# Patient Record
Sex: FEMALE | Race: WHITE | Employment: OTHER | URBAN - METROPOLITAN AREA
[De-identification: names, ages, dates, MRNs, and addresses within clinical notes are randomized per-mention and may not be internally consistent; named-entity substitution may affect disease eponyms.]

---

## 2021-05-03 ENCOUNTER — APPOINTMENT (OUTPATIENT)
Dept: GENERAL RADIOLOGY | Age: 71
DRG: 853 | End: 2021-05-03
Payer: MEDICARE

## 2021-05-03 ENCOUNTER — HOSPITAL ENCOUNTER (INPATIENT)
Age: 71
LOS: 25 days | Discharge: NURSING, REHAB OR LONG-TERM CARE | DRG: 853 | End: 2021-05-28
Attending: EMERGENCY MEDICINE | Admitting: FAMILY MEDICINE
Payer: MEDICARE

## 2021-05-03 ENCOUNTER — APPOINTMENT (OUTPATIENT)
Dept: MRI IMAGING | Age: 71
DRG: 853 | End: 2021-05-03
Payer: MEDICARE

## 2021-05-03 DIAGNOSIS — K44.0: ICD-10-CM

## 2021-05-03 DIAGNOSIS — I10 ESSENTIAL HYPERTENSION: ICD-10-CM

## 2021-05-03 DIAGNOSIS — K44.9 HIATAL HERNIA: ICD-10-CM

## 2021-05-03 DIAGNOSIS — K85.90 ACUTE PANCREATITIS, UNSPECIFIED COMPLICATION STATUS, UNSPECIFIED PANCREATITIS TYPE: Primary | ICD-10-CM

## 2021-05-03 DIAGNOSIS — J98.51 MEDIASTINITIS: ICD-10-CM

## 2021-05-03 DIAGNOSIS — K22.89 PARAESOPHAGEAL FLUID COLLECTION: ICD-10-CM

## 2021-05-03 DIAGNOSIS — N17.9 AKI (ACUTE KIDNEY INJURY) (HCC): ICD-10-CM

## 2021-05-03 DIAGNOSIS — E11.10 DIABETIC KETOACIDOSIS WITHOUT COMA ASSOCIATED WITH TYPE 2 DIABETES MELLITUS (HCC): ICD-10-CM

## 2021-05-03 DIAGNOSIS — K31.89: ICD-10-CM

## 2021-05-03 DIAGNOSIS — K46.0 HERNIA WITH OBSTRUCTION: ICD-10-CM

## 2021-05-03 LAB
ALBUMIN SERPL-MCNC: 3.3 G/DL (ref 3.5–5.2)
ALBUMIN SERPL-MCNC: 3.6 G/DL (ref 3.5–5.2)
ALBUMIN/GLOBULIN RATIO: 1.2 (ref 1–2.5)
ALLEN TEST: ABNORMAL
ALP BLD-CCNC: 77 U/L (ref 35–104)
ALT SERPL-CCNC: 14 U/L (ref 5–33)
AMMONIA: 19 UMOL/L (ref 11–51)
ANION GAP SERPL CALCULATED.3IONS-SCNC: 20 MMOL/L (ref 9–17)
ANION GAP SERPL CALCULATED.3IONS-SCNC: 24 MMOL/L (ref 9–17)
ANION GAP: 14 MMOL/L (ref 7–16)
AST SERPL-CCNC: 30 U/L
BETA-HYDROXYBUTYRATE: 2.85 MMOL/L (ref 0.02–0.27)
BILIRUB SERPL-MCNC: 3.74 MG/DL (ref 0.3–1.2)
BUN BLDV-MCNC: 92 MG/DL (ref 8–23)
BUN BLDV-MCNC: 95 MG/DL (ref 8–23)
BUN/CREAT BLD: ABNORMAL (ref 9–20)
BUN/CREAT BLD: ABNORMAL (ref 9–20)
CALCIUM SERPL-MCNC: 7.7 MG/DL (ref 8.6–10.4)
CALCIUM SERPL-MCNC: 7.8 MG/DL (ref 8.6–10.4)
CHLORIDE BLD-SCNC: 92 MMOL/L (ref 98–107)
CHLORIDE BLD-SCNC: 95 MMOL/L (ref 98–107)
CO2: 28 MMOL/L (ref 20–31)
CO2: 32 MMOL/L (ref 20–31)
CREAT SERPL-MCNC: 2.38 MG/DL (ref 0.5–0.9)
CREAT SERPL-MCNC: 2.49 MG/DL (ref 0.5–0.9)
FIO2: ABNORMAL
GFR AFRICAN AMERICAN: 23 ML/MIN
GFR AFRICAN AMERICAN: 25 ML/MIN
GFR NON-AFRICAN AMERICAN: 11 ML/MIN
GFR NON-AFRICAN AMERICAN: 19 ML/MIN
GFR NON-AFRICAN AMERICAN: 20 ML/MIN
GFR SERPL CREATININE-BSD FRML MDRD: 14 ML/MIN
GFR SERPL CREATININE-BSD FRML MDRD: ABNORMAL ML/MIN/{1.73_M2}
GLUCOSE BLD-MCNC: 444 MG/DL (ref 70–99)
GLUCOSE BLD-MCNC: 449 MG/DL (ref 74–100)
GLUCOSE BLD-MCNC: 467 MG/DL (ref 70–99)
HCO3 VENOUS: 35.8 MMOL/L (ref 22–29)
INR BLD: 1.2
LACTIC ACID, WHOLE BLOOD: 2.7 MMOL/L (ref 0.7–2.1)
MAGNESIUM: 2.4 MG/DL (ref 1.6–2.6)
MODE: ABNORMAL
NEGATIVE BASE EXCESS, VEN: ABNORMAL (ref 0–2)
O2 DEVICE/FLOW/%: ABNORMAL
O2 SAT, VEN: 92 % (ref 60–85)
PATIENT TEMP: ABNORMAL
PCO2, VEN: 35.3 MM HG (ref 41–51)
PH VENOUS: 7.61 (ref 7.32–7.43)
PHOSPHORUS: 3.6 MG/DL (ref 2.6–4.5)
PO2, VEN: 52.2 MM HG (ref 30–50)
POC BUN: 98 MG/DL (ref 8–26)
POC CHLORIDE: 97 MMOL/L (ref 98–107)
POC CREATININE: 3.95 MG/DL (ref 0.51–1.19)
POC HEMATOCRIT: 53 % (ref 36–46)
POC HEMOGLOBIN: 17.9 G/DL (ref 12–16)
POC IONIZED CALCIUM: 0.72 MMOL/L (ref 1.15–1.33)
POC LACTIC ACID: 2.84 MMOL/L (ref 0.56–1.39)
POC PCO2 TEMP: ABNORMAL MM HG
POC PH TEMP: ABNORMAL
POC PO2 TEMP: ABNORMAL MM HG
POC POTASSIUM: 2.3 MMOL/L (ref 3.5–4.5)
POC SODIUM: 145 MMOL/L (ref 138–146)
POC TCO2: 35 MMOL/L (ref 22–30)
POSITIVE BASE EXCESS, VEN: 14 (ref 0–3)
POTASSIUM SERPL-SCNC: 2.1 MMOL/L (ref 3.7–5.3)
POTASSIUM SERPL-SCNC: 3.2 MMOL/L (ref 3.7–5.3)
PROTHROMBIN TIME: 12.6 SEC (ref 9.1–12.3)
SAMPLE SITE: ABNORMAL
SERUM OSMOLALITY: 350 MOSM/KG (ref 275–295)
SODIUM BLD-SCNC: 143 MMOL/L (ref 135–144)
SODIUM BLD-SCNC: 148 MMOL/L (ref 135–144)
THYROXINE, FREE: 1.11 NG/DL (ref 0.93–1.7)
TOTAL CO2, VENOUS: ABNORMAL MMOL/L (ref 23–30)
TOTAL PROTEIN: 6.7 G/DL (ref 6.4–8.3)
TROPONIN INTERP: ABNORMAL
TROPONIN INTERP: ABNORMAL
TROPONIN T: ABNORMAL NG/ML
TROPONIN T: ABNORMAL NG/ML
TROPONIN, HIGH SENSITIVITY: 85 NG/L (ref 0–14)
TROPONIN, HIGH SENSITIVITY: 94 NG/L (ref 0–14)
TSH SERPL DL<=0.05 MIU/L-ACNC: 13.36 MIU/L (ref 0.3–5)

## 2021-05-03 PROCEDURE — 84484 ASSAY OF TROPONIN QUANT: CPT

## 2021-05-03 PROCEDURE — 82010 KETONE BODYS QUAN: CPT

## 2021-05-03 PROCEDURE — 84443 ASSAY THYROID STIM HORMONE: CPT

## 2021-05-03 PROCEDURE — 83735 ASSAY OF MAGNESIUM: CPT

## 2021-05-03 PROCEDURE — 99223 1ST HOSP IP/OBS HIGH 75: CPT | Performed by: NURSE PRACTITIONER

## 2021-05-03 PROCEDURE — 82803 BLOOD GASES ANY COMBINATION: CPT

## 2021-05-03 PROCEDURE — 84520 ASSAY OF UREA NITROGEN: CPT

## 2021-05-03 PROCEDURE — 82947 ASSAY GLUCOSE BLOOD QUANT: CPT

## 2021-05-03 PROCEDURE — 82565 ASSAY OF CREATININE: CPT

## 2021-05-03 PROCEDURE — 6360000002 HC RX W HCPCS: Performed by: STUDENT IN AN ORGANIZED HEALTH CARE EDUCATION/TRAINING PROGRAM

## 2021-05-03 PROCEDURE — 84100 ASSAY OF PHOSPHORUS: CPT

## 2021-05-03 PROCEDURE — 2580000003 HC RX 258: Performed by: NURSE PRACTITIONER

## 2021-05-03 PROCEDURE — 96375 TX/PRO/DX INJ NEW DRUG ADDON: CPT

## 2021-05-03 PROCEDURE — 6360000002 HC RX W HCPCS: Performed by: NURSE PRACTITIONER

## 2021-05-03 PROCEDURE — 83605 ASSAY OF LACTIC ACID: CPT

## 2021-05-03 PROCEDURE — 74181 MRI ABDOMEN W/O CONTRAST: CPT

## 2021-05-03 PROCEDURE — 82040 ASSAY OF SERUM ALBUMIN: CPT

## 2021-05-03 PROCEDURE — 85014 HEMATOCRIT: CPT

## 2021-05-03 PROCEDURE — 71045 X-RAY EXAM CHEST 1 VIEW: CPT

## 2021-05-03 PROCEDURE — 96374 THER/PROPH/DIAG INJ IV PUSH: CPT

## 2021-05-03 PROCEDURE — 99285 EMERGENCY DEPT VISIT HI MDM: CPT

## 2021-05-03 PROCEDURE — 82140 ASSAY OF AMMONIA: CPT

## 2021-05-03 PROCEDURE — 83930 ASSAY OF BLOOD OSMOLALITY: CPT

## 2021-05-03 PROCEDURE — 82330 ASSAY OF CALCIUM: CPT

## 2021-05-03 PROCEDURE — 2580000003 HC RX 258: Performed by: STUDENT IN AN ORGANIZED HEALTH CARE EDUCATION/TRAINING PROGRAM

## 2021-05-03 PROCEDURE — 84439 ASSAY OF FREE THYROXINE: CPT

## 2021-05-03 PROCEDURE — 85610 PROTHROMBIN TIME: CPT

## 2021-05-03 PROCEDURE — 80048 BASIC METABOLIC PNL TOTAL CA: CPT

## 2021-05-03 PROCEDURE — 6370000000 HC RX 637 (ALT 250 FOR IP): Performed by: NURSE PRACTITIONER

## 2021-05-03 PROCEDURE — 80051 ELECTROLYTE PANEL: CPT

## 2021-05-03 PROCEDURE — 2000000000 HC ICU R&B

## 2021-05-03 PROCEDURE — 80053 COMPREHEN METABOLIC PANEL: CPT

## 2021-05-03 PROCEDURE — 93005 ELECTROCARDIOGRAM TRACING: CPT | Performed by: STUDENT IN AN ORGANIZED HEALTH CARE EDUCATION/TRAINING PROGRAM

## 2021-05-03 RX ORDER — MAGNESIUM SULFATE IN WATER 40 MG/ML
2000 INJECTION, SOLUTION INTRAVENOUS ONCE
Status: COMPLETED | OUTPATIENT
Start: 2021-05-03 | End: 2021-05-03

## 2021-05-03 RX ORDER — SODIUM CHLORIDE, SODIUM LACTATE, POTASSIUM CHLORIDE, CALCIUM CHLORIDE 600; 310; 30; 20 MG/100ML; MG/100ML; MG/100ML; MG/100ML
INJECTION, SOLUTION INTRAVENOUS CONTINUOUS
Status: DISCONTINUED | OUTPATIENT
Start: 2021-05-04 | End: 2021-05-04

## 2021-05-03 RX ORDER — POTASSIUM CHLORIDE 7.45 MG/ML
20 INJECTION INTRAVENOUS ONCE
Status: COMPLETED | OUTPATIENT
Start: 2021-05-03 | End: 2021-05-04

## 2021-05-03 RX ORDER — DEXTROSE MONOHYDRATE 25 G/50ML
12.5 INJECTION, SOLUTION INTRAVENOUS PRN
Status: DISCONTINUED | OUTPATIENT
Start: 2021-05-03 | End: 2021-05-12

## 2021-05-03 RX ORDER — MAGNESIUM SULFATE 1 G/100ML
1000 INJECTION INTRAVENOUS PRN
Status: DISCONTINUED | OUTPATIENT
Start: 2021-05-03 | End: 2021-05-05

## 2021-05-03 RX ORDER — SODIUM CHLORIDE 0.9 % (FLUSH) 0.9 %
5-40 SYRINGE (ML) INJECTION PRN
Status: DISCONTINUED | OUTPATIENT
Start: 2021-05-03 | End: 2021-05-28 | Stop reason: HOSPADM

## 2021-05-03 RX ORDER — ACETAMINOPHEN 325 MG/1
650 TABLET ORAL EVERY 4 HOURS PRN
Status: DISCONTINUED | OUTPATIENT
Start: 2021-05-03 | End: 2021-05-05

## 2021-05-03 RX ORDER — ONDANSETRON 2 MG/ML
4 INJECTION INTRAMUSCULAR; INTRAVENOUS EVERY 6 HOURS PRN
Status: DISCONTINUED | OUTPATIENT
Start: 2021-05-03 | End: 2021-05-28 | Stop reason: HOSPADM

## 2021-05-03 RX ORDER — DEXTROSE, SODIUM CHLORIDE, AND POTASSIUM CHLORIDE 5; .45; .15 G/100ML; G/100ML; G/100ML
INJECTION INTRAVENOUS CONTINUOUS PRN
Status: CANCELLED | OUTPATIENT
Start: 2021-05-03

## 2021-05-03 RX ORDER — INSULIN GLARGINE 100 [IU]/ML
0.15 INJECTION, SOLUTION SUBCUTANEOUS NIGHTLY
Status: DISCONTINUED | OUTPATIENT
Start: 2021-05-03 | End: 2021-05-04

## 2021-05-03 RX ORDER — 0.9 % SODIUM CHLORIDE 0.9 %
1000 INTRAVENOUS SOLUTION INTRAVENOUS ONCE
Status: COMPLETED | OUTPATIENT
Start: 2021-05-03 | End: 2021-05-03

## 2021-05-03 RX ORDER — SODIUM CHLORIDE 9 MG/ML
25 INJECTION, SOLUTION INTRAVENOUS PRN
Status: DISCONTINUED | OUTPATIENT
Start: 2021-05-03 | End: 2021-05-12

## 2021-05-03 RX ORDER — SODIUM CHLORIDE, SODIUM LACTATE, POTASSIUM CHLORIDE, CALCIUM CHLORIDE 600; 310; 30; 20 MG/100ML; MG/100ML; MG/100ML; MG/100ML
INJECTION, SOLUTION INTRAVENOUS CONTINUOUS
Status: DISCONTINUED | OUTPATIENT
Start: 2021-05-03 | End: 2021-05-04

## 2021-05-03 RX ORDER — ALOGLIPTIN 6.25 MG/1
6.25 TABLET, FILM COATED ORAL DAILY
Status: DISCONTINUED | OUTPATIENT
Start: 2021-05-03 | End: 2021-05-05

## 2021-05-03 RX ORDER — HYDROCODONE BITARTRATE AND ACETAMINOPHEN 5; 325 MG/1; MG/1
1 TABLET ORAL EVERY 4 HOURS PRN
Status: DISCONTINUED | OUTPATIENT
Start: 2021-05-03 | End: 2021-05-04

## 2021-05-03 RX ORDER — GLIPIZIDE 5 MG/1
5 TABLET ORAL
Status: DISCONTINUED | OUTPATIENT
Start: 2021-05-04 | End: 2021-05-05

## 2021-05-03 RX ORDER — POTASSIUM CHLORIDE 7.45 MG/ML
10 INJECTION INTRAVENOUS PRN
Status: DISCONTINUED | OUTPATIENT
Start: 2021-05-03 | End: 2021-05-06

## 2021-05-03 RX ORDER — ATORVASTATIN CALCIUM 10 MG/1
10 TABLET, FILM COATED ORAL DAILY
Status: DISCONTINUED | OUTPATIENT
Start: 2021-05-03 | End: 2021-05-03

## 2021-05-03 RX ORDER — ASPIRIN 81 MG/1
81 TABLET ORAL DAILY
Status: DISCONTINUED | OUTPATIENT
Start: 2021-05-03 | End: 2021-05-05

## 2021-05-03 RX ORDER — SODIUM CHLORIDE 9 MG/ML
INJECTION, SOLUTION INTRAVENOUS CONTINUOUS
Status: DISCONTINUED | OUTPATIENT
Start: 2021-05-03 | End: 2021-05-04

## 2021-05-03 RX ORDER — PROMETHAZINE HYDROCHLORIDE 12.5 MG/1
12.5 TABLET ORAL EVERY 6 HOURS PRN
Status: DISCONTINUED | OUTPATIENT
Start: 2021-05-03 | End: 2021-05-05

## 2021-05-03 RX ORDER — LEVOTHYROXINE SODIUM 112 UG/1
112 TABLET ORAL DAILY
Status: DISCONTINUED | OUTPATIENT
Start: 2021-05-04 | End: 2021-05-05

## 2021-05-03 RX ORDER — HYDROCHLOROTHIAZIDE 25 MG/1
25 TABLET ORAL DAILY
Status: DISCONTINUED | OUTPATIENT
Start: 2021-05-03 | End: 2021-05-05

## 2021-05-03 RX ORDER — SODIUM CHLORIDE 0.9 % (FLUSH) 0.9 %
5-40 SYRINGE (ML) INJECTION EVERY 12 HOURS SCHEDULED
Status: DISCONTINUED | OUTPATIENT
Start: 2021-05-03 | End: 2021-05-28 | Stop reason: HOSPADM

## 2021-05-03 RX ORDER — HEPARIN SODIUM 5000 [USP'U]/ML
5000 INJECTION, SOLUTION INTRAVENOUS; SUBCUTANEOUS EVERY 8 HOURS SCHEDULED
Status: DISCONTINUED | OUTPATIENT
Start: 2021-05-03 | End: 2021-05-05

## 2021-05-03 RX ORDER — HYDROCODONE BITARTRATE AND ACETAMINOPHEN 5; 325 MG/1; MG/1
2 TABLET ORAL EVERY 4 HOURS PRN
Status: DISCONTINUED | OUTPATIENT
Start: 2021-05-03 | End: 2021-05-04

## 2021-05-03 RX ORDER — LISINOPRIL 10 MG/1
10 TABLET ORAL DAILY
Status: DISCONTINUED | OUTPATIENT
Start: 2021-05-03 | End: 2021-05-05

## 2021-05-03 RX ORDER — PANTOPRAZOLE SODIUM 40 MG/1
40 TABLET, DELAYED RELEASE ORAL
Status: DISCONTINUED | OUTPATIENT
Start: 2021-05-04 | End: 2021-05-04

## 2021-05-03 RX ADMIN — INSULIN GLARGINE 11 UNITS: 100 INJECTION, SOLUTION SUBCUTANEOUS at 21:31

## 2021-05-03 RX ADMIN — POTASSIUM CHLORIDE 20 MEQ: 7.46 INJECTION, SOLUTION INTRAVENOUS at 13:57

## 2021-05-03 RX ADMIN — HEPARIN SODIUM 5000 UNITS: 5000 INJECTION INTRAVENOUS; SUBCUTANEOUS at 22:22

## 2021-05-03 RX ADMIN — SODIUM CHLORIDE, POTASSIUM CHLORIDE, SODIUM LACTATE AND CALCIUM CHLORIDE: 600; 310; 30; 20 INJECTION, SOLUTION INTRAVENOUS at 22:37

## 2021-05-03 RX ADMIN — POTASSIUM CHLORIDE 10 MEQ: 10 INJECTION, SOLUTION INTRAVENOUS at 21:31

## 2021-05-03 RX ADMIN — MAGNESIUM SULFATE 2000 MG: 2 INJECTION INTRAVENOUS at 13:57

## 2021-05-03 RX ADMIN — SODIUM CHLORIDE 1000 ML: 9 INJECTION, SOLUTION INTRAVENOUS at 13:57

## 2021-05-03 RX ADMIN — POTASSIUM CHLORIDE 10 MEQ: 10 INJECTION, SOLUTION INTRAVENOUS at 22:35

## 2021-05-03 RX ADMIN — ONDANSETRON 4 MG: 2 INJECTION INTRAMUSCULAR; INTRAVENOUS at 17:04

## 2021-05-03 RX ADMIN — POTASSIUM CHLORIDE 10 MEQ: 10 INJECTION, SOLUTION INTRAVENOUS at 23:45

## 2021-05-03 RX ADMIN — INSULIN LISPRO 9 UNITS: 100 INJECTION, SOLUTION INTRAVENOUS; SUBCUTANEOUS at 21:31

## 2021-05-03 ASSESSMENT — ENCOUNTER SYMPTOMS
NAUSEA: 1
ABDOMINAL PAIN: 0
VOMITING: 1
DIARRHEA: 1
SHORTNESS OF BREATH: 0

## 2021-05-03 NOTE — ED TRIAGE NOTES
Pt presents to the ED via EMS after waking up on the floor of her home. Pt states she was getting a drink of water around 230 am and woke up on the floor. Pt lives alone. Pt states she has been having diarrhea and vomiting for 6 days. Pt has not taken her medications d/t frequent episodes of vomiting. Pt is jaundace upon appearance.    AxO x4, RR even and unlabored on 2L NC.

## 2021-05-03 NOTE — ED PROVIDER NOTES
[facility]  Emergency Department  Faculty Attestation     I performed a history and physical examination of the patient and discussed management with the resident. I reviewed the residents note and agree with the documented findings and plan of care. Any areas of disagreement are noted on the chart. I was personally present for the key portions of any procedures. I have documented in the chart those procedures where I was not present during the key portions. I have reviewed the emergency nurses triage note. I agree with the chief complaint, past medical history, past surgical history, allergies, medications, social and family history as documented unless otherwise noted below. For Physician Assistant/ Nurse Practitioner cases/documentation I have personally evaluated this patient and have completed at least one if not all key elements of the E/M (history, physical exam, and MDM). Additional findings are as noted. Primary Care Physician:  Treasure Ventura DO    Screenings:  [unfilled]    CHIEF COMPLAINT       Chief Complaint   Patient presents with    Blood Sugar Problem     >450    Hernia     hyatial        RECENT VITALS:    ,  Pulse: 86, Resp: 18, BP: (!) 127/91    LABS:  Labs Reviewed   COMPREHENSIVE METABOLIC PANEL   PROTIME-INR   LACTIC ACID, WHOLE BLOOD   AMMONIA   TROPONIN   POC BLOOD GAS AND CHEMISTRY       Radiology  XR CHEST PORTABLE    (Results Pending)       CRITICAL CARE: There was a high probability of clinically significant/life threatening deterioration in this patient's condition which required my urgent intervention. Total critical care time was 10 minutes. This excludes any time for separately reportable procedures.      EKG:   EKG Interpretation    Interpreted by me    Rhythm: normal sinus   Rate: normal  Axis: normal  Ectopy: none  Conduction: normal  ST Segments: Elevation in aVR and V1  T Waves: Flattening, inversion inferolaterally  Q Waves: none  U waves noted    Clinical Impression: Nonspecific ST and T wave changes, consider ischemia    Attending Physician Additional  Notes    Patient is transferred from outside hospital.  She is diabetic, has been ill with diarrhea for the past 6 days, vomiting for the past 5 days. She woke up on her kitchen floor this morning with a head contusion. She feels weak. No chest pain or abdominal pain. She does have urine issues. At outside hospital she had normal CT brain and cervical spine, CT abdomen showed hiatal hernia. Blood work shows leukocytosis, elevated glucose, metabolic acidosis with lactate, hypochloremia and severe hypokalemia to 2.2 initially and then one-point 3:08 rounds of 10 mill equivalents of potassium. She has severe prerenal acidemia. She received 3 L of IV fluids. She also received IV Zosyn. She has markedly elevated lipase. She has persistent nausea being treated with Phenergan. On arrival patient looks uncomfortable, stable vitals. She is afebrile and anicteric. She is in no acute distress. There is mild icterus noted. Lungs are clear. Abdomen is soft and nontender. No distention rebound or guarding. No peripheral edema. Impression is severe dehydration, pancreatitis, hypokalemia, presumed magnesium deficiency, DKA, hiatal hernia, hypochloremia. Plan is repeat labs, cardiac monitor, EKG, IV potassium and magnesium, continue fluids, insulin once potassium is over 3.3, admission to a monitored bed. Point-of-care chemistries here reveals respiratory alkalosis to pH 7.6. My suspicion for DKA is low. CT abdomen is reviewed and does appear that there is a large amount of gastric distention along with hiatal hernia concerning for essentially gastric volvulus. On reexam she appears in no distress, denies chest pain or abdominal pain. Abdomen is soft and nontender.   Will give trial of NG tube but general surgery is consulted, anticipate need for thoracic surgery consultation. Soraya Walker.  Grant Chavez MD, [address] [facility]  Attending Emergency  Physician               Sadi Mcguire MD  05/03/21 1317       Sadi Mcguire MD  05/03/21 1404

## 2021-05-03 NOTE — ED NOTES
Bed: 15  Expected date:   Expected time:   Means of arrival:   Comments:  Milagros Escamilla RN  05/03/21 9488

## 2021-05-03 NOTE — Clinical Note
Patient Class: Inpatient [101]   REQUIRED: Diagnosis: Acute pancreatitis, unspecified complication status, unspecified pancreatitis type [[id number]]   Estimated Length of Stay: Estimated stay of more than 2 midnights   Admitting Provider: Pearline Lundborg [[id number]]   Telemetry Bed Required?: Yes

## 2021-05-03 NOTE — ED NOTES
The following labs labeled with pt sticker and tubed:     -Green Pedi Tube         Ericka Guardado RN  05/03/21 4567

## 2021-05-03 NOTE — ED NOTES
Pt resting on cot. Family at bedside. Pt and family updated on plan of care. NAD noted.   Will continue to monitor     Nini Heredia RN  05/03/21 4157

## 2021-05-03 NOTE — CONSULTS
General Surgery   Consultation        PATIENT NAME: Corinne Houston OF BIRTH: 1950  MRN: [id number]    ADMISSION DATE: 5/3/2021 12:49 PM     Consulting Physician: Dr. João Shin Physician: Dr. Inman Raman: 5/3/2021    Consult regarding: Hiatal hernia       Cc: Vomiting    HPI:  The patient is a 78 y.o. female  who presents as a transfer from [place]. Patient states that over the past year, she has experienced intermittent nausea and vomiting. She states that typically, these episodes only last for about a day and resolve spontaneously. She states that over the past 6 days, she has been persistently nauseated and has vomiting several times. Patient also reports diarrhea with this. She denies any blood in there emesis or stool. Patient reports feeling weak overall. She states that she has been experiencing reflux over the past year as well, and has modified her diet. She states she sticks to smaller meals and bland foods. Patient states that she is passing flatus, but has not as much over the past 6 days. Reports increased belching. She states that she was seen and evaluated at the emergency department in her home town 4[place], and was told she had a hiatal hernia at that time. She denies any known history piror to this. Patient denies any abdominal or chest pain. CT imaging performed prior to transfer revealed an obstruction of the stomach, due to portions of the stomach being contained in a very large hiatal hernia. Past Medical History:        Diagnosis Date    Diabetes mellitus ([address]     Gout     Hiatal hernia     Hyperlipidemia     Hypertension     Thyroid disease        Past Surgical History:        Procedure Laterality Date    BREAST SURGERY      Bx 1993    CHOLECYSTECTOMY      HYSTERECTOMY         Medications Prior to Admission:   Not in a hospital admission.     Allergies:  No known allergies    Social History:   Social History     Socioeconomic History    Marital status:      Spouse name: Not on file    Number of children: Not on file    Years of education: Not on file    Highest education level: Not on file   Occupational History    Not on file   Social Needs    Financial resource strain: Not on file    Food insecurity     Worry: Not on file     Inability: Not on file    Transportation needs     Medical: Not on file     Non-medical: Not on file   Tobacco Use    Smoking status: Never Smoker    Smokeless tobacco: Never Used   Substance and Sexual Activity    Alcohol use: Never     Frequency: Never    Drug use: Never    Sexual activity: Not on file   Lifestyle    Physical activity     Days per week: Not on file     Minutes per session: Not on file    Stress: Not on file   Relationships    Social connections     Talks on phone: Not on file     Gets together: Not on file     Attends Taoist service: Not on file     Active member of club or organization: Not on file     Attends meetings of clubs or organizations: Not on file     Relationship status: Not on file    Intimate partner violence     Fear of current or ex partner: Not on file     Emotionally abused: Not on file     Physically abused: Not on file     Forced sexual activity: Not on file   Other Topics Concern    Not on file   Social History Narrative    Not on file       Family History:   History reviewed. No pertinent family history. Review of Systems:    Gen: Positive for general weakness. No fever or chills  Eyes: No blurry vision or conjunctivitis   ENT: No sinus congestion or sore throat  CV: No chest pain or dyspnea on exertion  Pulm: No cough or shortness of breath  GI: Positive for nausea, vomiting, and diarrhea. No abdominal pain.   Renal: No dysuria or hematuria  Endo: No excessive sweating or cold intolerance  Heme/Onc: No excessive bruising or swollen lymph nodes  Neuro: No difficulty with speech or acute extremity weakness  Skin: No rashes or ulcers  Musculoskeletal: Denies muscle, joint, back pain      PHYSICAL EXAM:    VITALS:  BP (!) 127/91   Pulse 86   Temp 98.6 °F (37 °C) (Oral)   Resp 18   Ht 5' 2\" (1.575 m)   Wt 166 lb (75.3 kg)   SpO2 97%   BMI 30.36 kg/m²   INTAKE/OUTPUT:   No intake or output data in the 24 hours ending 05/03/21 1408    CONSTITUTIONAL: awake, alert, cooperative, no apparent distress  HEAD: atraumatic, normocephalic  EYES: sclera icteric, pupils equal and reactive to light  ENT: ears are symmetric, nares patent, moist mucous membranes  NECK: Supple, symmetrical, trachea midline  LUNGS: no respiratory distress, no audible wheezing  CARDIOVASCULAR: regular rate   ABDOMEN: soft, nontender, nondistended, non-peritoneal on exam  MUSCULOSKELETAL: full range of motion noted  NEUROLOGIC: Awake, alert, oriented to name, place and time  EXTREMITIES: no edema, cyanosis or clubbing  SKIN: normal coloration and turgor      CBC with Differential:    Lab Results   Component Value Date    WBC 23.5 05/03/2021    RBC 6.23 05/03/2021    HGB 18.3 05/03/2021    HCT 54.2 05/03/2021     05/03/2021    MCV 87 05/03/2021    MCH 29.4 05/03/2021    MCHC 33.8 05/03/2021    LYMPHOPCT 6 05/03/2021    MONOSABS 1.8 05/03/2021    LYMPHSABS 1.4 05/03/2021    EOSABS 0.0 05/03/2021    BASOSABS 0.0 05/03/2021     CMP:    Lab Results   Component Value Date     05/03/2021    K 3.2 05/03/2021    CL 92 05/03/2021    CO2 32 05/03/2021    BUN 95 05/03/2021    CREATININE 2.49 05/03/2021    CREATININE 3.09 05/03/2021    GFRAA 23 05/03/2021    LABGLOM 19 05/03/2021    GLUCOSE 444 05/03/2021    GLUCOSE 453 05/03/2021    PROT 6.7 05/03/2021    PROT 7.3 05/03/2021    LABALBU 3.6 05/03/2021    CALCIUM 7.7 05/03/2021    BILITOT 3.74 05/03/2021    ALKPHOS 77 05/03/2021    AST 30 05/03/2021    ALT 14 05/03/2021     Magnesium:    Lab Results   Component Value Date    MG 1.7 05/03/2021     Phosphorus:  No results found for: PHOS  Troponin:    Lab Results   Component Value Date    TROPONINI 0.404 consciousness. COMPARISON: None. TECHNIQUE: CT examination of the head without IV contrast. Dose reduction techniques were achieved by using automated exposure control and/or adjustment of mA and/or kV according to patient size and/or use of iterative reconstruction technique. FINDINGS: There is no evidence of acute hemorrhage or infarct. There is no evidence of mass effect or midline shift. The CSF spaces are normal. The contents of the orbits are normal. The visualized sinuses and mastoid air cells are clear. Osseous structures are normal. Extracranial soft tissues are normal. Report electronically signed by: Dr. Darryl Oswald    No acute intracranial process. If clinical concern remains, consider further imaging with MRI. Ct Cervical Spine Wo Contrast    Result Date: 5/3/2021  EXAMINATION: CT CERVICAL SPINE WO CONTRAST HISTORY: Fall with loss of consciousness. COMPARISON: None. TECHNIQUE: CT Cervical spine without IV contrast. Coronal and sagittal reformations were performed. Dose reduction techniques were achieved by using automated exposure control and/or adjustment of mA and/or kV according to patient size and/or use of iterative reconstruction technique. FINDINGS: There is no evidence of acute fracture or subluxation. No suspicious bone lesions are seen. There are mild to moderate degenerative disc changes. No level shows significant canal or neuroforaminal narrowing. Paraspinal muscles are normal. Visualized lung apices are clear. Report electronically signed by: Dr. Darryl Oswald    No acute findings in the cervical spine. Xr Chest Portable    Result Date: 5/3/2021  EXAMINATION: ONE XRAY VIEW OF THE CHEST 5/3/2021 10:43 am COMPARISON: None. HISTORY: ORDERING SYSTEM PROVIDED HISTORY: cp TECHNOLOGIST PROVIDED HISTORY: cp FINDINGS: There is suboptimal inspiration. The cardiac size is upper limits of normal. No acute infiltrates or pleural effusions are seen.  Pulmonary vascularity appears normal. There is mild ectasia of the thoracic aorta. There are degenerative changes in the spine and shoulders. No acute bony abnormalities. The hilar structures are normal.  Large hiatal hernia. No acute cardiopulmonary disease Large hiatal hernia         ASSESSMENT   78year old female with a large hiatal hernia, likely chronic. Leukocytosis, hyperbilirubinemia, pancreatitis       PLAN    1. Recommend admission to medicine  2. NPO  3. Recommend NGT placement should patient develop nausea/vomiting   4. Maintenance IVF   5. MRCP for further evaluation of jaundice/elevated bilirubin   6. Recommend GI consult for further evaluation of hyperbilirubinemia, pancreatitis   7. No acute surgical intervention at this time. Hiatal hernia likely chronic and unlikely to be sole source of current symptoms and lab abnormalities. Hyperbilirubinemia will need worked up and patient would need to be medically optimized prior to consideration for elective repair. General surgery will continue to follow. Patient and plan discussed with senior resident and attending surgeon Dr. Sami Dorman. Chanel Maloney DO PGY-1   5/3/2021 at 2:08 PM     Attending Physician Statement  I have discussed the case with Dr Evelyn Esquivel, including pertinent history and exam findings with the resident. I have seen and examined the patient and the key elements of the encounter have been performed by me. I agree with the assessment, plan and orders as documented by the resident.       Electronically signed by Hany Mora IV, DO  on 5/5/2021 at 8:57 AM

## 2021-05-03 NOTE — ED NOTES
The following labs labeled with pt sticker and tubed:     [x] Lavender   [x] on ice   [x] Blue   [x] Green/yellow x2 (first and redraw)  [x] Green/black [x] on ice  [] Pink  [] Red  [] Yellow     [] COVID-19 swab    [] Rapid     [] Urine Sample  [] Pelvic Cultures    [] Blood Cultures            Michelle Nguyễn RN  05/03/21 2833

## 2021-05-03 NOTE — ED PROVIDER NOTES
[facility] ED  Emergency Department Encounter  Emergency Medicine Resident     Pt Name: Anamaria Bedoya  MRN: [id number]  [place] 1950  Date of evaluation: 5/3/21  PCP:  Mickey Greco, [address] [place] [address]       Chief Complaint   Patient presents with    Blood Sugar Problem     >450    Hernia     hyatial        HISTORY OFPRESENT ILLNESS  (Location/Symptom, Timing/Onset, Context/Setting, Quality, Duration, Modifying Thomascathryn Cooper.)      Anamaria Bedoya is a 78year old female who presents from outlying facility for concern of an STEMI and DKA. The patient was evaluated at outline facility for syncopal event this morning. Patient reports that she was getting up to get a drink of water and felt so fatigued she lost consciousness. The patient has about 1 week of nausea/vomiting, unable to take her home medications for hypertension or DM. Patient was evaluated and found to have lipase 1451, glucose 585 and leukocytosis to 23.5. Patient had elevated beta hydroxy butyrate and troponin as well. The CT scan of her head, cervical spine and abdomen which showed no acute abnormalities. UA negative. The patient was given aspirin and Zosyn and transferred here for further evaluation. PAST MEDICAL / SURGICAL / SOCIAL / FAMILY HISTORY      has a past medical history of Diabetes mellitus ([address] Gout, Hiatal hernia, Hyperlipidemia, Hypertension, and Thyroid disease. has a past surgical history that includes Cholecystectomy; Hysterectomy; and Breast surgery.      Social History     Socioeconomic History    Marital status:      Spouse name: Not on file    Number of children: Not on file    Years of education: Not on file    Highest education level: Not on file   Occupational History    Not on file   Social Needs    Financial resource strain: Not on file    Food insecurity     Worry: Not on file     Inability: Not on file    Transportation needs     Medical: Not on file     Non-medical: Not on file Tobacco Use    Smoking status: Never Smoker    Smokeless tobacco: Never Used   Substance and Sexual Activity    Alcohol use: Never     Frequency: Never    Drug use: Never    Sexual activity: Not on file   Lifestyle    Physical activity     Days per week: Not on file     Minutes per session: Not on file    Stress: Not on file   Relationships    Social connections     Talks on phone: Not on file     Gets together: Not on file     Attends Mor[employer] service: Not on file     Active member of club or organization: Not on file     Attends meetings of clubs or organizations: Not on file     Relationship status: Not on file    Intimate partner violence     Fear of current or ex partner: Not on file     Emotionally abused: Not on file     Physically abused: Not on file     Forced sexual activity: Not on file   Other Topics Concern    Not on file   Social History Narrative    Not on file       History reviewed. No pertinent family history. Allergies:  No known allergies    Home Medications:  Prior to Admission medications    Medication Sig Start Date End Date Taking?  Authorizing Provider   metFORMIN (GLUCOPHAGE) 1000 MG tablet Take 1,000 mg by mouth daily (with breakfast)    Historical Provider, MD   SITagliptin (JANUVIA) 100 MG tablet Take 100 mg by mouth daily    Historical Provider, MD   omeprazole (PRILOSEC) 40 MG delayed release capsule Take 1 capsule by mouth every morning (before breakfast) 4/28/21   Moon[place], DO   lisinopril (PRINIVIL;ZESTRIL) 40 MG tablet Take 10 mg by mouth daily     Historical Provider, MD   glimepiride (AMARYL) 2 MG tablet Take 2 mg by mouth every morning (before breakfast)    Historical Provider, MD   simvastatin (ZOCOR) 10 MG tablet Take 10 mg by mouth nightly    Historical Provider, MD   levothyroxine (SYNTHROID) 175 MCG tablet Take 112 mcg by mouth Daily Dose reduced in December 2020    Historical Provider, MD   aspirin 81 MG EC tablet Take 81 mg by mouth daily    Historical [employer] 23 (*)     All other components within normal limits   PROTIME-INR - Abnormal; Notable for the following components:    Protime 12.6 (*)     All other components within normal limits   LACTIC ACID, WHOLE BLOOD - Abnormal; Notable for the following components:    Lactic Acid, Whole Blood 2.7 (*)     All other components within normal limits   TROPONIN - Abnormal; Notable for the following components:    Troponin, High Sensitivity 85 (*)     All other components within normal limits   ELECTROLYTES PLUS - Abnormal; Notable for the following components:    POC Potassium 2.3 (*)     POC Chloride 97 (*)     POC TCO2 35 (*)     All other components within normal limits   HGB/HCT - Abnormal; Notable for the following components:    POC Hemoglobin 17.9 (*)     POC Hematocrit 53 (*)     All other components within normal limits   CALCIUM, IONIC (POC) - Abnormal; Notable for the following components:    POC Ionized Calcium 0.72 (*)     All other components within normal limits   VENOUS BLOOD GAS, POINT OF CARE - Abnormal; Notable for the following components:    pH, Medardo 7.613 (*)     pCO2, Medardo 35.3 (*)     pO2, Medardo 52.2 (*)     HCO3, Venous 35.8 (*)     Positive Base Excess, Medardo 14 (*)     O2 Sat, Medardo 92 (*)     All other components within normal limits   CREATININE W/GFR POINT OF CARE - Abnormal; Notable for the following components:    POC Creatinine 3.95 (*)     GFR Comment 14 (*)     GFR Non- 11 (*)     All other components within normal limits   UREA N (POC) - Abnormal; Notable for the following components:    POC BUN 98 (*)     All other components within normal limits   LACTIC ACID,POINT OF CARE - Abnormal; Notable for the following components:    POC Lactic Acid 2.84 (*)     All other components within normal limits   POCT GLUCOSE - Abnormal; Notable for the following components:    POC Glucose 449 (*)     All other components within normal limits   AMMONIA   TSH WITH REFLEX   TROPONIN   POC BLOOD GAS AND CHEMISTRY         RADIOLOGY:  Ct Abdomen Pelvis Wo Contrast Additional Contrast? None    Result Date: 5/3/2021  EXAMINATION: CT ABDOMEN PELVIS WO CONTRAST HISTORY: Abdominal bloating with abnormal labs. COMPARISON: CT dated 4/28/2021 TECHNIQUE: CT examination of the abdomen and pelvis without IV contrast. Coronal and sagittal reformations were performed. Dose reduction techniques were achieved by using automated exposure control and/or adjustment of mA and/or kV according to patient size and/or use of iterative reconstruction technique. FINDINGS: The lung bases are clear. There is a stable extremely large fluid-filled hiatal hernia. The stomach below the diaphragm is also distended and fluid-filled. The visualized heart is stable. The liver, spleen, adrenal glands, pancreas show normal unenhanced characteristics. Surgical changes of cholecystectomy. The kidneys are symmetric in size and show no calcifications or hydronephrosis. The aorta has a normal course and caliber. The large and small bowel are normal. A normal appendix is seen in the right lower quadrant. Surgical changes of hysterectomy. The urinary bladder is decompressed by Walker catheter. There is no free air or free fluid and no inflammatory stranding is seen. Osseous structures are stable in appearance. Report electronically signed by: Dr. Darryl Oswald    There is obstruction of the stomach because of the portions of the stomach contained in the very large hiatal hernia. The rest of the GI tract is normal. No evidence of an acute infectious or inflammatory process in the abdomen and pelvis. Ct Head Wo Contrast    Result Date: 5/3/2021  EXAMINATION: CT HEAD WO CONTRAST HISTORY: Fall with loss of consciousness. COMPARISON: None.  TECHNIQUE: CT examination of the head without IV contrast. Dose reduction techniques were achieved by using automated exposure control and/or adjustment of mA and/or kV according to patient size and/or use of iterative reconstruction technique. FINDINGS: There is no evidence of acute hemorrhage or infarct. There is no evidence of mass effect or midline shift. The CSF spaces are normal. The contents of the orbits are normal. The visualized sinuses and mastoid air cells are clear. Osseous structures are normal. Extracranial soft tissues are normal. Report electronically signed by: Dr. Elvis Diop    No acute intracranial process. If clinical concern remains, consider further imaging with MRI. Ct Cervical Spine Wo Contrast    Result Date: 5/3/2021  EXAMINATION: CT CERVICAL SPINE WO CONTRAST HISTORY: Fall with loss of consciousness. COMPARISON: None. TECHNIQUE: CT Cervical spine without IV contrast. Coronal and sagittal reformations were performed. Dose reduction techniques were achieved by using automated exposure control and/or adjustment of mA and/or kV according to patient size and/or use of iterative reconstruction technique. FINDINGS: There is no evidence of acute fracture or subluxation. No suspicious bone lesions are seen. There are mild to moderate degenerative disc changes. No level shows significant canal or neuroforaminal narrowing. Paraspinal muscles are normal. Visualized lung apices are clear. Report electronically signed by: Dr. Elvis Diop    No acute findings in the cervical spine. Xr Chest Portable    Result Date: 5/3/2021  EXAMINATION: ONE XRAY VIEW OF THE CHEST 5/3/2021 10:43 am COMPARISON: None. HISTORY: ORDERING SYSTEM PROVIDED HISTORY: cp TECHNOLOGIST PROVIDED HISTORY: cp FINDINGS: There is suboptimal inspiration. The cardiac size is upper limits of normal. No acute infiltrates or pleural effusions are seen. Pulmonary vascularity appears normal. There is mild ectasia of the thoracic aorta. There are degenerative changes in the spine and shoulders. No acute bony abnormalities. The hilar structures are normal.  Large hiatal hernia.      No acute cardiopulmonary disease Large hiatal hernia EKG  EKG Interpretation    Interpreted by emergency department physician    Rhythm: normal sinus   Rate: normal  Axis: left  Ectopy: none  Conduction: normal  ST Segments: no acute change  T Waves: no acute change  Q Waves: none    Clinical Impression: no acute changes    [place]      All EKG's are interpreted by the Emergency Department Physicianwho either signs or Co-signs this chart in the absence of a cardiologist.    EMERGENCY DEPARTMENT COURSE:      The patient's lab work significant for persistent hypokalemia, replacing with 20 mEq IV and 2 g of magnesium. Patient also has BONNIE and lactic acidosis. UA negative for infectious signs and CXR negative. General surgery consulted. Planning to admit to [facility]. General surgery consulted and recommending MRCP. The patient does not need OR urgently. Cardiology recommending trending troponins at this time. PROCEDURES:  None    CONSULTS:  IP CONSULT TO GENERAL SURGERY  IP CONSULT TO HOSPITALIST    CRITICAL CARE:  Please see attending note    FINAL IMPRESSION      1. Acute pancreatitis, unspecified complication status, unspecified pancreatitis type    2. BONNIE (acute kidney injury) ([facility]    3. Hiatal hernia          DISPOSITION / PLAN     DISPOSITION Decision To Admit 05/03/2021 01:57:27 PM      PATIENT REFERRED TO:  No follow-up provider specified.     DISCHARGE MEDICATIONS:  New Prescriptions    No medications on file       El Ch[place] MD  Emergency Medicine Resident    (Please note that portions of this note were completed with a voice recognition program.Efforts were made to edit the dictations but occasionally words are mis-transcribed.)        El Chino MD  Resident  05/03/21 [id number]

## 2021-05-04 ENCOUNTER — SOCIAL WORK (OUTPATIENT)
Dept: EMERGENCY DEPT | Age: 71
End: 2021-05-04

## 2021-05-04 ENCOUNTER — APPOINTMENT (OUTPATIENT)
Dept: CT IMAGING | Age: 71
DRG: 853 | End: 2021-05-04
Payer: MEDICARE

## 2021-05-04 ENCOUNTER — APPOINTMENT (OUTPATIENT)
Dept: GENERAL RADIOLOGY | Age: 71
DRG: 853 | End: 2021-05-04
Payer: MEDICARE

## 2021-05-04 ENCOUNTER — ANESTHESIA (OUTPATIENT)
Dept: OPERATING ROOM | Age: 71
DRG: 853 | End: 2021-05-04
Payer: MEDICARE

## 2021-05-04 ENCOUNTER — ANESTHESIA EVENT (OUTPATIENT)
Dept: OPERATING ROOM | Age: 71
DRG: 853 | End: 2021-05-04
Payer: MEDICARE

## 2021-05-04 PROBLEM — N17.9 AKI (ACUTE KIDNEY INJURY) (HCC): Status: ACTIVE | Noted: 2021-05-04

## 2021-05-04 PROBLEM — E11.10 DIABETIC KETOACIDOSIS WITHOUT COMA ASSOCIATED WITH TYPE 2 DIABETES MELLITUS (HCC): Status: ACTIVE | Noted: 2021-05-04

## 2021-05-04 PROBLEM — R65.21 SEPTIC SHOCK (HCC): Status: ACTIVE | Noted: 2021-05-04

## 2021-05-04 PROBLEM — N17.0 ACUTE TUBULAR NECROSIS (HCC): Status: ACTIVE | Noted: 2021-05-04

## 2021-05-04 PROBLEM — A41.9 SEPSIS WITH ACUTE RENAL FAILURE WITHOUT SEPTIC SHOCK (HCC): Status: ACTIVE | Noted: 2021-05-04

## 2021-05-04 PROBLEM — E87.20 LACTIC ACIDOSIS: Status: ACTIVE | Noted: 2021-05-04

## 2021-05-04 PROBLEM — K46.0 HERNIA WITH OBSTRUCTION: Status: ACTIVE | Noted: 2021-05-04

## 2021-05-04 PROBLEM — N17.9 SEPSIS WITH ACUTE RENAL FAILURE WITHOUT SEPTIC SHOCK (HCC): Status: ACTIVE | Noted: 2021-05-04

## 2021-05-04 PROBLEM — I21.4 NON-STEMI (NON-ST ELEVATED MYOCARDIAL INFARCTION) (HCC): Status: ACTIVE | Noted: 2021-05-04

## 2021-05-04 PROBLEM — R65.20 SEPSIS WITH ACUTE RENAL FAILURE WITHOUT SEPTIC SHOCK (HCC): Status: ACTIVE | Noted: 2021-05-04

## 2021-05-04 PROBLEM — J96.01 ACUTE HYPOXEMIC RESPIRATORY FAILURE (HCC): Status: ACTIVE | Noted: 2021-05-04

## 2021-05-04 LAB
-: ABNORMAL
-: NORMAL
-: NORMAL
ALBUMIN SERPL-MCNC: 2.7 G/DL (ref 3.5–5.2)
ALBUMIN/GLOBULIN RATIO: 1.1 (ref 1–2.5)
ALLEN TEST: ABNORMAL
ALP BLD-CCNC: 57 U/L (ref 35–104)
ALT SERPL-CCNC: 14 U/L (ref 5–33)
AMORPHOUS: ABNORMAL
ANION GAP SERPL CALCULATED.3IONS-SCNC: 14 MMOL/L (ref 9–17)
ANION GAP SERPL CALCULATED.3IONS-SCNC: 17 MMOL/L (ref 9–17)
ANION GAP SERPL CALCULATED.3IONS-SCNC: 18 MMOL/L (ref 9–17)
AST SERPL-CCNC: 32 U/L
BACTERIA: ABNORMAL
BILIRUB SERPL-MCNC: 2.46 MG/DL (ref 0.3–1.2)
BILIRUBIN URINE: NEGATIVE
BLOOD BANK SPECIMEN: NORMAL
BUN BLDV-MCNC: 84 MG/DL (ref 8–23)
BUN BLDV-MCNC: 84 MG/DL (ref 8–23)
BUN BLDV-MCNC: 88 MG/DL (ref 8–23)
BUN/CREAT BLD: ABNORMAL (ref 9–20)
CALCIUM IONIZED: 0.98 MMOL/L (ref 1.13–1.33)
CALCIUM IONIZED: 1.02 MMOL/L (ref 1.13–1.33)
CALCIUM IONIZED: 1.21 MMOL/L (ref 1.13–1.33)
CALCIUM IONIZED: 1.24 MMOL/L (ref 1.13–1.33)
CALCIUM SERPL-MCNC: 7.4 MG/DL (ref 8.6–10.4)
CALCIUM SERPL-MCNC: 8.2 MG/DL (ref 8.6–10.4)
CALCIUM SERPL-MCNC: 8.2 MG/DL (ref 8.6–10.4)
CARBOXYHEMOGLOBIN: 0.2 % (ref 0–5)
CARBOXYHEMOGLOBIN: 0.5 % (ref 0–5)
CARBOXYHEMOGLOBIN: 1.5 % (ref 0–5)
CASTS UA: ABNORMAL /LPF (ref 0–2)
CHLORIDE BLD-SCNC: 101 MMOL/L (ref 98–107)
CHLORIDE BLD-SCNC: 103 MMOL/L (ref 98–107)
CHLORIDE BLD-SCNC: 109 MMOL/L (ref 98–107)
CHLORIDE, WHOLE BLOOD: 111 MMOL/L (ref 98–110)
CHLORIDE, WHOLE BLOOD: 117 MMOL/L (ref 98–110)
CO2: 22 MMOL/L (ref 20–31)
CO2: 24 MMOL/L (ref 20–31)
CO2: 25 MMOL/L (ref 20–31)
COLOR: ABNORMAL
COMMENT UA: ABNORMAL
CREAT SERPL-MCNC: 2.24 MG/DL (ref 0.5–0.9)
CREAT SERPL-MCNC: 2.27 MG/DL (ref 0.5–0.9)
CREAT SERPL-MCNC: 2.42 MG/DL (ref 0.5–0.9)
CRYSTALS, UA: ABNORMAL /HPF
CRYSTALS, UA: ABNORMAL /HPF
EPITHELIAL CELLS UA: ABNORMAL /HPF (ref 0–5)
ESTIMATED AVERAGE GLUCOSE: 146 MG/DL
FIBRINOGEN: 560 MG/DL (ref 140–420)
FIO2: ABNORMAL
GFR AFRICAN AMERICAN: 24 ML/MIN
GFR AFRICAN AMERICAN: 26 ML/MIN
GFR AFRICAN AMERICAN: 26 ML/MIN
GFR NON-AFRICAN AMERICAN: 20 ML/MIN
GFR NON-AFRICAN AMERICAN: 21 ML/MIN
GFR NON-AFRICAN AMERICAN: 22 ML/MIN
GFR SERPL CREATININE-BSD FRML MDRD: ABNORMAL ML/MIN/{1.73_M2}
GLUCOSE BLD-MCNC: 170 MG/DL (ref 65–105)
GLUCOSE BLD-MCNC: 181 MG/DL (ref 65–105)
GLUCOSE BLD-MCNC: 184 MG/DL (ref 70–99)
GLUCOSE BLD-MCNC: 203 MG/DL (ref 65–105)
GLUCOSE BLD-MCNC: 228 MG/DL (ref 65–105)
GLUCOSE BLD-MCNC: 273 MG/DL (ref 70–99)
GLUCOSE BLD-MCNC: 277 MG/DL (ref 70–99)
GLUCOSE BLD-MCNC: 314 MG/DL (ref 65–105)
GLUCOSE URINE: ABNORMAL
HBA1C MFR BLD: 6.7 % (ref 4–6)
HCO3 ARTERIAL: 19.2 MMOL/L (ref 22–27)
HCO3 ARTERIAL: 22.9 MMOL/L (ref 22–27)
HCO3 VENOUS: 26.8 MMOL/L (ref 24–30)
HCT VFR BLD CALC: 36.7 %
HCT VFR BLD CALC: 51.4 % (ref 36.3–47.1)
HCT VFR BLD CALC: 60.6 % (ref 36.3–47.1)
HCT VFR BLD CALC: ABNORMAL %
HEMOGLOBIN: 11.9 GM/DL
HEMOGLOBIN: 17 G/DL (ref 11.9–15.1)
HEMOGLOBIN: 19.5 G/DL (ref 11.9–15.1)
HEMOGLOBIN: ABNORMAL GM/DL
INR BLD: 1.4
INR BLD: 2.9
KETONES, URINE: ABNORMAL
LACTIC ACID, SEPSIS WHOLE BLOOD: 6.7 MMOL/L (ref 0.5–1.9)
LACTIC ACID, SEPSIS: ABNORMAL MMOL/L (ref 0.5–1.9)
LACTIC ACID, WHOLE BLOOD: 3.8 MMOL/L (ref 0.7–2.1)
LACTIC ACID, WHOLE BLOOD: 4.3 MMOL/L (ref 0.7–2.1)
LACTIC ACID, WHOLE BLOOD: 5.7 MMOL/L (ref 0.7–2.1)
LEUKOCYTE ESTERASE, URINE: ABNORMAL
MAGNESIUM: 1.9 MG/DL (ref 1.6–2.6)
MAGNESIUM: 2.1 MG/DL (ref 1.6–2.6)
MAGNESIUM: 2.2 MG/DL (ref 1.6–2.6)
MCH RBC QN AUTO: 28.2 PG (ref 25.2–33.5)
MCHC RBC AUTO-ENTMCNC: 32.2 G/DL (ref 28.4–34.8)
MCV RBC AUTO: 87.7 FL (ref 82.6–102.9)
METHEMOGLOBIN: ABNORMAL % (ref 0–1.5)
MODE: ABNORMAL
MUCUS: ABNORMAL
NEGATIVE BASE EXCESS, ART: 0.8 MMOL/L (ref 0–2)
NEGATIVE BASE EXCESS, ART: 8.6 MMOL/L (ref 0–2)
NEGATIVE BASE EXCESS, VEN: ABNORMAL MMOL/L (ref 0–2)
NITRITE, URINE: NEGATIVE
NOTIFICATION TIME: ABNORMAL
NOTIFICATION: ABNORMAL
NRBC AUTOMATED: 0 PER 100 WBC
O2 DEVICE/FLOW/%: ABNORMAL
O2 SAT, ARTERIAL: 98 % (ref 94–100)
O2 SAT, ARTERIAL: 98.5 % (ref 94–100)
O2 SAT, VEN: 94.7 % (ref 60–85)
OTHER OBSERVATIONS UA: ABNORMAL
OXYHEMOGLOBIN: ABNORMAL % (ref 95–98)
PARTIAL THROMBOPLASTIN TIME: 27.4 SEC (ref 20.5–30.5)
PATIENT TEMP: 37
PCO2 ARTERIAL: 36.9 MMHG (ref 32–45)
PCO2 ARTERIAL: 52 MMHG (ref 32–45)
PCO2, ART, TEMP ADJ: ABNORMAL (ref 32–45)
PCO2, ART, TEMP ADJ: ABNORMAL (ref 32–45)
PCO2, VEN, TEMP ADJ: ABNORMAL MMHG (ref 39–55)
PCO2, VEN: 42.9 (ref 39–55)
PDW BLD-RTO: 14.3 % (ref 11.8–14.4)
PEEP/CPAP: ABNORMAL
PH ARTERIAL: 7.19 (ref 7.35–7.45)
PH ARTERIAL: 7.41 (ref 7.35–7.45)
PH UA: 5.5 (ref 5–8)
PH VENOUS: 7.41 (ref 7.32–7.42)
PH, ART, TEMP ADJ: ABNORMAL (ref 7.35–7.45)
PH, ART, TEMP ADJ: ABNORMAL (ref 7.35–7.45)
PH, VEN, TEMP ADJ: ABNORMAL (ref 7.32–7.42)
PHOSPHORUS: 3.5 MG/DL (ref 2.6–4.5)
PHOSPHORUS: 3.6 MG/DL (ref 2.6–4.5)
PHOSPHORUS: 3.7 MG/DL (ref 2.6–4.5)
PLATELET # BLD: ABNORMAL K/UL (ref 138–453)
PLATELET # BLD: ABNORMAL K/UL (ref 138–453)
PLATELET, FLUORESCENCE: 224 K/UL (ref 138–453)
PLATELET, FLUORESCENCE: 227 K/UL (ref 138–453)
PLATELET, IMMATURE FRACTION: 13.7 % (ref 1.1–10.3)
PLATELET, IMMATURE FRACTION: 14.7 % (ref 1.1–10.3)
PMV BLD AUTO: ABNORMAL FL (ref 8.1–13.5)
PO2 ARTERIAL: 130 MMHG (ref 75–95)
PO2 ARTERIAL: 145 MMHG (ref 75–95)
PO2, ART, TEMP ADJ: ABNORMAL MMHG (ref 75–95)
PO2, ART, TEMP ADJ: ABNORMAL MMHG (ref 75–95)
PO2, VEN, TEMP ADJ: ABNORMAL MMHG (ref 30–50)
PO2, VEN: 81 (ref 30–50)
POSITIVE BASE EXCESS, ART: ABNORMAL MMOL/L (ref 0–2)
POSITIVE BASE EXCESS, ART: ABNORMAL MMOL/L (ref 0–2)
POSITIVE BASE EXCESS, VEN: 2.2 MMOL/L (ref 0–2)
POTASSIUM SERPL-SCNC: 2.8 MMOL/L (ref 3.7–5.3)
POTASSIUM SERPL-SCNC: 3 MMOL/L (ref 3.7–5.3)
POTASSIUM SERPL-SCNC: 3.3 MMOL/L (ref 3.7–5.3)
POTASSIUM, WHOLE BLOOD: 2.5 MMOL/L (ref 3.6–5)
POTASSIUM, WHOLE BLOOD: 2.9 MMOL/L (ref 3.6–5)
PROCALCITONIN: 24.66 NG/ML
PROTEIN UA: ABNORMAL
PROTHROMBIN TIME: 14.5 SEC (ref 9.1–12.3)
PROTHROMBIN TIME: 28.2 SEC (ref 9.1–12.3)
PSV: ABNORMAL
PT. POSITION: ABNORMAL
RBC # BLD: 6.91 M/UL (ref 3.95–5.11)
RBC UA: ABNORMAL /HPF (ref 0–2)
REASON FOR REJECTION: NORMAL
REASON FOR REJECTION: NORMAL
RENAL EPITHELIAL, UA: ABNORMAL /HPF
RESPIRATORY RATE: ABNORMAL
SAMPLE SITE: ABNORMAL
SET RATE: ABNORMAL
SODIUM BLD-SCNC: 142 MMOL/L (ref 135–144)
SODIUM BLD-SCNC: 143 MMOL/L (ref 135–144)
SODIUM BLD-SCNC: 148 MMOL/L (ref 135–144)
SODIUM, WHOLE BLOOD: 145 MMOL/L (ref 136–145)
SODIUM, WHOLE BLOOD: 146 MMOL/L (ref 136–145)
SPECIFIC GRAVITY UA: 1.03 (ref 1–1.03)
TEXT FOR RESPIRATORY: ABNORMAL
TOTAL HB: ABNORMAL G/DL (ref 12–16)
TOTAL PROTEIN: 5.2 G/DL (ref 6.4–8.3)
TOTAL RATE: ABNORMAL
TRICHOMONAS: ABNORMAL
TRIGL SERPL-MCNC: 163 MG/DL
TURBIDITY: ABNORMAL
URINE HGB: ABNORMAL
UROBILINOGEN, URINE: NORMAL
VT: ABNORMAL
WBC # BLD: 4.1 K/UL (ref 3.5–11.3)
WBC UA: ABNORMAL /HPF (ref 0–5)
YEAST: ABNORMAL
ZZ NTE CLEAN UP: ORDERED TEST: NORMAL
ZZ NTE CLEAN UP: ORDERED TEST: NORMAL
ZZ NTE WITH NAME CLEAN UP: SPECIMEN SOURCE: NORMAL
ZZ NTE WITH NAME CLEAN UP: SPECIMEN SOURCE: NORMAL

## 2021-05-04 PROCEDURE — 83036 HEMOGLOBIN GLYCOSYLATED A1C: CPT

## 2021-05-04 PROCEDURE — 84100 ASSAY OF PHOSPHORUS: CPT

## 2021-05-04 PROCEDURE — 87086 URINE CULTURE/COLONY COUNT: CPT

## 2021-05-04 PROCEDURE — 2000000000 HC ICU R&B

## 2021-05-04 PROCEDURE — 99222 1ST HOSP IP/OBS MODERATE 55: CPT | Performed by: INTERNAL MEDICINE

## 2021-05-04 PROCEDURE — 99291 CRITICAL CARE FIRST HOUR: CPT | Performed by: PHYSICIAN ASSISTANT

## 2021-05-04 PROCEDURE — 86901 BLOOD TYPING SEROLOGIC RH(D): CPT

## 2021-05-04 PROCEDURE — P9041 ALBUMIN (HUMAN),5%, 50ML: HCPCS | Performed by: NURSE ANESTHETIST, CERTIFIED REGISTERED

## 2021-05-04 PROCEDURE — 6360000004 HC RX CONTRAST MEDICATION: Performed by: STUDENT IN AN ORGANIZED HEALTH CARE EDUCATION/TRAINING PROGRAM

## 2021-05-04 PROCEDURE — 85014 HEMATOCRIT: CPT

## 2021-05-04 PROCEDURE — 85730 THROMBOPLASTIN TIME PARTIAL: CPT

## 2021-05-04 PROCEDURE — 3700000000 HC ANESTHESIA ATTENDED CARE: Performed by: SURGERY

## 2021-05-04 PROCEDURE — 2500000003 HC RX 250 WO HCPCS: Performed by: PHYSICIAN ASSISTANT

## 2021-05-04 PROCEDURE — 43281 LAP PARAESOPHAG HERN REPAIR: CPT | Performed by: SURGERY

## 2021-05-04 PROCEDURE — 82962 GLUCOSE BLOOD TEST: CPT

## 2021-05-04 PROCEDURE — 82805 BLOOD GASES W/O2 SATURATION: CPT

## 2021-05-04 PROCEDURE — 43659 UNLISTED LAPS PX STOMACH: CPT | Performed by: SURGERY

## 2021-05-04 PROCEDURE — 85049 AUTOMATED PLATELET COUNT: CPT

## 2021-05-04 PROCEDURE — 6360000002 HC RX W HCPCS: Performed by: SURGERY

## 2021-05-04 PROCEDURE — 0BQT4ZZ REPAIR DIAPHRAGM, PERCUTANEOUS ENDOSCOPIC APPROACH: ICD-10-PCS | Performed by: SURGERY

## 2021-05-04 PROCEDURE — 2500000003 HC RX 250 WO HCPCS: Performed by: SURGERY

## 2021-05-04 PROCEDURE — 6360000002 HC RX W HCPCS: Performed by: NURSE PRACTITIONER

## 2021-05-04 PROCEDURE — 2500000003 HC RX 250 WO HCPCS: Performed by: STUDENT IN AN ORGANIZED HEALTH CARE EDUCATION/TRAINING PROGRAM

## 2021-05-04 PROCEDURE — 83605 ASSAY OF LACTIC ACID: CPT

## 2021-05-04 PROCEDURE — 3600000019 HC SURGERY ROBOT ADDTL 15MIN: Performed by: SURGERY

## 2021-05-04 PROCEDURE — 84132 ASSAY OF SERUM POTASSIUM: CPT

## 2021-05-04 PROCEDURE — G0108 DIAB MANAGE TRN  PER INDIV: HCPCS

## 2021-05-04 PROCEDURE — 80048 BASIC METABOLIC PNL TOTAL CA: CPT

## 2021-05-04 PROCEDURE — 74018 RADEX ABDOMEN 1 VIEW: CPT

## 2021-05-04 PROCEDURE — 2500000003 HC RX 250 WO HCPCS: Performed by: NURSE ANESTHETIST, CERTIFIED REGISTERED

## 2021-05-04 PROCEDURE — 2580000003 HC RX 258: Performed by: FAMILY MEDICINE

## 2021-05-04 PROCEDURE — 2580000003 HC RX 258: Performed by: PHYSICIAN ASSISTANT

## 2021-05-04 PROCEDURE — 36415 COLL VENOUS BLD VENIPUNCTURE: CPT

## 2021-05-04 PROCEDURE — 82435 ASSAY OF BLOOD CHLORIDE: CPT

## 2021-05-04 PROCEDURE — 85055 RETICULATED PLATELET ASSAY: CPT

## 2021-05-04 PROCEDURE — 86850 RBC ANTIBODY SCREEN: CPT

## 2021-05-04 PROCEDURE — 2580000003 HC RX 258: Performed by: NURSE PRACTITIONER

## 2021-05-04 PROCEDURE — 0DB64ZZ EXCISION OF STOMACH, PERCUTANEOUS ENDOSCOPIC APPROACH: ICD-10-PCS | Performed by: SURGERY

## 2021-05-04 PROCEDURE — 6360000002 HC RX W HCPCS: Performed by: NURSE ANESTHETIST, CERTIFIED REGISTERED

## 2021-05-04 PROCEDURE — 85027 COMPLETE CBC AUTOMATED: CPT

## 2021-05-04 PROCEDURE — 84145 PROCALCITONIN (PCT): CPT

## 2021-05-04 PROCEDURE — 5A1945Z RESPIRATORY VENTILATION, 24-96 CONSECUTIVE HOURS: ICD-10-PCS | Performed by: INTERNAL MEDICINE

## 2021-05-04 PROCEDURE — 0D9630Z DRAINAGE OF STOMACH WITH DRAINAGE DEVICE, PERCUTANEOUS APPROACH: ICD-10-PCS | Performed by: SURGERY

## 2021-05-04 PROCEDURE — 2580000003 HC RX 258: Performed by: NURSE ANESTHETIST, CERTIFIED REGISTERED

## 2021-05-04 PROCEDURE — 99223 1ST HOSP IP/OBS HIGH 75: CPT | Performed by: SURGERY

## 2021-05-04 PROCEDURE — 81001 URINALYSIS AUTO W/SCOPE: CPT

## 2021-05-04 PROCEDURE — 93005 ELECTROCARDIOGRAM TRACING: CPT | Performed by: FAMILY MEDICINE

## 2021-05-04 PROCEDURE — 8E0W4CZ ROBOTIC ASSISTED PROCEDURE OF TRUNK REGION, PERCUTANEOUS ENDOSCOPIC APPROACH: ICD-10-PCS | Performed by: SURGERY

## 2021-05-04 PROCEDURE — 3700000001 HC ADD 15 MINUTES (ANESTHESIA): Performed by: SURGERY

## 2021-05-04 PROCEDURE — 99291 CRITICAL CARE FIRST HOUR: CPT | Performed by: INTERNAL MEDICINE

## 2021-05-04 PROCEDURE — 85018 HEMOGLOBIN: CPT

## 2021-05-04 PROCEDURE — 71045 X-RAY EXAM CHEST 1 VIEW: CPT

## 2021-05-04 PROCEDURE — 85384 FIBRINOGEN ACTIVITY: CPT

## 2021-05-04 PROCEDURE — 2720000010 HC SURG SUPPLY STERILE: Performed by: SURGERY

## 2021-05-04 PROCEDURE — 85610 PROTHROMBIN TIME: CPT

## 2021-05-04 PROCEDURE — 84478 ASSAY OF TRIGLYCERIDES: CPT

## 2021-05-04 PROCEDURE — 0DJ08ZZ INSPECTION OF UPPER INTESTINAL TRACT, VIA NATURAL OR ARTIFICIAL OPENING ENDOSCOPIC: ICD-10-PCS | Performed by: SURGERY

## 2021-05-04 PROCEDURE — 74240 X-RAY XM UPR GI TRC 1CNTRST: CPT

## 2021-05-04 PROCEDURE — 3600000009 HC SURGERY ROBOT BASE: Performed by: SURGERY

## 2021-05-04 PROCEDURE — 2709999900 HC NON-CHARGEABLE SUPPLY: Performed by: SURGERY

## 2021-05-04 PROCEDURE — 6360000002 HC RX W HCPCS

## 2021-05-04 PROCEDURE — 6370000000 HC RX 637 (ALT 250 FOR IP): Performed by: NURSE PRACTITIONER

## 2021-05-04 PROCEDURE — 71250 CT THORAX DX C-: CPT

## 2021-05-04 PROCEDURE — 87641 MR-STAPH DNA AMP PROBE: CPT

## 2021-05-04 PROCEDURE — 86900 BLOOD TYPING SEROLOGIC ABO: CPT

## 2021-05-04 PROCEDURE — S2900 ROBOTIC SURGICAL SYSTEM: HCPCS | Performed by: SURGERY

## 2021-05-04 PROCEDURE — 84295 ASSAY OF SERUM SODIUM: CPT

## 2021-05-04 PROCEDURE — 6360000002 HC RX W HCPCS: Performed by: FAMILY MEDICINE

## 2021-05-04 PROCEDURE — 82330 ASSAY OF CALCIUM: CPT

## 2021-05-04 PROCEDURE — 86920 COMPATIBILITY TEST SPIN: CPT

## 2021-05-04 PROCEDURE — 82947 ASSAY GLUCOSE BLOOD QUANT: CPT

## 2021-05-04 PROCEDURE — 2500000003 HC RX 250 WO HCPCS: Performed by: NURSE PRACTITIONER

## 2021-05-04 PROCEDURE — 80053 COMPREHEN METABOLIC PANEL: CPT

## 2021-05-04 PROCEDURE — 83735 ASSAY OF MAGNESIUM: CPT

## 2021-05-04 PROCEDURE — 3E1M38Z IRRIGATION OF PERITONEAL CAVITY USING IRRIGATING SUBSTANCE, PERCUTANEOUS APPROACH: ICD-10-PCS | Performed by: SURGERY

## 2021-05-04 RX ORDER — BUPIVACAINE HYDROCHLORIDE 5 MG/ML
INJECTION, SOLUTION PERINEURAL PRN
Status: DISCONTINUED | OUTPATIENT
Start: 2021-05-04 | End: 2021-05-05 | Stop reason: ALTCHOICE

## 2021-05-04 RX ORDER — SODIUM CHLORIDE, SODIUM LACTATE, POTASSIUM CHLORIDE, CALCIUM CHLORIDE 600; 310; 30; 20 MG/100ML; MG/100ML; MG/100ML; MG/100ML
INJECTION, SOLUTION INTRAVENOUS CONTINUOUS PRN
Status: DISCONTINUED | OUTPATIENT
Start: 2021-05-04 | End: 2021-05-05 | Stop reason: SDUPTHER

## 2021-05-04 RX ORDER — NOREPINEPHRINE BIT/0.9 % NACL 16MG/250ML
.01-3.3 INFUSION BOTTLE (ML) INTRAVENOUS CONTINUOUS
Status: DISCONTINUED | OUTPATIENT
Start: 2021-05-04 | End: 2021-05-04

## 2021-05-04 RX ORDER — ACETAMINOPHEN 650 MG/1
650 SUPPOSITORY RECTAL EVERY 6 HOURS PRN
Status: CANCELLED | OUTPATIENT
Start: 2021-05-04

## 2021-05-04 RX ORDER — HEPARIN SODIUM 5000 [USP'U]/ML
5000 INJECTION, SOLUTION INTRAVENOUS; SUBCUTANEOUS EVERY 8 HOURS SCHEDULED
Status: CANCELLED | OUTPATIENT
Start: 2021-05-04

## 2021-05-04 RX ORDER — ETOMIDATE 2 MG/ML
INJECTION INTRAVENOUS PRN
Status: DISCONTINUED | OUTPATIENT
Start: 2021-05-04 | End: 2021-05-05 | Stop reason: SDUPTHER

## 2021-05-04 RX ORDER — POTASSIUM CHLORIDE 7.45 MG/ML
10 INJECTION INTRAVENOUS PRN
Status: DISCONTINUED | OUTPATIENT
Start: 2021-05-04 | End: 2021-05-06

## 2021-05-04 RX ORDER — SODIUM CHLORIDE, SODIUM LACTATE, POTASSIUM CHLORIDE, CALCIUM CHLORIDE 600; 310; 30; 20 MG/100ML; MG/100ML; MG/100ML; MG/100ML
INJECTION, SOLUTION INTRAVENOUS CONTINUOUS
Status: DISCONTINUED | OUTPATIENT
Start: 2021-05-04 | End: 2021-05-05

## 2021-05-04 RX ORDER — INDOCYANINE GREEN AND WATER 25 MG
5 KIT INJECTION ONCE
Status: DISCONTINUED | OUTPATIENT
Start: 2021-05-05 | End: 2021-05-05

## 2021-05-04 RX ORDER — SODIUM CHLORIDE 9 MG/ML
10 INJECTION INTRAVENOUS DAILY
Status: DISCONTINUED | OUTPATIENT
Start: 2021-05-04 | End: 2021-05-12

## 2021-05-04 RX ORDER — MAGNESIUM SULFATE IN WATER 40 MG/ML
2000 INJECTION, SOLUTION INTRAVENOUS PRN
Status: CANCELLED | OUTPATIENT
Start: 2021-05-04

## 2021-05-04 RX ORDER — 0.9 % SODIUM CHLORIDE 0.9 %
500 INTRAVENOUS SOLUTION INTRAVENOUS ONCE
Status: COMPLETED | OUTPATIENT
Start: 2021-05-04 | End: 2021-05-04

## 2021-05-04 RX ORDER — POTASSIUM CHLORIDE 7.45 MG/ML
INJECTION INTRAVENOUS PRN
Status: DISCONTINUED | OUTPATIENT
Start: 2021-05-04 | End: 2021-05-05 | Stop reason: SDUPTHER

## 2021-05-04 RX ORDER — ONDANSETRON 2 MG/ML
4 INJECTION INTRAMUSCULAR; INTRAVENOUS EVERY 6 HOURS PRN
Status: CANCELLED | OUTPATIENT
Start: 2021-05-04

## 2021-05-04 RX ORDER — INDOCYANINE GREEN AND WATER 25 MG
KIT INJECTION PRN
Status: DISCONTINUED | OUTPATIENT
Start: 2021-05-04 | End: 2021-05-05 | Stop reason: SDUPTHER

## 2021-05-04 RX ORDER — SODIUM CHLORIDE 9 MG/ML
INJECTION, SOLUTION INTRAVENOUS PRN
Status: DISCONTINUED | OUTPATIENT
Start: 2021-05-04 | End: 2021-05-06

## 2021-05-04 RX ORDER — 0.9 % SODIUM CHLORIDE 0.9 %
1000 INTRAVENOUS SOLUTION INTRAVENOUS ONCE
Status: DISCONTINUED | OUTPATIENT
Start: 2021-05-04 | End: 2021-05-05

## 2021-05-04 RX ORDER — SODIUM CHLORIDE 0.9 % (FLUSH) 0.9 %
5-40 SYRINGE (ML) INJECTION EVERY 12 HOURS SCHEDULED
Status: CANCELLED | OUTPATIENT
Start: 2021-05-04

## 2021-05-04 RX ORDER — 0.9 % SODIUM CHLORIDE 0.9 %
1000 INTRAVENOUS SOLUTION INTRAVENOUS ONCE
Status: COMPLETED | OUTPATIENT
Start: 2021-05-04 | End: 2021-05-04

## 2021-05-04 RX ORDER — ALBUMIN, HUMAN INJ 5% 5 %
SOLUTION INTRAVENOUS PRN
Status: DISCONTINUED | OUTPATIENT
Start: 2021-05-04 | End: 2021-05-05 | Stop reason: SDUPTHER

## 2021-05-04 RX ORDER — SODIUM CHLORIDE 9 MG/ML
INJECTION, SOLUTION INTRAVENOUS CONTINUOUS
Status: DISCONTINUED | OUTPATIENT
Start: 2021-05-04 | End: 2021-05-04

## 2021-05-04 RX ORDER — POLYETHYLENE GLYCOL 3350 17 G/17G
17 POWDER, FOR SOLUTION ORAL DAILY
Status: CANCELLED | OUTPATIENT
Start: 2021-05-04

## 2021-05-04 RX ORDER — FENTANYL CITRATE 50 UG/ML
INJECTION, SOLUTION INTRAMUSCULAR; INTRAVENOUS PRN
Status: DISCONTINUED | OUTPATIENT
Start: 2021-05-04 | End: 2021-05-05 | Stop reason: SDUPTHER

## 2021-05-04 RX ORDER — SODIUM CHLORIDE 9 MG/ML
25 INJECTION, SOLUTION INTRAVENOUS PRN
Status: CANCELLED | OUTPATIENT
Start: 2021-05-04

## 2021-05-04 RX ORDER — FENTANYL CITRATE 50 UG/ML
50 INJECTION, SOLUTION INTRAMUSCULAR; INTRAVENOUS ONCE
Status: COMPLETED | OUTPATIENT
Start: 2021-05-04 | End: 2021-05-04

## 2021-05-04 RX ORDER — SODIUM CHLORIDE 9 MG/ML
INJECTION, SOLUTION INTRAVENOUS CONTINUOUS PRN
Status: DISCONTINUED | OUTPATIENT
Start: 2021-05-04 | End: 2021-05-05 | Stop reason: SDUPTHER

## 2021-05-04 RX ORDER — FENTANYL CITRATE 50 UG/ML
INJECTION, SOLUTION INTRAMUSCULAR; INTRAVENOUS
Status: COMPLETED
Start: 2021-05-04 | End: 2021-05-04

## 2021-05-04 RX ORDER — NOREPINEPHRINE BIT/0.9 % NACL 16MG/250ML
2-100 INFUSION BOTTLE (ML) INTRAVENOUS CONTINUOUS
Status: DISCONTINUED | OUTPATIENT
Start: 2021-05-04 | End: 2021-05-07

## 2021-05-04 RX ORDER — ROCURONIUM BROMIDE 10 MG/ML
INJECTION, SOLUTION INTRAVENOUS PRN
Status: DISCONTINUED | OUTPATIENT
Start: 2021-05-04 | End: 2021-05-05 | Stop reason: SDUPTHER

## 2021-05-04 RX ORDER — ACETAMINOPHEN 325 MG/1
650 TABLET ORAL EVERY 6 HOURS PRN
Status: CANCELLED | OUTPATIENT
Start: 2021-05-04

## 2021-05-04 RX ORDER — PANTOPRAZOLE SODIUM 40 MG/10ML
40 INJECTION, POWDER, LYOPHILIZED, FOR SOLUTION INTRAVENOUS DAILY
Status: DISCONTINUED | OUTPATIENT
Start: 2021-05-04 | End: 2021-05-12

## 2021-05-04 RX ORDER — PHENYLEPHRINE HCL IN 0.9% NACL 1 MG/10 ML
SYRINGE (ML) INTRAVENOUS PRN
Status: DISCONTINUED | OUTPATIENT
Start: 2021-05-04 | End: 2021-05-05 | Stop reason: SDUPTHER

## 2021-05-04 RX ORDER — PROMETHAZINE HYDROCHLORIDE 12.5 MG/1
12.5 TABLET ORAL EVERY 6 HOURS PRN
Status: CANCELLED | OUTPATIENT
Start: 2021-05-04

## 2021-05-04 RX ORDER — POTASSIUM CHLORIDE 7.45 MG/ML
10 INJECTION INTRAVENOUS PRN
Status: CANCELLED | OUTPATIENT
Start: 2021-05-04

## 2021-05-04 RX ORDER — CALCIUM CHLORIDE 100 MG/ML
INJECTION INTRAVENOUS; INTRAVENTRICULAR PRN
Status: DISCONTINUED | OUTPATIENT
Start: 2021-05-04 | End: 2021-05-05 | Stop reason: SDUPTHER

## 2021-05-04 RX ORDER — SODIUM CHLORIDE 0.9 % (FLUSH) 0.9 %
5-40 SYRINGE (ML) INJECTION PRN
Status: CANCELLED | OUTPATIENT
Start: 2021-05-04

## 2021-05-04 RX ORDER — LIDOCAINE HYDROCHLORIDE 10 MG/ML
INJECTION, SOLUTION EPIDURAL; INFILTRATION; INTRACAUDAL; PERINEURAL PRN
Status: DISCONTINUED | OUTPATIENT
Start: 2021-05-04 | End: 2021-05-05 | Stop reason: SDUPTHER

## 2021-05-04 RX ADMIN — PHENYLEPHRINE HYDROCHLORIDE 300 MCG/MIN: 10 INJECTION INTRAVENOUS at 22:00

## 2021-05-04 RX ADMIN — ALBUMIN (HUMAN) 500 ML: 12.5 INJECTION, SOLUTION INTRAVENOUS at 22:04

## 2021-05-04 RX ADMIN — SODIUM CHLORIDE 1000 ML: 9 INJECTION, SOLUTION INTRAVENOUS at 05:32

## 2021-05-04 RX ADMIN — Medication 100 MCG: at 21:23

## 2021-05-04 RX ADMIN — PIPERACILLIN AND TAZOBACTAM 3375 MG: 3; .375 INJECTION, POWDER, LYOPHILIZED, FOR SOLUTION INTRAVENOUS at 10:49

## 2021-05-04 RX ADMIN — SODIUM CHLORIDE, POTASSIUM CHLORIDE, SODIUM LACTATE AND CALCIUM CHLORIDE: 600; 310; 30; 20 INJECTION, SOLUTION INTRAVENOUS at 19:34

## 2021-05-04 RX ADMIN — PHENYLEPHRINE HYDROCHLORIDE 50 MCG/MIN: 10 INJECTION INTRAVENOUS at 21:33

## 2021-05-04 RX ADMIN — SODIUM BICARBONATE 50 MEQ: 84 INJECTION, SOLUTION INTRAVENOUS at 22:42

## 2021-05-04 RX ADMIN — FENTANYL CITRATE 50 MCG: 50 INJECTION, SOLUTION INTRAMUSCULAR; INTRAVENOUS at 20:36

## 2021-05-04 RX ADMIN — FAMOTIDINE 20 MG: 10 INJECTION, SOLUTION INTRAVENOUS at 09:30

## 2021-05-04 RX ADMIN — SODIUM CHLORIDE: 9 INJECTION, SOLUTION INTRAVENOUS at 19:34

## 2021-05-04 RX ADMIN — SODIUM CHLORIDE, POTASSIUM CHLORIDE, SODIUM LACTATE AND CALCIUM CHLORIDE: 600; 310; 30; 20 INJECTION, SOLUTION INTRAVENOUS at 21:59

## 2021-05-04 RX ADMIN — SODIUM CHLORIDE, PRESERVATIVE FREE 10 ML: 5 INJECTION INTRAVENOUS at 09:30

## 2021-05-04 RX ADMIN — CALCIUM CHLORIDE 0.5 G: 100 INJECTION, SOLUTION INTRAVENOUS; INTRAVENTRICULAR at 22:00

## 2021-05-04 RX ADMIN — INDOCYANINE GREEN AND WATER 12.5 MG: KIT at 22:44

## 2021-05-04 RX ADMIN — ROCURONIUM BROMIDE 50 MG: 10 INJECTION INTRAVENOUS at 19:39

## 2021-05-04 RX ADMIN — ALBUMIN (HUMAN) 500 ML: 12.5 INJECTION, SOLUTION INTRAVENOUS at 22:35

## 2021-05-04 RX ADMIN — FLUCONAZOLE 400 MG: 2 INJECTION, SOLUTION INTRAVENOUS at 23:31

## 2021-05-04 RX ADMIN — SODIUM CHLORIDE 500 ML: 0.9 INJECTION, SOLUTION INTRAVENOUS at 03:40

## 2021-05-04 RX ADMIN — INSULIN LISPRO 2 UNITS: 100 INJECTION, SOLUTION INTRAVENOUS; SUBCUTANEOUS at 05:14

## 2021-05-04 RX ADMIN — POTASSIUM CHLORIDE 10 MEQ: 10 INJECTION, SOLUTION INTRAVENOUS at 01:34

## 2021-05-04 RX ADMIN — POTASSIUM CHLORIDE 10 MEQ: 7.46 INJECTION, SOLUTION INTRAVENOUS at 23:05

## 2021-05-04 RX ADMIN — SODIUM CHLORIDE: 9 INJECTION, SOLUTION INTRAVENOUS at 22:29

## 2021-05-04 RX ADMIN — FENTANYL CITRATE 50 MCG: 50 INJECTION, SOLUTION INTRAMUSCULAR; INTRAVENOUS at 18:48

## 2021-05-04 RX ADMIN — INDOCYANINE GREEN AND WATER 5 MG: KIT at 23:02

## 2021-05-04 RX ADMIN — SODIUM CHLORIDE, PRESERVATIVE FREE 10 ML: 5 INJECTION INTRAVENOUS at 04:17

## 2021-05-04 RX ADMIN — Medication 2 MCG/MIN: at 14:37

## 2021-05-04 RX ADMIN — POTASSIUM CHLORIDE 10 MEQ: 10 INJECTION, SOLUTION INTRAVENOUS at 03:12

## 2021-05-04 RX ADMIN — POTASSIUM CHLORIDE 10 MEQ: 7.46 INJECTION, SOLUTION INTRAVENOUS at 21:44

## 2021-05-04 RX ADMIN — Medication 100 MCG: at 21:16

## 2021-05-04 RX ADMIN — SODIUM CHLORIDE 1000 ML: 9 INJECTION, SOLUTION INTRAVENOUS at 10:49

## 2021-05-04 RX ADMIN — INDOCYANINE GREEN AND WATER 7.5 MG: KIT at 22:51

## 2021-05-04 RX ADMIN — SODIUM CHLORIDE: 9 INJECTION, SOLUTION INTRAVENOUS at 22:58

## 2021-05-04 RX ADMIN — SODIUM CHLORIDE: 9 INJECTION, SOLUTION INTRAVENOUS at 20:47

## 2021-05-04 RX ADMIN — IOHEXOL 80 ML: 240 INJECTION, SOLUTION INTRATHECAL; INTRAVASCULAR; INTRAVENOUS; ORAL at 09:14

## 2021-05-04 RX ADMIN — ROCURONIUM BROMIDE 50 MG: 10 INJECTION INTRAVENOUS at 20:34

## 2021-05-04 RX ADMIN — POTASSIUM CHLORIDE 10 MEQ: 10 INJECTION, SOLUTION INTRAVENOUS at 04:14

## 2021-05-04 RX ADMIN — CALCIUM CHLORIDE 0.5 G: 100 INJECTION, SOLUTION INTRAVENOUS; INTRAVENTRICULAR at 21:28

## 2021-05-04 RX ADMIN — VASOPRESSIN 4 UNITS/HR: 20 INJECTION INTRAVENOUS at 20:18

## 2021-05-04 RX ADMIN — Medication 300 MCG: at 22:00

## 2021-05-04 RX ADMIN — Medication 14 MCG/MIN: at 19:34

## 2021-05-04 RX ADMIN — ETOMIDATE 14 MG: 2 INJECTION, SOLUTION INTRAVENOUS at 19:39

## 2021-05-04 RX ADMIN — LIDOCAINE HYDROCHLORIDE 50 MG: 10 INJECTION, SOLUTION EPIDURAL; INFILTRATION; INTRACAUDAL; PERINEURAL at 19:39

## 2021-05-04 RX ADMIN — FENTANYL CITRATE 50 MCG: 50 INJECTION, SOLUTION INTRAMUSCULAR; INTRAVENOUS at 20:45

## 2021-05-04 RX ADMIN — POTASSIUM CHLORIDE 10 MEQ: 7.46 INJECTION, SOLUTION INTRAVENOUS at 21:01

## 2021-05-04 ASSESSMENT — PULMONARY FUNCTION TESTS
PIF_VALUE: 25
PIF_VALUE: 22
PIF_VALUE: 23
PIF_VALUE: 25
PIF_VALUE: 35
PIF_VALUE: 21
PIF_VALUE: 33
PIF_VALUE: 37
PIF_VALUE: 35
PIF_VALUE: 30
PIF_VALUE: 31
PIF_VALUE: 20
PIF_VALUE: 34
PIF_VALUE: 32
PIF_VALUE: 29
PIF_VALUE: 24
PIF_VALUE: 25
PIF_VALUE: 24
PIF_VALUE: 23
PIF_VALUE: 25
PIF_VALUE: 29
PIF_VALUE: 35
PIF_VALUE: 29
PIF_VALUE: 32
PIF_VALUE: 23
PIF_VALUE: 35
PIF_VALUE: 34
PIF_VALUE: 19
PIF_VALUE: 31
PIF_VALUE: 25
PIF_VALUE: 33
PIF_VALUE: 34
PIF_VALUE: 33
PIF_VALUE: 22
PIF_VALUE: 29
PIF_VALUE: 25
PIF_VALUE: 33
PIF_VALUE: 25
PIF_VALUE: 24
PIF_VALUE: 33
PIF_VALUE: 35
PIF_VALUE: 21
PIF_VALUE: 26
PIF_VALUE: 20
PIF_VALUE: 18
PIF_VALUE: 25
PIF_VALUE: 27
PIF_VALUE: 31
PIF_VALUE: 25
PIF_VALUE: 25
PIF_VALUE: 20
PIF_VALUE: 19
PIF_VALUE: 21
PIF_VALUE: 21
PIF_VALUE: 25
PIF_VALUE: 25
PIF_VALUE: 32
PIF_VALUE: 18
PIF_VALUE: 33
PIF_VALUE: 30
PIF_VALUE: 24
PIF_VALUE: 21
PIF_VALUE: 23
PIF_VALUE: 22
PIF_VALUE: 23
PIF_VALUE: 26
PIF_VALUE: 25
PIF_VALUE: 25
PIF_VALUE: 31
PIF_VALUE: 19
PIF_VALUE: 34
PIF_VALUE: 33
PIF_VALUE: 27
PIF_VALUE: 33
PIF_VALUE: 25
PIF_VALUE: 25
PIF_VALUE: 34
PIF_VALUE: 20
PIF_VALUE: 21
PIF_VALUE: 35
PIF_VALUE: 20
PIF_VALUE: 31
PIF_VALUE: 32
PIF_VALUE: 19
PIF_VALUE: 23
PIF_VALUE: 19
PIF_VALUE: 32
PIF_VALUE: 21
PIF_VALUE: 26
PIF_VALUE: 23
PIF_VALUE: 32
PIF_VALUE: 33
PIF_VALUE: 19
PIF_VALUE: 25
PIF_VALUE: 19
PIF_VALUE: 20
PIF_VALUE: 26
PIF_VALUE: 35
PIF_VALUE: 17
PIF_VALUE: 22
PIF_VALUE: 21
PIF_VALUE: 25
PIF_VALUE: 25
PIF_VALUE: 20
PIF_VALUE: 31
PIF_VALUE: 23
PIF_VALUE: 30
PIF_VALUE: 16
PIF_VALUE: 25
PIF_VALUE: 21
PIF_VALUE: 25
PIF_VALUE: 35
PIF_VALUE: 32
PIF_VALUE: 24
PIF_VALUE: 25
PIF_VALUE: 35
PIF_VALUE: 33
PIF_VALUE: 18
PIF_VALUE: 25
PIF_VALUE: 33
PIF_VALUE: 35
PIF_VALUE: 26
PIF_VALUE: 22
PIF_VALUE: 32
PIF_VALUE: 32
PIF_VALUE: 24
PIF_VALUE: 25
PIF_VALUE: 25
PIF_VALUE: 31
PIF_VALUE: 22
PIF_VALUE: 24
PIF_VALUE: 25
PIF_VALUE: 23
PIF_VALUE: 24
PIF_VALUE: 18
PIF_VALUE: 21
PIF_VALUE: 17
PIF_VALUE: 35
PIF_VALUE: 26
PIF_VALUE: 35
PIF_VALUE: 4
PIF_VALUE: 21

## 2021-05-04 ASSESSMENT — ENCOUNTER SYMPTOMS
DIARRHEA: 0
VOMITING: 1
WHEEZING: 0
STRIDOR: 0
BLOOD IN STOOL: 0
COUGH: 0
CONSTIPATION: 0
ABDOMINAL PAIN: 1
SHORTNESS OF BREATH: 0
NAUSEA: 1

## 2021-05-04 NOTE — CONSULTS
History    Not on file   Social Needs    Financial resource strain: Not on file    Food insecurity     Worry: Not on file     Inability: Not on file    Transportation needs     Medical: Not on file     Non-medical: Not on file   Tobacco Use    Smoking status: Never Smoker    Smokeless tobacco: Never Used   Substance and Sexual Activity    Alcohol use: Never     Frequency: Never    Drug use: Never    Sexual activity: Not on file   Lifestyle    Physical activity     Days per week: Not on file     Minutes per session: Not on file    Stress: Not on file   Relationships    Social connections     Talks on phone: Not on file     Gets together: Not on file     Attends Roman [employer] service: Not on file     Active member of club or organization: Not on file     Attends meetings of clubs or organizations: Not on file     Relationship status: Not on file    Intimate partner violence     Fear of current or ex partner: Not on file     Emotionally abused: Not on file     Physically abused: Not on file     Forced sexual activity: Not on file   Other Topics Concern    Not on file   Social History Narrative    Not on file       Family History:       Problem Relation Age of Onset    Breast Cancer Mother     High Blood Pressure Mother     High Cholesterol Father     Breast Cancer Sister        REVIEW OF SYSTEMS:    CONSTITUTIONAL: Denies recent weight loss, fatigue, fevers, chills. HEENT: Denies rhinorrhea, dysphagia, odynphagia. CARDIOVASCULAR: Denies history of MI, recent chest pain. RESPIRATORY: Denies recent history of shortness of breath or history of PE. GASTROINTESTINAL: per hpi  GENITOURINARY: Denies increased frequency or dysuria. HEMATOLOGIC/LYMPHATIC: Denies history of anemia or DVTs. ENDOCRINE: Denies history of thyroid problems or diabetes. NEURO: Denies history of CVA, TIA. Review of systems negative unless listed above.     PHYSICAL EXAM:    VITALS:  BP 81/61   Pulse 95   Temp 98.6 °F (37 °C) (Oral)   Resp (!) 33   Ht 5' 2\" (1.575 m)   Wt 166 lb (75.3 kg)   SpO2 90%   BMI 30.36 kg/m²   INTAKE/OUTPUT:   No intake or output data in the 24 hours ending 05/04/21 0717    CONSTITUTIONAL:  awake, alert, mild distress   HEENT: Normocephalic/atraumatic, without obvious abnormality. NECK:  Supple, symmetrical, trachea midline   CARDIOVASCULAR: Regular rate and rhythm without murmurs. LUNGS: Equal chest rise and fall  ABDOMEN: Epigastric tenderness noted, no guarding, obese, nondistended, no rebound, nonperitoneal, surgical scars consistent with history  MUSCULOSKELETAL: Muscle strength intact in all extremities bilaterally. NEUROLOGIC:  Gross motor intact without focal weakness. SKIN: No cyanosis, rashes, or edema noted.    Orientation:   oriented to person, place, and time    CBC with Differential:    Lab Results   Component Value Date    WBC 4.1 05/04/2021    RBC 6.91 05/04/2021    RBC 6.23 05/03/2021    HGB 19.5 05/04/2021    HCT 60.6 05/04/2021    PLT See Reflexed IPF Result 05/04/2021    MCV 87.7 05/04/2021    MCH 28.2 05/04/2021    MCHC 32.2 05/04/2021    RDW 14.3 05/04/2021    LYMPHOPCT 6 05/03/2021    MONOSABS 1.8 05/03/2021    LYMPHSABS 1.4 05/03/2021    EOSABS 0.0 05/03/2021    BASOSABS 0.0 05/03/2021     CMP:    Lab Results   Component Value Date     05/04/2021    K 2.8 05/04/2021     05/04/2021    CO2 25 05/04/2021    BUN 84 05/04/2021    CREATININE 2.24 05/04/2021    CREATININE 3.09 05/03/2021    GFRAA 26 05/04/2021    LABGLOM 22 05/04/2021    GLUCOSE 184 05/04/2021    GLUCOSE 453 05/03/2021    PROT 5.2 05/04/2021    PROT 7.3 05/03/2021    LABALBU 2.7 05/04/2021    CALCIUM 8.2 05/04/2021    BILITOT 2.46 05/04/2021    ALKPHOS 57 05/04/2021    AST 32 05/04/2021    ALT 14 05/04/2021     Hepatic Function Panel:    Lab Results   Component Value Date    ALKPHOS 57 05/04/2021    ALT 14 05/04/2021    AST 32 05/04/2021    PROT 5.2 05/04/2021    PROT 7.3 05/03/2021    BILITOT 2.46 05/04/2021    BILIDIR 0.0 05/03/2021    LABALBU 2.7 05/04/2021     PT/INR:    Lab Results   Component Value Date    PROTIME 28.2 05/04/2021    INR 2.9 05/04/2021       Pertinent Radiology:   Ct Abdomen Pelvis Wo Contrast Additional Contrast? None    Result Date: 5/3/2021  EXAMINATION: CT ABDOMEN PELVIS WO CONTRAST HISTORY: Abdominal bloating with abnormal labs. COMPARISON: CT dated 4/28/2021 TECHNIQUE: CT examination of the abdomen and pelvis without IV contrast. Coronal and sagittal reformations were performed. Dose reduction techniques were achieved by using automated exposure control and/or adjustment of mA and/or kV according to patient size and/or use of iterative reconstruction technique. FINDINGS: The lung bases are clear. There is a stable extremely large fluid-filled hiatal hernia. The stomach below the diaphragm is also distended and fluid-filled. The visualized heart is stable. The liver, spleen, adrenal glands, pancreas show normal unenhanced characteristics. Surgical changes of cholecystectomy. The kidneys are symmetric in size and show no calcifications or hydronephrosis. The aorta has a normal course and caliber. The large and small bowel are normal. A normal appendix is seen in the right lower quadrant. Surgical changes of hysterectomy. The urinary bladder is decompressed by Walker catheter. There is no free air or free fluid and no inflammatory stranding is seen. Osseous structures are stable in appearance. Report electronically signed by: Dr. Janette Mejia    There is obstruction of the stomach because of the portions of the stomach contained in the very large hiatal hernia. The rest of the GI tract is normal. No evidence of an acute infectious or inflammatory process in the abdomen and pelvis. Ct Head Wo Contrast    Result Date: 5/3/2021  EXAMINATION: CT HEAD WO CONTRAST HISTORY: Fall with loss of consciousness. COMPARISON: None.  TECHNIQUE: CT examination of the head without IV contrast. Dose shoulders. No acute bony abnormalities. The hilar structures are normal.  Large hiatal hernia. No acute cardiopulmonary disease Large hiatal hernia     Mri Abdomen Wo Contrast Mrcp    Result Date: 5/3/2021  EXAMINATION: MRI OF THE ABDOMEN WITHOUT CONTRAST AND MRCP 5/3/2021 6:32 pm TECHNIQUE: Multiplanar multisequence MRI of the abdomen was performed without the administration of intravenous contrast.  After initial T2 axial and coronal images, thick slab, thin slab and 3D coronal MRCP sequences were obtained without the administration of intravenous contrast.  MIP images are provided for review. COMPARISON: None HISTORY: ORDERING SYSTEM PROVIDED HISTORY: Elevated lipase, bili. Painless jaundice. TECHNOLOGIST PROVIDED HISTORY: Elevated lipase, bili. Painless jaundice. Reason for Exam: elevated lipase, bilil. FINDINGS: Gallbladder: Status post cholecystectomy. Bile Ducts: There is no intrahepatic or extrahepatic ductal dilatation. The common bile duct has a maximal dimension of 6 mm without evidence of a filling defect Pancreatic Duct: Pancreatic duct is normal in course and caliber. Other: There is an incompletely imaged large hiatal hernia with gastric organoaxial malrotation. A small to moderate amount of ascites is present throughout the abdomen and pelvis. There is diffuse bowel wall thickening of the visualized jejunum likely due to 3rd spacing. Trace bilateral pleural effusions are noted     1. No evidence of intrahepatic or extrahepatic ductal dilatation. 2. Incompletely imaged large hiatal hernia with organoaxial malrotation of the stomach. 3. Small to moderate amount of ascites. 4. Diffuse small bowel wall thickening likely due to 3rd spacing as the post enteritis. 5. Trace bilateral pleural effusions.          ASSESSMENT:  Active Hospital Problems    Diagnosis Date Noted    Sepsis with acute renal failure without septic shock (HCC) [A41.9, R65.20, N17.9] 05/04/2021    Non-STEMI (non-ST elevated myocardial infarction) ([facility] [I21.4] 05/04/2021    Diabetic ketoacidosis without coma associated with type 2 diabetes mellitus ([facility] [E11.10] 05/04/2021    BONNIE (acute kidney injury) ([facility] [N17.9] 05/04/2021    Hernia with obstruction [K46.0] 05/04/2021    Essential hypertension [I10]     Thyroid disease [E07.9]     Syncope [R55]     Acute pancreatitis [K85.90] 05/03/2021     70 F with large hiatal hernia    Plan:  1. Obtain UGI and KUB post procedure to monitor for contrast progression   2. Continue medical mgmt and supportive care per primary  3. Will follow along   4.  No plans for any acute surgical interventions at this time, further recs to follow status post imaging work-up    Plan discussed and formulated with Dr. Elvia Savage     Thank you,    Electronically signed by Oscar Barker DO  on 5/4/2021 at 7:17 AM

## 2021-05-04 NOTE — ED NOTES
Phleb unable to draw blue top blood specimen tube after poking pt 6 times. Pt's IVs do not draw blood. Juan Linton, ED charge nurse notified. Phleb to notify another phleb on the floor to come down to draw blue tube when she is available.      Femi Vo RN  05/04/21 [id number]

## 2021-05-04 NOTE — CONSULTS
Attestation signed by      Attending Physician Statement:    I have discussed the care of  Yolis Hughes , including pertinent history and exam findings, with the Cardiology fellow/resident. I have seen and examined the patient and the key elements of all parts of the encounter have been performed by me. I agree with the assessment, plan and orders as documented by the fellow/resident, after I modified exam findings and plan of treatments, and the final version is my approved version of the assessment. Additional Comments: The patient was seen and examined, agree with below. Presented with DKA and sepsis. No chest pain or SOB. Abnormal ECG and mildly elevated HS troponin, possible type 2 from renal failure. Will obtain echo. Ischemia work up after echo is done and acute issues  resolve. [facility]    Consult / H&P               Today's Date: 5/4/2021  Patient Name: Yolis Hughes  Date of admission: 5/3/2021 12:49 PM  Patient's age: 78 y. o., 1950  Admission Dx: Acute pancreatitis, unspecified complication status, unspecified pancreatitis type [K85.90]    Reason for Consult:  Cardiac evaluation    Requesting Physician: Ricardo Posadas MD    CHIEF COMPLAINT:  AMS     History Obtained From:  patient    HISTORY OF PRESENT ILLNESS:    57-year-old female with past medical history of hypertension, type 1 diabetes hypothyroidism presented with acute mental status. Was in diabetic ketoacidosis and had pancreatitis. Was found down at home. Denies any chest pain, shortness of breath. Cardiology was consulted because of elevated troponin. 83 and 94. EKG showed inferior infarct. No acute ST or T wave abnormality. Never had any ischemia work-up in the past.    Past Medical History:   has a past medical history of Diabetes mellitus ([address] Gout, Hiatal hernia, Hyperlipidemia, Hypertension, and Thyroid disease.     Past Surgical History:   has a past surgical history that includes Cholecystectomy; Hysterectomy; and Breast surgery. Home Medications:    Prior to Admission medications    Medication Sig Start Date End Date Taking?  Authorizing Provider   metFORMIN (GLUCOPHAGE) 1000 MG tablet Take 1,000 mg by mouth daily (with breakfast)    Historical Provider, MD   SITagliptin (JANUVIA) 100 MG tablet Take 100 mg by mouth daily    Historical Provider, MD   omeprazole (PRILOSEC) 40 MG delayed release capsule Take 1 capsule by mouth every morning (before breakfast) 4/28/21   Michelet Llamas DO   lisinopril (PRINIVIL;ZESTRIL) 40 MG tablet Take 10 mg by mouth daily     Historical Provider, MD   glimepiride (AMARYL) 2 MG tablet Take 2 mg by mouth every morning (before breakfast)    Historical Provider, MD   simvastatin (ZOCOR) 10 MG tablet Take 10 mg by mouth nightly    Historical Provider, MD   levothyroxine (SYNTHROID) 175 MCG tablet Take 112 mcg by mouth Daily Dose reduced in December 2020    Historical Provider, MD   aspirin 81 MG EC tablet Take 81 mg by mouth daily    Historical Provider, MD   metFORMIN (GLUCOPHAGE) 500 MG tablet Take 500 mg by mouth every evening     Historical Provider, MD   hydroCHLOROthiazide (HYDRODIURIL) 25 MG tablet Take 25 mg by mouth daily    Historical Provider, MD   predniSONE (DELTASONE) 20 MG tablet 60 mg x 2 days, 40 mg x 2 days, 20 mg x 2 days 12/19/20   Che Rome PA-C      Current Facility-Administered Medications: 0.9 % sodium chloride infusion, , Intravenous, Continuous  piperacillin-tazobactam (ZOSYN) 3,375 mg in dextrose 5 % 50 mL IVPB extended infusion (mini-bag), 3,375 mg, Intravenous, Q8H  aspirin EC tablet 81 mg, 81 mg, Oral, Daily  [Held by provider] glipiZIDE (GLUCOTROL) tablet 5 mg, 5 mg, Oral, QAM AC  [Held by provider] hydroCHLOROthiazide (HYDRODIURIL) tablet 25 mg, 25 mg, Oral, Daily  levothyroxine (SYNTHROID) tablet 112 mcg, 112 mcg, Oral, Daily  [Held by provider] lisinopril (PRINIVIL;ZESTRIL) tablet 10 mg, 10 mg, Oral, Daily  [Held by Nightly    Allergies:  No known allergies    Social History:   reports that she has never smoked. She has never used smokeless tobacco. She reports that she does not drink alcohol or use drugs. Family History: family history includes Breast Cancer in her mother and sister; High Blood Pressure in her mother; High Cholesterol in her father. No h/o sudden cardiac death. No for premature CAD    REVIEW OF SYSTEMS:    Constitutional: there has been no unanticipated weight loss. There's been No change in energy level, No change in activity level. Eyes: No visual changes or diplopia. No scleral icterus. ENT: No Headaches  Cardiovascular: As mentioned in H&P   Respiratory: No previous pulmonary problems, No cough  Gastrointestinal: No abdominal pain. No change in bowel or bladder habits. Genitourinary: No dysuria, trouble voiding, or hematuria. Musculoskeletal:  No gait disturbance, No weakness or joint complaints. Integumentary: No rash or pruritis. Neurological: No headache, diplopia, change in muscle strength, numbness or tingling. No change in gait, balance, coordination, mood, affect, memory, mentation, behavior. PHYSICAL EXAM:      BP 89/76   Pulse 95   Temp 98.6 °F (37 °C) (Oral)   Resp 29   Ht 5' 2\" (1.575 m)   Wt 166 lb (75.3 kg)   SpO2 90%   BMI 30.36 kg/m²    Constitutional and General Appearance: alert, cooperative, no distress and appears stated age  HEENT: PERRL, no cervical lymphadenopathy. No masses palpable. Normal oral mucosa  Respiratory:  Normal excursion and expansion without use of accessory muscles  Resp Auscultation: Good respiratory effort. No for increased work of breathing. On auscultation: clear to auscultation bilaterally  Cardiovascular:   The apical impulse is not displaced  Heart tones are crisp and normal. regular S1 and S2.  Jugular venous pulsation Normal  The carotid upstroke is normal in amplitude and contour without delay or bruit  Peripheral pulses are symmetrical and full   Abdomen:   No masses or tenderness  Bowel sounds present  Extremities:   No Cyanosis or Clubbing   Lower extremity edema: No   Skin: Warm and dry  Neurological:  Alert and oriented. Moves all extremities well  No abnormalities of mood, affect, memory, mentation, or behavior are noted    DATA:      Labs:     CBC:   Recent Labs     05/03/21  0713 05/04/21  0509   WBC 23.5* 4.1   HGB 18.3* 19.5*   HCT 54.2* 60.6*    See Reflexed IPF Result     BMP:   Recent Labs     05/04/21  0409 05/04/21  0509    142   K 3.3* 3.0*   CO2 22 24   BUN 88* 84*   CREATININE 2.27* 2.42*   LABGLOM 21* 20*   GLUCOSE 277* 273*     BNP: No results for input(s): BNP in the last 72 hours. PT/INR:   Recent Labs     05/03/21  1334 05/04/21  0509   PROTIME 12.6* 28.2*   INR 1.2 2.9     APTT:No results for input(s): APTT in the last 72 hours. CARDIAC ENZYMES:  Recent Labs     05/03/21  0713   TROPONINI 0.404*     FASTING LIPID PANEL:  Lab Results   Component Value Date    TRIG 163 05/04/2021     LIVER PROFILE:  Recent Labs     05/03/21  1334 05/03/21  1940 05/04/21  0509   AST 30  --  32*   ALT 14  --  14   LABALBU 3.6 3.3* 2.7*       IMPRESSION:    Patient Active Problem List   Diagnosis    Acute pancreatitis    Sepsis with acute [facility] failure without [facility] shock [facility]    Non-STEMI (non-ST elevated myocardial infarction) [facility]    Diabetic ketoacidosis without coma associated with type 2 diabetes mellitus ([facility]    BONNIE (acute kidney injury) ([facility]    Hernia with obstruction    Essential hypertension    Thyroid disease    Syncope     Assessment    1. AMS   2. Acute pancreatitis   2. Elevated troponin NSTEMI type 2 85->94  3. DKA   4. CKD   5. Essential HTN   6. Large Hiatal hernia   7. Hypothyroidism       RECOMMENDATIONS:  EKG reviewed. Showed inferior infarct. Elevated troponin likely due to chronic kidney disease and acute pancreatitis.   2D echo   2    patient will need ischemia work-up likely stress test after acute issues resolve. Discussed with patient and Nurse.     Electronically signed by Sara Mcintyre MD on 5/4/2021 at 8:24 [address] [place] [address] [facility]      [phone]

## 2021-05-04 NOTE — CONSULTS
GI CONSULTATION      REASON FOR CONSULT: Gastric volvulus    REQUESTING PHYSICIAN:  Romana Begin, MD    HISTORY OF PRESENT ILLNESS:    The patient is a 78 y.o. female who was transferred from the outside facility yesterday because of nausea vomiting diarrhea which has been going on for past few days associated with lightheadedness. Her diarrhea has improved. Last time she ate or drink anything was on Sunday. She has been having nausea vomiting, anytime she eats anything. She had an upper GI series which showed gastric volvulus with majority of the stomach in the chest area. Currently there is no NG tube. She was slightly hypotensive with tachycardia. Medical History:   Past Medical History:   Diagnosis Date    Diabetes mellitus ([facility]     Gout     Hiatal hernia     Hyperlipidemia     Hypertension     Thyroid disease        SURGICAL HISTORY:  Past Surgical History:   Procedure Laterality Date    BREAST SURGERY      Bx 1993    CHOLECYSTECTOMY      HYSTERECTOMY         MEDS:  Prior to Admission medications    Medication Sig Start Date End Date Taking?  Authorizing Provider   metFORMIN (GLUCOPHAGE) 1000 MG tablet Take 1,000 mg by mouth daily (with breakfast)    Historical Provider, MD   SITagliptin (JANUVIA) 100 MG tablet Take 100 mg by mouth daily    Historical Provider, MD   omeprazole (PRILOSEC) 40 MG delayed release capsule Take 1 capsule by mouth every morning (before breakfast) 4/28/21   Valerie Kelley DO   lisinopril (PRINIVIL;ZESTRIL) 40 MG tablet Take 10 mg by mouth daily     Historical Provider, MD   glimepiride (AMARYL) 2 MG tablet Take 2 mg by mouth every morning (before breakfast)    Historical Provider, MD   simvastatin (ZOCOR) 10 MG tablet Take 10 mg by mouth nightly    Historical Provider, MD   levothyroxine (SYNTHROID) 175 MCG tablet Take 112 mcg by mouth Daily Dose reduced in December 2020 Historical Provider, MD   aspirin 81 MG EC tablet Take 81 mg by mouth daily    Historical Provider, MD   metFORMIN (GLUCOPHAGE) 500 MG tablet Take 500 mg by mouth every evening     Historical Provider, MD   hydroCHLOROthiazide (HYDRODIURIL) 25 MG tablet Take 25 mg by mouth daily    Historical Provider, MD   predniSONE (DELTASONE) 20 MG tablet 60 mg x 2 days, 40 mg x 2 days, 20 mg x 2 days 12/19/20   Che Vaughan PA-C     Scheduled Meds:   piperacillin-tazobactam  3,375 mg Intravenous Q8H    sodium chloride  1,000 mL Intravenous Once    aspirin  81 mg Oral Daily    [Held by provider] glipiZIDE  5 mg Oral QAM AC    [Held by provider] hydroCHLOROthiazide  25 mg Oral Daily    levothyroxine  112 mcg Oral Daily    [Held by provider] lisinopril  10 mg Oral Daily    [Held by provider] metFORMIN  1,000 mg Oral Daily with breakfast    [Held by provider] metFORMIN  500 mg Oral QPM    pantoprazole  40 mg Oral QAM AC    [Held by provider] alogliptin  6.25 mg Oral Daily    sodium chloride flush  5-40 mL Intravenous 2 times per day    famotidine (PEPCID) injection  20 mg Intravenous Daily    [Held by provider] heparin (porcine)  5,000 Units Subcutaneous 3 times per day    insulin glargine  0.15 Units/kg Subcutaneous Nightly    insulin lispro  0-18 Units Subcutaneous TID WC    insulin lispro  0-9 Units Subcutaneous Nightly     Continuous Infusions:   sodium chloride      norepinephrine 10 mcg/min (05/04/21 1521)    sodium chloride       PRN Meds:sodium chloride flush, sodium chloride, potassium chloride, acetaminophen, HYDROcodone 5 mg - acetaminophen **OR** HYDROcodone 5 mg - acetaminophen, HYDROmorphone **OR** HYDROmorphone, promethazine **OR** ondansetron, dextrose, magnesium sulfate, sodium phosphate IVPB **OR** sodium phosphate IVPB **OR** sodium phosphate IVPB    Allergies   Allergen Reactions    No Known Allergies        SOCIAL HISTORY:   Social History     Socioeconomic History    Marital status: chest wall. There are no tattoos. There is no jaundice       HEENT:  Sclera are anicteric and conjunctiva are normal.  Hearing is adequate. Oropharynx moist without thrush. Oral ulcers are not seen. Neck is supple without masses. There is no supraclavicular wasting  Abdomen: Bowel sounds are normal.  The abdomen is soft and non distended. There is no tenderness, guarding, rebound, or rigidity. There are no masses, hepatosplenomegaly, or hernias. Peritoneal signs are not appreciated. Extemities: There is no clubbing or edema. Pulses are palpable.      Lab and Imaging Review   Recent Labs     05/03/21  0713 05/03/21  0713 05/03/21  0713 05/03/21  0713 05/03/21  1334 05/03/21  1940 05/04/21  0409 05/04/21  0509 05/04/21  1253   WBC 23.5*  --   --   --   --   --   --  4.1  --    HGB 18.3*  --   --   --   --   --   --  19.5*  --    MCV 87  --   --   --   --   --   --  87.7  --      --   --   --   --   --   --  See Reflexed IPF Result  --    INR  --   --   --   --  1.2  --   --  2.9  --    NA  --   --  144   < > 148* 143 143 142 148*   K  --   --  2.2*   < > 3.2* 2.1* 3.3* 3.0* 2.8*   CL  --   --  78*   < > 92* 95* 103 101 109*   CO2  --   --  >40*   < > 32* 28 22 24 25   BUN  --   --  116*   < > 95* 92* 88* 84* 84*   CREATININE  --   --  3.80*   < > 2.49* 2.38* 2.27* 2.42* 2.24*   GLUCOSE  --   --  585*   < > 444* 467* 277* 273* 184*   CALCIUM  --   --  9.6   < > 7.7* 7.8* 7.4* 8.2* 8.2*   PROT  --   --  7.3  --  6.7  --   --  5.2*  --    LABALBU  --    < > 4.7  --  3.6 3.3*  --  2.7*  --    AST  --   --  37*  --  30  --   --  32*  --    ALT  --   --  18  --  14  --   --  14  --    ALKPHOS  --   --  106  --  77  --   --  57  --    BILITOT  --   --  5.0*  --  3.74*  --   --  2.46*  --    BILIDIR  --   --  0.0  --   --   --   --   --   --    AMMONIA  --   --   --   --  19  --   --   --   --    LIPASE  --   --  1,451*  --   --   --   --   --   --    MG  --   --  2.1   < >  -- 2.4 2.2 2.1 1.9    < > = values in this interval not displayed. Recent Labs     05/03/21  1334 05/04/21  0509   INR 1.2 2.9   PROTIME 12.6* 28.2*   Upper GI series, CT scan, MRI of the abdomen were reviewed. All of the studies pointing towards the gastric volvulus without any CBD obstruction or pancreatitis. IMPRESSION:  1. Gastric volvulus  2. Dehydration with acute kidney injury  3. Indirect hyperbilirubinemia likely from Gilbert's syndrome. MRI was negative yesterday for any CBD stone. 4.  Elevated troponin secondary to type II MI  5. Hyperlipasemia without clinical acute pancreatitis based on her symptoms and imaging. RECOMMENDATIONS:   1. Patient had an upper GI series which was reviewed which showed gastric volvulus. Due to large amount of the contrast we will cannot do upper endoscopy today. Recommend NG tube suction today and potential upper endoscopy tomorrow with possible decompression and placement of PEG tube. If there is any deterioration she may require a surgical intervention. Thank you for this consult. Please do not hesitate to call with any questions.     Electronically signed by Wesley Kunz MD  on 5/4/2021 at 3:38 PM

## 2021-05-04 NOTE — PROGRESS NOTES
 Non-STEMI (non-ST elevated myocardial infarction) ([facility] [I21.4] 05/04/2021    Diabetic ketoacidosis without coma associated with type 2 diabetes mellitus ([facility] [E11.10] 05/04/2021    BONNIE (acute kidney injury) ([facility] [N17.9] 05/04/2021    Hernia with obstruction [K46.0] 05/04/2021    Essential hypertension [I10]     Thyroid disease [E07.9]     Syncope [R55]     Acute pancreatitis [K85.90] 05/03/2021       1. 78 y.o. female with acute pancreatitis, elevated INR, hyperbilirubinemia, NSTEMI, Hiatal hernia, and DKA     Plan:  1. Pt seen and examined at bedside this morning. 2. MRCP and labs reviewed. Elevated INR today- 2.9. bilirubin decreased overnight. Pt continues to have elevated blood glucose in the setting of DKA. MRCP with no evidence of intrahepatic or extrahepatic ductal dilation, there is a large hiatal hernia with organoaxial malrotation. 3. Pt with previous cholecystectomy with elevated bilirubin, recommend GI consultation. 4. Will consult MIS surgery team due to large hiatal hernia. 5. UGI study ordered this morning. Electronically signed by Jean Paul Barnard DO  on 5/4/2021 at 7:22 AM     Attending Physician Statement  I have discussed the case with Dr Mohamud Hairston, including pertinent history and exam findings with the resident. I have seen and examined the patient and the key elements of the encounter have been performed by me. I agree with the assessment, plan and orders as documented by the resident.       Electronically signed by Sabrina Mora IV, DO  on 5/5/2021 at 8:57 AM

## 2021-05-04 NOTE — PROGRESS NOTES
year-old female who was transferred from [place] where she presented with nausea, vomiting and diarrhea for 6 days. Naturally, her oral intake had been poor and she did experience a syncopal episode. Additionally, creatinine noted to be elevated to 3.8, lactic acid 6, glucose 585. Troponin I 0.404, bilirubin 5, lipase 1451, beta hydroxybutyrate 3.66. WBC count elevated to 23.5. [facility]. Admitted to medicine with DKA, BONNIE, pancreatitis, NSTEMI and large obstructing hiatal hernia noted on CT scan. General surgery consulted. Patient blood pressure remained low despite approximately 4 L of resuscitative fluids. Levophed was initiated. Critical care consulted. GI consulted for gastric volvulus. Review of Systems:     Constitutional:  negative for chills, fevers, sweats  Respiratory:  negative for cough, dyspnea on exertion, shortness of breath, wheezing  Cardiovascular:  negative for chest pain, chest pressure/discomfort, lower extremity edema, palpitations  Gastrointestinal: Positive for abdominal pain, diarrhea, nausea, vomiting  Neurological: Positive for dizziness. Negative for headache    Medications: Allergies:     Allergies   Allergen Reactions    No Known Allergies        Current Meds:   Scheduled Meds:    piperacillin-tazobactam  3,375 mg Intravenous Q8H    aspirin  81 mg Oral Daily    [Held by provider] glipiZIDE  5 mg Oral QAM AC    [Held by provider] hydroCHLOROthiazide  25 mg Oral Daily    levothyroxine  112 mcg Oral Daily    [Held by provider] lisinopril  10 mg Oral Daily    [Held by provider] metFORMIN  1,000 mg Oral Daily with breakfast    [Held by provider] metFORMIN  500 mg Oral QPM    pantoprazole  40 mg Oral QAM AC    [Held by provider] alogliptin  6.25 mg Oral Daily    sodium chloride flush  5-40 mL Intravenous 2 times per day    famotidine (PEPCID) injection  20 mg Intravenous Daily    [Held by provider] heparin (porcine) 5,000 Units Subcutaneous 3 times per day    insulin glargine  0.15 Units/kg Subcutaneous Nightly    insulin lispro  0-18 Units Subcutaneous TID WC    insulin lispro  0-9 Units Subcutaneous Nightly     Continuous Infusions:    sodium chloride      sodium chloride       PRN Meds: sodium chloride flush, sodium chloride, potassium chloride, acetaminophen, HYDROcodone 5 mg - acetaminophen **OR** HYDROcodone 5 mg - acetaminophen, HYDROmorphone **OR** HYDROmorphone, promethazine **OR** ondansetron, dextrose, magnesium sulfate, sodium phosphate IVPB **OR** sodium phosphate IVPB **OR** sodium phosphate IVPB    Data:     Past Medical History:   has a past medical history of Diabetes mellitus ([address] Gout, Hiatal hernia, Hyperlipidemia, Hypertension, and Thyroid disease. Social History:   reports that she has never smoked. She has never used smokeless tobacco. She reports that she does not drink alcohol or use drugs. Family History:   Family History   Problem Relation Age of Onset    Breast Cancer Mother     High Blood Pressure Mother     High Cholesterol Father     Breast Cancer Sister        Vitals:  BP (!) 78/60   Pulse 96   Temp 98.6 °F (37 °C) (Oral)   Resp 30   Ht 5' 2\" (1.575 m)   Wt 166 lb (75.3 kg)   SpO2 94%   BMI 30.36 kg/m²   Temp (24hrs), Av.1 °F (36.7 °C), Min:97.5 °F (36.4 °C), Max:98.6 °F (37 °C)    Recent Labs     21  1322 21  0131 21  0435   POCGLU 449* 314* 228*       I/O (24Hr):   No intake or output data in the 24 hours ending 21 0901    Labs:  Hematology:  Recent Labs     21  0713 21  1334 21  0509   WBC 23.5*  --  4.1   RBC 6.23*  --  6.91*   HGB 18.3*  --  19.5*   HCT 54.2*  --  60.6*   MCV 87  --  87.7   MCH 29.4  --  28.2   MCHC 33.8  --  32.2   RDW  --   --  14.3     --  See Reflexed IPF Result   MPV 13.7*  --  NOT REPORTED   INR  --  1.2 2.9     Chemistry:  Recent Labs     21  1334 21  1434 21  1940 05/04/21  0409 05/04/21  0509 05/04/21  0513   *  --  143 143 142  --    K 3.2*  --  2.1* 3.3* 3.0*  --    CL 92*  --  95* 103 101  --    CO2 32*  --  28 22 24  --    GLUCOSE 444*  --  467* 277* 273*  --    BUN 95*  --  92* 88* 84*  --    CREATININE 2.49*  --  2.38* 2.27* 2.42*  --    MG  --   --  2.4 2.2 2.1  --    ANIONGAP 24*  --  20* 18* 17  --    LABGLOM 19*  --  20* 21* 20*  --    GFRAA 23*  --  25* 26* 24*  --    CALCIUM 7.7*  --  7.8* 7.4* 8.2*  --    CAION  --   --   --  0.98*  --  1.02*   PHOS  --   --  3.6 3.6 3.7  --    TROPHS 85* 94*  --   --   --   --    LACTACIDWB 2.7*  --   --   --   --  5.7*     Recent Labs     05/03/21  0713 05/03/21  1322 05/03/21  1334 05/03/21  1940 05/04/21  0131 05/04/21  0435 05/04/21  0509   PROT 7.3  --  6.7  --   --   --  5.2*   LABALBU 4.7  --  3.6 3.3*  --   --  2.7*   TSH  --   --  13.36*  --   --   --   --    AST 37*  --  30  --   --   --  32*   ALT 18  --  14  --   --   --  14   ALKPHOS 106  --  77  --   --   --  57   BILITOT 5.0*  --  3.74*  --   --   --  2.46*   BILIDIR 0.0  --   --   --   --   --   --    AMMONIA  --   --  19  --   --   --   --    LIPASE 1,451*  --   --   --   --   --   --    TRIG  --   --   --   --   --   --  163*   POCGLU  --  449*  --   --  314* 228*  --      ABG:  Lab Results   Component Value Date    FIO2 INFORMATION NOT PROVIDED 05/04/2021     No results found for: SPECIAL  Lab Results   Component Value Date/Time    CULTURE <10,000 COL/CC, WILL NOT BE FURTHER ID'D. 04/28/2021 03:30 PM       Radiology:  Ct Abdomen Pelvis Wo Contrast Additional Contrast? None    Result Date: 5/3/2021  There is obstruction of the stomach because of the portions of the stomach contained in the very large hiatal hernia. The rest of the GI tract is normal. No evidence of an acute infectious or inflammatory process in the abdomen and pelvis.      Ct Abdomen Pelvis Wo Contrast Additional Contrast? None    Result Date: 4/28/2021  No acute infectious or inflammatory process is identified. Very large hiatal hernia. This could be the source of the patient's symptoms. Ct Head Wo Contrast    Result Date: 5/3/2021  No acute intracranial process. If clinical concern remains, consider further imaging with MRI. Ct Cervical Spine Wo Contrast    Result Date: 5/3/2021  No acute findings in the cervical spine. Xr Chest Portable    Result Date: 5/3/2021  No acute cardiopulmonary disease Large hiatal hernia     Mri Abdomen Wo Contrast Mrcp    Result Date: 5/3/2021  1. No evidence of intrahepatic or extrahepatic ductal dilatation. 2. Incompletely imaged large hiatal hernia with organoaxial malrotation of the stomach. 3. Small to moderate amount of ascites. 4. Diffuse small bowel wall thickening likely due to 3rd spacing as the post enteritis. 5. Trace bilateral pleural effusions. Physical Examination:        General appearance:  alert, acutely ill appearing. Not currently in distress. Mental Status:  oriented to person, but not place or time. Lungs:  clear to auscultation bilaterally. Tachypnea  Heart:  regular rate and rhythm, no murmur  Abdomen:  soft, nondistended, there is epigastric tenderness. Bowel sounds are hypoactive. No peritoneal signs.   Extremities:  no edema, redness, tenderness in the calves  Skin:  no gross lesions, rashes, induration    Assessment:        Hospital Problems           Last Modified POA    * (Principal) Sepsis with acute renal failure without septic shock ([address] 5/4/2021 Yes    Acute pancreatitis 5/4/2021 Yes    Non-STEMI (non-ST elevated myocardial infarction) ([address] 5/4/2021 Yes    Diabetic ketoacidosis without coma associated with type 2 diabetes mellitus ([address] 5/4/2021 Yes    BONNIE (acute kidney injury) ([address] 5/4/2021 Yes    Hernia with obstruction 5/4/2021 Yes    Essential hypertension 5/4/2021 Yes    Thyroid disease 5/4/2021 Yes    Syncope 5/4/2021 Yes          Plan:        #Septic shock/lactic acidosis, unclear source, suspect GI  - s/p administration of 3.5L of resuscitative fluids. Pressures remain low. Lactic remains elevated (3.8). I have initiated levophed and consulted critical care. Arterial line and central line ordered, spoke with critical care resident. Continue normal saline @125 ml/h. - continue zosyn as ordered  - trend lactic, concern is for gastric ischemia  - f/u urine, blood cultures    #Large obstructing hiatal hernia/gastric volvulus  - reviewed CT imaging/upper GI series and discussed with both the general surgery and GI teams. NG tube to be placed. Will plan for EGD tomorrow. Serial abdominal exams and monitoring of lactic. If lactic continues to uptrend and abdominal pain worsens, contact surgery ASAP  - less likely pancreatitis, lipase likely elevated secondary to recurrent vomiting, hemoconcentration. No evidence of pancreatitis on CT scan    #Type 2 DM, non-insulin dependent, complicated by hyperglycemia and ketosis  - ketosis is likely from starvation in conjunction with profound hyperglycemia. There is no suggestion of acidemia based ABG. - continue to check blood glucose q6h while NPO. Moderate intensity sliding scale    #Acute kidney injury secondary to ATN from hypotension/hypoperfusion  - continue to monitor creatinine. Continue resuscitative fluids. Consult to nephrology. #Acute hypoxemic respiratory failure  - likely secondary to impaired lung expansion from large hiatal hernia/septic shock  - maintain oxygen saturation >92%. Monitor for pulmonary edema given large amount of resuscitative fluids    #NSTEMI  - likely demand ischemia in setting of septic shock. Cardiology following. Echo pending. I have reviewed EKG which shows inferior infarct. Patient will need ischemic work-up following resolution of acute issues    #Hypothyroidism  - TSH elevated to 13.36. in the setting of acute illness.  Free T4 normal. Will resume thyroid supplementation when tolerating P.O.    #Elevated PT  - Could be

## 2021-05-04 NOTE — ED NOTES
Writer spoke with Marycruz Hoffmann, ANGELES and pt to get a one time dose of insulin based on recent BS of 228.      Iola Sandhoff, RN  05/04/21 [id number]

## 2021-05-04 NOTE — ANESTHESIA PRE PROCEDURE
Department of Anesthesiology  Preprocedure Note       Name:  Mell Pepe   Age:  78 y.o.  :  1950                                          MRN:  [id number]         Date:  2021      Surgeon: Gibran Barker):  Julio Cesar Ríos DO    Procedure: Procedure(s):  [address] ROBOTIC    Department of Anesthesiology  Pre-Anesthesia Evaluation/Consultation         Name:  Mell Pepe                                         Age:  78 y.o.   MRN:  [id number]             Medications  Current Facility-Administered Medications   Medication Dose Route Frequency Provider Last Rate Last Admin    0.9 % sodium chloride bolus  1,000 mL Intravenous Once Diana Horton DO        piperacillin-tazobactam (ZOSYN) 3,375 mg in dextrose 5 % 50 mL IVPB extended infusion (mini-bag)  3,375 mg Intravenous Q12H Nessa Cedeno MD        insulin lispro (HUMALOG) injection vial 0-12 Units  0-12 Units Subcutaneous Q6H Darwin Mancilla PA-C        insulin lispro (HUMALOG) injection vial 0-6 Units  0-6 Units Subcutaneous Nightly Arianne Mancilla PA-C        pantoprazole (PROTONIX) injection 40 mg  40 mg Intravenous Daily Darwin Mancilla PA-C        And    sodium chloride (PF) 0.9 % injection 10 mL  10 mL Intravenous Daily Arianne Mancilla PA-C        lactated ringers infusion   Intravenous Continuous Curly Conrad PA-C 100 mL/hr at 21 Rate Verify at 21    phytonadione (ADULT) (VITAMIN K) 10 mg in dextrose 5 % 100 mL IVPB  10 mg Intravenous Once Leo Cumins, DO        potassium chloride 10 mEq/100 mL IVPB (Peripheral Line)  10 mEq Intravenous PRN Leo Cumins, DO        norepinephrine (LEVOPHED) 16 mg in sodium chloride 0.9 % 250 mL infusion  2-100 mcg/min Intravenous Continuous Flordia Ang, DO 13.1 mL/hr at 21 14 mcg/min at 21    0.9 % sodium chloride infusion   Intravenous PRN Armen Bashir, DO        0.9 % sodium chloride infusion   Intravenous PRN Diana A Clifford, DO        sodium phosphate 10 mmol in dextrose 5 % 250 mL IVPB  10 mmol Intravenous PRN Diana A Clifford, DO        Or    sodium phosphate 15 mmol in dextrose 5 % 250 mL IVPB  15 mmol Intravenous PRN Diana A Clifford, DO        Or    sodium phosphate 20 mmol in dextrose 5 % 500 mL IVPB  20 mmol Intravenous PRN Diana A Clifford, DO        [Held by provider] heparin (porcine) injection 5,000 Units  5,000 Units Subcutaneous 3 times per day ALFONZO Greco CNP   5,000 Units at 21 2222       Allergies   Allergen Reactions    No Known Allergies      Patient Active Problem List   Diagnosis    Acute pancreatitis    Septic shock (HCC)    [facility]non-ST elevated [facility]    Diabetic ketoacidosis without coma associated with type 2 diabetes mellitus ([facility]    BONNIE (acute kidney injury) ([facility]    Hernia with obstruction    Essential hypertension    Thyroid disease    Syncope    Acute tubular necrosis (HCC)    Lactic acidosis    Acute hypoxemic respiratory failure (HCC)     Past Medical History:   Diagnosis Date    Diabetes mellitus ([facility]     Gout     Hiatal hernia     Hyperlipidemia     Hypertension     Thyroid disease      Past Surgical History:   Procedure Laterality Date    BREAST SURGERY      Bx     CHOLECYSTECTOMY      HYSTERECTOMY       Social History     Tobacco Use    Smoking status: Never Smoker    Smokeless tobacco: Never Used   Substance Use Topics    Alcohol use: Never     Frequency: Never    Drug use: Never         Vital Signs (Current)   Vitals:    21 1830   BP: (!) 82/42   Pulse: 109   Resp: (!) 39   Temp:    SpO2: 92%     Vital Signs Statistics (for past 48 hrs)     Temp  Av.9 °F (36.6 °C)  Min: 97.5 °F (36.4 °C)   Min taken time: 21 1003  Max: 98.6 °F (37 °C)   Max taken time: 21 1400  Pulse  Av  Min: 70   Min taken time: 21 0900  Max: 110   Max taken time: 21 1641  Resp  Av.4  Min: 10   Min taken time: 21 1401  Max: 44   Max taken time: 21 1830  BP  Min: 68/58   Min taken time: 21 1449  Max: 149/89   Max taken time: 21 1030  MAP (mmHg)  Av.5  Min: 55   Min taken time: 21 1815  Max: 114   Max taken time: 21 1100  SpO2  Av.2 %  Min: 85 %   Min taken time: 21 1641  Max: 100 %   Max taken time: 21 1045  BP Readings from Last 3 Encounters:   21 (!) 82/42   21 139/79   21 124/76       BMI  Body mass index is 30.36 kg/m². CBC   Lab Results   Component Value Date    WBC 4.1 2021    RBC 6.91 2021    RBC 6.23 2021    HGB 19.5 2021    HCT 60.6 2021    MCV 87.7 2021    RDW 14.3 2021    PLT See Reflexed IPF Result 2021       CMP    Lab Results   Component Value Date     2021    K 2.8 2021     2021    CO2 25 2021    BUN 84 2021    CREATININE 2.24 2021    CREATININE 3.09 2021    GFRAA 26 2021    LABGLOM 22 2021    GLUCOSE 184 2021    GLUCOSE 453 2021    PROT 5.2 2021    PROT 7.3 2021    CALCIUM 8.2 2021    BILITOT 2.46 2021    ALKPHOS 57 2021    AST 32 2021    ALT 14 2021       BMP    Lab Results   Component Value Date     2021    K 2.8 2021     2021    CO2 25 2021    BUN 84 2021    CREATININE 2.24 2021    CREATININE 3.09 2021    CALCIUM 8.2 2021    GFRAA 26 2021    LABGLOM 22 2021    GLUCOSE 184 2021    GLUCOSE 453 2021       POC Testing  Recent Labs     21  1322 21  1322 21  1718   POCGLU 449*   < > 170*   POCNA 145  --   --    POCK 2.3*  --   --    POCCL 97*  --   --    POCBUN 98*  --   --    POCHEMO 17.9*  --   --    POCHCT 53*  --   --     < > = values in this interval not displayed.        [facility]    Lab Results   Component Value Date    PROTIME 28.2 2021    INR 2.9 05/04/2021       HCG (If Applicable) No results found for: PREGTESTUR, PREGSERUM, HCG, HCGQUANT     ABGs No results found for: PHART, PO2ART, NEF4QSY, WGR4AWW, BEART, V6YVABIM     Type & Screen (If Applicable)  No results found for: [facility]    Radiology (If Applicable)    Cardiac Testing (If Applicable)     EKG (If Applicable)   Inf,anterolat MI ,inc QT        Medications prior to admission:   Prior to Admission medications    Medication Sig Start Date End Date Taking?  Authorizing Provider   metFORMIN (GLUCOPHAGE) 1000 MG tablet Take 1,000 mg by mouth daily (with breakfast)    Historical Provider, MD   SITagliptin (JANUVIA) 100 MG tablet Take 100 mg by mouth daily    Historical Provider, MD   omeprazole (PRILOSEC) 40 MG delayed release capsule Take 1 capsule by mouth every morning (before breakfast) 4/28/21   Sebastian Glez DO   lisinopril (PRINIVIL;ZESTRIL) 40 MG tablet Take 10 mg by mouth daily     Historical Provider, MD   glimepiride (AMARYL) 2 MG tablet Take 2 mg by mouth every morning (before breakfast)    Historical Provider, MD   simvastatin (ZOCOR) 10 MG tablet Take 10 mg by mouth nightly    Historical Provider, MD   levothyroxine (SYNTHROID) 175 MCG tablet Take 112 mcg by mouth Daily Dose reduced in December 2020    Historical Provider, MD   aspirin 81 MG EC tablet Take 81 mg by mouth daily    Historical Provider, MD   metFORMIN (GLUCOPHAGE) 500 MG tablet Take 500 mg by mouth every evening     Historical Provider, MD   hydroCHLOROthiazide (HYDRODIURIL) 25 MG tablet Take 25 mg by mouth daily    Historical Provider, MD   predniSONE (DELTASONE) 20 MG tablet 60 mg x 2 days, 40 mg x 2 days, 20 mg x 2 days 12/19/20   JUN RamosC       Current medications:    Current Facility-Administered Medications   Medication Dose Route Frequency Provider Last Rate Last Admin    0.9 % sodium chloride bolus  1,000 mL Intravenous Once Diana Horton,         piperacillin-tazobactam (ZOSYN) 3,375 mg in dextrose 5 % 50 mL IVPB extended infusion (mini-bag)  3,375 mg Intravenous Q12H Nessa Cedeno MD        insulin lispro (HUMALOG) injection vial 0-12 Units  0-12 Units Subcutaneous Q6H Esteban Mancilla PA-C        insulin lispro (HUMALOG) injection vial 0-6 Units  0-6 Units Subcutaneous Nightly Esteban Mancilla PA-C        pantoprazole (PROTONIX) injection 40 mg  40 mg Intravenous Daily Darwin Mancilla PA-C        And    sodium chloride (PF) 0.9 % injection 10 mL  10 mL Intravenous Daily Nirmala Reina PA-C        lactated ringers infusion   Intravenous Continuous Nirmala ROMINA Reina 100 mL/hr at 05/04/21 1816 Rate Verify at 05/04/21 1816    phytonadione (ADULT) (VITAMIN K) 10 mg in dextrose 5 % 100 mL IVPB  10 mg Intravenous Once Kvng Powell DO        potassium chloride 10 mEq/100 mL IVPB (Peripheral Line)  10 mEq Intravenous PRN Kvng Powell DO        norepinephrine (LEVOPHED) 16 mg in sodium chloride 0.9 % 250 mL infusion  2-100 mcg/min Intravenous Continuous Catarino Medrano DO 13.1 mL/hr at 05/04/21 1831 14 mcg/min at 05/04/21 1831    0.9 % sodium chloride infusion   Intravenous PRN Francis Lopez DO        0.9 % sodium chloride infusion   Intravenous PRN Francis Lopez DO        aspirin EC tablet 81 mg  81 mg Oral Daily Diana Horton DO        [Held by provider] glipiZIDE (GLUCOTROL) tablet 5 mg  5 mg Oral QAM AC ALFONZO Pavon CNP        [Held by provider] hydroCHLOROthiazide (HYDRODIURIL) tablet 25 mg  25 mg Oral Daily ALFONZO Lepe - CNP        levothyroxine (SYNTHROID) tablet 112 mcg  112 mcg Oral Daily Diana Horton DO   Stopped at 05/04/21 0505    [Held by provider] lisinopril (PRINIVIL;ZESTRIL) tablet 10 mg  10 mg Oral Daily ALFONZO Pavon CNP        [Held by provider] metFORMIN (GLUCOPHAGE) tablet 1,000 mg  1,000 mg Oral Daily with breakfast Lawernce Sidle, APRN - CNP        [Held by provider] metFORMIN (GLUCOPHAGE) tablet 500 mg  500 mg Oral QPM Mattie Kiley Steele CNP        [Held by provider] alogliptin (NESINA) tablet 6.25 mg  6.25 mg Oral Daily ALFONZO Rodas CNP        sodium chloride flush 0.9 % injection 5-40 mL  5-40 mL Intravenous 2 times per day Diana A Clifford, DO   10 mL at 05/04/21 0930    sodium chloride flush 0.9 % injection 5-40 mL  5-40 mL Intravenous PRN Diana A Clifford, DO        0.9 % sodium chloride infusion  25 mL Intravenous PRN Diana A Clifford, DO        potassium chloride 10 mEq/100 mL IVPB (Peripheral Line)  10 mEq Intravenous PRN Diana A Clifford, DO   Stopped at 05/04/21 0503    acetaminophen (TYLENOL) tablet 650 mg  650 mg Oral Q4H PRN Diana A Clifford, DO        HYDROmorphone (DILAUDID) injection 0.25 mg  0.25 mg Intravenous Q3H PRN Diana A Clifford, DO        Or    HYDROmorphone (DILAUDID) injection 0.5 mg  0.5 mg Intravenous Q3H PRN Diana A Clifford, DO        promethazine (PHENERGAN) tablet 12.5 mg  12.5 mg Oral Q6H PRN Diana A Clifford, DO        Or    ondansetron (ZOFRAN) injection 4 mg  4 mg Intravenous Q6H PRN Diana A Clifford, DO   4 mg at 05/03/21 1704    dextrose 50 % IV solution  12.5 g Intravenous PRN Diana A Clifford, DO        magnesium sulfate 1000 mg in dextrose 5% 100 mL IVPB  1,000 mg Intravenous PRN Diana A Clifford, DO        sodium phosphate 10 mmol in dextrose 5 % 250 mL IVPB  10 mmol Intravenous PRN Diana A Clifford, DO        Or    sodium phosphate 15 mmol in dextrose 5 % 250 mL IVPB  15 mmol Intravenous PRN Diana A Clifford, DO        Or    sodium phosphate 20 mmol in dextrose 5 % 500 mL IVPB  20 mmol Intravenous PRN Diana A Clifford, DO        [Held by provider] heparin (porcine) injection 5,000 Units  5,000 Units Subcutaneous 3 times per day ALFONZO Rodas CNP   5,000 Units at 05/03/21 2222       Allergies:     Allergies   Allergen Reactions    No Known Allergies        Problem List:    Patient Active Problem List   Diagnosis Code    Acute pancreatitis K85.90    Septic shock (HCC) A41.9, R65.21    Non-STEMI (non-ST elevated myocardial infarction) ([facility] I21.4    Diabetic ketoacidosis without coma associated with type 2 diabetes mellitus ([facility] E11.10    BONNIE (acute kidney injury) ([facility] N17.9    Hernia with obstruction K46.0    Essential hypertension I10    Thyroid disease E07.9    Syncope R55    Acute tubular necrosis (HCC) N17.0    Lactic acidosis E87.2    Acute hypoxemic respiratory failure (HCC) J96.01       Past Medical History:        Diagnosis Date    Diabetes mellitus ([facility]     Gout     Hiatal hernia     Hyperlipidemia     Hypertension     Thyroid disease        Past Surgical History:        Procedure Laterality Date    BREAST SURGERY      Bx 1993    CHOLECYSTECTOMY      HYSTERECTOMY         Social History:    Social History     Tobacco Use    Smoking status: Never Smoker    Smokeless tobacco: Never Used   Substance Use Topics    Alcohol use: Never     Frequency: Never                                Counseling given: Not Answered      Vital Signs (Current):   Vitals:    05/04/21 1641 05/04/21 1645 05/04/21 1815 05/04/21 1830   BP: 92/61 92/61 (!) 84/38 (!) 82/42   Pulse: 110 107 103 109   Resp: 20 (!) 35 (!) 38 (!) 39   Temp: 98 °F (36.7 °C)      TempSrc: Oral      SpO2: (!) 85% 91% 93% 92%   Weight:       Height:                                                  BP Readings from Last 3 Encounters:   05/04/21 (!) 82/42   05/03/21 139/79   04/28/21 124/76       NPO Status:  mn                                                                               BMI:   Wt Readings from Last 3 Encounters:   05/03/21 166 lb (75.3 kg)   05/03/21 166 lb (75.3 kg)   04/28/21 166 lb (75.3 kg)     Body mass index is 30.36 kg/m².     CBC:   Lab Results   Component Value Date    WBC 4.1 05/04/2021    RBC 6.91 05/04/2021    RBC 6.23 05/03/2021    HGB 19.5 05/04/2021    HCT 60.6 05/04/2021    MCV 87.7 05/04/2021    RDW 14.3 05/04/2021    PLT See Reflexed IPF Result 05/04/2021       CMP:   Lab Results   Component Value Date     05/04/2021    K 2.8 05/04/2021     05/04/2021    CO2 25 05/04/2021    BUN 84 05/04/2021    CREATININE 2.24 05/04/2021    CREATININE 3.09 05/03/2021    GFRAA 26 05/04/2021    LABGLOM 22 05/04/2021    GLUCOSE 184 05/04/2021    GLUCOSE 453 05/03/2021    PROT 5.2 05/04/2021    PROT 7.3 05/03/2021    CALCIUM 8.2 05/04/2021    BILITOT 2.46 05/04/2021    ALKPHOS 57 05/04/2021    AST 32 05/04/2021    ALT 14 05/04/2021       POC Tests:   Recent Labs     05/03/21  1322 05/03/21  1322 05/04/21  1718   POCGLU 449*   < > 170*   POCNA 145  --   --    POCK 2.3*  --   --    POCCL 97*  --   --    POCBUN 98*  --   --    POCHEMO 17.9*  --   --    POCHCT 53*  --   --     < > = values in this interval not displayed. Coags:   Lab Results   Component Value Date    PROTIME 28.2 05/04/2021    INR 2.9 05/04/2021       HCG (If Applicable): No results found for: PREGTESTUR, PREGSERUM, HCG, HCGQUANT     ABGs: No results found for: PHART, PO2ART, RIB7MWL, QRT1SZQ, BEART, S3XQTJUV     Type & Screen (If Applicable):  No results found for: LABABO, LABRH    Drug/Infectious Status (If Applicable):  No results found for: HIV, HEPCAB    COVID-19 Screening (If Applicable):   Lab Results   Component Value Date    COVID19 NOT DETECTED 05/03/2021           Anesthesia Evaluation  Nursing notes reviewed  Airway:         Dental:          Pulmonary:                             ROS comment: Rep failurw   Cardiovascular:    (+) hypertension:, past MI: < 1 month,                ROS comment: nstemi     Neuro/Psych:               GI/Hepatic/Renal:   (+) hiatal hernia, renal disease: ARF,          ROS comment: Septic shock  Ketoacidosis  pancreatitis. Endo/Other:    (+) DiabetesType II DM, poorly controlled, , hypothyroidism::., .                  ROS comment: gout Abdominal:           Vascular:                                        Anesthesia Plan      general     ASA 4 - emergent       Induction: intravenous.       Anesthetic plan and risks discussed with Unable to obtain due to emergent nature.                     Marlon Haile MD   5/4/2021

## 2021-05-04 NOTE — CONSULTS
Critical Care - History and Physical Examination    Patient's name:  Florecita Banuelos Record Number: [id number]  Patient's account/billing number: [de-identified]  Patient's YOB: 1950  Age: 78 y.o. Date of Admission: 5/3/2021 12:49 PM  Reason of ICU admission: hypotension  Date of History and Physical Examination: 5/4/2021    Primary Care Physician: Adilson Mansfield DO  Attending Physician: Dr. Vernell Campos    Code Status: Full Code    Chief complaint:   Chief Complaint   Patient presents with    Blood Sugar Problem     >450    Hernia     hyatial        Reason for ICU admission: hypotension    History Of Present Illness:   History was obtained from chart review, the patient and family member. Negro Ng is a 78 y.o.  female who was transferred from OSH after presenting for 6d of n/v and diarrhea. States that she fell, +LOC and woke up around 0200 yesterday morning on the floor. Lives alone, she was able to alert her neighbor that she needed help and her neighbor called EMS. Medical history includes HTN, DM, gout, and hiatal hernia. Her w/u at OSH demonstrated pancreatitis, NSTEMI, DKA, BONNIE and large hiatal hernia. She was transferred for higher level care and general surgery consult. General surgery not recommending surgical intervention at this time. She was originally admitted to medicine service. Became hypotensive while boarding in the emergency department, refractory to 3 fluid boluses. She was transferred to ICU due to requiring pressor support. Past Medical History:        Diagnosis Date    Diabetes mellitus ([address]     Gout     Hiatal hernia     Hyperlipidemia     Hypertension     Thyroid disease        Past Surgical History:        Procedure Laterality Date    BREAST SURGERY      Bx 1993    CHOLECYSTECTOMY      HYSTERECTOMY         Allergies:     Allergies   Allergen Reactions    No Known Allergies          Home Medications:   Prior to Admission medications    Medication Sig Start Date End Date Taking? Authorizing Provider   metFORMIN (GLUCOPHAGE) 1000 MG tablet Take 1,000 mg by mouth daily (with breakfast)    Historical Provider, MD   SITagliptin (JANUVIA) 100 MG tablet Take 100 mg by mouth daily    Historical Provider, MD   omeprazole (PRILOSEC) 40 MG delayed release capsule Take 1 capsule by mouth every morning (before breakfast) 4/28/21   Elizabeth Sheehan DO   lisinopril (PRINIVIL;ZESTRIL) 40 MG tablet Take 10 mg by mouth daily     Historical Provider, MD   glimepiride (AMARYL) 2 MG tablet Take 2 mg by mouth every morning (before breakfast)    Historical Provider, MD   simvastatin (ZOCOR) 10 MG tablet Take 10 mg by mouth nightly    Historical Provider, MD   levothyroxine (SYNTHROID) 175 MCG tablet Take 112 mcg by mouth Daily Dose reduced in December 2020    Historical Provider, MD   aspirin 81 MG EC tablet Take 81 mg by mouth daily    Historical Provider, MD   metFORMIN (GLUCOPHAGE) 500 MG tablet Take 500 mg by mouth every evening     Historical Provider, MD   hydroCHLOROthiazide (HYDRODIURIL) 25 MG tablet Take 25 mg by mouth daily    Historical Provider, MD   predniSONE (DELTASONE) 20 MG tablet 60 mg x 2 days, 40 mg x 2 days, 20 mg x 2 days 12/19/20   Che Lopez PA-C       Social History:   TOBACCO:   reports that she has never smoked. She has never used smokeless tobacco.  ETOH:   reports no history of alcohol use. DRUGS:  reports no history of drug use.   OCCUPATION:      Family History:       Problem Relation Age of Onset    Breast Cancer Mother     High Blood Pressure Mother     High Cholesterol Father     Breast Cancer Sister        REVIEW OF SYSTEMS (ROS):  Review of Systems    Physical Exam:    Vitals: BP 84/62   Pulse 97   Temp 98.6 °F (37 °C) (Oral)   Resp 30   Ht 5' 2\" (1.575 m)   Wt 166 lb (75.3 kg)   SpO2 92%   BMI 30.36 kg/m²     Body weight:   Wt Readings from Last 3 Encounters:   05/03/21 166 lb (75.3 kg)   05/03/21 166 lb (75.3 kg)   04/28/21 166 lb (75.3 kg) enzymes:  Recent Labs     05/03/21  0713   TROPONINI 0.404*     BNP:No results for input(s): BNP in the last 72 hours. Lipid profile:   Recent Labs     05/04/21  0509   TRIG 163*     Blood Gases: No results found for: PH, PCO2, PO2, HCO3, O2SAT  Thyroid functions:   Lab Results   Component Value Date    TSH 13.36 05/03/2021        Urinalysis: Urine dipstick shows many uric acid crystals. Micro exam: negative for WBC's or RBC's and uric acid crystals seen. Microbiology:  Cultures during this admission:   Blood cultures:                 [x] None drawn      [] Negative             []  Positive (Details:  )  Urine Culture:                   [x] None drawn      [] Negative             []  Positive (Details:  )  Sputum Culture:               [x] None drawn       [] Negative             []  Positive (Details:  )   Endotracheal aspirate:     [x] None drawn       [] Negative             []  Positive (Details:  )   -----------------------------------------------------------------  Radiological reports:    Ct Abdomen Pelvis Wo Contrast Additional Contrast? None    Result Date: 5/3/2021  There is obstruction of the stomach because of the portions of the stomach contained in the very large hiatal hernia. The rest of the GI tract is normal. No evidence of an acute infectious or inflammatory process in the abdomen and pelvis. Ct Abdomen Pelvis Wo Contrast Additional Contrast? None    Result Date: 4/28/2021  No acute infectious or inflammatory process is identified. Very large hiatal hernia. This could be the source of the patient's symptoms. Xr Abdomen (kub) (single Ap View)    Result Date: 5/4/2021  No significant subdiaphragmatic contrast progression, as above. RECOMMENDATION: Consider chest x-ray to further assess a organic-axial gastric volvulus     Ct Head Wo Contrast    Result Date: 5/3/2021  No acute intracranial process. If clinical concern remains, consider further imaging with MRI.      Ct Cervical Spine Wo Contrast    Result Date: 5/3/2021  No acute findings in the cervical spine. Xr Chest Portable    Result Date: 5/3/2021  No acute cardiopulmonary disease Large hiatal hernia     Mri Abdomen Wo Contrast Mrcp    Result Date: 5/3/2021  1. No evidence of intrahepatic or extrahepatic ductal dilatation. 2. Incompletely imaged large hiatal hernia with organoaxial malrotation of the stomach. 3. Small to moderate amount of ascites. 4. Diffuse small bowel wall thickening likely due to 3rd spacing as the post enteritis. 5. Trace bilateral pleural effusions. Fl Ugi    Result Date: 5/4/2021  Organo-axial volvulus the stomach. Large paraesophageal hernia containing the majority of the rotated gastric body. The greater curvature is positioned superiorly within the paraesophageal hernia defect. There is narrowing of the gastroesophageal junction as well as of the gastroduodenal junction through the hernia defect. Assessment and Plan     Patient Active Problem List   Diagnosis    Acute pancreatitis    Sepsis with acute renal failure without septic shock ([address]    Non-STEMI (non-ST elevated myocardial infarction) ([address]    Diabetic ketoacidosis without coma associated with type 2 diabetes mellitus ([address]    BONNIE (acute kidney injury) ([address]    Hernia with obstruction    Essential hypertension    Thyroid disease    Syncope    Acute tubular necrosis ([address]       Additional assessment:  Dino Burkett is a 78 y.o. female who intially presented on 5/3/2021 for   Chief Complaint   Patient presents with    Blood Sugar Problem     >450    Hernia     hyatial        PLAN/MEDICAL DECISION MAKING:  Neurologic:   · Neuro checks per protocol  · Sedation: no sedation.   · Pain control: analgesia PRN  Cardiovascular:  · HR:   · MAP: 63-95  · MAP goal >65  · Echo ordered and pending  Pulmonary:  · Maintain oxygen sats >92%  · Pulmonary toilet  · Felisha@barcoo  · No oxygen requirement at home  · Most recent CXR as mentioned above  GI/Nutrition  · bowel reg PRN  · Ulcer Prophylaxis: PPI  · Diet:Diet NPO Effective Now  Renal/Fluid/Electrolyte  · IV Fluids: 0.9NS @ 100 mL/Hr   · I/O: No intake/output data recorded. · F/u UOP  · BUN/Cr: 84/2.24  · Monitor electrolytes, replace PRN   ID  WBC:   Lab Results   Component Value Date    WBC 4.1 05/04/2021   ·   · Tmax: No data recorded.   · zosyn/vancomycin given in ED  Hematology:  Recent Labs     05/03/21  0713 05/04/21  0509   HGB 18.3* 19.5*   ·  stable  Endocrine:   glucose controlled - most recent BGL is   Recent Labs     05/04/21  0409 05/04/21  0509 05/04/21  1253   GLUCOSE 277* 273* 184*   ·   DVT Prophylaxis  · SCD sleeves -thigh high and Heparin  Discharge Needs:  PT, OT, ST, SW and Case Management      Plan to admit patient to TICU w/pulmonology consulting    CODE STATUS: Full Code    DISPOSITION:  [x] To remain ICU: pending dispo  [] OK for out of ICU from Critical Care standpoint    ---  Alfonzo Rivera DO, [address]  Emergency Medicine Resident  [facility]  05/04/21 3:11 PM

## 2021-05-04 NOTE — PROGRESS NOTES
Inpatient Diabetes  Education     Type and Reason for Visit: Patient Education  Patient in ED room 19. Patient was not feeling well, a bit confused at this time and full education session not appropriate. Brief assessment of needs was done : per support person, daughter in law, patient had been not well at home last few days and not able to eat or take medication. Unsure if patient had been checking her BG at home. Patient did state she had a home BG meter. Per chart review:Patient home medication list with oral medications for diabetes X3, no insulin. Discussed with daughter in law and patient her currently insulin is part of the plan now due to her acute needs. A1C was checked and is in target:   Lab Results   Component Value Date    LABA1C 6.7 (H) 05/04/2021     An education folder was left at bedside as support and writer will follow patient for ongoing needs. Out patient diabetes education  contact number [email or web address] - [phone] to patient and placed on the discharge summary.     Callie Osorio RN CDE

## 2021-05-04 NOTE — ED NOTES
[facility]leb notified of needing boarding labs. Notified stickers will be left in room for them.      Sary Troncoso RN  05/04/21 4521

## 2021-05-04 NOTE — ED NOTES
Audie Tse  77 yo F  Known hiatal hernia with incarceration and obstruction  Dr. Contreras Area aware  23 leukocytosis  Lactic 6.0  Elevated trop  Lipases 1051  Got 30 cc/kg fluid  Zosyn given    Accepted by Dr. Peace Cruz, RN  05/04/21 0108

## 2021-05-04 NOTE — H&P
the floor. She stated the last thing she recalls was getting up to get water. Patient states that she was lightheaded dizzy and weak and had a syncopal episode and fell. LOC of unknown time. The work-up in the outlying emergency room showed: A metabolic panel with a sodium of 144 potassium 2.2 chloride 78 CO2 greater than 40  creatinine 3.8 with lactic acid of 6 and glucose of 585. Nikolai Kid Troponin I 0.404, liver function showed AST of 37 bilirubin of 5 direct bilirubin 0, and a lipase of 1451. Beta hydroxybutyrate 3.66. Hemogram with acute leukocytosis with a WBC of 23.5, hemoglobin 18.3, hematocrit 54.2 a high concern of acute pancreatitis with acute kidney injury and DKA, with non-STEMI. There was endorgan injury with a high lactic 6.6 and a creatinine of 3.8 and troponin of 0.4 with EKG showing acute ischemia but with Q waves in the inferior and anterior leads. Originally the patient was recommended for ICU and the case was discussed with Dr. Jocelyn Mei and deferred the case to general surgery. And at that time Dr. Racquel Chadwick recommended ED to ED transfer. At that time the patient was given Zosyn and transferred ED to ED. Once at [facility] emergency room (@ 4318 5/3/21) repeat laboratories showed sodium of 148, potassium 3.2, chloride 92, CO2 32, BUN 95, creatinine 2.49 anion gap of 24 and a lactic acid of 2.7 with a glucose of 444. Repeat troponin was a high sensitive troponin that resulted at 85 and a recheck at 94. LFT GI panel normalized outside of the bilirubin decreased to 3.74 with no recheck of lipase. TSH showed 13.36 but a free T4 of 1.11. No repeat on the hemogram INR normal at 1.2 PT 12.6.   Venous blood gas showed pH of 7.61, CO2 35 PO2 52.2 HCO3 35.8    Past Medical History:     Past Medical History:   Diagnosis Date    Diabetes mellitus ([address]     Gout     Hiatal hernia     Hyperlipidemia     Hypertension     Thyroid disease         Past Surgical History:     Past Surgical Constitutional: Positive for fatigue. Negative for chills, diaphoresis and fever. HENT: Negative for congestion and hearing loss. Respiratory: Negative for cough, shortness of breath, wheezing and stridor. Cardiovascular: Negative for chest pain, palpitations and leg swelling. Gastrointestinal: Positive for abdominal pain, nausea and vomiting. Negative for blood in stool, constipation and diarrhea. Blood taste in mouth   Genitourinary: Negative for difficulty urinating, dysuria, frequency and urgency. Musculoskeletal: Positive for arthralgias and myalgias. Skin: Negative for rash. Neurological: Positive for syncope and weakness. Negative for dizziness, seizures and headaches. Psychiatric/Behavioral: Positive for confusion. The patient is nervous/anxious. Physical Exam:   /86   Pulse 97   Temp 98.6 °F (37 °C) (Oral)   Resp 22   Ht 5' 2\" (1.575 m)   Wt 166 lb (75.3 kg)   SpO2 99%   BMI 30.36 kg/m²   Temp (24hrs), Av.9 °F (36.6 °C), Min:97.5 °F (36.4 °C), Max:98.6 °F (37 °C)    Recent Labs     21  1322   POCGLU 449*     No intake or output data in the 24 hours ending 216    Physical Exam  Vitals signs and nursing note reviewed. Constitutional:       General: She is not in acute distress. Appearance: She is well-developed. She is ill-appearing. She is not diaphoretic. HENT:      Head: Normocephalic and atraumatic. Right Ear: Hearing normal.      Left Ear: Hearing normal.      Nose: Nose normal. No rhinorrhea. Eyes:      General: Lids are normal.      Extraocular Movements:      Right eye: Normal extraocular motion. Left eye: Normal extraocular motion. Conjunctiva/sclera: Conjunctivae normal.      Right eye: Right conjunctiva is not injected. Left eye: Left conjunctiva is not injected. Pupils: Pupils are equal, round, and reactive to light. Pupils are equal.      Right eye: Pupil is reactive.       Left eye: Pupil is reactive. Neck:      Musculoskeletal: Neck supple. Thyroid: No thyromegaly. Vascular: No carotid bruit. Trachea: Trachea and phonation normal. No tracheal deviation. Cardiovascular:      Rate and Rhythm: Regular rhythm. Tachycardia present. Pulses: Normal pulses. Heart sounds: Normal heart sounds. No murmur. Pulmonary:      Effort: Pulmonary effort is normal. No tachypnea or respiratory distress. Breath sounds: Normal breath sounds. No stridor. No decreased breath sounds. Abdominal:      General: Abdomen is protuberant. Bowel sounds are normal. There is distension. Palpations: Abdomen is soft. There is hepatomegaly. There is no mass. Tenderness: There is generalized abdominal tenderness. There is no guarding. Genitourinary:     Comments: Walker catheter  Musculoskeletal:         General: No tenderness. Right lower leg: No edema. Left lower leg: No edema. Comments: Moves all extremities   Skin:     General: Skin is warm and dry. Findings: No erythema, lesion or rash. Neurological:      General: No focal deficit present. Mental Status: She is easily aroused. She is lethargic. She is not disoriented. GCS: GCS eye subscore is 4. GCS verbal subscore is 5. GCS motor subscore is 6. Cranial Nerves: No cranial nerve deficit. Sensory: Sensation is intact. Motor: Motor function is intact. Comments: Alert to person situation but incorrect date, location. Answers questions and follows simple commands slowly     Psychiatric:         Speech: Speech normal.         Behavior: Behavior normal. Behavior is cooperative.          Investigations:      Laboratory Testing:  Recent Results (from the past 24 hour(s))   CBC Auto Differential    Collection Time: 05/03/21  7:13 AM   Result Value Ref Range    WBC 23.5 (H) 3.7 - 11.0 10 X 3/mm^3    RBC 6.23 (H) 4.20 - 5.40 10 X 6/mm^3    Hemoglobin 18.3 (HH) 12.0 - 16.0 g/dL    Hematocrit 54.2 (H) Collection Time: 05/03/21  8:35 AM    Specimen: Urine   Result Value Ref Range    Color, Urine Yellow Yellow    Clarity, UA Clear Clear    Glucose, Ur 100 mg/dL (A) Negative mg/dL    Ketones, Urine 15 mg/dL (A) Negative mg/dL    Bilirubin, Urine Moderate (A) Negative    Specific Gravity, Urine 1.025 1.005 - 1.030    pH, Urine 5.5 5.0 - 7.0    Blood, Urine Negative Negative    Protein, Ur (gm/dL) 100 mg/dL (A) Negative mg/dL    Urobilinogen, Urine 0.2 E.U./dL 0.2 - 1.0 E.U./dL    Nitrate, UA Negative Negative    Leukocyte Esterase, Urine Negative Negative    WBC, UA 0-3 #/HPF    RBC, UA 0-2 #/HPF    Squam Epithel, UA 3+     MUCUS, URINE 1+     BACTERIA, URINE 2+     Hyaline Casts, UA 2-4 #/LPF    Culture Indicated?  Yes    FSBS Finger Stick Blood Sugar    Collection Time: 05/03/21  9:19 AM   Result Value Ref Range    Glucose 473 mg/dL   Lactic Acid, Plasma    Collection Time: 05/03/21 10:14 AM   Result Value Ref Range    Lactic Acid 3.2 (H) 1.0 - 2.2 mmol/L   Basic Metabolic Panel w/ Reflex to MG    Collection Time: 05/03/21 10:15 AM   Result Value Ref Range    Glucose 453 (H) 65 - 100 mg/dL     (H) 7 - 17 mg/dL    Creatinine 3.09 (H) 0.5 - 1.0 mg/dL    Sodium 144 135 - 145 mEq/L    Potassium 1.9 (LL) 3.6 - 5.0 mEq/L    Chloride 91 (L) 98 - 107 mEq/L    CO2 36 (H) 22 - 32 mEq/L    Calcium 7.6 (L) 8.4 - 10.2 mg/dL    GFR, Estimated 15 (L) >60 mL/min/1.73m2   Magnesium    Collection Time: 05/03/21 10:15 AM   Result Value Ref Range    Magnesium 1.7 1.6 - 2.3 mg/dL   COVID-19 Rapid (ID NOW)    Collection Time: 05/03/21 10:32 AM    Specimen: Nasopharyngeal Swab   Result Value Ref Range    SARS-CoV-2 NOT DETECTED NOT DETECTED   Venous Blood Gas, POC    Collection Time: 05/03/21  1:22 PM   Result Value Ref Range    pH, Medardo 7.613 (HH) 7.320 - 7.430    pCO2, Medardo 35.3 (L) 41.0 - 51.0 mm Hg    pO2, Medardo 52.2 (H) 30 - 50 mm Hg    HCO3, Venous 35.8 (H) 22.0 - 29.0 mmol/L    Total CO2, Venous NOT REPORTED 23.0 - 30.0 mmol/L Negative Base Excess, Medardo NOT REPORTED 0.0 - 2.0    Positive Base Excess, Medardo 14 (H) 0.0 - 3.0    O2 Sat, Medardo 92 (H) 60.0 - 85.0 %    O2 Device/Flow/% NOT REPORTED     Kofi Test NOT REPORTED     Sample Site NOT REPORTED     Mode NOT REPORTED     FIO2 NOT REPORTED     Pt Temp NOT REPORTED     POC pH Temp NOT REPORTED     POC pCO2 Temp NOT REPORTED mm Hg    POC pO2 Temp NOT REPORTED mm Hg   ELECTROLYTES PLUS    Collection Time: 05/03/21  1:22 PM   Result Value Ref Range    POC Sodium 145 138 - 146 mmol/L    POC Potassium 2.3 (LL) 3.5 - 4.5 mmol/L    POC Chloride 97 (L) 98 - 107 mmol/L    POC TCO2 35 (H) 22 - 30 mmol/L    Anion Gap 14 7 - 16 mmol/L   Hemoglobin and hematocrit, blood    Collection Time: 05/03/21  1:22 PM   Result Value Ref Range    POC Hemoglobin 17.9 (H) 12.0 - 16.0 g/dL    POC Hematocrit 53 (H) 36 - 46 %   Creatinine W/GFR Point of Care    Collection Time: 05/03/21  1:22 PM   Result Value Ref Range    POC Creatinine 3.95 (H) 0.51 - 1.19 mg/dL    GFR Comment 14 (L) >60 mL/min    GFR Non- 11 (L) >60 mL/min    GFR Comment         CALCIUM, IONIC (POC)    Collection Time: 05/03/21  1:22 PM   Result Value Ref Range    POC Ionized Calcium 0.72 (LL) 1.15 - 1.33 mmol/L   POCT urea (BUN)    Collection Time: 05/03/21  1:22 PM   Result Value Ref Range    POC BUN 98 (HH) 8 - 26 mg/dL   Lactic Acid, POC    Collection Time: 05/03/21  1:22 PM   Result Value Ref Range    POC Lactic Acid 2.84 (H) 0.56 - 1.39 mmol/L   POCT Glucose    Collection Time: 05/03/21  1:22 PM   Result Value Ref Range    POC Glucose 449 (HH) 74 - 100 mg/dL   COMPREHENSIVE METABOLIC PANEL    Collection Time: 05/03/21  1:34 PM   Result Value Ref Range    Glucose 444 (HH) 70 - 99 mg/dL    BUN 95 (HH) 8 - 23 mg/dL    CREATININE 2.49 (H) 0.50 - 0.90 mg/dL    Bun/Cre Ratio NOT REPORTED 9 - 20    Calcium 7.7 (L) 8.6 - 10.4 mg/dL    Sodium 148 (H) 135 - 144 mmol/L    Potassium 3.2 (L) 3.7 - 5.3 mmol/L    Chloride 92 (L) 98 - 107 mmol/L    CO2 32 (H) 20 - 31 mmol/L    Anion Gap 24 (H) 9 - 17 mmol/L    Alkaline Phosphatase 77 35 - 104 U/L    ALT 14 5 - 33 U/L    AST 30 <32 U/L    Total Bilirubin 3.74 (H) 0.3 - 1.2 mg/dL    Total Protein 6.7 6.4 - 8.3 g/dL    Albumin 3.6 3.5 - 5.2 g/dL    Albumin/Globulin Ratio 1.2 1.0 - 2.5    GFR Non- 19 (L) >60 mL/min    GFR  23 (L) >60 mL/min    GFR Comment          GFR Staging NOT REPORTED    PROTIME-INR    Collection Time: 05/03/21  1:34 PM   Result Value Ref Range    Protime 12.6 (H) 9.1 - 12.3 sec    INR 1.2    LACTIC ACID, WHOLE BLOOD    Collection Time: 05/03/21  1:34 PM   Result Value Ref Range    Lactic Acid, Whole Blood 2.7 (H) 0.7 - 2.1 mmol/L   AMMONIA    Collection Time: 05/03/21  1:34 PM   Result Value Ref Range    Ammonia 19 11 - 51 umol/L   Troponin    Collection Time: 05/03/21  1:34 PM   Result Value Ref Range    Troponin, High Sensitivity 85 (HH) 0 - 14 ng/L    Troponin T NOT REPORTED <0.03 ng/mL    Troponin Interp NOT REPORTED    TSH with Reflex    Collection Time: 05/03/21  1:34 PM   Result Value Ref Range    TSH 13.36 (H) 0.30 - 5.00 mIU/L   T4, Free    Collection Time: 05/03/21  1:34 PM   Result Value Ref Range    Thyroxine, Free 1.11 0.93 - 1.70 ng/dL   Troponin    Collection Time: 05/03/21  2:34 PM   Result Value Ref Range    Troponin, High Sensitivity 94 (HH) 0 - 14 ng/L    Troponin T NOT REPORTED <0.03 ng/mL    Troponin Interp NOT REPORTED      Imaging/Diagnostics:  Ct Abdomen Pelvis Wo Contrast Additional Contrast? None    Result Date: 5/3/2021  There is obstruction of the stomach because of the portions of the stomach contained in the very large hiatal hernia. The rest of the GI tract is normal. No evidence of an acute infectious or inflammatory process in the abdomen and pelvis. Ct Abdomen Pelvis Wo Contrast Additional Contrast? None    Result Date: 4/28/2021  No acute infectious or inflammatory process is identified. Very large hiatal hernia. This could be the source of the patient's symptoms. Ct Head Wo Contrast    Result Date: 5/3/2021  No acute intracranial process. If clinical concern remains, consider further imaging with MRI. Ct Cervical Spine Wo Contrast    Result Date: 5/3/2021  No acute findings in the cervical spine. Xr Chest Portable    Result Date: 5/3/2021  No acute cardiopulmonary disease Large hiatal hernia           Assessment :      Hospital Problems           Last Modified POA    * (Principal) Sepsis with acute renal failure without septic shock ([address] 5/4/2021 Yes    Acute pancreatitis 5/4/2021 Yes    Diabetic ketoacidosis without coma associated with type 2 diabetes mellitus ([address] 5/4/2021 Yes    BONNIE (acute kidney injury) ([address] 5/4/2021 Yes    Hernia with obstruction 5/4/2021 Yes    Non-STEMI (non-ST elevated myocardial infarction) ([address] 5/4/2021 Yes    Essential hypertension 5/4/2021 Yes    Thyroid disease 5/4/2021 Yes    Syncope 5/4/2021 Yes          Plan:     Patient status inpatient in the Progressive Unit/Step down    Order for stat recheck on electrolytes to assess the hypokalemia and metabolic disturbances  Recheck venous blood gas,  Recheck beta hydroxybutyrate as we cannot start IV insulin with her potassium being so low  Close monitoring of above lab results and concern of need for ICU, primary service to be notified of all lab results  General surgery evaluation completed in ER prior to admission with further recommendations to obtain abdominal MRI MRCP and GI consultation if indicated. Continue  to trend out cardiac enzymes and correlate with EKG, consultation to cardiology  Continue to monitor kidney function, avoid nephrotoxins   Syncope multifactorial risks, will continue to monitor, CT imaging above continue to work-up from cardiac standpoint.    GI prophylaxis  DVT prophylaxis with heparin as the kidney function is poor  Very close monitoring of laboratories and overall clinical condition as there is high concern for the need of ICU. Please contact for any concerns or change. Consultations:   IP CONSULT TO GENERAL SURGERY  IP CONSULT TO HOSPITALIST  IP CONSULT TO CARDIOLOGY  IP CONSULT TO DIABETES EDUCATOR  IP CONSULT TO DIETITIAN     Patient is admitted as inpatient status because of co-morbidities listed above, severity of signs and symptoms as outlined, requirement for current medical therapies and most importantly because of direct risk to patient if care not provided in a hospital setting. Expected length of stay > 48 hours.     ALFONZO Bourgeois - CNP  5/3/2021  9:26 PM    Copy sent to Dr. Ochoa Perez DO

## 2021-05-04 NOTE — ED NOTES
VBG on ice, redrawn potassium, and urine sample obtained and sent to lab via tube system. Pt boosted in bed for comfort.      Makenzie Dupree RN  05/04/21 [id number]

## 2021-05-04 NOTE — ED NOTES
Pt placed on sticker pulse ox, as the other one is not reading an O2 sat.      Lex Sharma RN  05/04/21 [id number]

## 2021-05-04 NOTE — ED NOTES
ROMINA AQUINO perfectserved about pt's hypotension. Pt receiving 500mL bolus of NS currently on her transport to Endoscopy for upper GI scope. Writer having a hard time obtaining blood pressure readings on pt, requested an ART line. Pt still A&O x4, answering questions and conversing with writer as she has since writer assumed care.      Jose Conrad RN  05/04/21 [id number]

## 2021-05-04 NOTE — PROGRESS NOTES
No acute surgical intervention from a general surgery standpoint. Internal medicine, bariatric surgery and cardiology following. General surgery will be signing off. Please contact with any questions.        Brendan Britton, DO PGY-1

## 2021-05-04 NOTE — CONSULTS
INTENSIVE CARE UNIT   Physician Consult Note     Patient - Yolis Hughes  Date of Admission -  5/3/2021 12:49 PM  Date of Evaluation -  5/4/2021  Room and Bed Number -  8383/0370-70   Hospital Day - 1      SUBJECTIVE:     CHIEF COMPLAINT: Refractory shock    HPI:       History is obtained from the patient and her family at the bedside. Patient was in good health up until about a week ago when she developed severe diarrhea. She was evaluated at urgent care center and diagnosed with a large hiatal hernia and gastroesophageal reflux. Discharged home but according to her family was unable to eat or drink much. Became progressively weaker. Had a fall at home. Circumstances not clear but did not lose consciousness. Was able to summon help and was taken to [facility].  There an MRCP showed a large gastric volvulus and paraesophageal hernia with much of the gastric contents in the lower chest.  There was no specific mention of strangulation. In addition, the patient specifically denied chest or epigastric pain. Upon her arrival her serum bicarbonate was actually elevated at 32. Lactic acid was elevated at 2.5. She was in acute renal failure creatinine of 2.49. Potassium was low at 3.2. She was given a total of 3 L crystalloid overnight. Other notable findings included an elevated lipase of 1700 (no evidence for pancreatic necrosis on MRI) as well as an elevated procalcitonin of 24.66. Glucose was elevated at 444 upon arrival.  She has a history of type 2 diabetes mellitus. Beta hydroxybutyrate was elevated at 3.66. Recent electrolytes this afternoon showed a sodium of 148 potassium of 2.8 chloride of 109 bicarbonate 25 BUN of 84 creatinine 2.24. Patient continues to report thirst.  Lactic acid was elevated at 6.7 but most recently down to 3.8. Urine output borderline. TSH mildly elevated at 11. Patient states that her diarrhea has since resolved. She specifically denies abdominal pain.   No shortness of breath. Her chest x-ray is reviewed and found to be clear except for the large paraesophageal hernia. Non-smoker. No history of C. difficile or recent antibiotics. No recent travel or sick contacts. Patient received both of her Covid vaccines in early March. Past medical history includes hypertension and diabetes. General surgery evaluation noted. Gastroenterology evaluation noted. No clinical evidence for acute pancreatitis. Etiology consulted for elevated troponin although no clear ischemia on electrocardiogram.  Poor R wave progression anteriorly. Myocardial stress oxygen demand.     AWAKE & FOLLOWING COMMANDS:  [] No   [x] Yes    SECRETIONS Amount:  [x] Small [] Moderate  [] Large  [] None  Color:     [] White [] Colored  [] Bloody    SEDATION:  RAAS Score:  [] Propofol gtt  [] Versed gtt  [] Ativan gtt   [] No Sedation    PARALYZED:  [x] No    [] Yes    VASOPRESSORS:  [] No    [x] Yes  [x] Levophed [] Dopamine [] Vasopressin  [] Dobutamine [] Phenylephrine [] Epinephrine      OBJECTIVE:     VITAL SIGNS:  BP 92/61   Pulse 110   Temp 98 °F (36.7 °C) (Oral)   Resp 20   Ht 5' 2\" (1.575 m)   Wt 166 lb (75.3 kg)   SpO2 (!) 85%   BMI 30.36 kg/m²   Tmax over 24 hours:  Temp (24hrs), Av °F (36.7 °C), Min:98 °F (36.7 °C), Max:98 °F (36.7 °C)      Patient Vitals for the past 8 hrs:   BP Temp Temp src Pulse Resp SpO2   21 1641 92/61 98 °F (36.7 °C) Oral 110 20 (!) 85 %   21 1600 88/75   106     21 1531 (!) 93/57   104 (!) 36 90 %   21 1518 108/65   102     21 1449 (!) 68/58   102 (!) 35 91 %   21 1401 96/77   100 (!) 37 90 %   21 1333 84/60   99 (!) 34 90 %   21 1235      92 %   21 1231 84/62   97     21 1216 (!) 84/43   94 30 93 %   21 1201 97/70   94 (!) 33 94 %   21 1146 99/72   89 (!) 33 93 %   21 1131 98/71   90 (!) 33 96 %   21 1117 93/70   91 (!) 35 93 %   21 1102 (!) 87/57   95 (!) 36 94 %   05/04/21 1048 (!) 83/56   100 27 92 %   05/04/21 1031 (!) 81/57   99 (!) 37    05/04/21 1016 90/70   95 (!) 36 93 %   05/04/21 1006 (!) 84/54   97 (!) 38 91 %   05/04/21 0946 (!) 81/52   99 (!) 36 93 %         Intake/Output Summary (Last 24 hours) at 5/4/2021 1707  Last data filed at 5/4/2021 1642  Gross per 24 hour   Intake    Output 36 ml   Net -36 ml     Date 05/04/21 0000 - 05/04/21 2359   Shift 2471-7245 7439-9897 3793-9068 24 Hour Total   INTAKE   Shift Total(mL/kg)       OUTPUT   Urine(mL/kg/hr)   36 36   Shift Total(mL/kg)   36(0.5) 36(0.5)   Weight (kg) 75.3 75.3 75.3 75.3     Wt Readings from Last 3 Encounters:   05/03/21 166 lb (75.3 kg)   05/03/21 166 lb (75.3 kg)   04/28/21 166 lb (75.3 kg)     Body mass index is 30.36 kg/m². PHYSICAL EXAM:  CONSTITUTIONAL:  awake, alert, cooperative, no apparent distress, and appears stated age and denies pain. EYES:  Lids and lashes normal, pupils equal, round and reactive to light, extra ocular muscles intact, sclera clear, conjunctiva normal  HEENT:  Normocephalic, without obvious abnormality, atramatic, sinuses nontender on palpation, external ears without lesions, oral pharynx with moist mucus membranes, tonsils without erythema or exudates, gums normal and good dentition. John membranes and tongue appear dry. NECK:  Supple, symmetrical, trachea midline, no adenopathy, thyroid symmetric, not enlarged and no tenderness and no thyromegaly. HEMATOLOGIC/LYMPHATICS:  Cervical lymph nodes normal, supraclavicular lymph nodes normal  BACK:  no kyphosis  LUNGS:  no increased work of breathing, good air exchange, clear to auscultation and no crackles or wheezing  CARDIOVASCULAR:  Normal apical impulse, regular rate and rhythm, normal S1 and S2, no S3 or S4, and no murmur noted  ABDOMEN:  No scars, normal bowel sounds, soft, non-distended, non-tender, no masses palpated, no hepatosplenomegaly.   No flank PHOS 3.7 05/04/2021    PHOS 3.6 05/04/2021     Ionized Calcium:   Lab Results   Component Value Date    CAION 1.02 05/04/2021    CAION 0.98 05/04/2021        Urinalysis:   Lab Results   Component Value Date    NITRU NEGATIVE 05/04/2021    COLORU DARK YELLOW 05/04/2021    PHUR 5.5 05/04/2021    WBCUA 20 TO 50 05/04/2021    WBCUA 0-3 05/03/2021    RBCUA 5 TO 10 05/04/2021    RBCUA 0-2 05/03/2021    MUCUS NOT REPORTED 05/04/2021    TRICHOMONAS NOT REPORTED 05/04/2021    YEAST NOT REPORTED 05/04/2021    BACTERIA NOT REPORTED 05/04/2021    CLARITYU Clear 05/03/2021    SPECGRAV 1.029 05/04/2021    LEUKOCYTESUR TRACE 05/04/2021    UROBILINOGEN Normal 05/04/2021    BILIRUBINUR NEGATIVE 05/04/2021    BILIRUBINUR Moderate 05/03/2021    BLOODU Negative 05/03/2021    GLUCOSEU 3+ 05/04/2021    KETUA SMALL 05/04/2021    AMORPHOUS 1+ 05/04/2021       HgBA1c:    Lab Results   Component Value Date    LABA1C 6.7 05/04/2021     TSH:    Lab Results   Component Value Date    TSH 13.36 05/03/2021       Lactic Acid:   Lab Results   Component Value Date    LACTA 3.2 05/03/2021    LACTA 6.0 05/03/2021      Troponin:   Recent Labs     05/03/21  0713   TROPONINI 0.404*       Microbiology:  Urine Culture:  No components found for: CURINE  Blood Culture:  No components found for: CBLOOD, CFUNGUSBL  Sputum Culture:  No components found for: CSPUTUM      Other Labs:  CBC:   Lab Results   Component Value Date    WBC 4.1 05/04/2021    RBC 6.91 05/04/2021    RBC 6.23 05/03/2021    HGB 19.5 05/04/2021    HCT 60.6 05/04/2021    MCV 87.7 05/04/2021    MCH 28.2 05/04/2021    MCHC 32.2 05/04/2021    RDW 14.3 05/04/2021    PLT See Reflexed IPF Result 05/04/2021    MPV NOT REPORTED 05/04/2021     CBC with Differential:    Lab Results   Component Value Date    WBC 4.1 05/04/2021    RBC 6.91 05/04/2021    RBC 6.23 05/03/2021    HGB 19.5 05/04/2021    HCT 60.6 05/04/2021    PLT See Reflexed IPF Result 05/04/2021    MCV 87.7 05/04/2021    MCH 28.2 05/04/2021 MCHC 32.2 05/04/2021    RDW 14.3 05/04/2021    LYMPHOPCT 6 05/03/2021    MONOSABS 1.8 05/03/2021    LYMPHSABS 1.4 05/03/2021    EOSABS 0.0 05/03/2021    BASOSABS 0.0 05/03/2021     WBC:    Lab Results   Component Value Date    WBC 4.1 05/04/2021     Platelets:    Lab Results   Component Value Date    PLT See Reflexed IPF Result 05/04/2021     Hemoglobin/Hematocrit:    Lab Results   Component Value Date    HGB 19.5 05/04/2021    HCT 60.6 05/04/2021     CMP:    Lab Results   Component Value Date     05/04/2021    K 2.8 05/04/2021     05/04/2021    CO2 25 05/04/2021    BUN 84 05/04/2021    CREATININE 2.24 05/04/2021    CREATININE 3.09 05/03/2021    GFRAA 26 05/04/2021    LABGLOM 22 05/04/2021    GLUCOSE 184 05/04/2021    GLUCOSE 453 05/03/2021    PROT 5.2 05/04/2021    PROT 7.3 05/03/2021    LABALBU 2.7 05/04/2021    CALCIUM 8.2 05/04/2021    BILITOT 2.46 05/04/2021    ALKPHOS 57 05/04/2021    AST 32 05/04/2021    ALT 14 05/04/2021     BMP:    Lab Results   Component Value Date     05/04/2021    K 2.8 05/04/2021     05/04/2021    CO2 25 05/04/2021    BUN 84 05/04/2021    LABALBU 2.7 05/04/2021    CREATININE 2.24 05/04/2021    CREATININE 3.09 05/03/2021    CALCIUM 8.2 05/04/2021    GFRAA 26 05/04/2021    LABGLOM 22 05/04/2021    GLUCOSE 184 05/04/2021    GLUCOSE 453 05/03/2021     Sodium:    Lab Results   Component Value Date     05/04/2021     Potassium:    Lab Results   Component Value Date    K 2.8 05/04/2021     BUN/Creatinine:    Lab Results   Component Value Date    BUN 84 05/04/2021    CREATININE 2.24 05/04/2021    CREATININE 3.09 05/03/2021     Hepatic Function Panel:    Lab Results   Component Value Date    ALKPHOS 57 05/04/2021    ALT 14 05/04/2021    AST 32 05/04/2021    PROT 5.2 05/04/2021    PROT 7.3 05/03/2021    BILITOT 2.46 05/04/2021    BILIDIR 0.0 05/03/2021    LABALBU 2.7 05/04/2021     Albumin:    Lab Results   Component Value Date    LABALBU 2.7 05/04/2021 Calcium:    Lab Results   Component Value Date    CALCIUM 8.2 05/04/2021     Ionized Calcium:  No results found for: IONCA  Magnesium:    Lab Results   Component Value Date    MG 1.9 05/04/2021     Phosphorus:    Lab Results   Component Value Date    PHOS 3.5 05/04/2021     LDH:  No results found for: LDH  Uric Acid:  No results found for: LABURIC, URICACID  PT/INR:    Lab Results   Component Value Date    PROTIME 28.2 05/04/2021    INR 2.9 05/04/2021     PTT:  No results found for: APTT, PTT[APTT}  Troponin:    Lab Results   Component Value Date    TROPONINI 0.404 05/03/2021     Last 3 Troponin:    Lab Results   Component Value Date    TROPONINI 0.404 05/03/2021     U/A:    Lab Results   Component Value Date    COLORU DARK YELLOW 05/04/2021    PROTEINU 2+ 05/04/2021    PHUR 5.5 05/04/2021    WBCUA 20 TO 50 05/04/2021    WBCUA 0-3 05/03/2021    RBCUA 5 TO 10 05/04/2021    RBCUA 0-2 05/03/2021    MUCUS NOT REPORTED 05/04/2021    TRICHOMONAS NOT REPORTED 05/04/2021    YEAST NOT REPORTED 05/04/2021    BACTERIA NOT REPORTED 05/04/2021    CLARITYU Clear 05/03/2021    SPECGRAV 1.029 05/04/2021    LEUKOCYTESUR TRACE 05/04/2021    UROBILINOGEN Normal 05/04/2021    BILIRUBINUR NEGATIVE 05/04/2021    BILIRUBINUR Moderate 05/03/2021    BLOODU Negative 05/03/2021    GLUCOSEU 3+ 05/04/2021    AMORPHOUS 1+ 05/04/2021     ABG:  No results found for: PH, PCO2, PO2, HCO3, BE, THGB, TCO2, O2SAT  HgBA1c:    Lab Results   Component Value Date    LABA1C 6.7 05/04/2021     Microalbumen/Creatinine ratio:  No components found for: RUCREAT  FLP:    Lab Results   Component Value Date    TRIG 163 05/04/2021     TSH:    Lab Results   Component Value Date    TSH 13.36 05/03/2021     VITAMIN B12: No components found for: B12  FOLATE:  No results found for: FOLATE  Iron Saturation:  No components found for: PERCENTFE  FERRITIN:  No results found for: FERRITIN  AMYLASE:  No results found for: AMYLASE  LIPASE:    Lab Results   Component Value Date    LIPASE 1,451 05/03/2021         Radiology/Imaging:  Mediastinum is normal, No infiltrate, No effusion, No cardiomegaly and No pneumothorax    ASSESSMENT:     Patient Active Problem List    Diagnosis Date Noted    Septic shock ([facility] 05/04/2021    Non-STEMI (non-ST elevated myocardial infarction) [facility] 05/04/2021    Diabetic ketoacidosis without coma associated with type 2 diabetes mellitus ([facility] 05/04/2021    BONNIE (acute kidney injury) ([facility] 05/04/2021    Hernia with obstruction 05/04/2021    Acute tubular necrosis ([facility] 05/04/2021    Lactic acidosis 05/04/2021    Acute hypoxemic respiratory failure ([facility] 05/04/2021    Essential hypertension     Thyroid disease     Syncope     Acute pancreatitis 05/03/2021         PLAN:     WEAN PER PROTOCOL:  [] No   [] Yes  [x] N/A    DISCONTINUE ANY LABS:   [x] No   [] Yes    ICU PROPHYLAXIS:  Stress ulcer:  [x] PPI Agent  [] [id number] [] Sucralfate  [] Other:  VTE:   [] Enoxaparin  [] Unfract. Heparin Subcut  [] EPC Cuffs    NUTRITION:  [x] NPO [] Tube Feeding (Specify: ) [] TPN  [] PO    HOME MEDICATIONS RECONCILED: [x] No  [] Yes    INSULIN DRIP:   [x] No   [] Yes    CONSULTATION NEEDED:  [x] No   [] Yes    FAMILY UPDATED:    [] No   [x] Yes    TRANSFER OUT OF ICU:   [x] No   [] Yes    ADDITIONAL PLAN:  1. Would volume expand. 2. Replace K and Mg  3. Reasonable to cont Ab until sepsis excluded but no clear source apparent  4. Art line  5. Support BP with NE  6. Monitor labs   7. Vit K to reverse elevated PT INR. Nutritional  8. Discussed with IM, Gen surg and GI. Large gastric volvulus an issue and not clear if acute or subacute. EGD planned tonight after barium evacuated. 9. Discussed with family. Prognosis guarded. Multisystem disease. Dorthula Sacks, DO  5/4/2021 5:07 PM  Dept.  Of Pulmonary and Critical Care Medicine  Critical Care Time 65min

## 2021-05-04 NOTE — ED NOTES
Mya Armstrong PA-C perfectserved about pt's hypotension again, he states he is speaking with ICU attending and resident to admit to ICU. Awaiting pressor order.      Lupe Leyva, ROSALINDA  05/04/21 9084

## 2021-05-05 ENCOUNTER — SOCIAL WORK (OUTPATIENT)
Dept: EMERGENCY DEPT | Age: 71
End: 2021-05-05

## 2021-05-05 ENCOUNTER — APPOINTMENT (OUTPATIENT)
Dept: GENERAL RADIOLOGY | Age: 71
DRG: 853 | End: 2021-05-05
Payer: MEDICARE

## 2021-05-05 ENCOUNTER — APPOINTMENT (OUTPATIENT)
Dept: ULTRASOUND IMAGING | Age: 71
DRG: 853 | End: 2021-05-05
Payer: MEDICARE

## 2021-05-05 VITALS
DIASTOLIC BLOOD PRESSURE: 35 MMHG | TEMPERATURE: 96.8 F | SYSTOLIC BLOOD PRESSURE: 62 MMHG | OXYGEN SATURATION: 75 % | RESPIRATION RATE: 18 BRPM

## 2021-05-05 LAB
ABO/RH: NORMAL
ABSOLUTE EOS #: 0 K/UL (ref 0–0.4)
ABSOLUTE EOS #: 0 K/UL (ref 0–0.4)
ABSOLUTE IMMATURE GRANULOCYTE: 0 K/UL (ref 0–0.3)
ABSOLUTE IMMATURE GRANULOCYTE: 0.64 K/UL (ref 0–0.3)
ABSOLUTE LYMPH #: 1.06 K/UL (ref 1–4.8)
ABSOLUTE LYMPH #: 1.45 K/UL (ref 1–4.8)
ABSOLUTE MONO #: 0.21 K/UL (ref 0.1–0.8)
ABSOLUTE MONO #: 0.66 K/UL (ref 0.1–0.8)
ACTION: NORMAL
ALBUMIN SERPL-MCNC: 2.7 G/DL (ref 3.5–5.2)
ALBUMIN SERPL-MCNC: 2.7 G/DL (ref 3.5–5.2)
ALBUMIN SERPL-MCNC: 2.9 G/DL (ref 3.5–5.2)
ALBUMIN/GLOBULIN RATIO: 1.2 (ref 1–2.5)
ALBUMIN/GLOBULIN RATIO: 1.6 (ref 1–2.5)
ALBUMIN/GLOBULIN RATIO: 1.8 (ref 1–2.5)
ALLEN TEST: ABNORMAL
ALP BLD-CCNC: 34 U/L (ref 35–104)
ALP BLD-CCNC: 35 U/L (ref 35–104)
ALP BLD-CCNC: 42 U/L (ref 35–104)
ALT SERPL-CCNC: 133 U/L (ref 5–33)
ALT SERPL-CCNC: 141 U/L (ref 5–33)
ALT SERPL-CCNC: 149 U/L (ref 5–33)
ANION GAP SERPL CALCULATED.3IONS-SCNC: 14 MMOL/L (ref 9–17)
ANION GAP SERPL CALCULATED.3IONS-SCNC: 15 MMOL/L (ref 9–17)
ANION GAP SERPL CALCULATED.3IONS-SCNC: 15 MMOL/L (ref 9–17)
ANION GAP SERPL CALCULATED.3IONS-SCNC: 17 MMOL/L (ref 9–17)
ANTIBODY SCREEN: NEGATIVE
ARM BAND NUMBER: NORMAL
AST SERPL-CCNC: 205 U/L
AST SERPL-CCNC: 243 U/L
AST SERPL-CCNC: 265 U/L
BASOPHILS # BLD: 0 % (ref 0–2)
BASOPHILS # BLD: 0 % (ref 0–2)
BASOPHILS ABSOLUTE: 0 K/UL (ref 0–0.2)
BASOPHILS ABSOLUTE: 0 K/UL (ref 0–0.2)
BILIRUB SERPL-MCNC: 2.08 MG/DL (ref 0.3–1.2)
BILIRUB SERPL-MCNC: 2.3 MG/DL (ref 0.3–1.2)
BILIRUB SERPL-MCNC: 2.32 MG/DL (ref 0.3–1.2)
BILIRUBIN DIRECT: 0.62 MG/DL
BILIRUBIN DIRECT: 0.76 MG/DL
BILIRUBIN, INDIRECT: 1.46 MG/DL (ref 0–1)
BILIRUBIN, INDIRECT: 1.54 MG/DL (ref 0–1)
BLD PROD TYP BPU: NORMAL
BUN BLDV-MCNC: 60 MG/DL (ref 8–23)
BUN BLDV-MCNC: 60 MG/DL (ref 8–23)
BUN BLDV-MCNC: 63 MG/DL (ref 8–23)
BUN BLDV-MCNC: 63 MG/DL (ref 8–23)
BUN/CREAT BLD: ABNORMAL (ref 9–20)
CALCIUM IONIZED: 1.08 MMOL/L (ref 1.13–1.33)
CALCIUM SERPL-MCNC: 7.6 MG/DL (ref 8.6–10.4)
CALCIUM SERPL-MCNC: 7.9 MG/DL (ref 8.6–10.4)
CALCIUM SERPL-MCNC: 8.3 MG/DL (ref 8.6–10.4)
CALCIUM SERPL-MCNC: 8.6 MG/DL (ref 8.6–10.4)
CHLORIDE BLD-SCNC: 108 MMOL/L (ref 98–107)
CHLORIDE BLD-SCNC: 111 MMOL/L (ref 98–107)
CHLORIDE BLD-SCNC: 111 MMOL/L (ref 98–107)
CHLORIDE BLD-SCNC: 112 MMOL/L (ref 98–107)
CO2: 18 MMOL/L (ref 20–31)
CO2: 20 MMOL/L (ref 20–31)
CO2: 22 MMOL/L (ref 20–31)
CO2: 22 MMOL/L (ref 20–31)
COMPLEMENT C3: 50 MG/DL (ref 90–180)
COMPLEMENT C4: 14 MG/DL (ref 10–40)
CREAT SERPL-MCNC: 1.98 MG/DL (ref 0.5–0.9)
CREAT SERPL-MCNC: 2.03 MG/DL (ref 0.5–0.9)
CREAT SERPL-MCNC: 2.06 MG/DL (ref 0.5–0.9)
CREAT SERPL-MCNC: 2.1 MG/DL (ref 0.5–0.9)
CREATININE URINE: 131 MG/DL (ref 28–217)
CROSSMATCH RESULT: NORMAL
CROSSMATCH RESULT: NORMAL
CULTURE: NO GROWTH
DATE AND TIME: NORMAL
DIFFERENTIAL TYPE: ABNORMAL
DIFFERENTIAL TYPE: ABNORMAL
DISPENSE STATUS BLOOD BANK: NORMAL
EKG ATRIAL RATE: 98 BPM
EKG P AXIS: 43 DEGREES
EKG P-R INTERVAL: 148 MS
EKG Q-T INTERVAL: 410 MS
EKG QRS DURATION: 68 MS
EKG QTC CALCULATION (BAZETT): 523 MS
EKG R AXIS: -88 DEGREES
EKG T AXIS: 20 DEGREES
EKG VENTRICULAR RATE: 98 BPM
EOSINOPHIL,URINE: NORMAL
EOSINOPHILS RELATIVE PERCENT: 0 % (ref 1–4)
EOSINOPHILS RELATIVE PERCENT: 0 % (ref 1–4)
EXPIRATION DATE: NORMAL
FIO2: 100
FIO2: 40
FIO2: 55
FIO2: 60
FIO2: 75
FREE KAPPA/LAMBDA RATIO: 1.72 (ref 0.26–1.65)
GFR AFRICAN AMERICAN: 28 ML/MIN
GFR AFRICAN AMERICAN: 29 ML/MIN
GFR AFRICAN AMERICAN: 30 ML/MIN
GFR AFRICAN AMERICAN: 30 ML/MIN
GFR NON-AFRICAN AMERICAN: 23 ML/MIN
GFR NON-AFRICAN AMERICAN: 24 ML/MIN
GFR NON-AFRICAN AMERICAN: 24 ML/MIN
GFR NON-AFRICAN AMERICAN: 25 ML/MIN
GFR SERPL CREATININE-BSD FRML MDRD: ABNORMAL ML/MIN/{1.73_M2}
GLOBULIN: ABNORMAL G/DL (ref 1.5–3.8)
GLOBULIN: ABNORMAL G/DL (ref 1.5–3.8)
GLUCOSE BLD-MCNC: 129 MG/DL (ref 65–105)
GLUCOSE BLD-MCNC: 132 MG/DL (ref 65–105)
GLUCOSE BLD-MCNC: 140 MG/DL (ref 65–105)
GLUCOSE BLD-MCNC: 151 MG/DL (ref 70–99)
GLUCOSE BLD-MCNC: 176 MG/DL (ref 74–100)
GLUCOSE BLD-MCNC: 178 MG/DL (ref 70–99)
GLUCOSE BLD-MCNC: 186 MG/DL (ref 70–99)
GLUCOSE BLD-MCNC: 190 MG/DL (ref 74–100)
GLUCOSE BLD-MCNC: 208 MG/DL (ref 70–99)
GLUCOSE BLD-MCNC: 233 MG/DL (ref 74–100)
HAV IGM SER IA-ACNC: NONREACTIVE
HCT VFR BLD CALC: 35.2 % (ref 36.3–47.1)
HCT VFR BLD CALC: 36.5 % (ref 36.3–47.1)
HEMOGLOBIN: 11.1 G/DL (ref 11.9–15.1)
HEMOGLOBIN: 11.5 G/DL (ref 11.9–15.1)
HEPATITIS B CORE IGM ANTIBODY: NONREACTIVE
HEPATITIS B SURFACE ANTIGEN: NONREACTIVE
HEPATITIS C ANTIBODY: NONREACTIVE
IMMATURE GRANULOCYTES: 0 %
IMMATURE GRANULOCYTES: 6 %
INR BLD: 1.7
KAPPA FREE LIGHT CHAINS QNT: 3.24 MG/DL (ref 0.37–1.94)
LACTIC ACID, WHOLE BLOOD: 2 MMOL/L (ref 0.7–2.1)
LAMBDA FREE LIGHT CHAINS QNT: 1.88 MG/DL (ref 0.57–2.63)
LV EF: 55 %
LVEF MODALITY: NORMAL
LYMPHOCYTES # BLD: 10 % (ref 24–44)
LYMPHOCYTES # BLD: 11 % (ref 24–44)
Lab: NORMAL
MAGNESIUM: 1.5 MG/DL (ref 1.6–2.6)
MCH RBC QN AUTO: 28.4 PG (ref 25.2–33.5)
MCH RBC QN AUTO: 29 PG (ref 25.2–33.5)
MCHC RBC AUTO-ENTMCNC: 31.5 G/DL (ref 28.4–34.8)
MCHC RBC AUTO-ENTMCNC: 31.5 G/DL (ref 28.4–34.8)
MCV RBC AUTO: 90 FL (ref 82.6–102.9)
MCV RBC AUTO: 92.2 FL (ref 82.6–102.9)
MODE: ABNORMAL
MONOCYTES # BLD: 2 % (ref 1–7)
MONOCYTES # BLD: 5 % (ref 1–7)
MORPHOLOGY: ABNORMAL
MRSA, DNA, NASAL: NORMAL
NEGATIVE BASE EXCESS, ART: 3 (ref 0–2)
NEGATIVE BASE EXCESS, ART: 4 (ref 0–2)
NEGATIVE BASE EXCESS, ART: 4 (ref 0–2)
NEGATIVE BASE EXCESS, ART: 6 (ref 0–2)
NOTIFY: NORMAL
NRBC AUTOMATED: 0 PER 100 WBC
NRBC AUTOMATED: 0.2 PER 100 WBC
NUCLEATED RED BLOOD CELLS: 1 PER 100 WBC
O2 DEVICE/FLOW/%: ABNORMAL
PATIENT TEMP: ABNORMAL
PDW BLD-RTO: 14.3 % (ref 11.8–14.4)
PDW BLD-RTO: 14.3 % (ref 11.8–14.4)
PHOSPHORUS: 4.3 MG/DL (ref 2.6–4.5)
PLATELET # BLD: ABNORMAL K/UL (ref 138–453)
PLATELET # BLD: ABNORMAL K/UL (ref 138–453)
PLATELET ESTIMATE: ABNORMAL
PLATELET ESTIMATE: ABNORMAL
PLATELET, FLUORESCENCE: 144 K/UL (ref 138–453)
PLATELET, FLUORESCENCE: 163 K/UL (ref 138–453)
PLATELET, IMMATURE FRACTION: 13.2 % (ref 1.1–10.3)
PLATELET, IMMATURE FRACTION: 16.3 % (ref 1.1–10.3)
PMV BLD AUTO: ABNORMAL FL (ref 8.1–13.5)
PMV BLD AUTO: ABNORMAL FL (ref 8.1–13.5)
POC HCO3: 20.5 MMOL/L (ref 21–28)
POC HCO3: 20.8 MMOL/L (ref 21–28)
POC HCO3: 20.9 MMOL/L (ref 21–28)
POC HCO3: 21.4 MMOL/L (ref 21–28)
POC HCO3: 21.7 MMOL/L (ref 21–28)
POC HCO3: 21.8 MMOL/L (ref 21–28)
POC HCO3: 23.8 MMOL/L (ref 21–28)
POC HCO3: 25.6 MMOL/L (ref 21–28)
POC LACTIC ACID: 1.37 MMOL/L (ref 0.56–1.39)
POC LACTIC ACID: 1.65 MMOL/L (ref 0.56–1.39)
POC LACTIC ACID: 1.74 MMOL/L (ref 0.56–1.39)
POC LACTIC ACID: 1.76 MMOL/L (ref 0.56–1.39)
POC LACTIC ACID: 1.81 MMOL/L (ref 0.56–1.39)
POC LACTIC ACID: 1.85 MMOL/L (ref 0.56–1.39)
POC LACTIC ACID: 1.88 MMOL/L (ref 0.56–1.39)
POC O2 SATURATION: 100 % (ref 94–98)
POC O2 SATURATION: 85 % (ref 94–98)
POC O2 SATURATION: 92 % (ref 94–98)
POC O2 SATURATION: 94 % (ref 94–98)
POC O2 SATURATION: 98 % (ref 94–98)
POC O2 SATURATION: 99 % (ref 94–98)
POC PCO2 TEMP: ABNORMAL MM HG
POC PCO2: 33.2 MM HG (ref 35–48)
POC PCO2: 34.9 MM HG (ref 35–48)
POC PCO2: 34.9 MM HG (ref 35–48)
POC PCO2: 35.7 MM HG (ref 35–48)
POC PCO2: 36.6 MM HG (ref 35–48)
POC PCO2: 36.7 MM HG (ref 35–48)
POC PCO2: 50.3 MM HG (ref 35–48)
POC PCO2: 87.5 MM HG (ref 35–48)
POC PH TEMP: ABNORMAL
POC PH: 7.08 (ref 7.35–7.45)
POC PH: 7.28 (ref 7.35–7.45)
POC PH: 7.38 (ref 7.35–7.45)
POC PH: 7.39 (ref 7.35–7.45)
POC PH: 7.41 (ref 7.35–7.45)
POC PO2 TEMP: ABNORMAL MM HG
POC PO2: 142.1 MM HG (ref 83–108)
POC PO2: 145.7 MM HG (ref 83–108)
POC PO2: 158.9 MM HG (ref 83–108)
POC PO2: 159.2 MM HG (ref 83–108)
POC PO2: 278.3 MM HG (ref 83–108)
POC PO2: 56.8 MM HG (ref 83–108)
POC PO2: 66.2 MM HG (ref 83–108)
POC PO2: 72.7 MM HG (ref 83–108)
POSITIVE BASE EXCESS, ART: ABNORMAL (ref 0–3)
POTASSIUM SERPL-SCNC: 3.4 MMOL/L (ref 3.7–5.3)
POTASSIUM SERPL-SCNC: 3.4 MMOL/L (ref 3.7–5.3)
POTASSIUM SERPL-SCNC: 3.5 MMOL/L (ref 3.7–5.3)
POTASSIUM SERPL-SCNC: 3.8 MMOL/L (ref 3.7–5.3)
PROTHROMBIN TIME: 17 SEC (ref 9.1–12.3)
RBC # BLD: 3.91 M/UL (ref 3.95–5.11)
RBC # BLD: 3.96 M/UL (ref 3.95–5.11)
RBC # BLD: ABNORMAL 10*6/UL
RBC # BLD: ABNORMAL 10*6/UL
READ BACK: YES
SAMPLE SITE: ABNORMAL
SEG NEUTROPHILS: 82 % (ref 36–66)
SEG NEUTROPHILS: 84 % (ref 36–66)
SEGMENTED NEUTROPHILS ABSOLUTE COUNT: 11.09 K/UL (ref 1.8–7.7)
SEGMENTED NEUTROPHILS ABSOLUTE COUNT: 8.69 K/UL (ref 1.8–7.7)
SODIUM BLD-SCNC: 143 MMOL/L (ref 135–144)
SODIUM BLD-SCNC: 146 MMOL/L (ref 135–144)
SODIUM BLD-SCNC: 148 MMOL/L (ref 135–144)
SODIUM BLD-SCNC: 148 MMOL/L (ref 135–144)
SODIUM,UR: 43 MMOL/L
SPECIMEN DESCRIPTION: NORMAL
SPECIMEN DESCRIPTION: NORMAL
TCO2 (CALC), ART: ABNORMAL MMOL/L (ref 22–29)
TOTAL PROTEIN, URINE: 65 MG/DL
TOTAL PROTEIN: 4.4 G/DL (ref 6.4–8.3)
TOTAL PROTEIN: 4.5 G/DL (ref 6.4–8.3)
TOTAL PROTEIN: 4.9 G/DL (ref 6.4–8.3)
TRANSFUSION STATUS: NORMAL
UNIT DIVISION: 0
UNIT NUMBER: NORMAL
WBC # BLD: 10.6 K/UL (ref 3.5–11.3)
WBC # BLD: 13.2 K/UL (ref 3.5–11.3)
WBC # BLD: ABNORMAL 10*3/UL
WBC # BLD: ABNORMAL 10*3/UL

## 2021-05-05 PROCEDURE — 2580000003 HC RX 258: Performed by: NURSE PRACTITIONER

## 2021-05-05 PROCEDURE — 88342 IMHCHEM/IMCYTCHM 1ST ANTB: CPT

## 2021-05-05 PROCEDURE — 76770 US EXAM ABDO BACK WALL COMP: CPT

## 2021-05-05 PROCEDURE — 94002 VENT MGMT INPAT INIT DAY: CPT

## 2021-05-05 PROCEDURE — 80076 HEPATIC FUNCTION PANEL: CPT

## 2021-05-05 PROCEDURE — 84100 ASSAY OF PHOSPHORUS: CPT

## 2021-05-05 PROCEDURE — 2580000003 HC RX 258: Performed by: STUDENT IN AN ORGANIZED HEALTH CARE EDUCATION/TRAINING PROGRAM

## 2021-05-05 PROCEDURE — 87205 SMEAR GRAM STAIN: CPT

## 2021-05-05 PROCEDURE — 2500000003 HC RX 250 WO HCPCS: Performed by: STUDENT IN AN ORGANIZED HEALTH CARE EDUCATION/TRAINING PROGRAM

## 2021-05-05 PROCEDURE — 82803 BLOOD GASES ANY COMBINATION: CPT

## 2021-05-05 PROCEDURE — 2500000003 HC RX 250 WO HCPCS: Performed by: NURSE PRACTITIONER

## 2021-05-05 PROCEDURE — 80048 BASIC METABOLIC PNL TOTAL CA: CPT

## 2021-05-05 PROCEDURE — 86334 IMMUNOFIX E-PHORESIS SERUM: CPT

## 2021-05-05 PROCEDURE — 83605 ASSAY OF LACTIC ACID: CPT

## 2021-05-05 PROCEDURE — 6360000002 HC RX W HCPCS: Performed by: STUDENT IN AN ORGANIZED HEALTH CARE EDUCATION/TRAINING PROGRAM

## 2021-05-05 PROCEDURE — 85610 PROTHROMBIN TIME: CPT

## 2021-05-05 PROCEDURE — 99221 1ST HOSP IP/OBS SF/LOW 40: CPT | Performed by: INTERNAL MEDICINE

## 2021-05-05 PROCEDURE — 71045 X-RAY EXAM CHEST 1 VIEW: CPT

## 2021-05-05 PROCEDURE — 88307 TISSUE EXAM BY PATHOLOGIST: CPT

## 2021-05-05 PROCEDURE — 85055 RETICULATED PLATELET ASSAY: CPT

## 2021-05-05 PROCEDURE — 85025 COMPLETE CBC W/AUTO DIFF WBC: CPT

## 2021-05-05 PROCEDURE — 88341 IMHCHEM/IMCYTCHM EA ADD ANTB: CPT

## 2021-05-05 PROCEDURE — 06HY33Z INSERTION OF INFUSION DEVICE INTO LOWER VEIN, PERCUTANEOUS APPROACH: ICD-10-PCS | Performed by: SURGERY

## 2021-05-05 PROCEDURE — 99291 CRITICAL CARE FIRST HOUR: CPT | Performed by: INTERNAL MEDICINE

## 2021-05-05 PROCEDURE — 93356 MYOCRD STRAIN IMG SPCKL TRCK: CPT

## 2021-05-05 PROCEDURE — 36556 INSERT NON-TUNNEL CV CATH: CPT

## 2021-05-05 PROCEDURE — 93306 TTE W/DOPPLER COMPLETE: CPT

## 2021-05-05 PROCEDURE — 86160 COMPLEMENT ANTIGEN: CPT

## 2021-05-05 PROCEDURE — 6370000000 HC RX 637 (ALT 250 FOR IP): Performed by: STUDENT IN AN ORGANIZED HEALTH CARE EDUCATION/TRAINING PROGRAM

## 2021-05-05 PROCEDURE — 6360000002 HC RX W HCPCS: Performed by: NURSE PRACTITIONER

## 2021-05-05 PROCEDURE — 99233 SBSQ HOSP IP/OBS HIGH 50: CPT | Performed by: PHYSICIAN ASSISTANT

## 2021-05-05 PROCEDURE — 82570 ASSAY OF URINE CREATININE: CPT

## 2021-05-05 PROCEDURE — 82947 ASSAY GLUCOSE BLOOD QUANT: CPT

## 2021-05-05 PROCEDURE — 84300 ASSAY OF URINE SODIUM: CPT

## 2021-05-05 PROCEDURE — 80074 ACUTE HEPATITIS PANEL: CPT

## 2021-05-05 PROCEDURE — 37799 UNLISTED PX VASCULAR SURGERY: CPT

## 2021-05-05 PROCEDURE — 99232 SBSQ HOSP IP/OBS MODERATE 35: CPT | Performed by: INTERNAL MEDICINE

## 2021-05-05 PROCEDURE — 6360000002 HC RX W HCPCS: Performed by: NURSE ANESTHETIST, CERTIFIED REGISTERED

## 2021-05-05 PROCEDURE — 2580000003 HC RX 258: Performed by: NURSE ANESTHETIST, CERTIFIED REGISTERED

## 2021-05-05 PROCEDURE — C9113 INJ PANTOPRAZOLE SODIUM, VIA: HCPCS | Performed by: STUDENT IN AN ORGANIZED HEALTH CARE EDUCATION/TRAINING PROGRAM

## 2021-05-05 PROCEDURE — 83883 ASSAY NEPHELOMETRY NOT SPEC: CPT

## 2021-05-05 PROCEDURE — 84156 ASSAY OF PROTEIN URINE: CPT

## 2021-05-05 PROCEDURE — 82330 ASSAY OF CALCIUM: CPT

## 2021-05-05 PROCEDURE — 93010 ELECTROCARDIOGRAM REPORT: CPT | Performed by: INTERNAL MEDICINE

## 2021-05-05 PROCEDURE — 80053 COMPREHEN METABOLIC PANEL: CPT

## 2021-05-05 PROCEDURE — APPSS45 APP SPLIT SHARED TIME 31-45 MINUTES: Performed by: INTERNAL MEDICINE

## 2021-05-05 PROCEDURE — 2000000000 HC ICU R&B

## 2021-05-05 PROCEDURE — 83735 ASSAY OF MAGNESIUM: CPT

## 2021-05-05 PROCEDURE — 2500000003 HC RX 250 WO HCPCS: Performed by: NURSE ANESTHETIST, CERTIFIED REGISTERED

## 2021-05-05 PROCEDURE — 36620 INSERTION CATHETER ARTERY: CPT

## 2021-05-05 RX ORDER — CHLORHEXIDINE GLUCONATE 0.12 MG/ML
15 RINSE ORAL 2 TIMES DAILY
Status: DISCONTINUED | OUTPATIENT
Start: 2021-05-05 | End: 2021-05-06

## 2021-05-05 RX ORDER — HEPARIN SODIUM 5000 [USP'U]/ML
5000 INJECTION, SOLUTION INTRAVENOUS; SUBCUTANEOUS EVERY 8 HOURS SCHEDULED
Status: DISCONTINUED | OUTPATIENT
Start: 2021-05-05 | End: 2021-05-06

## 2021-05-05 RX ORDER — PROPOFOL 10 MG/ML
INJECTION, EMULSION INTRAVENOUS CONTINUOUS PRN
Status: DISCONTINUED | OUTPATIENT
Start: 2021-05-05 | End: 2021-05-05 | Stop reason: SDUPTHER

## 2021-05-05 RX ORDER — DEXMEDETOMIDINE HYDROCHLORIDE 4 UG/ML
.2-1.4 INJECTION, SOLUTION INTRAVENOUS CONTINUOUS
Status: DISCONTINUED | OUTPATIENT
Start: 2021-05-05 | End: 2021-05-06

## 2021-05-05 RX ORDER — SODIUM CHLORIDE, SODIUM LACTATE, POTASSIUM CHLORIDE, CALCIUM CHLORIDE 600; 310; 30; 20 MG/100ML; MG/100ML; MG/100ML; MG/100ML
INJECTION, SOLUTION INTRAVENOUS CONTINUOUS
Status: DISCONTINUED | OUTPATIENT
Start: 2021-05-05 | End: 2021-05-06

## 2021-05-05 RX ORDER — DEXTROSE AND SODIUM CHLORIDE 5; .45 G/100ML; G/100ML
INJECTION, SOLUTION INTRAVENOUS CONTINUOUS
Status: DISCONTINUED | OUTPATIENT
Start: 2021-05-05 | End: 2021-05-05

## 2021-05-05 RX ORDER — LEVOTHYROXINE SODIUM ANHYDROUS 100 UG/5ML
56 INJECTION, POWDER, LYOPHILIZED, FOR SOLUTION INTRAVENOUS DAILY
Status: DISCONTINUED | OUTPATIENT
Start: 2021-05-05 | End: 2021-05-12

## 2021-05-05 RX ORDER — SODIUM CHLORIDE, SODIUM LACTATE, POTASSIUM CHLORIDE, AND CALCIUM CHLORIDE .6; .31; .03; .02 G/100ML; G/100ML; G/100ML; G/100ML
500 INJECTION, SOLUTION INTRAVENOUS ONCE
Status: COMPLETED | OUTPATIENT
Start: 2021-05-05 | End: 2021-05-05

## 2021-05-05 RX ORDER — MINERAL OIL AND WHITE PETROLATUM 150; 830 MG/G; MG/G
OINTMENT OPHTHALMIC PRN
Status: DISCONTINUED | OUTPATIENT
Start: 2021-05-05 | End: 2021-05-28 | Stop reason: HOSPADM

## 2021-05-05 RX ORDER — POTASSIUM CHLORIDE 7.45 MG/ML
20 INJECTION INTRAVENOUS ONCE
Status: COMPLETED | OUTPATIENT
Start: 2021-05-05 | End: 2021-05-05

## 2021-05-05 RX ORDER — MAGNESIUM SULFATE HEPTAHYDRATE 40 MG/ML
4000 INJECTION, SOLUTION INTRAVENOUS ONCE
Status: COMPLETED | OUTPATIENT
Start: 2021-05-05 | End: 2021-05-05

## 2021-05-05 RX ORDER — PROPOFOL 10 MG/ML
5-50 INJECTION, EMULSION INTRAVENOUS
Status: DISCONTINUED | OUTPATIENT
Start: 2021-05-05 | End: 2021-05-05

## 2021-05-05 RX ORDER — SODIUM CHLORIDE 9 MG/ML
5 INJECTION INTRAVENOUS DAILY
Status: DISCONTINUED | OUTPATIENT
Start: 2021-05-05 | End: 2021-05-12

## 2021-05-05 RX ORDER — POTASSIUM CHLORIDE 29.8 MG/ML
20 INJECTION INTRAVENOUS PRN
Status: DISCONTINUED | OUTPATIENT
Start: 2021-05-05 | End: 2021-05-06

## 2021-05-05 RX ADMIN — PANTOPRAZOLE SODIUM 40 MG: 40 INJECTION, POWDER, FOR SOLUTION INTRAVENOUS at 08:25

## 2021-05-05 RX ADMIN — POTASSIUM CHLORIDE 20 MEQ: 400 INJECTION, SOLUTION INTRAVENOUS at 09:49

## 2021-05-05 RX ADMIN — HEPARIN SODIUM 5000 UNITS: 5000 INJECTION INTRAVENOUS; SUBCUTANEOUS at 15:05

## 2021-05-05 RX ADMIN — SODIUM CHLORIDE: 9 INJECTION, SOLUTION INTRAVENOUS at 00:57

## 2021-05-05 RX ADMIN — HEPARIN SODIUM 5000 UNITS: 5000 INJECTION INTRAVENOUS; SUBCUTANEOUS at 21:55

## 2021-05-05 RX ADMIN — POTASSIUM CHLORIDE 20 MEQ: 400 INJECTION, SOLUTION INTRAVENOUS at 08:25

## 2021-05-05 RX ADMIN — LEVOTHYROXINE SODIUM ANHYDROUS 56 MCG: 100 INJECTION, POWDER, LYOPHILIZED, FOR SOLUTION INTRAVENOUS at 13:01

## 2021-05-05 RX ADMIN — DEXMEDETOMIDINE HYDROCHLORIDE 0.2 MCG/KG/HR: 400 INJECTION INTRAVENOUS at 05:38

## 2021-05-05 RX ADMIN — FENTANYL CITRATE 50 MCG: 50 INJECTION, SOLUTION INTRAMUSCULAR; INTRAVENOUS at 01:03

## 2021-05-05 RX ADMIN — VASOPRESSIN 0.04 UNITS/MIN: 20 INJECTION INTRAVENOUS at 03:32

## 2021-05-05 RX ADMIN — CALCIUM GLUCONATE 1000 MG: 20 INJECTION, SOLUTION INTRAVENOUS at 11:12

## 2021-05-05 RX ADMIN — FENTANYL CITRATE 50 MCG: 50 INJECTION, SOLUTION INTRAMUSCULAR; INTRAVENOUS at 01:11

## 2021-05-05 RX ADMIN — ROCURONIUM BROMIDE 50 MG: 10 INJECTION INTRAVENOUS at 00:54

## 2021-05-05 RX ADMIN — PIPERACILLIN AND TAZOBACTAM 3375 MG: 3; .375 INJECTION, POWDER, FOR SOLUTION INTRAVENOUS at 11:58

## 2021-05-05 RX ADMIN — SODIUM CHLORIDE, PRESERVATIVE FREE 10 ML: 5 INJECTION INTRAVENOUS at 08:25

## 2021-05-05 RX ADMIN — SODIUM CHLORIDE, POTASSIUM CHLORIDE, SODIUM LACTATE AND CALCIUM CHLORIDE 500 ML: 600; 310; 30; 20 INJECTION, SOLUTION INTRAVENOUS at 14:33

## 2021-05-05 RX ADMIN — INSULIN LISPRO 6 UNITS: 100 INJECTION, SOLUTION INTRAVENOUS; SUBCUTANEOUS at 20:24

## 2021-05-05 RX ADMIN — CALCIUM GLUCONATE: 98 INJECTION, SOLUTION INTRAVENOUS at 18:05

## 2021-05-05 RX ADMIN — INSULIN LISPRO 3 UNITS: 100 INJECTION, SOLUTION INTRAVENOUS; SUBCUTANEOUS at 04:17

## 2021-05-05 RX ADMIN — KETAMINE HYDROCHLORIDE 0.2 MG/KG/HR: 50 INJECTION, SOLUTION INTRAMUSCULAR; INTRAVENOUS at 03:48

## 2021-05-05 RX ADMIN — PROPOFOL 25 MCG/KG/MIN: 10 INJECTION, EMULSION INTRAVENOUS at 00:51

## 2021-05-05 RX ADMIN — POTASSIUM CHLORIDE 20 MEQ: 400 INJECTION, SOLUTION INTRAVENOUS at 20:21

## 2021-05-05 RX ADMIN — MINERAL OIL, PETROLATUM: 425; 573 OINTMENT OPHTHALMIC at 18:22

## 2021-05-05 RX ADMIN — HEPARIN SODIUM 5000 UNITS: 5000 INJECTION INTRAVENOUS; SUBCUTANEOUS at 08:14

## 2021-05-05 RX ADMIN — VASOPRESSIN 0.04 UNITS/MIN: 20 INJECTION INTRAVENOUS at 19:48

## 2021-05-05 RX ADMIN — POTASSIUM CHLORIDE 20 MEQ: 400 INJECTION, SOLUTION INTRAVENOUS at 22:19

## 2021-05-05 RX ADMIN — SODIUM CHLORIDE, POTASSIUM CHLORIDE, SODIUM LACTATE AND CALCIUM CHLORIDE 100 ML/HR: 600; 310; 30; 20 INJECTION, SOLUTION INTRAVENOUS at 02:40

## 2021-05-05 RX ADMIN — PROPOFOL 25 MCG/KG/MIN: 10 INJECTION, EMULSION INTRAVENOUS at 02:28

## 2021-05-05 RX ADMIN — INSULIN LISPRO 3 UNITS: 100 INJECTION, SOLUTION INTRAVENOUS; SUBCUTANEOUS at 08:25

## 2021-05-05 RX ADMIN — VASOPRESSIN 0.04 UNITS/MIN: 20 INJECTION INTRAVENOUS at 10:16

## 2021-05-05 RX ADMIN — SODIUM CHLORIDE, POTASSIUM CHLORIDE, SODIUM LACTATE AND CALCIUM CHLORIDE: 600; 310; 30; 20 INJECTION, SOLUTION INTRAVENOUS at 10:53

## 2021-05-05 RX ADMIN — FLUCONAZOLE 200 MG: 2 INJECTION, SOLUTION INTRAVENOUS at 23:34

## 2021-05-05 RX ADMIN — I.V. FAT EMULSION 100 ML: 20 EMULSION INTRAVENOUS at 18:04

## 2021-05-05 RX ADMIN — SODIUM CHLORIDE, PRESERVATIVE FREE 5 ML: 5 INJECTION INTRAVENOUS at 13:00

## 2021-05-05 RX ADMIN — POTASSIUM CHLORIDE 20 MEQ: 7.46 INJECTION, SOLUTION INTRAVENOUS at 04:22

## 2021-05-05 RX ADMIN — SODIUM CHLORIDE, PRESERVATIVE FREE 10 ML: 5 INJECTION INTRAVENOUS at 09:13

## 2021-05-05 RX ADMIN — DEXTROSE AND SODIUM CHLORIDE: 5; 450 INJECTION, SOLUTION INTRAVENOUS at 10:27

## 2021-05-05 RX ADMIN — MAGNESIUM SULFATE IN WATER 4000 MG: 40 INJECTION, SOLUTION INTRAVENOUS at 12:16

## 2021-05-05 RX ADMIN — FENTANYL CITRATE 50 MCG: 50 INJECTION, SOLUTION INTRAMUSCULAR; INTRAVENOUS at 00:55

## 2021-05-05 RX ADMIN — CHLORHEXIDINE GLUCONATE 15 ML: 1.2 RINSE ORAL at 08:19

## 2021-05-05 RX ADMIN — Medication 20 MCG/MIN: at 13:55

## 2021-05-05 ASSESSMENT — PULMONARY FUNCTION TESTS
PIF_VALUE: 33
PIF_VALUE: 11
PIF_VALUE: 33
PIF_VALUE: 33
PIF_VALUE: 25
PIF_VALUE: 31
PIF_VALUE: 24
PIF_VALUE: 23
PIF_VALUE: 29
PIF_VALUE: 26
PIF_VALUE: 27
PIF_VALUE: 31
PIF_VALUE: 24
PIF_VALUE: 33
PIF_VALUE: 25
PIF_VALUE: 34
PIF_VALUE: 30
PIF_VALUE: 28
PIF_VALUE: 25
PIF_VALUE: 29
PIF_VALUE: 24
PIF_VALUE: 31
PIF_VALUE: 35
PIF_VALUE: 9
PIF_VALUE: 27
PIF_VALUE: 35
PIF_VALUE: 32
PIF_VALUE: 34
PIF_VALUE: 25
PIF_VALUE: 24
PIF_VALUE: 28
PIF_VALUE: 33
PIF_VALUE: 33
PIF_VALUE: 27
PIF_VALUE: 29
PIF_VALUE: 23
PIF_VALUE: 22
PIF_VALUE: 27
PIF_VALUE: 24
PIF_VALUE: 33
PIF_VALUE: 27
PIF_VALUE: 33
PIF_VALUE: 28
PIF_VALUE: 27
PIF_VALUE: 25
PIF_VALUE: 34
PIF_VALUE: 33
PIF_VALUE: 23
PIF_VALUE: 28
PIF_VALUE: 28
PIF_VALUE: 33
PIF_VALUE: 27
PIF_VALUE: 34
PIF_VALUE: 28
PIF_VALUE: 30
PIF_VALUE: 33

## 2021-05-05 NOTE — CONSULTS
Hypertension     Thyroid disease        PAST SURGICAL HISTORY          Procedure Laterality Date    BREAST SURGERY      Bx 1993    CHOLECYSTECTOMY  1999    GASTRIC FUNDOPLICATION N/A 6/9/4691    PARAESPHAGEAL HERNIA REPAIR LAPAROSCOPIC ROBOTIC performed by Kiley Renteria DO at [address] [place] [address]    Remove spur L ankle    OTHER SURGICAL HISTORY  1978    Pin and wire repair R ankle    TUBAL LIGATION  1988       MEDICATIONS     Home Meds:                Medications Prior to Admission: metFORMIN (GLUCOPHAGE) 1000 MG tablet, Take 1,000 mg by mouth daily (with breakfast)  SITagliptin (JANUVIA) 100 MG tablet, Take 100 mg by mouth daily  omeprazole (PRILOSEC) 40 MG delayed release capsule, Take 1 capsule by mouth every morning (before breakfast)  lisinopril (PRINIVIL;ZESTRIL) 40 MG tablet, Take 10 mg by mouth daily   glimepiride (AMARYL) 2 MG tablet, Take 2 mg by mouth every morning (before breakfast)  simvastatin (ZOCOR) 10 MG tablet, Take 10 mg by mouth nightly  levothyroxine (SYNTHROID) 175 MCG tablet, Take 112 mcg by mouth Daily Dose reduced in December 2020  aspirin 81 MG EC tablet, Take 81 mg by mouth daily  metFORMIN (GLUCOPHAGE) 500 MG tablet, Take 500 mg by mouth every evening   hydroCHLOROthiazide (HYDRODIURIL) 25 MG tablet, Take 25 mg by mouth daily  [DISCONTINUED] predniSONE (DELTASONE) 20 MG tablet, 60 mg x 2 days, 40 mg x 2 days, 20 mg x 2 days  Scheduled Meds:    chlorhexidine  15 mL Mouth/Throat BID    heparin (porcine)  5,000 Units Subcutaneous 3 times per day    insulin lispro  0-18 Units Subcutaneous Q4H    fluconazole  200 mg Intravenous Q24H    piperacillin-tazobactam  3,375 mg Intravenous Q12H    magnesium sulfate  4,000 mg Intravenous Once    levothyroxine  56 mcg Intravenous Daily    And    sodium chloride (PF)  5 mL Intravenous Daily    pantoprazole  40 mg Intravenous Daily    And    sodium chloride (PF)  10 mL Intravenous Daily    sodium chloride flush  5-40 mL Intravenous 2 times per day     Continuous Infusions:    ketamine (KETALAR) infusion for analgosedation 0.2 mg/kg/hr (05/05/21 0348)    vasopressin (Septic Shock) infusion 0.04 Units/min (05/05/21 1016)    dexmedetomidine 0.2 mcg/kg/hr (05/05/21 1314)    lactated ringers 75 mL/hr at 05/05/21 1053    norepinephrine 20 mcg/min (05/05/21 1307)    sodium chloride      sodium chloride      sodium chloride       PRN Meds:  potassium chloride, potassium chloride, sodium chloride, sodium chloride, sodium chloride flush, sodium chloride, potassium chloride, [DISCONTINUED] promethazine **OR** ondansetron, dextrose, sodium phosphate IVPB **OR** sodium phosphate IVPB **OR** sodium phosphate IVPB    ALLERGY     No known allergies       SOCIAL HISTORY     Social History     Socioeconomic History    Marital status:      Spouse name: Not on file    Number of children: Not on file    Years of education: Not on file    Highest education level: Not on file   Occupational History    Not on file   Social Needs    Financial resource strain: Not on file    Food insecurity     Worry: Not on file     Inability: Not on file    Transportation needs     Medical: Not on file     Non-medical: Not on file   Tobacco Use    Smoking status: Never Smoker    Smokeless tobacco: Never Used   Substance and Sexual Activity    Alcohol use: Never     Frequency: Never    Drug use: Never    Sexual activity: Not on file   Lifestyle    Physical activity     Days per week: Not on file     Minutes per session: Not on file    Stress: Not on file   Relationships    Social connections     Talks on phone: Not on file     Gets together: Not on file     Attends [employer] service: Not on file     Active member of club or organization: Not on file     Attends meetings of clubs or organizations: Not on file     Relationship status: Not on file    Intimate partner violence     Fear of current or ex partner: Not on file     Emotionally abused: Not on file     Physically abused: Not on file     Forced sexual activity: Not on file   Other Topics Concern    Not on file   Social History Narrative    Not on file       FAMILY HISTORY     Family History   Problem Relation Age of Onset    Breast Cancer Mother     High Blood Pressure Mother     High Cholesterol Father     Breast Cancer Sister           REVIEW OF SYSTEM     Unable to obtain as intubated mechanically ventilated      PHYSICAL EXAM     Vitals:    05/05/21 1215 05/05/21 1217 05/05/21 1230 05/05/21 1233   BP:  113/72  (!) 115/58   Pulse: 68  68    Resp: 26  26    Temp:       TempSrc:       SpO2:       Weight:       Height:         24HR INTAKE/OUTPUT:      Intake/Output Summary (Last 24 hours) at 5/5/2021 1316  Last data filed at 5/5/2021 1200  Gross per 24 hour   Intake 9590.08 ml   Output 1986 ml   Net 7604.08 ml       General appearance: Mechanical ventilation  Respiratory: vesicular breath sounds,no wheeze/crackles  Cardiovascular: S1 S2 normal,no gallop or organic murmur. No carotid bruit  Abdomen: TAISHA drain  Extremities: No Cyanosis or Clubbing,Lower extremity edema  Neurological: Mechanical ventilation    INVESTIGATIONS      CBC:   Recent Labs     05/04/21  0509 05/04/21 2010 05/04/21 2229 05/05/21  0255 05/05/21  0646   WBC 4.1  --   --  13.2* 10.6   RBC 6.91*  --   --  3.96 3.91*   HGB 72.2* DUPLICATE ORDER  15.9* 75.8 11.5* 11.1*   HCT 47.2* DUPLICATE ORDER  53.2* 76.8 36.5 35.2*   MCV 87.7  --   --  92.2 90.0   MCH 28.2  --   --  29.0 28.4   MCHC 32.2  --   --  31.5 31.5   RDW 14.3  --   --  14.3 14.3   PLT See Reflexed IPF Result See Reflexed IPF Result  --  See Reflexed IPF Result See Reflexed IPF Result   MPV NOT REPORTED  --   --  NOT REPORTED NOT REPORTED      BMP:   Recent Labs     05/04/21  1253 05/04/21  1253 05/04/21 2229 05/05/21  0255 05/05/21  0646   *   < > 145 148* 148*   K 2.8*   < > 2.9* 3.4* 3.4*   *  --   --  112* 111* CO2 25  --   --  22 22   BUN 84*  --   --  63* 63*   CREATININE 2.24*  --   --  2.03* 2.10*   GLUCOSE 184*  --   --  178* 186*   CALCIUM 8.2*  --   --  7.6* 7.9*    < > = values in this interval not displayed. Phosphorus:    Recent Labs     05/04/21  0509 05/04/21  1253 05/05/21  0646   PHOS 3.7 3.5 4.3     Magnesium:   Recent Labs     05/04/21  0509 05/04/21  1253 05/05/21  0646   MG 2.1 1.9 1.5*     Albumin:   Recent Labs     05/04/21  0509 05/05/21  0255 05/05/21  0500   LABALBU 2.7* 2.9* 2.7*       Radiology:  Reviewed as available. ASSESSMENT     #1 acute kidney injury nonoliguric secondary to ischemic ATN from GI loss (N/V/D and gastirc volvulus) and hypotension - plateau  Suspected baseline creatinine 1.3  #2 circulatory shock on pressors likely hypovolemic  #3 hiatal hernia with gastric volvulus and perforation status post wedge gastrectomy with gastropexy and placement of TAISHA drains  #4 ventilatory dependent respiratory failure  #5 non-ST relation MI type II likely demand ischemia  #6 type 2 diabetes     PLAN:     1.T,P,RR,BP & I/O monitoring  2. Avoid Nephrotoxic agents  3. Keep MAP >60 /SBP >110 mm Hg  4. Antibiotics dosed as per eGFR  5. Urine routine analysis/Random Urine protein/sodium/creatinine/albumin  6. LILLY,Complement levels,Acute hepatitis panel to exclude occult connective tissue dis  7. SPEP/UPEP/sFLC to rule out occult  Paraproteinemia  8. USS Kidneys for size,echogenicity and exclude obstruction  9. Fluids   10. Recheck BMP in the evening    Thank you for the consultation. Please do not hesitate to call with questions. This note is created with the assistance of a speech-recognition program. While intending to generate a document that actually reflects the content of the visit, no guarantees can be provided that every mistake has been identified and corrected by editing.     Salbador Hein MD, MRCP Arturo Ureña, FACP   5/5/2021 1:16 PM    [facility]

## 2021-05-05 NOTE — PLAN OF CARE
Problem: Non-Violent Restraints  Goal: No harm/injury to patient while restraints in use  Outcome: Met This Shift  Goal: Patient's dignity will be maintained  Outcome: Met This Shift     Problem: OXYGENATION/RESPIRATORY FUNCTION  Goal: Patient will maintain patent airway  5/5/2021 0407 by Harpal [place] RN  Outcome: Ongoing     Problem: OXYGENATION/RESPIRATORY FUNCTION  Goal: Patient will achieve/maintain normal respiratory rate/effort  Description: Respiratory rate and effort will be within normal limits for the patient  5/5/2021 0407 by Harpal [place] RN  Outcome: Ongoing     Problem: MECHANICAL VENTILATION  Goal: Patient will maintain patent airway  5/5/2021 0407 by Harpal [place] RN  Outcome: Ongoing     Problem: MECHANICAL VENTILATION  Goal: Oral health is maintained or improved  5/5/2021 0407 by Harpal [place] RN  Outcome: Ongoing     Problem: MECHANICAL VENTILATION  Goal: ET tube will be managed safely  5/5/2021 0407 by Harpal [place] RN  Outcome: Ongoing     Problem: MECHANICAL VENTILATION  Goal: Ability to express needs and understand communication  5/5/2021 0407 by Harpal [place] RN  Outcome: Ongoing     Problem: MECHANICAL VENTILATION  Goal: Mobility/activity is maintained at optimum level for patient  5/5/2021 0407 by Harpal [place] RN  Outcome: Ongoing     Problem: SKIN INTEGRITY  Goal: Skin integrity is maintained or improved  5/5/2021 0407 by Harpal [place] RN  Outcome: Ongoing     Problem: Non-Violent Restraints  Goal: Removal from restraints as soon as assessed to be safe  Outcome: Ongoing

## 2021-05-05 NOTE — BRIEF OP NOTE
Brief Postoperative Note      Patient: Walker Simon  YOB: 1950  MRN: [id number]     Date of Procedure: 5/4/2021    Pre-Op Diagnosis: PARAESOPHAGEAL HERNIA, PERFORRATED STOMACH    Post-Op Diagnosis: Same       Procedure(s):  PARAESPHAGEAL HERNIA REPAIR LAPAROSCOPIC ROBOTIC    Surgeon(s):  Clement Carroll DO    Assistant:  Resident: Sandoval Snow DO; Lottie Arnold MD    Anesthesia: General    Estimated Blood Loss (mL): Minimal    Complications: None    Specimens:   ID Type Source Tests Collected by Time Destination   A : PARTIAL GASTRECTOMY Tissue Stomach SURGICAL PATHOLOGY Clement Carroll DO 5/5/2021 0027        Implants:  * No implants in log *      Drains:   Closed/Suction Drain RLQ Bulb 19 [place] (Active)   Site Description Unable to view 05/05/21 0400   Dressing Status Clean;Dry; Intact 05/05/21 0400   Drainage Appearance Bloody;Dark Red 05/05/21 0400   Status Compressed 05/05/21 0400   Output (ml) 120 ml 05/05/21 0622       Closed/Suction Drain Lateral LLQ Bulb 19 [place] (Active)   Site Description Other (Comment) 05/05/21 0400   Dressing Status Clean;Dry; Intact 05/05/21 0400   Drainage Appearance Bloody;Dark Red 05/05/21 0400   Status Compressed 05/05/21 0400   Output (ml) 100 ml 05/05/21 0259       Gastrostomy/Enterostomy/Jejunostomy Gastrostomy LUQ 1 22 fr (Active)   Drainage Appearance Bile;Green 05/05/21 0400   Site Description Bleeding 05/05/21 0400   Drain Status Open to gravity drainage 05/05/21 0400   Surrounding Skin Dry; Intact 05/05/21 0400   Dressing Status Changed;New drainage 05/05/21 0400   Dressing Type Split gauze 05/05/21 0400   Dressing Change Due 05/05/21 05/05/21 0400       Gastrostomy/Enterostomy/Jejunostomy Gastrostomy  1 20 fr (Active)   Drainage Appearance Bile;Green 05/05/21 0400   Site Description Reddened 05/05/21 0400   Drain Status Open to gravity drainage 05/05/21 0400   Surrounding Skin Dry; Intact 05/05/21 0400   Dressing Status Changed;New drainage 05/05/21 0400   Dressing Type Split gauze 05/05/21 0400   Dressing Change Due 05/05/21 05/05/21 0400       Urethral Catheter Temperature probe (Active)   Catheter Indications Need for fluid volume management of the critically ill patient in a critical care setting 05/05/21 0400   Securement Device Date Changed 05/04/21 05/05/21 0400   Site Assessment No urethral drainage 05/05/21 0400   Urine Color Yellow 05/05/21 0400   Urine Appearance Clear 05/05/21 0400   Output (mL) 260 mL 05/05/21 0623       [REMOVED] NG/OG/NJ/NE Tube Nasogastric (Removed)   Surrounding Skin Dry; Intact 05/04/21 1645   Securement device Yes 05/04/21 1645   Status Suction-low intermittent 05/04/21 1645   Placement Verified by X-Ray (Initial) 05/04/21 1645   NG/OG/NJ/NE External Measurement (cm) 57 cm 05/04/21 1645   Drainage Appearance Bile;Brown 05/04/21 1645       [REMOVED] Urethral Catheter Non-latex 16 fr (Removed)   Catheter Indications Need for fluid volume management of the critically ill patient in a critical care setting 05/03/21 0815   Urine Color Yellow 05/03/21 0815   Output (mL) 36 mL 05/04/21 1642       Findings: SEE NOTE    Electronically signed by Ryan Moon DO on 5/5/2021 at 7:00 AM

## 2021-05-05 NOTE — ANESTHESIA PRE PROCEDURE
Department of Anesthesiology  Preprocedure Note       Name:  Sydnee Beauchamp   Age:  78 y.o.  :  1950                                          MRN:  [id number]         Date:  2021      Surgeon: Nanci Vargas):  Elaine See DO    Procedure: Procedure(s):  PARAESPHAGEAL HERNIA REPAIR LAPAROSCOPIC ROBOTIC    Medications prior to admission:   Prior to Admission medications    Medication Sig Start Date End Date Taking?  Authorizing Provider   metFORMIN (GLUCOPHAGE) 1000 MG tablet Take 1,000 mg by mouth daily (with breakfast)    Historical Provider, MD   SITagliptin (JANUVIA) 100 MG tablet Take 100 mg by mouth daily    Historical Provider, MD   omeprazole (PRILOSEC) 40 MG delayed release capsule Take 1 capsule by mouth every morning (before breakfast) 21   Rafi Bowman DO   lisinopril (PRINIVIL;ZESTRIL) 40 MG tablet Take 10 mg by mouth daily     Historical Provider, MD   glimepiride (AMARYL) 2 MG tablet Take 2 mg by mouth every morning (before breakfast)    Historical Provider, MD   simvastatin (ZOCOR) 10 MG tablet Take 10 mg by mouth nightly    Historical Provider, MD   levothyroxine (SYNTHROID) 175 MCG tablet Take 112 mcg by mouth Daily Dose reduced in 2020    Historical Provider, MD   aspirin 81 MG EC tablet Take 81 mg by mouth daily    Historical Provider, MD   metFORMIN (GLUCOPHAGE) 500 MG tablet Take 500 mg by mouth every evening     Historical Provider, MD   hydroCHLOROthiazide (HYDRODIURIL) 25 MG tablet Take 25 mg by mouth daily    Historical Provider, MD   predniSONE (DELTASONE) 20 MG tablet 60 mg x 2 days, 40 mg x 2 days, 20 mg x 2 days 20   Che Flores PA-C       Current medications:    Current Facility-Administered Medications   Medication Dose Route Frequency Provider Last Rate Last Admin    chlorhexidine (PERIDEX) 0.12 % solution 15 mL  15 mL Mouth/Throat BID Martha Valentine MD        propofol injection  5-50 mcg/kg/min Intravenous Titrated Martha Valentine MD 11.3 mL/hr at 05/05/21 0228 25 mcg/kg/min at 05/05/21 0228    fluconazole (DIFLUCAN) 200 mg IVPB  200 mg Intravenous Q24H Pearl Lee DO        heparin (porcine) injection 5,000 Units  5,000 Units Subcutaneous 3 times per day Pearl Lee DO        ketamine (KETALAR) 500 mg in sodium chloride 0.9 % 250 mL infusion  0.2 mg/kg/hr (Ideal) Intravenous Continuous Pearl Lee DO 5 mL/hr at 05/05/21 0348 0.2 mg/kg/hr at 05/05/21 0348    vasopressin 20 Units in dextrose 5 % 100 mL infusion  0.04 Units/min Intravenous Continuous Pearl Lee DO 12 mL/hr at 05/05/21 0332 0.04 Units/min at 05/05/21 0332    0.9 % sodium chloride bolus  1,000 mL Intravenous Once Abhinav Obrien MD        piperacillin-tazobactam (ZOSYN) 3,375 mg in dextrose 5 % 50 mL IVPB extended infusion (mini-bag)  3,375 mg Intravenous Q12H Abhinav Obrien MD        insulin lispro (HUMALOG) injection vial 0-12 Units  0-12 Units Subcutaneous Q6H Abhinav Obrien MD        insulin lispro (HUMALOG) injection vial 0-6 Units  0-6 Units Subcutaneous Nightly Abhinav Obrien MD        pantoprazole (PROTONIX) injection 40 mg  40 mg Intravenous Daily Abhinav Obrien MD        And    sodium chloride (PF) 0.9 % injection 10 mL  10 mL Intravenous Daily Abhinav Obrien MD        lactated ringers infusion   Intravenous Continuous Abhinav Obrien  mL/hr at 05/05/21 0240 100 mL/hr at 05/05/21 0240    phytonadione (ADULT) (VITAMIN K) 10 mg in dextrose 5 % 100 mL IVPB  10 mg Intravenous Once Abhinav Obrien MD        potassium chloride 10 mEq/100 mL IVPB (Peripheral Line)  10 mEq Intravenous PRN Abhinav Obrien MD        norepinephrine (LEVOPHED) 16 mg in sodium chloride 0.9 % 250 mL infusion  2-100 mcg/min Intravenous Continuous Abhinav Obrien MD 28.1 mL/hr at 05/05/21 0254 30 mcg/min at 05/05/21 0254    0.9 % sodium chloride infusion   Intravenous PRN Abhinav Obrien MD        0.9 % sodium chloride infusion   Intravenous PRN Abhinav Obrien MD        sodium chloride flush 0.9 % injection 5-40 mL  5-40 mL Intravenous 2 times per day Talat Henao MD   10 mL at 05/04/21 0930    sodium chloride flush 0.9 % injection 5-40 mL  5-40 mL Intravenous PRN Talat Henao MD        0.9 % sodium chloride infusion  25 mL Intravenous PRN Talat Henao MD        potassium chloride 10 mEq/100 mL IVPB (Peripheral Line)  10 mEq Intravenous PRN Talat Henao MD   Stopped at 05/04/21 0503    ondansetron (ZOFRAN) injection 4 mg  4 mg Intravenous Q6H PRN Talat Henao MD   4 mg at 05/03/21 1704    dextrose 50 % IV solution  12.5 g Intravenous PRN Talat Henao MD        magnesium sulfate 1000 mg in dextrose 5% 100 mL IVPB  1,000 mg Intravenous PRN Talat Henao MD        sodium phosphate 10 mmol in dextrose 5 % 250 mL IVPB  10 mmol Intravenous PRN Talat Henao MD        Or    sodium phosphate 15 mmol in dextrose 5 % 250 mL IVPB  15 mmol Intravenous PRN Talat Henao MD        Or    sodium phosphate 20 mmol in dextrose 5 % 500 mL IVPB  20 mmol Intravenous PRN Talat Henao MD           Allergies:     Allergies   Allergen Reactions    No Known Allergies        Problem List:    Patient Active Problem List   Diagnosis Code    Acute pancreatitis K85.90    Septic shock (HCC) A41.9, R65.21    Non-STEMI (non-ST elevated myocardial infarction) ([facility] [address] I21.4    Diabetic ketoacidosis without coma associated with type 2 diabetes mellitus ([facility] [address] E11.10    BONNIE (acute kidney injury) ([facility] [address] N17.9    Hernia with obstruction K46.0    Essential hypertension I10    Thyroid disease E07.9    Syncope R55    Acute tubular necrosis (HCC) N17.0    Lactic acidosis E87.2    Acute hypoxemic respiratory failure (HCC) J96.01       Past Medical History:        Diagnosis Date    Diabetes mellitus ([facility] [address]     Gout     Hiatal hernia     Hyperlipidemia     Hypertension     Thyroid disease        Past Surgical History:        Procedure Laterality Date    BREAST SURGERY      Bx 1993    CHOLECYSTECTOMY  HYSTERECTOMY         Social History:    Social History     Tobacco Use    Smoking status: Never Smoker    Smokeless tobacco: Never Used   Substance Use Topics    Alcohol use: Never     Frequency: Never                                Counseling given: Not Answered      Vital Signs (Current):   Vitals:    05/04/21 1915 05/05/21 0130 05/05/21 0350 05/05/21 0353   BP: 100/66      Pulse: 112  91 89   Resp: (!) 37 14 18 23   Temp:       TempSrc:       SpO2: 90%  100% 100%   Weight:       Height:                                                  BP Readings from Last 3 Encounters:   05/04/21 100/66   05/04/21 (!) 62/35   05/03/21 139/79       NPO Status:                                                                                 BMI:   Wt Readings from Last 3 Encounters:   05/03/21 166 lb (75.3 kg)   05/03/21 166 lb (75.3 kg)   04/28/21 166 lb (75.3 kg)     Body mass index is 30.36 kg/m². CBC:   Lab Results   Component Value Date    WBC 13.2 05/05/2021    RBC 3.96 05/05/2021    RBC 6.23 05/03/2021    HGB 11.5 05/05/2021    HCT 36.5 05/05/2021    MCV 92.2 05/05/2021    RDW 14.3 05/05/2021    PLT See Reflexed IPF Result 05/05/2021       CMP:   Lab Results   Component Value Date     05/05/2021    K 3.4 05/05/2021     05/05/2021    CO2 22 05/05/2021    BUN 63 05/05/2021    CREATININE 2.03 05/05/2021    CREATININE 3.09 05/03/2021    GFRAA 30 05/05/2021    LABGLOM 24 05/05/2021    GLUCOSE 178 05/05/2021    GLUCOSE 453 05/03/2021    PROT 4.5 05/05/2021    PROT 7.3 05/03/2021    CALCIUM 7.6 05/05/2021    BILITOT 2.32 05/05/2021    ALKPHOS 35 05/05/2021     05/05/2021     05/05/2021       POC Tests:   Recent Labs     05/03/21  1322 05/03/21  1322 05/05/21  0231   POCGLU 449*   < > 176*   POCNA 145  --   --    POCK 2.3*  --   --    POCCL 97*  --   --    POCBUN 98*  --   --    POCHEMO 17.9*  --   --    POCHCT 53*  --   --     < > = values in this interval not displayed.        Coags:   Lab Results   Component Value Date    PROTIME 14.5 05/04/2021    INR 1.4 05/04/2021    APTT 27.4 05/04/2021       HCG (If Applicable): No results found for: PREGTESTUR, PREGSERUM, HCG, HCGQUANT     ABGs:   Lab Results   Component Value Date    PHART 7.193 05/04/2021    PO2ART 145.0 05/04/2021    OIK6TTE 52.0 05/04/2021    CHF9JBO 19.2 05/04/2021    N5VUXLDT 98.0 05/04/2021        Type & Screen (If Applicable):  No results found for: LABABO, LABRH    Drug/Infectious Status (If Applicable):  No results found for: HIV, HEPCAB    COVID-19 Screening (If Applicable):   Lab Results   Component Value Date    COVID19 NOT DETECTED 05/03/2021           Anesthesia Evaluation    Airway: Mallampati: Unable to assess / NA        Dental:          Pulmonary:   (+) COPD:                             Cardiovascular:                      Neuro/Psych:               GI/Hepatic/Renal:             Endo/Other:                     Abdominal:           Vascular:                                        Anesthesia Plan        Sonam Reyes MD   5/5/2021

## 2021-05-05 NOTE — PROGRESS NOTES
Patient admitted on Mechanical Ventilator Protocol. Patients height measured at 62\" for an IBW 50.1 Kg. Patient placed on the ventilator on settings as charted on flowsheeet. Ventilator Bronchodilator assessment    Breath sounds: clear  Inspiratory Pressure: 23  Plateau Pressure: 21    Patient assessed at level 1           [x]    Bronchodilator Assessment    BRONCHODILATOR ASSESSMENT SCORE  Score 0 (Home) 1 2 3 4   Breath Sounds   []  Chronic Ventilator: Patient at baseline [x]  Mild Wheezes/ Clear []  Intermittent wheezes with good air entry []  Bilateral/unilateral wheezing with diminished air entry []  Insp/Exp wheeze and/or poor aeration   Ventilator Pressures   []  Chronic Ventilator [x]  Insp. Pressure less than 25 cm H20 []  Insp. Pressure less than 25 cm H20 []  Insp. Pressure exceeds 25 cm H20 []  Insp.  Pressure exceeds 30 cm H20   Plateau Pressure []  NA   [x]  Plateau Pressure less than 4  []  Plateau Pressure less than or equal to 5 []  Plateau Pressure greater than or equal to 6 []  Plateau Pressure greater than or equal to 8       Karolina JOEL Foster  1:40 AM

## 2021-05-05 NOTE — PROGRESS NOTES
ICU PROGRESS NOTE          PATIENT NAME: Nguyen Rome  MEDICAL RECORD NO. [id number]  DATE: 5/5/2021    PRIMARY CARE PHYSICIAN: Robson Chavez DO     HD: # 2    ASSESSMENT    Hiatal Hernia Reduction  Wedge gastroectomy  Gastropexy x2  Placement of TAISHA drain x2  Septic Shock  Acute Pancreatitis   Non-STEMI   Diabetic Ketoacidosis without coma associated with type 2 DM  BONNIE (acute kidney injury)  Essential Hypertension  Thyroid disease  Syncope  Acute Tubular necrosis  Lactic Acidosis  Acute hypoxemic respiratory failure        MEDICAL DECISION MAKING AND PLAN    1. Neuro       1. Sedation/Pain          Precedex          Ketamine           Propofol           Tylenol 650 mg q 6 prn                   2. CV-        DX: NSTEMI        Map 66       Art /61       Saul /86       Saul MAP 86        Troponin I on 5/3/21 0.404,        Troponin, High Sensitivity  5/3/21 at 1334- 85 and repeat at 1434- 94        Heparin  5000 u sc TID       LR @ 100 ml/hr        Levophed-currently 20 mcg/min        Vasopressin       3. Heme      Hgb 11.1 (previous 11.5)       HCT 35.2 (previous 36.5)       INR 1.7 (previous 1.4)      PTT 17.0 ( previous 14.5)       Type and Screened      Received no blood products           4. Res      PRVC, Rate 26, , PEEP 14, PIP 28       5/5/21 ph 7.36, pCO2 36.6, pO2 66.2, HCO3 21.4 O2 Sat 92%     Will start spontaneous breathing trials if there is no plan for further surgery and extubate when appropriate. Xr Chest Portable      Result Date: 5/5/2021      1. Recommend repositioning of left internal jugular approach central venous catheter. 2. Low lung              volumes with left basilar atelectasis plus or minus mild pleural effusion. 3. Left chest wall scattered soft     tissue emphysema. Will assess and consider changing IV access.              5. GI       Dx: Hiatal Hernia reduction, Wedge Gastrectomy secondary to perforation, Gastropexy x2, TAISHA drain x 2       NPO Protonix     6.       Morel to gravity- yellow urine no sediment      24 hr intake/output:      Intake  8902.9/ Output 1616      Total -7286.9      Urine output per hr: 17.6 ml/kg             7. ID      Dx: Septic Shock,  Hiatal Hernia reduction, Wedge Gastrectomy secondary to perforation, Gastropexy       x2, TAISHA drain x 2       Zosyn      Diflucan     8. MSK      Tylenol 650 mg q 6 prn     9. Endo -      Dx: DKA and thyroid disease       Accucheck q 4 hrs       PO anti-glycemics on hold       Will adjust insulin according to insulin needs once no longer NPO       Will restart synthroid once no longer NPO    10: Skin       TAISHA drain x 2 (right and left sided) with serosanguineous drainage         Routinely turning and repositioning     CHECKLIST    RASS:  -2  RESTRAINTS: bilateral wrist   IVF: LR @100  NUTRITION:  NPO  ANTIBIOTICS: Zosyn   GI: Bilateral TAISHA, NPO  DVT: Heparin   GLYCEMIC CONTROL: accucheck q 4 hrs  HOB >45: yes    SUBJECTIVE    [place] acutely ill appearing. No acute distress. No acute events over night and patient rested quietly.        OBJECTIVE  VITALS: Temp:  GENERAL: sedated, no acute distress  NEURO: sedated, GCS 5 ( E 2, V- not testable, M 3)   HEENT:NCAT   : morel, noted yellow urine drainage by gravity to bedside bag   LUNGS: clear to auscultation  MECHANICAL VENTILATION: ETT in place, PRVC, rate 26, , Peep 14, PIP, 27   HEART: S1, S2 audible, no additional heart sounds   ABDOMEN: mild distension,patient is sedated but no guarding or movement on palpation, BS noted, TAISHA drain on bilateral sides with serous sanguineous drainage   EXTERMITY: trace edema to bilateral lower extremities        Drain/tube output:  Urethral catheter 1260, Rt TAISHA 220, Lt TAISHA 100    LAB:  CBC:   Recent Labs     05/03/21  0713 05/04/21  0509 05/04/21 2010 05/04/21  2229 05/05/21  0255   WBC 23.5* 4.1  --   --  13.2*   HGB 99.3* 90.5* DUPLICATE ORDER  69.4* 70.0 11.5*   HCT 28.3* 84.8* DUPLICATE ORDER  72.3* 36.7 36.5   MCV 87 87.7  --   --  92.2    See Reflexed IPF Result See Reflexed IPF Result  --  See Reflexed IPF Result     BMP:   Recent Labs     05/04/21  0509 05/04/21  1253 05/04/21 2010 05/04/21  2229 05/05/21  0255    148* 146* 145 148*   K 3.0* 2.8* 2.5* 2.9* 3.4*    109*  --   --  112*   CO2 24 25  --   --  22   BUN 84* 84*  --   --  63*   CREATININE 2.42* 2.24*  --   --  2.03*   GLUCOSE 273* 184*  --   --  178*         RADIOLOGY:  Ct Abdomen Pelvis Wo Contrast Additional Contrast? None    Result Date: 5/3/2021  There is obstruction of the stomach because of the portions of the stomach contained in the very large hiatal hernia. The rest of the GI tract is normal. No evidence of an acute infectious or inflammatory process in the abdomen and pelvis. Ct Abdomen Pelvis Wo Contrast Additional Contrast? None    Result Date: 4/28/2021  No acute infectious or inflammatory process is identified. Very large hiatal hernia. This could be the source of the patient's symptoms. Xr Abdomen (kub) (single Ap View)    Result Date: 5/4/2021  No significant subdiaphragmatic contrast progression, as above. RECOMMENDATION: Consider chest x-ray to further assess a organic-axial gastric volvulus     Ct Head Wo Contrast    Result Date: 5/3/2021  No acute intracranial process. If clinical concern remains, consider further imaging with MRI. Ct Chest Wo Contrast    Result Date: 5/4/2021  1. Ascites and pneumoperitoneum with apparent free-flowing oral contrast in the left upper quadrant is concerning for viscus perforation, possibly within the subdiaphragmatic portion of the stomach. 2. Large hiatal hernia with organoaxial volvulus. Majority of contrast is retained within the esophagus and supradiaphragmatic stomach. 3. Enteric tube extends below the diaphragm with tip outside the field of view. 4. Moderate bilateral pleural effusions with adjacent consolidation, atelectasis versus pneumonia.  Critical results were called by Dr. Sharee Christian MD to [facility] on 5/4/2021 at 18:30. Ct Cervical Spine Wo Contrast    Result Date: 5/3/2021  No acute findings in the cervical spine. Xr Chest Portable    Result Date: 5/5/2021  1. Recommend repositioning of left internal jugular approach central venous catheter. 2. Low lung volumes with left basilar atelectasis plus or minus mild pleural effusion. 3. Left chest wall scattered soft tissue emphysema. Xr Chest Portable    Result Date: 5/4/2021  Intestinal tube deviates to the left side of a sizable hiatal hernia, traversing below the hemidiaphragm and terminating just underneath the left hemidiaphragm. Side port of the intestinal tube is below the diaphragmatic hiatus. Xr Chest Portable    Result Date: 5/3/2021  No acute cardiopulmonary disease Large hiatal hernia     Mri Abdomen Wo Contrast Mrcp    Result Date: 5/3/2021  1. No evidence of intrahepatic or extrahepatic ductal dilatation. 2. Incompletely imaged large hiatal hernia with organoaxial malrotation of the stomach. 3. Small to moderate amount of ascites. 4. Diffuse small bowel wall thickening likely due to 3rd spacing as the post enteritis. 5. Trace bilateral pleural effusions. Fl Ugi    Result Date: 5/4/2021  Organo-axial volvulus the stomach. Large paraesophageal hernia containing the majority of the rotated gastric body. The greater curvature is positioned superiorly within the paraesophageal hernia defect. There is narrowing of the gastroesophageal junction as well as of the gastroduodenal junction through the hernia defect. Ashley Sylvester MD  5/5/21, 6:30 AM           Trauma Attending Rick Thakur      I have reviewed the above GCS note(s) and confirmed the key elements of the medical history and physical exam. I have seen and examined the pt. I have discussed the findings, established the care plan and recommendations with Resident, GCS RN, bedside nurse.   Neuro: ketamine, precedex, propofol   CV: maintain MAP >65 levo and vaso titrate to MAP   Pulm: O2 sats  paO2 66-on 75% fiO2- p/f ratio 88  GI: NPO currently, bilirubin imrpoving   Creatinine 2.10 will trend UOP  On zosyn and diflucan   Increased PEEP to 16 and dec fiO2 to 60%  Critical Care min: 2050 [facility],   5/5/2021  10:18 AM

## 2021-05-05 NOTE — PROGRESS NOTES
Patient's son and DIL are now ready to go to a room at [place] [address]. SW took them to room #8 and checked them in.  Amna Evans.  Sukhjinder Landeros

## 2021-05-05 NOTE — PROGRESS NOTES
Occupational Therapy    Occupational Therapy Not Seen Note    DATE: 2021  Name: Sydnee Beauchamp  : 1950  MRN: [id number]    Patient not available for Occupational Therapy due to: Other: Pt intubated and sedated at this time. Not appropriate for OOB/EOB activity with OT at this time.     Next Scheduled Treatment: Check back     Electronically signed by Laney Godinez OT on 2021 at 8:55 AM

## 2021-05-05 NOTE — CONSULTS
every evening     Historical Provider, MD   hydroCHLOROthiazide (HYDRODIURIL) 25 MG tablet Take 25 mg by mouth daily    Historical Provider, MD     IMMUNIZATIONS:    There is no immunization history on file for this patient.     REVIEW OF SYSTEMS:  Review of systems not possible given patient is intubated    PHYSICAL EXAMINATION     VITAL SIGNS:   LAST-  BP (!) 115/58   Pulse 68   Temp 99.9 °F (37.7 °C) (Core)   Resp 26   Ht 5' 2\" (1.575 m)   Wt 202 lb 9.6 oz (91.9 kg)   SpO2 99%   BMI 37.06 kg/m²   8-24 HR RANGE-  TEMP Temp  Av.2 °F (36.8 °C)  Min: 96.8 °F (36 °C)  Max: 99.9 °F (26.2 °C)   BP Systolic (80VWZ), ESTEE:89 , Min:62 , UIN:140      Diastolic (87FOB), DLW:75, Min:24, Max:82     PULSE Pulse  Av.5  Min: 66  Max: 121   RR Resp  Av.7  Min: 23  Max: 26   O2 SAT SpO2  Av.4 %  Min: 95 %  Max: 100 %   OXYGEN DELIVERY No data recorded     SYSTEMIC EXAMINATION:   General appearance -intubated, sedated   mental status -follows commands off sedation  Eyes - pupils equal and reactive, extraocular eye movements intact, sclera anicteric  Ears - not examined  Nose - normal and patent, no erythema, discharge  Mouth - mucous membranes moist, pharynx normal without lesions  Neck - supple, no significant adenopathy, carotids upstroke normal bilaterally, no bruits  Lymphatics - no palpable lymphadenopathy, no hepatosplenomegaly  Chest -bilateral breath sounds equal and vesicular  Heart - normal rate, regular rhythm, normal S1, S2, no murmurs, rubs, clicks or gallops  Abdomen -distended, rigid with 2 TAISHA drains and 2 PEG tubes  Neurological - motor and sensory grossly normal bilaterally off sedation  Musculoskeletal - no joint tenderness, deformity or swelling  Extremities - peripheral pulses normal, no pedal edema, no clubbing or cyanosis  Skin - normal coloration and turgor, no rashes, no suspicious skin lesions noted    DATA REVIEW     Medications: Current Inpatient  Scheduled Meds:   chlorhexidine 15 mL Mouth/Throat BID    heparin (porcine)  5,000 Units Subcutaneous 3 times per day    insulin lispro  0-18 Units Subcutaneous Q4H    fluconazole  200 mg Intravenous Q24H    piperacillin-tazobactam  3,375 mg Intravenous Q12H    magnesium sulfate  4,000 mg Intravenous Once    levothyroxine  56 mcg Intravenous Daily    And    sodium chloride (PF)  5 mL Intravenous Daily    lactated ringers bolus  500 mL Intravenous Once    pantoprazole  40 mg Intravenous Daily    And    sodium chloride (PF)  10 mL Intravenous Daily    sodium chloride flush  5-40 mL Intravenous 2 times per day     Continuous Infusions:   ketamine (KETALAR) infusion for analgosedation 0.2 mg/kg/hr (05/05/21 0348)    vasopressin (Septic Shock) infusion 0.04 Units/min (05/05/21 1016)    dexmedetomidine 0.2 mcg/kg/hr (05/05/21 1314)    lactated ringers 75 mL/hr at 05/05/21 1053    norepinephrine 20 mcg/min (05/05/21 1355)    sodium chloride      sodium chloride      sodium chloride       INPUT/OUTPUT:  In: 9590.1 [I.V.:9490.1]  Out: 1986 [WDVZB:7058; Drains:545]    LABS:-  ABGs:   No results found for: PH, PCO2, PO2, HCO3, O2SAT  Recent Labs     05/04/21 2010 05/04/21 2229   PHART 7.409 7.193*   PO2ART 130.0* 145.0*   PXB9BFY 36.9 52.0*   FOW9XMA 22.9 19.2*   K8WMRMLV 98.5 98.0     Lab Results   Component Value Date    POCPH 7.381 05/05/2021    POCPCO2 36.7 05/05/2021    POCPO2 278.3 (H) 05/05/2021    POCHCO3 21.8 05/05/2021    HMNC1QOT 100 (H) 05/05/2021     CBC:   Recent Labs     05/03/21  0713 05/03/21  0713 05/04/21  0509 05/04/21 2010 05/04/21 2229 05/05/21  0255 05/05/21  0646   WBC 23.5*  --  4.1  --   --  13.2* 10.6   HGB 36.5*  --  74.2* DUPLICATE ORDER  03.5* 67.4 11.5* 11.1*   HCT 46.5*  --  10.2* DUPLICATE ORDER  59.7* 49.2 36.5 35.2*   MCV 87  --  87.7  --   --  92.2 90.0      < > See Reflexed IPF Result See Reflexed IPF Result  --  See Reflexed IPF Result See Reflexed IPF Result   LYMPHOPCT 6*  --   --   -- XR CHEST PORTABLE   Final Result   1. Recommend repositioning of left internal jugular approach central venous   catheter. 2. Low lung volumes with left basilar atelectasis plus or minus mild pleural   effusion. 3. Left chest wall scattered soft tissue emphysema. XR CHEST PORTABLE   Final Result   Intestinal tube deviates to the left side of a sizable hiatal hernia,   traversing below the hemidiaphragm and terminating just underneath the left   hemidiaphragm. Side port of the intestinal tube is below the diaphragmatic   hiatus. CT CHEST WO CONTRAST   Final Result   1. Ascites and pneumoperitoneum with apparent free-flowing oral contrast in   the left upper quadrant is concerning for viscus perforation, possibly within   the subdiaphragmatic portion of the stomach. 2. Large hiatal hernia with organoaxial volvulus. Majority of contrast is   retained within the esophagus and supradiaphragmatic stomach. 3. Enteric tube extends below the diaphragm with tip outside the field of   view. 4. Moderate bilateral pleural effusions with adjacent consolidation,   atelectasis versus pneumonia. Critical results were called by Dr. Lucy Douglas MD to [facility] on   5/4/2021 at 18:30. XR ABDOMEN (KUB) (SINGLE AP VIEW)   Final Result   No significant subdiaphragmatic contrast progression, as above. RECOMMENDATION:   Consider chest x-ray to further assess a organic-axial gastric volvulus         FL UGI   Final Result   Organo-axial volvulus the stomach. Large paraesophageal hernia containing the majority of the rotated gastric   body. The greater curvature is positioned superiorly within the   paraesophageal hernia defect. There is narrowing of the gastroesophageal   junction as well as of the gastroduodenal junction through the hernia defect. MRI ABDOMEN WO CONTRAST MRCP   Final Result   1. No evidence of intrahepatic or extrahepatic ductal dilatation.    2. Weight: 166 pounds BSA: 1.77 m^2 BMI: 30.36 kg/m^2    CONCLUSIONS    Summary  Normal LV size and wall thickness. No obvious wall motion abnormality seen. Normal LV systolic function with LVEF 55%. RV appears dilated with reduced function. RV systolic pressure 37 mmHg  LA appears normal in size. No obvious significant structural valvular abnormality noted. No significant valvular stenosis or regurgitation noted. Normal aortic root dimension. No significant pericardial effusion noted. Signature  ----------------------------------------------------------------------------   Electronically signed by Connor Montoya(Interpreting physician) on   05/05/2021 12:15 PM  ----------------------------------------------------------------------------    ----------------------------------------------------------------------------   Electronically signed by Olimpia Borjas(Sonographer) on 05/05/2021 11:37 AM  ----------------------------------------------------------------------------  FINDINGS  Left Atrium  Left atrium is normal in size. Inter-atrial septum is intact with no evidence for an atrial septal defect,  by color doppler. Left Ventricle  Left ventricle is small in size, normal wall thickness, global left  ventricular systolic function is normal, calculated ejection fraction is  53%. All wall segments are not well visualized (poor acoustic windows.)  Right Atrium  Right atrial dilatation. Right Ventricle  Right ventricular dilatation with reduced systolic function. Abnormal RV strain. Mitral Valve  Normal mitral valve structure. Trivial mitral regurgitation. Aortic Valve  Aortic valve is trileaflet. No evidence of aortic insufficiency or stenosis. Tricuspid Valve  Normal tricuspid valve leaflets. Moderate tricuspid regurgitation. Estimated right ventricular systolic pressure is 37 mmHg, suggesting  pulmonary HTN.   Pulmonic Valve  Pulmonic valve not well visualized but Doppler velocities are normal.  Pericardial Effusion  No significant pericardial effusion is seen. Miscellaneous  Normal aortic root dimension. E/E' average = 15.05. IVC dilated but unable to assess respiratory collapse due to patient on  ventilator. M-mode / 2D Measurements & Calculations:      LVIDd:4 cm(3.7 - 5.6 cm)          Diastolic NNVUFF:21 ml   EMCZ:4.7 cm(0.6 - 1.1 cm)         Systolic VENFIU:93.76 ml   LVPWd:0.9 cm(0.6 - 1.1 cm)        Aortic Root:2.9 cm(2.0 - 3.7 cm)                                     LA Dimension: 3.1 cm(1.9 - 4.0 cm)   Calculated LVEF (%): 52.95 %      LA volume/Index: 31.04 ml /18m^2                                     LVOT:1.9 cm                                     RVDd:3 cm      Mitral:                                 Aortic      Valve Area (P1/2-Time): 5.12 cm^2       Peak Velocity: 1.55 m/s   Peak E-Wave: 0.78 m/s                   Mean Velocity: 0.99 m/s   Peak A-Wave: 1.00 m/s                   Peak Gradient: 9.61 mmHg   E/A Ratio: 0.78                         Mean Gradient: 5 mmHg   Peak Gradient: 2.45 mmHg   Mean Gradient: 2 mmHg   Deceleration Time: 145 msec             Area (continuity): 2.16 cm^2   P1/2t: 43 msec                          AV VTI: 25.9 cm      Area (continuity): 2.1 cm^2   Mean Velocity: 0.60 m/s      Tricuspid:                              Pulmonic:      Estimated RVSP: 37 mmHg                 Peak Velocity: 0.80 m/s   Peak TR Velocity: 2.85 m/s              Peak Gradient: 2.57 mmHg   Peak TR Gradient: 32.49 mmHg     Diastology / Tissue Doppler  Septal Wall E' velocity:0.06 m/s  Septal Wall E/E':13.8  Lateral Wall E' velocity:0.05 m/s  Lateral Wall E/E':16.3       Cardiac Catheterization:   No results found for this or any previous visit.     ASSESSMENT AND PLAN     Assessment:    Gastric volvulus with Viscus perforation  Sepsis  Moderate bilateral pleural effusions  Diabetes mellitusDKA now resolved  Lactic acidosis  BONNIE  Elevated troponins  Subclinical hypothyroidism  Elevated bilirubin    Plan:    I personally interviewed/examined the patient; reviewed interval history, interpreted all available radiographic and laboratory data at the time of service. Patient continues to be on vent support PRVC/26/400/14/75  Noted to be on pressor support with Levophed and vasopressin  Continue Precedex, propofol weaned off  Started on ketamine for pain  We will continue vent support and wean as tolerated. Patient's chest x-ray shows subcutaneous emphysema likely postoperative, will continue to follow    Gastric volvulus with viscus perforation s/p 2 PEG, 2 TAISHA drain in placeon Zosyn  Acute kidney injurynonoliguric, good urine output, nephrology following. HypernatremiaRinger lactate changed to D5 half-normal saline for hypernatremia of 148  Elevated troponins cardiology evaluation to follow  Cover hyperglycemia with insulin sliding scale. Restart DVT prophylaxis when okay with surgery      Rob Strickland M.D. Pulmonary and Critical Care Medicine           5/5/2021, 2:19 PM    Please note that this chart was generated using voice recognition Dragon dictation software. Although every effort was made to ensure the accuracy of this automated transcription, some errors in transcription may have occurred.

## 2021-05-05 NOTE — CARE COORDINATION
Transitional planning     Call from [place] with [facility]; patient is OON and will need to be transferred to [facility] facility once stable. Case Management Initial Discharge Plan  Dunia Bishop,             Met with:patient's son to discuss discharge plans. Information verified: address, contacts, phone number, , insurance Yes    Emergency Contact/Next of Kin name & number:   Dajuan Barron (Son) [phone]    PCP: Etienne Currie DO  Date of last visit:     Insurance Provider: [facility] Medicare     Discharge Planning    Living Arrangements:      Support Systems:       Patient able to perform ADL's:Independent    Current Services (outpatient & in home)   DME equipment:   DME provider:     Receiving oral anticoagulation therapy? No    If indicated:   Physician managing anticoagulation treatment:   Where does patient obtain lab work for ATC treatment? Potential Assistance Needed:       Patient agreeable to home care: Yes  Freedom of choice provided:  yes    Prior SNF/Rehab Placement and Facility:   Agreeable to SNF/Rehab: Yes  [place] of choice provided: yes     Evaluation: n/a    Expected Discharge date:       Patient expects to be discharged to: Follow Up Appointment: Best Day/ Time:      Transportation provider: son/ambulance. Transportation arrangements needed for discharge: Yes    Readmission Risk              Risk of Unplanned Readmission:        24             Does patient have a readmission risk score greater than 14?: Yes  If yes, follow-up appointment must be made within 7 days of discharge. Goals of Care:       Discharge Plan: Home goal; has transportation. Agreeable to SNF/HC if needed.         Electronically signed by Aroldo Cabrera RN on 21 at 2:48 PM EDT

## 2021-05-05 NOTE — ANESTHESIA PRE PROCEDURE
Department of Anesthesiology  Preprocedure Note       Name:  Anamaria Bedoya   Age:  78 y.o.  :  1950                                          MRN:  [id number]         Date:  2021      Surgeon: Alok Leblanc):  Lucio Lucio DO    Procedure: Procedure(s):  PARAESPHAGEAL HERNIA REPAIR LAPAROSCOPIC ROBOTIC    Medications prior to admission:   Prior to Admission medications    Medication Sig Start Date End Date Taking?  Authorizing Provider   metFORMIN (GLUCOPHAGE) 1000 MG tablet Take 1,000 mg by mouth daily (with breakfast)    Historical Provider, MD   SITagliptin (JANUVIA) 100 MG tablet Take 100 mg by mouth daily    Historical Provider, MD   omeprazole (PRILOSEC) 40 MG delayed release capsule Take 1 capsule by mouth every morning (before breakfast) 21   Jory Barnard DO   lisinopril (PRINIVIL;ZESTRIL) 40 MG tablet Take 10 mg by mouth daily     Historical Provider, MD   glimepiride (AMARYL) 2 MG tablet Take 2 mg by mouth every morning (before breakfast)    Historical Provider, MD   simvastatin (ZOCOR) 10 MG tablet Take 10 mg by mouth nightly    Historical Provider, MD   levothyroxine (SYNTHROID) 175 MCG tablet Take 112 mcg by mouth Daily Dose reduced in 2020    Historical Provider, MD   aspirin 81 MG EC tablet Take 81 mg by mouth daily    Historical Provider, MD   metFORMIN (GLUCOPHAGE) 500 MG tablet Take 500 mg by mouth every evening     Historical Provider, MD   hydroCHLOROthiazide (HYDRODIURIL) 25 MG tablet Take 25 mg by mouth daily    Historical Provider, MD   predniSONE (DELTASONE) 20 MG tablet 60 mg x 2 days, 40 mg x 2 days, 20 mg x 2 days 20   Che Gallagher PA-C       Current medications:    Current Facility-Administered Medications   Medication Dose Route Frequency Provider Last Rate Last Admin    chlorhexidine (PERIDEX) 0.12 % solution 15 mL  15 mL Mouth/Throat BID Afia Cunningham MD        propofol injection  5-50 mcg/kg/min Intravenous Titrated Afia Cunningham MD 11.3 mL/hr at 05/05/21 0228 25 mcg/kg/min at 05/05/21 0228    fluconazole (DIFLUCAN) 200 mg IVPB  200 mg Intravenous Q24H Sveta Laughter, DO        heparin (porcine) injection 5,000 Units  5,000 Units Subcutaneous 3 times per day Sveta Laughter, DO        ketamine (KETALAR) 500 mg in sodium chloride 0.9 % 250 mL infusion  0.2 mg/kg/hr (Ideal) Intravenous Continuous Sveta Laughter, DO 5 mL/hr at 05/05/21 0348 0.2 mg/kg/hr at 05/05/21 0348    vasopressin 20 Units in dextrose 5 % 100 mL infusion  0.04 Units/min Intravenous Continuous Sveta Laughter, DO 12 mL/hr at 05/05/21 0332 0.04 Units/min at 05/05/21 0332    dexmedetomidine (PRECEDEX) 400 mcg in sodium chloride 0.9 % 100 mL infusion  0.2-1.4 mcg/kg/hr Intravenous Continuous Rock Doss MD        potassium chloride 10 mEq/100 mL IVPB (Peripheral Line)  20 mEq Intravenous Once Rock Doss MD        0.9 % sodium chloride bolus  1,000 mL Intravenous Once Rock Doss MD        piperacillin-tazobactam (ZOSYN) 3,375 mg in dextrose 5 % 50 mL IVPB extended infusion (mini-bag)  3,375 mg Intravenous Q12H Rock Doss MD        insulin lispro (HUMALOG) injection vial 0-12 Units  0-12 Units Subcutaneous Q6H Rock Doss MD        insulin lispro (HUMALOG) injection vial 0-6 Units  0-6 Units Subcutaneous Nightly Rock Doss MD        pantoprazole (PROTONIX) injection 40 mg  40 mg Intravenous Daily Rock Doss MD        And    sodium chloride (PF) 0.9 % injection 10 mL  10 mL Intravenous Daily Rock Doss MD        lactated ringers infusion   Intravenous Continuous Rock Doss  mL/hr at 05/05/21 0240 100 mL/hr at 05/05/21 0240    phytonadione (ADULT) (VITAMIN K) 10 mg in dextrose 5 % 100 mL IVPB  10 mg Intravenous Once Rock Doss MD        potassium chloride 10 mEq/100 mL IVPB (Peripheral Line)  10 mEq Intravenous PRN Rock Doss MD        norepinephrine (LEVOPHED) 16 mg in sodium chloride 0.9 % 250 mL infusion  2-100 mcg/min Intravenous Continuous Jason Camp MD 28.1 mL/hr at 05/05/21 0254 30 mcg/min at 05/05/21 0254    0.9 % sodium chloride infusion   Intravenous PRN Jason Camp MD        0.9 % sodium chloride infusion   Intravenous PRN Jason Camp MD        sodium chloride flush 0.9 % injection 5-40 mL  5-40 mL Intravenous 2 times per day Jason Camp MD   10 mL at 05/04/21 0930    sodium chloride flush 0.9 % injection 5-40 mL  5-40 mL Intravenous PRN Jason Camp MD        0.9 % sodium chloride infusion  25 mL Intravenous PRN Jason Camp MD        potassium chloride 10 mEq/100 mL IVPB (Peripheral Line)  10 mEq Intravenous PRN Jason Camp MD   Stopped at 05/04/21 0503    ondansetron (ZOFRAN) injection 4 mg  4 mg Intravenous Q6H PRN Jason Camp MD   4 mg at 05/03/21 1704    dextrose 50 % IV solution  12.5 g Intravenous PRN Jason Camp MD        magnesium sulfate 1000 mg in dextrose 5% 100 mL IVPB  1,000 mg Intravenous PRN Jason Camp MD        sodium phosphate 10 mmol in dextrose 5 % 250 mL IVPB  10 mmol Intravenous PRN Jason Camp MD        Or    sodium phosphate 15 mmol in dextrose 5 % 250 mL IVPB  15 mmol Intravenous PRN Jason Camp MD        Or    sodium phosphate 20 mmol in dextrose 5 % 500 mL IVPB  20 mmol Intravenous PRN Jason Camp MD           Allergies:     Allergies   Allergen Reactions    No Known Allergies        Problem List:    Patient Active Problem List   Diagnosis Code    Acute pancreatitis K85.90    Septic shock (HCC) A41.9, R65.21    Non-STEMI (non-ST elevated myocardial infarction) ([facility] [address] I21.4    Diabetic ketoacidosis without coma associated with type 2 diabetes mellitus ([facility] [address] E11.10    BONNIE (acute kidney injury) ([facility] [address] N17.9    Hernia with obstruction K46.0    Essential hypertension I10    Thyroid disease E07.9    Syncope R55    Acute tubular necrosis (HCC) N17.0    Lactic acidosis E87.2    Acute hypoxemic respiratory failure (HCC) J96.01       Past Medical History: Diagnosis Date    Diabetes mellitus ([facility] [address]     Gout     Hiatal hernia     Hyperlipidemia     Hypertension     Thyroid disease        Past Surgical History:        Procedure Laterality Date    BREAST SURGERY      Bx 1993    CHOLECYSTECTOMY      HYSTERECTOMY         Social History:    Social History     Tobacco Use    Smoking status: Never Smoker    Smokeless tobacco: Never Used   Substance Use Topics    Alcohol use: Never     Frequency: Never                                Counseling given: Not Answered      Vital Signs (Current):   Vitals:    05/04/21 1915 05/05/21 0130 05/05/21 0350 05/05/21 0353   BP: 100/66      Pulse: 112  91 89   Resp: (!) 37 14 18 23   Temp:       TempSrc:       SpO2: 90%  100% 100%   Weight:       Height:                                                  BP Readings from Last 3 Encounters:   05/04/21 100/66   05/04/21 (!) 62/35   05/03/21 139/79       NPO Status:                                                                                 BMI:   Wt Readings from Last 3 Encounters:   05/03/21 166 lb (75.3 kg)   05/03/21 166 lb (75.3 kg)   04/28/21 166 lb (75.3 kg)     Body mass index is 30.36 kg/m².     CBC:   Lab Results   Component Value Date    WBC 13.2 05/05/2021    RBC 3.96 05/05/2021    RBC 6.23 05/03/2021    HGB 11.5 05/05/2021    HCT 36.5 05/05/2021    MCV 92.2 05/05/2021    RDW 14.3 05/05/2021    PLT See Reflexed IPF Result 05/05/2021       CMP:   Lab Results   Component Value Date     05/05/2021    K 3.4 05/05/2021     05/05/2021    CO2 22 05/05/2021    BUN 63 05/05/2021    CREATININE 2.03 05/05/2021    CREATININE 3.09 05/03/2021    GFRAA 30 05/05/2021    LABGLOM 24 05/05/2021    GLUCOSE 178 05/05/2021    GLUCOSE 453 05/03/2021    PROT 4.5 05/05/2021    PROT 7.3 05/03/2021    CALCIUM 7.6 05/05/2021    BILITOT 2.32 05/05/2021    ALKPHOS 35 05/05/2021     05/05/2021     05/05/2021       POC Tests:   Recent Labs     05/03/21  1323 05/03/21  1322 05/05/21  0231   POCGLU 449*   < > 176*   POCNA 145  --   --    POCK 2.3*  --   --    POCCL 97*  --   --    POCBUN 98*  --   --    POCHEMO 17.9*  --   --    POCHCT 53*  --   --     < > = values in this interval not displayed.        Coags:   Lab Results   Component Value Date    PROTIME 14.5 05/04/2021    INR 1.4 05/04/2021    APTT 27.4 05/04/2021       HCG (If Applicable): No results found for: PREGTESTUR, PREGSERUM, HCG, HCGQUANT     ABGs:   Lab Results   Component Value Date    PHART 7.193 05/04/2021    PO2ART 145.0 05/04/2021    OYW1RPO 52.0 05/04/2021    KYQ2OUE 19.2 05/04/2021    T8HDFPTX 98.0 05/04/2021        Type & Screen (If Applicable):  No results found for: LABABO, LABRH    Drug/Infectious Status (If Applicable):  No results found for: HIV, HEPCAB    COVID-19 Screening (If Applicable):   Lab Results   Component Value Date    COVID19 NOT DETECTED 05/03/2021           Anesthesia Evaluation    Airway:         Dental:          Pulmonary:   (+) asthma:                            Cardiovascular:                      Neuro/Psych:               GI/Hepatic/Renal:             Endo/Other:                     Abdominal:           Vascular:                                        Anesthesia Plan        Kasie Green MD   5/5/2021

## 2021-05-05 NOTE — PROGRESS NOTES
Patient off unit to OR, accompanied by Anesthesia, trauma resident, surgery team. Patient family in waiting room, consent obtained per Trauma resident.

## 2021-05-05 NOTE — PROGRESS NOTES
Bariatric Surgery Progress Note    PATIENT NAME: Sveta Duong   TODAY'S DATE: 5/5/2021, 6:34 AM  Chief Complaint   Patient presents with    Blood Sugar Problem     >450    Hernia     hyatial      SUBJECTIVE:    Pt seen and examined. No acute issues overnight. Remains intubated and sedated. Pressor requirement decreasing.      LLQ TAISHA 100 ml SS  RLQ TAISHA 220 ml SS  PEG tubes x2 minimal output   Tmax 37.3    OBJECTIVE:   Vitals:  /66   Pulse 71   Temp 97.9 °F (36.6 °C) (Axillary)   Resp 26   Ht 5' 2\" (1.575 m)   Wt 166 lb (75.3 kg)   SpO2 96%   BMI 30.36 kg/m²      INTAKE/OUTPUT:      Intake/Output Summary (Last 24 hours) at 5/5/2021 0634  Last data filed at 5/5/2021 5585  Gross per 24 hour   Intake 8902.9 ml   Output 1616 ml   Net 7286.9 ml                 General: intubated and sedated   Lungs: Symmetrical chest rise bilaterally, mechanically vented   Heart: S1S2  Abdomen: Soft, ND, appropriately tender, non peritoneal, no rebound, JPs intact with SS in bulb, incisions c/d/i, PEG to gravity x2  Extremity: moves all extremities x4, trace edema    Data Review:  CBC:   Recent Labs     05/03/21  0713 05/04/21  0509 05/04/21 2010 05/04/21 2229 05/05/21  0255   WBC 23.5* 4.1  --   --  13.2*   HGB 81.6* 47.1* DUPLICATE ORDER  59.4* 56.1 11.5*    See Reflexed IPF Result See Reflexed IPF Result  --  See Reflexed IPF Result     BMP:    Recent Labs     05/04/21  0509 05/04/21  1253 05/04/21 2010 05/04/21 2229 05/05/21  0255    148* 146* 145 148*   K 3.0* 2.8* 2.5* 2.9* 3.4*    109*  --   --  112*   CO2 24 25  --   --  22   BUN 84* 84*  --   --  63*   CREATININE 2.42* 2.24*  --   --  2.03*   GLUCOSE 273* 184*  --   --  178*     Hepatic:   Recent Labs     05/03/21  0713 05/03/21  0713 05/04/21  0509 05/05/21  0255 05/05/21  0500   AST 37*   < > 32* 243* 265*   ALT 18   < > 14 133* 141*   ALKPHOS 106   < > 57 35 34*   BILITOT 5.0*   < > 2.46* 2.32* 2.30*   BILIDIR 0.0  --   --   --  0.76*    < > = values in this interval not displayed. Coagulation:   Recent Labs     05/04/21  0509 05/04/21 2010 05/05/21  0255 05/05/21  0500   APTT  --  27.4  --   --    PROT 5.2*  --  4.5* 4.4*   INR 2.9 1.4  --  1.7       ASSESSMENT   Patient Active Problem List   Diagnosis    Acute pancreatitis    [facility] [address]    Non-STEMI (non-ST elevated myocardial infarction) ([facility] [address] [facility]    Diabetic ketoacidosis without coma associated with type 2 diabetes mellitus ([facility] [address]    BONNIE (acute kidney injury) ([facility] [address]    Hernia with obstruction    Essential hypertension    Thyroid disease    Syncope    Acute tubular necrosis (HCC)    Lactic acidosis    Acute hypoxemic respiratory failure ([facility] [address]     77 yo F s/p 5/4 emergent robotic assisted laparoscopic hiatal hernia reduction with gastropexy via g-tube x2     PLAN  1. Cont recs and management per CC/TIUC  2. Recommend cont NPO, andree TAISHA and PEG tube output   3.   Cont IV abx and monitor WBC/temperature     Thank you,    Electronically signed by Enoc Sainz DO  on 5/5/2021 at 6:34 AM

## 2021-05-05 NOTE — PROGRESS NOTES
[facility]. [facility] Gastroenterology Progress Note    Alvarez Jauregui is a 78 y.o. female patient. Hospitalization Day:2      Chief consult reason: Gastric volvulus      Subjective: Patient seen and examined. Patient in ICU, intubated, sedated, and on pressors. Patient underwent emergent robotic assisted laparoscopic hiatal hernia reduction with gastroplexi last evening per general surgery       Objective:   VITALS:  /66   Pulse 69   Temp 97.9 °F (36.6 °C) (Axillary)   Resp 26   Ht 5' 2\" (1.575 m)   Wt 202 lb 9.6 oz (91.9 kg)   SpO2 97%   BMI 37.06 kg/m²   TEMPERATURE:  Current - Temp: 97.9 °F (36.6 °C);  Max - Temp  Av.2 °F (36.8 °C)  Min: 96.8 °F (36 °C)  Max: 99.1 °F (37.3 °C)    Physical Assessment:  General appearance: Intubated, sedated, and on pressors  Mental Status: ALYSE  Lungs:  clear to auscultation bilaterally, normal effort  Heart:  regular rate and rhythm, no murmur  Abdomen:  Soft,  nondistended, G-tube x2 and TAISHA drain  X 2  Extremities:  + edema, no redness, No clubbing  Skin:  warm, dry, no gross lesions or rashes    CURRENT MEDICATIONS:  Scheduled Meds:   chlorhexidine  15 mL Mouth/Throat BID    heparin (porcine)  5,000 Units Subcutaneous 3 times per day    insulin lispro  0-18 Units Subcutaneous Q4H    fluconazole  200 mg Intravenous Q24H    piperacillin-tazobactam  3,375 mg Intravenous Q12H    sodium chloride  1,000 mL Intravenous Once    pantoprazole  40 mg Intravenous Daily    And    sodium chloride (PF)  10 mL Intravenous Daily    phytonadione (VITAMIN K)  IVPB  10 mg Intravenous Once    sodium chloride flush  5-40 mL Intravenous 2 times per day     Continuous Infusions:   propofol 20 mcg/kg/min (21)    ketamine (KETALAR) infusion for analgosedation 0.2 mg/kg/hr (21 885)    vasopressin (Septic Shock) infusion 0.04 Units/min (21 144)    dexmedetomidine 0.3 mcg/kg/hr (21)    lactated ringers 100 mL/hr (21 8881)   Juanito Davis norepinephrine 25 mcg/min (05/05/21 3713)    sodium chloride      sodium chloride      sodium chloride       PRN Meds:potassium chloride, sodium chloride, sodium chloride, sodium chloride flush, sodium chloride, potassium chloride, [DISCONTINUED] promethazine **OR** ondansetron, dextrose, magnesium sulfate, sodium phosphate IVPB **OR** sodium phosphate IVPB **OR** sodium phosphate IVPB      Data Review:  LABS and IMAGING:     CBC  Recent Labs     05/03/21  0713 05/04/21  0509 05/04/21 2010 05/04/21 2229 05/05/21  0255 05/05/21  0646   WBC 23.5* 4.1  --   --  13.2* PENDING   HGB 37.3* 60.8* DUPLICATE ORDER  32.8* 92.5 11.5* 11.1*   HCT 09.1* 62.9* DUPLICATE ORDER  27.6* 29.3 36.5 35.2*   MCV 87 87.7  --   --  92.2 90.0   MCHC 33.8 32.2  --   --  31.5 31.5   RDW  --  14.3  --   --  14.3 14.3    See Reflexed IPF Result See Reflexed IPF Result  --  See Reflexed IPF Result See Reflexed IPF Result       Immature PLTs   Lab Results   Component Value Date    PLTFLUORE 163 05/05/2021    PLTFLUORE 224 05/04/2021    PLTFLUORE 227 05/04/2021       ANEMIA STUDIES  No results for input(s): LABIRON, TIBC, IRON, FERRITIN, YSAQEEYL50, FOLATE, OCCULTBLD in the last 72 hours.     BMP  Recent Labs     05/04/21  0509 05/04/21  1253 05/04/21 2010 05/04/21 2229 05/05/21  0255    148* 146* 145 148*   K 3.0* 2.8* 2.5* 2.9* 3.4*    109*  --   --  112*   CO2 24 25  --   --  22   BUN 84* 84*  --   --  63*   CREATININE 2.42* 2.24*  --   --  2.03*   GLUCOSE 273* 184*  --   --  178*   CALCIUM 8.2* 8.2*  --   --  7.6*       LFTS  Recent Labs     05/03/21  0713 05/03/21  1334 05/03/21  1940 05/04/21  0509 05/05/21  0255 05/05/21  0500   ALKPHOS 106 77  --  57 35 34*   ALT 18 14  --  14 133* 141*   AST 37* 30  --  32* 243* 265*   BILITOT 5.0* 3.74*  --  2.46* 2.32* 2.30*   BILIDIR 0.0  --   --   --   --  0.76*   LABALBU 4.7 3.6 3.3* 2.7* 2.9* 2.7*       AMYLASE/LIPASE/AMMONIA  Recent Labs     05/03/21  0713 05/03/21  1334 LIPASE 1,451*  --    AMMONIA  --  19       Acute Hepatitis Panel   No results found for: HEPBSAG, HEPCAB, HEPBIGM, HEPAIGM    HCV Genotype   No results found for: HEPATITISCGENOTYPE    HCV Quantitative   No results found for: HCVQNT    LIVER WORK UP:    AFP  No results found for: AFP    Alpha 1 antitrypsin   No results found for: A1A    Anti - Liver/Kidney Ab  No results found for: LIVER-KIDNEYMICROSOMALAB    LILLY  No results found for: LILLY    AMA  No results found for: MITOAB    ASMA  No results found for: SMOOTHMUSCAB    Ceruloplasmin  No results found for: CERULOPLSM    Celiac panel  No results found for: TISSTRNTIIGG, TTGIGA, IGA    IgG  No results found for: IGG    IgM  No results found for: IGM    GGT   No results found for: LABGGT    PT/INR  Recent Labs     05/04/21  0509 05/04/21 2010 05/05/21  0500   PROTIME 28.2* 14.5* 17.0*   INR 2.9 1.4 1.7       Cancer Markers:  CEA:  No results for input(s): CEA in the last 72 hours. Ca 125:  No results for input(s):  in the last 72 hours. Ca 19-9:   No results for input(s):  in the last 72 hours. AFP: No results for input(s): AFP in the last 72 hours. Lactic acid:  Recent Labs     05/04/21  1253 05/04/21 2010 05/05/21  0255   LACTACIDWB 3.8* 4.3* 2.0         Radiology Review:    Ct Abdomen Pelvis Wo Contrast Additional Contrast? None    Result Date: 5/3/2021  EXAMINATION: CT ABDOMEN PELVIS WO CONTRAST HISTORY: Abdominal bloating with abnormal labs. COMPARISON: CT dated 4/28/2021 TECHNIQUE: CT examination of the abdomen and pelvis without IV contrast. Coronal and sagittal reformations were performed. Dose reduction techniques were achieved by using automated exposure control and/or adjustment of mA and/or kV according to patient size and/or use of iterative reconstruction technique. FINDINGS: The lung bases are clear. There is a stable extremely large fluid-filled hiatal hernia. The stomach below the diaphragm is also distended and fluid-filled.  The visualized heart is stable. The liver, spleen, adrenal glands, pancreas show normal unenhanced characteristics. Surgical changes of cholecystectomy. The kidneys are symmetric in size and show no calcifications or hydronephrosis. The aorta has a normal course and caliber. The large and small bowel are normal. A normal appendix is seen in the right lower quadrant. Surgical changes of hysterectomy. The urinary bladder is decompressed by Walker catheter. There is no free air or free fluid and no inflammatory stranding is seen. Osseous structures are stable in appearance. Report electronically signed by: Dr. Swapnil Rosario    There is obstruction of the stomach because of the portions of the stomach contained in the very large hiatal hernia. The rest of the GI tract is normal. No evidence of an acute infectious or inflammatory process in the abdomen and pelvis. Ct Abdomen Pelvis Wo Contrast Additional Contrast? None    Result Date: 4/28/2021  EXAMINATION: CT ABDOMEN PELVIS WO CONTRAST HISTORY: Epigastric pain and vomiting. COMPARISON: None. TECHNIQUE: CT examination of the abdomen and pelvis without IV contrast. Coronal and sagittal reformations were performed. Dose reduction techniques were achieved by using automated exposure control and/or adjustment of mA and/or kV according to patient size and/or use of iterative reconstruction technique. FINDINGS: The lung bases are clear. There is an extremely large hiatal hernia. Visualized heart is normal in size. The liver, spleen, adrenal glands, pancreas show normal unenhanced characteristics. Surgical changes of cholecystectomy. The kidneys are symmetric in size and show no calcifications or hydronephrosis. There is minimal calcified plaque in the aorta and branch vessels. The large and small bowel are normal caliber. A normal appendix is seen in the right lower quadrant. Surgical changes of hysterectomy.  The urinary bladder is normal. There is no free air or free fluid and no inflammatory stranding is seen. No suspicious or destructive bone lesions are seen. Report electronically signed by: Dr. Brodie Lam    No acute infectious or inflammatory process is identified. Very large hiatal hernia. This could be the source of the patient's symptoms. Xr Abdomen (kub) (single Ap View)    Result Date: 5/4/2021  EXAMINATION: ONE SUPINE XRAY VIEW(S) OF THE ABDOMEN 5/4/2021 2:22 pm COMPARISON: Earlier upper GI study HISTORY: ORDERING SYSTEM PROVIDED HISTORY: s/p UGI, inquiry of contrast progression TECHNOLOGIST PROVIDED HISTORY: s/p UGI, inquiry of contrast progression FINDINGS: No significant wall contrast identified below the diaphragm; a small amount detected in the gastric fundus as seen on the upper GI study. Clips status post cholecystectomy. Walker catheter and post herniorrhaphy changes lower pelvis. Paucity of bowel gas. No obvious mass or organomegaly. Bones intact. No significant subdiaphragmatic contrast progression, as above. RECOMMENDATION: Consider chest x-ray to further assess a organic-axial gastric volvulus     Ct Head Wo Contrast    Result Date: 5/3/2021  EXAMINATION: CT HEAD WO CONTRAST HISTORY: Fall with loss of consciousness. COMPARISON: None. TECHNIQUE: CT examination of the head without IV contrast. Dose reduction techniques were achieved by using automated exposure control and/or adjustment of mA and/or kV according to patient size and/or use of iterative reconstruction technique. FINDINGS: There is no evidence of acute hemorrhage or infarct. There is no evidence of mass effect or midline shift. The CSF spaces are normal. The contents of the orbits are normal. The visualized sinuses and mastoid air cells are clear. Osseous structures are normal. Extracranial soft tissues are normal. Report electronically signed by: Dr. Brodie Lam    No acute intracranial process. If clinical concern remains, consider further imaging with MRI.      Ct Chest Wo Contrast    Result Date: 5/4/2021  EXAMINATION: CT OF THE CHEST WITHOUT CONTRAST 5/4/2021 5:55 pm TECHNIQUE: CT of the chest was performed without the administration of intravenous contrast. Multiplanar reformatted images are provided for review. Dose modulation, iterative reconstruction, and/or weight based adjustment of the mA/kV was utilized to reduce the radiation dose to as low as reasonably achievable. COMPARISON: None. HISTORY: ORDERING SYSTEM PROVIDED HISTORY: large hiatal hernia, contrast progression? TECHNOLOGIST PROVIDED HISTORY: large hiatal hernia, contrast progression? Reason for Exam: large hiatal hernia, contrast progression? FINDINGS: Mediastinum: Heart size is normal without pericardial effusion. There are shotty mediastinal lymph nodes. The thoracic aorta and main pulmonary artery are normal in caliber. Lungs/pleura: Moderate bilateral pleural effusions with adjacent consolidation. No pneumothorax. Upper Abdomen: There is a large hiatal hernia containing the majority of the stomach. There is organoaxial volvulus. Enteric tube is noted extending below the diaphragm with tip outside the field of view. The herniated stomach is distended with contrast.  There is also contrast within the distal esophagus. There is some contrast visualized within the portion of stomach below the diaphragm. Free air and fluid are noted within the visualized upper abdomen with apparent free spill of contrast in the left upper quadrant. Soft Tissues/Bones: No acute osseous abnormality. 1. Ascites and pneumoperitoneum with apparent free-flowing oral contrast in the left upper quadrant is concerning for viscus perforation, possibly within the subdiaphragmatic portion of the stomach. 2. Large hiatal hernia with organoaxial volvulus. Majority of contrast is retained within the esophagus and supradiaphragmatic stomach. 3. Enteric tube extends below the diaphragm with tip outside the field of view.  4. Moderate bilateral pleural effusions with adjacent consolidation, atelectasis versus pneumonia. Critical results were called by Dr. Tiffanie Flores MD to [facility] on 5/4/2021 at 18:30. Xr Chest Portable    Result Date: 5/5/2021  EXAMINATION: ONE XRAY VIEW OF THE CHEST 5/5/2021 1:55 am COMPARISON: Chest portable May 4, 2021 HISTORY: ORDERING SYSTEM PROVIDED HISTORY: post op TECHNOLOGIST PROVIDED HISTORY: post op Reason for Exam: port Supine FINDINGS: Left internal jugular approach central venous catheter is noted which crosses midline and extends superiorly within the right internal jugular vein. Endotracheal tube is noted in place with the distal tip located 4.2 cm superior to the lucila. Partially visualized superior abdominal suspected drainage catheter is noted in place. Low lung volumes are seen with scattered atelectasis plus or minus mild left-sided pleural effusion. Left-sided chest wall scattered soft tissue emphysema is seen. 1. Recommend repositioning of left internal jugular approach central venous catheter. 2. Low lung volumes with left basilar atelectasis plus or minus mild pleural effusion. 3. Left chest wall scattered soft tissue emphysema. Xr Chest Portable    Result Date: 5/4/2021  EXAMINATION: ONE XRAY VIEW OF THE CHEST 5/4/2021 5:56 pm COMPARISON: 3 May 2021 HISTORY: ORDERING SYSTEM PROVIDED HISTORY: Confirmation of course of NG/OG/NE tube and location of tip of tube TECHNOLOGIST PROVIDED HISTORY: Please include upper chest Confirmation of course of NG/OG/NE tube and location of tip of tube Reason for Exam: upr Acuity: Unknown Type of Exam: Unknown FINDINGS: AP portable view of the chest time stamped at 1802 hours demonstrates an intestinal tube deviated within a large hiatal hernia, tip terminating just below the left hemidiaphragm. Moderate cardiomegaly and a large hiatal hernia are noted. Contrast is noted within the hiatal hernia.   Lung volumes are low with appearance of basilar atelectasis. No free air. Intestinal tube deviates to the left side of a sizable hiatal hernia, traversing below the hemidiaphragm and terminating just underneath the left hemidiaphragm. Side port of the intestinal tube is below the diaphragmatic hiatus. Xr Chest Portable    Result Date: 5/3/2021  EXAMINATION: ONE XRAY VIEW OF THE CHEST 5/3/2021 10:43 am COMPARISON: None. HISTORY: ORDERING SYSTEM PROVIDED HISTORY: cp TECHNOLOGIST PROVIDED HISTORY: cp FINDINGS: There is suboptimal inspiration. The cardiac size is upper limits of normal. No acute infiltrates or pleural effusions are seen. Pulmonary vascularity appears normal. There is mild ectasia of the thoracic aorta. There are degenerative changes in the spine and shoulders. No acute bony abnormalities. The hilar structures are normal.  Large hiatal hernia. No acute cardiopulmonary disease Large hiatal hernia     Mri Abdomen Wo Contrast Mrcp    Result Date: 5/3/2021  EXAMINATION: MRI OF THE ABDOMEN WITHOUT CONTRAST AND MRCP 5/3/2021 6:32 pm TECHNIQUE: Multiplanar multisequence MRI of the abdomen was performed without the administration of intravenous contrast.  After initial T2 axial and coronal images, thick slab, thin slab and 3D coronal MRCP sequences were obtained without the administration of intravenous contrast.  MIP images are provided for review. COMPARISON: None HISTORY: ORDERING SYSTEM PROVIDED HISTORY: Elevated lipase, bili. Painless jaundice. TECHNOLOGIST PROVIDED HISTORY: Elevated lipase, bili. Painless jaundice. Reason for Exam: elevated lipase, bilil. FINDINGS: Gallbladder: Status post cholecystectomy. Bile Ducts: There is no intrahepatic or extrahepatic ductal dilatation. The common bile duct has a maximal dimension of 6 mm without evidence of a filling defect Pancreatic Duct: Pancreatic duct is normal in course and caliber. Other: There is an incompletely imaged large hiatal hernia with gastric organoaxial malrotation.   A small to ENDOSCOPY     Principal Problem:    Septic shock ([facility] [address]  Active Problems:    Non-STEMI (non-ST elevated myocardial infarction) ([facility] [address]    Diabetic ketoacidosis without coma associated with type 2 diabetes mellitus ([facility] [address]    BONNIE (acute kidney injury) ([facility] [address]    Hernia with obstruction    Essential hypertension    Thyroid disease    Syncope    Acute tubular necrosis (HCC)    Lactic acidosis    Acute hypoxemic respiratory failure (HCC)  Resolved Problems:    * No resolved hospital problems. *       GI Assessment:    1. Hiatal hernia with volvulus - S/P emergent robotic assisted laparoscopic hiatal hernia reduction with gastroplexi via G-tube x2 per general surgery      Plan of care:  1. Management per general surgery team  2. GI will sign off. Please recall if we can further help. Discussed with Dr Troy Garcia. Please feel free to contact me with any questions or concerns. Thank you for allowing me to participate in the care of your patient. Garfield Posey, ALFONZO - CNP on 5/5/2021 at [address] [place] [address] Gastroenterology    Please note that this note was generated using a voice recognition dictation software. Although every effort was made to ensure the accuracy of this automated transcription, some errors in transcription may have occurred.

## 2021-05-05 NOTE — PLAN OF CARE
Nutrition Problem #1: Inadequate oral intake  Intervention: Food and/or Nutrient Delivery: Continue NPO, Start Parenteral Nutrition-suggest start with 150 g Dex, 50 g AA at 41.7 mL/hr with 100 mL 20% lipids.   Nutritional Goals: meet % of estimated nutrient needs

## 2021-05-05 NOTE — ANESTHESIA PROCEDURE NOTES
Central Venous Line:    A central venous line was placed using ultrasound guidance, in the OR for the following indication(s): central venous access. Sterility preparation included the following: maximum sterile barriers used and sterile technique used to drape from head to toe. The patient was placed in Trendelenburg position. The left internal jugular vein was prepped. The site was prepped with Chloraprep.20 (length), triple lumen was placed. During the procedure, the following specific steps were taken: target vein identified, needle advanced into vein and blood aspirated and guidewire advanced into vein. Intravenous verification was obtained by ultrasound and venous blood return. Post insertion care included: all ports aspirated, all ports flushed easily, guidewire removed intact, Biopatch applied, line sutured in place and dressing applied. During the procedure the patient experienced: patient tolerated procedure well with no complications.       Anesthesia type: general..No  Staffing  Anesthesiologist: Zari Pearson MD  Preanesthetic Checklist  Completed: patient identified

## 2021-05-05 NOTE — PROGRESS NOTES
Comprehensive Nutrition Assessment    Type and Reason for Visit:  Initial, Consult(Uncontrolled Diabetes, Vent, PN ordering/management)    Nutrition Recommendations/Plan: Continue NPO, Start Parenteral Nutrition start with 150 g Dex, 50 g AA at 41.7 mL/hr with 100 mL 20% lipids. Will monitor PN adequacy/labs - modify PN as needed. Nutrition Assessment:  Pt admitted with N/V/D x 6 days, hyperglycemia, elevated lipase, BONNIE. Noted large hiatal hernia present - s/p hernia repair, gastrectomy, gastropexy, G-tubes on 5/4/21. Pt is currently intubated. NPO/no enteral nutrition at present. Plan to start TPN - RD/Pharmacy consulted for PN ordering/management. Labs reviewed/include: Na 148 mmol/L, K 3.4 mmol/L, iCa 1.08 mmol/L, Mg 1.5 mg/dL, Glu 186, 140 mg/dL, BUN 63 mg/dL, Cr 2.10 mg/dL. Meds include: KCl, Mag Sulf, CaGluc, Humalog SS. Malnutrition Assessment:  Malnutrition Status:  Insufficient data    Context:  Acute Illness     Findings of the 6 clinical characteristics of malnutrition:  Energy Intake:  1 - 75% or less of estimated energy requirements for 7 or more days  Weight Loss:  Unable to assess     Body Fat Loss:  Unable to assess     Muscle Mass Loss:  Unable to assess    Fluid Accumulation:  Moderate, Generalized (per RN documentation)   Strength:  Not Performed    Estimated Daily Nutrient Needs:  Energy (kcal):  8928-0783 kcal/day; Weight Used for Energy Requirements:  Current     Protein (g):  75 g pro/day; Weight Used for Protein Requirements:  Ideal(1.5)        Fluid (ml/day):  9016-3461 mL/day (or per MD); Method Used for Fluid Requirements:  ml/Kg(25-30)      Nutrition Related Findings:  S/p hiatal hernia repair, gastrectomy, gastropexy, G-tubes 5/4/21.       Wounds:  Surgical Incision(abdomen)       Current Nutrition Therapies:    Diet NPO Effective Now  Additional Calorie Sources:   none    Anthropometric Measures:  · Height: 5' 2\" (157.5 cm)  · Current Body Weight: 202 lb 9.6 oz (91.9 kg) · Admission Body Weight: 202 lb (91.6 kg)    · Usual Body Weight: (166 lb - stated)     · Ideal Body Weight: 110 lbs; % Ideal Body Weight  184%   · BMI: 37  · BMI Categories: Obese Class 2 (BMI 35.0 -39.9)       Nutrition Diagnosis:   · Inadequate oral intake related to impaired respiratory function, altered GI function as evidenced by NPO or clear liquid status due to medical condition,need for TPN    Nutrition Interventions:   Food and/or Nutrient Delivery:  Continue NPO, Start Parenteral Nutrition start with 150 g Dex, 50 g AA at 41.7 mL/hr with 100 mL 20% lipids. Nutrition Education/Counseling:  No recommendation at this time- Diet education not appropriate. Coordination of Nutrition Care:  Continue to monitor while inpatient    Goals:  meet % of estimated nutrient needs-goal set        Nutrition Monitoring and Evaluation:   Behavioral-Environmental Outcomes:  None Identified   Food/Nutrient Intake Outcomes:   Parenteral Nutrition Intake/Tolerance  Physical Signs/Symptoms Outcomes:  Biochemical Data, Nutrition Focused Physical Findings, Skin, Weight     Discharge Planning:     Too soon to determine     Electronically signed by Keily Mccarthy RD, LD on 5/5/21 at 12:10 PM EDT    Contact: [phone]

## 2021-05-05 NOTE — CONSULTS
TRAUMA HISTORY AND PHYSICAL EXAMINATION    PATIENT NAME: Sydnee Beauchamp  YOB: 1950  MEDICAL RECORD NO. [id number]   DATE: 5/5/2021  PRIMARY CARE PHYSICIAN: DOUG DISLA DO  PATIENT EVALUATED AT THE REQUEST OF DR.: Carola Arteaga   . IMPRESSION:     Patient Active Problem List   Diagnosis    Acute pancreatitis    Septic shock (HCC)    Non-STEMI (non-ST elevated [facility] infarction) ([facility]    Diabetic ketoacidosis without coma associated with type 2 diabetes mellitus ([facility]    BONNIE (acute kidney injury) ([facility]    Hernia with obstruction    Essential hypertension    Thyroid disease    Syncope    Acute tubular necrosis (HCC)    Lactic acidosis    Acute hypoxemic respiratory failure [facility])       MEDICAL DECISION MAKING AND PLAN:       Post op trauma/Surgical ICU for management   Cbc/bmp/lft/pt/inr  cxr  Additional orders post op      CONSULT SERVICES    [] Neurosurgery     [] Orthopedic Surgery    [] Cardiothoracic     [] Facial Trauma    [] Plastic Surgery (Burn)    [] Pediatric Surgery     [x] Internal Medicine (p)    [] Pulmonary Medicine    [] Other:        HISTORY:     Chief Complaint:  \"Hiatal Henria with gastric volvulus \"    GENERAL DATA  Age 78 y.o.  female   Patient information was obtained from past medical records. History/Exam limitations: mental status. HISTORY:     Sydnee Beauchamp is a 78 y.o. female who presented to the hostpital on 5[facility]21 concern of hiatal hernia with gastric volvulus. Pt was taken to OR 5/4/21 for robotic hiatal hernia repair. Trauma surgical ICU consulted for management post op. She was taken to the or found to have large hiatal hernia with gastric volvulus and perforation. She had a wedge gastrectomy, gastropexy X2, placement of TAISHA drains X2, abdominal lavage. Taken to SICU for resuscitation. HPI (MIS Consult) 5/4/21  HISTORY OF PRESENT ILLNESS:  The patient is a 78 y.o. female  Who presented on 5/3 as a transfer.   Patient reports a 6 to 7-day history of acute nausea and emesis associated with diarrhea. Patient also states that over the past year has had issues with tolerating large meals and has noted some increased belching and GERD type symptoms. Patient denies any previous EGD or colonoscopy. Patient denies any history of gastric or colon cancer. Patient does have significant history of breast cancer in family. Otherwise patient denies any known cancers for family. Patient admits to history of cholecystectomy otherwise denies any other surgeries. Patient does admit to cardiac event in the 80s but denies any stents or CABG. Patient does take aspirin daily 81 mg but denies any use of other antiplatelets or anticoagulation. Patient currently denies any fever, chills, patient does have some nausea no recent emesis. Patient is passing flatus. Patient states she has some epigastric discomfort. MEDICATIONS:   []  None     []  Information not available due to exam limitations documented above  Prior to Admission medications    Medication Sig Start Date End Date Taking?  Authorizing Provider   metFORMIN (GLUCOPHAGE) 1000 MG tablet Take 1,000 mg by mouth daily (with breakfast)    Historical Provider, MD   SITagliptin (JANUVIA) 100 MG tablet Take 100 mg by mouth daily    Historical Provider, MD   omeprazole (PRILOSEC) 40 MG delayed release capsule Take 1 capsule by mouth every morning (before breakfast) 4/28/21   Emma Fix, DO   lisinopril (PRINIVIL;ZESTRIL) 40 MG tablet Take 10 mg by mouth daily     Historical Provider, MD   glimepiride (AMARYL) 2 MG tablet Take 2 mg by mouth every morning (before breakfast)    Historical Provider, MD   simvastatin (ZOCOR) 10 MG tablet Take 10 mg by mouth nightly    Historical Provider, MD   levothyroxine (SYNTHROID) 175 MCG tablet Take 112 mcg by mouth Daily Dose reduced in December 2020    Historical Provider, MD   aspirin 81 MG EC tablet Take 81 mg by mouth daily    Historical Provider, MD   metFORMIN (GLUCOPHAGE) 500 MG tablet Take 500 mg by mouth every evening     Historical Provider, MD   hydroCHLOROthiazide (HYDRODIURIL) 25 MG tablet Take 25 mg by mouth daily    Historical Provider, MD   predniSONE (DELTASONE) 20 MG tablet 60 mg x 2 days, 40 mg x 2 days, 20 mg x 2 days 12/19/20   Che Flores PA-C       ALLERGIES:   []  None    []   Information not available due to exam limitations documented above   No known allergies    PAST MEDICAL HISTORY: []  None   []   Information not available due to exam limitations documented above    has a past medical history of Diabetes mellitus ([facility] Gout, Hiatal hernia, Hyperlipidemia, Hypertension, and Thyroid disease. has a past surgical history that includes Cholecystectomy; Hysterectomy; and Breast surgery. FAMILY HISTORY   []   Information not available due to exam limitations documented above    family history includes Breast Cancer in her mother and sister; High Blood Pressure in her mother; High Cholesterol in her father. SOCIAL HISTORY  []   Information not available due to exam limitations documented above     reports that she has never smoked. She has never used smokeless tobacco.   reports no history of alcohol use. reports no history of drug use. PERTINENT SYSTEMIC REVIEW:    [x]   Information not available due to exam limitations documented above        PHYSICAL EXAMINATION:       PHYSICAL EXAMINATION    VITAL SIGNS:   Vitals:    05/05/21 0430   BP:    Pulse: 76   Resp: 22   Temp:    SpO2: 96%     Const: confused, awake, mod distress  HEENT: Atraumatic, normocephalic, external auditory canals patent, EOMI, no icterus, nares patent, mucous membranes moist  Neck: trachea midline, no jvd  Cv: tachycardia, s1+s2  Resp: tachypnic, equal and symmetric chest rise/fall  Abd: soft, nd, diffusely ttp, w/ guarding in epigastrium. Scars consistent with prior surgery  Ext: edema all extremities, no cyanosis          RADIOLOGY  XR CHEST PORTABLE   Final Result   1.  Recommend malrotation of   the stomach. 3. Small to moderate amount of ascites. 4. Diffuse small bowel wall thickening likely due to 3rd spacing as the post   enteritis. 5. Trace bilateral pleural effusions.          XR CHEST PORTABLE   Final Result   No acute cardiopulmonary disease      Large hiatal hernia         XR CHEST PORTABLE    (Results Pending)         LABS    Labs Reviewed   COMPREHENSIVE METABOLIC PANEL - Abnormal; Notable for the following components:       Result Value    Glucose 444 (*)     BUN 95 (*)     CREATININE 2.49 (*)     Calcium 7.7 (*)     Sodium 148 (*)     Potassium 3.2 (*)     Chloride 92 (*)     CO2 32 (*)     Anion Gap 24 (*)     Total Bilirubin 3.74 (*)     GFR Non- 19 (*)     GFR  23 (*)     All other components within normal limits   PROTIME-INR - Abnormal; Notable for the following components:    Protime 12.6 (*)     All other components within normal limits   LACTIC ACID, WHOLE BLOOD - Abnormal; Notable for the following components:    Lactic Acid, Whole Blood 2.7 (*)     All other components within normal limits   TROPONIN - Abnormal; Notable for the following components:    Troponin, High Sensitivity 85 (*)     All other components within normal limits   ELECTROLYTES PLUS - Abnormal; Notable for the following components:    POC Potassium 2.3 (*)     POC Chloride 97 (*)     POC TCO2 35 (*)     All other components within normal limits   HGB/HCT - Abnormal; Notable for the following components:    POC Hemoglobin 17.9 (*)     POC Hematocrit 53 (*)     All other components within normal limits   CALCIUM, IONIC (POC) - Abnormal; Notable for the following components:    POC Ionized Calcium 0.72 (*)     All other components within normal limits   TSH WITH REFLEX - Abnormal; Notable for the following components:    TSH 13.36 (*)     All other components within normal limits   TROPONIN - Abnormal; Notable for the following components:    Troponin, High Sensitivity 94 (*)     All other components within normal limits   TRIGLYCERIDES - Abnormal; Notable for the following components:    Triglycerides 163 (*)     All other components within normal limits   COMPREHENSIVE METABOLIC PANEL W/ REFLEX TO MG FOR LOW K - Abnormal; Notable for the following components:    Glucose 273 (*)     BUN 84 (*)     CREATININE 2.42 (*)     Calcium 8.2 (*)     Potassium 3.0 (*)     AST 32 (*)     Total Bilirubin 2.46 (*)     Total Protein 5.2 (*)     Albumin 2.7 (*)     GFR Non- 20 (*)     GFR  24 (*)     All other components within normal limits   ALBUMIN - Abnormal; Notable for the following components:    Albumin 3.3 (*)     All other components within normal limits   BETA-HYDROXYBUTYRATE - Abnormal; Notable for the following components:    Beta-Hydroxybutyrate 2.85 (*)     All other components within normal limits   BASIC METABOLIC PANEL - Abnormal; Notable for the following components:    Glucose 467 (*)     BUN 92 (*)     CREATININE 2.38 (*)     Calcium 7.8 (*)     Potassium 2.1 (*)     Chloride 95 (*)     Anion Gap 20 (*)     GFR Non- 20 (*)     GFR  25 (*)     All other components within normal limits   HEMOGLOBIN A1C - Abnormal; Notable for the following components:    Hemoglobin A1C 6.7 (*)     All other components within normal limits   OSMOLALITY - Abnormal; Notable for the following components:    Serum Osmolality 350 (*)     All other components within normal limits   CBC - Abnormal; Notable for the following components:    RBC 6.91 (*)     Hemoglobin 19.5 (*)     Hematocrit 60.6 (*)     All other components within normal limits   PROTIME-INR - Abnormal; Notable for the following components:    Protime 28.2 (*)     All other components within normal limits   BASIC METABOLIC PANEL - Abnormal; Notable for the following components:    Glucose 184 (*)     BUN 84 (*)     CREATININE 2.24 (*)     Calcium 8.2 (*) Sodium 148 (*)     Potassium 2.8 (*)     Chloride 109 (*)     GFR Non- 22 (*)     GFR  26 (*)     All other components within normal limits   URINE RT REFLEX TO CULTURE - Abnormal; Notable for the following components:    Color, UA DARK YELLOW (*)     Turbidity UA TURBID (*)     Glucose, Ur 3+ (*)     Ketones, Urine SMALL (*)     Urine Hgb MODERATE (*)     Protein, UA 2+ (*)     Leukocyte Esterase, Urine TRACE (*)     All other components within normal limits   BLOOD GAS, VENOUS - Abnormal; Notable for the following components:    pO2, Medardo 81.0 (*)     Positive Base Excess, Medardo 2.2 (*)     O2 Sat, Medardo 94.7 (*)     All other components within normal limits   BASIC METABOLIC PANEL - Abnormal; Notable for the following components:    Glucose 277 (*)     BUN 88 (*)     CREATININE 2.27 (*)     Calcium 7.4 (*)     Potassium 3.3 (*)     Anion Gap 18 (*)     GFR Non- 21 (*)     GFR  26 (*)     All other components within normal limits   CALCIUM, IONIZED - Abnormal; Notable for the following components:    Calcium, Ion 0.98 (*)     All other components within normal limits   LACTATE, SEPSIS - Abnormal; Notable for the following components:    Lactic Acid, Sepsis, Whole Blood 6.7 (*)     All other components within normal limits   MICROSCOPIC URINALYSIS - Abnormal; Notable for the following components:    Crystals, UA MANY (*)     Crystals, UA URIC ACID (*)     Amorphous, UA 1+ (*)     All other components within normal limits   CALCIUM, IONIZED - Abnormal; Notable for the following components:    Calcium, Ion 1.02 (*)     All other components within normal limits   LACTIC ACID, WHOLE BLOOD - Abnormal; Notable for the following components:    Lactic Acid, Whole Blood 5.7 (*)     All other components within normal limits   IMMATURE PLATELET FRACTION - Abnormal; Notable for the following components:    Platelet, Immature Fraction 13.7 (*)     All other components within normal limits   LACTIC ACID, WHOLE BLOOD - Abnormal; Notable for the following components:    Lactic Acid, Whole Blood 3.8 (*)     All other components within normal limits   PROCALCITONIN - Abnormal; Notable for the following components:    Procalcitonin 24.66 (*)     All other components within normal limits   OPEN HEART COAG - Abnormal; Notable for the following components:    Fibrinogen 560 (*)     Protime 14.5 (*)     All other components within normal limits   OPEN HEART PANEL - Abnormal; Notable for the following components:    pO2, Arterial 130.0 (*)     POC Glucose 203 (*)     Chloride, Whole Blood 111 (*)     Potassium, Whole Blood 2.5 (*)     Sodium, Whole Blood 146 (*)     All other components within normal limits   HEMOGLOBIN AND HEMATOCRIT, BLOOD - Abnormal; Notable for the following components:    Hemoglobin 17.0 (*)     Hematocrit 51.4 (*)     All other components within normal limits   LACTIC ACID, WHOLE BLOOD - Abnormal; Notable for the following components:    Lactic Acid, Whole Blood 4.3 (*)     All other components within normal limits   IMMATURE PLATELET FRACTION - Abnormal; Notable for the following components:    Platelet, Immature Fraction 14.7 (*)     All other components within normal limits   BLOOD GAS, ARTERIAL - Abnormal; Notable for the following components:    pH, Arterial 7.193 (*)     pCO2, Arterial 52.0 (*)     pO2, Arterial 145.0 (*)     HCO3, Arterial 19.2 (*)     Negative Base Excess, Art 8.6 (*)     All other components within normal limits   GLUCOSE, WHOLE BLOOD - Abnormal; Notable for the following components:    POC Glucose 181 (*)     All other components within normal limits   CHLORIDE, WHOLE BLOOD - Abnormal; Notable for the following components:    Chloride, Whole Blood 117 (*)     All other components within normal limits   POTASSIUM, WHOLE BLOOD - Abnormal; Notable for the following components:    Potassium, Whole Blood 2.9 (*)     All other components within normal limits   COMPREHENSIVE METABOLIC PANEL - Abnormal; Notable for the following components:    Glucose 178 (*)     BUN 63 (*)     CREATININE 2.03 (*)     Calcium 7.6 (*)     Sodium 148 (*)     Potassium 3.4 (*)     Chloride 112 (*)      (*)      (*)     Total Bilirubin 2.32 (*)     Total Protein 4.5 (*)     Albumin 2.9 (*)     GFR Non- 24 (*)     GFR  30 (*)     All other components within normal limits   CBC WITH AUTO DIFFERENTIAL - Abnormal; Notable for the following components:    WBC 13.2 (*)     Hemoglobin 11.5 (*)     NRBC Automated 0.2 (*)     Seg Neutrophils 84 (*)     Lymphocytes 11 (*)     Eosinophils % 0 (*)     Segs Absolute 11.09 (*)     All other components within normal limits   IMMATURE PLATELET FRACTION - Abnormal; Notable for the following components:    Platelet, Immature Fraction 16.3 (*)     All other components within normal limits   VENOUS BLOOD GAS, POINT OF CARE - Abnormal; Notable for the following components:    pH, Medardo 7.613 (*)     pCO2, Medardo 35.3 (*)     pO2, Medardo 52.2 (*)     HCO3, Venous 35.8 (*)     Positive Base Excess, Medardo 14 (*)     O2 Sat, Medardo 92 (*)     All other components within normal limits   CREATININE W/GFR POINT OF CARE - Abnormal; Notable for the following components:    POC Creatinine 3.95 (*)     GFR Comment 14 (*)     GFR Non- 11 (*)     All other components within normal limits   UREA N (POC) - Abnormal; Notable for the following components:    POC BUN 98 (*)     All other components within normal limits   LACTIC ACID,POINT OF CARE - Abnormal; Notable for the following components:    POC Lactic Acid 2.84 (*)     All other components within normal limits   POCT GLUCOSE - Abnormal; Notable for the following components:    POC Glucose 449 (*)     All other components within normal limits   POC GLUCOSE FINGERSTICK - Abnormal; Notable for the following components:    POC Glucose 314 (*)     All other components within normal limits   POC GLUCOSE FINGERSTICK - Abnormal; Notable for the following components:    POC Glucose 228 (*)     All other components within normal limits   POC GLUCOSE FINGERSTICK - Abnormal; Notable for the following components:    POC Glucose 170 (*)     All other components within normal limits   ARTERIAL BLOOD GAS, POC - Abnormal; Notable for the following components:    POC pH 7.075 (*)     POC pCO2 87.5 (*)     POC PO2 158.9 (*)     Negative Base Excess, Art 6 (*)     All other components within normal limits   LACTIC ACID,POINT OF CARE - Abnormal; Notable for the following components:    POC Lactic Acid 1.74 (*)     All other components within normal limits   POCT GLUCOSE - Abnormal; Notable for the following components:    POC Glucose 176 (*)     All other components within normal limits   CULTURE, URINE   MRSA DNA PROBE, NASAL   AMMONIA   T4, FREE   MAGNESIUM   PHOSPHORUS   MAGNESIUM   PHOSPHORUS   MAGNESIUM   PHOSPHORUS   SPECIMEN REJECTION   MAGNESIUM   PHOSPHORUS   SPECIMEN REJECTION   CALCIUM, IONIZED   CV HGB/HCT   CALCIUM, IONIZED   SODIUM, WHOLE BLOOD   BLOOD GAS, ARTERIAL   LACTIC ACID, WHOLE BLOOD   BASIC METABOLIC PANEL   MAGNESIUM   PHOSPHORUS   PREVIOUS SPECIMEN   CBC WITH AUTO DIFFERENTIAL   SURGICAL PATHOLOGY   HEPATIC FUNCTION PANEL   PROTIME-INR   NOTIFICATION PANEL, POC   TYPE AND SCREEN   PREPARE FRESH FROZEN PLASMA   BLOOD BANK SPECIMEN   PREPARE RBC (CROSSMATCH)         Donna Quiles DO  5/5/21, 4:40 AM

## 2021-05-05 NOTE — PROGRESS NOTES
05/05/21 0528   Vent Information   Rate Set 26 bmp   FiO2  75 %   PEEP/CPAP 14     Changes made per trauma. Repeat ABG at 0615.

## 2021-05-05 NOTE — ANESTHESIA POSTPROCEDURE EVALUATION
Department of Anesthesiology  Postprocedure Note    Patient: Nikkie Flowers  MRN: [id number]  [place]: 1950  Date of evaluation: 5/5/2021  Time:  4:08 AM     Procedure Summary     Date: 05/04/21 Room / Location: [address]    Anesthesia Start: 1934 Anesthesia Stop: 05/05/21 0135    Procedure: [address] ROBOTIC (N/A ) Diagnosis: (PARAESOPHAGEAL HERNIA, PERFORRATED ESOPHAGUS)    Surgeons: Carlos Marie DO Responsible Provider: Amie Farias MD    Anesthesia Type: general ASA Status: 4          Anesthesia Type: general    [place] Phase I:      [place] Phase II:      Last vitals: Reviewed and per EMR flowsheets.        Anesthesia Post Evaluation    Patient location during evaluation: ICU  Patient participation: complete - patient cannot participate  Level of consciousness: sedated and ventilated  Pain scale: NA.  Nausea & Vomiting: no vomiting  Cardiovascular status: hemodynamically stable  Respiratory status: ventilator

## 2021-05-05 NOTE — OP NOTE
Operative Note      Patient: Sydnee Beauchamp  YOB: 1950  MRN: [id number]    Date of Procedure: 5/4/2021    Pre-Op Diagnosis: PARAESOPHAGEAL HERNIA, PNEUMOPERITONEUM    Post-Op Diagnosis: Same, Gastric perforation due to ischemia from paraesophageal hernia. Feculent peritonitis       Procedure(s):  PARAESPHAGEAL HERNIA REPAIR LAPAROSCOPIC ROBOTIC   PARTIAL GASTRECTOMY  GASTROPEXY  IRRIGATION OF ABDOMEN    Surgeon(s):  Elaine See DO    Assistant:   Resident: Violette Lanes, DO; Martha Valentine MD    Anesthesia: General    Estimated Blood Loss (mL): Minimal    Complications: None    Specimens:   ID Type Source Tests Collected by Time Destination   A : PARTIAL GASTRECTOMY Tissue Stomach SURGICAL PATHOLOGY Elaine See DO 5/5/2021 0027        Implants:  * No implants in log *      Drains:   Closed/Suction Drain RLQ Bulb 19 [place] (Active)   Site Description Other (Comment) 05/05/21 1600   Dressing Status Dry;Clean; Intact 05/05/21 1600   Drainage Appearance Bloody;Dark Red 05/05/21 1600   Status To bulb suction 05/05/21 1600   Output (ml) 45 ml 05/05/21 1430       Closed/Suction Drain Lateral LLQ Bulb 19 [facility] (Active)   Site Description Other (Comment) 05/05/21 1600   Dressing Status Dry;Clean; Intact 05/05/21 1600   Drainage Appearance Bloody;Dark Red 05/05/21 1600   Status To bulb suction 05/05/21 1600   Output (ml) 40 ml 05/05/21 1430       Gastrostomy/Enterostomy/Jejunostomy Gastrostomy LUQ 1 22 fr (Active)   Drainage Appearance Bile;Green 05/05/21 1600   Site Description Bleeding 05/05/21 1200   Drain Status Open to gravity drainage 05/05/21 1600   Surrounding Skin Dry; Intact 05/05/21 1600   Dressing Status Changed;New drainage 05/05/21 1600   Dressing Type Split gauze 05/05/21 1600   Dressing Change Due 05/05/21 05/05/21 1600   Output (mL) 10 ml 05/05/21 1600       Gastrostomy/Enterostomy/Jejunostomy Gastrostomy  1 20 fr (Active)   Drainage Appearance Bile;Green 05/05/21 1600   Site Description Reddened 05/05/21 1600   Drain Status Open to gravity drainage 05/05/21 0800   Surrounding Skin Dry; Intact 05/05/21 1600   Dressing Status Clean;Dry; Intact 05/05/21 1600   Dressing Type Split gauze 05/05/21 1600   Dressing Change Due 05/05/21 05/05/21 1600   Output (mL) 15 ml 05/05/21 1600       Urethral Catheter Temperature probe (Active)   Catheter Indications Need for fluid volume management of the critically ill patient in a critical care setting 05/05/21 1600   Securement Device Date Changed 05/04/21 05/05/21 1600   Site Assessment No urethral drainage 05/05/21 1600   Urine Color Yellow 05/05/21 1600   Urine Appearance Clear 05/05/21 1600   Output (mL) 34 mL 05/05/21 1800       [REMOVED] NG/OG/NJ/NE Tube Nasogastric (Removed)   Surrounding Skin Dry; Intact 05/04/21 1645   Securement device Yes 05/04/21 1645   Status Suction-low intermittent 05/04/21 1645   Placement Verified by X-Ray (Initial) 05/04/21 1645   NG/OG/NJ/NE External Measurement (cm) 57 cm 05/04/21 1645   Drainage Appearance Bile;Brown 05/04/21 1645       [REMOVED] Urethral Catheter Non-latex 16 fr (Removed)   Catheter Indications Need for fluid volume management of the critically ill patient in a critical care setting 05/03/21 0815   Urine Color Yellow 05/03/21 0815   Output (mL) 36 mL 05/04/21 1642       Findings:     Gastric perforation, greater curve on the fundus from strangulated paraesophageal hernia  Feculent peritonitis with pus in thorax and abdomen  Esophagitis on endoscopy  Negative esophageal leak test and perfusion noted ICG perfusion test on stomach and esophagus    Detailed Description of Procedure: The patient taken to the operative suite and prepared and draped in normal sterile fashion. Time out called and patient and procedure confirmed. Arterial line and central line placed by anesthesia. SCD's in place, morel placed prior to procedure. Antibiotics administered.     Upper GI endoscopy performed and the endoscope advanced into the oropharynx down into the esophagus under visualization. The esophagus appeared somewhat dusky distally, there was also severe erythema and bleeding mucosa of the esophagus. We were able to advance into the stomach, but a large hernia could not be reduced. No apparent gastric ischemia. The endoscope removed for now. Incision made at [place]. Using a 12 mm trocar pneumoperitoneum established without complications. No underlying bleeding or evidence of succus or injury. I then placed three 8 mm ports and one 12 mm robotic ports for paraesophageal hernia repair. Segundo Carngrazyna placed and the liver retracted to evaluate the hernia and stomach. Robot docked. There was pus in the abdomen and in the thorax. I could not initially see the site of perforation. I pulled the distal stomach down from the thorax. The hernia defect somewhat small but there was about 85% of the stomach intra-thoracic. The duodenum well into the chest with about the junction of D1/D2 in the chest.      This allowed further visualization. At this time due to a paraesophageal hernia decision was made to attempt to perform hernia repair. We mobilized the pars flaccida until the right deborah was noted and we could begin our dissection. Using accommodation of blunt and sharp dissection as well as electrocautery a window was created along the right deborah along the esophagus. The avascular plane was entered and this facilitated our dissection. Care dissection circumferentially around the esophagus to expose the left deborah. The right and left vagus nerves were identified but visualization limited due to the purulence and fluid in the abdomen and the inflammation of the hernia sac. The hernia sac was massive, but the overall diaphragmatic defect not that large. The sac also opened in the chest to help reduce the hernia. I brought down as much of the sac as I could see.       I then mobilized the greater curve with the vessel sealer to the left deborah. The perforation was spilling gastric contents as we approached the left deborah. I turned dissection back to the right side and was able to further isolate the hernia sac from the right chest.  I kept this mobilization around anteriorly to the left side. The pleura was violated on the left side as we retracted further and dissected the hernia sac and stomach out of the chest.  I turned back to the right side and was able to dissect around the esophagus posteriorly in meticulous fashion. Extreme care used as there was purulence and inflammation. I eventually was able to get behind the esophagus and see the left deborah. Penrose used for retraction. The hernia now reduced. I used ICG to confirm the viable areas of the stomach as well as visualize the esophagus. The greater curve had some ischemia along the posterior superior fundus. I used several robotic green loads to perform a wedge gastrectomy of the perforation site and ischemic region. EGD again performed the region appeared to have good blood flow. The staple lines hemostatic. The esophagus looked less dusky, however, there was severe distal esophagitis and ulceration likely from repeated emesis. The esophagus however, appeared improved from the initial EGD. I performed a leak test of the esophagus with ICG, negative leak test noted. The endoscope used to evacuate the stomach and esophagus. She was on 3 pressors at one point, hence I felt it was better to pexy the stomach, drain and used damage control instead of closing the deborah with mesh placement and fundoplication. I selected two sites for purse string sutures on the greater curve at the body. Two 0 silk sutures placed at each site. Gastrotomy made at each site. Two 20 [place] G tubes passed through the abdomen at separate incisions. Each balloon tested and then passed into the stomach and filled with sterile saline.   There was gastric contents returning in each. The purse string sutures tied down. I had the penrose removed and confirmed removed. The needles all removed and the small portion of the stomach removed from the abdomen. The abdomen then drained with two 19 Lao TAISHA drains. One placed through the right most port, another through the left most port. The right drained passed over the staple line and into the thorax. The left along the left paracolic gutter. I also irrigated with one liter normal saline, including the pelvis as there was gastric contents pretty much everywhere secondary to the perforation. The drains secured with 3-0 nylon sutures to the skin. The liver retractor removed. The G tubes pulled into position as we released pneumoperitoneum under visualization. Sponge, needle and penrose counts correct. The skin closed with 4-0 monocryl sutures at the port sites, followed by skin glue. The patient's son and daughter in law updated.           Electronically signed by Mitch Gonzalez DO on 5/5/2021 at 6:46 PM

## 2021-05-05 NOTE — PROCEDURES
PROCEDURE NOTE - CENTRAL VENOUS LINE PLACEMENT    PATIENT NAME: Cami Paul  MEDICAL RECORD NO. [id number]  DATE: 5/5/2021  SURGEON: Dr. Jackeline Celeste: DOUG DISLA DO    PREOPERATIVE DIAGNOSIS:  Need for IV access  POSTOPERATIVE DIAGNOSIS:  Same  PROCEDURE PERFORMED:  Left Femoral Vein Central Line Insertion  SURGEON: Erica Pacheco MD  ANESTHESIA:  Intubated patient   ESTIMATED BLOOD LOSS:  Less than 33GY  COMPLICATIONS:  None immediately appreciated. OPERATIVE NOTE PREPARED BY: Erica Pacheco MD  TIME OF PROCEDURE: 8:07 AM     DISCUSSION:  Cami Paul is a 78y.o.-year-old female who requires additional IV access and central pressure monitoring. The history and physical examination were reviewed and confirmed. The diagnoses, proposed procedure, risks, possible complications, benefits and alternatives were discussed with the patient or family  The patient was then prepared for the procedure. PROCEDURE:  A timeout was initiated by the bedside nurse and was confirmed by those present. The patient was placed in a supine position. The skin overlying the Left Femoral Vein was prepped with chlorhexadine and draped in sterile fashion. The introducer needle was inserted into the femoral vein returning dark red non pulsatile blood. A guidewire was placed through the center of the needle with no resistance. A small incision made in the skin with a #11 scalpel blade. The dilator was inserted into the skin and vein over guidewire using Seldinger technique. The dilator was then removed and the catheter was placed in the vein over the guidewire using Seldinger technique. The guidewire was then removed and all ports aspirated and flushed appropriately. The catheter then secured using silk suture and a temporary sterile dressing was applied. No immediate complication was evident. All sponge, instrument and needle counts were correct at the completion of the procedure.      The patient tolerated the procedure well with no immediate complication evident.      Yohannes Katz MD  5/5/21, 8:07 AM

## 2021-05-05 NOTE — PROGRESS NOTES
Physical Therapy    DATE: 2021    NAME: Yara Smith  MRN: [id number]   : 1950      Patient not seen this date for Physical Therapy due to:     Other: Intubated      Electronically signed by Emili Chan PT on 2021 at 8:48 AM

## 2021-05-05 NOTE — PLAN OF CARE
Problem: OXYGENATION/RESPIRATORY FUNCTION  Goal: Patient will maintain patent airway  5/5/2021 1708 by Randy Martin RN  Outcome: Met This Shift  5/5/2021 0407 by Jake Adan RN  Outcome: Ongoing  Goal: Patient will achieve/maintain normal respiratory rate/effort  Description: Respiratory rate and effort will be within normal limits for the patient  5/5/2021 1708 by Randy Martin RN  Outcome: Met This Shift  5/5/2021 0407 by Jake Adan RN  Outcome: Ongoing     Problem: MECHANICAL VENTILATION  Goal: Patient will maintain patent airway  5/5/2021 1708 by Randy Martin RN  Outcome: Met This Shift  5/5/2021 0407 by Jake Adan RN  Outcome: Ongoing  Goal: Oral health is maintained or improved  5/5/2021 1708 by Randy Martin RN  Outcome: Met This Shift  5/5/2021 0407 by Jake Adan RN  Outcome: Ongoing  Goal: ET tube will be managed safely  5/5/2021 1708 by Randy Martin RN  Outcome: Met This Shift  5/5/2021 0407 by Jake Adan RN  Outcome: Ongoing     Problem: SKIN INTEGRITY  Goal: Skin integrity is maintained or improved  5/5/2021 1708 by Randy Martin RN  Outcome: Met This Shift  5/5/2021 0407 by Jake Adna RN  Outcome: Ongoing     Problem: Non-Violent Restraints  Goal: No harm/injury to patient while restraints in use  5/5/2021 1708 by Randy Martin RN  Outcome: Met This Shift  5/5/2021 0407 by Jake Adan RN  Outcome: Met This Shift  Goal: Patient's dignity will be maintained  5/5/2021 1708 by Randy Martin RN  Outcome: Met This Shift  5/5/2021 0407 by Jake Adan RN  Outcome: Met This Shift     Problem: Injury - Risk of, Physical Injury:  Goal: Will remain free from falls  Description: Will remain free from falls  Outcome: Met This Shift     Problem: Skin Integrity:  Goal: Absence of new skin breakdown  Description: Absence of new skin breakdown  Outcome: Met This Shift     Problem: Falls - Risk of:  Goal: Will remain free from falls  Description: Will remain free from falls  Outcome: Met This Shift     Problem: MECHANICAL VENTILATION  Goal: Ability to express needs and understand communication  5/5/2021 1708 by Yony Rodrigues RN  Outcome: Ongoing  5/5/2021 0407 by Jones Townsend RN  Outcome: Ongoing  Goal: Mobility/activity is maintained at optimum level for patient  5/5/2021 1708 by Yony Rodrigues RN  Outcome: Ongoing  5/5/2021 0407 by Jones Townsend RN  Outcome: Ongoing     Problem: Non-Violent Restraints  Goal: Removal from restraints as soon as assessed to be safe  5/5/2021 1708 by Yony Rodrigues RN  Outcome: Ongoing  5/5/2021 0407 by Jones Townsend RN  Outcome: Ongoing     Problem: Confusion - Acute:  Goal: Absence of continued neurological deterioration signs and symptoms  Description: Absence of continued neurological deterioration signs and symptoms  Outcome: Ongoing  Goal: Mental status will be restored to baseline  Description: Mental status will be restored to baseline  Outcome: Ongoing     Problem: Discharge Planning:  Goal: Ability to perform activities of daily living will improve  Description: Ability to perform activities of daily living will improve  Outcome: Ongoing  Goal: Participates in care planning  Description: Participates in care planning  Outcome: Ongoing     Problem: Injury - Risk of, Physical Injury:  Goal: Absence of physical injury  Description: Absence of physical injury  Outcome: Ongoing     Problem: Mood - Altered:  Goal: Mood stable  Description: Mood stable  Outcome: Ongoing  Goal: Absence of abusive behavior  Description: Absence of abusive behavior  Outcome: Ongoing  Goal: Verbalizations of feeling emotionally comfortable while being cared for will increase  Description: Verbalizations of feeling emotionally comfortable while being cared for will increase  Outcome: Ongoing     Problem: Psychomotor Activity - Altered:  Goal: Absence of psychomotor disturbance signs and symptoms  Description: Absence of psychomotor disturbance signs and symptoms  Outcome: Ongoing     Problem: Sensory Perception - Impaired:  Goal: Demonstrations of improved sensory functioning will increase  Description: Demonstrations of improved sensory functioning will increase  Outcome: Ongoing  Goal: Decrease in sensory misperception frequency  Description: Decrease in sensory misperception frequency  Outcome: Ongoing  Goal: Able to refrain from responding to false sensory perceptions  Description: Able to refrain from responding to false sensory perceptions  Outcome: Ongoing  Goal: Demonstrates accurate environmental perceptions  Description: Demonstrates accurate environmental perceptions  Outcome: Ongoing  Goal: Able to distinguish between reality-based and nonreality-based thinking  Description: Able to distinguish between reality-based and nonreality-based thinking  Outcome: Ongoing  Goal: Able to interrupt nonreality-based thinking  Description: Able to interrupt nonreality-based thinking  Outcome: Ongoing     Problem: Sleep Pattern Disturbance:  Goal: Appears well-rested  Description: Appears well-rested  Outcome: Ongoing     Problem: Skin Integrity:  Goal: Will show no infection signs and symptoms  Description: Will show no infection signs and symptoms  Outcome: Ongoing     Problem: Falls - Risk of:  Goal: Absence of physical injury  Description: Absence of physical injury  Outcome: Ongoing     Problem: Nutrition  Goal: Optimal nutrition therapy  Description: Nutrition Problem #1: Inadequate oral intake  Intervention: Food and/or Nutrient Delivery: Continue NPO, Start Parenteral Nutrition-suggest start with 150 g Dex, 50 g AA at 41.7 mL/hr with 100 mL 20% lipids.   Nutritional Goals: meet % of estimated nutrient needs     Outcome: Ongoing

## 2021-05-05 NOTE — PROGRESS NOTES
[facility]  Office: [address], DO, Jose Alfredo Ng, DO, Yane Gordon, DO, Olivier Smoker Blood, DO, Kolby North MD, Cheo Guerrero MD, Be Benitez MD, Makayla Stock MD, Naida Subramanian MD, Zoran Khalil MD, Heather Pina MD, Erika Moya MD, Katelin Chua, DO, Keith Barron MD, Nir Gomez DO, Adia Whitehead MD,  Fior Cool DO, Helen Metzger MD, Jen Traore MD, Srinivasa Hamlin MD, Acosta Dillard MD, Nadine Olivarez, Brenda Novak, CNP, Jaleesa Gandara, CNP, Joseph Knott, CNS, Samara Solomon, [facility], Beryle Fix, CNP, Liv Gan, CNP, Laith Carr, CNP, Joseph Puri, CNP, Flaca Rose PA-C, Silvio Flores, [facility], Derik Chaidez, CNP, Mike oK, CNP, Oralia Selby, CNP, Cony August, CNP, Avery Hernandez, [facility], Peter Dhaliwal, [address] [place] [address]    Progress Note    5/5/2021    8:12 AM    Name:   Dino Burkett  MRN:     [id number]     Acct:      [de-identified]   Room:   [facility] Day:  2  Admit Date:  5/3/2021 12:49 PM    PCP:   Jesse Santos DO  Code Status:  Full Code    Subjective:     C/C:   Chief Complaint   Patient presents with    Blood Sugar Problem     >450    Hernia     hyatial      Interval History Status: significantly worsened. Pt seen and evaluated this morning. She developed pneumoperitoneum and underwent emergency surgery yesterday. She remains intubated and sedated. Labs reviewed. Currently she is hemodynamically stable. She remains on two pressors, levophed and vasopressin. Discussed case with trauma attending, Dr. Ana Schulte. Brief History:     78year-old female who was transferred from [place] where she presented with nausea, vomiting and diarrhea for 6 days. Naturally, her oral intake had been poor and she did experience a syncopal episode. Additionally, creatinine noted to be elevated to 3.8, lactic acid 6, glucose 585.  Troponin I 0.404, bilirubin 5, lipase chloride, sodium chloride, sodium chloride flush, sodium chloride, potassium chloride, [DISCONTINUED] promethazine **OR** ondansetron, dextrose, magnesium sulfate, sodium phosphate IVPB **OR** sodium phosphate IVPB **OR** sodium phosphate IVPB    Data:     Past Medical History:   has a past medical history of Diabetes mellitus ([address] Gout, Hiatal hernia, Hyperlipidemia, Hypertension, and Thyroid disease. Social History:   reports that she has never smoked. She has never used smokeless tobacco. She reports that she does not drink alcohol or use drugs. Family History:   Family History   Problem Relation Age of Onset    Breast Cancer Mother     High Blood Pressure Mother     High Cholesterol Father     Breast Cancer Sister        Vitals:  /66   Pulse 68   Temp 97.9 °F (36.6 °C) (Axillary)   Resp 26   Ht 5' 2\" (1.575 m)   Wt 202 lb 9.6 oz (91.9 kg)   SpO2 98%   BMI 37.06 kg/m²   Temp (24hrs), Av.2 °F (36.8 °C), Min:96.8 °F (36 °C), Max:99.1 °F (37.3 °C)    Recent Labs     21  0231 21  0445   POCGLU 203* 181* 176* 190*       I/O (24Hr):     Intake/Output Summary (Last 24 hours) at 2021 0812  Last data filed at 2021 5969  Gross per 24 hour   Intake 8902.9 ml   Output 1616 ml   Net 7286.9 ml       Labs:  Hematology:  Recent Labs     21  05021  0255 21  0500 21  0646   WBC 4.1  --   --  13.2*  --  10.6   RBC 6.91*  --   --  3.96  --  3.91*   HGB 13.8* DUPLICATE ORDER  55.4* 62.2 11.5*  --  11.1*   HCT 35.6* DUPLICATE ORDER  46.3* 66.7 36.5  --  35.2*   MCV 87.7  --   --  92.2  --  90.0   MCH 28.2  --   --  29.0  --  28.4   MCHC 32.2  --   --  31.5  --  31.5   RDW 14.3  --   --  14.3  --  14.3   PLT See Reflexed IPF Result See Reflexed IPF Result  --  See Reflexed IPF Result  --  See Reflexed IPF Result   MPV NOT REPORTED  --   --  NOT REPORTED  --  NOT REPORTED   INR 2.9 1.4  --   --  1.7 --      Chemistry:  Recent Labs     05/03/21  1334 05/03/21  1434 05/03/21  1434 05/04/21  0509 05/04/21  0509 05/04/21  1253 05/04/21  1253 05/04/21 2010 05/04/21  2229 05/05/21  0255 05/05/21  0646   *  --    < > 142  --  148*   < > 146* 145 148* 148*   K 3.2*  --    < > 3.0*  --  2.8*   < > 2.5* 2.9* 3.4* 3.4*   CL 92*  --    < > 101  --  109*  --   --   --  112* 111*   CO2 32*  --    < > 24  --  25  --   --   --  22 22   GLUCOSE 444*  --    < > 273*  --  184*  --   --   --  178* 186*   BUN 95*  --    < > 84*  --  84*  --   --   --  63* 63*   CREATININE 2.49*  --    < > 2.42*  --  2.24*  --   --   --  2.03* 2.10*   MG  --   --    < > 2.1  --  1.9  --   --   --   --  1.5*   ANIONGAP 24*  --    < > 17  --  14  --   --   --  14 15   LABGLOM 19*  --    < > 20*  --  22*  --   --   --  24* 23*   GFRAA 23*  --    < > 24*  --  26*  --   --   --  30* 28*   CALCIUM 7.7*  --    < > 8.2*  --  8.2*  --   --   --  7.6* 7.9*   CAION  --   --    < >  --    < >  --   --  1.21 1.24  --  1.08*   PHOS  --   --    < > 3.7  --  3.5  --   --   --   --  4.3   TROPHS 85* 94*  --   --   --   --   --   --   --   --   --    LACTACIDWB 2.7*  --   --   --    < > 3.8*  --  4.3*  --  2.0  --     < > = values in this interval not displayed.      Recent Labs     05/03/21  0713 05/03/21  0713 05/03/21  1334 05/03/21  1334 05/04/21  0435 05/04/21  0509 05/04/21  1718 05/04/21 2010 05/04/21 2229 05/05/21  0231 05/05/21  0255 05/05/21  0445 05/05/21  0500   PROT 7.3   < > 6.7  --   --  5.2*  --   --   --   --  4.5*  --  4.4*   LABALBU 4.7  --  3.6   < >  --  2.7*  --   --   --   --  2.9*  --  2.7*   LABA1C  --   --   --   --   --  6.7*  --   --   --   --   --   --   --    TSH  --   --  13.36*  --   --   --   --   --   --   --   --   --   --    AST 37*  --  30  --   --  32*  --   --   --   --  243*  --  265*   ALT 18  --  14  --   --  14  --   --   --   --  133*  --  141*   ALKPHOS 106  --  77  --   --  57  --   --   --   --  35  --  34* BILITOT 5.0*  --  3.74*  --   --  2.46*  --   --   --   --  2.32*  --  2.30*   BILIDIR 0.0  --   --   --   --   --   --   --   --   --   --   --  0.76*   AMMONIA  --   --  19  --   --   --   --   --   --   --   --   --   --    LIPASE 1,451*  --   --   --   --   --   --   --   --   --   --   --   --    TRIG  --   --   --   --   --  163*  --   --   --   --   --   --   --    POCGLU  --    < >  --    < > 228*  --  170* 203* 181* 176*  --  190*  --     < > = values in this interval not displayed. ABG:  Lab Results   Component Value Date    POCPH 7.376 05/05/2021    PHART 7.193 05/04/2021    POCPCO2 36.6 05/05/2021    ZOE2YOR 52.0 05/04/2021    POCPO2 66.2 05/05/2021    PO2ART 145.0 05/04/2021    POCHCO3 21.4 05/05/2021    OIN7AMM 19.2 05/04/2021    NBEA 3 05/05/2021    PBEA NOT REPORTED 05/05/2021    WIY5HUV NOT REPORTED 05/05/2021    SMFE9AAR 92 05/05/2021    T1GXBRGF 98.0 05/04/2021    FIO2 75.0 05/05/2021     Lab Results   Component Value Date/Time    SPECIAL NOT REPORTED 05/04/2021 04:13 AM     Lab Results   Component Value Date/Time    CULTURE NO GROWTH 05/04/2021 04:13 AM       Radiology:  Ct Abdomen Pelvis Wo Contrast Additional Contrast? None    Result Date: 5/3/2021  There is obstruction of the stomach because of the portions of the stomach contained in the very large hiatal hernia. The rest of the GI tract is normal. No evidence of an acute infectious or inflammatory process in the abdomen and pelvis. Ct Abdomen Pelvis Wo Contrast Additional Contrast? None    Result Date: 4/28/2021  No acute infectious or inflammatory process is identified. Very large hiatal hernia. This could be the source of the patient's symptoms. Ct Head Wo Contrast    Result Date: 5/3/2021  No acute intracranial process. If clinical concern remains, consider further imaging with MRI. Ct Cervical Spine Wo Contrast    Result Date: 5/3/2021  No acute findings in the cervical spine.      Xr Chest Portable    Result Date: injury secondary to ATN from hypotension/hypoperfusion  - continue to monitor creatinine. Continue resuscitative fluids. Nephrology following. Creatinine is steadily improving. Urine output appropriate (0.6 ml/kg/h). #Acute hypoxemic respiratory failure  - likely secondary to impaired lung expansion from large hiatal hernia/septic shock  - Intubated. Monitor for pulmonary edema given large amount of resuscitative fluids    #NSTEMI  - likely demand ischemia in setting of septic shock. Cardiology following. Echo pending. I have reviewed EKG which shows inferior infarct. Patient will need ischemic work-up following resolution of acute issues    #Hypernatremia  - TBW deficit 2.6L. Fluids per nephro    #Hypothyroidism  - TSH elevated to 13.36. in the setting of acute illness. Free T4 normal. Will resume thyroid supplementation when tolerating P.O.    #Elevated PT/INR  - likely from vitamin K deficiency from poor oral intake. S/P 10 units vitamin K. INR 1.7 today.      #Protonix GI prophylaxis      Nirmala Reina PA-C  5/5/2021  8:12 AM

## 2021-05-06 ENCOUNTER — APPOINTMENT (OUTPATIENT)
Dept: GENERAL RADIOLOGY | Age: 71
DRG: 853 | End: 2021-05-06
Payer: MEDICARE

## 2021-05-06 LAB
-: ABNORMAL
ABSOLUTE EOS #: 0.16 K/UL (ref 0–0.4)
ABSOLUTE IMMATURE GRANULOCYTE: 0.08 K/UL (ref 0–0.3)
ABSOLUTE LYMPH #: 0.23 K/UL (ref 1–4.8)
ABSOLUTE MONO #: 0.16 K/UL (ref 0.1–0.8)
ALBUMIN SERPL-MCNC: 2.1 G/DL (ref 3.5–5.2)
ALBUMIN/GLOBULIN RATIO: 0.8 (ref 1–2.5)
ALLEN TEST: ABNORMAL
ALLEN TEST: ABNORMAL
ALP BLD-CCNC: 48 U/L (ref 35–104)
ALT SERPL-CCNC: 133 U/L (ref 5–33)
AMORPHOUS: ABNORMAL
ANION GAP SERPL CALCULATED.3IONS-SCNC: 13 MMOL/L (ref 9–17)
ANION GAP SERPL CALCULATED.3IONS-SCNC: 8 MMOL/L (ref 9–17)
AST SERPL-CCNC: 154 U/L
BACTERIA: ABNORMAL
BASOPHILS # BLD: 0 % (ref 0–2)
BASOPHILS ABSOLUTE: 0 K/UL (ref 0–0.2)
BILIRUB SERPL-MCNC: 1.3 MG/DL (ref 0.3–1.2)
BILIRUBIN URINE: NEGATIVE
BUN BLDV-MCNC: 48 MG/DL (ref 8–23)
BUN BLDV-MCNC: 54 MG/DL (ref 8–23)
BUN/CREAT BLD: ABNORMAL (ref 9–20)
BUN/CREAT BLD: ABNORMAL (ref 9–20)
CALCIUM SERPL-MCNC: 8 MG/DL (ref 8.6–10.4)
CALCIUM SERPL-MCNC: 8.5 MG/DL (ref 8.6–10.4)
CASTS UA: ABNORMAL /LPF (ref 0–2)
CHLORIDE BLD-SCNC: 113 MMOL/L (ref 98–107)
CHLORIDE BLD-SCNC: 113 MMOL/L (ref 98–107)
CO2: 21 MMOL/L (ref 20–31)
CO2: 26 MMOL/L (ref 20–31)
COLOR: YELLOW
COMMENT UA: ABNORMAL
CREAT SERPL-MCNC: 1.71 MG/DL (ref 0.5–0.9)
CREAT SERPL-MCNC: 1.8 MG/DL (ref 0.5–0.9)
CRYSTALS, UA: ABNORMAL /HPF
CRYSTALS, UA: ABNORMAL /HPF
DIFFERENTIAL TYPE: ABNORMAL
EOSINOPHILS RELATIVE PERCENT: 2 % (ref 1–4)
EPITHELIAL CELLS UA: ABNORMAL /HPF (ref 0–5)
FIO2: 40
FIO2: 40
GFR AFRICAN AMERICAN: 34 ML/MIN
GFR AFRICAN AMERICAN: 36 ML/MIN
GFR NON-AFRICAN AMERICAN: 28 ML/MIN
GFR NON-AFRICAN AMERICAN: 30 ML/MIN
GFR SERPL CREATININE-BSD FRML MDRD: ABNORMAL ML/MIN/{1.73_M2}
GLUCOSE BLD-MCNC: 141 MG/DL (ref 65–105)
GLUCOSE BLD-MCNC: 196 MG/DL (ref 65–105)
GLUCOSE BLD-MCNC: 198 MG/DL (ref 74–100)
GLUCOSE BLD-MCNC: 214 MG/DL (ref 70–99)
GLUCOSE BLD-MCNC: 219 MG/DL (ref 65–105)
GLUCOSE BLD-MCNC: 222 MG/DL (ref 65–105)
GLUCOSE BLD-MCNC: 272 MG/DL (ref 70–99)
GLUCOSE BLD-MCNC: 291 MG/DL (ref 74–100)
GLUCOSE URINE: ABNORMAL
HCT VFR BLD CALC: 29.9 % (ref 36.3–47.1)
HEMOGLOBIN: 9.5 G/DL (ref 11.9–15.1)
IMMATURE GRANULOCYTES: 1 %
INR BLD: 1.1
KETONES, URINE: NEGATIVE
LEUKOCYTE ESTERASE, URINE: NEGATIVE
LYMPHOCYTES # BLD: 3 % (ref 24–44)
MAGNESIUM: 1.9 MG/DL (ref 1.6–2.6)
MAGNESIUM: 2.2 MG/DL (ref 1.6–2.6)
MCH RBC QN AUTO: 28.4 PG (ref 25.2–33.5)
MCHC RBC AUTO-ENTMCNC: 31.8 G/DL (ref 28.4–34.8)
MCV RBC AUTO: 89.3 FL (ref 82.6–102.9)
MODE: ABNORMAL
MODE: ABNORMAL
MONOCYTES # BLD: 2 % (ref 1–7)
MORPHOLOGY: ABNORMAL
MUCUS: ABNORMAL
NEGATIVE BASE EXCESS, ART: ABNORMAL (ref 0–2)
NEGATIVE BASE EXCESS, ART: ABNORMAL (ref 0–2)
NITRITE, URINE: NEGATIVE
NRBC AUTOMATED: 0 PER 100 WBC
O2 DEVICE/FLOW/%: ABNORMAL
O2 DEVICE/FLOW/%: ABNORMAL
OTHER OBSERVATIONS UA: ABNORMAL
PARTIAL THROMBOPLASTIN TIME: 40.2 SEC (ref 20.5–30.5)
PATHOLOGIST: NORMAL
PATIENT TEMP: ABNORMAL
PATIENT TEMP: ABNORMAL
PDW BLD-RTO: 14.4 % (ref 11.8–14.4)
PH UA: 5.5 (ref 5–8)
PHOSPHORUS: 1.8 MG/DL (ref 2.6–4.5)
PHOSPHORUS: 2.4 MG/DL (ref 2.6–4.5)
PLATELET # BLD: ABNORMAL K/UL (ref 138–453)
PLATELET ESTIMATE: ABNORMAL
PLATELET, FLUORESCENCE: 75 K/UL (ref 138–453)
PLATELET, IMMATURE FRACTION: 8.2 % (ref 1.1–10.3)
PMV BLD AUTO: ABNORMAL FL (ref 8.1–13.5)
POC HCO3: 24.5 MMOL/L (ref 21–28)
POC HCO3: 24.6 MMOL/L (ref 21–28)
POC LACTIC ACID: 1.69 MMOL/L (ref 0.56–1.39)
POC LACTIC ACID: 2.02 MMOL/L (ref 0.56–1.39)
POC O2 SATURATION: 99 % (ref 94–98)
POC O2 SATURATION: 99 % (ref 94–98)
POC PCO2 TEMP: ABNORMAL MM HG
POC PCO2 TEMP: ABNORMAL MM HG
POC PCO2: 36.8 MM HG (ref 35–48)
POC PCO2: 38.4 MM HG (ref 35–48)
POC PH TEMP: ABNORMAL
POC PH TEMP: ABNORMAL
POC PH: 7.41 (ref 7.35–7.45)
POC PH: 7.43 (ref 7.35–7.45)
POC PO2 TEMP: ABNORMAL MM HG
POC PO2 TEMP: ABNORMAL MM HG
POC PO2: 147.2 MM HG (ref 83–108)
POC PO2: 150.9 MM HG (ref 83–108)
POSITIVE BASE EXCESS, ART: 0 (ref 0–3)
POSITIVE BASE EXCESS, ART: 0 (ref 0–3)
POTASSIUM SERPL-SCNC: 3.1 MMOL/L (ref 3.7–5.3)
POTASSIUM SERPL-SCNC: 3.5 MMOL/L (ref 3.7–5.3)
POTASSIUM SERPL-SCNC: 3.6 MMOL/L (ref 3.7–5.3)
PROTEIN UA: ABNORMAL
PROTHROMBIN TIME: 11.2 SEC (ref 9.1–12.3)
RBC # BLD: 3.35 M/UL (ref 3.95–5.11)
RBC # BLD: ABNORMAL 10*6/UL
RBC UA: ABNORMAL /HPF (ref 0–2)
RENAL EPITHELIAL, UA: ABNORMAL /HPF
SAMPLE SITE: ABNORMAL
SAMPLE SITE: ABNORMAL
SEG NEUTROPHILS: 92 % (ref 36–66)
SEGMENTED NEUTROPHILS ABSOLUTE COUNT: 7.17 K/UL (ref 1.8–7.7)
SERUM IFX INTERP: NORMAL
SODIUM BLD-SCNC: 147 MMOL/L (ref 135–144)
SODIUM BLD-SCNC: 147 MMOL/L (ref 135–144)
SPECIFIC GRAVITY UA: 1.02 (ref 1–1.03)
TCO2 (CALC), ART: ABNORMAL MMOL/L (ref 22–29)
TCO2 (CALC), ART: ABNORMAL MMOL/L (ref 22–29)
TOTAL PROTEIN: 4.6 G/DL (ref 6.4–8.3)
TRICHOMONAS: ABNORMAL
TRIGL SERPL-MCNC: 366 MG/DL
TURBIDITY: ABNORMAL
URINE HGB: ABNORMAL
UROBILINOGEN, URINE: NORMAL
WBC # BLD: 7.8 K/UL (ref 3.5–11.3)
WBC # BLD: ABNORMAL 10*3/UL
WBC UA: ABNORMAL /HPF (ref 0–5)
YEAST: ABNORMAL

## 2021-05-06 PROCEDURE — 81001 URINALYSIS AUTO W/SCOPE: CPT

## 2021-05-06 PROCEDURE — 85055 RETICULATED PLATELET ASSAY: CPT

## 2021-05-06 PROCEDURE — 97530 THERAPEUTIC ACTIVITIES: CPT

## 2021-05-06 PROCEDURE — 84132 ASSAY OF SERUM POTASSIUM: CPT

## 2021-05-06 PROCEDURE — 99291 CRITICAL CARE FIRST HOUR: CPT | Performed by: INTERNAL MEDICINE

## 2021-05-06 PROCEDURE — 94003 VENT MGMT INPAT SUBQ DAY: CPT

## 2021-05-06 PROCEDURE — 94761 N-INVAS EAR/PLS OXIMETRY MLT: CPT

## 2021-05-06 PROCEDURE — 2580000003 HC RX 258: Performed by: STUDENT IN AN ORGANIZED HEALTH CARE EDUCATION/TRAINING PROGRAM

## 2021-05-06 PROCEDURE — 36415 COLL VENOUS BLD VENIPUNCTURE: CPT

## 2021-05-06 PROCEDURE — 84478 ASSAY OF TRIGLYCERIDES: CPT

## 2021-05-06 PROCEDURE — 85610 PROTHROMBIN TIME: CPT

## 2021-05-06 PROCEDURE — 51702 INSERT TEMP BLADDER CATH: CPT | Performed by: NURSE PRACTITIONER

## 2021-05-06 PROCEDURE — 85730 THROMBOPLASTIN TIME PARTIAL: CPT

## 2021-05-06 PROCEDURE — 2500000003 HC RX 250 WO HCPCS: Performed by: STUDENT IN AN ORGANIZED HEALTH CARE EDUCATION/TRAINING PROGRAM

## 2021-05-06 PROCEDURE — 2500000003 HC RX 250 WO HCPCS: Performed by: NURSE PRACTITIONER

## 2021-05-06 PROCEDURE — 80048 BASIC METABOLIC PNL TOTAL CA: CPT

## 2021-05-06 PROCEDURE — 99232 SBSQ HOSP IP/OBS MODERATE 35: CPT | Performed by: PHYSICIAN ASSISTANT

## 2021-05-06 PROCEDURE — 97162 PT EVAL MOD COMPLEX 30 MIN: CPT

## 2021-05-06 PROCEDURE — 83605 ASSAY OF LACTIC ACID: CPT

## 2021-05-06 PROCEDURE — 2700000000 HC OXYGEN THERAPY PER DAY

## 2021-05-06 PROCEDURE — 6370000000 HC RX 637 (ALT 250 FOR IP): Performed by: STUDENT IN AN ORGANIZED HEALTH CARE EDUCATION/TRAINING PROGRAM

## 2021-05-06 PROCEDURE — 99232 SBSQ HOSP IP/OBS MODERATE 35: CPT | Performed by: INTERNAL MEDICINE

## 2021-05-06 PROCEDURE — C9113 INJ PANTOPRAZOLE SODIUM, VIA: HCPCS | Performed by: STUDENT IN AN ORGANIZED HEALTH CARE EDUCATION/TRAINING PROGRAM

## 2021-05-06 PROCEDURE — 6360000002 HC RX W HCPCS: Performed by: STUDENT IN AN ORGANIZED HEALTH CARE EDUCATION/TRAINING PROGRAM

## 2021-05-06 PROCEDURE — 84100 ASSAY OF PHOSPHORUS: CPT

## 2021-05-06 PROCEDURE — 82803 BLOOD GASES ANY COMBINATION: CPT

## 2021-05-06 PROCEDURE — 83735 ASSAY OF MAGNESIUM: CPT

## 2021-05-06 PROCEDURE — 2580000003 HC RX 258: Performed by: NURSE PRACTITIONER

## 2021-05-06 PROCEDURE — 6360000002 HC RX W HCPCS: Performed by: NURSE PRACTITIONER

## 2021-05-06 PROCEDURE — 85025 COMPLETE CBC W/AUTO DIFF WBC: CPT

## 2021-05-06 PROCEDURE — 82947 ASSAY GLUCOSE BLOOD QUANT: CPT

## 2021-05-06 PROCEDURE — 6370000000 HC RX 637 (ALT 250 FOR IP): Performed by: PHYSICIAN ASSISTANT

## 2021-05-06 PROCEDURE — 80053 COMPREHEN METABOLIC PANEL: CPT

## 2021-05-06 PROCEDURE — 2000000000 HC ICU R&B

## 2021-05-06 PROCEDURE — 71045 X-RAY EXAM CHEST 1 VIEW: CPT

## 2021-05-06 PROCEDURE — 86022 PLATELET ANTIBODIES: CPT

## 2021-05-06 PROCEDURE — 37799 UNLISTED PX VASCULAR SURGERY: CPT

## 2021-05-06 RX ORDER — POTASSIUM CHLORIDE 29.8 MG/ML
20 INJECTION INTRAVENOUS
Status: COMPLETED | OUTPATIENT
Start: 2021-05-06 | End: 2021-05-06

## 2021-05-06 RX ORDER — INSULIN GLARGINE 100 [IU]/ML
5 INJECTION, SOLUTION SUBCUTANEOUS NIGHTLY
Status: DISCONTINUED | OUTPATIENT
Start: 2021-05-06 | End: 2021-05-10

## 2021-05-06 RX ORDER — FUROSEMIDE 10 MG/ML
20 INJECTION INTRAMUSCULAR; INTRAVENOUS ONCE
Status: COMPLETED | OUTPATIENT
Start: 2021-05-06 | End: 2021-05-06

## 2021-05-06 RX ORDER — SODIUM CHLORIDE, SODIUM LACTATE, POTASSIUM CHLORIDE, AND CALCIUM CHLORIDE .6; .31; .03; .02 G/100ML; G/100ML; G/100ML; G/100ML
500 INJECTION, SOLUTION INTRAVENOUS ONCE
Status: COMPLETED | OUTPATIENT
Start: 2021-05-06 | End: 2021-05-06

## 2021-05-06 RX ADMIN — PIPERACILLIN AND TAZOBACTAM 3375 MG: 3; .375 INJECTION, POWDER, LYOPHILIZED, FOR SOLUTION INTRAVENOUS at 17:31

## 2021-05-06 RX ADMIN — LEVOTHYROXINE SODIUM ANHYDROUS 56 MCG: 100 INJECTION, POWDER, LYOPHILIZED, FOR SOLUTION INTRAVENOUS at 08:01

## 2021-05-06 RX ADMIN — POTASSIUM CHLORIDE 20 MEQ: 29.8 INJECTION, SOLUTION INTRAVENOUS at 15:27

## 2021-05-06 RX ADMIN — PIPERACILLIN AND TAZOBACTAM 3375 MG: 3; .375 INJECTION, POWDER, LYOPHILIZED, FOR SOLUTION INTRAVENOUS at 10:10

## 2021-05-06 RX ADMIN — INSULIN LISPRO 3 UNITS: 100 INJECTION, SOLUTION INTRAVENOUS; SUBCUTANEOUS at 19:40

## 2021-05-06 RX ADMIN — SODIUM CHLORIDE, PRESERVATIVE FREE 5 ML: 5 INJECTION INTRAVENOUS at 08:01

## 2021-05-06 RX ADMIN — PIPERACILLIN AND TAZOBACTAM 3375 MG: 3; .375 INJECTION, POWDER, FOR SOLUTION INTRAVENOUS at 01:13

## 2021-05-06 RX ADMIN — KETAMINE HYDROCHLORIDE 0.1 MG/KG/HR: 50 INJECTION, SOLUTION INTRAMUSCULAR; INTRAVENOUS at 23:23

## 2021-05-06 RX ADMIN — INSULIN LISPRO 9 UNITS: 100 INJECTION, SOLUTION INTRAVENOUS; SUBCUTANEOUS at 05:59

## 2021-05-06 RX ADMIN — POTASSIUM CHLORIDE 20 MEQ: 29.8 INJECTION, SOLUTION INTRAVENOUS at 16:24

## 2021-05-06 RX ADMIN — VASOPRESSIN 0.04 UNITS/MIN: 20 INJECTION INTRAVENOUS at 04:31

## 2021-05-06 RX ADMIN — INSULIN LISPRO 6 UNITS: 100 INJECTION, SOLUTION INTRAVENOUS; SUBCUTANEOUS at 01:37

## 2021-05-06 RX ADMIN — INSULIN LISPRO 3 UNITS: 100 INJECTION, SOLUTION INTRAVENOUS; SUBCUTANEOUS at 12:11

## 2021-05-06 RX ADMIN — SODIUM PHOSPHATE, MONOBASIC, MONOHYDRATE 15 MMOL: 276; 142 INJECTION, SOLUTION INTRAVENOUS at 07:27

## 2021-05-06 RX ADMIN — SODIUM CHLORIDE, PRESERVATIVE FREE 10 ML: 5 INJECTION INTRAVENOUS at 08:01

## 2021-05-06 RX ADMIN — HEPARIN SODIUM 5000 UNITS: 5000 INJECTION INTRAVENOUS; SUBCUTANEOUS at 05:41

## 2021-05-06 RX ADMIN — INSULIN GLARGINE 5 UNITS: 100 INJECTION, SOLUTION SUBCUTANEOUS at 22:00

## 2021-05-06 RX ADMIN — SODIUM CHLORIDE, PRESERVATIVE FREE 10 ML: 5 INJECTION INTRAVENOUS at 08:02

## 2021-05-06 RX ADMIN — FLUCONAZOLE 200 MG: 2 INJECTION, SOLUTION INTRAVENOUS at 22:15

## 2021-05-06 RX ADMIN — FUROSEMIDE 20 MG: 10 INJECTION, SOLUTION INTRAMUSCULAR; INTRAVENOUS at 10:31

## 2021-05-06 RX ADMIN — PANTOPRAZOLE SODIUM 40 MG: 40 INJECTION, POWDER, FOR SOLUTION INTRAVENOUS at 08:01

## 2021-05-06 RX ADMIN — INSULIN LISPRO 3 UNITS: 100 INJECTION, SOLUTION INTRAVENOUS; SUBCUTANEOUS at 16:22

## 2021-05-06 RX ADMIN — SODIUM CHLORIDE, POTASSIUM CHLORIDE, SODIUM LACTATE AND CALCIUM CHLORIDE 500 ML: 600; 310; 30; 20 INJECTION, SOLUTION INTRAVENOUS at 05:35

## 2021-05-06 RX ADMIN — INSULIN LISPRO 6 UNITS: 100 INJECTION, SOLUTION INTRAVENOUS; SUBCUTANEOUS at 08:36

## 2021-05-06 RX ADMIN — POTASSIUM PHOSPHATE, MONOBASIC AND POTASSIUM PHOSPHATE, DIBASIC 10 MMOL: 224; 236 INJECTION, SOLUTION, CONCENTRATE INTRAVENOUS at 16:57

## 2021-05-06 RX ADMIN — CALCIUM GLUCONATE: 98 INJECTION, SOLUTION INTRAVENOUS at 17:48

## 2021-05-06 ASSESSMENT — PULMONARY FUNCTION TESTS
PIF_VALUE: 22
PIF_VALUE: 23
PIF_VALUE: 16

## 2021-05-06 ASSESSMENT — PAIN SCALES - GENERAL: PAINLEVEL_OUTOF10: 0

## 2021-05-06 NOTE — PROGRESS NOTES
[facility]  Office: [address], DO, Gleneden Beachlesly Perry, DO, Juanita Pride, DO, Purnima Covert Blood, DO, Suzan Soto MD, Mikel Patricio MD, Robi Sepulveda MD, Gianna Vincent MD, Collette Denmark, MD, Salvador Driver MD, Radha Dumont MD, Parker Mack MD, Dakota Grullon, DO, Sharri Felder MD, Flori De Anda, DO, Lisa Dennis MD,  Debra Hanson DO, Kelly Kaufman MD, Eliott Spatz, MD, Trina Hu MD, Luiza Lyons MD, Amarjit Box, Marlon Deras, CNP, Randy Sanchez, CNP, Jeanie Lubin, CNS, Evan Lopez, CNP, Narda Babb, CNP, Fern Yeung, CNP, Nelda Rg, CNP, Tom James, CNP, JUN CaalC, Clotilde Dillon, [facility], Duane Abed, CNP, Lily Villa, CNP, Dorcas Jackson, CNP, Ana Poe, CNP, Steven Pickens, CNP, Angela Flow, [address] [place] [address]    Progress Note    5/6/2021    9:58 AM    Name:   Sveta Duong  MRN:     [id number]     Acct:      [de-identified]   Room:   [address] [place], [place] [address]-[facility] Day:  3  Admit Date:  5/3/2021 12:49 PM    PCP:   Francesca Esquivel DO  Code Status:  Full Code    Subjective:     C/C:   Chief Complaint   Patient presents with    Blood Sugar Problem     >450    Hernia     hyatial      Interval History Status: improved. Pt seen and evaluated this morning. She appears to be doing better. She is currently on CPAP undergoing wean trial. She awakens to her name and follows commands. She is currently denying pain. She continues to be febrile. She has been weaned from vasopressin and remains on levophed. Creatinine steadily improving. I have reviewed her labs and imaging. Brief History:     78year-old female who was transferred from [place] where she presented with nausea, vomiting and diarrhea for 6 days. Naturally, her oral intake had been poor and she did experience a syncopal episode. Additionally, creatinine noted to be elevated to 3.8, lactic acid 6, glucose 585.  Troponin I chloride      sodium chloride      sodium chloride       PRN Meds: acetaminophen, artificial tears, sodium chloride, sodium chloride, sodium chloride flush, sodium chloride, [DISCONTINUED] promethazine **OR** ondansetron, dextrose    Data:     Past Medical History:   has a past medical history of Diabetes mellitus ([address] Gout, Hiatal hernia, Hyperlipidemia, Hypertension, and Thyroid disease. Social History:   reports that she has never smoked. She has never used smokeless tobacco. She reports that she does not drink alcohol or use drugs. Family History:   Family History   Problem Relation Age of Onset    Breast Cancer Mother     High Blood Pressure Mother     High Cholesterol Father     Breast Cancer Sister        Vitals:  BP (!) 95/58   Pulse 87   Temp 100.8 °F (38.2 °C) (Bladder)   Resp 22   Ht 5' 2\" (1.575 m)   Wt 205 lb 4 oz (93.1 kg)   SpO2 100%   BMI 37.54 kg/m²   Temp (24hrs), Av °F (38.3 °C), Min:99.9 °F (37.7 °C), Max:101.7 °F (38.7 °C)    Recent Labs     21  2219 21  0134 21  0602 21  0834   POCGLU 233* 222* 291* 219*       I/O (24Hr):     Intake/Output Summary (Last 24 hours) at 2021 0958  Last data filed at 2021 0900  Gross per 24 hour   Intake 3390.98 ml   Output 1449 ml   Net 1941.98 ml       Labs:  Hematology:  Recent Labs     21  0255 21  0500 21  0646 21  0503 21  0645   WBC  --   --  13.2*  --  10.6 7.8  --    RBC  --   --  3.96  --  3.91* 5.98*  --    HGB DUPLICATE ORDER  80.5*   < > 11.5*  --  11.1* 9.5*  --    HCT DUPLICATE ORDER  45.9*   < > 36.5  --  35.2* 29.9*  --    MCV  --   --  92.2  --  90.0 89.3  --    MCH  --   --  29.0  --  28.4 28.4  --    MCHC  --   --  31.5  --  31.5 31.8  --    RDW  --   --  14.3  --  14.3 14.4  --    PLT See Reflexed IPF Result  --  See Reflexed IPF Result  --  See Reflexed IPF Result See Reflexed IPF Result  --    MPV  --   --  NOT REPORTED  -- NOT REPORTED NOT REPORTED  --    INR 1.4  --   --  1.7  --   --  1.1    < > = values in this interval not displayed. Chemistry:  Recent Labs     05/03/21  1334 05/03/21  1434 05/03/21  1434 05/04/21  1253 05/04/21  1253 05/04/21 2010 05/04/21  2229 05/05/21  0255 05/05/21  0646 05/05/21  1410 05/05/21  1944 05/06/21  0503   *  --    < > 148*   < > 146* 145 148* 148* 146* 143 147*   K 3.2*  --    < > 2.8*   < > 2.5* 2.9* 3.4* 3.4* 3.8 3.5* 3.6*   CL 92*  --    < > 109*  --   --   --  112* 111* 111* 108* 113*   CO2 32*  --    < > 25  --   --   --  22 22 18* 20 21   GLUCOSE 444*  --    < > 184*  --   --   --  178* 186* 151* 208* 272*   BUN 95*  --    < > 84*  --   --   --  63* 63* 60* 60* 54*   CREATININE 2.49*  --    < > 2.24*  --   --   --  2.03* 2.10* 2.06* 1.98* 1.80*   MG  --   --    < > 1.9  --   --   --   --  1.5*  --   --  2.2   ANIONGAP 24*  --    < > 14  --   --   --  14 15 17 15 13   LABGLOM 19*  --    < > 22*  --   --   --  24* 23* 24* 25* 28*   GFRAA 23*  --    < > 26*  --   --   --  30* 28* 29* 30* 34*   CALCIUM 7.7*  --    < > 8.2*  --   --   --  7.6* 7.9* 8.6 8.3* 8.0*   CAION  --   --    < >  --   --  1.21 1.24  --  1.08*  --   --   --    PHOS  --   --    < > 3.5  --   --   --   --  4.3  --   --  1.8*   TROPHS 85* 94*  --   --   --   --   --   --   --   --   --   --    LACTACIDWB 2.7*  --    < > 3.8*  --  4.3*  --  2.0  --   --   --   --     < > = values in this interval not displayed.      Recent Labs     05/03/21  1334 05/03/21  1334 05/04/21  0509 05/04/21  0509 05/05/21  0500 05/05/21  0500 05/05/21  1144 05/05/21  1410 05/05/21  1541 05/05/21  2219 05/06/21  0134 05/06/21  0503 05/06/21  0602 05/06/21  0834   PROT 6.7  --  5.2*   < > 4.4*  --   --  4.9*  --   --   --  4.6*  --   --    LABALBU 3.6   < > 2.7*   < > 2.7*  --   --  2.7*  --   --   --  2.1*  --   --    LABA1C  --   --  6.7*  --   --   --   --   --   --   --   --   --   --   --    TSH 13.36*  --   --   --   --   --   --   -- --   --   --   --   --   --    AST 30  --  32*   < > 265*  --   --  205*  --   --   --  154*  --   --    ALT 14  --  14   < > 141*  --   --  149*  --   --   --  133*  --   --    ALKPHOS 77  --  57   < > 34*  --   --  42  --   --   --  48  --   --    BILITOT 3.74*  --  2.46*   < > 2.30*  --   --  2.08*  --   --   --  1.30*  --   --    BILIDIR  --   --   --   --  0.76*  --   --  0.62*  --   --   --   --   --   --    AMMONIA 19  --   --   --   --   --   --   --   --   --   --   --   --   --    TRIG  --    < > 163*  --   --   --   --   --   --   --   --  366*  --   --    POCGLU  --    < >  --    < >  --    < > 132*  --  129* 233* 222*  --  291* 219*    < > = values in this interval not displayed. ABG:  Lab Results   Component Value Date    POCPH 7.414 05/06/2021    PHART 7.193 05/04/2021    POCPCO2 38.4 05/06/2021    BTA1XLC 52.0 05/04/2021    POCPO2 147.2 05/06/2021    PO2ART 145.0 05/04/2021    POCHCO3 24.5 05/06/2021    DOP0KLD 19.2 05/04/2021    NBEA NOT REPORTED 05/06/2021    PBEA 0 05/06/2021    KBQ5ZTE NOT REPORTED 05/06/2021    VFVA4ZQO 99 05/06/2021    E9MEQXRL 98.0 05/04/2021    FIO2 40.0 05/06/2021     Lab Results   Component Value Date/Time    SPECIAL NOT REPORTED 05/04/2021 04:13 AM     Lab Results   Component Value Date/Time    CULTURE NO GROWTH 05/04/2021 04:13 AM       Radiology:  Ct Abdomen Pelvis Wo Contrast Additional Contrast? None    Result Date: 5/3/2021  There is obstruction of the stomach because of the portions of the stomach contained in the very large hiatal hernia. The rest of the GI tract is normal. No evidence of an acute infectious or inflammatory process in the abdomen and pelvis. Ct Abdomen Pelvis Wo Contrast Additional Contrast? None    Result Date: 4/28/2021  No acute infectious or inflammatory process is identified. Very large hiatal hernia. This could be the source of the patient's symptoms. Ct Head Wo Contrast    Result Date: 5/3/2021  No acute intracranial process.  If clinical concern remains, consider further imaging with MRI. Ct Cervical Spine Wo Contrast    Result Date: 5/3/2021  No acute findings in the cervical spine. Xr Chest Portable    Result Date: 5/3/2021  No acute cardiopulmonary disease Large hiatal hernia     Mri Abdomen Wo Contrast Mrcp    Result Date: 5/3/2021  1. No evidence of intrahepatic or extrahepatic ductal dilatation. 2. Incompletely imaged large hiatal hernia with organoaxial malrotation of the stomach. 3. Small to moderate amount of ascites. 4. Diffuse small bowel wall thickening likely due to 3rd spacing as the post enteritis. 5. Trace bilateral pleural effusions. Physical Examination:        General appearance:  Intubated, yet alert. Follows commands. Does not appear to be in distress   Mental Status:  Alert. Follows commands appropriately. Remains intubated. Lungs:  Ventilated breath sounds, coarse respirations  Heart:  regular rate and rhythm, no murmur  Abdomen:  soft, nondistended.  TAISHA drains present draining serosanguinous fluid  Extremities:  no edema, redness  Skin:  no gross lesions, rashes, induration    Assessment:        Hospital Problems           Last Modified POA    * (Principal) Septic shock ([address] 5/4/2021 Yes    Non-STEMI (non-ST elevated myocardial infarction) ([address] 5/4/2021 Yes    Diabetic ketoacidosis without coma associated with type 2 diabetes mellitus ([address] 5/4/2021 Yes    BONNIE (acute kidney injury) ([address] 5/4/2021 Yes    Hernia with obstruction 5/4/2021 Yes    Essential hypertension 5/4/2021 Yes    Thyroid disease 5/4/2021 Yes    Syncope 5/4/2021 Yes    Acute tubular necrosis ([address] 5/4/2021 Yes    Lactic acidosis 5/4/2021 Yes    Acute hypoxemic respiratory failure ([address] 5/4/2021 Yes    Hiatal hernia 5/5/2021 Yes    Gastric volvulus 5/5/2021 Yes          Plan:        #Septic shock/lactic acidosis secondary to gastric ischemia and subsequent pneumoperitoneum  - Underwent emergent wedge gastrectomy, gastropexy x2, placement of TAISHA drains x2, abdominal lavage  - Pt remains febrile. Will obtain urine culture as morel was clogged and changed today. Reviewed CXR, I do not see any evidence of pneumonia. WBC count not elevated. Will continue with zosyn. If condition worsens will consider broadening coverage with teflaro.  - fluconazole as ordered  - f/u urine, blood cultures  - wean pressors as tolerated    #Type 2 DM, non-insulin dependent, complicated by hyperglycemia and ketosis  - continue to check blood glucose q4h while NPO. Moderate intensity sliding scale. - start lantus 5U qhs    #Acute kidney injury secondary to ATN from hypotension/hypoperfusion  - continue to monitor creatinine. Continue resuscitative fluids. Nephrology following. Creatinine is steadily improving. Urine output did decline yesterday (0.2 ml/kg/h). Morel has been exchanged this a.m. and urine output improved. #Acute hypoxemic respiratory failure  - likely secondary to impaired lung expansion from large hiatal hernia/septic shock  - Intubated, currently tolerating CPAP/wean trial. Monitor for pulmonary edema given large amount of resuscitative fluids    #NSTEMI  - likely demand ischemia in setting of septic shock. Cardiology following. Echo pending. I have reviewed EKG which shows inferior infarct. Patient will need ischemic work-up following resolution of acute issues    #Hypernatremia  - TBW deficit 2.6L. Fluids per nephro    #Thrombocytopenia  - likely from sepsis. Continue to monitor. If continuing to decline, will stop heparin and obtain HIT panel    #Hypothyroidism  - TSH elevated to 13.36. in the setting of acute illness. Free T4 normal. Will resume thyroid supplementation when tolerating P.O.    #Elevated PT/INR  - resolved.     #Protonix GI prophylaxis      Lety Tyler PA-C  5/6/2021  9:58 AM

## 2021-05-06 NOTE — PROGRESS NOTES
PULMONARY & CRITICAL CARE MEDICINE PROGRESS  NOTE     Patient:  Eli Saunders  MRN: [id number]  Admit date: 5/3/2021    SUBJECTIVE     I personally interviewed/examined the patient, reviewed interval history and interpreted all available radiographic, laboratory data at the time of service. Chief Compliant/Reason for Initial Consult: Acute respiratory failure on vent support, gastric perforation    Brief Hospital Course and Interval History:  The patient is a 78 y.o. female with known medical history of diabetes mellitus, hypertension, hypothyroidism, abdominal hernia presented to the hospital with nausea, vomiting, diarrhea. Patient had similar symptoms on 4/28 but was apparently sent home. Patient had dizzy spells and lightheadedness, had a syncopal episode in the ER on this admission. Patient was transferred from the [facility] to [facility].  In [facility] patient lab revealed lactic acidosis of 6, glucose 585, patient was seen to be in DKA. Elevated lipase of 1451. Leukocytotic with WBC 23.5, hemoglobin 18.3. Patient also had elevated troponins, elevated creatinine of 3.8. In [facility] repeat labs showed creatinine of 2.49 with lactic acid of 2.4. Troponin was 85, recheck of 94. LFTs showed elevated bilirubin.     General surgery was consulted. CT chest showed ascites and pneumoperitoneum with apparent free-flowing oral contrast in the left upper quadrant concerning for viscus perforation possibly within the subdiaphragmatic portion of the stomach. Patient then went for robotic hernia repair 5/4 with wedge gastrectomy, gastropexy and placement of 2 TAISHA drains and 2 PEG tubes. Patient is n.p.o.     Pulmonology consulted for vent management. Interval history  5/6/2021  Patient weaning this morning, is off sedation and is able to follow commands. Patient did spike fever of 101.7.   Normal heart rate and respiratory rate. Slightly hypotensive on pressor support  Had trouble with urine output overnight, multiple boluses were given without any urine output then Walker catheter was changed which showed instant return of 500 mL of urine. Urine output has been picking up. Creatinine is now down to 1.8 mg/dL. She has baseline creatinine of 1.3 mg/dL. Patient is still on pressor support, currently weaning down. On Zosyn for abdominal infection    Patient also had severe thrombocytopenia with platelet count dropping to 75. Review of Systems:  Patient is intubated review of systems not possible  OBJECTIVE     VITAL SIGNS:   LAST-  /65   Pulse 94   Temp 100.9 °F (38.3 °C) (Bladder)   Resp 23   Ht 5' 2\" (1.575 m)   Wt 205 lb 4 oz (93.1 kg)   SpO2 100%   BMI 37.54 kg/m²   8-24 HR RANGE-  TEMP Temp  Av.1 °F (38.4 °C)  Min: 100.8 °F (38.2 °C)  Max: 101.7 °F (67.9 °C)   BP Systolic (19VLH), DZK:261 , Min:86 , TPL:433      Diastolic (99DQH), ANI:13, Min:49, Max:82     PULSE Pulse  Av.7  Min: 58  Max: 95   RR Resp  Av  Min: 18  Max: 25   O2 SAT SpO2  Av.9 %  Min: 98 %  Max: 100 %   OXYGEN DELIVERY No data recorded     Systemic Examination:   General appearance -intubated, off sedation, following commands   mental status - alert  Eyes - pupils equal and reactive, extraocular eye movements intact  Mouth - mucous membranes moist, pharynx normal without lesions  Neck - supple, no significant adenopathy  Chest - Chest was symmetrical without dullness to percussion. Breath sounds bilaterally were clear to auscultation. There were no wheezes, rhonchi or rales.   There is no intercostal recession or use of accessory muscles, ET tube in place  Heart - normal rate, regular rhythm, normal S1, S2, no murmurs, rubs, clicks or gallops  Abdomen -soft with 2 TAISHA drains in to PEG tube placement  Neurological - alert, oriented, normal speech, no focal findings or movement disorder noted  Extremities - peripheral pulses normal, no pedal edema, no clubbing or cyanosis  Skin - normal coloration and turgor, no rashes, no suspicious skin lesions noted     DATA REVIEW     Medications:  Scheduled Meds:   piperacillin-tazobactam  3,375 mg Intravenous Q8H    insulin glargine  5 Units Subcutaneous Nightly    chlorhexidine  15 mL Mouth/Throat BID    insulin lispro  0-18 Units Subcutaneous Q4H    fluconazole  200 mg Intravenous Q24H    levothyroxine  56 mcg Intravenous Daily    And    sodium chloride (PF)  5 mL Intravenous Daily    pantoprazole  40 mg Intravenous Daily    And    sodium chloride (PF)  10 mL Intravenous Daily    sodium chloride flush  5-40 mL Intravenous 2 times per day     Continuous Infusions:   PN-Adult 2-in-1 Central Line (Standard)      ketamine (KETALAR) infusion for analgosedation 0.1 mg/kg/hr (05/06/21 1300)    vasopressin (Septic Shock) infusion Stopped (05/06/21 0820)    PN-Adult 2-in-1 Central Line (Standard) 41.7 mL/hr at 05/05/21 1805    norepinephrine 2 mcg/min (05/06/21 1243)    sodium chloride       LABS:-  ABGs:   No results found for: PH, PCO2, PO2, HCO3, O2SAT  Recent Labs     05/04/21 2010 05/04/21  2229   PHART 7.409 7.193*   PO2ART 130.0* 145.0*   FCP5NCO 36.9 52.0*   JND7ZFS 22.9 19.2*   X1YGLAHF 98.5 98.0     Lab Results   Component Value Date    POCPH 7.433 05/06/2021    POCPCO2 36.8 05/06/2021    POCPO2 150.9 (H) 05/06/2021    POCHCO3 24.6 05/06/2021    XMKA6MNK 99 (H) 05/06/2021     CBC:   Recent Labs     05/05/21  0255 05/05/21  0646 05/06/21  0503   WBC 13.2* 10.6 7.8   HGB 11.5* 11.1* 9.5*   HCT 36.5 35.2* 29.9*   MCV 92.2 90.0 89.3   PLT See Reflexed IPF Result See Reflexed IPF Result See Reflexed IPF Result   LYMPHOPCT 11* 10* 3*   RBC 3.96 3.91* 3.35*   MCH 29.0 28.4 28.4   MCHC 31.5 31.5 31.8   RDW 14.3 14.3 14.4     BMP:   Recent Labs     05/04/21  1253 05/04/21  1253 05/05/21  0646 05/05/21  1410 05/05/21  1944 05/06/21  0503   *   < > 148* 146* 143 with haziness at the left base either   atelectasis and or fluid. Subcutaneous emphysema along the left lateral   chest wall has decreased. No appreciable extrapleural air. Endotracheal   tube in appropriate position. XR CHEST PORTABLE   Final Result   1. Recommend repositioning of left internal jugular approach central venous   catheter. 2. Low lung volumes with left basilar atelectasis plus or minus mild pleural   effusion. 3. Left chest wall scattered soft tissue emphysema. XR CHEST PORTABLE   Final Result   Intestinal tube deviates to the left side of a sizable hiatal hernia,   traversing below the hemidiaphragm and terminating just underneath the left   hemidiaphragm. Side port of the intestinal tube is below the diaphragmatic   hiatus. CT CHEST WO CONTRAST   Final Result   1. Ascites and pneumoperitoneum with apparent free-flowing oral contrast in   the left upper quadrant is concerning for viscus perforation, possibly within   the subdiaphragmatic portion of the stomach. 2. Large hiatal hernia with organoaxial volvulus. Majority of contrast is   retained within the esophagus and supradiaphragmatic stomach. 3. Enteric tube extends below the diaphragm with tip outside the field of   view. 4. Moderate bilateral pleural effusions with adjacent consolidation,   atelectasis versus pneumonia. Critical results were called by Dr. Ana Maria Bryant MD to [facility] on   5/4/2021 at 18:30. XR ABDOMEN (KUB) (SINGLE AP VIEW)   Final Result   No significant subdiaphragmatic contrast progression, as above. RECOMMENDATION:   Consider chest x-ray to further assess a organic-axial gastric volvulus         FL UGI   Final Result   Organo-axial volvulus the stomach. Large paraesophageal hernia containing the majority of the rotated gastric   body. The greater curvature is positioned superiorly within the   paraesophageal hernia defect.   There is narrowing of the troponin. History / Tech. Comments:  Procedure explained to patient. No known medical Hx. Patient Status: Inpatient    Height: 62 inches Weight: 166 pounds BSA: 1.77 m^2 BMI: 30.36 kg/m^2    CONCLUSIONS    Summary  Normal LV size and wall thickness. No obvious wall motion abnormality seen. Normal LV systolic function with LVEF 55%. RV appears dilated with reduced function. RV systolic pressure 37 mmHg  LA appears normal in size. No obvious significant structural valvular abnormality noted. No significant valvular stenosis or regurgitation noted. Normal aortic root dimension. No significant pericardial effusion noted. Signature  ----------------------------------------------------------------------------   Electronically signed by Connor Montoya(Interpreting physician) on   05/05/2021 12:15 PM  ----------------------------------------------------------------------------    ----------------------------------------------------------------------------   Electronically signed by Olimpia Clark(Sonographer) on 05/05/2021 11:37 AM  ----------------------------------------------------------------------------  FINDINGS  Left Atrium  Left atrium is normal in size. Inter-atrial septum is intact with no evidence for an atrial septal defect,  by color doppler. Left Ventricle  Left ventricle is small in size, normal wall thickness, global left  ventricular systolic function is normal, calculated ejection fraction is  53%. All wall segments are not well visualized (poor acoustic windows.)  Right Atrium  Right atrial dilatation. Right Ventricle  Right ventricular dilatation with reduced systolic function. Abnormal RV strain. Mitral Valve  Normal mitral valve structure. Trivial mitral regurgitation. Aortic Valve  Aortic valve is trileaflet. No evidence of aortic insufficiency or stenosis. Tricuspid Valve  Normal tricuspid valve leaflets. Moderate tricuspid regurgitation.   Estimated right ventricular systolic pressure is 37 mmHg, suggesting  pulmonary HTN. Pulmonic Valve  Pulmonic valve not well visualized but Doppler velocities are normal.  Pericardial Effusion  No significant pericardial effusion is seen. Miscellaneous  Normal aortic root dimension. E/E' average = 15.05. IVC dilated but unable to assess respiratory collapse due to patient on  ventilator. M-mode / 2D Measurements & Calculations:      LVIDd:4 cm(3.7 - 5.6 cm)          Diastolic PEXVNW:41 ml   ZHIZ:5.5 cm(0.6 - 1.1 cm)         Systolic IXAKMH:82.64 ml   LVPWd:0.9 cm(0.6 - 1.1 cm)        Aortic Root:2.9 cm(2.0 - 3.7 cm)                                     LA Dimension: 3.1 cm(1.9 - 4.0 cm)   Calculated LVEF (%): 52.95 %      LA volume/Index: 31.04 ml /18m^2                                     LVOT:1.9 cm                                     RVDd:3 cm      Mitral:                                 Aortic      Valve Area (P1/2-Time): 5.12 cm^2       Peak Velocity: 1.55 m/s   Peak E-Wave: 0.78 m/s                   Mean Velocity: 0.99 m/s   Peak A-Wave: 1.00 m/s                   Peak Gradient: 9.61 mmHg   E/A Ratio: 0.78                         Mean Gradient: 5 mmHg   Peak Gradient: 2.45 mmHg   Mean Gradient: 2 mmHg   Deceleration Time: 145 msec             Area (continuity): 2.16 cm^2   P1/2t: 43 msec                          AV VTI: 25.9 cm      Area (continuity): 2.1 cm^2   Mean Velocity: 0.60 m/s      Tricuspid:                              Pulmonic:      Estimated RVSP: 37 mmHg                 Peak Velocity: 0.80 m/s   Peak TR Velocity: 2.85 m/s              Peak Gradient: 2.57 mmHg   Peak TR Gradient: 32.49 mmHg     Diastology / Tissue Doppler  Septal Wall E' velocity:0.06 m/s  Septal Wall E/E':13.8  Lateral Wall E' velocity:0.05 m/s  Lateral Wall E/E':16.3       Cardiac Catheterization:   No results found for this or any previous visit.     ASSESSMENT AND PLAN     Assessment:    Gastric volvulus with Viscus perforation  Sepsis   Thrombocytopenia moderate bilateral pleural effusions  Diabetes mellitusDKA now resolved  Lactic acidosis resolving  BONNIE  Elevated troponins  Subclinical hypothyroidism  Elevated bilirubin  Hyponatremia    Plan:    Patient remains hemodynamically stable and was weaning well this morning  Blood gases show pH of 7.41, PCO2 38.4, PO2 of 147, bicarb 24.5  Continue supplemental oxygen to keep oxygen saturation greater than 92%  Continue nocturnal and as needed noninvasive mechanical ventilation (BiPAP)  No objections for extubation if general surgery is okay. Sepsis likely secondary to gastric perforation on Zosyn, continues on fluid    Thrombocytopenia-platelet count dropped from 220s to 75, likely related to sepsis. Since patient has received heparin, will stop heparin and send for HIT. Low suspicion for HIT    Gastric volvulus with viscus perforation s/p robotic assisted laparoscopic hiatal hernia reduction with gastropexy. Has 2 PEG, 2 TAISHA drain in placeon Zosyn  Acute kidney injurynonoliguric, good urine output after Walker change, nephrology following. Hypernatremia-on Ringer lactate  Elevated troponins cardiology evaluation to follow, echo shows EF of 55%. Diabetes mellituscover with insulin     Continue to monitor I/O with a goal of even/negative fluid balance      Maryjane Arriola M.D. Pulmonary and Critical Care Medicine           5/6/2021, 1:33 PM    Please note that this chart was generated using voice recognition Dragon dictation software. Although every effort was made to ensure the accuracy of this automated transcription, some errors in transcription may have occurred.

## 2021-05-06 NOTE — PROGRESS NOTES
Plan for esophagram tomorrow am per Dr. Nargis Adan. Order placed.      Thanks,    Electronically signed by Yashira Valero DO on 5/6/2021 at 4:36 PM

## 2021-05-06 NOTE — PROGRESS NOTES
Physical Therapy    Facility/Department: [address]  Initial Assessment    NAME: Eli Saunders  : 1950  MRN: [id number]    Date of Service: 2021  Date of Procedure: 2021   Pre-Op Diagnosis: PARAESOPHAGEAL HERNIA, PERFORRATED STOMACH   Post-Op Diagnosis: Same    Procedure(s):  PARAESPHAGEAL HERNIA REPAIR LAPAROSCOPIC ROBOTIC     Discharge Recommendations:  Continue to assess pending progress   PT Equipment Recommendations  Equipment Needed: No    Assessment   Body structures, Functions, Activity limitations: Decreased functional mobility ; Decreased strength;Decreased endurance  Assessment: Pt intubated. Pt followed some commands for active exercise. Will continue to assess. Prognosis: Good  Decision Making: Medium Complexity  PT Education: Plan of Care;General Safety  Barriers to Learning: Sedation, fatigue. REQUIRES PT FOLLOW UP: Yes  Activity Tolerance  Activity Tolerance: Patient limited by endurance; Patient limited by fatigue       Patient Diagnosis(es): The primary encounter diagnosis was Acute pancreatitis, unspecified complication status, unspecified pancreatitis type. Diagnoses of BONNIE (acute kidney injury) ([address] and Hiatal hernia were also pertinent to this visit. has a past medical history of Diabetes mellitus ([address] Gout, Hiatal hernia, Hyperlipidemia, Hypertension, and Thyroid disease. has a past surgical history that includes Cholecystectomy (); Hysterectomy (); Breast surgery; other surgical history (); other surgical history (); Tubal ligation (); and Gastric fundoplication (N/A, 4/3/2181).     Restrictions  Restrictions/Precautions  Restrictions/Precautions: General Precautions  Required Braces or Orthoses?: No  Vision/Hearing  Vision: Within Functional Limits  Hearing: Within functional limits     Subjective  General  Patient assessed for rehabilitation services?: Yes  Family / Caregiver Present: No  Follows Commands: Impaired  Other (Comment): Pt on some goal 1: Prevent contractures through ROM and stretching. Short term goal 2: Pt to follow most commands for active participation in treatment  Short term goal 3: Progress with mobility as appropriate. Patient Goals   Patient goals : Get tube out.        Therapy Time   Individual Concurrent Group Co-treatment   Time In 0920         Time Out 0940         Minutes 20         Timed Code Treatment Minutes: One Maki sky, PT

## 2021-05-06 NOTE — PROGRESS NOTES
SUBJECTIVE    Patient was seen and examined. Consult from [facility] reviewed. Patient is still intubated plan for extubation noted. In last 24 hours there has been improvement in her urine output and her creatinine remained stable around 1.8 mg/dL. Patient remained febrile and fever of 38.4 centigrade. Her urine output was close to 1.2 L in last 24 hours. Patient is still 9 L positive. Ultrasound shows right kidney 9.6 cm and left kidney 11.7 cm without hydronephrosis  Patient has low C3 of 50 but C4 was within normal limit. Free light chain ratios were normal and hepatitis serology was not reactive. Proteinuria was 300 mg/g of creatinine   OBJECTIVE      CURRENT TEMPERATURE:  Temp: 100.9 °F (38.3 °C)  MAXIMUM TEMPERATURE OVER 24HRS:  Temp (24hrs), Av.1 °F (38.4 °C), Min:100.8 °F (38.2 °C), Max:101.7 °F (38.7 °C)    CURRENT RESPIRATORY RATE:  Resp: 19  CURRENT PULSE:  Pulse: 93  CURRENT BLOOD PRESSURE:  BP: 116/72  24HR BLOOD PRESSURE RANGE:  Systolic (22UPB), MNI:552 , Min:86 , RWV:391   ; Diastolic (76DTE), RJX:18, Min:49, Max:82    24HR INTAKE/OUTPUT:      Intake/Output Summary (Last 24 hours) at 2021 1215  Last data filed at 2021 1200  Gross per 24 hour   Intake 3231 ml   Output 1479 ml   Net 1752 ml     WEIGHT :  Patient Vitals for the past 96 hrs (Last 3 readings):   Weight   21 0550 205 lb 4 oz (93.1 kg)   21 0600 202 lb 9.6 oz (91.9 kg)   21 1258 166 lb (75.3 kg)     PHYSICAL EXAM      GENERAL APPEARANCE: Patient was awake and alert and able to follow verbal commands.   She is intubated and on 35% FiO2  SKIN: Warm to touch and no erythema  EYES: Conjunctiva was pink  ENT: No thrush  NECK: No JVD or carotid bruit  PULMONARY: Bilateral air entry and clear to auscultation no crackles or wheezes  CADRDIOVASCULAR: S1 and S2 audible no S3 or murmur  ABDOMEN: Soft and nontender bowel sounds are positive and no ascites   EXTREMITIES: 1+ edema    CURRENT MEDICATIONS piperacillin-tazobactam (ZOSYN) 3,375 mg in dextrose 5 % 50 mL IVPB extended infusion (mini-bag), Q8H  insulin glargine (LANTUS) injection vial 5 Units, Nightly  acetaminophen (TYLENOL) suppository 325 mg, Q4H PRN  chlorhexidine (PERIDEX) 0.12 % solution 15 mL, BID  ketamine (KETALAR) 500 mg in sodium chloride 0.9 % 250 mL infusion, Continuous  vasopressin 20 Units in dextrose 5 % 100 mL infusion, Continuous  dexmedetomidine (PRECEDEX) 400 mcg in sodium chloride 0.9 % 100 mL infusion, Continuous  insulin lispro (HUMALOG) injection vial 0-18 Units, Q4H  fluconazole (DIFLUCAN) 200 mg IVPB, Q24H  levothyroxine (SYNTHROID) injection 56 mcg, Daily    And  sodium chloride (PF) 0.9 % injection 5 mL, Daily  PN-Adult 2-in-1 Central Line (Standard), Continuous TPN  fat emulsion 20 % infusion 100 mL, Daily  lubrifresh P.M. (artificial tears) ophthalmic ointment, PRN  pantoprazole (PROTONIX) injection 40 mg, Daily    And  sodium chloride (PF) 0.9 % injection 10 mL, Daily  norepinephrine (LEVOPHED) 16 mg in sodium chloride 0.9 % 250 mL infusion, Continuous  sodium chloride flush 0.9 % injection 5-40 mL, 2 times per day  sodium chloride flush 0.9 % injection 5-40 mL, PRN  0.9 % sodium chloride infusion, PRN  ondansetron (ZOFRAN) injection 4 mg, Q6H PRN  dextrose 50 % IV solution, PRN          LABS      CBC:   Recent Labs     05/05/21  0255 05/05/21  0646 05/06/21  0503   WBC 13.2* 10.6 7.8   RBC 3.96 3.91* 3.35*   HGB 11.5* 11.1* 9.5*   HCT 36.5 35.2* 29.9*   MCV 92.2 90.0 89.3   MCH 29.0 28.4 28.4   MCHC 31.5 31.5 31.8   RDW 14.3 14.3 14.4   PLT See Reflexed IPF Result See Reflexed IPF Result See Reflexed IPF Result   MPV NOT REPORTED NOT REPORTED NOT REPORTED      BMP:   Recent Labs     05/05/21  1410 05/05/21  1944 05/06/21  0503   * 143 147*   K 3.8 3.5* 3.6*   * 108* 113*   CO2 18* 20 21   BUN 60* 60* 54*   CREATININE 2.06* 1.98* 1.80*   GLUCOSE 151* 208* 272*   CALCIUM 8.6 8.3* 8.0*      BNP:No results found for: BNP  PHOSPHORUS:    Recent Labs     05/04/21  1253 05/05/21  0646 05/06/21  0503   PHOS 3.5 4.3 1.8*     MAGNESIUM:   Recent Labs     05/04/21  1253 05/05/21  0646 05/06/21  0503   MG 1.9 1.5* 2.2     ALBUMIN:   Recent Labs     05/05/21  0500 05/05/21  1410 05/06/21  0503   LABALBU 2.7* 2.7* 2.1*     SPEP:   Lab Results   Component Value Date    PROT 4.6 05/06/2021    PROT 7.3 05/03/2021     UPEP: No results found for: TPU   HEPBSAG:  Lab Results   Component Value Date    HEPBSAG NONREACTIVE 05/05/2021     HEPCAB:  Lab Results   Component Value Date    HEPCAB NONREACTIVE 05/05/2021     C3:   Lab Results   Component Value Date    C3 50 (L) 05/05/2021     C4:   Lab Results   Component Value Date    C4 14 05/05/2021   URINE SODIUM:    Lab Results   Component Value Date    KILEY 43 05/05/2021    URINE CREATININE:    Lab Results   Component Value Date    LABCREA 131.0 05/05/2021   URINALYSIS:  U/A:   Lab Results   Component Value Date    NITRU NEGATIVE 05/06/2021    COLORU YELLOW 05/06/2021    PHUR 5.5 05/06/2021    WBCUA None 05/06/2021    WBCUA 0-3 05/03/2021    RBCUA 0 TO 2 05/06/2021    RBCUA 0-2 05/03/2021    MUCUS NOT REPORTED 05/06/2021    TRICHOMONAS NOT REPORTED 05/06/2021    YEAST NOT REPORTED 05/06/2021    BACTERIA FEW 05/06/2021    CLARITYU Clear 05/03/2021    SPECGRAV 1.023 05/06/2021    LEUKOCYTESUR NEGATIVE 05/06/2021    UROBILINOGEN Normal 05/06/2021    BILIRUBINUR NEGATIVE 05/06/2021    BILIRUBINUR Moderate 05/03/2021    BLOODU Negative 05/03/2021    GLUCOSEU 1+ 05/06/2021    KETUA NEGATIVE 05/06/2021    AMORPHOUS NOT REPORTED 05/06/2021     ANTIGBM:No results found for: GBMABIGG      RADIOLOGY      Reviewed as available. ASSESSMENT      1. Cute kidney injury due to ischemic ATN further complicated by hypotension, third spacing due to gastric volvulus and perforation leading to acute renal failure  Her creatinine is improving and now down to 1.8 mg/dL  2.   Possible underlying chronic kidney disease with a creatinine of 1.3 mg/dL  3. Status post laparotomy for gastric volvulus and perforation. Patient underwent wedge gastrectomy and gastropexy with drain placement. [facility] Respiratory failure patient is on ventilator. Pulmonary status has improved significantly and plan for extubation noted  5. Non-ST BENJAMIN  6. Longstanding type 2 diabetes    PLAN      1. Follow serum electrolyte  2. May need diuresis  3. We will follow with you    Please do not hesitate to call with questions.     Electronically signed by Frederick Betancur MD on 5/6/2021 at 12:15 PM

## 2021-05-06 NOTE — PLAN OF CARE
Problem: MECHANICAL VENTILATION  Goal: Patient will maintain patent airway  5/6/2021 1412 by Phyllis Brooks RCP  Outcome: Completed     Problem: MECHANICAL VENTILATION  Goal: Oral health is maintained or improved  5/6/2021 1412 by Phyllis Brooks RCP  Outcome: Completed     Problem: MECHANICAL VENTILATION  Goal: ET tube will be managed safely  5/6/2021 1412 by Phyllis Brooks RCP  Outcome: Completed     Problem: MECHANICAL VENTILATION  Goal: Ability to express needs and understand communication  5/6/2021 1412 by Phyllis Brooks RCP  Outcome: Completed     Problem: MECHANICAL VENTILATION  Goal: Mobility/activity is maintained at optimum level for patient  5/6/2021 1412 by Phyllis Brooks RCP  Outcome: Completed

## 2021-05-06 NOTE — FLOWSHEET NOTE
Order obtained for extubation. SpO2 of 100 on 40% FiO2. Patient extubated and placed on 4L O2 via nasal cannula. Post extubation SpO2 is 100% with HR  97 bpm and RR 20 breaths/min. Patient had moderate cough that was productive of clear  and white sputum. Extubation Well tolerated by patient. Samantha Clark Flick   2:10 PM
meaning/purpose;Sustaining presence/ Ministry of presence   Outcome Expressed gratitude;Expressed feelings/needs/concerns;Engaged in conversation; Less anxious, less agitated; Shared life review; Hopeful;Encouraged     Electronically signed by Joycelyn Cormier on 5/5/2021 at 1:48 AM

## 2021-05-06 NOTE — PLAN OF CARE
Problem: Non-Violent Restraints  Goal: No harm/injury to patient while restraints in use  5/6/2021 0215 by Milka Waters RN  Outcome: Met This Shift     Problem: Non-Violent Restraints  Goal: Patient's dignity will be maintained  5/6/2021 0215 by Milka Waters RN  Outcome: Met This Shift     Problem: OXYGENATION/RESPIRATORY FUNCTION  Goal: Patient will maintain patent airway  5/6/2021 0215 by Milka Waters RN  Outcome: Ongoing     Problem: OXYGENATION/RESPIRATORY FUNCTION  Goal: Patient will achieve/maintain normal respiratory rate/effort  Description: Respiratory rate and effort will be within normal limits for the patient  5/6/2021 0215 by Milka Waters RN  Outcome: Ongoing     Problem: MECHANICAL VENTILATION  Goal: Patient will maintain patent airway  5/6/2021 0215 by Milka Waters RN  Outcome: Ongoing     Problem: MECHANICAL VENTILATION  Goal: Oral health is maintained or improved  5/6/2021 0215 by Milka Waters RN  Outcome: Ongoing     Problem: MECHANICAL VENTILATION  Goal: ET tube will be managed safely  5/6/2021 0215 by Milka Waters RN  Outcome: Ongoing     Problem: MECHANICAL VENTILATION  Goal: Ability to express needs and understand communication  5/6/2021 0215 by Milka Waters RN  Outcome: Ongoing     Problem: MECHANICAL VENTILATION  Goal: Mobility/activity is maintained at optimum level for patient  5/6/2021 0215 by Milka Waters RN  Outcome: Ongoing     Problem: SKIN INTEGRITY  Goal: Skin integrity is maintained or improved  5/6/2021 0215 by Milka Waters RN  Outcome: Ongoing     Problem: Non-Violent Restraints  Goal: Removal from restraints as soon as assessed to be safe  5/6/2021 0215 by Milka Waters RN  Outcome: Ongoing     Problem: Confusion - Acute:  Goal: Absence of continued neurological deterioration signs and symptoms  Description: Absence of continued neurological deterioration signs and symptoms  5/6/2021 0215 by Milka Waters RN  Outcome: Ongoing     Problem: Confusion - Acute:  Goal: Mental status will be restored to baseline  Description: Mental status will be restored to baseline  5/6/2021 0215 by Nancy Trujillo RN  Outcome: Ongoing     Problem: Discharge Planning:  Goal: Ability to perform activities of daily living will improve  Description: Ability to perform activities of daily living will improve  5/6/2021 0215 by Nancy Trujillo RN  Outcome: Ongoing     Problem: Discharge Planning:  Goal: Participates in care planning  Description: Participates in care planning  5/6/2021 0215 by Nancy Trujillo RN  Outcome: Ongoing     Problem: Injury - Risk of, Physical Injury:  Goal: Absence of physical injury  Description: Absence of physical injury  5/6/2021 0215 by Nancy Trujillo RN  Outcome: Ongoing     Problem: Injury - Risk of, Physical Injury:  Goal: Will remain free from falls  Description: Will remain free from falls  5/6/2021 0215 by Nancy Trujillo RN  Outcome: Ongoing     Problem: Mood - Altered:  Goal: Mood stable  Description: Mood stable  5/6/2021 0215 by Nancy Trujillo RN  Outcome: Ongoing     Problem: Mood - Altered:  Goal: Absence of abusive behavior  Description: Absence of abusive behavior  5/6/2021 0215 by Nancy Trujillo RN  Outcome: Ongoing     Problem: Mood - Altered:  Goal: Verbalizations of feeling emotionally comfortable while being cared for will increase  Description: Verbalizations of feeling emotionally comfortable while being cared for will increase  5/6/2021 0215 by Nancy Trujillo RN  Outcome: Ongoing     Problem: Psychomotor Activity - Altered:  Goal: Absence of psychomotor disturbance signs and symptoms  Description: Absence of psychomotor disturbance signs and symptoms  5/6/2021 0215 by Nancy Trujillo RN  Outcome: Ongoing     Problem: Sensory Perception - Impaired:  Goal: Demonstrations of improved sensory functioning will increase  Description: Demonstrations of improved sensory functioning will increase  5/6/2021 0215 by Nancy Trujillo RN  Outcome: Ongoing     Problem: Sensory Perception - Impaired:  Goal: Decrease in sensory misperception frequency  Description: Decrease in sensory misperception frequency  5/6/2021 0215 by Stephany Carrizales RN  Outcome: Ongoing     Problem: Sensory Perception - Impaired:  Goal: Able to refrain from responding to false sensory perceptions  Description: Able to refrain from responding to false sensory perceptions  5/6/2021 0215 by Stephany Carrizales RN  Outcome: Ongoing     Problem: Sensory Perception - Impaired:  Goal: Demonstrates accurate environmental perceptions  Description: Demonstrates accurate environmental perceptions  5/6/2021 0215 by Stephany Carrizales RN  Outcome: Ongoing     Problem: Sensory Perception - Impaired:  Goal: Able to distinguish between reality-based and nonreality-based thinking  Description: Able to distinguish between reality-based and nonreality-based thinking  5/6/2021 0215 by Stephany Carrizales RN  Outcome: Ongoing     Problem: Sensory Perception - Impaired:  Goal: Able to interrupt nonreality-based thinking  Description: Able to interrupt nonreality-based thinking  5/6/2021 0215 by Stephany Carrizales RN  Outcome: Ongoing     Problem: Sleep Pattern Disturbance:  Goal: Appears well-rested  Description: Appears well-rested  5/6/2021 0215 by Stephany Carrizales RN  Outcome: Ongoing     Problem: Skin Integrity:  Goal: Will show no infection signs and symptoms  Description: Will show no infection signs and symptoms  5/6/2021 0215 by Stephany Carrizales RN  Outcome: Ongoing     Problem: Skin Integrity:  Goal: Absence of new skin breakdown  Description: Absence of new skin breakdown  5/6/2021 0215 by Stephany Carrizales RN  Outcome: Ongoing     Problem: Falls - Risk of:  Goal: Absence of physical injury  Description: Absence of physical injury  5/6/2021 0215 by Stephany Carrizales RN  Outcome: Ongoing     Problem: Falls - Risk of:  Goal: Will remain free from falls  Description: Will remain free from falls  5/6/2021 0215 by Stephany Carrizales RN

## 2021-05-06 NOTE — PROGRESS NOTES
Pharmacy Note     Renal Dose Adjustment    Yolis Hughes is a 78 y.o. female. Pharmacist assessment of renally cleared medications. Recent Labs     05/05/21 1944 05/06/21  0503   BUN 60* 54*       Recent Labs     05/05/21 1944 05/06/21  0503   CREATININE 1.98* 1.80*       Estimated Creatinine Clearance: 32 mL/min (A) (based on SCr of 1.8 mg/dL (H)).   Estimated CrCl using Ideal Body Weight: 24 mL/min (based on IBW 50.1 kg)    Height:   Ht Readings from Last 1 Encounters:   05/03/21 5' 2\" (1.575 m)     Weight:  Wt Readings from Last 1 Encounters:   05/06/21 205 lb 4 oz (93.1 kg)       The following medication dose has been adjusted based upon renal function per P&T Guidelines:             Zosyn Q12H to [facility], PharmD BCPS BCCCP  5/6/2021 8:50 AM

## 2021-05-06 NOTE — PLAN OF CARE
Problem: Non-Violent Restraints  Goal: No harm/injury to patient while restraints in use  5/6/2021 1152 by Paula Varela  Outcome: Met This Shift  5/6/2021 0215 by Lillie Dempsey RN  Outcome: Met This Shift  Goal: Patient's dignity will be maintained  5/6/2021 1152 by Paula Varela  Outcome: Met This Shift  5/6/2021 0215 by Lillie Dempsey RN  Outcome: Met This Shift     Problem: OXYGENATION/RESPIRATORY FUNCTION  Goal: Patient will maintain patent airway  5/6/2021 1152 by Paula Varela  Outcome: Ongoing  5/6/2021 0215 by Lillie Dempsey RN  Outcome: Ongoing  Goal: Patient will achieve/maintain normal respiratory rate/effort  Description: Respiratory rate and effort will be within normal limits for the patient  5/6/2021 1152 by Paula Varela  Outcome: Ongoing  5/6/2021 0215 by Lillie Dempsey RN  Outcome: Ongoing     Problem: MECHANICAL VENTILATION  Goal: Patient will maintain patent airway  5/6/2021 1152 by Paula Varela  Outcome: Ongoing  5/6/2021 0215 by Lillie Dempsey RN  Outcome: Ongoing  Goal: Oral health is maintained or improved  5/6/2021 1152 by Paula Varela  Outcome: Ongoing  5/6/2021 0215 by Lillie Dempsey RN  Outcome: Ongoing  Goal: ET tube will be managed safely  5/6/2021 1152 by Paula Varela  Outcome: Ongoing  5/6/2021 0215 by Lillie Dempsey RN  Outcome: Ongoing  Goal: Ability to express needs and understand communication  5/6/2021 1152 by Paula Varela  Outcome: Ongoing  5/6/2021 0215 by Lillie Dempsey RN  Outcome: Ongoing  Goal: Mobility/activity is maintained at optimum level for patient  5/6/2021 1152 by Paula Varela  Outcome: Ongoing  5/6/2021 0215 by Lillie Dempsey RN  Outcome: Ongoing     Problem: SKIN INTEGRITY  Goal: Skin integrity is maintained or improved  5/6/2021 1152 by Paula Varela  Outcome: Ongoing  5/6/2021 0215 by Lillie Dempsey RN  Outcome: Ongoing     Problem: Confusion - Acute:  Goal: Absence of continued neurological deterioration signs and symptoms  Description: Absence of continued neurological deterioration signs and symptoms  5/6/2021 1152 by Camilla   Outcome: Ongoing  5/6/2021 0215 by Trevon Leung RN  Outcome: Ongoing  Goal: Mental status will be restored to baseline  Description: Mental status will be restored to baseline  5/6/2021 1152 by Camilla Zhou  Outcome: Ongoing  5/6/2021 0215 by Trevon Leung RN  Outcome: Ongoing     Problem: Discharge Planning:  Goal: Ability to perform activities of daily living will improve  Description: Ability to perform activities of daily living will improve  5/6/2021 1152 by Camilla Zhou  Outcome: Ongoing  5/6/2021 0215 by Trevon Leung RN  Outcome: Ongoing  Goal: Participates in care planning  Description: Participates in care planning  5/6/2021 1152 by Camilla Zhou  Outcome: Ongoing  5/6/2021 0215 by Trevon Leung RN  Outcome: Ongoing     Problem: Injury - Risk of, Physical Injury:  Goal: Absence of physical injury  Description: Absence of physical injury  5/6/2021 1152 by Camilla Zhou  Outcome: Ongoing  5/6/2021 0215 by Trevon Leung RN  Outcome: Ongoing  Goal: Will remain free from falls  Description: Will remain free from falls  5/6/2021 1152 by Camilla   Outcome: Ongoing  5/6/2021 0215 by Trevon Leung RN  Outcome: Ongoing     Problem: Mood - Altered:  Goal: Mood stable  Description: Mood stable  5/6/2021 1152 by Camilla Zhou  Outcome: Ongoing  5/6/2021 0215 by Trevon Leung RN  Outcome: Ongoing  Goal: Absence of abusive behavior  Description: Absence of abusive behavior  5/6/2021 1152 by Camilla   Outcome: Ongoing  5/6/2021 0215 by Trevon Leung RN  Outcome: Ongoing  Goal: Verbalizations of feeling emotionally comfortable while being cared for will increase  Description: Verbalizations of feeling emotionally comfortable while being cared for will increase  5/6/2021 1152 by Camilla Zhou  Outcome: Ongoing 5/6/2021 0215 by Dion Osman RN  Outcome: Ongoing     Problem: Psychomotor Activity - Altered:  Goal: Absence of psychomotor disturbance signs and symptoms  Description: Absence of psychomotor disturbance signs and symptoms  5/6/2021 1152 by Blessing Varghese  Outcome: Ongoing  5/6/2021 0215 by Dion Osman RN  Outcome: Ongoing     Problem: Sensory Perception - Impaired:  Goal: Demonstrations of improved sensory functioning will increase  Description: Demonstrations of improved sensory functioning will increase  5/6/2021 1152 by Blessing Varghese  Outcome: Ongoing  5/6/2021 0215 by Dion Osman RN  Outcome: Ongoing  Goal: Decrease in sensory misperception frequency  Description: Decrease in sensory misperception frequency  5/6/2021 1152 by Blessing Varghese  Outcome: Ongoing  5/6/2021 0215 by Dion Osman RN  Outcome: Ongoing  Goal: Able to refrain from responding to false sensory perceptions  Description: Able to refrain from responding to false sensory perceptions  5/6/2021 1152 by Blessing Varghese  Outcome: Ongoing  5/6/2021 0215 by Dion Osman RN  Outcome: Ongoing  Goal: Demonstrates accurate environmental perceptions  Description: Demonstrates accurate environmental perceptions  5/6/2021 1152 by Blessing Varghese  Outcome: Ongoing  5/6/2021 0215 by Dion Osman RN  Outcome: Ongoing  Goal: Able to distinguish between reality-based and nonreality-based thinking  Description: Able to distinguish between reality-based and nonreality-based thinking  5/6/2021 1152 by Blessing Varghese  Outcome: Ongoing  5/6/2021 0215 by Dion Osman RN  Outcome: Ongoing  Goal: Able to interrupt nonreality-based thinking  Description: Able to interrupt nonreality-based thinking  5/6/2021 1152 by Blessing Varghese  Outcome: Ongoing  5/6/2021 0215 by Dion Osman RN  Outcome: Ongoing     Problem: Sleep Pattern Disturbance:  Goal: Appears well-rested  Description: Appears well-rested  5/6/2021 1152 by Norris Tan Adam  Outcome: Ongoing  5/6/2021 0215 by Shashank Mensah RN  Outcome: Ongoing     Problem: Skin Integrity:  Goal: Will show no infection signs and symptoms  Description: Will show no infection signs and symptoms  5/6/2021 1152 by Bere Cifuentes  Outcome: Ongoing  5/6/2021 0215 by Shashank Mensah RN  Outcome: Ongoing  Goal: Absence of new skin breakdown  Description: Absence of new skin breakdown  5/6/2021 1152 by Bere Cifuentes  Outcome: Ongoing  5/6/2021 0215 by Shashank Mensah RN  Outcome: Ongoing     Problem: Falls - Risk of:  Goal: Absence of physical injury  Description: Absence of physical injury  5/6/2021 1152 by Bere Cifuentes  Outcome: Ongoing  5/6/2021 0215 by Shashank Mensah RN  Outcome: Ongoing  Goal: Will remain free from falls  Description: Will remain free from falls  5/6/2021 1152 by Bere Cifuentes  Outcome: Ongoing  5/6/2021 0215 by Shashank Mensah RN  Outcome: Ongoing     Problem: Nutrition  Goal: Optimal nutrition therapy  Description: Nutrition Problem #1: Inadequate oral intake  Intervention: Food and/or Nutrient Delivery: Continue NPO, Start Parenteral Nutrition-suggest start with 150 g Dex, 50 g AA at 41.7 mL/hr with 100 mL 20% lipids.   Nutritional Goals: meet % of estimated nutrient needs     5/6/2021 1152 by Bere Cifuentes  Outcome: Ongoing  5/6/2021 0215 by Shashank Mensah RN  Outcome: Ongoing     Problem: Non-Violent Restraints  Goal: Removal from restraints as soon as assessed to be safe  5/6/2021 1152 by Bere Cifuentes  Outcome: Not Met This Shift  5/6/2021 0215 by Shashank Mensah RN  Outcome: Ongoing

## 2021-05-06 NOTE — PROGRESS NOTES
Comprehensive Nutrition Assessment    Type and Reason for Visit:  Reassess    Nutrition Recommendations/Plan: Continue Parenteral Nutrition; increase AA, K, Phos, and Ca, decrease Na, and hold lipids. Will continue to monitor TPN adequacy and labs - modify PN as needed/able. Nutrition Assessment:  Pt remains on vent at this time. TPN started last night. Labs reviewed:Na 147 mmol/L, K 3.6 mmol/L, Cl 113 mmol/L, BUN 54 mg/dL, CR 1.80 mg/dL, Phos 1.8 mg/dL, Ca 8 mg/dL, Glu 222-291 mg/dL, Trig 366 mg/dL. Meds include: NaPhos, Lantus, Humalog SS, Protonix, Synthroid, and KCl prn. Malnutrition Assessment:  Malnutrition Status:  Insufficient data    Context:  Acute Illness     Findings of the 6 clinical characteristics of malnutrition:  Energy Intake:  1 - 75% or less of estimated energy requirements for 7 or more days  Weight Loss:  Unable to assess     Body Fat Loss:  Unable to assess     Muscle Mass Loss:  Unable to assess    Fluid Accumulation:  (Moderate) Generalized   Strength:  Not Performed    Estimated Daily Nutrient Needs:  Energy (kcal):  0449-4037 kcal/day; Weight Used for Energy Requirements:  Current     Protein (g):  75 g pro/day; Weight Used for Protein Requirements:  Ideal(1.5)        Fluid (ml/day):  7023-8231 mL/day (or per MD); Method Used for Fluid Requirements:  ml/Kg(25-30)      Nutrition Related Findings:  S/p hiatal hernia repair, gastrectomy, gastropexy, G-tubes 5/4/21.       Wounds:  Surgical Incision(abdomen)       Current Nutrition Therapies:    Diet NPO Effective Now  PN-Adult 2-in-1 Central Line (Standard)  Current Parenteral Nutrition Orders:  · Type and Formula: 2-in-1 Custom(150 g Dex, 50 g AA)   · Lipids: 100ml, Daily  · Duration: Continuous  · Rate/Volume: 41.7 mL/hr  · Current PN Order Provides: 910 kcal and 50 g pro/day    Additional Calorie Sources:   none    Anthropometric Measures:  · Height: 5' 2\" (157.5 cm)  · Current Body Weight: 205 lb 4 oz (93.1 kg)   · Admission Body Weight: 202 lb (91.6 kg)    · Usual Body Weight: (166 lb - stated)     · Ideal Body Weight: 110 lbs; % Ideal Body Weight  186%   · BMI: 37.5  · BMI Categories: Obese Class 2 (BMI 35.0 -39.9)       Nutrition Diagnosis:   · Inadequate oral intake related to altered GI function, impaired respiratory function as evidenced by NPO or clear liquid status due to medical condition, nutrition support - parenteral nutrition    Nutrition Interventions:   Food and/or Nutrient Delivery:  Continue Parenteral Nutrition; increase AA, K, Phos, and Ca, decrease Na, and hold lipids. Nutrition Education/Counseling:  No recommendation at this time   Coordination of Nutrition Care:  Continue to monitor while inpatient    Goals:  meet % of estimated nutrient needs-progressing towards goal        Nutrition Monitoring and Evaluation:   Behavioral-Environmental Outcomes:  None Identified   Food/Nutrient Intake Outcomes:  Parenteral Nutrition Intake/Tolerance  Physical Signs/Symptoms Outcomes:  Biochemical Data, Fluid Status or Edema, Nutrition Focused Physical Findings, Skin, Weight, GI Status     Discharge Planning:     Too soon to determine     Electronically signed by Murali Alejo RD, LD on 5/6/21 at 12:11 PM EDT    Contact: [phone]

## 2021-05-06 NOTE — PROGRESS NOTES
Bariatric Surgery Progress Note    PATIENT NAME: Alvarez Jauregui   TODAY'S DATE: 5/6/2021, 7:14 AM  Chief Complaint   Patient presents with    Blood Sugar Problem     >450    Hernia     Hiatal      SUBJECTIVE:    Pt seen and examined. Low UOP last 8hrs. Did have temp 38.7 in last 24hrs. Remains intubated and sedated. Pressor requirement decreasing.      LLQ TAISHA 170 ml SS  RLQ TAISHA 290 ml SS  PEG tubes x2 minimal bilious output   Tmax 38.7    OBJECTIVE:   Vitals:  /74   Pulse 81   Temp 101.1 °F (38.4 °C) (Core)   Resp 21   Ht 5' 2\" (1.575 m)   Wt 205 lb 4 oz (93.1 kg)   SpO2 100%   BMI 37.54 kg/m²      INTAKE/OUTPUT:      Intake/Output Summary (Last 24 hours) at 5/6/2021 0714  Last data filed at 5/6/2021 9320  Gross per 24 hour   Intake 2971.18 ml   Output 999 ml   Net 1972.18 ml                 General: intubated and sedated   Lungs: Symmetrical chest rise bilaterally, mechanically vented   Heart: S1S2  Abdomen: Soft, ND, appropriately tender, non peritoneal, no rebound, JPs x2 intact with SS in bulb, incisions c/d/i, PEG x2 to gravity  Extremity: moves all extremities x4, trace edema    Data Review:  CBC:   Recent Labs     05/05/21  0255 05/05/21  0646 05/06/21  0503   WBC 13.2* 10.6 7.8   HGB 11.5* 11.1* 9.5*   PLT See Reflexed IPF Result See Reflexed IPF Result See Reflexed IPF Result     BMP:    Recent Labs     05/05/21  1410 05/05/21  1944 05/06/21  0503   * 143 147*   K 3.8 3.5* 3.6*   * 108* 113*   CO2 18* 20 21   BUN 60* 60* 54*   CREATININE 2.06* 1.98* 1.80*   GLUCOSE 151* 208* 272*     Hepatic:   Recent Labs     05/05/21  0500 05/05/21  1410 05/06/21  0503   * 205* 154*   * 149* 133*   ALKPHOS 34* 42 48   BILITOT 2.30* 2.08* 1.30*   BILIDIR 0.76* 0.62*  --      Coagulation:   Recent Labs     05/04/21  0509 05/04/21 2010 05/04/21 2010 05/05/21 0500 05/05/21  1410 05/06/21  0503   APTT  --  27.4  --   --   --   --    PROT 5.2*  --    < > 4.4* 4.9* 4.6*   INR 2.9 1.4  --  1.7 --   --     < > = values in this interval not displayed. ASSESSMENT   Patient Active Problem List   Diagnosis    Acute pancreatitis    Septic shock (HCC)    Non-STEMI (non-ST elevated myocardial infarction) ([facility] [address] [facility]    Diabetic ketoacidosis without coma associated with type 2 diabetes mellitus ([facility] [address] [facility]    BONNIE (acute kidney injury) ([facility] [address] [facility]    Hernia with obstruction    Essential hypertension    Thyroid disease    Syncope    Acute tubular necrosis (HCC)    Lactic acidosis    Acute hypoxemic respiratory failure (HCC)    Hiatal hernia    Gastric volvulus     77 yo F s/p 5/4 emergent robotic assisted laparoscopic hiatal hernia reduction with gastropexy via g-tube x2     PLAN  1. Cont recs and management per CC/TICU  2. Recommend cont NPO, andree TAISHA and PEG tube output   3.   Cont IV abx and monitor WBC/temperature     Thank you,    Electronically signed by Makayla Hicks DO  on 5/6/2021 at 7:14 AM

## 2021-05-06 NOTE — PROGRESS NOTES
Pt having diminishing UOP overnight despite stable labs and decreased need for pressor support. No UOP noted since 0200 and no urine in morel bag this AM despite having just received at 500mL bolus. Pt expressing a lot of discomfort upon gentle pelvic palptation. Received order to exchange morel. Tip of old morel clearly occluded. 500mL of UOP quickly emptied. Dr Call Dears notified.

## 2021-05-06 NOTE — PLAN OF CARE
Problem: OXYGENATION/RESPIRATORY FUNCTION  Goal: Patient will maintain patent airway  5/6/2021 1152 by Perla Whitman  Outcome: Ongoing  5/6/2021 0215 by Milagros Garcia RN  Outcome: Ongoing  Goal: Patient will achieve/maintain normal respiratory rate/effort  Description: Respiratory rate and effort will be within normal limits for the patient  5/6/2021 1152 by Perla Whitman  Outcome: Ongoing  5/6/2021 0215 by Milagros Garcia RN  Outcome: Ongoing     Problem: SKIN INTEGRITY  Goal: Skin integrity is maintained or improved  5/6/2021 1152 by Perla Whitman  Outcome: Ongoing  5/6/2021 0215 by Milagros Garcia RN  Outcome: Ongoing     Problem: Confusion - Acute:  Goal: Absence of continued neurological deterioration signs and symptoms  Description: Absence of continued neurological deterioration signs and symptoms  5/6/2021 1152 by Perla Whitman  Outcome: Ongoing  5/6/2021 0215 by Milagros Garcia RN  Outcome: Ongoing  Goal: Mental status will be restored to baseline  Description: Mental status will be restored to baseline  5/6/2021 1152 by Perla Whitman  Outcome: Ongoing  5/6/2021 0215 by Milagros Garcia RN  Outcome: Ongoing     Problem: Discharge Planning:  Goal: Ability to perform activities of daily living will improve  Description: Ability to perform activities of daily living will improve  5/6/2021 1152 by Perla Whitman  Outcome: Ongoing  5/6/2021 0215 by Milagros Garcia RN  Outcome: Ongoing  Goal: Participates in care planning  Description: Participates in care planning  5/6/2021 1152 by Perla Whitman  Outcome: Ongoing  5/6/2021 0215 by Milagros Garcia RN  Outcome: Ongoing     Problem: Injury - Risk of, Physical Injury:  Goal: Absence of physical injury  Description: Absence of physical injury  5/6/2021 1152 by Perla Whitman  Outcome: Ongoing  5/6/2021 0215 by Milagros Garcia RN  Outcome: Ongoing  Goal: Will remain free from falls  Description: Will remain free from falls  5/6/2021 1152 by Brock Cordova  Outcome: Ongoing  5/6/2021 0215 by Kevin Baird RN  Outcome: Ongoing     Problem: Mood - Altered:  Goal: Mood stable  Description: Mood stable  5/6/2021 1152 by Brock Cordova  Outcome: Ongoing  5/6/2021 0215 by Kevin Baird RN  Outcome: Ongoing  Goal: Absence of abusive behavior  Description: Absence of abusive behavior  5/6/2021 1152 by Brock Cordova  Outcome: Ongoing  5/6/2021 0215 by Kevin Baird RN  Outcome: Ongoing  Goal: Verbalizations of feeling emotionally comfortable while being cared for will increase  Description: Verbalizations of feeling emotionally comfortable while being cared for will increase  5/6/2021 1152 by Brock Cordova  Outcome: Ongoing  5/6/2021 0215 by Kevin Baird RN  Outcome: Ongoing     Problem: Psychomotor Activity - Altered:  Goal: Absence of psychomotor disturbance signs and symptoms  Description: Absence of psychomotor disturbance signs and symptoms  5/6/2021 1152 by Brock Cordova  Outcome: Ongoing  5/6/2021 0215 by Kevin Baird RN  Outcome: Ongoing     Problem: Sensory Perception - Impaired:  Goal: Demonstrations of improved sensory functioning will increase  Description: Demonstrations of improved sensory functioning will increase  5/6/2021 1152 by Brock Cordova  Outcome: Ongoing  5/6/2021 0215 by Kevin Baird RN  Outcome: Ongoing  Goal: Decrease in sensory misperception frequency  Description: Decrease in sensory misperception frequency  5/6/2021 1152 by Brock Cordova  Outcome: Ongoing  5/6/2021 0215 by Kevin Baird RN  Outcome: Ongoing  Goal: Able to refrain from responding to false sensory perceptions  Description: Able to refrain from responding to false sensory perceptions  5/6/2021 1152 by Brock Cordova  Outcome: Ongoing  5/6/2021 0215 by Kevin Baird RN  Outcome: Ongoing  Goal: Demonstrates accurate environmental perceptions  Description: Demonstrates accurate environmental perceptions  5/6/2021 1152 by Kirsten Spann  Outcome: Ongoing  5/6/2021 0215 by Jake Adan RN  Outcome: Ongoing  Goal: Able to distinguish between reality-based and nonreality-based thinking  Description: Able to distinguish between reality-based and nonreality-based thinking  5/6/2021 1152 by Kirsten Spann  Outcome: Ongoing  5/6/2021 0215 by Jake Adan RN  Outcome: Ongoing  Goal: Able to interrupt nonreality-based thinking  Description: Able to interrupt nonreality-based thinking  5/6/2021 1152 by Kirsten Spann  Outcome: Ongoing  5/6/2021 0215 by Jake Adan RN  Outcome: Ongoing     Problem: Sleep Pattern Disturbance:  Goal: Appears well-rested  Description: Appears well-rested  5/6/2021 1152 by Kirsten Spann  Outcome: Ongoing  5/6/2021 0215 by Jake Adan RN  Outcome: Ongoing     Problem: Skin Integrity:  Goal: Will show no infection signs and symptoms  Description: Will show no infection signs and symptoms  5/6/2021 1152 by Kirsten Spann  Outcome: Ongoing  5/6/2021 0215 by Jake Adan RN  Outcome: Ongoing  Goal: Absence of new skin breakdown  Description: Absence of new skin breakdown  5/6/2021 1152 by Kirsten Spann  Outcome: Ongoing  5/6/2021 0215 by Jake Adan RN  Outcome: Ongoing     Problem: Falls - Risk of:  Goal: Absence of physical injury  Description: Absence of physical injury  5/6/2021 1152 by Kirsten Spann  Outcome: Ongoing  5/6/2021 0215 by Jake Adan RN  Outcome: Ongoing  Goal: Will remain free from falls  Description: Will remain free from falls  5/6/2021 1152 by Kirsten Spann  Outcome: Ongoing  5/6/2021 0215 by Jake Adan RN  Outcome: Ongoing     Problem: Nutrition  Goal: Optimal nutrition therapy  Description: Nutrition Problem #1: Inadequate oral intake  Intervention: Food and/or Nutrient Delivery: Continue NPO, Start Parenteral Nutrition-suggest start with 150 g Dex, 50 g AA at 41.7 mL/hr with 100 mL 20% lipids.   Nutritional Goals: meet % of estimated nutrient needs     5/6/2021 1152 by Karmen Wick  Outcome: Ongoing  5/6/2021 0215 by Milka Waters RN  Outcome: Ongoing

## 2021-05-06 NOTE — PROGRESS NOTES
ICU PROGRESS NOTE          PATIENT NAME: Nikkie Flowers  MEDICAL RECORD NO. [id number]  DATE: 5/6/2021    PRIMARY CARE PHYSICIAN: Irwin Johnson DO     HD: # 3    ASSESSMENT    Hiatal Hernia Reduction  Wedge gastroectomy  Gastropexy x2  Placement of TAISHA drain x2  Septic Shock  Acute Pancreatitis   Non-STEMI   Diabetic Ketoacidosis without coma associated with type 2 DM  BONNIE (acute kidney injury)  Essential Hypertension  Thyroid disease  Syncope  Acute Tubular necrosis  Lactic Acidosis  Acute hypoxemic respiratory failure        MEDICAL DECISION MAKING AND PLAN    1. Neuro       1. Sedation/Pain          Ketamine           Precedex (not requiring medication currently)                 2. CV-        DX: NSTEMI        Map 79       Art /52              Troponin I on 5/3/21 0.404,        Troponin, High Sensitivity  5/3/21 at 1334- 85 and repeat at 1434- 94        Heparin  5000 u sc TID       LR @ 75 ml/hr        Levophed-currently  4mcg/min        Vasopressin               5/5/21 Cardiac ECHO Normal LV systolic function with LVEF 55%. RV appears dilated with reduced function. RV systolic pressure 37 mmHg               Cardiology note states elevated troponins likely secondary to CKD and acute pancreatitis. Recommends patient will need ischemia work-up likely stress test after acute issue resolved. 3. Heme      Hgb 9.5 (previous 11.1)       HCT 29.9 (previous 35.2)       INR today's pending (previous 1.7)      PTT 17.0 ( previous 14.5)       Type and Screened      Received no blood products                4. Res      PRVC, Rate 22, , PEEP 14, PIP 28  FI02 100%     5/5/21 ph 7.41, pCO2 38.4, pO2 147.2, HCO3 24.5 O2 Sat 99%     Will start spontaneous breathing trials if there is no plan for further surgery and extubate when appropriate. Nestor Aguilar- today's pending        Xr Chest Portable      Result Date: 5/5/2021      1.  Recommend repositioning of left internal jugular approach central venous catheter. 2. Low lung              volumes with left basilar atelectasis plus or minus mild pleural effusion. 3. Left chest wall scattered soft     tissue emphysema. Will assess and consider changing IV access. 5. GI       Dx: Hiatal Hernia reduction, Wedge Gastrectomy secondary to perforation, Gastropexy x2, TAISHA drain x 2       NPO      Fat emulsion and       Protonix       Albumin 2.1(2.7) , Alk Phos 48 (42),  (149),  (205), Bili  1.30 (2.08) improved, Lipase pending     6. Renal/Lytes               1. BUN/Cr: 54/1.80 ( 60/1.98)         2. Electrolytes: Na+ 147, K+ 3.6, CL- 113, CO2 21   1. Hypernatremia -  FWF 5/1 @ 400q4h (resolved)   3. UOP: 0.2 cc/kg/hr over the past 24 hours   4. IVF: LR @ 50 cc/hr   5.  ml at 0730 completed  6. Nephrology following           24 hr intake/output:      Intake 2971.2/ Output 999     Total since admit +9259.1         7. ID      Dx: Septic Shock,  Hiatal Hernia reduction, Wedge Gastrectomy secondary to perforation, Gastropexy       x2, TAISHA drain x 2       Zosyn      Diflucan     8. MSK      Repositioning q 2 hrs     9. Endo -      Dx: DKA and thyroid disease       Accucheck q 4 hrs       PO anti-glycemics on hold       Insulin high dose sliding scale      On synthroid IV       10: Skin       TAISHA drain x 2 (right and left sided) with serosanguineous drainage (24hr output R 290/L 170)        Routinely turning and repositioning     CHECKLIST    RASS:  -2  RESTRAINTS: bilateral wrist   IVF: LR @100  NUTRITION:  NPO  ANTIBIOTICS: Zosyn   GI: Bilateral TAISHA, NPO  DVT: Heparin   GLYCEMIC CONTROL: accucheck q 4 hrs  HOB >45: yes    SUBJECTIVE    Alvarez Fritter acutely ill appearing. No acute distress. No acute events over night and patient rested quietly.        OBJECTIVE  VITALS: Temp: 101.1, RR 20, Pule 84, /56, Pulse Ox 100%  GENERAL: sedated, no acute distress  NEURO: sedated, GCS 5 ( E 2, V- not testable, M 3)   HEENT:NCAT   : morel, noted yellow urine drainage by gravity to bedside bag   LUNGS: clear to auscultation  MECHANICAL VENTILATION: ETT in place, PRVC, rate 22, , Peep 14, PIP, 27 Fio2 100%  HEART: S1, S2 audible, no additional heart sounds   ABDOMEN: mild distension,patient is sedated but no guarding or movement on palpation, BS noted, TAISHA drain on bilateral sides with serous sanguineous drainage   EXTERMITY: trace edema to bilateral lower extremities        Drain/tube output:  Urethral catheter 494, Rt TAISHA 290, Lt TAISHA 170    LAB:  CBC:   Recent Labs     05/05/21  0255 05/05/21  0646 05/06/21  0503   WBC 13.2* 10.6 PENDING   HGB 11.5* 11.1* 9.5*   HCT 36.5 35.2* 29.9*   MCV 92.2 90.0 89.3   PLT See Reflexed IPF Result See Reflexed IPF Result See Reflexed IPF Result     BMP:   Recent Labs     05/05/21  1410 05/05/21  1944 05/06/21  0503   * 143 147*   K 3.8 3.5* 3.6*   * 108* 113*   CO2 18* 20 21   BUN 60* 60* 54*   CREATININE 2.06* 1.98* 1.80*   GLUCOSE 151* 208* 272*         RADIOLOGY:  Ct Abdomen Pelvis Wo Contrast Additional Contrast? None    Result Date: 5/3/2021  There is obstruction of the stomach because of the portions of the stomach contained in the very large hiatal hernia. The rest of the GI tract is normal. No evidence of an acute infectious or inflammatory process in the abdomen and pelvis. Ct Abdomen Pelvis Wo Contrast Additional Contrast? None    Result Date: 4/28/2021  No acute infectious or inflammatory process is identified. Very large hiatal hernia. This could be the source of the patient's symptoms. Xr Abdomen (kub) (single Ap View)    Result Date: 5/4/2021  No significant subdiaphragmatic contrast progression, as above. RECOMMENDATION: Consider chest x-ray to further assess a organic-axial gastric volvulus     Ct Head Wo Contrast    Result Date: 5/3/2021  No acute intracranial process. If clinical concern remains, consider further imaging with MRI. Ct Chest Wo Contrast    Result Date: 5/4/2021  1. Ascites and pneumoperitoneum with apparent free-flowing oral contrast in the left upper quadrant is concerning for viscus perforation, possibly within the subdiaphragmatic portion of the stomach. 2. Large hiatal hernia with organoaxial volvulus. Majority of contrast is retained within the esophagus and supradiaphragmatic stomach. 3. Enteric tube extends below the diaphragm with tip outside the field of view. 4. Moderate bilateral pleural effusions with adjacent consolidation, atelectasis versus pneumonia. Critical results were called by Dr. Estelita Patel MD to [facility] on 5/4/2021 at 18:30. Ct Cervical Spine Wo Contrast    Result Date: 5/3/2021  No acute findings in the cervical spine. Xr Chest Portable    Result Date: 5/5/2021  1. Recommend repositioning of left internal jugular approach central venous catheter. 2. Low lung volumes with left basilar atelectasis plus or minus mild pleural effusion. 3. Left chest wall scattered soft tissue emphysema. Xr Chest Portable    Result Date: 5/4/2021  Intestinal tube deviates to the left side of a sizable hiatal hernia, traversing below the hemidiaphragm and terminating just underneath the left hemidiaphragm. Side port of the intestinal tube is below the diaphragmatic hiatus. Xr Chest Portable    Result Date: 5/3/2021  No acute cardiopulmonary disease Large hiatal hernia     Mri Abdomen Wo Contrast Mrcp    Result Date: 5/3/2021  1. No evidence of intrahepatic or extrahepatic ductal dilatation. 2. Incompletely imaged large hiatal hernia with organoaxial malrotation of the stomach. 3. Small to moderate amount of ascites. 4. Diffuse small bowel wall thickening likely due to 3rd spacing as the post enteritis. 5. Trace bilateral pleural effusions. Fl Ugi    Result Date: 5/4/2021  Organo-axial volvulus the stomach. Large paraesophageal hernia containing the majority of the rotated gastric body.   The greater curvature is positioned superiorly within the paraesophageal hernia defect. There is narrowing of the gastroesophageal junction as well as of the gastroduodenal junction through the hernia defect. Nivia Mckeon MD  5/5/21, 6:26 AM         Trauma Attending Leonel Norman      I have reviewed the above GCS note(s) and confirmed the key elements of the medical history and physical exam. I have seen and examined the pt. I have discussed the findings, established the care plan and recommendations with Resident, GCS RN, bedside nurse.   Pt follows commands   Cr trending down   Hyperglycemia   On SBT tolerating   P/f ratio 367  If tolerates SBT will consider extubation   Critical care min: Begoniasingel 13, DO  5/6/2021  10:18 AM

## 2021-05-07 ENCOUNTER — APPOINTMENT (OUTPATIENT)
Dept: GENERAL RADIOLOGY | Age: 71
DRG: 853 | End: 2021-05-07
Payer: MEDICARE

## 2021-05-07 ENCOUNTER — APPOINTMENT (OUTPATIENT)
Dept: CT IMAGING | Age: 71
DRG: 853 | End: 2021-05-07
Payer: MEDICARE

## 2021-05-07 LAB
ABSOLUTE EOS #: 0.06 K/UL (ref 0–0.4)
ABSOLUTE IMMATURE GRANULOCYTE: 0 K/UL (ref 0–0.3)
ABSOLUTE LYMPH #: 0.31 K/UL (ref 1–4.8)
ABSOLUTE MONO #: 0.37 K/UL (ref 0.1–0.8)
ALBUMIN SERPL-MCNC: 2 G/DL (ref 3.5–5.2)
ALBUMIN/GLOBULIN RATIO: 0.8 (ref 1–2.5)
ALP BLD-CCNC: 66 U/L (ref 35–104)
ALT SERPL-CCNC: 77 U/L (ref 5–33)
ANION GAP SERPL CALCULATED.3IONS-SCNC: 10 MMOL/L (ref 9–17)
AST SERPL-CCNC: 32 U/L
BASOPHILS # BLD: 0 % (ref 0–2)
BASOPHILS ABSOLUTE: 0 K/UL (ref 0–0.2)
BILIRUB SERPL-MCNC: 0.76 MG/DL (ref 0.3–1.2)
BILIRUBIN DIRECT: 0.29 MG/DL
BILIRUBIN, INDIRECT: 0.47 MG/DL (ref 0–1)
BUN BLDV-MCNC: 44 MG/DL (ref 8–23)
BUN/CREAT BLD: ABNORMAL (ref 9–20)
CALCIUM SERPL-MCNC: 8 MG/DL (ref 8.6–10.4)
CHLORIDE BLD-SCNC: 112 MMOL/L (ref 98–107)
CO2: 26 MMOL/L (ref 20–31)
CREAT SERPL-MCNC: 1.51 MG/DL (ref 0.5–0.9)
DIFFERENTIAL TYPE: ABNORMAL
EKG ATRIAL RATE: 84 BPM
EKG P AXIS: 27 DEGREES
EKG P-R INTERVAL: 152 MS
EKG Q-T INTERVAL: 450 MS
EKG QRS DURATION: 76 MS
EKG QTC CALCULATION (BAZETT): 531 MS
EKG R AXIS: -74 DEGREES
EKG T AXIS: 24 DEGREES
EKG VENTRICULAR RATE: 84 BPM
EOSINOPHILS RELATIVE PERCENT: 1 % (ref 1–4)
GFR AFRICAN AMERICAN: 42 ML/MIN
GFR NON-AFRICAN AMERICAN: 34 ML/MIN
GFR SERPL CREATININE-BSD FRML MDRD: ABNORMAL ML/MIN/{1.73_M2}
GFR SERPL CREATININE-BSD FRML MDRD: ABNORMAL ML/MIN/{1.73_M2}
GLOBULIN: ABNORMAL G/DL (ref 1.5–3.8)
GLUCOSE BLD-MCNC: 170 MG/DL (ref 65–105)
GLUCOSE BLD-MCNC: 176 MG/DL (ref 65–105)
GLUCOSE BLD-MCNC: 179 MG/DL (ref 65–105)
GLUCOSE BLD-MCNC: 184 MG/DL (ref 65–105)
GLUCOSE BLD-MCNC: 186 MG/DL (ref 65–105)
GLUCOSE BLD-MCNC: 190 MG/DL (ref 65–105)
GLUCOSE BLD-MCNC: 210 MG/DL (ref 65–105)
GLUCOSE BLD-MCNC: 235 MG/DL (ref 70–99)
HCT VFR BLD CALC: 27.2 % (ref 36.3–47.1)
HEMOGLOBIN: 8.8 G/DL (ref 11.9–15.1)
HEPARIN INDUCED PLATELET ANTIBODY: 0.2 O.D. (ref 0–0.4)
IMMATURE GRANULOCYTES: 0 %
INR BLD: 0.9
LIPASE: 12 U/L (ref 13–60)
LYMPHOCYTES # BLD: 5 % (ref 24–44)
MAGNESIUM: 1.6 MG/DL (ref 1.6–2.6)
MAGNESIUM: 1.8 MG/DL (ref 1.6–2.6)
MCH RBC QN AUTO: 28.6 PG (ref 25.2–33.5)
MCHC RBC AUTO-ENTMCNC: 32.4 G/DL (ref 28.4–34.8)
MCV RBC AUTO: 88.3 FL (ref 82.6–102.9)
MONOCYTES # BLD: 6 % (ref 1–7)
MORPHOLOGY: ABNORMAL
NRBC AUTOMATED: 0 PER 100 WBC
PARTIAL THROMBOPLASTIN TIME: 30.4 SEC (ref 20.5–30.5)
PDW BLD-RTO: 14.6 % (ref 11.8–14.4)
PHOSPHORUS: 2.6 MG/DL (ref 2.6–4.5)
PLATELET # BLD: 66 K/UL (ref 138–453)
PLATELET ESTIMATE: ABNORMAL
PMV BLD AUTO: 12 FL (ref 8.1–13.5)
POTASSIUM SERPL-SCNC: 3 MMOL/L (ref 3.7–5.3)
PROTHROMBIN TIME: 10.2 SEC (ref 9.1–12.3)
RBC # BLD: 3.08 M/UL (ref 3.95–5.11)
RBC # BLD: ABNORMAL 10*6/UL
SEG NEUTROPHILS: 88 % (ref 36–66)
SEGMENTED NEUTROPHILS ABSOLUTE COUNT: 5.46 K/UL (ref 1.8–7.7)
SODIUM BLD-SCNC: 148 MMOL/L (ref 135–144)
SURGICAL PATHOLOGY REPORT: NORMAL
TOTAL PROTEIN: 4.5 G/DL (ref 6.4–8.3)
WBC # BLD: 6.2 K/UL (ref 3.5–11.3)
WBC # BLD: ABNORMAL 10*3/UL

## 2021-05-07 PROCEDURE — 82947 ASSAY GLUCOSE BLOOD QUANT: CPT

## 2021-05-07 PROCEDURE — 86403 PARTICLE AGGLUT ANTBDY SCRN: CPT

## 2021-05-07 PROCEDURE — 80048 BASIC METABOLIC PNL TOTAL CA: CPT

## 2021-05-07 PROCEDURE — 2500000003 HC RX 250 WO HCPCS: Performed by: NURSE PRACTITIONER

## 2021-05-07 PROCEDURE — 6370000000 HC RX 637 (ALT 250 FOR IP): Performed by: STUDENT IN AN ORGANIZED HEALTH CARE EDUCATION/TRAINING PROGRAM

## 2021-05-07 PROCEDURE — 99233 SBSQ HOSP IP/OBS HIGH 50: CPT | Performed by: FAMILY MEDICINE

## 2021-05-07 PROCEDURE — 99232 SBSQ HOSP IP/OBS MODERATE 35: CPT | Performed by: INTERNAL MEDICINE

## 2021-05-07 PROCEDURE — 6360000002 HC RX W HCPCS: Performed by: NURSE PRACTITIONER

## 2021-05-07 PROCEDURE — 99291 CRITICAL CARE FIRST HOUR: CPT | Performed by: INTERNAL MEDICINE

## 2021-05-07 PROCEDURE — 6360000002 HC RX W HCPCS: Performed by: STUDENT IN AN ORGANIZED HEALTH CARE EDUCATION/TRAINING PROGRAM

## 2021-05-07 PROCEDURE — 93010 ELECTROCARDIOGRAM REPORT: CPT | Performed by: INTERNAL MEDICINE

## 2021-05-07 PROCEDURE — 94667 MNPJ CHEST WALL 1ST: CPT

## 2021-05-07 PROCEDURE — 6360000004 HC RX CONTRAST MEDICATION: Performed by: STUDENT IN AN ORGANIZED HEALTH CARE EDUCATION/TRAINING PROGRAM

## 2021-05-07 PROCEDURE — 36415 COLL VENOUS BLD VENIPUNCTURE: CPT

## 2021-05-07 PROCEDURE — 37799 UNLISTED PX VASCULAR SURGERY: CPT

## 2021-05-07 PROCEDURE — 85025 COMPLETE CBC W/AUTO DIFF WBC: CPT

## 2021-05-07 PROCEDURE — 87186 SC STD MICRODIL/AGAR DIL: CPT

## 2021-05-07 PROCEDURE — 2580000003 HC RX 258: Performed by: NURSE PRACTITIONER

## 2021-05-07 PROCEDURE — 83690 ASSAY OF LIPASE: CPT

## 2021-05-07 PROCEDURE — 85610 PROTHROMBIN TIME: CPT

## 2021-05-07 PROCEDURE — 71250 CT THORAX DX C-: CPT

## 2021-05-07 PROCEDURE — 83735 ASSAY OF MAGNESIUM: CPT

## 2021-05-07 PROCEDURE — 6370000000 HC RX 637 (ALT 250 FOR IP): Performed by: PHYSICIAN ASSISTANT

## 2021-05-07 PROCEDURE — 2580000003 HC RX 258: Performed by: STUDENT IN AN ORGANIZED HEALTH CARE EDUCATION/TRAINING PROGRAM

## 2021-05-07 PROCEDURE — 94640 AIRWAY INHALATION TREATMENT: CPT

## 2021-05-07 PROCEDURE — 84100 ASSAY OF PHOSPHORUS: CPT

## 2021-05-07 PROCEDURE — 71045 X-RAY EXAM CHEST 1 VIEW: CPT

## 2021-05-07 PROCEDURE — 94669 MECHANICAL CHEST WALL OSCILL: CPT

## 2021-05-07 PROCEDURE — 87040 BLOOD CULTURE FOR BACTERIA: CPT

## 2021-05-07 PROCEDURE — 2000000000 HC ICU R&B

## 2021-05-07 PROCEDURE — 2500000003 HC RX 250 WO HCPCS: Performed by: SURGERY

## 2021-05-07 PROCEDURE — 87205 SMEAR GRAM STAIN: CPT

## 2021-05-07 PROCEDURE — 74220 X-RAY XM ESOPHAGUS 1CNTRST: CPT

## 2021-05-07 PROCEDURE — 85730 THROMBOPLASTIN TIME PARTIAL: CPT

## 2021-05-07 PROCEDURE — 80076 HEPATIC FUNCTION PANEL: CPT

## 2021-05-07 PROCEDURE — 6370000000 HC RX 637 (ALT 250 FOR IP): Performed by: NURSE PRACTITIONER

## 2021-05-07 PROCEDURE — C9113 INJ PANTOPRAZOLE SODIUM, VIA: HCPCS | Performed by: STUDENT IN AN ORGANIZED HEALTH CARE EDUCATION/TRAINING PROGRAM

## 2021-05-07 PROCEDURE — 87077 CULTURE AEROBIC IDENTIFY: CPT

## 2021-05-07 PROCEDURE — 94660 CPAP INITIATION&MGMT: CPT

## 2021-05-07 PROCEDURE — 87150 DNA/RNA AMPLIFIED PROBE: CPT

## 2021-05-07 RX ORDER — GABAPENTIN 250 MG/5ML
300 SOLUTION ORAL EVERY 8 HOURS SCHEDULED
Status: DISCONTINUED | OUTPATIENT
Start: 2021-05-07 | End: 2021-05-12

## 2021-05-07 RX ORDER — IPRATROPIUM BROMIDE AND ALBUTEROL SULFATE 2.5; .5 MG/3ML; MG/3ML
1 SOLUTION RESPIRATORY (INHALATION)
Status: DISCONTINUED | OUTPATIENT
Start: 2021-05-07 | End: 2021-05-23

## 2021-05-07 RX ORDER — OXYCODONE HCL 5 MG/5 ML
2.5 SOLUTION, ORAL ORAL EVERY 4 HOURS PRN
Status: DISCONTINUED | OUTPATIENT
Start: 2021-05-07 | End: 2021-05-12

## 2021-05-07 RX ORDER — POTASSIUM CHLORIDE 29.8 MG/ML
20 INJECTION INTRAVENOUS PRN
Status: DISCONTINUED | OUTPATIENT
Start: 2021-05-07 | End: 2021-05-20

## 2021-05-07 RX ORDER — MAGNESIUM SULFATE 1 G/100ML
1000 INJECTION INTRAVENOUS PRN
Status: DISCONTINUED | OUTPATIENT
Start: 2021-05-07 | End: 2021-05-28 | Stop reason: HOSPADM

## 2021-05-07 RX ORDER — ACETAMINOPHEN 160 MG/5ML
1000 SOLUTION ORAL EVERY 8 HOURS
Status: DISCONTINUED | OUTPATIENT
Start: 2021-05-07 | End: 2021-05-11

## 2021-05-07 RX ORDER — IPRATROPIUM BROMIDE AND ALBUTEROL SULFATE 2.5; .5 MG/3ML; MG/3ML
1 SOLUTION RESPIRATORY (INHALATION)
Status: DISCONTINUED | OUTPATIENT
Start: 2021-05-07 | End: 2021-05-07

## 2021-05-07 RX ORDER — ACETYLCYSTEINE 200 MG/ML
600 SOLUTION ORAL; RESPIRATORY (INHALATION) 2 TIMES DAILY
Status: DISCONTINUED | OUTPATIENT
Start: 2021-05-07 | End: 2021-05-11

## 2021-05-07 RX ORDER — MAGNESIUM SULFATE IN WATER 40 MG/ML
2000 INJECTION, SOLUTION INTRAVENOUS ONCE
Status: COMPLETED | OUTPATIENT
Start: 2021-05-07 | End: 2021-05-07

## 2021-05-07 RX ORDER — POTASSIUM CHLORIDE 29.8 MG/ML
20 INJECTION INTRAVENOUS
Status: COMPLETED | OUTPATIENT
Start: 2021-05-07 | End: 2021-05-07

## 2021-05-07 RX ORDER — GABAPENTIN 100 MG/1
100 CAPSULE ORAL 3 TIMES DAILY
Status: DISCONTINUED | OUTPATIENT
Start: 2021-05-07 | End: 2021-05-08

## 2021-05-07 RX ADMIN — INSULIN LISPRO 3 UNITS: 100 INJECTION, SOLUTION INTRAVENOUS; SUBCUTANEOUS at 20:58

## 2021-05-07 RX ADMIN — LEVOTHYROXINE SODIUM ANHYDROUS 56 MCG: 100 INJECTION, POWDER, LYOPHILIZED, FOR SOLUTION INTRAVENOUS at 08:26

## 2021-05-07 RX ADMIN — INSULIN GLARGINE 5 UNITS: 100 INJECTION, SOLUTION SUBCUTANEOUS at 20:58

## 2021-05-07 RX ADMIN — SODIUM CHLORIDE, PRESERVATIVE FREE 10 ML: 5 INJECTION INTRAVENOUS at 20:59

## 2021-05-07 RX ADMIN — MAGNESIUM SULFATE 2000 MG: 2 INJECTION INTRAVENOUS at 13:49

## 2021-05-07 RX ADMIN — POTASSIUM CHLORIDE 20 MEQ: 400 INJECTION, SOLUTION INTRAVENOUS at 13:49

## 2021-05-07 RX ADMIN — PIPERACILLIN AND TAZOBACTAM 3375 MG: 3; .375 INJECTION, POWDER, LYOPHILIZED, FOR SOLUTION INTRAVENOUS at 09:41

## 2021-05-07 RX ADMIN — INSULIN LISPRO 3 UNITS: 100 INJECTION, SOLUTION INTRAVENOUS; SUBCUTANEOUS at 16:02

## 2021-05-07 RX ADMIN — INSULIN LISPRO 3 UNITS: 100 INJECTION, SOLUTION INTRAVENOUS; SUBCUTANEOUS at 05:43

## 2021-05-07 RX ADMIN — PANTOPRAZOLE SODIUM 40 MG: 40 INJECTION, POWDER, FOR SOLUTION INTRAVENOUS at 08:26

## 2021-05-07 RX ADMIN — CALCIUM GLUCONATE: 98 INJECTION, SOLUTION INTRAVENOUS at 17:38

## 2021-05-07 RX ADMIN — SODIUM CHLORIDE, PRESERVATIVE FREE 10 ML: 5 INJECTION INTRAVENOUS at 08:27

## 2021-05-07 RX ADMIN — PIPERACILLIN AND TAZOBACTAM 3375 MG: 3; .375 INJECTION, POWDER, LYOPHILIZED, FOR SOLUTION INTRAVENOUS at 17:36

## 2021-05-07 RX ADMIN — FLUCONAZOLE 200 MG: 2 INJECTION, SOLUTION INTRAVENOUS at 23:16

## 2021-05-07 RX ADMIN — Medication 600 MG: at 20:58

## 2021-05-07 RX ADMIN — ACETAMINOPHEN 325 MG: 325 SUPPOSITORY RECTAL at 15:09

## 2021-05-07 RX ADMIN — INSULIN LISPRO 3 UNITS: 100 INJECTION, SOLUTION INTRAVENOUS; SUBCUTANEOUS at 00:00

## 2021-05-07 RX ADMIN — INSULIN LISPRO 3 UNITS: 100 INJECTION, SOLUTION INTRAVENOUS; SUBCUTANEOUS at 08:25

## 2021-05-07 RX ADMIN — POTASSIUM CHLORIDE 20 MEQ: 400 INJECTION, SOLUTION INTRAVENOUS at 14:37

## 2021-05-07 RX ADMIN — SODIUM CHLORIDE, PRESERVATIVE FREE 5 ML: 5 INJECTION INTRAVENOUS at 08:28

## 2021-05-07 RX ADMIN — INSULIN LISPRO 6 UNITS: 100 INJECTION, SOLUTION INTRAVENOUS; SUBCUTANEOUS at 12:17

## 2021-05-07 RX ADMIN — IOHEXOL 100 ML: 240 INJECTION, SOLUTION INTRATHECAL; INTRAVASCULAR; INTRAVENOUS; ORAL at 11:20

## 2021-05-07 RX ADMIN — IPRATROPIUM BROMIDE AND ALBUTEROL SULFATE 1 AMPULE: .5; 3 SOLUTION RESPIRATORY (INHALATION) at 19:51

## 2021-05-07 RX ADMIN — SODIUM CHLORIDE, PRESERVATIVE FREE 10 ML: 5 INJECTION INTRAVENOUS at 08:25

## 2021-05-07 RX ADMIN — PIPERACILLIN AND TAZOBACTAM 3375 MG: 3; .375 INJECTION, POWDER, LYOPHILIZED, FOR SOLUTION INTRAVENOUS at 02:35

## 2021-05-07 RX ADMIN — POTASSIUM CHLORIDE 20 MEQ: 400 INJECTION, SOLUTION INTRAVENOUS at 15:59

## 2021-05-07 RX ADMIN — INSULIN LISPRO 3 UNITS: 100 INJECTION, SOLUTION INTRAVENOUS; SUBCUTANEOUS at 23:16

## 2021-05-07 RX ADMIN — ACETAMINOPHEN 325 MG: 325 SUPPOSITORY RECTAL at 22:55

## 2021-05-07 ASSESSMENT — ENCOUNTER SYMPTOMS
ABDOMINAL DISTENTION: 1
SHORTNESS OF BREATH: 0
COUGH: 0
ABDOMINAL PAIN: 1
WHEEZING: 0

## 2021-05-07 ASSESSMENT — PAIN SCALES - GENERAL
PAINLEVEL_OUTOF10: 0
PAINLEVEL_OUTOF10: 0

## 2021-05-07 ASSESSMENT — PULMONARY FUNCTION TESTS
PIF_VALUE: 11
PIF_VALUE: 14

## 2021-05-07 NOTE — PROGRESS NOTES
ICU PROGRESS NOTE          PATIENT NAME: Anamaria Bedoya  MEDICAL RECORD NO. [id number]  DATE: 5/7/2021    PRIMARY CARE PHYSICIAN: Mickey Greco DO     HD: # 4    ASSESSMENT    Hiatal Hernia Reduction  Wedge gastroectomy  Gastropexy x2  Placement of TAISHA drain x2  Septic Shock  Acute Pancreatitis   Non-STEMI   Diabetic Ketoacidosis without coma associated with type 2 DM  BONNIE (acute kidney injury)  Essential Hypertension  Thyroid disease  Syncope  Acute Tubular necrosis  Lactic Acidosis  Acute hypoxemic respiratory failure        MEDICAL DECISION MAKING AND PLAN    1. Neuro       1. Sedation/Pain          Ketamine           Has not needed medication for pain          Can add MMPM after patient is no longer NPO                       2. CV-        DX: NSTEMI        Map 81       Art /81              Troponin I on 5/3/21 0.404       Troponin, High Sensitivity  5/3/21 at 1334- 85 and repeat at 1434- 94        5/5/21 Cardiac ECHO Normal LV systolic function with LVEF 55%. RV appears dilated with reduced function. RV systolic pressure 37 mmHg               Cardiology note states elevated troponins likely secondary to CKD and acute pancreatitis. Recommends patient will need ischemia work-up likely stress test after acute issue resolved. Heparin D/C by IM- doing HIT work up        Platelets 66 (5/9/07 75 and 5/5/21 163)            3. Heme      Hgb 8.8 (previous 9.5)       HCT 27.2 (previous 29.9)       INR today's pending (previous 1.1)      PTT 40.2 ( previous 27.4)       Type and Screened      Received no blood products        4. Res      NC 2L sat 99%      5/6/21 ph 7.43, pCO2 36.8, pO2 150.9, HCO3 24.6 O2 Sat 99%     Start incentive spirometry        Xr Chest Portable      Result 5/7/21      Bibasilar consolidation new from prior study. Blunting of costophrenic angles bilaterally.       5. GI       Dx: Hiatal Hernia reduction, Wedge Gastrectomy secondary to perforation, Gastropexy x2, TAISHA drain x 2 14, PIP, 27 Fio2 100%  HEART: S1, S2 audible, no additional heart sounds   ABDOMEN: mild distension,patient is sedated but no guarding or movement on palpation, BS noted, TAISHA drain on bilateral sides with serous sanguineous drainage, Peg tube draining green fluid   EXTERMITY: trace edema to bilateral lower extremities        Drain/tube output:  Urethral catheter 494, Rt TAISHA 290, Lt TAISHA 170    LAB:  CBC:   Recent Labs     05/05/21  0646 05/06/21  0503 05/07/21  0542   WBC 10.6 7.8 6.2   HGB 11.1* 9.5* 8.8*   HCT 35.2* 29.9* 27.2*   MCV 90.0 89.3 88.3   PLT See Reflexed IPF Result See Reflexed IPF Result 66*     BMP:   Recent Labs     05/05/21  1944 05/06/21  0503 05/06/21  1404 05/06/21  2214    147*  --  147*   K 3.5* 3.6* 3.1* 3.5*   * 113*  --  113*   CO2 20 21  --  26   BUN 60* 54*  --  48*   CREATININE 1.98* 1.80*  --  1.71*   GLUCOSE 208* 272*  --  214*         RADIOLOGY:    Ct Abdomen Pelvis Wo Contrast Additional Contrast? None    Result Date: 5/3/2021  There is obstruction of the stomach because of the portions of the stomach contained in the very large hiatal hernia. The rest of the GI tract is normal. No evidence of an acute infectious or inflammatory process in the abdomen and pelvis. Ct Abdomen Pelvis Wo Contrast Additional Contrast? None    Result Date: 4/28/2021  No acute infectious or inflammatory process is identified. Very large hiatal hernia. This could be the source of the patient's symptoms. Xr Abdomen (kub) (single Ap View)    Result Date: 5/4/2021  No significant subdiaphragmatic contrast progression, as above. RECOMMENDATION: Consider chest x-ray to further assess a organic-axial gastric volvulus     Ct Head Wo Contrast    Result Date: 5/3/2021  No acute intracranial process. If clinical concern remains, consider further imaging with MRI. Ct Chest Wo Contrast    Result Date: 5/4/2021  1.  Ascites and pneumoperitoneum with apparent free-flowing oral contrast in the left upper quadrant is concerning for viscus perforation, possibly within the subdiaphragmatic portion of the stomach. 2. Large hiatal hernia with organoaxial volvulus. Majority of contrast is retained within the esophagus and supradiaphragmatic stomach. 3. Enteric tube extends below the diaphragm with tip outside the field of view. 4. Moderate bilateral pleural effusions with adjacent consolidation, atelectasis versus pneumonia. Critical results were called by Dr. Echo Sands MD to [facility] on 5/4/2021 at 18:30. Ct Cervical Spine Wo Contrast    Result Date: 5/3/2021  No acute findings in the cervical spine. Us Renal Complete    Result Date: 5/5/2021  Unremarkable ultrasound of the kidneys and urinary bladder. Trace right pleural effusion     Xr Chest Portable    Result Date: 5/6/2021  ETT tip position stable. Decreased left subcutaneous emphysema. Slightly improved suspected left basilar volume loss with persistent findings as above. No overt vascular congestion. Xr Chest Portable    Result Date: 5/5/2021  Volume loss continues left hemithorax with haziness at the left base either atelectasis and or fluid. Subcutaneous emphysema along the left lateral chest wall has decreased. No appreciable extrapleural air. Endotracheal tube in appropriate position. Xr Chest Portable    Result Date: 5/5/2021  1. Recommend repositioning of left internal jugular approach central venous catheter. 2. Low lung volumes with left basilar atelectasis plus or minus mild pleural effusion. 3. Left chest wall scattered soft tissue emphysema. Xr Chest Portable    Result Date: 5/4/2021  Intestinal tube deviates to the left side of a sizable hiatal hernia, traversing below the hemidiaphragm and terminating just underneath the left hemidiaphragm. Side port of the intestinal tube is below the diaphragmatic hiatus.      Xr Chest Portable    Result Date: 5/3/2021  No acute cardiopulmonary disease Large hiatal hernia Mri Abdomen Wo Contrast Mrcp    Result Date: 5/3/2021  1. No evidence of intrahepatic or extrahepatic ductal dilatation. 2. Incompletely imaged large hiatal hernia with organoaxial malrotation of the stomach. 3. Small to moderate amount of ascites. 4. Diffuse small bowel wall thickening likely due to 3rd spacing as the post enteritis. 5. Trace bilateral pleural effusions. Fl Ugi    Result Date: 5/4/2021  Organo-axial volvulus the stomach. Large paraesophageal hernia containing the majority of the rotated gastric body. The greater curvature is positioned superiorly within the paraesophageal hernia defect. There is narrowing of the gastroesophageal junction as well as of the gastroduodenal junction through the hernia defect. Ct Abdomen Pelvis Wo Contrast Additional Contrast? None    Result Date: 5/3/2021  There is obstruction of the stomach because of the portions of the stomach contained in the very large hiatal hernia. The rest of the GI tract is normal. No evidence of an acute infectious or inflammatory process in the abdomen and pelvis. Yaw Saravia MD  5/5/21, 6:38 AM             Trauma Attending Rafael Dean      I have reviewed the above GCS note(s) and confirmed the key elements of the medical history and physical exam. I have seen and examined the pt. I have discussed the findings, established the care plan and recommendations with Resident, GCS RN, bedside nurse.   Wean ketamine, MMPT, roxicodone elixir  NC 2L, IS   Pulmonary toilet- ipV, Vibrio vest   Will get CT chest/a/p   GI- started clears  On zosyn and diflucan       Izabel Mix DO  5/7/2021  4:22 PM

## 2021-05-07 NOTE — PROGRESS NOTES
SUBJECTIVE    Patient was seen and examined. Patient was lying flat. She was alert and awake and quite comfortable. She denies any shortness of breath. Patient denies any chest pain  There is no BMP available from this morning  Significant improvement in urine output noted she put out 5.3 L in last 24 hours. Patient is still positive close to 5.7 L of fluid. Chest x-ray from yesterday does not show any sign of pulmonary congestion    Ultrasound shows right kidney 9.6 cm and left kidney 11.7 cm without hydronephrosis  Patient has low C3 of 50 but C4 was within normal limit. Free light chain ratios were normal and hepatitis serology was not reactive. Proteinuria was 300 mg/g of creatinine   OBJECTIVE      CURRENT TEMPERATURE:  Temp: 101.1 °F (38.4 °C)  MAXIMUM TEMPERATURE OVER 24HRS:  Temp (24hrs), Av.8 °F (38.2 °C), Min:100.6 °F (38.1 °C), Max:101.1 °F (38.4 °C)    CURRENT RESPIRATORY RATE:  Resp: 16  CURRENT PULSE:  Pulse: 80  CURRENT BLOOD PRESSURE:  BP: 123/70  24HR BLOOD PRESSURE RANGE:  Systolic (88OOA), YGV:600 , Min:103 , JCD:857   ; Diastolic (62OTP), VÍCTOR:65, Min:62, Max:74    24HR INTAKE/OUTPUT:      Intake/Output Summary (Last 24 hours) at 2021 1027  Last data filed at 2021 0900  Gross per 24 hour   Intake 1747 ml   Output 5335 ml   Net -3588 ml     WEIGHT :  Patient Vitals for the past 96 hrs (Last 3 readings):   Weight   21 0542 195 lb 5.2 oz (88.6 kg)   21 0550 205 lb 4 oz (93.1 kg)   21 0600 202 lb 9.6 oz (91.9 kg)     PHYSICAL EXAM      GENERAL APPEARANCE: Lying flat alert and oriented x3.     SKIN: Warm to touch and no erythema  EYES: Conjunctiva was pink  ENT: No thrush  NECK: No JVD or carotid bruit   PULMONARY: Bilateral air entry and clear to auscultation  CADRDIOVASCULAR: S1 and S2 audible no S3   ABDOMEN: Soft and nontender bowel sounds are positive and no ascites   EXTREMITIES: 1+ edema    CURRENT MEDICATIONS      piperacillin-tazobactam (ZOSYN) 3,375 mg in dextrose 5 % 50 mL IVPB extended infusion (mini-bag), Q8H  insulin glargine (LANTUS) injection vial 5 Units, Nightly  acetaminophen (TYLENOL) suppository 325 mg, Q4H PRN  PN-Adult 2-in-1 Central Line (Standard), Continuous TPN  ketamine (KETALAR) 500 mg in sodium chloride 0.9 % 250 mL infusion, Continuous  vasopressin 20 Units in dextrose 5 % 100 mL infusion, Continuous  insulin lispro (HUMALOG) injection vial 0-18 Units, Q4H  fluconazole (DIFLUCAN) 200 mg IVPB, Q24H  levothyroxine (SYNTHROID) injection 56 mcg, Daily    And  sodium chloride (PF) 0.9 % injection 5 mL, Daily  lubrifresh P.M. (artificial tears) ophthalmic ointment, PRN  pantoprazole (PROTONIX) injection 40 mg, Daily    And  sodium chloride (PF) 0.9 % injection 10 mL, Daily  norepinephrine (LEVOPHED) 16 mg in sodium chloride 0.9 % 250 mL infusion, Continuous  sodium chloride flush 0.9 % injection 5-40 mL, 2 times per day  sodium chloride flush 0.9 % injection 5-40 mL, PRN  0.9 % sodium chloride infusion, PRN  ondansetron (ZOFRAN) injection 4 mg, Q6H PRN  dextrose 50 % IV solution, PRN          LABS      CBC:   Recent Labs     05/05/21  0646 05/06/21  0503 05/07/21  0542   WBC 10.6 7.8 6.2   RBC 3.91* 3.35* 3.08*   HGB 11.1* 9.5* 8.8*   HCT 35.2* 29.9* 27.2*   MCV 90.0 89.3 88.3   MCH 28.4 28.4 28.6   MCHC 31.5 31.8 32.4   RDW 14.3 14.4 14.6*   PLT See Reflexed IPF Result See Reflexed IPF Result 66*   MPV NOT REPORTED NOT REPORTED 12.0      BMP:   Recent Labs     05/05/21  1944 05/06/21  0503 05/06/21  1404 05/06/21  2214    147*  --  147*   K 3.5* 3.6* 3.1* 3.5*   * 113*  --  113*   CO2 20 21  --  26   BUN 60* 54*  --  48*   CREATININE 1.98* 1.80*  --  1.71*   GLUCOSE 208* 272*  --  214*   CALCIUM 8.3* 8.0*  --  8.5*   PHOSPHORUS:    Recent Labs     05/06/21  0503 05/06/21  1404 05/07/21  0542   PHOS 1.8* 2.4* 2.6     MAGNESIUM:   Recent Labs     05/06/21  0503 05/06/21  2214 05/07/21  0542   MG 2.2 1.9 1.8     ALBUMIN:   Recent Labs 05/05/21  0500 05/05/21  1410 05/06/21  0503   LABALBU 2.7* 2.7* 2.1*     SPEP:   Lab Results   Component Value Date    PROT 4.6 05/06/2021    PROT 7.3 05/03/2021    PATH ELECTRONICALLY SIGNED. Maria Del Rosario Puentes M.D. 05/05/2021     UPEP: No results found for: TPU   HEPBSAG:  Lab Results   Component Value Date    HEPBSAG NONREACTIVE 05/05/2021     HEPCAB:  Lab Results   Component Value Date    HEPCAB NONREACTIVE 05/05/2021     C3:   Lab Results   Component Value Date    C3 50 (L) 05/05/2021     C4:   Lab Results   Component Value Date    C4 14 05/05/2021   URINE SODIUM:    Lab Results   Component Value Date    KILEY 43 05/05/2021    URINE CREATININE:    Lab Results   Component Value Date    LABCREA 131.0 05/05/2021   URINALYSIS:  U/A:   Lab Results   Component Value Date    NITRU NEGATIVE 05/06/2021    COLORU YELLOW 05/06/2021    PHUR 5.5 05/06/2021    WBCUA None 05/06/2021    WBCUA 0-3 05/03/2021    RBCUA 0 TO 2 05/06/2021    RBCUA 0-2 05/03/2021    MUCUS NOT REPORTED 05/06/2021    TRICHOMONAS NOT REPORTED 05/06/2021    YEAST NOT REPORTED 05/06/2021    BACTERIA FEW 05/06/2021    CLARITYU Clear 05/03/2021    SPECGRAV 1.023 05/06/2021    LEUKOCYTESUR NEGATIVE 05/06/2021    UROBILINOGEN Normal 05/06/2021    BILIRUBINUR NEGATIVE 05/06/2021    BILIRUBINUR Moderate 05/03/2021    BLOODU Negative 05/03/2021    GLUCOSEU 1+ 05/06/2021    KETUA NEGATIVE 05/06/2021    AMORPHOUS NOT REPORTED 05/06/2021     ANTIGBM:No results found for: [address] as available. ASSESSMENT      1. Cute kidney injury due to ischemic ATN further complicated by hypotension, third spacing due to gastric volvulus and perforation leading to acute renal failure. Creatinine was 1.7 yesterday. No BMP is available from today    2. Possible underlying chronic kidney disease with a creatinine of 1.3 mg/dL  3. Status post laparotomy for gastric volvulus and perforation.   Patient underwent wedge gastrectomy and gastropexy with drain placement. Patient tolerated procedure fairly well  4. Respiratory failure: Successfully extubated yesterday  5. Non-ST BENJAMIN  6. Longstanding type 2 diabetes  7. Hypernatremia due to free water deficit  PLAN      1. Check BMP today  2. Encourage free water intake  3. CBC and BMP with a.m.  4.  We will follow with you  Please do not hesitate to call with questions.     Electronically signed by Vanessa Elizondo MD on 5/7/2021 at 10:27 AM

## 2021-05-07 NOTE — PROGRESS NOTES
Physical Therapy    DATE: 2021    NAME: Yara Smith  MRN: [id number]   : 1950      Patient not seen this date for Physical Therapy due to: Other: Pt currently off the unit for esophagram. Will check back as time allows.       Electronically signed by Sheldon Drake PTA on 2021 at 11:17 AM

## 2021-05-07 NOTE — PLAN OF CARE
Problem: OXYGENATION/RESPIRATORY FUNCTION  Goal: Patient will maintain patent airway  5/6/2021 2348 by Harpal Johnson RN  Outcome: Ongoing     Problem: OXYGENATION/RESPIRATORY FUNCTION  Goal: Patient will achieve/maintain normal respiratory rate/effort  Description: Respiratory rate and effort will be within normal limits for the patient  5/6/2021 2348 by Harpal Johnson RN  Outcome: Ongoing     Problem: SKIN INTEGRITY  Goal: Skin integrity is maintained or improved  5/6/2021 2348 by Harpal Johnson RN  Outcome: Ongoing     Problem: Confusion - Acute:  Goal: Absence of continued neurological deterioration signs and symptoms  Description: Absence of continued neurological deterioration signs and symptoms  5/6/2021 2348 by Harpal Johnson RN  Outcome: Ongoing     Problem: Confusion - Acute:  Goal: Mental status will be restored to baseline  Description: Mental status will be restored to baseline  5/6/2021 2348 by Harpal Johnson RN  Outcome: Ongoing     Problem: Discharge Planning:  Goal: Ability to perform activities of daily living will improve  Description: Ability to perform activities of daily living will improve  5/6/2021 2348 by Harpal Johnson RN  Outcome: Ongoing     Problem: Discharge Planning:  Goal: Participates in care planning  Description: Participates in care planning  5/6/2021 2348 by Harpal Johnson RN  Outcome: Ongoing     Problem: Injury - Risk of, Physical Injury:  Goal: Absence of physical injury  Description: Absence of physical injury  5/6/2021 2348 by Harpal Johnson RN  Outcome: Ongoing     Problem: Injury - Risk of, Physical Injury:  Goal: Will remain free from falls  Description: Will remain free from falls  5/6/2021 2348 by Harpal Johnson RN  Outcome: Ongoing     Problem: Mood - Altered:  Goal: Mood stable  Description: Mood stable  5/6/2021 2348 by Harpal Johnson RN  Outcome: Ongoing     Problem: Mood - Altered:  Goal: Absence of abusive behavior  Description: Absence of abusive behavior 5/6/2021 2348 by Saray Jack RN  Outcome: Ongoing     Problem: Mood - Altered:  Goal: Verbalizations of feeling emotionally comfortable while being cared for will increase  Description: Verbalizations of feeling emotionally comfortable while being cared for will increase  5/6/2021 2348 by Saray Jack RN  Outcome: Ongoing     Problem: Psychomotor Activity - Altered:  Goal: Absence of psychomotor disturbance signs and symptoms  Description: Absence of psychomotor disturbance signs and symptoms  5/6/2021 2348 by Saray Jack RN  Outcome: Ongoing     Problem: Sensory Perception - Impaired:  Goal: Demonstrations of improved sensory functioning will increase  Description: Demonstrations of improved sensory functioning will increase  5/6/2021 2348 by Saray Jack RN  Outcome: Ongoing     Problem: Sensory Perception - Impaired:  Goal: Decrease in sensory misperception frequency  Description: Decrease in sensory misperception frequency  5/6/2021 2348 by Saray Jack RN  Outcome: Ongoing     Problem: Sensory Perception - Impaired:  Goal: Able to refrain from responding to false sensory perceptions  Description: Able to refrain from responding to false sensory perceptions  5/6/2021 2348 by Saray Jack RN  Outcome: Ongoing     Problem: Sensory Perception - Impaired:  Goal: Demonstrates accurate environmental perceptions  Description: Demonstrates accurate environmental perceptions  5/6/2021 2348 by Saray Jack RN  Outcome: Ongoing     Problem: Sensory Perception - Impaired:  Goal: Able to distinguish between reality-based and nonreality-based thinking  Description: Able to distinguish between reality-based and nonreality-based thinking  5/6/2021 2348 by Saray Jack RN  Outcome: Ongoing     Problem: Sensory Perception - Impaired:  Goal: Able to interrupt nonreality-based thinking  Description: Able to interrupt nonreality-based thinking  5/6/2021 2348 by Saray Jack RN  Outcome: Ongoing     Problem: Sleep

## 2021-05-07 NOTE — PROGRESS NOTES
values in this interval not displayed. ASSESSMENT   Patient Active Problem List   Diagnosis    Acute pancreatitis    Septic shock (HCC)    Non-STEMI (non-ST elevated myocardial infarction) ([facility] [address]    Diabetic ketoacidosis without coma associated with type 2 diabetes mellitus ([facility] [address] [facility]    BONNIE (acute kidney injury) ([facility] [address] [facility]    Hernia with obstruction    Essential hypertension    Thyroid disease    Syncope    Acute tubular necrosis (HCC)    Lactic acidosis    Acute hypoxemic respiratory failure (HCC)    Hiatal hernia    Gastric volvulus     77 yo F s/p 5/4 emergent robotic assisted laparoscopic hiatal hernia reduction with gastropexy via g-tube x2     PLAN  1. Plan for esophagram this am, cont strict NPO until exam reviewed, cont TPN at this time  2. Cont recs and management per CC/TICU  3. Cont andree TAISHA and PEG tube output   4.  Cont IV abx and monitor WBC/temperature     Thank you,    Electronically signed by Lauren Tam DO  on 5/7/2021 at 6:32 AM

## 2021-05-07 NOTE — PROGRESS NOTES
Comprehensive Nutrition Assessment    Type and Reason for Visit:  Reassess    Nutrition Recommendations/Plan: Continue TPN until able to tolerate/advance oral diet. Suggest decrease Na, and increase K, Ca, Mg in next bag. Will continue to follow. Nutrition Assessment:  Chart reviewed. Pt was extubated yesterday. Pt off unit at time of visit. Noted pt having esophagram today. Pt remains on TPN at this time. Labs reviewed/include: Na 148 mmol/L, K 3 mmol/L, Cl 112 mmol/L, Ca 8 mg/dL, Mg 1.6 mg/dL, Glu 176-235 mg/dL. Meds include: Lantus, Humalog SS, Protonix, Synthroid, KCl. Malnutrition Assessment:  Malnutrition Status:  Insufficient data    Context:  Acute Illness       Estimated Daily Nutrient Needs:  Energy (kcal):  1600 kcal/day; Weight Used for Energy Requirements:  Current     Protein (g):  75 g pro/day; Weight Used for Protein Requirements:  Ideal(1.5)        Fluid (ml/day):  2297-2140 mL/day (or per MD); Method Used for Fluid Requirements:  ml/Kg(25-30)      Nutrition Related Findings:  S/p hiatal hernia repair, gastrectomy, gastropexy, G-tubes 5/4/21. Wounds:  Surgical Incision(abdomen)       Current Nutrition Therapies:    PN-Adult 2-in-1 Central Line (Standard)  DIET BARIATRIC CLEAR LIQUID;-just initiated this afternoon.   Current Parenteral Nutrition Orders:  · Type and Formula: 2-in-1 Custom(150 g Dex, 75 g AA)   · Lipids: None  · Duration: Continuous  · Rate/Volume: 50 mL/hr  · Current PN Order Provides: 810 kcal and 75 g pro/day    Additional Calorie Sources:   none    Anthropometric Measures:  · Height: 5' 2\" (157.5 cm)  · Current Body Weight: 195 lb 5.2 oz (88.6 kg)   · Admission Body Weight: 202 lb (91.6 kg)    · Usual Body Weight: (166 lb - stated)     · Ideal Body Weight: 110 lbs; % Ideal Body Weight  177%   · BMI: 35.7  · BMI Categories: Obese Class 2 (BMI 35.0 -39.9)       Nutrition Diagnosis:   · Inadequate oral intake related to altered GI function as evidenced by NPO or clear liquid status due to medical condition, nutrition support - parenteral nutrition    Nutrition Interventions:   Food and/or Nutrient Delivery: Continue TPN until able to tolerate/advance oral diet. Decrease Na, and increase K, Ca, Mg in next PN. Nutrition Education/Counseling:  No recommendation at this time   Coordination of Nutrition Care:  Continue to monitor while inpatient    Goals:  meet % of estimated nutrient needs-progressing towards goal       Nutrition Monitoring and Evaluation:   Behavioral-Environmental Outcomes:  None Identified   Food/Nutrient Intake Outcomes:  Parenteral Nutrition Intake/Tolerance, Diet Advancement/Tolerance, Food and Nutrient Intake  Physical Signs/Symptoms Outcomes:  Biochemical Data, Nutrition Focused Physical Findings, Weight, Skin, GI Status, Fluid Status or Edema     Discharge Planning:     Too soon to determine     Electronically signed by Elizabeth Roy RD, LD on 5/7/21 at 3:05 PM EDT    Contact: [phone]

## 2021-05-07 NOTE — CONSULTS
[facility] - Initial Consult Note  Today's Date and Time: 5/7/2021, 11:19 AM    Impression :     Paraesophageal Hiatal hernia  Perforation of the esophagus  S/p laparoscopic, robotic para esophageal hernia repair 5-4-21  Acute pancreatitis  Shock   Hyperglycemia  NSTEMI  BONNIE  Not a MRSA carrier    Recommendations:   Continue Zosyn  Diflucan 200 mg IV q day. Monitor QTc  Await results of Repeat blood cultures x 2 on 5-7-21    Medical Decision Making/Summary/Discussion:5/7/2021     Patient with large paraesophageal hiatal hernia with perforation of esophagus  Hyperglycemia   Acute pancreatitis  Shock   S/p laparoscopic, robotic para esophageal hernia repair 5-4-21  Improvement in overall picture but is developing basilar infiltrates  Not a MRSA carrier  Unclear whether this is fluid retention vs residual leakage vs secondary pneumonia  Esophagogram 5-7-21 does not show a leak  Will plan to continue zosyn and add Diflucan. Monitor temps and CXR. If further fluid retention becomes apparent then it may be wise to switch to meropenem to decrease the fluid and NA loads associated with Zosyn  Infection Control Recommendations   [place] Precautions      Antimicrobial Stewardship Recommendations     Simplification of therapy  Targeted therapy    Coordination of Outpatient Care:   Estimated Length of IV antimicrobials: TBD  Patient will need Midline Catheter Insertion:No   Patient will need PICC line Insertion:No  Patient will need: Home IV , [place],  SNF,  LTAC:  Patient will need outpatient wound care:Yes    Chief complaint/reason for consultation:   Septic shock/sepsis    History of Present Illness:   Walker Simon is a 78y.o.-year-old   female who was initially admitted on 5/3/2021. Patient seen at the request of Gonzalez Tanner.     INITIAL HISTORY : 05/07/2021    Pt with a history of diabetes mellitus type 2, hypertension, hyperlipidemia, thyroid disease, gout and hiatal hernia. She initially presented on 05/03/2021 to an outlying facility with a chief complaint of nausea vomiting, diarrhea and syncopal attack . Patient was found to have blood glucose of 585,WBC of 23.5, elevated lipase 1451 and elevated beta hydroxybutyrate. MRCP on 05/03/2021 showed diffuse small bowel wall thickening likely due to third spacing as opposed to enteritis. CT scan of the abdomen showed extremely large fluid-filled hiatal hernia and distended and fluid-filled stomach below the diaphragm. [facility] for evaluation by the surgery team.    CT scan of the chest without contrast was performed on 05/04/2021 which showed ascites and pneumoperitoneum with apparent free-flowing oral contrast in the left upper quadrant concerning for viscus perforation possibly within the subdiaphragmatic portion of the stomach. Moderate bilateral pleural effusions and hiatal hernia with organoaxial volvulus. Bariatric surgery performed a laparoscopic robotic para esophageal hernia repair. Cardiology is also following the patient because of elevated troponins with unremarkable echocardiography. Antibiotics wise the patient was given Zosyn on 05/03/2021 at the outside facility and it has been continued through the present time. Blood and urine cultures on 05/03/2021 show No growth . Repeat urine culture on 05/04/2021 shows No growth     Patient is currently having temperature elevations. Temp max of 101.1 on 05/07/2021 around 4 AM in the morning. Patient WBC count is improving from 23.5 on presentation to 6.2 today. Repeat chest x-ray 5-7-21 shows bibasilar consolidation new from prior study with blunting of the costophrenic angles bilaterally.   Patient is off of the pressors since 5-6-21    Labs, X rays reviewed: 5/7/2021    BUN: 83-00-59-60-60-54->48  Cr: 2. 242.032.102.061.981.80>1.71    WBC: 23. 54.113.210.67.8>6.2  Hb: 11.911.511.19.5>8.8  Plat: 471-567-855-163-14475>66    Cultures:    Urine:  05/03/2021: No growth   05/0 4/2021: No growth     Blood:  05/03/2021: Blood cultures x 2:  No growth    Sputum :    Wound:      Discussed with patient, RN. Surgery    ICU evaluation 45 min    I have personally reviewed the past medical history, past surgical history, medications, social history, and family history, and I have updated the database accordingly.   Past Medical History:     Past Medical History:   Diagnosis Date    Diabetes mellitus ([address]     Gout     Hiatal hernia     Hyperlipidemia     Hypertension     Thyroid disease        Past Surgical  History:     Past Surgical History:   Procedure Laterality Date    BREAST SURGERY      Bx 1993    CHOLECYSTECTOMY  1999    GASTRIC FUNDOPLICATION N/A 5/7/2680    PARAESPHAGEAL HERNIA REPAIR LAPAROSCOPIC ROBOTIC performed by Rosario Muir DO at [address] [place] [address]    Remove spur L ankle    OTHER SURGICAL HISTORY  1978    Pin and wire repair R ankle    TUBAL LIGATION  1988       Medications:      piperacillin-tazobactam  3,375 mg Intravenous Q8H    insulin glargine  5 Units Subcutaneous Nightly    insulin lispro  0-18 Units Subcutaneous Q4H    fluconazole  200 mg Intravenous Q24H    levothyroxine  56 mcg Intravenous Daily    And    sodium chloride (PF)  5 mL Intravenous Daily    pantoprazole  40 mg Intravenous Daily    And    sodium chloride (PF)  10 mL Intravenous Daily    sodium chloride flush  5-40 mL Intravenous 2 times per day       Social History:     Social History     Socioeconomic History    Marital status:      Spouse name: Not on file    Number of children: Not on file    Years of education: Not on file    Highest education level: Not on file   Occupational History    Not on file   Social Needs    Financial resource strain: Not on file    Food insecurity     Worry: Not on file     Inability: Not on file    Transportation needs     Medical: Not on file     Non-medical: Not on file   Tobacco Use    Smoking status: Never Smoker    Smokeless tobacco: Never Used   Substance and Sexual Activity    Alcohol use: Never     Frequency: Never    Drug use: Never    Sexual activity: Not on file   Lifestyle    Physical activity     Days per week: Not on file     Minutes per session: Not on file    Stress: Not on file   Relationships    Social connections     Talks on phone: Not on file     Gets together: Not on file     Attends Mosque service: Not on file     Active member of club or organization: Not on file     Attends meetings of clubs or organizations: Not on file     Relationship status: Not on file    Intimate partner violence     Fear of current or ex partner: Not on file     Emotionally abused: Not on file     Physically abused: Not on file     Forced sexual activity: Not on file   Other Topics Concern    Not on file   Social History Narrative    Not on file       Family History:     Family History   Problem Relation Age of Onset    Breast Cancer Mother     High Blood Pressure Mother     High Cholesterol Father     Breast Cancer Sister         Allergies:   No known allergies     Review of Systems:   Review of Systems   Constitutional: Positive for fatigue and fever. Negative for activity change, appetite change, chills, diaphoresis and unexpected weight change. Respiratory: Positive for cough. Negative for apnea, choking, chest tightness, shortness of breath, wheezing and stridor. Cardiovascular: Negative for chest pain, palpitations and leg swelling. Gastrointestinal: Positive for constipation. Negative for abdominal distention, nausea and vomiting. Endocrine: Negative for cold intolerance. Genitourinary: Negative for difficulty urinating and dysuria.    Musculoskeletal: Negative for arthralgias and back repair/gastrectomy/gastropexy/ 100 ml Omnipaque orally   Acuity: Unknown   Type of Exam: Unknown       71-year-old female status post hiatal hernia reduction with partial   gastrectomy and gastropexy       FINDINGS:   Fluoroscopic  imaging was obtained prior to the administration contrast.       2 gastrostomy tubes are seen projecting over the left upper quadrant.  There   also 2 surgical drains projecting over the left upper quadrant.  Prior   cholecystectomy.  Suture material projects over the left upper quadrant.       The patient drank contrast which migrates through the postoperative region   and into the stomach and small bowel.       There is contrast draining through what appears to be the gastrostomy tubes.       No extraneous collection of contrast is seen beyond the confines of the   stomach.       There is contrast marginating a presumed gastrostomy tube balloon.       Mild gastroesophageal reflux and delayed transit of contrast is seen within   the distal esophagus/GE junction.           Impression   1. Drainage of contrast material through what appears to be the gastrostomy   tubes following the ingestion of contrast.  Contrast does migrate beyond the   postoperative region into the proximal small bowel. 2. No extraneous/extraluminal collection of contrast is seen beyond the   confines of the stomach. 3. 2 surgical drains and 2 presumed gastrostomy tubes are seen overlying the   left upper quadrant.  There does appear to be contrast slightly marginating   the more lateral gastrostomy tube balloon. 4. Delayed migration of contrast through the distal esophagus and GE junction   with mild gastroesophageal reflux.    The findings were sent to the Radiology Results [address] at 12:43   pm on 5/7/2021to be communicated to a licensed caregiver.             EXAMINATION:   CT OF THE CHEST WITHOUT CONTRAST 5/4/2021 5:55 pm       TECHNIQUE:   CT of the chest was performed without the administration of intravenous   contrast. Multiplanar reformatted images are provided for review. Dose   modulation, iterative reconstruction, and/or weight based adjustment of the   mA/kV was utilized to reduce the radiation dose to as low as reasonably   achievable.       COMPARISON:   None.       HISTORY:   ORDERING SYSTEM PROVIDED HISTORY: large hiatal hernia, contrast progression? TECHNOLOGIST PROVIDED HISTORY:   large hiatal hernia, contrast progression? Reason for Exam: large hiatal hernia, contrast progression?       FINDINGS:   Mediastinum: Heart size is normal without pericardial effusion.  There are   shotty mediastinal lymph nodes.  The thoracic aorta and main pulmonary artery   are normal in caliber.       Lungs/pleura: Moderate bilateral pleural effusions with adjacent   consolidation.  No pneumothorax.       Upper Abdomen: There is a large hiatal hernia containing the majority of the   stomach.  There is organoaxial volvulus.  Enteric tube is noted extending   below the diaphragm with tip outside the field of view.  The herniated   stomach is distended with contrast.  There is also contrast within the distal   esophagus. Sanjuanita Copper is some contrast visualized within the portion of stomach   below the diaphragm.  Free air and fluid are noted within the visualized   upper abdomen with apparent free spill of contrast in the left upper quadrant.       Soft Tissues/Bones: No acute osseous abnormality.           Impression   1. Ascites and pneumoperitoneum with apparent free-flowing oral contrast in   the left upper quadrant is concerning for viscus perforation, possibly within   the subdiaphragmatic portion of the stomach. 2. Large hiatal hernia with organoaxial volvulus.  Majority of contrast is   retained within the esophagus and supradiaphragmatic stomach. 3. Enteric tube extends below the diaphragm with tip outside the field of   view.    4. Moderate bilateral pleural effusions with adjacent

## 2021-05-07 NOTE — PROGRESS NOTES
PULMONARY & CRITICAL CARE MEDICINE PROGRESS  NOTE     Patient:  Alvarez Jauregui  MRN: [id number]  Admit date: 5/3/2021    SUBJECTIVE     I personally interviewed/examined the patient, reviewed interval history and interpreted all available radiographic, laboratory data at the time of service. Chief Compliant/Reason for Initial Consult: Acute respiratory failure on vent support, gastric perforation    Brief Hospital Course and Interval History:  The patient is a 78 y.o. female with known medical history of diabetes mellitus, hypertension, hypothyroidism, abdominal hernia presented to the hospital with nausea, vomiting, diarrhea. Patient had similar symptoms on 4/28 but was apparently sent home. Patient had dizzy spells and lightheadedness, had a syncopal episode in the ER on this admission. Patient was transferred from the [facility] to [place].  In [facility] patient lab revealed lactic acidosis of 6, glucose 585, patient was seen to be in DKA. Elevated lipase of 1451. Leukocytotic with WBC 23.5, hemoglobin 18.3. Patient also had elevated troponins, elevated creatinine of 3.8. In [place] repeat labs showed creatinine of 2.49 with lactic acid of 2.4. Troponin was 85, recheck of 94. LFTs showed elevated bilirubin.     General surgery was consulted. CT chest showed ascites and pneumoperitoneum with apparent free-flowing oral contrast in the left upper quadrant concerning for viscus perforation possibly within the subdiaphragmatic portion of the stomach. Patient then went for robotic hernia repair 5/4 with wedge gastrectomy, gastropexy and placement of 2 TAISHA drains and 2 PEG tubes. Patient is n.p.o.     Pulmonology consulted for vent management.       Interval history  5/7/2021  Patient lying comfortably in bed, on room air, saturating above 90%  Extubated successfully 5/6  Has good urine output, received dose of Lasix 20 mg  Creatinine improved from 2.49-1.71  Patient going for esophagogram this morning  Continues to be on TPN  Patient was afebrile overnight with T-max of 101.1. Review of Systems:  Review of Systems   Constitutional: Positive for activity change. HENT: Negative for congestion. Respiratory: Negative for cough, shortness of breath and wheezing. Gastrointestinal: Positive for abdominal distention and abdominal pain. Genitourinary: Negative for difficulty urinating. Musculoskeletal: Negative. Neurological: Negative for light-headedness and numbness. Psychiatric/Behavioral: Negative for agitation.          OBJECTIVE     VITAL SIGNS:   LAST-  /68   Pulse 84   Temp 101.1 °F (38.4 °C) (Bladder)   Resp 26   Ht 5' 2\" (1.575 m)   Wt 195 lb 5.2 oz (88.6 kg)   SpO2 97%   BMI 35.73 kg/m²   8-24 HR RANGE-  TEMP Temp  Av.8 °F (38.2 °C)  Min: 100.6 °F (38.1 °C)  Max: 101.1 °F (92.2 °C)   BP Systolic (07LHR), EKT:764 , Min:103 , HPK:272      Diastolic (31BJB), ENR:34, Min:62, Max:74     PULSE Pulse  Av.4  Min: 80  Max: 99   RR Resp  Av.6  Min: 16  Max: 26   O2 SAT SpO2  Av %  Min: 92 %  Max: 100 %   OXYGEN DELIVERY No data recorded     Systemic Examination:   General appearance alert and oriented   mental status - alert  Eyes - pupils equal and reactive, extraocular eye movements intact  Mouth - mucous membranes moist, pharynx normal without lesions  Neck - supple, no significant adenopathy  Chest -chest symmetrical, bilateral breath sounds clear and equal  Heart - normal rate, regular rhythm, normal S1, S2, no murmurs, rubs, clicks or gallops  Abdomen -soft with 2 TAISHA drains in to PEG tube placement  Neurological - alert, oriented, normal speech, no focal findings or movement disorder noted  Extremities - peripheral pulses normal, no pedal edema, no clubbing or cyanosis  Skin - normal coloration and turgor, no rashes, no suspicious skin lesions noted     DATA REVIEW LABALBU 2.1*   *   *   ALKPHOS 48   BILITOT 1.30*     Amylase/Lipase:  Recent Labs     05/07/21  0542   LIPASE 12*     Coagulation Profile:   Recent Labs     05/04/21 2010 05/05/21  0500 05/06/21  0645   INR 1.4 1.7 1.1   PROTIME 14.5* 17.0* 11.2   APTT 27.4  --  40.2*     Cardiac Enzymes:  No results for input(s): CKTOTAL, CKMB, CKMBINDEX, TROPONINI in the last 72 hours. Lactic Acid:  Lab Results   Component Value Date    LACTA 3.2 (H) 05/03/2021    LACTA 6.0 (HH) 05/03/2021     BNP:   No results found for: BNP  D-Dimer:  No results found for: DDIMER  Others:   Lab Results   Component Value Date    TSH 13.36 (H) 05/03/2021     No results found for: LILLY, RHEUMFACTOR, SEDRATE, CRP  No results found for: Negrito Rota  No results found for: IRON, TIBC, FERRITIN  No results found for: SPEP, UPEP  No results found for: PSA, CEA, , [id number],     Input/Output:    Intake/Output Summary (Last 24 hours) at 5/7/2021 1215  Last data filed at 5/7/2021 1200  Gross per 24 hour   Intake 1608.25 ml   Output 5315 ml   Net -3706.75 ml       Microbiology:    Pathology:    Radiology Reports:  XR CHEST PORTABLE   Final Result   Bibasilar consolidation new from prior study. Blunting of the costophrenic   angles bilaterally         XR CHEST PORTABLE   Final Result   ETT tip position stable. Decreased left subcutaneous emphysema. Slightly   improved suspected left basilar volume loss with persistent findings as   above. No overt vascular congestion. US RENAL COMPLETE   Final Result   Unremarkable ultrasound of the kidneys and urinary bladder. Trace right pleural effusion         XR CHEST PORTABLE   Final Result   Volume loss continues left hemithorax with haziness at the left base either   atelectasis and or fluid. Subcutaneous emphysema along the left lateral   chest wall has decreased. No appreciable extrapleural air. Endotracheal   tube in appropriate position.          XR CHEST PORTABLE Final Result   1. Recommend repositioning of left internal jugular approach central venous   catheter. 2. Low lung volumes with left basilar atelectasis plus or minus mild pleural   effusion. 3. Left chest wall scattered soft tissue emphysema. XR CHEST PORTABLE   Final Result   Intestinal tube deviates to the left side of a sizable hiatal hernia,   traversing below the hemidiaphragm and terminating just underneath the left   hemidiaphragm. Side port of the intestinal tube is below the diaphragmatic   hiatus. CT CHEST WO CONTRAST   Final Result   1. Ascites and pneumoperitoneum with apparent free-flowing oral contrast in   the left upper quadrant is concerning for viscus perforation, possibly within   the subdiaphragmatic portion of the stomach. 2. Large hiatal hernia with organoaxial volvulus. Majority of contrast is   retained within the esophagus and supradiaphragmatic stomach. 3. Enteric tube extends below the diaphragm with tip outside the field of   view. 4. Moderate bilateral pleural effusions with adjacent consolidation,   atelectasis versus pneumonia. Critical results were called by Dr. Kaila Durbin MD to [facility] on   5/4/2021 at 18:30. XR ABDOMEN (KUB) (SINGLE AP VIEW)   Final Result   No significant subdiaphragmatic contrast progression, as above. RECOMMENDATION:   Consider chest x-ray to further assess a organic-axial gastric volvulus         FL UGI   Final Result   Organo-axial volvulus the stomach. Large paraesophageal hernia containing the majority of the rotated gastric   body. The greater curvature is positioned superiorly within the   paraesophageal hernia defect. There is narrowing of the gastroesophageal   junction as well as of the gastroduodenal junction through the hernia defect. MRI ABDOMEN WO CONTRAST MRCP   Final Result   1. No evidence of intrahepatic or extrahepatic ductal dilatation.    2. Incompletely imaged large hiatal hernia with organoaxial malrotation of   the stomach. 3. Small to moderate amount of ascites. 4. Diffuse small bowel wall thickening likely due to 3rd spacing as the post   enteritis. 5. Trace bilateral pleural effusions. XR CHEST PORTABLE   Final Result   No acute cardiopulmonary disease      Large hiatal hernia         FL ESOPHAGRAM    (Results Pending)       Echocardiogram:   Results for orders placed during the hospital encounter of 05/03/21   ECHO Complete 2D W Doppler W Color    Narrative Transthoracic Echocardiography Report (TTE)     Patient Name Donna Chan   Date of Study               05/05/2021      Date of      1950  Gender                      Female   Birth      Age          78 year(s)  Race                              Room Number  2171        Height:                     62 inch, 157.48 cm      Corporate ID [id number]    Weight:                     166 pounds, 75.3 kg   #      Patient Acct [de-identified]   BSA:          1.77 m^2      BMI:      30.36   #                                                              kg/m^2      MR #         [id number]     Sonographer                 Cassie Marycarlos      Accession #  [id number]  Interpreting Physician      Shellie Vizcarra 63      Fellow                   Referring Nurse                            Practitioner      Interpreting             Referring Physician         Sourav Quijano   Fellow     Additional Comments  Technically difficult study, patient supine on ventilator. Type of Study      TTE procedure:2D Echocardiogram, M-Mode, Doppler, Color Doppler. Procedure Date  Date: 05/05/2021 Start: 07:32 AM    Study Location: [facility]  Technical Quality: Adequate visualization    Indications:Elevated troponin. History / Tech. Comments:  Procedure explained to patient. No known medical Hx.     Patient Status: Inpatient    Height: 62 inches Weight: 166 pounds BSA: 1.77 m^2 BMI: 30.36 kg/m^2    CONCLUSIONS    Summary  Normal LV size and wall thickness. No obvious wall motion abnormality seen. Normal LV systolic function with LVEF 55%. RV appears dilated with reduced function. RV systolic pressure 37 mmHg  LA appears normal in size. No obvious significant structural valvular abnormality noted. No significant valvular stenosis or regurgitation noted. Normal aortic root dimension. No significant pericardial effusion noted. Signature  ----------------------------------------------------------------------------   Electronically signed by Connor Montoya(Interpreting physician) on   05/05/2021 12:15 PM  ----------------------------------------------------------------------------    ----------------------------------------------------------------------------   Electronically signed by Olimpia Ortega(Sonographer) on 05/05/2021 11:37 AM  ----------------------------------------------------------------------------  FINDINGS  Left Atrium  Left atrium is normal in size. Inter-atrial septum is intact with no evidence for an atrial septal defect,  by color doppler. Left Ventricle  Left ventricle is small in size, normal wall thickness, global left  ventricular systolic function is normal, calculated ejection fraction is  53%. All wall segments are not well visualized (poor acoustic windows.)  Right Atrium  Right atrial dilatation. Right Ventricle  Right ventricular dilatation with reduced systolic function. Abnormal RV strain. Mitral Valve  Normal mitral valve structure. Trivial mitral regurgitation. Aortic Valve  Aortic valve is trileaflet. No evidence of aortic insufficiency or stenosis. Tricuspid Valve  Normal tricuspid valve leaflets. Moderate tricuspid regurgitation. Estimated right ventricular systolic pressure is 37 mmHg, suggesting  pulmonary HTN. Pulmonic Valve  Pulmonic valve not well visualized but Doppler velocities are normal.  Pericardial Effusion  No significant pericardial effusion is seen.     Miscellaneous  Normal aortic root dimension. E/E' average = 15.05. IVC dilated but unable to assess respiratory collapse due to patient on  ventilator. M-mode / 2D Measurements & Calculations:      LVIDd:4 cm(3.7 - 5.6 cm)          Diastolic IWAGLN:21 ml   MUIN:7.8 cm(0.6 - 1.1 cm)         Systolic YGGTNM:86.41 ml   LVPWd:0.9 cm(0.6 - 1.1 cm)        Aortic Root:2.9 cm(2.0 - 3.7 cm)                                     LA Dimension: 3.1 cm(1.9 - 4.0 cm)   Calculated LVEF (%): 52.95 %      LA volume/Index: 31.04 ml /18m^2                                     LVOT:1.9 cm                                     RVDd:3 cm      Mitral:                                 Aortic      Valve Area (P1/2-Time): 5.12 cm^2       Peak Velocity: 1.55 m/s   Peak E-Wave: 0.78 m/s                   Mean Velocity: 0.99 m/s   Peak A-Wave: 1.00 m/s                   Peak Gradient: 9.61 mmHg   E/A Ratio: 0.78                         Mean Gradient: 5 mmHg   Peak Gradient: 2.45 mmHg   Mean Gradient: 2 mmHg   Deceleration Time: 145 msec             Area (continuity): 2.16 cm^2   P1/2t: 43 msec                          AV VTI: 25.9 cm      Area (continuity): 2.1 cm^2   Mean Velocity: 0.60 m/s      Tricuspid:                              Pulmonic:      Estimated RVSP: 37 mmHg                 Peak Velocity: 0.80 m/s   Peak TR Velocity: 2.85 m/s              Peak Gradient: 2.57 mmHg   Peak TR Gradient: 32.49 mmHg     Diastology / Tissue Doppler  Septal Wall E' velocity:0.06 m/s  Septal Wall E/E':13.8  Lateral Wall E' velocity:0.05 m/s  Lateral Wall E/E':16.3       Cardiac Catheterization:   No results found for this or any previous visit.     ASSESSMENT AND PLAN     Assessment:    Gastric volvulus with Viscus perforation  Sepsis   Thrombocytopenia   moderate bilateral pleural effusions  Diabetes mellitusDKA now resolved  Lactic acidosis resolving  BONNIE  Elevated troponins  Subclinical hypothyroidism  Elevated bilirubin  Hypernatremia    Plan:    Patient extubated successfully 5/6, on room air saturating well. Received a dose of Lasix 20 mg with 5 L urine output. Sepsis likely secondary to gastric perforation on Zosyn, continues on fluid  Will recommend ID consultation    Thrombocytopenia-platelet count dropped further down from 75-66, likely related to sepsis. Continue to hold heparin until HIT is back    Gastric volvulus with viscus perforation s/p robotic assisted laparoscopic hiatal hernia reduction with gastropexy. Has 2 PEG, 2 TAISHA drain in placeon Zosyn  Acute kidney injurynonoliguric, good urine output after Walker change, nephrology following. Hypernatremia-on Ringer lactate  Elevated troponins cardiology evaluation to follow, echo shows EF of 55%. Diabetes mellituscover with insulin     Continue to monitor I/O with a goal of even/negative fluid balance  Maintain epc cuffs    Carolina Luis M.D. Pulmonary and Critical Care Medicine           5/7/2021, 12:15 PM    Please note that this chart was generated using voice recognition Dragon dictation software. Although every effort was made to ensure the accuracy of this automated transcription, some errors in transcription may have occurred.

## 2021-05-07 NOTE — PROGRESS NOTES
Occupational Therapy Not Seen Note    DATE: 2021  Name: Talon Morataya  : 1950  MRN: [id number]    Patient not available for Occupational Therapy due to:     Other: Off unit for esophagram. OT will attempt this PM if appropriate    Next Scheduled Treatment: PM or 2021    Electronically signed by МАРИЯ Cadet on 2021 at 12:03 PM

## 2021-05-07 NOTE — PROGRESS NOTES
[facility]  Office: [address], DO, Frankjennifer Dodson, DO, Brittanie Johns, DO, Hieu Jauregui, DO, Yohan Candelario MD, Edna Graham MD, Naty Brown MD, Willette Klinefelter, MD, Tiffany Angulo MD, Amadou Hebert MD, Katia Valentin MD, Ricky Canseco MD, Asim Frey, DO, Adrianne Leung MD, Ana Rosales DO, Kaushik Carter MD,  Jayce Jensen DO, Ricardo Posadas MD, Adenike Zayas MD, Myla Roldan MD, America Mascorro MD, Romy Cruz, Vance Keller, CNP, Herb Preciado, CNP, Mariangel Roberts, CNS, Natalie Mares, CNP, Pancho Ariza, CNP, Michael Phoenix, CNP, Vijay Vasquez, CNP, Lanie Alexandre, CNP, Enrrique Darling PA-C, Jacob Carlson, [facility], Mell Stewart, CNP, Michelle Samuels, CNP, Yadira Way, CNP, Marland Cowden, CNP, Trisha Zimmerman, CNP, Prince Pena, [address] [place] [address]    Progress Note    5/7/2021    10:11 AM    Name:   Yolis Hughes  MRN:     [id number]     Acct:      [de-identified]   Room:   [address] [place] [address] Day:  4  Admit Date:  5/3/2021 12:49 PM    PCP:   Tata Nguyễn DO  Code Status:  Full Code    Subjective:     C/C:   Chief Complaint   Patient presents with    Blood Sugar Problem     >450    Hernia     hyatial       Interval History Status: improved. Pt seen and examined this morning  Saturating well on room air  No acute events overnight   No complaints       Review of Systems:     12 point ROS performed and negative for anything other than what was stated in subjective     Medications: Allergies:     Allergies   Allergen Reactions    No Known Allergies        Current Meds:   Scheduled Meds:    piperacillin-tazobactam  3,375 mg Intravenous Q8H    insulin glargine  5 Units Subcutaneous Nightly    insulin lispro  0-18 Units Subcutaneous Q4H    fluconazole  200 mg Intravenous Q24H    levothyroxine  56 mcg Intravenous Daily    And    sodium chloride (PF)  5 mL Intravenous Daily    pantoprazole  40 mg 51.4*   < >  --  35.2* 29.9*  --  27.2*   MCV  --    < >  --  90.0 89.3  --  88.3   MCH  --    < >  --  28.4 28.4  --  28.6   MCHC  --    < >  --  31.5 31.8  --  32.4   RDW  --    < >  --  14.3 14.4  --  14.6*   PLT See Reflexed IPF Result   < >  --  See Reflexed IPF Result See Reflexed IPF Result  --  66*   MPV  --    < >  --  NOT REPORTED NOT REPORTED  --  12.0   INR 1.4  --  1.7  --   --  1.1  --     < > = values in this interval not displayed. Chemistry:  Recent Labs     05/04/21  1253 05/04/21  1253 05/04/21 2010 05/04/21 2229 05/05/21  0255 05/05/21  6565 05/05/21  9578 05/05/21  1944 05/06/21  0503 05/06/21  1404 05/06/21  2214 05/07/21  0542   *   < > 146* 145 148* 148*   < > 143 147*  --  147*  --    K 2.8*   < > 2.5* 2.9* 3.4* 3.4*   < > 3.5* 3.6* 3.1* 3.5*  --    *  --   --   --  112* 111*   < > 108* 113*  --  113*  --    CO2 25  --   --   --  22 22   < > 20 21  --  26  --    GLUCOSE 184*  --   --   --  178* 186*   < > 208* 272*  --  214*  --    BUN 84*  --   --   --  63* 63*   < > 60* 54*  --  48*  --    CREATININE 2.24*  --   --   --  2.03* 2.10*   < > 1.98* 1.80*  --  1.71*  --    MG 1.9  --   --   --   --  1.5*  --   --  2.2  --  1.9 1.8   ANIONGAP 14  --   --   --  14 15   < > 15 13  --  8*  --    LABGLOM 22*  --   --   --  24* 23*   < > 25* 28*  --  30*  --    GFRAA 26*  --   --   --  30* 28*   < > 30* 34*  --  36*  --    CALCIUM 8.2*  --   --   --  7.6* 7.9*   < > 8.3* 8.0*  --  8.5*  --    CAION  --   --  1.21 1.24  --  1.08*  --   --   --   --   --   --    PHOS 3.5  --   --   --   --  4.3  --   --  1.8* 2.4*  --  2.6   LACTACIDWB 3.8*  --  4.3*  --  2.0  --   --   --   --   --   --   --     < > = values in this interval not displayed.      Recent Labs     05/05/21  0500 05/05/21  0500 05/05/21  1410 05/05/21  1410 05/06/21  0503 05/06/21  0503 05/06/21  1202 05/06/21  1619 05/06/21  1939 05/06/21  2354 05/07/21  0541 05/07/21  0542 05/07/21  0823   PROT 4.4*  --  4.9*  --  4.6* --   --   --   --   --   --   --   --    LABALBU 2.7*  --  2.7*  --  2.1*  --   --   --   --   --   --   --   --    *  --  205*  --  154*  --   --   --   --   --   --   --   --    *  --  149*  --  133*  --   --   --   --   --   --   --   --    ALKPHOS 34*  --  42  --  48  --   --   --   --   --   --   --   --    BILITOT 2.30*  --  2.08*  --  1.30*  --   --   --   --   --   --   --   --    BILIDIR 0.76*  --  0.62*  --   --   --   --   --   --   --   --   --   --    LIPASE  --   --   --   --   --   --   --   --   --   --   --  12*  --    TRIG  --   --   --   --  366*  --   --   --   --   --   --   --   --    POCGLU  --    < >  --    < >  --    < > 198* 141* 196* 184* 176*  --  186*    < > = values in this interval not displayed. ABG:  Lab Results   Component Value Date    POCPH 7.433 05/06/2021    PHART 7.193 05/04/2021    POCPCO2 36.8 05/06/2021    RFE4ZOG 52.0 05/04/2021    POCPO2 150.9 05/06/2021    PO2ART 145.0 05/04/2021    POCHCO3 24.6 05/06/2021    PWG8QLX 19.2 05/04/2021    NBEA NOT REPORTED 05/06/2021    PBEA 0 05/06/2021    RRU1XEQ NOT REPORTED 05/06/2021    UESX6SXX 99 05/06/2021    Q9ADGROQ 98.0 05/04/2021    FIO2 40.0 05/06/2021     Lab Results   Component Value Date/Time    SPECIAL NOT REPORTED 05/04/2021 04:13 AM     Lab Results   Component Value Date/Time    CULTURE NO GROWTH 05/04/2021 04:13 AM       Radiology:  Ct Abdomen Pelvis Wo Contrast Additional Contrast? None    Result Date: 5/3/2021  There is obstruction of the stomach because of the portions of the stomach contained in the very large hiatal hernia. The rest of the GI tract is normal. No evidence of an acute infectious or inflammatory process in the abdomen and pelvis. Xr Abdomen (kub) (single Ap View)    Result Date: 5/4/2021  No significant subdiaphragmatic contrast progression, as above.  RECOMMENDATION: Consider chest x-ray to further assess a organic-axial gastric volvulus     Ct Head Wo Contrast    Result Date: 5/3/2021  No acute intracranial process. If clinical concern remains, consider further imaging with MRI. Ct Chest Wo Contrast    Result Date: 5/4/2021  1. Ascites and pneumoperitoneum with apparent free-flowing oral contrast in the left upper quadrant is concerning for viscus perforation, possibly within the subdiaphragmatic portion of the stomach. 2. Large hiatal hernia with organoaxial volvulus. Majority of contrast is retained within the esophagus and supradiaphragmatic stomach. 3. Enteric tube extends below the diaphragm with tip outside the field of view. 4. Moderate bilateral pleural effusions with adjacent consolidation, atelectasis versus pneumonia. Critical results were called by Dr. Stanislav Renee MD to [facility] on 5/4/2021 at 18:30. Ct Cervical Spine Wo Contrast    Result Date: 5/3/2021  No acute findings in the cervical spine. Us Renal Complete    Result Date: 5/5/2021  Unremarkable ultrasound of the kidneys and urinary bladder. Trace right pleural effusion     Xr Chest Portable    Result Date: 5/6/2021  ETT tip position stable. Decreased left subcutaneous emphysema. Slightly improved suspected left basilar volume loss with persistent findings as above. No overt vascular congestion. Xr Chest Portable    Result Date: 5/5/2021  Volume loss continues left hemithorax with haziness at the left base either atelectasis and or fluid. Subcutaneous emphysema along the left lateral chest wall has decreased. No appreciable extrapleural air. Endotracheal tube in appropriate position. Xr Chest Portable    Result Date: 5/5/2021  1. Recommend repositioning of left internal jugular approach central venous catheter. 2. Low lung volumes with left basilar atelectasis plus or minus mild pleural effusion. 3. Left chest wall scattered soft tissue emphysema.      Xr Chest Portable    Result Date: 5/4/2021  Intestinal tube deviates to the left side of a sizable hiatal hernia, traversing below the hemidiaphragm and terminating just underneath the left hemidiaphragm. Side port of the intestinal tube is below the diaphragmatic hiatus. Xr Chest Portable    Result Date: 5/3/2021  No acute cardiopulmonary disease Large hiatal hernia     Mri Abdomen Wo Contrast Mrcp    Result Date: 5/3/2021  1. No evidence of intrahepatic or extrahepatic ductal dilatation. 2. Incompletely imaged large hiatal hernia with organoaxial malrotation of the stomach. 3. Small to moderate amount of ascites. 4. Diffuse small bowel wall thickening likely due to 3rd spacing as the post enteritis. 5. Trace bilateral pleural effusions. Fl Ugi    Result Date: 5/4/2021  Organo-axial volvulus the stomach. Large paraesophageal hernia containing the majority of the rotated gastric body. The greater curvature is positioned superiorly within the paraesophageal hernia defect. There is narrowing of the gastroesophageal junction as well as of the gastroduodenal junction through the hernia defect. Physical Examination:        General appearance: NAD, ill appearing  Mental Status: Follows commands appropriately. Lungs:  CTAB.   Heart:  regular rate and rhythm, no murmur  Abdomen:  mild distension,patient is sedated but no guarding or movement on palpation, BS noted, TAISHA drain on bilateral sides with serous sanguineous drainage, Peg tube draining green fluid  Extremities:  no edema, redness      Assessment:        Hospital Problems           Last Modified POA    * (Principal) Septic shock ([address] 5/4/2021 Yes    Non-STEMI (non-ST elevated myocardial infarction) ([address] 5/4/2021 Yes    Diabetic ketoacidosis without coma associated with type 2 diabetes mellitus ([address] 5/4/2021 Yes    BONNIE (acute kidney injury) ([address] 5/4/2021 Yes    Hernia with obstruction 5/4/2021 Yes    Essential hypertension 5/4/2021 Yes    Thyroid disease 5/4/2021 Yes    Syncope 5/4/2021 Yes    Acute tubular necrosis ([address] 5/4/2021 Yes    Lactic acidosis 5/4/2021 Yes    Acute hypoxemic respiratory failure ([facility] 5/4/2021 Yes    Hiatal hernia 5/5/2021 Yes    Gastric volvulus 5/5/2021 Yes          Plan:        1. Septic shock  2. Gastric ischemia and subsequent pneumoperitoneum  - Underwent emergent wedge gastrectomy, gastropexy x2, placement of TAISHA drains x2, abdominal lavage  - Pt remains febrile. Will obtain urine culture as morel was clogged and changed today. Reviewed CXR, I do not see any evidence of pneumonia. WBC count not elevated. Will continue with zosyn. If condition worsens will consider broadening coverage with teflaro.  - fluconazole as ordered  - f/u urine, blood cultures  - wean pressors as tolerated  - ID consult placed   - surgery on board      3. Type 2 DM, non-insulin dependent  - complicated by hyperglycemia and ketosis  - continue to check blood glucose q4h while NPO.  - Moderate intensity sliding scale. - started on lantus 5U qhs     4. Acute kidney injury secondary to ATN from hypotension/hypoperfusion  - continue to monitor creatinine.   - Continue resuscitative fluids.   - Nephrology following.   - Creatinine is steadily improving.   - Morel exchanged yday and urine output improved. - repeat BMP this am   - renally dose all meds   - avoid nephrotoxic agents      5. Acute hypoxemic respiratory failure  - likely secondary to impaired lung expansion from large hiatal hernia/septic shock  - Extubated on 5/6, saturating well on room air      6. NSTEMI  - likely demand ischemia in setting of septic shock.   - Cardiology following.    - Echo pending.  - EKG  shows inferior infarct. - Patient will need ischemic work-up following resolution of acute issues     7. Hypernatremia  - TBW deficit 2.6L. - nephro on board   - Na 147 yday, repeat bmp this am      8. Thrombocytopenia  - likely from sepsis. - Continue to monitor.   - If continuing to decline, will stop heparin and obtain HIT panel     9.  Hypothyroidism  - TSH elevated to 13.36. in the setting of acute illness.   - Free T4 normal.   - Will resume thyroid supplementation when tolerating P. O.     10.  Elevated PT/INR  - resolved.     #Protonix GI prophylaxis    Ricardo Nieves MD  5/7/2021  10:11 AM

## 2021-05-08 LAB
ABSOLUTE EOS #: 0 K/UL (ref 0–0.4)
ABSOLUTE IMMATURE GRANULOCYTE: 0.14 K/UL (ref 0–0.3)
ABSOLUTE LYMPH #: 0.36 K/UL (ref 1–4.8)
ABSOLUTE MONO #: 0.43 K/UL (ref 0.1–0.8)
ALBUMIN SERPL-MCNC: 1.8 G/DL (ref 3.5–5.2)
ALBUMIN/GLOBULIN RATIO: 0.6 (ref 1–2.5)
ALP BLD-CCNC: 84 U/L (ref 35–104)
ALT SERPL-CCNC: 56 U/L (ref 5–33)
ANION GAP SERPL CALCULATED.3IONS-SCNC: 10 MMOL/L (ref 9–17)
ANION GAP SERPL CALCULATED.3IONS-SCNC: 13 MMOL/L (ref 9–17)
AST SERPL-CCNC: 19 U/L
BASOPHILS # BLD: 0 % (ref 0–2)
BASOPHILS ABSOLUTE: 0 K/UL (ref 0–0.2)
BILIRUB SERPL-MCNC: 0.62 MG/DL (ref 0.3–1.2)
BILIRUBIN DIRECT: 0.25 MG/DL
BILIRUBIN, INDIRECT: 0.37 MG/DL (ref 0–1)
BUN BLDV-MCNC: 39 MG/DL (ref 8–23)
BUN/CREAT BLD: ABNORMAL (ref 9–20)
CALCIUM SERPL-MCNC: 8.1 MG/DL (ref 8.6–10.4)
CHLORIDE BLD-SCNC: 111 MMOL/L (ref 98–107)
CHLORIDE BLD-SCNC: 112 MMOL/L (ref 98–107)
CO2: 21 MMOL/L (ref 20–31)
CO2: 24 MMOL/L (ref 20–31)
CREAT SERPL-MCNC: 1.4 MG/DL (ref 0.5–0.9)
DIFFERENTIAL TYPE: ABNORMAL
EOSINOPHILS RELATIVE PERCENT: 0 % (ref 1–4)
GFR AFRICAN AMERICAN: 45 ML/MIN
GFR NON-AFRICAN AMERICAN: 37 ML/MIN
GFR SERPL CREATININE-BSD FRML MDRD: ABNORMAL ML/MIN/{1.73_M2}
GFR SERPL CREATININE-BSD FRML MDRD: ABNORMAL ML/MIN/{1.73_M2}
GLOBULIN: ABNORMAL G/DL (ref 1.5–3.8)
GLUCOSE BLD-MCNC: 170 MG/DL (ref 65–105)
GLUCOSE BLD-MCNC: 191 MG/DL (ref 65–105)
GLUCOSE BLD-MCNC: 221 MG/DL (ref 70–99)
GLUCOSE BLD-MCNC: 239 MG/DL (ref 65–105)
GLUCOSE BLD-MCNC: 241 MG/DL (ref 65–105)
GLUCOSE BLD-MCNC: 299 MG/DL (ref 65–105)
HCT VFR BLD CALC: 27.9 % (ref 36.3–47.1)
HEMOGLOBIN: 8.9 G/DL (ref 11.9–15.1)
IMMATURE GRANULOCYTES: 2 %
INR BLD: 1
LYMPHOCYTES # BLD: 5 % (ref 24–44)
MAGNESIUM: 1.8 MG/DL (ref 1.6–2.6)
MCH RBC QN AUTO: 28.3 PG (ref 25.2–33.5)
MCHC RBC AUTO-ENTMCNC: 31.9 G/DL (ref 28.4–34.8)
MCV RBC AUTO: 88.6 FL (ref 82.6–102.9)
MONOCYTES # BLD: 6 % (ref 1–7)
MORPHOLOGY: ABNORMAL
NRBC AUTOMATED: 0 PER 100 WBC
PARTIAL THROMBOPLASTIN TIME: 27.9 SEC (ref 20.5–30.5)
PDW BLD-RTO: 14.9 % (ref 11.8–14.4)
PHOSPHORUS: 2.6 MG/DL (ref 2.6–4.5)
PLATELET # BLD: 72 K/UL (ref 138–453)
PLATELET ESTIMATE: ABNORMAL
PMV BLD AUTO: 12.2 FL (ref 8.1–13.5)
POTASSIUM SERPL-SCNC: 3 MMOL/L (ref 3.7–5.3)
POTASSIUM SERPL-SCNC: 4 MMOL/L (ref 3.7–5.3)
PROTHROMBIN TIME: 10.6 SEC (ref 9.1–12.3)
RBC # BLD: 3.15 M/UL (ref 3.95–5.11)
RBC # BLD: ABNORMAL 10*6/UL
SEG NEUTROPHILS: 87 % (ref 36–66)
SEGMENTED NEUTROPHILS ABSOLUTE COUNT: 6.17 K/UL (ref 1.8–7.7)
SODIUM BLD-SCNC: 145 MMOL/L (ref 135–144)
SODIUM BLD-SCNC: 146 MMOL/L (ref 135–144)
TOTAL PROTEIN: 4.7 G/DL (ref 6.4–8.3)
WBC # BLD: 7.1 K/UL (ref 3.5–11.3)
WBC # BLD: ABNORMAL 10*3/UL

## 2021-05-08 PROCEDURE — 97110 THERAPEUTIC EXERCISES: CPT

## 2021-05-08 PROCEDURE — 85025 COMPLETE CBC W/AUTO DIFF WBC: CPT

## 2021-05-08 PROCEDURE — 6360000002 HC RX W HCPCS: Performed by: NURSE PRACTITIONER

## 2021-05-08 PROCEDURE — 36415 COLL VENOUS BLD VENIPUNCTURE: CPT

## 2021-05-08 PROCEDURE — 99233 SBSQ HOSP IP/OBS HIGH 50: CPT | Performed by: INTERNAL MEDICINE

## 2021-05-08 PROCEDURE — 2500000003 HC RX 250 WO HCPCS: Performed by: SURGERY

## 2021-05-08 PROCEDURE — 2580000003 HC RX 258: Performed by: NURSE PRACTITIONER

## 2021-05-08 PROCEDURE — 80048 BASIC METABOLIC PNL TOTAL CA: CPT

## 2021-05-08 PROCEDURE — 87040 BLOOD CULTURE FOR BACTERIA: CPT

## 2021-05-08 PROCEDURE — 83735 ASSAY OF MAGNESIUM: CPT

## 2021-05-08 PROCEDURE — 6360000002 HC RX W HCPCS: Performed by: STUDENT IN AN ORGANIZED HEALTH CARE EDUCATION/TRAINING PROGRAM

## 2021-05-08 PROCEDURE — 99232 SBSQ HOSP IP/OBS MODERATE 35: CPT | Performed by: INTERNAL MEDICINE

## 2021-05-08 PROCEDURE — 2580000003 HC RX 258: Performed by: STUDENT IN AN ORGANIZED HEALTH CARE EDUCATION/TRAINING PROGRAM

## 2021-05-08 PROCEDURE — 6370000000 HC RX 637 (ALT 250 FOR IP): Performed by: STUDENT IN AN ORGANIZED HEALTH CARE EDUCATION/TRAINING PROGRAM

## 2021-05-08 PROCEDURE — 94761 N-INVAS EAR/PLS OXIMETRY MLT: CPT

## 2021-05-08 PROCEDURE — 85730 THROMBOPLASTIN TIME PARTIAL: CPT

## 2021-05-08 PROCEDURE — C9113 INJ PANTOPRAZOLE SODIUM, VIA: HCPCS | Performed by: STUDENT IN AN ORGANIZED HEALTH CARE EDUCATION/TRAINING PROGRAM

## 2021-05-08 PROCEDURE — 6370000000 HC RX 637 (ALT 250 FOR IP): Performed by: PHYSICIAN ASSISTANT

## 2021-05-08 PROCEDURE — 6370000000 HC RX 637 (ALT 250 FOR IP): Performed by: NURSE PRACTITIONER

## 2021-05-08 PROCEDURE — 94640 AIRWAY INHALATION TREATMENT: CPT

## 2021-05-08 PROCEDURE — 80076 HEPATIC FUNCTION PANEL: CPT

## 2021-05-08 PROCEDURE — 2060000000 HC ICU INTERMEDIATE R&B

## 2021-05-08 PROCEDURE — 85610 PROTHROMBIN TIME: CPT

## 2021-05-08 PROCEDURE — 94664 DEMO&/EVAL PT USE INHALER: CPT

## 2021-05-08 PROCEDURE — 80051 ELECTROLYTE PANEL: CPT

## 2021-05-08 PROCEDURE — 2700000000 HC OXYGEN THERAPY PER DAY

## 2021-05-08 PROCEDURE — 82947 ASSAY GLUCOSE BLOOD QUANT: CPT

## 2021-05-08 PROCEDURE — 99232 SBSQ HOSP IP/OBS MODERATE 35: CPT | Performed by: FAMILY MEDICINE

## 2021-05-08 PROCEDURE — 2500000003 HC RX 250 WO HCPCS: Performed by: NURSE PRACTITIONER

## 2021-05-08 PROCEDURE — 94669 MECHANICAL CHEST WALL OSCILL: CPT

## 2021-05-08 PROCEDURE — 84100 ASSAY OF PHOSPHORUS: CPT

## 2021-05-08 RX ORDER — HEPARIN SODIUM 5000 [USP'U]/ML
5000 INJECTION, SOLUTION INTRAVENOUS; SUBCUTANEOUS EVERY 8 HOURS SCHEDULED
Status: DISCONTINUED | OUTPATIENT
Start: 2021-05-08 | End: 2021-05-28 | Stop reason: HOSPADM

## 2021-05-08 RX ORDER — VANCOMYCIN HYDROCHLORIDE 1 G/200ML
1000 INJECTION, SOLUTION INTRAVENOUS EVERY 12 HOURS
Status: DISCONTINUED | OUTPATIENT
Start: 2021-05-08 | End: 2021-05-08

## 2021-05-08 RX ADMIN — IPRATROPIUM BROMIDE AND ALBUTEROL SULFATE 1 AMPULE: .5; 2.5 SOLUTION RESPIRATORY (INHALATION) at 12:06

## 2021-05-08 RX ADMIN — ACETAMINOPHEN 1000 MG: 650 SOLUTION ORAL at 10:29

## 2021-05-08 RX ADMIN — INSULIN LISPRO 3 UNITS: 100 INJECTION, SOLUTION INTRAVENOUS; SUBCUTANEOUS at 11:09

## 2021-05-08 RX ADMIN — PIPERACILLIN AND TAZOBACTAM 3375 MG: 3; .375 INJECTION, POWDER, LYOPHILIZED, FOR SOLUTION INTRAVENOUS at 02:35

## 2021-05-08 RX ADMIN — POTASSIUM CHLORIDE 20 MEQ: 400 INJECTION, SOLUTION INTRAVENOUS at 10:24

## 2021-05-08 RX ADMIN — INSULIN LISPRO 9 UNITS: 100 INJECTION, SOLUTION INTRAVENOUS; SUBCUTANEOUS at 20:44

## 2021-05-08 RX ADMIN — Medication 1250 MG: at 16:02

## 2021-05-08 RX ADMIN — IPRATROPIUM BROMIDE AND ALBUTEROL SULFATE 1 AMPULE: .5; 2.5 SOLUTION RESPIRATORY (INHALATION) at 15:46

## 2021-05-08 RX ADMIN — INSULIN GLARGINE 5 UNITS: 100 INJECTION, SOLUTION SUBCUTANEOUS at 20:43

## 2021-05-08 RX ADMIN — ACETAMINOPHEN 1000 MG: 650 SOLUTION ORAL at 20:42

## 2021-05-08 RX ADMIN — IPRATROPIUM BROMIDE AND ALBUTEROL SULFATE 1 AMPULE: .5; 2.5 SOLUTION RESPIRATORY (INHALATION) at 08:56

## 2021-05-08 RX ADMIN — LEVOTHYROXINE SODIUM ANHYDROUS 56 MCG: 100 INJECTION, POWDER, LYOPHILIZED, FOR SOLUTION INTRAVENOUS at 10:21

## 2021-05-08 RX ADMIN — SODIUM CHLORIDE, PRESERVATIVE FREE 10 ML: 5 INJECTION INTRAVENOUS at 11:09

## 2021-05-08 RX ADMIN — SODIUM CHLORIDE, PRESERVATIVE FREE 10 ML: 5 INJECTION INTRAVENOUS at 11:18

## 2021-05-08 RX ADMIN — GABAPENTIN 300 MG: 250 SUSPENSION ORAL at 14:26

## 2021-05-08 RX ADMIN — SODIUM CHLORIDE 25 ML: 9 INJECTION, SOLUTION INTRAVENOUS at 10:26

## 2021-05-08 RX ADMIN — GABAPENTIN 300 MG: 250 SUSPENSION ORAL at 11:07

## 2021-05-08 RX ADMIN — PANTOPRAZOLE SODIUM 40 MG: 40 INJECTION, POWDER, FOR SOLUTION INTRAVENOUS at 10:21

## 2021-05-08 RX ADMIN — Medication 600 MG: at 20:41

## 2021-05-08 RX ADMIN — Medication 600 MG: at 10:20

## 2021-05-08 RX ADMIN — SODIUM CHLORIDE, PRESERVATIVE FREE 5 ML: 5 INJECTION INTRAVENOUS at 10:21

## 2021-05-08 RX ADMIN — POTASSIUM CHLORIDE 20 MEQ: 400 INJECTION, SOLUTION INTRAVENOUS at 12:49

## 2021-05-08 RX ADMIN — ACETAMINOPHEN 1000 MG: 650 SOLUTION ORAL at 02:34

## 2021-05-08 RX ADMIN — CALCIUM GLUCONATE: 98 INJECTION, SOLUTION INTRAVENOUS at 17:55

## 2021-05-08 RX ADMIN — INSULIN LISPRO 6 UNITS: 100 INJECTION, SOLUTION INTRAVENOUS; SUBCUTANEOUS at 17:17

## 2021-05-08 RX ADMIN — POTASSIUM CHLORIDE 20 MEQ: 400 INJECTION, SOLUTION INTRAVENOUS at 14:11

## 2021-05-08 RX ADMIN — HEPARIN SODIUM 5000 UNITS: 5000 INJECTION INTRAVENOUS; SUBCUTANEOUS at 14:26

## 2021-05-08 RX ADMIN — PIPERACILLIN AND TAZOBACTAM 3375 MG: 3; .375 INJECTION, POWDER, LYOPHILIZED, FOR SOLUTION INTRAVENOUS at 17:10

## 2021-05-08 RX ADMIN — GABAPENTIN 300 MG: 250 SUSPENSION ORAL at 20:41

## 2021-05-08 RX ADMIN — HEPARIN SODIUM 5000 UNITS: 5000 INJECTION INTRAVENOUS; SUBCUTANEOUS at 20:40

## 2021-05-08 RX ADMIN — IPRATROPIUM BROMIDE AND ALBUTEROL SULFATE 1 AMPULE: .5; 2.5 SOLUTION RESPIRATORY (INHALATION) at 19:51

## 2021-05-08 RX ADMIN — HEPARIN SODIUM 5000 UNITS: 5000 INJECTION INTRAVENOUS; SUBCUTANEOUS at 10:20

## 2021-05-08 RX ADMIN — SODIUM CHLORIDE, PRESERVATIVE FREE 10 ML: 5 INJECTION INTRAVENOUS at 21:03

## 2021-05-08 RX ADMIN — INSULIN LISPRO 3 UNITS: 100 INJECTION, SOLUTION INTRAVENOUS; SUBCUTANEOUS at 03:57

## 2021-05-08 RX ADMIN — PIPERACILLIN AND TAZOBACTAM 3375 MG: 3; .375 INJECTION, POWDER, LYOPHILIZED, FOR SOLUTION INTRAVENOUS at 11:03

## 2021-05-08 ASSESSMENT — ENCOUNTER SYMPTOMS
COUGH: 0
ABDOMINAL DISTENTION: 1
WHEEZING: 0
SHORTNESS OF BREATH: 0
ABDOMINAL PAIN: 1

## 2021-05-08 ASSESSMENT — PAIN SCALES - GENERAL
PAINLEVEL_OUTOF10: 0
PAINLEVEL_OUTOF10: 5
PAINLEVEL_OUTOF10: 0

## 2021-05-08 ASSESSMENT — PULMONARY FUNCTION TESTS: PIF_VALUE: 15

## 2021-05-08 NOTE — PROGRESS NOTES
Comprehensive Nutrition Assessment    Type and Reason for Visit:  Reassess    Nutrition Recommendations/Plan:   -Continue CL diet, advance as tolerated   -Suggest ensure clear supplements BID   -Recommend increasing TPN @ 55 mL/hr x 24 hrs (1320 mLs) +200 gms dextrose + 75 gms AA ~> 980 kcals, 75 gms protein   -Suggest omitting NaCl, increasing KPhos, Ca and decreasing KCl  -Will continue to monitor po intake, TPN, weights and labs     Nutrition Assessment:  TPN continues. Pt has now been started on a CL diet - pt reports that she has been consuming 25% of her meal trays. Pt agreed to ensure clear supplements on her meal trays. Will continue to monitor. Malnutrition Assessment:  Malnutrition Status:  Insufficient data    Context:  Acute Illness     Findings of the 6 clinical characteristics of malnutrition:  Energy Intake:  1 - 75% or less of estimated energy requirements for 7 or more days  Weight Loss:  Unable to assess     Body Fat Loss:  No significant body fat loss     Muscle Mass Loss:  No significant muscle mass loss    Fluid Accumulation:  1 - Mild Generalized   Strength:  Not Performed    Estimated Daily Nutrient Needs:  Energy (kcal):  1600 kcal/day; Weight Used for Energy Requirements:  Current     Protein (g):  75 g pro/day; Weight Used for Protein Requirements:  Ideal(1.5)        Fluid (ml/day):  5810-8496 mL/day (or per MD);  Method Used for Fluid Requirements:  ml/Kg(25-30)      Nutrition Related Findings:  BM 5/8; Na 146, K 3.0, glucose 221; synthroid      Wounds:  Surgical Incision(abdomen)       Current Nutrition Therapies:    DIET BARIATRIC CLEAR LIQUID;  PN-Adult 2-in-1 Central Line (Standard)  Current Parenteral Nutrition Orders:  · Type and Formula: 2-in-1 Custom   · Lipids: None  · Duration: Continuous  · Rate/Volume: 50 mL/hr; 1200 mLs  · Current PN Order Provides: 150 gms dextrose + 75 gms AA ~> 810 kcals, 75 gms protein  · Goal PN Orders Provides: 200 gms dextrose + 75 gms AA ~>

## 2021-05-08 NOTE — PROGRESS NOTES
Physical Therapy  Facility/Department: [facility] NEURO  Daily Treatment Note  NAME: Addy Quintero  : 1950  MRN: [id number]    Date of Service: 2021    Discharge Recommendations:  Patient would benefit from continued therapy after discharge      Assessment   Body structures, Functions, Activity limitations: Decreased functional mobility ; Decreased ROM; Decreased strength;Decreased endurance;Decreased balance  Assessment: pt zamzam ther ex in supine. pt defer mobility d/t reports of fatigue. Patient Diagnosis(es): The primary encounter diagnosis was Acute pancreatitis, unspecified complication status, unspecified pancreatitis type. Diagnoses of BONNIE (acute kidney injury) ([facility] and Hiatal hernia were also pertinent to this visit. has a past medical history of Diabetes mellitus ([facility] Gout, Hiatal hernia, Hyperlipidemia, Hypertension, and Thyroid disease. has a past surgical history that includes Cholecystectomy (); Hysterectomy (); Breast surgery; other surgical history (); other surgical history (); Tubal ligation (); and Gastric fundoplication (N/A, ). Restrictions  Restrictions/Precautions  Restrictions/Precautions: General Precautions, Fall Risk  Required Braces or Orthoses?: No  Subjective   General  Chart Reviewed: Yes  Family / Caregiver Present: No  Subjective  Subjective: RN & pt agreeable to PT. General Comment  Comments: Upon arrival pt in bed.   Pain Screening  Patient Currently in Pain: (pt does not state)  Vital Signs  Patient Currently in Pain: (pt does not state)       Orientation  Oriented to person  Objective  Exercises  Straight Leg Raise: x10  Heelslides: x10  Hip Flexion: x10  Hip Abduction: x10  Ankle Pumps: x10  Upper Extremity: x10 of bilat shoulder flexion/horizontal abd, elbow flex/ext, forearm supination/pronation  Comments: pt zamzam P-AAROM to bilat LE, followed by gentle stretches to bilat hamstrings & PF      Goals  Short term goals  Time Frame for Short term goals: 14 visits  Short term goal 1: Prevent contractures through ROM and stretching. Short term goal 2: Pt to follow most commands for active participation in treatment  Short term goal 3: Progress with mobility as appropriate. Patient Goals   Patient goals : Get tube out.     Plan    Plan  Times per week: 5x/week  Current Treatment Recommendations: Strengthening, Endurance Training, Cognitive Reorientation, Functional Mobility Training  Safety Devices  Type of devices: Left in bed, Nurse notified     Therapy Time   Individual Concurrent Group Co-treatment   Time In  12:16         Time Out  12:26         Minutes  9'                 [facility]

## 2021-05-08 NOTE — PROGRESS NOTES
Renal Progress Note    Patient :  Yara Smith; 78 y.o. MRN# [id number]  Location:  9452/1174-36  Attending:  Sorin Campos MD  Admit Date:  5/3/2021   Hospital Day: 5      Subjective: Following for BONNIE/ATN due to hypotension following laparotomy. Has history of CKD III with baseline 1.3  Creatinine this morning 1.40, down from 1.5. Urine output 1715, + 4 liters. Not on diuretics. Low grade temp this morning. Blood pressure stable  Receiving Vanc 1250mg daily. Has two peg tubes to drainage. Remains on TPN. Potassium 3.0 and covered with 60meq. Blood Cultures gram positive cocci in clusters with staphylococcus epidermidis. Ultrasound shows right kidney 9.6 cm and left kidney 11.7 cm without hydronephrosis  Patient has low C3 of 50 but C4 was within normal limit. Free light chain ratios were normal and hepatitis serology was not reactive.   Proteinuria was 300 mg/g of creatinine   Outpatient Medications:     Medications Prior to Admission: metFORMIN (GLUCOPHAGE) 1000 MG tablet, Take 1,000 mg by mouth daily (with breakfast)  SITagliptin (JANUVIA) 100 MG tablet, Take 100 mg by mouth daily  omeprazole (PRILOSEC) 40 MG delayed release capsule, Take 1 capsule by mouth every morning (before breakfast)  lisinopril (PRINIVIL;ZESTRIL) 40 MG tablet, Take 10 mg by mouth daily   glimepiride (AMARYL) 2 MG tablet, Take 2 mg by mouth every morning (before breakfast)  simvastatin (ZOCOR) 10 MG tablet, Take 10 mg by mouth nightly  levothyroxine (SYNTHROID) 175 MCG tablet, Take 112 mcg by mouth Daily Dose reduced in December 2020  aspirin 81 MG EC tablet, Take 81 mg by mouth daily  metFORMIN (GLUCOPHAGE) 500 MG tablet, Take 500 mg by mouth every evening   hydroCHLOROthiazide (HYDRODIURIL) 25 MG tablet, Take 25 mg by mouth daily  [DISCONTINUED] predniSONE (DELTASONE) 20 MG tablet, 60 mg x 2 days, 40 mg x 2 days, 20 mg x 2 days    Current Medications:     Scheduled Meds:    heparin (porcine)  5,000 Units Subcutaneous 3 times per day    vancomycin (VANCOCIN) intermittent dosing (placeholder)   Other RX Placeholder    vancomycin  1,250 mg Intravenous Q24H    acetaminophen  1,000 mg Oral Q8H    gabapentin  300 mg Oral 3 times per day    ipratropium-albuterol  1 ampule Inhalation Q4H WA    acetylcysteine  600 mg Oral BID    piperacillin-tazobactam  3,375 mg Intravenous Q8H    insulin glargine  5 Units Subcutaneous Nightly    insulin lispro  0-18 Units Subcutaneous Q4H    fluconazole  200 mg Intravenous Q24H    levothyroxine  56 mcg Intravenous Daily    And    sodium chloride (PF)  5 mL Intravenous Daily    pantoprazole  40 mg Intravenous Daily    And    sodium chloride (PF)  10 mL Intravenous Daily    sodium chloride flush  5-40 mL Intravenous 2 times per day     Continuous Infusions:    PN-Adult 2-in-1 Central Line (Standard) 50 mL/hr at 21 1738    sodium chloride 25 mL (21 1026)     PRN Meds:  potassium chloride, magnesium sulfate, oxyCODONE, acetaminophen, artificial tears, sodium chloride flush, sodium chloride, [DISCONTINUED] promethazine **OR** ondansetron, dextrose    Input/Output:       I/O last 3 completed shifts: In: 1800.3 [I.V.:323; IV Piggyback:166.3]  Out: 6761 [Urine:1715; Emesis/NG output:350; Drains:230]. Patient Vitals for the past 96 hrs (Last 3 readings):   Weight   21 0542 195 lb 5.2 oz (88.6 kg)   21 0550 205 lb 4 oz (93.1 kg)   21 0600 202 lb 9.6 oz (91.9 kg)       Vital Signs:   Temperature:  Temp: 98.2 °F (36.8 °C)  TMax:   Temp (24hrs), Av.2 °F (37.9 °C), Min:97.8 °F (36.6 °C), Max:101.7 °F (38.7 °C)    Respirations:  Resp: 23  Pulse:   Pulse: 82  BP:    BP: (!) 145/72  BP Range: Systolic (08VHO), LEF:710 , Min:137 , MEF:334       Diastolic (54EPF), ZWA:93, Min:60, Max:80      Physical Examination:     General:  AAO x 3, speaking in full sentences, no accessory muscle use. HEENT: Atraumatic, normocephalic, no throat congestion, moist mucosa.   Eyes:   Pupils PHUR 5.5 05/06/2021    WBCUA None 05/06/2021    WBCUA 0-3 05/03/2021    RBCUA 0 TO 2 05/06/2021    RBCUA 0-2 05/03/2021    MUCUS NOT REPORTED 05/06/2021    TRICHOMONAS NOT REPORTED 05/06/2021    YEAST NOT REPORTED 05/06/2021    BACTERIA FEW 05/06/2021    CLARITYU Clear 05/03/2021    SPECGRAV 1.023 05/06/2021    LEUKOCYTESUR NEGATIVE 05/06/2021    UROBILINOGEN Normal 05/06/2021    BILIRUBINUR NEGATIVE 05/06/2021    BILIRUBINUR Moderate 05/03/2021    BLOODU Negative 05/03/2021    GLUCOSEU 1+ 05/06/2021    KETUA NEGATIVE 05/06/2021    AMORPHOUS NOT REPORTED 05/06/2021     Urine Sodium:     Lab Results   Component Value Date    KILEY 43 05/05/2021     Urine Creatinine:     Lab Results   Component Value Date    LABCREA 131.0 05/05/2021       Radiology:     CXR:     Assessment:     1. Acute kidney injury due to ischemic ATN further complicated by hypotension, GI loss, third spacing due to gastric volvulus and perforation leading to acute renal failure. Creatinine now 1.4.  2.  Possible underlying chronic kidney disease with a creatinine of 1.3 mg/dL  3. Status post laparotomy for gastric volvulus and perforation. Patient underwent wedge gastrectomy and gastropexy with drain placement. Patient tolerated procedure fairly well  4. Respiratory failure: Successfully extubated   5. Non-ST BENJAMIN demand ischemia  6. Longstanding type 2 diabetes  7. Hypernatremia due to free water deficit, improving  8. DM II  9. Hypokalemia     Plan:   1. Keep morel   2. Consider adjusting potassium in TPN  3. Hold diuretics  4. Daily Labs    Nutrition   Please ensure that patient is on a renal diet/TF. Avoid nephrotoxic drugs/contrast exposure. We will continue to follow along with you. Ruthann Macias CNP    Attending Physician Statement  I have discussed the care of Anamaria Bedoya, including pertinent history and exam findings with the CNP. I have reviewed the key elements of all parts of the encounter with the CNP.  I have seen and examined the patient. I agree with the assessment and plan and status of the problem list as documented. Addiitionally I recommend rechecking electrolytes with the low potassium.     Aziza Butts MD  Nephrology Attending Physician  [facility]

## 2021-05-08 NOTE — PROGRESS NOTES
Pt transferred safely to 537 with RT Radha. Writer assisted with bed transfer and wasted ketamine with RN [facility]. No belongings found with pt in room, meds/chart passed to [facility] Jose C. No questions at this time.

## 2021-05-08 NOTE — PROGRESS NOTES
Pt arrived to floor via bed from TICU and transfered to bed. Telemetry activated. Patient oriented to room and use of call light. Call light and personal items within reach. Denied further needs or questions at this time. Will continue to monitor.

## 2021-05-08 NOTE — PROGRESS NOTES
Bariatric Surgery Progress Note      PATIENT NAME: Erick Oreilly   TODAY'S DATE: 5/8/2021, 7:20 AM  Chief Complaint   Patient presents with    Blood Sugar Problem     >450    Hernia     Hiatal      SUBJECTIVE:    Pt seen and examined. UOP adequate. Did have temp 38.7 in last 24hrs. States pain is well controlled. Tolerating CLD. L TAISHA drain removed 5/7.      RLQ TAISHA 160 ml SS  PEG tubes 350ml out, bilious output   Tmax 38.7    OBJECTIVE:   Vitals:  /60   Pulse 85   Temp 101.3 °F (38.5 °C) (Bladder)   Resp 27   Ht 5' 2\" (1.575 m)   Wt 195 lb 5.2 oz (88.6 kg)   SpO2 95%   BMI 35.73 kg/m²      INTAKE/OUTPUT:      Intake/Output Summary (Last 24 hours) at 5/8/2021 0720  Last data filed at 5/8/2021 0400  Gross per 24 hour   Intake 1800.25 ml   Output 2295 ml   Net -494.75 ml                 General: alert, oriented  Lungs: Symmetrical chest rise bilaterally  Heart: S1S2  Abdomen: Soft, ND, appropriately tender, non peritoneal, no rebound, R TAISHA intact with SS in bulb, incisions c/d/i, PEG x2 to gravity  Extremity: moves all extremities x4, trace edema    Data Review:  CBC:   Recent Labs     05/06/21  0503 05/07/21  0542 05/08/21  0355   WBC 7.8 6.2 7.1   HGB 9.5* 8.8* 8.9*   PLT See Reflexed IPF Result 66* 72*     BMP:    Recent Labs     05/06/21  2214 05/07/21  1151 05/08/21  0355   * 148* 146*   K 3.5* 3.0* 3.0*   * 112* 112*   CO2 26 26 24   BUN 48* 44* 39*   CREATININE 1.71* 1.51* 1.40*   GLUCOSE 214* 235* 221*     Hepatic:   Recent Labs     05/05/21  1410 05/06/21  0503 05/07/21  1151 05/08/21  0355   * 154* 32* 19   * 133* 77* 56*   ALKPHOS 42 48 66 84   BILITOT 2.08* 1.30* 0.76 0.62   BILIDIR 0.62*  --  0.29 0.25     Coagulation:   Recent Labs     05/06/21  0503 05/06/21  0645 05/07/21  1151 05/07/21  1212 05/08/21  0355   APTT  --  40.2*  --  30.4 27.9   PROT 4.6*  --  4.5*  --  4.7*   INR  --  1.1  --  0.9 1.0       ASSESSMENT   Patient Active Problem List   Diagnosis    Acute pancreatitis    Septic shock (HCC)    Non-STEM[facility]non-ST elevated [facility]) [facility]    Diabetic ketoacidosis without coma associated with type 2 diabetes mellitus ([facility]    BONNIE (acute kidney injury) ([facility]    Hernia with obstruction    Essential hypertension    Thyroid disease    Syncope    Acute tubular necrosis (HCC)    Lactic acidosis    Acute hypoxemic respiratory failure (HCC)    Hiatal hernia    Gastric volvulus     77 yo F s/p 5/4 emergent robotic assisted laparoscopic hiatal hernia reduction with gastropexy via g-tube x2     5/7 esophagram performed no leak noted    PLAN  1. Ok for bariatric CLD, nutritional supplements, cont andree TAISHA and PEG tube output, will discuss timing of diet advancement and PEG tube management, cont to gravity at this time  2. Cont recs and management per primary   3. Cont IV abx and monitor WBC/temperature, + blood cultures, defer abx regimen to primary   4. Encourage IS use, deep breathing, ambulation and PT/OT work   5. [id number] Dahlia Irwin for DVT ppx from surg stance   6.  Defer any need for thoracentesis or IR drain of chest per pulmonary discretion     Thank you,    Electronically signed by Kevin Faulkner DO  on 5/8/2021 at 7:20 AM

## 2021-05-08 NOTE — PLAN OF CARE
BRONCHOSPASM/BRONCHOCONSTRICTION     [x]         IMPROVE AERATION/BREATH SOUNDS  [x]   ADMINISTER BRONCHODILATOR THERAPY AS APPROPRIATE  [x]   ASSESS BREATH SOUNDS  []   IMPLEMENT AEROSOL/MDI PROTOCOL  [x]   PATIENT EDUCATION AS NEEDED    NONINVASIVE VENTILATION    PROVIDE OPTIMAL VENTILATION/ACCEPTABLE SPO2   IMPLEMENT NONINVASIVE VENTILATION PROTOCOL   MAINTAIN ACCEPTABLE SPO2   ASSESS SKIN INTEGRITY/BREAKDOWN SCORE   PATIENT EDUCATION AS NEEDED   BIPAP AS NEEDED    MOBILIZE SECRETIONS    [x]   ASSESS BREATH SOUNDS  [x]   ASSESS SPUTUM PRODUCTION  [x]   COUGH AND DEEP BREATHING  []  IMPLEMENT SECRETION MANAGEMENT PROTOCOL  [x]   PATIENT EDUCATION AS NEEDED

## 2021-05-08 NOTE — PROGRESS NOTES
Spoke to [facility] resident about plan. No further need for surgical critical care on board. Will sign off at this time. Please call with questions or concerns.      Electronically signed by Angie Lr DO on 5/8/2021 at 12:28 PM

## 2021-05-08 NOTE — PROGRESS NOTES
PULMONARY & CRITICAL CARE MEDICINE PROGRESS  NOTE     Patient:  Dunia Bishop  MRN: [id number]  Admit date: 5/3/2021    SUBJECTIVE     I personally interviewed/examined the patient, reviewed interval history and interpreted all available radiographic, laboratory data at the time of service. Chief Compliant/Reason for Initial Consult: Acute respiratory failure on vent support, gastric perforation    Brief Hospital Course and Interval History:  The patient is a 78 y.o. female with known medical history of diabetes mellitus, hypertension, hypothyroidism, abdominal hernia presented to the hospital with nausea, vomiting, diarrhea. Patient had similar symptoms on 4/28 but was apparently sent home. Patient had dizzy spells and lightheadedness, had a syncopal episode in the ER on this admission. Patient was transferred from the [facility] to [place].  In [facility] patient lab revealed lactic acidosis of 6, glucose 585, patient was seen to be in DKA. Elevated lipase of 1451. Leukocytotic with WBC 23.5, hemoglobin 18.3. Patient also had elevated troponins, elevated creatinine of 3.8. In [place] repeat labs showed creatinine of 2.49 with lactic acid of 2.4. Troponin was 85, recheck of 94. LFTs showed elevated bilirubin.     General surgery was consulted. CT chest showed ascites and pneumoperitoneum with apparent free-flowing oral contrast in the left upper quadrant concerning for viscus perforation possibly within the subdiaphragmatic portion of the stomach. Patient then went for robotic hernia repair 5/4 with wedge gastrectomy, gastropexy and placement of 2 TAISHA drains and 2 PEG tubes. Patient is n.p.o.     Pulmonology consulted for vent management.       Interval history  5/8/2021  Patient noted at bedside, saturating well on 2 L nasal cannula oxygen, platelet count stable  Continues to have persistent fever, blood cultures grow staph epidermidis. 2 gastrostomy tube, 1 TAISHA drain noted    Review of Systems:  Review of Systems   Constitutional: Positive for activity change. HENT: Negative for congestion. Respiratory: Negative for cough, shortness of breath and wheezing. Gastrointestinal: Positive for abdominal distention and abdominal pain. Genitourinary: Negative for difficulty urinating. Musculoskeletal: Negative. Neurological: Negative for light-headedness and numbness. Psychiatric/Behavioral: Negative for agitation.          OBJECTIVE     VITAL SIGNS:   LAST-  BP (!) 145/72   Pulse 88   Temp 98.2 °F (36.8 °C) (Oral)   Resp 20   Ht 5' 2\" (1.575 m)   Wt 195 lb 5.2 oz (88.6 kg)   SpO2 99%   BMI 35.73 kg/m²   8-24 HR RANGE-  TEMP Temp  Av.1 °F (37.8 °C)  Min: 97.8 °F (36.6 °C)  Max: 101.7 °F (81.1 °C)   BP Systolic (04DVX), QVU:683 , Min:137 , DFD:320      Diastolic (93XPR), YVC:25, Min:60, Max:80     PULSE Pulse  Av  Min: 80  Max: 100   RR Resp  Av.8  Min: 20  Max: 24   O2 SAT SpO2  Av.5 %  Min: 98 %  Max: 99 %   OXYGEN DELIVERY No data recorded     Systemic Examination:   General appearance alert and oriented   mental status - alert  Eyes - pupils equal and reactive, extraocular eye movements intact  Mouth - mucous membranes moist, pharynx normal without lesions  Neck - supple, no significant adenopathy  Chest -chest symmetrical, bilateral breath sounds clear and equal  Heart - normal rate, regular rhythm, normal S1, S2, no murmurs, rubs, clicks or gallops  Abdomen -soft with one TAISHA drain, PEG x2  Neurological - alert, oriented, normal speech, no focal findings or movement disorder noted  Extremities - peripheral pulses normal, no pedal edema, no clubbing or cyanosis  Skin - normal coloration and turgor, no rashes, no suspicious skin lesions noted     DATA REVIEW     Medications:  Scheduled Meds:   heparin (porcine)  5,000 Units Subcutaneous 3 times per day    vancomycin (VANCOCIN) intermittent dosing (placeholder)   Other RX Placeholder    vancomycin  1,250 mg Intravenous Q24H    acetaminophen  1,000 mg Oral Q8H    gabapentin  300 mg Oral 3 times per day    ipratropium-albuterol  1 ampule Inhalation Q4H WA    acetylcysteine  600 mg Oral BID    piperacillin-tazobactam  3,375 mg Intravenous Q8H    insulin glargine  5 Units Subcutaneous Nightly    insulin lispro  0-18 Units Subcutaneous Q4H    fluconazole  200 mg Intravenous Q24H    levothyroxine  56 mcg Intravenous Daily    And    sodium chloride (PF)  5 mL Intravenous Daily    pantoprazole  40 mg Intravenous Daily    And    sodium chloride (PF)  10 mL Intravenous Daily    sodium chloride flush  5-40 mL Intravenous 2 times per day     Continuous Infusions:   PN-Adult 2-in-1 Central Line (Standard)      PN-Adult 2-in-1 Central Line (Standard) 50 mL/hr at 05/07/21 1738    sodium chloride 25 mL (05/08/21 1026)     LABS:-  ABGs:   No results found for: PH, PCO2, PO2, HCO3, O2SAT  No results for input(s): PHART, PO2ART, AKO4TQL, HHX9PTR, BEART, Y3LUWKDR in the last 72 hours.   Lab Results   Component Value Date    POCPH 7.433 05/06/2021    POCPCO2 36.8 05/06/2021    POCPO2 150.9 (H) 05/06/2021    POCHCO3 24.6 05/06/2021    KXJC5PNT 99 (H) 05/06/2021     CBC:   Recent Labs     05/06/21  0503 05/07/21  0542 05/08/21  0355   WBC 7.8 6.2 7.1   HGB 9.5* 8.8* 8.9*   HCT 29.9* 27.2* 27.9*   MCV 89.3 88.3 88.6   PLT See Reflexed IPF Result 66* 72*   LYMPHOPCT 3* 5* 5*   RBC 3.35* 3.08* 3.15*   MCH 28.4 28.6 28.3   MCHC 31.8 32.4 31.9   RDW 14.4 14.6* 14.9*     BMP:   Recent Labs     05/06/21  1404 05/06/21  2214 05/07/21  0542 05/07/21  1151 05/08/21  0355   NA  --  147*  --  148* 146*   K 3.1* 3.5*  --  3.0* 3.0*   CL  --  113*  --  112* 112*   CO2  --  26  --  26 24   BUN  --  48*  --  44* 39*   CREATININE  --  1.71*  --  1.51* 1.40*   GLUCOSE  --  214*  --  235* 221*   PHOS 2.4*  --  2.6  --  2.6     Liver Function Test: Recent Labs     05/08/21  0355   PROT 4.7*   LABALBU 1.8*   ALT 56*   AST 19   ALKPHOS 84   BILITOT 0.62     Amylase/Lipase:  Recent Labs     05/07/21  0542   LIPASE 12*     Coagulation Profile:   Recent Labs     05/06/21  0645 05/07/21  1212 05/08/21  0355   INR 1.1 0.9 1.0   PROTIME 11.2 10.2 10.6   APTT 40.2* 30.4 27.9     Cardiac Enzymes:  No results for input(s): CKTOTAL, CKMB, CKMBINDEX, TROPONINI in the last 72 hours. Lactic Acid:  Lab Results   Component Value Date    LACTA 3.2 (H) 05/03/2021    LACTA 6.0 (HH) 05/03/2021     BNP:   No results found for: BNP  D-Dimer:  No results found for: DDIMER  Others:   Lab Results   Component Value Date    TSH 13.36 (H) 05/03/2021     No results found for: LILLY, RHEUMFACTOR, SEDRATE, CRP  No results found for: Nohemy Darter  No results found for: IRON, TIBC, FERRITIN  No results found for: SPEP, UPEP  No results found for: PSA, CEA, , RN4852,     Input/Output:    Intake/Output Summary (Last 24 hours) at 5/8/2021 1447  Last data filed at 5/8/2021 1256  Gross per 24 hour   Intake 1214 ml   Output 2415 ml   Net -1201 ml       Microbiology:    Pathology:    Radiology Reports:  CT CHEST ABDOMEN PELVIS WO CONTRAST   Final Result   1. Within the chest, the patient has consolidative processes in both lower   lobes likely pneumonitis. Bilateral pleural effusions and basilar   atelectasis are noted. 2. Postoperative changes within the abdomen. The patient had a hiatal hernia   repair. Although the stomach is decompressed, gastric walls appear thickened   likely secondary to inflammation which may be postoperative. 3. Fluid in the right pericolic gutter and pelvis. 4. Thickened small bowel loops in the right lower quadrant which may   represent secondary changes to surgery. 5. Two drain placements in the upper abdomen. Left inguinal terminates in   the left iliac vein. FL ESOPHAGRAM   Final Result   1.  Drainage of contrast material through what appears to be the gastrostomy   tubes following the ingestion of contrast.  Contrast does migrate beyond the   postoperative region into the proximal small bowel. 2. No extraneous/extraluminal collection of contrast is seen beyond the   confines of the stomach. 3. 2 surgical drains and 2 presumed gastrostomy tubes are seen overlying the   left upper quadrant. There does appear to be contrast slightly marginating   the more lateral gastrostomy tube balloon. 4. Delayed migration of contrast through the distal esophagus and GE junction   with mild gastroesophageal reflux. The findings were sent to the Radiology Results [address] at 12:43   pm on 5/7/2021to be communicated to a licensed caregiver. XR CHEST PORTABLE   Final Result   Bibasilar consolidation new from prior study. Blunting of the costophrenic   angles bilaterally         XR CHEST PORTABLE   Final Result   ETT tip position stable. Decreased left subcutaneous emphysema. Slightly   improved suspected left basilar volume loss with persistent findings as   above. No overt vascular congestion. US RENAL COMPLETE   Final Result   Unremarkable ultrasound of the kidneys and urinary bladder. Trace right pleural effusion         XR CHEST PORTABLE   Final Result   Volume loss continues left hemithorax with haziness at the left base either   atelectasis and or fluid. Subcutaneous emphysema along the left lateral   chest wall has decreased. No appreciable extrapleural air. Endotracheal   tube in appropriate position. XR CHEST PORTABLE   Final Result   1. Recommend repositioning of left internal jugular approach central venous   catheter. 2. Low lung volumes with left basilar atelectasis plus or minus mild pleural   effusion. 3. Left chest wall scattered soft tissue emphysema.          XR CHEST PORTABLE   Final Result   Intestinal tube deviates to the left side of a sizable hiatal hernia,   traversing below the hemidiaphragm and terminating just underneath the left   hemidiaphragm. Side port of the intestinal tube is below the diaphragmatic   hiatus. CT CHEST WO CONTRAST   Final Result   1. Ascites and pneumoperitoneum with apparent free-flowing oral contrast in   the left upper quadrant is concerning for viscus perforation, possibly within   the subdiaphragmatic portion of the stomach. 2. Large hiatal hernia with organoaxial volvulus. Majority of contrast is   retained within the esophagus and supradiaphragmatic stomach. 3. Enteric tube extends below the diaphragm with tip outside the field of   view. 4. Moderate bilateral pleural effusions with adjacent consolidation,   atelectasis versus pneumonia. Critical results were called by Dr. Martin Ly MD to [facility] on   5/4/2021 at 18:30. XR ABDOMEN (KUB) (SINGLE AP VIEW)   Final Result   No significant subdiaphragmatic contrast progression, as above. RECOMMENDATION:   Consider chest x-ray to further assess a organic-axial gastric volvulus         FL UGI   Final Result   Organo-axial volvulus the stomach. Large paraesophageal hernia containing the majority of the rotated gastric   body. The greater curvature is positioned superiorly within the   paraesophageal hernia defect. There is narrowing of the gastroesophageal   junction as well as of the gastroduodenal junction through the hernia defect. MRI ABDOMEN WO CONTRAST MRCP   Final Result   1. No evidence of intrahepatic or extrahepatic ductal dilatation. 2. Incompletely imaged large hiatal hernia with organoaxial malrotation of   the stomach. 3. Small to moderate amount of ascites. 4. Diffuse small bowel wall thickening likely due to 3rd spacing as the post   enteritis. 5. Trace bilateral pleural effusions.          XR CHEST PORTABLE   Final Result   No acute cardiopulmonary disease      Large hiatal hernia             Echocardiogram:   Results for orders placed during the hospital encounter of 05/03/21   ECHO Complete 2D W Doppler W Color    Narrative Transthoracic Echocardiography Report (TTE)     Patient Name Jorge Luis Barnacle   Date of Study               05/05/2021      Date of      1950  Gender                      Female   Birth      Age          78 year(s)  Race                              Room Number  8735        Height:                     62 inch, 157.48 cm      Corporate ID [id number]    Weight:                     166 pounds, 75.3 kg   #      Patient Acct [de-identified]   BSA:          1.77 m^2      BMI:      30.36   #                                                              kg/m^2      MR #         [id number]     Sonographer                 [place]      Accession #  [id number]  Interpreting Physician      Shellie Vizcarra 63      Fellow                   Referring Nurse                            Practitioner      Interpreting             Referring Physician         Sourav Quijano   Fellow     Additional Comments  Technically difficult study, patient supine on ventilator. Type of Study      TTE procedure:2D Echocardiogram, M-Mode, Doppler, Color Doppler. Procedure Date  Date: 05/05/2021 Start: 07:32 AM    Study Location: [facility]  Technical Quality: Adequate visualization    Indications:Elevated troponin. History / Tech. Comments:  Procedure explained to patient. No known medical Hx. Patient Status: Inpatient    Height: 62 inches Weight: 166 pounds BSA: 1.77 m^2 BMI: 30.36 kg/m^2    CONCLUSIONS    Summary  Normal LV size and wall thickness. No obvious wall motion abnormality seen. Normal LV systolic function with LVEF 55%. RV appears dilated with reduced function. RV systolic pressure 37 mmHg  LA appears normal in size. No obvious significant structural valvular abnormality noted. No significant valvular stenosis or regurgitation noted. Normal aortic root dimension.   No significant pericardial effusion noted.    Signature  ----------------------------------------------------------------------------   Electronically signed by Connor Montoya(Interpreting physician) on   2021 12:15 PM  ----------------------------------------------------------------------------    ----------------------------------------------------------------------------   Electronically signed by Olimpia Ag(Sonographer) on 2021 11:37 AM  ----------------------------------------------------------------------------  FINDINGS  Left Atrium  Left atrium is normal in size. Inter-atrial septum is intact with no evidence for an atrial septal defect,  by color doppler. Left Ventricle  Left ventricle is small in size, normal wall thickness, global left  ventricular systolic function is normal, calculated ejection fraction is  53%. All wall segments are not well visualized (poor acoustic windows.)  Right Atrium  Right atrial dilatation. Right Ventricle  Right ventricular dilatation with reduced systolic function. Abnormal RV strain. Mitral Valve  Normal mitral valve structure. Trivial mitral regurgitation. Aortic Valve  Aortic valve is trileaflet. No evidence of aortic insufficiency or stenosis. Tricuspid Valve  Normal tricuspid valve leaflets. Moderate tricuspid regurgitation. Estimated right ventricular systolic pressure is 37 mmHg, suggesting  pulmonary HTN. Pulmonic Valve  Pulmonic valve not well visualized but Doppler velocities are normal.  Pericardial Effusion  No significant pericardial effusion is seen. Miscellaneous  Normal aortic root dimension. E/E' average = 15.05. IVC dilated but unable to assess respiratory collapse due to patient on  ventilator.     M-mode / 2D Measurements & Calculations:      LVIDd:4 cm(3.7 - 5.6 cm)          Diastolic HDIGYP:08 ml   ZCBK:9.2 cm(0.6 - 1.1 cm)         Systolic TARVK.78 ml   LVPWd:0.9 cm(0.6 - 1.1 cm)        Aortic Root:2.9 cm(2.0 - 3.7 cm) LA Dimension: 3.1 cm(1.9 - 4.0 cm)   Calculated LVEF (%): 52.95 %      LA volume/Index: 31.04 ml /18m^2                                     LVOT:1.9 cm                                     RVDd:3 cm      Mitral:                                 Aortic      Valve Area (P1/2-Time): 5.12 cm^2       Peak Velocity: 1.55 m/s   Peak E-Wave: 0.78 m/s                   Mean Velocity: 0.99 m/s   Peak A-Wave: 1.00 m/s                   Peak Gradient: 9.61 mmHg   E/A Ratio: 0.78                         Mean Gradient: 5 mmHg   Peak Gradient: 2.45 mmHg   Mean Gradient: 2 mmHg   Deceleration Time: 145 msec             Area (continuity): 2.16 cm^2   P1/2t: 43 msec                          AV VTI: 25.9 cm      Area (continuity): 2.1 cm^2   Mean Velocity: 0.60 m/s      Tricuspid:                              Pulmonic:      Estimated RVSP: 37 mmHg                 Peak Velocity: 0.80 m/s   Peak TR Velocity: 2.85 m/s              Peak Gradient: 2.57 mmHg   Peak TR Gradient: 32.49 mmHg     Diastology / Tissue Doppler  Septal Wall E' velocity:0.06 m/s  Septal Wall E/E':13.8  Lateral Wall E' velocity:0.05 m/s  Lateral Wall E/E':16.3       Cardiac Catheterization:   No results found for this or any previous visit.     ASSESSMENT AND PLAN     Assessment:    Gastric volvulus with Viscus perforation  Sepsis   moderate bilateral pleural effusions  Bilateral lower lobe pneumonia  Peritonitis  Blood cultures positive for staph epidermidis, methicillin resistant  Diabetes mellitusDKA now resolved  Lactic acidosis resolving  BONNIE  Thrombocytopenia   Elevated troponins  Subclinical hypothyroidism  Elevated bilirubin  Hypernatremia    Plan:    Patient saturating well on nasal cannula oxygen, 2 L, wean as tolerated  Encourage ambulation    Sepsis likely secondary to gastric perforation on Zosyn, fluconazole   Blood cultures grew staph epidermidis, vancomycin added    Thrombocytopenia-patient had count stable, hit negative, resume DVT prophylaxis with heparin    Gastric volvulus with viscus perforation s/p robotic assisted laparoscopic hiatal hernia reduction with gastropexy. Has 2 PEG, 1 TAISHA drain in placeon Zosyn. On TPN    Acute kidney injurynonoliguric, good urine output after Walker change, nephrology following. Hypernatremia-on Ringer lactate  Elevated troponins cardiology evaluation to follow, echo shows EF of 55%. Diabetes mellituscover with insulin     Remove Walker and central line. Continue to monitor I/O with a goal of even/negative fluid balance  Maintain epc cuffs    Robyn Suazo M.D. Pulmonary and Critical Care Medicine           5/8/2021, 2:47 PM    Please note that this chart was generated using voice recognition Dragon dictation software. Although every effort was made to ensure the accuracy of this automated transcription, some errors in transcription may have occurred.

## 2021-05-08 NOTE — PROGRESS NOTES
[facility]  Office: [address], DO, Maurilio Darnell, DO, Christian Field, DO, Naima Woods Blood, DO, Odilon Mejia MD, Nikolai Espinosa MD, Odilon Xiao MD, Julia Allan MD, Jose Perkins MD, Yazan Durham MD, Michelina Peabody, MD, Brian Lorenz MD, Tejal White, DO, Elisa Stein MD, Neav Malone DO, Nicol Interiano MD,  Jose Alberto Navarro DO, Nirmal Blood MD, Derald Jeans, MD, Saul Rodriguez MD, Ute Osborn MD, See Keith, [facility], [facility] Annette, [facility], Olvin Burgos, CNP, Gabriele Tadeo, CNS, Alexandra Dias, CNP, Lio Hernandez, CNP, Shanta Gonzalez, CNP, Janessa Kemp, CNP, Jarrett Bashir, CNP, Sana Strickland PA-C, Darryl Penaloza, [facility], Marry Wei, CNP, Gabo Root, CNP, Narendra Saini, CNP, Antoine Holter, CNP, Vamshi Johnson, CNP, [facility], [address] [place] [address]    Progress Note    5/8/2021    11:06 AM    Name:   Audie Tse  MRN:     [id number]     Acct:      [de-identified]   Room:   [facility] Day:  5  Admit Date:  5/3/2021 12:49 PM    PCP:   Mirlande Hill DO  Code Status:  Full Code    Subjective:     C/C:        Chief Complaint   Patient presents with    Blood Sugar Problem       >450    Hernia       hyatial       Interval History Status: improved.      Pt seen and examined this morning  Saturating well on room air  No acute events overnight   No complaints         Review of Systems:      12 point ROS performed and negative for anything other than what was stated in subjective          Medications: Allergies:     Allergies   Allergen Reactions    No Known Allergies        Current Meds:   Scheduled Meds:    heparin (porcine)  5,000 Units Subcutaneous 3 times per day    vancomycin (VANCOCIN) intermittent dosing (placeholder)   Other RX Placeholder    vancomycin  1,250 mg Intravenous Q24H    acetaminophen  1,000 mg Oral Q8H    gabapentin  300 mg Oral 3 times per day    ipratropium-albuterol  1 ampule Inhalation Q4H WA    acetylcysteine  600 mg Oral BID    piperacillin-tazobactam  3,375 mg Intravenous Q8H    insulin glargine  5 Units Subcutaneous Nightly    insulin lispro  0-18 Units Subcutaneous Q4H    fluconazole  200 mg Intravenous Q24H    levothyroxine  56 mcg Intravenous Daily    And    sodium chloride (PF)  5 mL Intravenous Daily    pantoprazole  40 mg Intravenous Daily    And    sodium chloride (PF)  10 mL Intravenous Daily    sodium chloride flush  5-40 mL Intravenous 2 times per day     Continuous Infusions:    PN-Adult 2-in-1 Central Line (Standard) 50 mL/hr at 21 1738    sodium chloride 25 mL (21 1026)     PRN Meds: potassium chloride, magnesium sulfate, oxyCODONE, acetaminophen, artificial tears, sodium chloride flush, sodium chloride, [DISCONTINUED] promethazine **OR** ondansetron, dextrose    Data:     Past Medical History:   has a past medical history of Diabetes mellitus ([address] Gout, Hiatal hernia, Hyperlipidemia, Hypertension, and Thyroid disease. Social History:   reports that she has never smoked. She has never used smokeless tobacco. She reports that she does not drink alcohol or use drugs. Family History:   Family History   Problem Relation Age of Onset    Breast Cancer Mother     High Blood Pressure Mother     High Cholesterol Father     Breast Cancer Sister        Vitals:  BP (!) 155/80   Pulse 85   Temp 97.8 °F (36.6 °C) (Oral)   Resp 23   Ht 5' 2\" (1.575 m)   Wt 195 lb 5.2 oz (88.6 kg)   SpO2 99%   BMI 35.73 kg/m²   Temp (24hrs), Av.4 °F (38 °C), Min:97.8 °F (36.6 °C), Max:101.7 °F (38.7 °C)    Recent Labs     21  2050 21  2311 21  0354 21  0819   POCGLU 170* 190* 191* 170*       I/O (24Hr):     Intake/Output Summary (Last 24 hours) at 2021 1106  Last data filed at 2021 0400  Gross per 24 hour   Intake 1590.25 ml   Output 1840 ml   Net -249.75 ml       Labs:  Hematology:  Recent Labs     21  0503 05/06/21  0645 05/07/21  0542 05/07/21  1212 05/08/21  0355   WBC 7.8  --  6.2  --  7.1   RBC 3.35*  --  3.08*  --  3.15*   HGB 9.5*  --  8.8*  --  8.9*   HCT 29.9*  --  27.2*  --  27.9*   MCV 89.3  --  88.3  --  88.6   MCH 28.4  --  28.6  --  28.3   MCHC 31.8  --  32.4  --  31.9   RDW 14.4  --  14.6*  --  14.9*   PLT See Reflexed IPF Result  --  66*  --  72*   MPV NOT REPORTED  --  12.0  --  12.2   INR  --  1.1  --  0.9 1.0     Chemistry:  Recent Labs     05/06/21  1404 05/06/21 2214 05/07/21  0542 05/07/21  1151 05/08/21  0355   NA  --  147*  --  148* 146*   K 3.1* 3.5*  --  3.0* 3.0*   CL  --  113*  --  112* 112*   CO2  --  26  --  26 24   GLUCOSE  --  214*  --  235* 221*   BUN  --  48*  --  44* 39*   CREATININE  --  1.71*  --  1.51* 1.40*   MG  --  1.9 1.8 1.6 1.8   ANIONGAP  --  8*  --  10 10   LABGLOM  --  30*  --  34* 37*   GFRAA  --  36*  --  42* 45*   CALCIUM  --  8.5*  --  8.0* 8.1*   PHOS 2.4*  --  2.6  --  2.6     Recent Labs     05/05/21  1410 05/05/21  1410 05/06/21  0503 05/06/21  0503 05/07/21  0542 05/07/21  0542 05/07/21  1151 05/07/21  1217 05/07/21  1601 05/07/21  2050 05/07/21  2311 05/08/21  0354 05/08/21  0355 05/08/21  0819   PROT 4.9*  --  4.6*  --   --   --  4.5*  --   --   --   --   --  4.7*  --    LABALBU 2.7*  --  2.1*  --   --   --  2.0*  --   --   --   --   --  1.8*  --    *  --  154*  --   --   --  32*  --   --   --   --   --  19  --    *  --  133*  --   --   --  77*  --   --   --   --   --  56*  --    ALKPHOS 42  --  48  --   --   --  66  --   --   --   --   --  84  --    BILITOT 2.08*  --  1.30*  --   --   --  0.76  --   --   --   --   --  0.62  --    BILIDIR 0.62*  --   --   --   --   --  0.29  --   --   --   --   --  0.25  --    LIPASE  --   --   --   --  12*  --   --   --   --   --   --   --   --   --    TRIG  --   --  366*  --   --   --   --   --   --   --   --   --   --   --    POCGLU  --    < >  --    < >  --    < >  --  210* 179* 170* 190* 191*  --  170*    < > Large hiatal hernia with organoaxial volvulus. Majority of contrast is retained within the esophagus and supradiaphragmatic stomach. 3. Enteric tube extends below the diaphragm with tip outside the field of view. 4. Moderate bilateral pleural effusions with adjacent consolidation, atelectasis versus pneumonia. Critical results were called by Dr. Jacquie Lee MD to [facility] on 5/4/2021 at 18:30. Ct Cervical Spine Wo Contrast    Result Date: 5/3/2021  No acute findings in the cervical spine. Us Renal Complete    Result Date: 5/5/2021  Unremarkable ultrasound of the kidneys and urinary bladder. Trace right pleural effusion     Fl Esophagram    Result Date: 5/7/2021  1. Drainage of contrast material through what appears to be the gastrostomy tubes following the ingestion of contrast.  Contrast does migrate beyond the postoperative region into the proximal small bowel. 2. No extraneous/extraluminal collection of contrast is seen beyond the confines of the stomach. 3. 2 surgical drains and 2 presumed gastrostomy tubes are seen overlying the left upper quadrant. There does appear to be contrast slightly marginating the more lateral gastrostomy tube balloon. 4. Delayed migration of contrast through the distal esophagus and GE junction with mild gastroesophageal reflux. The findings were sent to the Radiology Results [address] at 12:43 pm on 5/7/2021to be communicated to a licensed caregiver. Xr Chest Portable    Result Date: 5/7/2021  Bibasilar consolidation new from prior study. Blunting of the costophrenic angles bilaterally     Xr Chest Portable    Result Date: 5/6/2021  ETT tip position stable. Decreased left subcutaneous emphysema. Slightly improved suspected left basilar volume loss with persistent findings as above. No overt vascular congestion.      Xr Chest Portable    Result Date: 5/5/2021  Volume loss continues left hemithorax with haziness at the left base either atelectasis and or fluid. Subcutaneous emphysema along the left lateral chest wall has decreased. No appreciable extrapleural air. Endotracheal tube in appropriate position. Xr Chest Portable    Result Date: 5/5/2021  1. Recommend repositioning of left internal jugular approach central venous catheter. 2. Low lung volumes with left basilar atelectasis plus or minus mild pleural effusion. 3. Left chest wall scattered soft tissue emphysema. Xr Chest Portable    Result Date: 5/4/2021  Intestinal tube deviates to the left side of a sizable hiatal hernia, traversing below the hemidiaphragm and terminating just underneath the left hemidiaphragm. Side port of the intestinal tube is below the diaphragmatic hiatus. Xr Chest Portable    Result Date: 5/3/2021  No acute cardiopulmonary disease Large hiatal hernia     Mri Abdomen Wo Contrast Mrcp    Result Date: 5/3/2021  1. No evidence of intrahepatic or extrahepatic ductal dilatation. 2. Incompletely imaged large hiatal hernia with organoaxial malrotation of the stomach. 3. Small to moderate amount of ascites. 4. Diffuse small bowel wall thickening likely due to 3rd spacing as the post enteritis. 5. Trace bilateral pleural effusions. Ct Chest Abdomen Pelvis Wo Contrast    Result Date: 5/7/2021  1. Within the chest, the patient has consolidative processes in both lower lobes likely pneumonitis. Bilateral pleural effusions and basilar atelectasis are noted. 2. Postoperative changes within the abdomen. The patient had a hiatal hernia repair. Although the stomach is decompressed, gastric walls appear thickened likely secondary to inflammation which may be postoperative. 3. Fluid in the right pericolic gutter and pelvis. 4. Thickened small bowel loops in the right lower quadrant which may represent secondary changes to surgery. 5. Two drain placements in the upper abdomen. Left inguinal terminates in the left iliac vein.      Fl Ugi    Result Date: 5/4/2021  Organo-axial volvulus the stomach. Large paraesophageal hernia containing the majority of the rotated gastric body. The greater curvature is positioned superiorly within the paraesophageal hernia defect. There is narrowing of the gastroesophageal junction as well as of the gastroduodenal junction through the hernia defect. Physical Examination:        General appearance: NAD, ill appearing  Mental Status:  Follows commands appropriately. Lungs:  CTAB. Heart:  regular rate and rhythm, no murmur  Abdomen:  mild distension,patient is sedated but no guarding or movement on palpation, BS noted, TAISHA drain on bilateral sides with serous sanguineous drainage, Peg tube draining green fluid  Extremities:  no edema, redness    Assessment:        Hospital Problems           Last Modified POA    * (Principal) Septic shock ([address] 5/4/2021 Yes    Non-STEMI (non-ST elevated myocardial infarction) ([address] 5/4/2021 Yes    Diabetic ketoacidosis without coma associated with type 2 diabetes mellitus ([address] 5/4/2021 Yes    BONNIE (acute kidney injury) ([address] 5/4/2021 Yes    Hernia with obstruction 5/4/2021 Yes    Essential hypertension 5/4/2021 Yes    Thyroid disease 5/4/2021 Yes    Syncope 5/4/2021 Yes    Acute tubular necrosis ([address] 5/4/2021 Yes    Lactic acidosis 5/4/2021 Yes    Acute hypoxemic respiratory failure ([address] 5/4/2021 Yes    Hiatal hernia 5/5/2021 Yes    Gastric volvulus 5/5/2021 Yes          Plan:        1. Septic shock  2. Gastric ischemia and subsequent pneumoperitoneum  - Underwent emergent wedge gastrectomy, gastropexy x2, placement of TAISHA drains x2, abdominal lavage  - Pt remains febrile. Will obtain urine culture as morel was clogged and changed today. Reviewed CXR, I do not see any evidence of pneumonia. WBC count not elevated. Will continue with zosyn. If condition worsens will consider broadening coverage with teflaro.  - fluconazole as ordered  - f/u urine, blood cultures  - initial blood cx positive for staph epidermidis.

## 2021-05-08 NOTE — PROGRESS NOTES
[facility] - Initial Consult Note  Today's Date and Time: 5/8/2021, 9:17 AM    Impression :     Paraesophageal Hiatal hernia  Gastric perforation and volvulus   Purulent mediastinitis  S/p laparoscopic, 5/4/21 - wedge gastrectomy and HH repair, gastropexy  TAISHA and PEG  bilat LL pneumonia   Peritonitis  Acute pancreatitis  Septic Shock   Hyperglycemia  NSTEMI  BONNIE - better  DKA/ DM 2  Lactic acidosis  Not a MRSA carrier    Recommendations:   Continue Zosyn fluconazole   Watch LFT  5/8/21 persistent fever and SCN bacteremia -  add vanco iv 5/8  Ask lab to run the sensi on both SCN org to ck if double contamination  Repeat BC  Pull fem line  Pull morel  Watch fever  Disc w Dr Terri Kaiser and MAEGAN [place]    Medical Decision Making/Summary/Discussion:5/8/2021         Infection Control Recommendations   [place] Precautions    Antimicrobial Stewardship Recommendations     Simplification of therapy  Targeted therapy    Coordination of Outpatient Care:   Estimated Length of IV antimicrobials: TBD  Patient will need Midline Catheter Insertion:No   Patient will need PICC line Insertion:No  Patient will need: Home IV , [place],  SNF,  LTAC:  Patient will need outpatient wound care:Yes    Chief complaint/reason for consultation:   Septic shock/sepsis    History of Present Illness:   Opal Heath is a 78y.o.-year-old   female who was initially admitted on 5/3/2021. Patient seen at the request of Corinna Banuelos. INITIAL HISTORY : 05/07/2021    Pt with a history of diabetes mellitus type 2, hypertension, hyperlipidemia, thyroid disease, gout and hiatal hernia. She initially presented on 05/03/2021 to an outlying facility with a chief complaint of nausea vomiting, diarrhea and syncopal attack . Patient was found to have blood glucose of 585,WBC of 23.5, elevated lipase 1451 and elevated beta hydroxybutyrate.       MRCP on 05/03/2021 showed diffuse small bowel wall thickening likely growth  5/7 BC x 2 SCN  Sputum :    Wound:      I have personally reviewed the past medical history, past surgical history, medications, social history, and family history, and I have updated the database accordingly.   Past Medical History:     Past Medical History:   Diagnosis Date    Diabetes mellitus ([address]     Gout     Hiatal hernia     Hyperlipidemia     Hypertension     Thyroid disease        Past Surgical  History:     Past Surgical History:   Procedure Laterality Date    BREAST SURGERY      Bx 1993    CHOLECYSTECTOMY  1999    GASTRIC FUNDOPLICATION N/A 4/5/7255    PARAESPHAGEAL HERNIA REPAIR LAPAROSCOPIC ROBOTIC performed by Adilson Hughes DO at [address] [place] [address]    Remove spur L ankle    OTHER SURGICAL HISTORY  1978    Pin and wire repair R ankle    TUBAL LIGATION  1988       Medications:      heparin (porcine)  5,000 Units Subcutaneous 3 times per day    vancomycin (VANCOCIN) intermittent dosing (placeholder)   Other RX Placeholder    vancomycin  1,250 mg Intravenous Q24H    acetaminophen  1,000 mg Oral Q8H    gabapentin  300 mg Oral 3 times per day    ipratropium-albuterol  1 ampule Inhalation Q4H WA    acetylcysteine  600 mg Oral BID    gabapentin  100 mg Oral TID    piperacillin-tazobactam  3,375 mg Intravenous Q8H    insulin glargine  5 Units Subcutaneous Nightly    insulin lispro  0-18 Units Subcutaneous Q4H    fluconazole  200 mg Intravenous Q24H    levothyroxine  56 mcg Intravenous Daily    And    sodium chloride (PF)  5 mL Intravenous Daily    pantoprazole  40 mg Intravenous Daily    And    sodium chloride (PF)  10 mL Intravenous Daily    sodium chloride flush  5-40 mL Intravenous 2 times per day       Social History:     Social History     Socioeconomic History    Marital status:      Spouse name: Not on file    Number of children: Not on file    Years of education: Not on file    Highest education level: Not on file   Occupational History    Not on file   Social Needs    Financial resource strain: Not on file    Food insecurity     Worry: Not on file     Inability: Not on file    Transportation needs     Medical: Not on file     Non-medical: Not on file   Tobacco Use    Smoking status: Never Smoker    Smokeless tobacco: Never Used   Substance and Sexual Activity    Alcohol use: Never     Frequency: Never    Drug use: Never    Sexual activity: Not on file   Lifestyle    Physical activity     Days per week: Not on file     Minutes per session: Not on file    Stress: Not on file   Relationships    Social connections     Talks on phone: Not on file     Gets together: Not on file     Attends Nondenominational service: Not on file     Active member of club or organization: Not on file     Attends meetings of clubs or organizations: Not on file     Relationship status: Not on file    Intimate partner violence     Fear of current or ex partner: Not on file     Emotionally abused: Not on file     Physically abused: Not on file     Forced sexual activity: Not on file   Other Topics Concern    Not on file   Social History Narrative    Not on file       Family History:     Family History   Problem Relation Age of Onset    Breast Cancer Mother     High Blood Pressure Mother     High Cholesterol Father     Breast Cancer Sister         Allergies:   No known allergies       Review of Systems   Constitutional: Positive for appetite change. Negative for activity change. HENT: Negative for congestion. Eyes: Negative for discharge. Respiratory: Negative for apnea. Cardiovascular: Negative for chest pain. Gastrointestinal: Negative for abdominal distention. Endocrine: Negative for heat intolerance. Genitourinary: Negative for dysuria. Musculoskeletal: Negative for arthralgias. Skin: Negative for color change. Allergic/Immunologic: Negative for immunocompromised state. Neurological: Negative for headaches. 1.51* 1.40*   MG 1.6 1.8     Hepatic Function Panel:   Recent Labs     05/07/21  1151 05/08/21  0355   PROT 4.5* 4.7*   LABALBU 2.0* 1.8*   BILIDIR 0.29 0.25   IBILI 0.47 0.37   BILITOT 0.76 0.62   ALKPHOS 66 84   ALT 77* 56*   AST 32* 19     No results for input(s): RPR in the last 72 hours. No results for input(s): HIV in the last 72 hours. No results for input(s): BC in the last 72 hours.   Lab Results   Component Value Date    MUCUS NOT REPORTED 05/06/2021    RBC 3.15 05/08/2021    RBC 6.23 05/03/2021    TRICHOMONAS NOT REPORTED 05/06/2021    WBC 7.1 05/08/2021    YEAST NOT REPORTED 05/06/2021    TURBIDITY CLOUDY 05/06/2021     Lab Results   Component Value Date    CREATININE 1.40 05/08/2021    CREATININE 3.09 05/03/2021    GLUCOSE 221 05/08/2021    GLUCOSE 453 05/03/2021       Medical Decision Making-Imaging:     EXAMINATION:   SINGLE CONTRAST ESOPHAGRAM       5/7/2021 11:03 am       TECHNIQUE:   Single contrast esophagram was performed with a total 100 mL of Omnipaque 240   oral contrast.       FLUOROSCOPY DOSE AND TYPE OR TIME AND EXPOSURES:   Fluoro time: 2.7 minutes; DAP: 70.44 mGy       COMPARISON:   Upper GI from 05/04/2021       HISTORY:   ORDERING SYSTEM PROVIDED HISTORY: s/p hiatal hernia reduction  with partial   gastrectomy and gastropexy   TECHNOLOGIST PROVIDED HISTORY:   s/p hiatal hernia reduction  with partial gastrectomy and gastropexy   Reason for Exam: H.H repair/gastrectomy/gastropexy/ 100 ml Omnipaque orally   Acuity: Unknown   Type of Exam: Unknown       62-year-old female status post hiatal hernia reduction with partial   gastrectomy and gastropexy       FINDINGS:   Fluoroscopic  imaging was obtained prior to the administration contrast.       2 gastrostomy tubes are seen projecting over the left upper quadrant.  There   also 2 surgical drains projecting over the left upper quadrant.  Prior   cholecystectomy.  Suture material projects over the left upper quadrant.       The patient drank contrast which migrates through the postoperative region   and into the stomach and small bowel.       There is contrast draining through what appears to be the gastrostomy tubes.       No extraneous collection of contrast is seen beyond the confines of the   stomach.       There is contrast marginating a presumed gastrostomy tube balloon.       Mild gastroesophageal reflux and delayed transit of contrast is seen within   the distal esophagus/GE junction.           Impression   1. Drainage of contrast material through what appears to be the gastrostomy   tubes following the ingestion of contrast.  Contrast does migrate beyond the   postoperative region into the proximal small bowel. 2. No extraneous/extraluminal collection of contrast is seen beyond the   confines of the stomach. 3. 2 surgical drains and 2 presumed gastrostomy tubes are seen overlying the   left upper quadrant.  There does appear to be contrast slightly marginating   the more lateral gastrostomy tube balloon. 4. Delayed migration of contrast through the distal esophagus and GE junction   with mild gastroesophageal reflux. The findings were sent to the Radiology Results [address] at 12:43   pm on 5/7/2021to be communicated to a licensed caregiver.             EXAMINATION:   CT OF THE CHEST WITHOUT CONTRAST 5/4/2021 5:55 pm       TECHNIQUE:   CT of the chest was performed without the administration of intravenous   contrast. Multiplanar reformatted images are provided for review. Dose   modulation, iterative reconstruction, and/or weight based adjustment of the   mA/kV was utilized to reduce the radiation dose to as low as reasonably   achievable.       COMPARISON:   None.       HISTORY:   ORDERING SYSTEM PROVIDED HISTORY: large hiatal hernia, contrast progression? TECHNOLOGIST PROVIDED HISTORY:   large hiatal hernia, contrast progression?    Reason for Exam: large hiatal hernia, contrast progression?       FINDINGS: Mediastinum: Heart size is normal without pericardial effusion.  There are   shotty mediastinal lymph nodes.  The thoracic aorta and main pulmonary artery   are normal in caliber.       Lungs/pleura: Moderate bilateral pleural effusions with adjacent   consolidation.  No pneumothorax.       Upper Abdomen: There is a large hiatal hernia containing the majority of the   stomach.  There is organoaxial volvulus.  Enteric tube is noted extending   below the diaphragm with tip outside the field of view.  The herniated   stomach is distended with contrast.  There is also contrast within the distal   esophagus. Amparo Beers is some contrast visualized within the portion of stomach   below the diaphragm.  Free air and fluid are noted within the visualized   upper abdomen with apparent free spill of contrast in the left upper quadrant.       Soft Tissues/Bones: No acute osseous abnormality.           Impression   1. Ascites and pneumoperitoneum with apparent free-flowing oral contrast in   the left upper quadrant is concerning for viscus perforation, possibly within   the subdiaphragmatic portion of the stomach. 2. Large hiatal hernia with organoaxial volvulus.  Majority of contrast is   retained within the esophagus and supradiaphragmatic stomach. 3. Enteric tube extends below the diaphragm with tip outside the field of   view. 4. Moderate bilateral pleural effusions with adjacent consolidation,   atelectasis versus pneumonia.    Critical results were called [place] Dr. Kristal [place]erd [place] on   5/4/2021 at 18:30.           Medical Decision [id number]:       Medical Decision Making-Other:     Note:  Labs, medications, radiologic studies were reviewed with personal review of films   Large amounts of data were reviewed  Discussed with nursing Staff, Discharge planner  Infection Control and Prevention measures reviewed  All prior entries were reviewed  Administer medications as ordered  Prognosis:

## 2021-05-08 NOTE — PROGRESS NOTES
Pharmacy Note  Vancomycin Consult    Erick Oreilly is a 78 y.o. female started on Vancomycin for a bloodstream infection; consult received from Dr. Rob Strickland to manage therapy. Also receiving the following antibiotics: fluconazole, Zosyn. Patient Active Problem List   Diagnosis    Acute pancreatitis    Septic shock (HCC)    Non-STEMI (non-ST elevated myocardial infarction) ([facility]    Diabetic ketoacidosis without coma associated with type 2 diabetes mellitus ([facility]    BONNIE (acute kidney injury) ([facility]    Hernia with obstruction    Essential hypertension    Thyroid disease    Syncope    Acute tubular necrosis (HCC)    Lactic acidosis    Acute hypoxemic respiratory failure (HCC)    Hiatal hernia    Gastric volvulus     Allergies:  No known allergies     Temp max: 101.7 F    Recent Labs     05/07/21  0542 05/07/21  1151 05/08/21  0355   BUN  --  44* 39*   CREATININE  --  1.51* 1.40*   WBC 6.2  --  7.1       Intake/Output Summary (Last 24 hours) at 5/8/2021 0836  Last data filed at 5/8/2021 0400  Gross per 24 hour   Intake 1590.25 ml   Output 1980 ml   Net -389.75 ml     Culture Date      Source                       Results  5/7/21                   Blood                         gram + cocci in clusters  5/7/21                   Blood                         gram + cocci in clusters    Ht Readings from Last 1 Encounters:   05/03/21 5' 2\" (1.575 m)        Wt Readings from Last 1 Encounters:   05/07/21 195 lb 5.2 oz (88.6 kg)       Body mass index is 35.73 kg/m². Estimated Creatinine Clearance: 40 mL/min (A) (based on SCr of 1.4 mg/dL (H)). Goal Trough Level: 15-20 mcg/mL    Assessment/Plan:  Will initiate Vancomycin 1250 mg IV every 24 hours. Timing of trough level will be determined based on culture results, renal function, and clinical response. Thank you for the consult. Will continue to follow.     Tam Joseph, PharmD 5/8/2021 8:51 AM

## 2021-05-08 NOTE — PLAN OF CARE
Problem: Skin Integrity:  Goal: Will show no infection signs and symptoms  Description: Will show no infection signs and symptoms  5/8/2021 1848 by Mikey Ureña RN  Outcome: Met This Shift  Note: Skin assessed for breakdown and redness, patient turned regularly, and heels elevated off of bed on pillows.

## 2021-05-08 NOTE — PLAN OF CARE
Problem: OXYGENATION/RESPIRATORY FUNCTION  Goal: Patient will maintain patent airway  Outcome: Ongoing  Goal: Patient will achieve/maintain normal respiratory rate/effort  Description: Respiratory rate and effort will be within normal limits for the patient  Outcome: Ongoing     Problem: SKIN INTEGRITY  Goal: Skin integrity is maintained or improved  Outcome: Ongoing     Problem: Confusion - Acute:  Goal: Absence of continued neurological deterioration signs and symptoms  Description: Absence of continued neurological deterioration signs and symptoms  Outcome: Ongoing  Goal: Mental status will be restored to baseline  Description: Mental status will be restored to baseline  Outcome: Ongoing     Problem: Discharge Planning:  Goal: Ability to perform activities of daily living will improve  Description: Ability to perform activities of daily living will improve  Outcome: Ongoing  Goal: Participates in care planning  Description: Participates in care planning  Outcome: Ongoing     Problem: Injury - Risk of, Physical Injury:  Goal: Absence of physical injury  Description: Absence of physical injury  Outcome: Met This Shift  Goal: Will remain free from falls  Description: Will remain free from falls  Outcome: Met This Shift     Problem: Mood - Altered:  Goal: Mood stable  Description: Mood stable  Outcome: Ongoing  Goal: Absence of abusive behavior  Description: Absence of abusive behavior  Outcome: Ongoing  Goal: Verbalizations of feeling emotionally comfortable while being cared for will increase  Description: Verbalizations of feeling emotionally comfortable while being cared for will increase  Outcome: Ongoing     Problem: Psychomotor Activity - Altered:  Goal: Absence of psychomotor disturbance signs and symptoms  Description: Absence of psychomotor disturbance signs and symptoms  Outcome: Ongoing     Problem: Sensory Perception - Impaired:  Goal: Demonstrations of improved sensory functioning will increase Description: Demonstrations of improved sensory functioning will increase  Outcome: Ongoing  Goal: Decrease in sensory misperception frequency  Description: Decrease in sensory misperception frequency  Outcome: Ongoing  Goal: Able to refrain from responding to false sensory perceptions  Description: Able to refrain from responding to false sensory perceptions  Outcome: Ongoing  Goal: Demonstrates accurate environmental perceptions  Description: Demonstrates accurate environmental perceptions  Outcome: Ongoing  Goal: Able to distinguish between reality-based and nonreality-based thinking  Description: Able to distinguish between reality-based and nonreality-based thinking  Outcome: Ongoing  Goal: Able to interrupt nonreality-based thinking  Description: Able to interrupt nonreality-based thinking  Outcome: Ongoing     Problem: Sleep Pattern Disturbance:  Goal: Appears well-rested  Description: Appears well-rested  Outcome: Ongoing     Problem: Skin Integrity:  Goal: Will show no infection signs and symptoms  Description: Will show no infection signs and symptoms  Outcome: Ongoing  Goal: Absence of new skin breakdown  Description: Absence of new skin breakdown  Outcome: Ongoing     Problem: Falls - Risk of:  Goal: Absence of physical injury  Description: Absence of physical injury  Outcome: Met This Shift  Goal: Will remain free from falls  Description: Will remain free from falls  Outcome: Met This Shift     Problem: Nutrition  Goal: Optimal nutrition therapy  Description: Nutrition Problem #1: Inadequate oral intake  Intervention: Food and/or Nutrient Delivery: Continue NPO, Start Parenteral Nutrition-suggest start with 150 g Dex, 50 g AA at 41.7 mL/hr with 100 mL 20% lipids.   Nutritional Goals: meet % of estimated nutrient needs     Outcome: Ongoing

## 2021-05-09 LAB
ABSOLUTE EOS #: 0 K/UL (ref 0–0.4)
ABSOLUTE IMMATURE GRANULOCYTE: 0 K/UL (ref 0–0.3)
ABSOLUTE LYMPH #: 1.51 K/UL (ref 1–4.8)
ABSOLUTE MONO #: 0.34 K/UL (ref 0.1–0.8)
ALBUMIN SERPL-MCNC: 1.5 G/DL (ref 3.5–5.2)
ALBUMIN/GLOBULIN RATIO: 0.5 (ref 1–2.5)
ALP BLD-CCNC: 83 U/L (ref 35–104)
ALT SERPL-CCNC: 34 U/L (ref 5–33)
ANION GAP SERPL CALCULATED.3IONS-SCNC: 11 MMOL/L (ref 9–17)
AST SERPL-CCNC: 13 U/L
BASOPHILS # BLD: 0 % (ref 0–2)
BASOPHILS ABSOLUTE: 0 K/UL (ref 0–0.2)
BILIRUB SERPL-MCNC: 0.39 MG/DL (ref 0.3–1.2)
BILIRUBIN DIRECT: 0.17 MG/DL
BILIRUBIN, INDIRECT: 0.22 MG/DL (ref 0–1)
BUN BLDV-MCNC: 34 MG/DL (ref 8–23)
BUN/CREAT BLD: ABNORMAL (ref 9–20)
CALCIUM SERPL-MCNC: 8.3 MG/DL (ref 8.6–10.4)
CHLORIDE BLD-SCNC: 112 MMOL/L (ref 98–107)
CO2: 22 MMOL/L (ref 20–31)
CREAT SERPL-MCNC: 1.26 MG/DL (ref 0.5–0.9)
CULTURE: ABNORMAL
DIFFERENTIAL TYPE: ABNORMAL
EOSINOPHILS RELATIVE PERCENT: 0 % (ref 1–4)
GFR AFRICAN AMERICAN: 51 ML/MIN
GFR NON-AFRICAN AMERICAN: 42 ML/MIN
GFR SERPL CREATININE-BSD FRML MDRD: ABNORMAL ML/MIN/{1.73_M2}
GFR SERPL CREATININE-BSD FRML MDRD: ABNORMAL ML/MIN/{1.73_M2}
GLOBULIN: ABNORMAL G/DL (ref 1.5–3.8)
GLUCOSE BLD-MCNC: 178 MG/DL (ref 65–105)
GLUCOSE BLD-MCNC: 238 MG/DL (ref 65–105)
GLUCOSE BLD-MCNC: 245 MG/DL (ref 65–105)
GLUCOSE BLD-MCNC: 252 MG/DL (ref 65–105)
GLUCOSE BLD-MCNC: 261 MG/DL (ref 65–105)
GLUCOSE BLD-MCNC: 264 MG/DL (ref 70–99)
HCT VFR BLD CALC: 28.7 % (ref 36.3–47.1)
HEMOGLOBIN: 8.8 G/DL (ref 11.9–15.1)
IMMATURE GRANULOCYTES: 0 %
INR BLD: 1
LYMPHOCYTES # BLD: 18 % (ref 24–44)
Lab: ABNORMAL
Lab: ABNORMAL
MAGNESIUM: 1.6 MG/DL (ref 1.6–2.6)
MCH RBC QN AUTO: 28.6 PG (ref 25.2–33.5)
MCHC RBC AUTO-ENTMCNC: 30.7 G/DL (ref 28.4–34.8)
MCV RBC AUTO: 93.2 FL (ref 82.6–102.9)
MONOCYTES # BLD: 4 % (ref 1–7)
MORPHOLOGY: ABNORMAL
NRBC AUTOMATED: 0 PER 100 WBC
PARTIAL THROMBOPLASTIN TIME: 29.1 SEC (ref 20.5–30.5)
PDW BLD-RTO: 15.4 % (ref 11.8–14.4)
PHOSPHORUS: 2.9 MG/DL (ref 2.6–4.5)
PLATELET # BLD: ABNORMAL K/UL (ref 138–453)
PLATELET ESTIMATE: ABNORMAL
PLATELET, FLUORESCENCE: 98 K/UL (ref 138–453)
PLATELET, IMMATURE FRACTION: 4.7 % (ref 1.1–10.3)
PMV BLD AUTO: ABNORMAL FL (ref 8.1–13.5)
POTASSIUM SERPL-SCNC: 3.4 MMOL/L (ref 3.7–5.3)
POTASSIUM SERPL-SCNC: 3.7 MMOL/L (ref 3.7–5.3)
PROTHROMBIN TIME: 10.8 SEC (ref 9.1–12.3)
RBC # BLD: 3.08 M/UL (ref 3.95–5.11)
RBC # BLD: ABNORMAL 10*6/UL
SEG NEUTROPHILS: 78 % (ref 36–66)
SEGMENTED NEUTROPHILS ABSOLUTE COUNT: 6.55 K/UL (ref 1.8–7.7)
SODIUM BLD-SCNC: 145 MMOL/L (ref 135–144)
SPECIMEN DESCRIPTION: ABNORMAL
SPECIMEN DESCRIPTION: ABNORMAL
TOTAL PROTEIN: 4.8 G/DL (ref 6.4–8.3)
WBC # BLD: 8.4 K/UL (ref 3.5–11.3)
WBC # BLD: ABNORMAL 10*3/UL

## 2021-05-09 PROCEDURE — 94761 N-INVAS EAR/PLS OXIMETRY MLT: CPT

## 2021-05-09 PROCEDURE — 6370000000 HC RX 637 (ALT 250 FOR IP): Performed by: PHYSICIAN ASSISTANT

## 2021-05-09 PROCEDURE — 80048 BASIC METABOLIC PNL TOTAL CA: CPT

## 2021-05-09 PROCEDURE — 99232 SBSQ HOSP IP/OBS MODERATE 35: CPT | Performed by: INTERNAL MEDICINE

## 2021-05-09 PROCEDURE — 84100 ASSAY OF PHOSPHORUS: CPT

## 2021-05-09 PROCEDURE — 6370000000 HC RX 637 (ALT 250 FOR IP): Performed by: STUDENT IN AN ORGANIZED HEALTH CARE EDUCATION/TRAINING PROGRAM

## 2021-05-09 PROCEDURE — 2580000003 HC RX 258: Performed by: NURSE PRACTITIONER

## 2021-05-09 PROCEDURE — 85055 RETICULATED PLATELET ASSAY: CPT

## 2021-05-09 PROCEDURE — 94669 MECHANICAL CHEST WALL OSCILL: CPT

## 2021-05-09 PROCEDURE — 6360000002 HC RX W HCPCS: Performed by: STUDENT IN AN ORGANIZED HEALTH CARE EDUCATION/TRAINING PROGRAM

## 2021-05-09 PROCEDURE — 99232 SBSQ HOSP IP/OBS MODERATE 35: CPT | Performed by: FAMILY MEDICINE

## 2021-05-09 PROCEDURE — 2580000003 HC RX 258: Performed by: STUDENT IN AN ORGANIZED HEALTH CARE EDUCATION/TRAINING PROGRAM

## 2021-05-09 PROCEDURE — 6360000002 HC RX W HCPCS: Performed by: NURSE PRACTITIONER

## 2021-05-09 PROCEDURE — C9113 INJ PANTOPRAZOLE SODIUM, VIA: HCPCS | Performed by: STUDENT IN AN ORGANIZED HEALTH CARE EDUCATION/TRAINING PROGRAM

## 2021-05-09 PROCEDURE — 83735 ASSAY OF MAGNESIUM: CPT

## 2021-05-09 PROCEDURE — 80076 HEPATIC FUNCTION PANEL: CPT

## 2021-05-09 PROCEDURE — 2500000003 HC RX 250 WO HCPCS: Performed by: NURSE PRACTITIONER

## 2021-05-09 PROCEDURE — 85025 COMPLETE CBC W/AUTO DIFF WBC: CPT

## 2021-05-09 PROCEDURE — 6370000000 HC RX 637 (ALT 250 FOR IP): Performed by: NURSE PRACTITIONER

## 2021-05-09 PROCEDURE — 84132 ASSAY OF SERUM POTASSIUM: CPT

## 2021-05-09 PROCEDURE — 82947 ASSAY GLUCOSE BLOOD QUANT: CPT

## 2021-05-09 PROCEDURE — 2060000000 HC ICU INTERMEDIATE R&B

## 2021-05-09 PROCEDURE — 2700000000 HC OXYGEN THERAPY PER DAY

## 2021-05-09 PROCEDURE — 2500000003 HC RX 250 WO HCPCS: Performed by: SURGERY

## 2021-05-09 PROCEDURE — 36415 COLL VENOUS BLD VENIPUNCTURE: CPT

## 2021-05-09 PROCEDURE — 94640 AIRWAY INHALATION TREATMENT: CPT

## 2021-05-09 PROCEDURE — 85730 THROMBOPLASTIN TIME PARTIAL: CPT

## 2021-05-09 PROCEDURE — 85610 PROTHROMBIN TIME: CPT

## 2021-05-09 RX ADMIN — INSULIN GLARGINE 5 UNITS: 100 INJECTION, SOLUTION SUBCUTANEOUS at 20:56

## 2021-05-09 RX ADMIN — CALCIUM GLUCONATE: 98 INJECTION, SOLUTION INTRAVENOUS at 17:38

## 2021-05-09 RX ADMIN — Medication 1250 MG: at 09:14

## 2021-05-09 RX ADMIN — IPRATROPIUM BROMIDE AND ALBUTEROL SULFATE 1 AMPULE: .5; 2.5 SOLUTION RESPIRATORY (INHALATION) at 16:15

## 2021-05-09 RX ADMIN — ACETAMINOPHEN 1000 MG: 650 SOLUTION ORAL at 09:29

## 2021-05-09 RX ADMIN — PANTOPRAZOLE SODIUM 40 MG: 40 INJECTION, POWDER, FOR SOLUTION INTRAVENOUS at 09:13

## 2021-05-09 RX ADMIN — POTASSIUM CHLORIDE 20 MEQ: 400 INJECTION, SOLUTION INTRAVENOUS at 13:42

## 2021-05-09 RX ADMIN — HEPARIN SODIUM 5000 UNITS: 5000 INJECTION INTRAVENOUS; SUBCUTANEOUS at 15:15

## 2021-05-09 RX ADMIN — Medication 600 MG: at 09:13

## 2021-05-09 RX ADMIN — SODIUM CHLORIDE, PRESERVATIVE FREE 5 ML: 5 INJECTION INTRAVENOUS at 09:13

## 2021-05-09 RX ADMIN — IPRATROPIUM BROMIDE AND ALBUTEROL SULFATE 1 AMPULE: .5; 2.5 SOLUTION RESPIRATORY (INHALATION) at 18:30

## 2021-05-09 RX ADMIN — PIPERACILLIN AND TAZOBACTAM 3375 MG: 3; .375 INJECTION, POWDER, LYOPHILIZED, FOR SOLUTION INTRAVENOUS at 03:56

## 2021-05-09 RX ADMIN — PIPERACILLIN AND TAZOBACTAM 3375 MG: 3; .375 INJECTION, POWDER, LYOPHILIZED, FOR SOLUTION INTRAVENOUS at 09:14

## 2021-05-09 RX ADMIN — HEPARIN SODIUM 5000 UNITS: 5000 INJECTION INTRAVENOUS; SUBCUTANEOUS at 05:41

## 2021-05-09 RX ADMIN — HEPARIN SODIUM 5000 UNITS: 5000 INJECTION INTRAVENOUS; SUBCUTANEOUS at 20:55

## 2021-05-09 RX ADMIN — SODIUM CHLORIDE, PRESERVATIVE FREE 10 ML: 5 INJECTION INTRAVENOUS at 09:13

## 2021-05-09 RX ADMIN — GABAPENTIN 300 MG: 250 SUSPENSION ORAL at 15:15

## 2021-05-09 RX ADMIN — ACETAMINOPHEN 1000 MG: 650 SOLUTION ORAL at 17:37

## 2021-05-09 RX ADMIN — SODIUM CHLORIDE, PRESERVATIVE FREE 10 ML: 5 INJECTION INTRAVENOUS at 20:57

## 2021-05-09 RX ADMIN — INSULIN LISPRO 6 UNITS: 100 INJECTION, SOLUTION INTRAVENOUS; SUBCUTANEOUS at 20:56

## 2021-05-09 RX ADMIN — LEVOTHYROXINE SODIUM ANHYDROUS 56 MCG: 100 INJECTION, POWDER, LYOPHILIZED, FOR SOLUTION INTRAVENOUS at 09:13

## 2021-05-09 RX ADMIN — Medication 600 MG: at 20:56

## 2021-05-09 RX ADMIN — IPRATROPIUM BROMIDE AND ALBUTEROL SULFATE 1 AMPULE: .5; 2.5 SOLUTION RESPIRATORY (INHALATION) at 11:50

## 2021-05-09 RX ADMIN — INSULIN LISPRO 3 UNITS: 100 INJECTION, SOLUTION INTRAVENOUS; SUBCUTANEOUS at 17:38

## 2021-05-09 RX ADMIN — INSULIN LISPRO 6 UNITS: 100 INJECTION, SOLUTION INTRAVENOUS; SUBCUTANEOUS at 05:33

## 2021-05-09 RX ADMIN — POTASSIUM CHLORIDE 20 MEQ: 400 INJECTION, SOLUTION INTRAVENOUS at 12:24

## 2021-05-09 RX ADMIN — PIPERACILLIN AND TAZOBACTAM 3375 MG: 3; .375 INJECTION, POWDER, LYOPHILIZED, FOR SOLUTION INTRAVENOUS at 17:37

## 2021-05-09 RX ADMIN — INSULIN LISPRO 9 UNITS: 100 INJECTION, SOLUTION INTRAVENOUS; SUBCUTANEOUS at 14:03

## 2021-05-09 RX ADMIN — INSULIN LISPRO 9 UNITS: 100 INJECTION, SOLUTION INTRAVENOUS; SUBCUTANEOUS at 09:14

## 2021-05-09 RX ADMIN — FLUCONAZOLE 200 MG: 2 INJECTION, SOLUTION INTRAVENOUS at 03:56

## 2021-05-09 RX ADMIN — GABAPENTIN 300 MG: 250 SUSPENSION ORAL at 09:13

## 2021-05-09 ASSESSMENT — PAIN SCALES - GENERAL
PAINLEVEL_OUTOF10: 4
PAINLEVEL_OUTOF10: 4

## 2021-05-09 ASSESSMENT — ENCOUNTER SYMPTOMS
ABDOMINAL PAIN: 1
APNEA: 0
ABDOMINAL DISTENTION: 0
COUGH: 0
ABDOMINAL DISTENTION: 1
EYE DISCHARGE: 0
SHORTNESS OF BREATH: 0
COLOR CHANGE: 0
WHEEZING: 0

## 2021-05-09 NOTE — PROGRESS NOTES
protein  · Goal PN Orders Provides: 225 gms detrose + 75 gms AA ~> 1065 kcals, 75 gms protein      Anthropometric Measures:  · Height: 5' 2\" (157.5 cm)  · Current Body Weight: 195 lb 5.2 oz (88.6 kg)   · Admission Body Weight: 202 lb (91.6 kg)    · Usual Body Weight: (166 lb - stated)     · Ideal Body Weight: 110 lbs;   · BMI: 35.7  · BMI Categories: Obese Class 2 (BMI 35.0 -39.9)       Nutrition Diagnosis:   · Inadequate oral intake related to altered GI function as evidenced by intake 0-25%, NPO or clear liquid status due to medical condition, nutrition support - parenteral nutrition(Need for ONS)      Nutrition Interventions:   Food and/or Nutrient Delivery:  Continue Current Diet, Continue Oral Nutrition Supplement, Modify Parenteral Nutrition(advance diet as able)  Nutrition Education/Counseling:  Education not indicated   Coordination of Nutrition Care:  Continue to monitor while inpatient    Goals: Achieved   meet % of estimated nutrient needs       Nutrition Monitoring and Evaluation:   Food/Nutrient Intake Outcomes:  Diet Advancement/Tolerance, Food and Nutrient Intake, Supplement Intake, Parenteral Nutrition Intake/Tolerance  Physical Signs/Symptoms Outcomes:  Biochemical Data, Nutrition Focused Physical Findings, Skin, Weight, GI Status, Fluid Status or Edema     Discharge Planning:     Too soon to determine     Electronically signed by Salomon Araujo RD, LD on 5/9/21 at 12:24 PM EDT    Contact: [phone]

## 2021-05-09 NOTE — PROGRESS NOTES
[facility] - Initial Consult Note  Today's Date and Time: 5/9/2021, 6:30 PM    Impression :     Paraesophageal Hiatal hernia  Gastric perforation and volvulus   Purulent mediastinitis  S/p laparoscopic, 5/4/21 - wedge gastrectomy and HH repair, gastropexy  TAISHA and PEG  bilat LL pneumonia   Peritonitis  Acute pancreatitis  Septic Shock   Hyperglycemia  NSTEMI  BONNIE - better  DKA/ DM 2  Lactic acidosis  Not a MRSA carrier    Recommendations:   Continue Zosyn fluconazole   Watch LFT  5/8/21 persistent fever and SCN bacteremia -  vanco iv 5/8 for staph epidermidis bacteremia possibly related  Pull fem line  Watch fever    Medical Decision Making/Summary/Discussion:5/9/2021         Infection Control Recommendations   [place] Precautions    Antimicrobial Stewardship Recommendations     Simplification of therapy  Targeted therapy    Coordination of Outpatient Care:   Estimated Length of IV antimicrobials: TBD  Patient will need Midline Catheter Insertion:No   Patient will need PICC line Insertion:No  Patient will need: Home IV , [place],  SNF,  LTAC:  Patient will need outpatient wound care:Yes    Chief complaint/reason for consultation:   Septic shock/sepsis    History of Present Illness:   Yara Smith is a 78y.o.-year-old   female who was initially admitted on 5/3/2021. Patient seen at the request of Kapil uBrkett. INITIAL HISTORY : 05/07/2021    Pt with a history of diabetes mellitus type 2, hypertension, hyperlipidemia, thyroid disease, gout and hiatal hernia. She initially presented on 05/03/2021 to an outlying facility with a chief complaint of nausea vomiting, diarrhea and syncopal attack . Patient was found to have blood glucose of 585,WBC of 23.5, elevated lipase 1451 and elevated beta hydroxybutyrate. MRCP on 05/03/2021 showed diffuse small bowel wall thickening likely due to third spacing as opposed to enteritis.     CT scan of the abdomen showed extremely large fluid-filled hiatal hernia and distended and fluid-filled stomach below the diaphragm. Patient was given Zosyn at the outlying facility and was transferred to [facility] ED for evaluation by the surgery team.    CT scan of the chest without contrast was performed on 05/04/2021 which showed ascites and pneumoperitoneum with apparent free-flowing oral contrast in the left upper quadrant concerning for viscus perforation possibly within the subdiaphragmatic portion of the stomach. Moderate bilateral pleural effusions and hiatal hernia with organoaxial volvulus. Bariatric surgery performed a laparoscopic robotic para esophageal hernia repair. Cardiology is also following the patient because of elevated troponins with unremarkable echocardiography. Antibiotics wise the patient was given Zosyn on 05/03/2021 at the outside facility and it has been continued through the present time. Blood and urine cultures on 05/03/2021 show No growth . Repeat urine culture on 05/04/2021 shows No growth     Patient is currently having temperature elevations. Temp max of 101.1 on 05/07/2021 around 4 AM in the morning. Patient WBC count is improving from 23.5 on presentation to 6.2 today. Repeat chest x-ray 5-7-21 shows bibasilar consolidation new from prior study with blunting of the costophrenic angles bilaterally. Patient is off of the pressors since 5-6-21    Interval history 05/09/21  BC + 5/7 SCN epidermidis with the same sensitivity, hence suggesting bacteremia    Alert appropriate, tired today, nauseated when she ate clears  Abdomen seems to be sore to touch.   TAISHA drain with serosanguineous fluid    PEG site clean      Labs, X rays reviewed: 5/9/2021    BUN: 84-48  Cr: 2.24>1.71    WBC: 23.5>6.2    Cultures:    Urine:  05/03/2021: No growth   05/0 4/2021: No growth     Blood:  05/03/2021: Blood cultures x 2:  No growth  5/7 BC x 2 SCN  Sputum :    Wound:      I have personally reviewed the past medical history, past surgical history, medications, social history, and family history, and I have updated the database accordingly.   Past Medical History:     Past Medical History:   Diagnosis Date    Diabetes mellitus ([address]     Gout     Hiatal hernia     Hyperlipidemia     Hypertension     Thyroid disease        Past Surgical  History:     Past Surgical History:   Procedure Laterality Date    BREAST SURGERY      Bx 1993    CHOLECYSTECTOMY  1999    GASTRIC FUNDOPLICATION N/A 6/5/8062    PARAESPHAGEAL HERNIA REPAIR LAPAROSCOPIC ROBOTIC performed by Yesika Schneider DO at [address] [place] [address]    Remove spur L ankle    OTHER SURGICAL HISTORY  1978    Pin and wire repair R ankle    TUBAL LIGATION  1988       Medications:      heparin (porcine)  5,000 Units Subcutaneous 3 times per day    vancomycin (VANCOCIN) intermittent dosing (placeholder)   Other RX Placeholder    vancomycin  1,250 mg Intravenous Q24H    acetaminophen  1,000 mg Oral Q8H    gabapentin  300 mg Oral 3 times per day    ipratropium-albuterol  1 ampule Inhalation Q4H WA    acetylcysteine  600 mg Oral BID    piperacillin-tazobactam  3,375 mg Intravenous Q8H    insulin glargine  5 Units Subcutaneous Nightly    insulin lispro  0-18 Units Subcutaneous Q4H    levothyroxine  56 mcg Intravenous Daily    And    sodium chloride (PF)  5 mL Intravenous Daily    pantoprazole  40 mg Intravenous Daily    And    sodium chloride (PF)  10 mL Intravenous Daily    sodium chloride flush  5-40 mL Intravenous 2 times per day       Social History:     Social History     Socioeconomic History    Marital status:      Spouse name: Not on file    Number of children: Not on file    Years of education: Not on file    Highest education level: Not on file   Occupational History    Not on file   Social Needs    Financial resource strain: Not on file    Food insecurity Worry: Not on file     Inability: Not on file    Transportation needs     Medical: Not on file     Non-medical: Not on file   Tobacco Use    Smoking status: Never Smoker    Smokeless tobacco: Never Used   Substance and Sexual Activity    Alcohol use: Never     Frequency: Never    Drug use: Never    Sexual activity: Not on file   Lifestyle    Physical activity     Days per week: Not on file     Minutes per session: Not on file    Stress: Not on file   Relationships    Social connections     Talks on phone: Not on file     Gets together: Not on file     Attends Holiness service: Not on file     Active member of club or organization: Not on file     Attends meetings of clubs or organizations: Not on file     Relationship status: Not on file    Intimate partner violence     Fear of current or ex partner: Not on file     Emotionally abused: Not on file     Physically abused: Not on file     Forced sexual activity: Not on file   Other Topics Concern    Not on file   Social History Narrative    Not on file       Family History:     Family History   Problem Relation Age of Onset    Breast Cancer Mother     High Blood Pressure Mother     High Cholesterol Father     Breast Cancer Sister         Allergies:   No known allergies       Review of Systems   Constitutional: Positive for activity change. HENT: Negative for congestion. Eyes: Negative for discharge. Respiratory: Negative for apnea. Gastrointestinal: Negative for abdominal distention. Endocrine: Negative for cold intolerance. Genitourinary: Negative for dysuria and flank pain. Musculoskeletal: Negative for arthralgias. Skin: Negative for color change. Allergic/Immunologic: Negative for immunocompromised state. Neurological: Negative for dizziness, light-headedness and numbness. Hematological: Negative for adenopathy. Psychiatric/Behavioral: Negative for agitation. Anil Luther     Physical Examination :     Patient Vitals for the past 8 hrs:   BP Temp Pulse Resp SpO2   05/09/21 1200   86 16 100 %   05/09/21 1150    20 100 %   05/09/21 1136 132/81 98.4 °F (36.9 °C) 85 16        Physical Exam  Constitutional:       General: She is not in acute distress. Appearance: Normal appearance. She is not ill-appearing. HENT:      Head: Normocephalic and atraumatic. Nose: Nose normal.      Mouth/Throat:      Mouth: Mucous membranes are moist.   Eyes:      General: No scleral icterus. Conjunctiva/sclera: Conjunctivae normal.   Neck:      Musculoskeletal: Neck supple. No neck rigidity. Cardiovascular:      Rate and Rhythm: Normal rate and regular rhythm. Heart sounds: Normal heart sounds. No murmur. Pulmonary:      Effort: No respiratory distress. Breath sounds: Normal breath sounds. Abdominal:      General: There is distension. Tenderness: There is abdominal tenderness. Genitourinary:     Comments: Urine dark  Musculoskeletal:         General: No swelling, tenderness or deformity. Skin:     General: Skin is dry. Coloration: Skin is not jaundiced or pale. Neurological:      General: No focal deficit present. Mental Status: She is alert. Mental status is at baseline. Psychiatric:         Mood and Affect: Mood normal.         Thought Content:  Thought content normal.          Medical Decision Making -Laboratory:   I have independently reviewed/ordered the following labs:    CBC with Differential:   Recent Labs     05/08/21 0355 05/09/21  0501   WBC 7.1 8.4   HGB 8.9* 8.8*   HCT 27.9* 28.7*   PLT 72* See Reflexed IPF Result   LYMPHOPCT 5* 18*   MONOPCT 6 4     BMP:   Recent Labs     05/08/21  0355 05/08/21  1753 05/09/21  0501 05/09/21  1701   * 145* 145*  --    K 3.0* 4.0 3.4* 3.7   * 111* 112*  --    CO2 24 21 22  --    BUN 39*  --  34*  --    CREATININE 1.40*  --  1.26*  --    MG 1.8  --  1.6  --      Hepatic Function Panel:   Recent Labs     05/08/21  0355 05/09/21  0501   PROT 4.7* is contrast draining through what appears to be the gastrostomy tubes.       No extraneous collection of contrast is seen beyond the confines of the   stomach.       There is contrast marginating a presumed gastrostomy tube balloon.       Mild gastroesophageal reflux and delayed transit of contrast is seen within   the distal esophagus/GE junction.           Impression   1. Drainage of contrast material through what appears to be the gastrostomy   tubes following the ingestion of contrast.  Contrast does migrate beyond the   postoperative region into the proximal small bowel. 2. No extraneous/extraluminal collection of contrast is seen beyond the   confines of the stomach. 3. 2 surgical drains and 2 presumed gastrostomy tubes are seen overlying the   left upper quadrant.  There does appear to be contrast slightly marginating   the more lateral gastrostomy tube balloon. 4. Delayed migration of contrast through the distal esophagus and GE junction   with mild gastroesophageal reflux. The findings were sent to the Radiology Results [address] at 12:43   pm on 5/7/2021to be communicated to a licensed caregiver.             EXAMINATION:   CT OF THE CHEST WITHOUT CONTRAST 5/4/2021 5:55 pm       TECHNIQUE:   CT of the chest was performed without the administration of intravenous   contrast. Multiplanar reformatted images are provided for review. Dose   modulation, iterative reconstruction, and/or weight based adjustment of the   mA/kV was utilized to reduce the radiation dose to as low as reasonably   achievable.       COMPARISON:   None.       HISTORY:   ORDERING SYSTEM PROVIDED HISTORY: large hiatal hernia, contrast progression? TECHNOLOGIST PROVIDED HISTORY:   large hiatal hernia, contrast progression?    Reason for Exam: large hiatal hernia, contrast progression?       FINDINGS:   Mediastinum: Heart size is normal without pericardial effusion.  There are   shotty mediastinal lymph nodes.  The thoracic aorta and main pulmonary artery   are normal in caliber.       Lungs/pleura: Moderate bilateral pleural effusions with adjacent   consolidation.  No pneumothorax.       Upper Abdomen: There is a large hiatal hernia containing the majority of the   stomach.  There is organoaxial volvulus.  Enteric tube is noted extending   below the diaphragm with tip outside the field of view.  The herniated   stomach is distended with contrast.  There is also contrast within the distal   esophagus. Josiephine Payer is some contrast visualized within the portion of stomach   below the diaphragm.  Free air and fluid are noted within the visualized   upper abdomen with apparent free spill of contrast in the left upper quadrant.       Soft Tissues/Bones: No acute osseous abnormality.           Impression   1. Ascites and pneumoperitoneum with apparent free-flowing oral contrast in   the left upper quadrant is concerning for viscus perforation, possibly within   the subdiaphragmatic portion of the stomach. 2. Large hiatal hernia with organoaxial volvulus.  Majority of contrast is   retained within the esophagus and supradiaphragmatic stomach. 3. Enteric tube extends below the diaphragm with tip outside the field of   view. 4. Moderate bilateral pleural effusions with adjacent consolidation,   atelectasis versus pneumonia. Critical results [facility]   5/4/2021 at 18:30.           Medical Decision [id number]:       Medical Decision Making-Other:     Note:  Labs, medications, radiologic studies were reviewed with personal review of films   Large amounts of data were reviewed  Discussed with nursing Staff, Discharge planner  Infection Control and Prevention measures reviewed  All prior entries were reviewed  Administer medications as ordered  Prognosis: Guarded  Discharge planning reviewed  Follow up as outpatient.     Thank you for allowing us to participate in the care of this patient. Please call with questions.     Brooke Guevara MD

## 2021-05-09 NOTE — PROGRESS NOTES
Perfect serve sent to primary doctor regarding central line/TPN and need to transition to peripheral access/PPN. Primary Dr. Carla Schmitz states nephrology clearance is needed for PICC placement in order to infuse PPN. Nephrology states morel needs to remain in place due to elevated creatinine and BUN. Nephro note states will be evaluated again in the morning.

## 2021-05-09 NOTE — PLAN OF CARE
Problem: SKIN INTEGRITY  Goal: Skin integrity is maintained or improved  5/9/2021 1833 by Cirilo Hankins RN  Outcome: Ongoing  Note: Skin assessed for breakdown and redness, patient turned regularly, and heels elevated off of bed on pillows.      5/9/2021 0617 by Omid Adair RN  Outcome: Ongoing

## 2021-05-09 NOTE — PROGRESS NOTES
acetaminophen  1,000 mg Oral Q8H    gabapentin  300 mg Oral 3 times per day    ipratropium-albuterol  1 ampule Inhalation Q4H WA    acetylcysteine  600 mg Oral BID    piperacillin-tazobactam  3,375 mg Intravenous Q8H    insulin glargine  5 Units Subcutaneous Nightly    insulin lispro  0-18 Units Subcutaneous Q4H    levothyroxine  56 mcg Intravenous Daily    And    sodium chloride (PF)  5 mL Intravenous Daily    pantoprazole  40 mg Intravenous Daily    And    sodium chloride (PF)  10 mL Intravenous Daily    sodium chloride flush  5-40 mL Intravenous 2 times per day     Continuous Infusions:    PN-Adult 2-in-1 Central Line (Standard) 55 mL/hr at 21 1755    sodium chloride 25 mL (21 1026)     PRN Meds:  potassium chloride, magnesium sulfate, oxyCODONE, acetaminophen, artificial tears, sodium chloride flush, sodium chloride, [DISCONTINUED] promethazine **OR** ondansetron, dextrose    Input/Output:       I/O last 3 completed shifts: In: 2442 [P.O.:200; I.V.:1327.9; IV Piggyback:169.7]  Out: 7938 [Urine:2455; Emesis/NG output:1200; Drains:100]. Patient Vitals for the past 96 hrs (Last 3 readings):   Weight   21 0542 195 lb 5.2 oz (88.6 kg)   21 0550 205 lb 4 oz (93.1 kg)       Vital Signs:   Temperature:  Temp: 98.8 °F (37.1 °C)  TMax:   Temp (24hrs), Av.4 °F (36.9 °C), Min:98.2 °F (36.8 °C), Max:98.8 °F (37.1 °C)    Respirations:  Resp: 27  Pulse:   Pulse: 96  BP:    BP: (!) 140/66  BP Range: Systolic (85TJN), UYZ:013 , Min:134 , KEU:876       Diastolic (80XGF), JIF:43, Min:66, Max:76      Physical Examination:     General:  AAO x 3, speaking in full sentences, no accessory muscle use. HEENT: Atraumatic, normocephalic, no throat congestion, moist mucosa. Eyes:   Pupils equal, round and reactive to light, EOMI. Neck:   No JVD  Chest:              Bilateral vesicular breath sounds, no rales or wheezes.   Cardiac:  S1 S2 RR, no murmurs, gallops or rubs  Abdomen: Soft, not distended, ttp without peritoneal signs, + RJP, PEG x 2   :   + Walker  Neuro:  AAO x 3, No FND. SKIN:  No rashes, good skin turgor. Extremities:  No edema, palpable peripheral pulses, no calf tenderness. Labs:       Recent Labs     05/07/21  0542 05/08/21  0355 05/09/21  0501   WBC 6.2 7.1 8.4   RBC 3.08* 3.15* 3.08*   HGB 8.8* 8.9* 8.8*   HCT 27.2* 27.9* 28.7*   MCV 88.3 88.6 93.2   MCH 28.6 28.3 28.6   MCHC 32.4 31.9 30.7   RDW 14.6* 14.9* 15.4*   PLT 66* 72* See Reflexed IPF Result   MPV 12.0 12.2 NOT REPORTED      BMP:   Recent Labs     05/07/21  1151 05/08/21  0355 05/08/21  1753 05/09/21  0501   * 146* 145* 145*   K 3.0* 3.0* 4.0 3.4*   * 112* 111* 112*   CO2 26 24 21 22   BUN 44* 39*  --  34*   CREATININE 1.51* 1.40*  --  1.26*   GLUCOSE 235* 221*  --  264*   CALCIUM 8.0* 8.1*  --  8.3*      Phosphorus:     Recent Labs     05/07/21  0542 05/08/21  0355 05/09/21  0501   PHOS 2.6 2.6 2.9     Magnesium:    Recent Labs     05/07/21  1151 05/08/21  0355 05/09/21  0501   MG 1.6 1.8 1.6     Albumin:    Recent Labs     05/07/21  1151 05/08/21  0355 05/09/21  0501   LABALBU 2.0* 1.8* 1.5*     SPEP:  Lab Results   Component Value Date    PROT 4.8 05/09/2021    PROT 7.3 05/03/2021    PATH ELECTRONICALLY SIGNED.  León Mora M.D. 05/05/2021   C3:     Lab Results   Component Value Date    C3 50 05/05/2021     C4:     Lab Results   Component Value Date    C4 14 05/05/2021   Hep BsAg:         Lab Results   Component Value Date    HEPBSAG NONREACTIVE 05/05/2021     Hep C AB:          Lab Results   Component Value Date    HEPCAB NONREACTIVE 05/05/2021       Urinalysis/Chemistries:      Lab Results   Component Value Date    NITRU NEGATIVE 05/06/2021    COLORU YELLOW 05/06/2021    PHUR 5.5 05/06/2021    WBCUA None 05/06/2021    WBCUA 0-3 05/03/2021    RBCUA 0 TO 2 05/06/2021    RBCUA 0-2 05/03/2021    MUCUS NOT REPORTED 05/06/2021    TRICHOMONAS NOT REPORTED 05/06/2021    YEAST NOT REPORTED 05/06/2021 documented.      Raheel Cummings MD  Nephrology Attending Physician  [facility]

## 2021-05-09 NOTE — PROGRESS NOTES
Bariatric Surgery Progress Note      PATIENT NAME: Danielle Medina   TODAY'S DATE: 5/9/2021, 6:23 AM  Chief Complaint   Patient presents with    Blood Sugar Problem     >450    Hernia     Hiatal      SUBJECTIVE:    Pt seen and examined. UOP adequate. States pain is well controlled. Tolerating CLD. L TAISHA drain removed 5/7.      RLQ TAISHA 100 ml SS  PEG tubes 1200ml out, bilious output   Tmax 37.1    OBJECTIVE:   Vitals:  BP (!) 140/66   Pulse 96   Temp 98.8 °F (37.1 °C) (Oral)   Resp 27   Ht 5' 2\" (1.575 m)   Wt 195 lb 5.2 oz (88.6 kg)   SpO2 100%   BMI 35.73 kg/m²      INTAKE/OUTPUT:      Intake/Output Summary (Last 24 hours) at 5/9/2021 0980  Last data filed at 5/9/2021 0558  Gross per 24 hour   Intake 2442 ml   Output 3755 ml   Net -1313 ml                 General: alert, oriented  Lungs: Symmetrical chest rise bilaterally  Heart: S1S2  Abdomen: Soft, ND, appropriately tender, non peritoneal, no rebound, R TAISHA intact with SS in bulb, incisions c/d/i, PEG x2 to gravity  Extremity: moves all extremities x4, trace edema    Data Review:  CBC:   Recent Labs     05/07/21  0542 05/08/21  0355 05/09/21  0501   WBC 6.2 7.1 PENDING   HGB 8.8* 8.9* 8.8*   PLT 66* 72* See Reflexed IPF Result     BMP:    Recent Labs     05/07/21  1151 05/08/21  0355 05/08/21  1753 05/09/21  0501   * 146* 145* 145*   K 3.0* 3.0* 4.0 3.4*   * 112* 111* 112*   CO2 26 24 21 22   BUN 44* 39*  --  34*   CREATININE 1.51* 1.40*  --  1.26*   GLUCOSE 235* 221*  --  264*     Hepatic:   Recent Labs     05/07/21  1151 05/08/21  0355 05/09/21  0501   AST 32* 19 13   ALT 77* 56* 34*   ALKPHOS 66 84 83   BILITOT 0.76 0.62 0.39   BILIDIR 0.29 0.25 0.17     Coagulation:   Recent Labs     05/07/21  1151 05/07/21  1212 05/08/21  0355 05/09/21  0501   APTT  --  30.4 27.9 29.1   PROT 4.5*  --  4.7* 4.8*   INR  --  0.9 1.0 1.0       ASSESSMENT   Patient Active Problem List   Diagnosis    Acute pancreatitis    Septic shock (HCC)    Non-STEMI (non-ST elevated [facility] infarction) [facility]    Diabetic ketoacidosis without coma associated with type 2 diabetes mellitus ([address]    BONNIE (acute kidney injury) ([address]    Hernia with obstruction    Essential hypertension    Thyroid disease    Syncope    Acute tubular necrosis (HCC)    Lactic acid acidosis    Acute hypoxemic respiratory failure (HCC)    Hiatal hernia    Gastric volvulus    Community acquired bilateral lower lobe pneumonia    Mediastinitis    Peritonitis ([address]     79 yo F s/p 5/4 emergent robotic assisted laparoscopic hiatal hernia reduction with gastropexy via g-tube x2     5/7 esophagram performed no leak noted    PLAN  1. [id number] Dahlia Irwin for bariatric CLD, will discuss timing of adv of diet, nutritional supplements, cont andree TAISHA and PEG tube output, will discuss timing of diet advancement and PEG tube management, cont to gravity at this time  2. Cont recs and management per primary   3. Cont IV abx and monitor WBC/temperature, + blood cultures, defer abx regimen to primary   4. Encourage IS use, deep breathing, ambulation and PT/OT work   5. [id number] Dahlia Irwin for DVT ppx from surg stance   6. Defer any need for thoracentesis or IR drain of chest per pulmonary discretion     Thank you,    Electronically signed by Ric Cortez, DO  on 5/9/2021 at 6:23 AM     Tolerating clears. Fulls Tuesday    Continue TPN    Discussed with pulmonology    Antibiotics per ID    Surgically progressing well    I have discussed the care of the patient, including pertinent history and exam findings, with the resident. I have seen and examined the patient and the key elements of all parts of the encounter have been performed by me. I agree with the assessment, plan and orders as documented by the resident. n/a

## 2021-05-09 NOTE — PROGRESS NOTES
[facility]  Office: [address], DO, Starlin Ganser, DO, Clarita Ellington, DO, Randall  Blood, DO, Leah Villagomez MD, Garrick Arceo MD, Jose Bucio MD, Gwyn Gómez MD, Zachery Hare MD, Damaso Vargas MD, Maribel Rowan MD, Maxwell Street MD, Jaiden Carlos, DO, Valarie Mccoy MD, Patti Pat, DO, Tamar Brown MD,  Chuck Burnett, DO, Ricardo Nieves MD, Bridgette Solis MD, Julián Rubalcava MD, Gracie Alfredo MD, Rosy Brown, Cindi Malcolm, CNP, Daily Vo, CNP, Foreign Mccray, CNS, Adriane Hamilton, CNP, Aditi Conklin, CNP, Jose Howell, CNP, Queta Little, CNP, Enrrique Alonso, CNP, JNU CastilloC, Umesh Wong, [facility], Ariel Arredondo, CNP, Shelby Gil, CNP, Faith Wells, CNP, Frank Henson, CNP, Flaco Carranza, CNP, Arleen Mckay, [address] [place] [address]    Progress Note    5/9/2021    8:05 AM    Name:   Nikkie Flowers  MRN:     [id number]     Acct:      [de-identified]   Room:   [address] [place] [address] Day:  6  Admit Date:  5/3/2021 12:49 PM    PCP:   Irwin Johnson DO  Code Status:  Full Code    Subjective:     C/C:   Chief Complaint   Patient presents with    Blood Sugar Problem     >450    Hernia     hyatial       Interval History Status: improved. Pt was seen and examined this morning  No acute events overnight         Review of Systems:     12 point ROS performed and negative for anything other than what was stated in subjective     Medications: Allergies:     Allergies   Allergen Reactions    No Known Allergies        Current Meds:   Scheduled Meds:    heparin (porcine)  5,000 Units Subcutaneous 3 times per day    vancomycin (VANCOCIN) intermittent dosing (placeholder)   Other RX Placeholder    vancomycin  1,250 mg Intravenous Q24H    acetaminophen  1,000 mg Oral Q8H    gabapentin  300 mg Oral 3 times per day    ipratropium-albuterol  1 ampule Inhalation Q4H WA    acetylcysteine  600 mg Oral BID  piperacillin-tazobactam  3,375 mg Intravenous Q8H    insulin glargine  5 Units Subcutaneous Nightly    insulin lispro  0-18 Units Subcutaneous Q4H    levothyroxine  56 mcg Intravenous Daily    And    sodium chloride (PF)  5 mL Intravenous Daily    pantoprazole  40 mg Intravenous Daily    And    sodium chloride (PF)  10 mL Intravenous Daily    sodium chloride flush  5-40 mL Intravenous 2 times per day     Continuous Infusions:    PN-Adult 2-in-1 Central Line (Standard) 55 mL/hr at 21 1755    sodium chloride 25 mL (21 1026)     PRN Meds: potassium chloride, magnesium sulfate, oxyCODONE, acetaminophen, artificial tears, sodium chloride flush, sodium chloride, [DISCONTINUED] promethazine **OR** ondansetron, dextrose    Data:     Past Medical History:   has a past medical history of Diabetes mellitus ([address] Gout, Hiatal hernia, Hyperlipidemia, Hypertension, and Thyroid disease. Social History:   reports that she has never smoked. She has never used smokeless tobacco. She reports that she does not drink alcohol or use drugs. Family History:   Family History   Problem Relation Age of Onset    Breast Cancer Mother     High Blood Pressure Mother     High Cholesterol Father     Breast Cancer Sister        Vitals:  BP (!) 140/66   Pulse 96   Temp 98.8 °F (37.1 °C) (Oral)   Resp 27   Ht 5' 2\" (1.575 m)   Wt 195 lb 5.2 oz (88.6 kg)   SpO2 100%   BMI 35.73 kg/m²   Temp (24hrs), Av.3 °F (36.8 °C), Min:97.8 °F (36.6 °C), Max:98.8 °F (37.1 °C)    Recent Labs     21  1118 21  1520 21  2030 21  0531   POCGLU 241* 239* 299* 238*       I/O (24Hr):     Intake/Output Summary (Last 24 hours) at 2021 0805  Last data filed at 2021 0558  Gross per 24 hour   Intake 2442 ml   Output 3730 ml   Net -1288 ml       Labs:  Hematology:  Recent Labs     21  0542 21  1212 21  0355 21  0501   WBC 6.2  --  7.1 8.4   RBC 3.08*  --  3.15* 3.08*   HGB 8.8* 05/06/2021    ISSF3IKP 99 05/06/2021    T7LXCYOR 98.0 05/04/2021    FIO2 40.0 05/06/2021     Lab Results   Component Value Date/Time    SPECIAL L HAND 11 ML 05/08/2021 02:10 PM     Lab Results   Component Value Date/Time    CULTURE NO GROWTH 16 HOURS 05/08/2021 02:10 PM       Radiology:  Ct Abdomen Pelvis Wo Contrast Additional Contrast? None    Result Date: 5/3/2021  There is obstruction of the stomach because of the portions of the stomach contained in the very large hiatal hernia. The rest of the GI tract is normal. No evidence of an acute infectious or inflammatory process in the abdomen and pelvis. Xr Abdomen (kub) (single Ap View)    Result Date: 5/4/2021  No significant subdiaphragmatic contrast progression, as above. RECOMMENDATION: Consider chest x-ray to further assess a organic-axial gastric volvulus     Ct Head Wo Contrast    Result Date: 5/3/2021  No acute intracranial process. If clinical concern remains, consider further imaging with MRI. Ct Chest Wo Contrast    Result Date: 5/4/2021  1. Ascites and pneumoperitoneum with apparent free-flowing oral contrast in the left upper quadrant is concerning for viscus perforation, possibly within the subdiaphragmatic portion of the stomach. 2. Large hiatal hernia with organoaxial volvulus. Majority of contrast is retained within the esophagus and supradiaphragmatic stomach. 3. Enteric tube extends below the diaphragm with tip outside the field of view. 4. Moderate bilateral pleural effusions with adjacent consolidation, atelectasis versus pneumonia. Critical results were called by Dr. Evan Hodge MD to [facility] on 5/4/2021 at 18:30. Ct Cervical Spine Wo Contrast    Result Date: 5/3/2021  No acute findings in the cervical spine. Us Renal Complete    Result Date: 5/5/2021  Unremarkable ultrasound of the kidneys and urinary bladder. Trace right pleural effusion     Fl Esophagram    Result Date: 5/7/2021  1.  Drainage of contrast material through what appears to be the gastrostomy tubes following the ingestion of contrast.  Contrast does migrate beyond the postoperative region into the proximal small bowel. 2. No extraneous/extraluminal collection of contrast is seen beyond the confines of the stomach. 3. 2 surgical drains and 2 presumed gastrostomy tubes are seen overlying the left upper quadrant. There does appear to be contrast slightly marginating the more lateral gastrostomy tube balloon. 4. Delayed migration of contrast through the distal esophagus and GE junction with mild gastroesophageal reflux. The findings were sent to the Radiology Results [address] at 12:43 pm on 5/7/2021to be communicated to a licensed caregiver. Xr Chest Portable    Result Date: 5/7/2021  Bibasilar consolidation new from prior study. Blunting of the costophrenic angles bilaterally     Xr Chest Portable    Result Date: 5/6/2021  ETT tip position stable. Decreased left subcutaneous emphysema. Slightly improved suspected left basilar volume loss with persistent findings as above. No overt vascular congestion. Xr Chest Portable    Result Date: 5/5/2021  Volume loss continues left hemithorax with haziness at the left base either atelectasis and or fluid. Subcutaneous emphysema along the left lateral chest wall has decreased. No appreciable extrapleural air. Endotracheal tube in appropriate position. Xr Chest Portable    Result Date: 5/5/2021  1. Recommend repositioning of left internal jugular approach central venous catheter. 2. Low lung volumes with left basilar atelectasis plus or minus mild pleural effusion. 3. Left chest wall scattered soft tissue emphysema. Xr Chest Portable    Result Date: 5/4/2021  Intestinal tube deviates to the left side of a sizable hiatal hernia, traversing below the hemidiaphragm and terminating just underneath the left hemidiaphragm. Side port of the intestinal tube is below the diaphragmatic hiatus.      Brianda Pinon Chest Portable    Result Date: 5/3/2021  No acute cardiopulmonary disease Large hiatal hernia     Mri Abdomen Wo Contrast Mrcp    Result Date: 5/3/2021  1. No evidence of intrahepatic or extrahepatic ductal dilatation. 2. Incompletely imaged large hiatal hernia with organoaxial malrotation of the stomach. 3. Small to moderate amount of ascites. 4. Diffuse small bowel wall thickening likely due to 3rd spacing as the post enteritis. 5. Trace bilateral pleural effusions. Ct Chest Abdomen Pelvis Wo Contrast    Result Date: 5/7/2021  1. Within the chest, the patient has consolidative processes in both lower lobes likely pneumonitis. Bilateral pleural effusions and basilar atelectasis are noted. 2. Postoperative changes within the abdomen. The patient had a hiatal hernia repair. Although the stomach is decompressed, gastric walls appear thickened likely secondary to inflammation which may be postoperative. 3. Fluid in the right pericolic gutter and pelvis. 4. Thickened small bowel loops in the right lower quadrant which may represent secondary changes to surgery. 5. Two drain placements in the upper abdomen. Left inguinal terminates in the left iliac vein. Fl Ugi    Result Date: 5/4/2021  Organo-axial volvulus the stomach. Large paraesophageal hernia containing the majority of the rotated gastric body. The greater curvature is positioned superiorly within the paraesophageal hernia defect. There is narrowing of the gastroesophageal junction as well as of the gastroduodenal junction through the hernia defect. Physical Examination:        General appearance: NAD, ill appearing  Mental Status:  Follows commands appropriately. Lungs:  CTAB.   Heart:  regular rate and rhythm, no murmur  Abdomen:  mild distension,patient is sedated but no guarding or movement on palpation, BS noted, TAISHA drain on bilateral sides with serous sanguineous drainage, Peg tube draining green fluid  Extremities:  no edema, redness    Assessment:        Hospital Problems           Last Modified POA    * (Principal) Septic shock ([address] 5/4/2021 Yes    Non-STEMI (non-ST elevated myocardial infarction) ([address] 5/4/2021 Yes    Diabetic ketoacidosis without coma associated with type 2 diabetes mellitus ([address] 5/4/2021 Yes    BONNIE (acute kidney injury) ([address] 5/4/2021 Yes    Hernia with obstruction 5/4/2021 Yes    Essential hypertension 5/4/2021 Yes    Thyroid disease 5/4/2021 Yes    Syncope 5/4/2021 Yes    Acute tubular necrosis ([address] 5/4/2021 Yes    Lactic acid acidosis 5/8/2021 Yes    Acute hypoxemic respiratory failure ([address] 5/4/2021 Yes    Hiatal hernia 5/5/2021 Yes    Gastric volvulus 5/5/2021 Yes    Community acquired bilateral lower lobe pneumonia 5/8/2021 Yes    Mediastinitis 5/8/2021 Yes    Peritonitis ([address] 5/8/2021 Yes          Plan:        1. Septic shock  2. Gastric ischemia and subsequent pneumoperitoneum  - Underwent emergent wedge gastrectomy, gastropexy x2, placement of TAISHA drains x2, abdominal lavage  - Pt remains febrile. Will obtain urine culture as morel was clogged and changed today. Reviewed CXR, I do not see any evidence of pneumonia. WBC count not elevated. Will continue with zosyn. If condition worsens will consider broadening coverage with teflaro.  - fluconazole as ordered  - f/u urine, blood cultures  - initial blood cx positive for staph epidermidis. Likely contaminant  - no longer requiring pressors  - ID consult placed   - surgery on board   - will remove femoral and place PICC for abx if okay with nephrology      3. Type 2 DM, non-insulin dependent  - complicated by hyperglycemia and ketosis  - continue to check blood glucose q4h while NPO. - Moderate intensity sliding scale. - started on lantus 5U qhs     4. Acute kidney injury secondary to ATN from hypotension/hypoperfusion  - continue to monitor creatinine.   - Continue resuscitative fluids.    - Nephrology following.   - Creatinine is steadily

## 2021-05-09 NOTE — PLAN OF CARE
Skin assessed for breakdown and redness, patient turned regularly, and heels elevated off of bed on pillows.       Problem: OXYGENATION/RESPIRATORY FUNCTION  Goal: Patient will maintain patent airway  Outcome: Ongoing  Goal: Patient will achieve/maintain normal respiratory rate/effort  Description: Respiratory rate and effort will be within normal limits for the patient  Outcome: Ongoing     Problem: SKIN INTEGRITY  Goal: Skin integrity is maintained or improved  Outcome: Ongoing     Problem: Confusion - Acute:  Goal: Absence of continued neurological deterioration signs and symptoms  Description: Absence of continued neurological deterioration signs and symptoms  Outcome: Ongoing  Goal: Mental status will be restored to baseline  Description: Mental status will be restored to baseline  Outcome: Ongoing     Problem: Discharge Planning:  Goal: Ability to perform activities of daily living will improve  Description: Ability to perform activities of daily living will improve  Outcome: Ongoing  Goal: Participates in care planning  Description: Participates in care planning  Outcome: Ongoing

## 2021-05-10 LAB
ABSOLUTE EOS #: 0.47 K/UL (ref 0–0.4)
ABSOLUTE IMMATURE GRANULOCYTE: 0.19 K/UL (ref 0–0.3)
ABSOLUTE LYMPH #: 2.54 K/UL (ref 1–4.8)
ABSOLUTE MONO #: 0.47 K/UL (ref 0.1–0.8)
ALBUMIN SERPL-MCNC: 1.8 G/DL (ref 3.5–5.2)
ALBUMIN/GLOBULIN RATIO: 0.5 (ref 1–2.5)
ALP BLD-CCNC: 82 U/L (ref 35–104)
ALT SERPL-CCNC: 24 U/L (ref 5–33)
ANION GAP SERPL CALCULATED.3IONS-SCNC: 10 MMOL/L (ref 9–17)
AST SERPL-CCNC: 12 U/L
BASOPHILS # BLD: 0 % (ref 0–2)
BASOPHILS ABSOLUTE: 0 K/UL (ref 0–0.2)
BILIRUB SERPL-MCNC: 0.34 MG/DL (ref 0.3–1.2)
BILIRUBIN DIRECT: 0.16 MG/DL
BILIRUBIN, INDIRECT: 0.18 MG/DL (ref 0–1)
BUN BLDV-MCNC: 33 MG/DL (ref 8–23)
BUN/CREAT BLD: ABNORMAL (ref 9–20)
CALCIUM SERPL-MCNC: 8.8 MG/DL (ref 8.6–10.4)
CHLORIDE BLD-SCNC: 111 MMOL/L (ref 98–107)
CO2: 25 MMOL/L (ref 20–31)
CREAT SERPL-MCNC: 1.2 MG/DL (ref 0.5–0.9)
DIFFERENTIAL TYPE: ABNORMAL
EOSINOPHILS RELATIVE PERCENT: 5 % (ref 1–4)
GFR AFRICAN AMERICAN: 54 ML/MIN
GFR NON-AFRICAN AMERICAN: 45 ML/MIN
GFR SERPL CREATININE-BSD FRML MDRD: ABNORMAL ML/MIN/{1.73_M2}
GFR SERPL CREATININE-BSD FRML MDRD: ABNORMAL ML/MIN/{1.73_M2}
GLOBULIN: ABNORMAL G/DL (ref 1.5–3.8)
GLUCOSE BLD-MCNC: 179 MG/DL (ref 65–105)
GLUCOSE BLD-MCNC: 194 MG/DL (ref 65–105)
GLUCOSE BLD-MCNC: 198 MG/DL (ref 65–105)
GLUCOSE BLD-MCNC: 219 MG/DL (ref 70–99)
GLUCOSE BLD-MCNC: 237 MG/DL (ref 65–105)
GLUCOSE BLD-MCNC: 249 MG/DL (ref 65–105)
GLUCOSE BLD-MCNC: 257 MG/DL (ref 65–105)
GLUCOSE BLD-MCNC: 262 MG/DL (ref 65–105)
HCT VFR BLD CALC: 29 % (ref 36.3–47.1)
HEMOGLOBIN: 8.8 G/DL (ref 11.9–15.1)
IMMATURE GRANULOCYTES: 2 %
INR BLD: 1
LYMPHOCYTES # BLD: 27 % (ref 24–44)
MAGNESIUM: 1.7 MG/DL (ref 1.6–2.6)
MCH RBC QN AUTO: 27.6 PG (ref 25.2–33.5)
MCHC RBC AUTO-ENTMCNC: 30.3 G/DL (ref 28.4–34.8)
MCV RBC AUTO: 90.9 FL (ref 82.6–102.9)
MONOCYTES # BLD: 5 % (ref 1–7)
MORPHOLOGY: ABNORMAL
NRBC AUTOMATED: 0 PER 100 WBC
PARTIAL THROMBOPLASTIN TIME: 22.7 SEC (ref 20.5–30.5)
PDW BLD-RTO: 15.5 % (ref 11.8–14.4)
PHOSPHORUS: 3.5 MG/DL (ref 2.6–4.5)
PLATELET # BLD: 122 K/UL (ref 138–453)
PLATELET ESTIMATE: ABNORMAL
PMV BLD AUTO: 11.7 FL (ref 8.1–13.5)
POTASSIUM SERPL-SCNC: 3.8 MMOL/L (ref 3.7–5.3)
PROTHROMBIN TIME: 10.9 SEC (ref 9.1–12.3)
RBC # BLD: 3.19 M/UL (ref 3.95–5.11)
RBC # BLD: ABNORMAL 10*6/UL
SEG NEUTROPHILS: 61 % (ref 36–66)
SEGMENTED NEUTROPHILS ABSOLUTE COUNT: 5.73 K/UL (ref 1.8–7.7)
SODIUM BLD-SCNC: 146 MMOL/L (ref 135–144)
TOTAL PROTEIN: 5.5 G/DL (ref 6.4–8.3)
TRIGL SERPL-MCNC: 335 MG/DL
WBC # BLD: 9.4 K/UL (ref 3.5–11.3)
WBC # BLD: ABNORMAL 10*3/UL

## 2021-05-10 PROCEDURE — 36569 INSJ PICC 5 YR+ W/O IMAGING: CPT

## 2021-05-10 PROCEDURE — 99232 SBSQ HOSP IP/OBS MODERATE 35: CPT | Performed by: INTERNAL MEDICINE

## 2021-05-10 PROCEDURE — 94761 N-INVAS EAR/PLS OXIMETRY MLT: CPT

## 2021-05-10 PROCEDURE — 97530 THERAPEUTIC ACTIVITIES: CPT

## 2021-05-10 PROCEDURE — 2580000003 HC RX 258: Performed by: STUDENT IN AN ORGANIZED HEALTH CARE EDUCATION/TRAINING PROGRAM

## 2021-05-10 PROCEDURE — C9113 INJ PANTOPRAZOLE SODIUM, VIA: HCPCS | Performed by: STUDENT IN AN ORGANIZED HEALTH CARE EDUCATION/TRAINING PROGRAM

## 2021-05-10 PROCEDURE — 97110 THERAPEUTIC EXERCISES: CPT

## 2021-05-10 PROCEDURE — 94640 AIRWAY INHALATION TREATMENT: CPT

## 2021-05-10 PROCEDURE — 2580000003 HC RX 258: Performed by: NURSE PRACTITIONER

## 2021-05-10 PROCEDURE — 6370000000 HC RX 637 (ALT 250 FOR IP): Performed by: STUDENT IN AN ORGANIZED HEALTH CARE EDUCATION/TRAINING PROGRAM

## 2021-05-10 PROCEDURE — 85610 PROTHROMBIN TIME: CPT

## 2021-05-10 PROCEDURE — 36415 COLL VENOUS BLD VENIPUNCTURE: CPT

## 2021-05-10 PROCEDURE — 97166 OT EVAL MOD COMPLEX 45 MIN: CPT

## 2021-05-10 PROCEDURE — 85730 THROMBOPLASTIN TIME PARTIAL: CPT

## 2021-05-10 PROCEDURE — 84478 ASSAY OF TRIGLYCERIDES: CPT

## 2021-05-10 PROCEDURE — 99232 SBSQ HOSP IP/OBS MODERATE 35: CPT | Performed by: FAMILY MEDICINE

## 2021-05-10 PROCEDURE — 99233 SBSQ HOSP IP/OBS HIGH 50: CPT | Performed by: INTERNAL MEDICINE

## 2021-05-10 PROCEDURE — 2500000003 HC RX 250 WO HCPCS: Performed by: NURSE PRACTITIONER

## 2021-05-10 PROCEDURE — G0108 DIAB MANAGE TRN  PER INDIV: HCPCS

## 2021-05-10 PROCEDURE — 76937 US GUIDE VASCULAR ACCESS: CPT

## 2021-05-10 PROCEDURE — 80048 BASIC METABOLIC PNL TOTAL CA: CPT

## 2021-05-10 PROCEDURE — 6370000000 HC RX 637 (ALT 250 FOR IP): Performed by: NURSE PRACTITIONER

## 2021-05-10 PROCEDURE — 82947 ASSAY GLUCOSE BLOOD QUANT: CPT

## 2021-05-10 PROCEDURE — 6360000002 HC RX W HCPCS: Performed by: STUDENT IN AN ORGANIZED HEALTH CARE EDUCATION/TRAINING PROGRAM

## 2021-05-10 PROCEDURE — 85025 COMPLETE CBC W/AUTO DIFF WBC: CPT

## 2021-05-10 PROCEDURE — 80076 HEPATIC FUNCTION PANEL: CPT

## 2021-05-10 PROCEDURE — C1751 CATH, INF, PER/CENT/MIDLINE: HCPCS

## 2021-05-10 PROCEDURE — 2500000003 HC RX 250 WO HCPCS: Performed by: SURGERY

## 2021-05-10 PROCEDURE — 97535 SELF CARE MNGMENT TRAINING: CPT

## 2021-05-10 PROCEDURE — 84100 ASSAY OF PHOSPHORUS: CPT

## 2021-05-10 PROCEDURE — 2700000000 HC OXYGEN THERAPY PER DAY

## 2021-05-10 PROCEDURE — 2060000000 HC ICU INTERMEDIATE R&B

## 2021-05-10 PROCEDURE — 6360000002 HC RX W HCPCS: Performed by: NURSE PRACTITIONER

## 2021-05-10 PROCEDURE — 83735 ASSAY OF MAGNESIUM: CPT

## 2021-05-10 PROCEDURE — 02HV33Z INSERTION OF INFUSION DEVICE INTO SUPERIOR VENA CAVA, PERCUTANEOUS APPROACH: ICD-10-PCS | Performed by: INTERNAL MEDICINE

## 2021-05-10 PROCEDURE — APPSS60 APP SPLIT SHARED TIME 46-60 MINUTES: Performed by: NURSE PRACTITIONER

## 2021-05-10 RX ORDER — POLYETHYLENE GLYCOL 3350 17 G/17G
17 POWDER, FOR SOLUTION ORAL DAILY
Status: DISCONTINUED | OUTPATIENT
Start: 2021-05-10 | End: 2021-05-28 | Stop reason: HOSPADM

## 2021-05-10 RX ORDER — INSULIN GLARGINE 100 [IU]/ML
10 INJECTION, SOLUTION SUBCUTANEOUS NIGHTLY
Status: DISCONTINUED | OUTPATIENT
Start: 2021-05-10 | End: 2021-05-28 | Stop reason: HOSPADM

## 2021-05-10 RX ORDER — DEXTROSE MONOHYDRATE 50 MG/ML
100 INJECTION, SOLUTION INTRAVENOUS PRN
Status: DISCONTINUED | OUTPATIENT
Start: 2021-05-10 | End: 2021-05-28 | Stop reason: HOSPADM

## 2021-05-10 RX ORDER — LIDOCAINE HYDROCHLORIDE 10 MG/ML
5 INJECTION, SOLUTION INFILTRATION; PERINEURAL ONCE
Status: COMPLETED | OUTPATIENT
Start: 2021-05-10 | End: 2021-05-10

## 2021-05-10 RX ORDER — SODIUM CHLORIDE 9 MG/ML
25 INJECTION, SOLUTION INTRAVENOUS PRN
Status: DISCONTINUED | OUTPATIENT
Start: 2021-05-10 | End: 2021-05-28 | Stop reason: HOSPADM

## 2021-05-10 RX ORDER — SODIUM CHLORIDE 0.9 % (FLUSH) 0.9 %
5-40 SYRINGE (ML) INJECTION PRN
Status: DISCONTINUED | OUTPATIENT
Start: 2021-05-10 | End: 2021-05-12

## 2021-05-10 RX ORDER — NICOTINE POLACRILEX 4 MG
15 LOZENGE BUCCAL PRN
Status: DISCONTINUED | OUTPATIENT
Start: 2021-05-10 | End: 2021-05-28 | Stop reason: HOSPADM

## 2021-05-10 RX ORDER — SODIUM CHLORIDE 0.9 % (FLUSH) 0.9 %
5-40 SYRINGE (ML) INJECTION EVERY 12 HOURS SCHEDULED
Status: DISCONTINUED | OUTPATIENT
Start: 2021-05-10 | End: 2021-05-12

## 2021-05-10 RX ORDER — DEXTROSE MONOHYDRATE 25 G/50ML
12.5 INJECTION, SOLUTION INTRAVENOUS PRN
Status: DISCONTINUED | OUTPATIENT
Start: 2021-05-10 | End: 2021-05-28 | Stop reason: HOSPADM

## 2021-05-10 RX ADMIN — HEPARIN SODIUM 5000 UNITS: 5000 INJECTION INTRAVENOUS; SUBCUTANEOUS at 08:01

## 2021-05-10 RX ADMIN — HEPARIN SODIUM 5000 UNITS: 5000 INJECTION INTRAVENOUS; SUBCUTANEOUS at 14:36

## 2021-05-10 RX ADMIN — SODIUM CHLORIDE, PRESERVATIVE FREE 10 ML: 5 INJECTION INTRAVENOUS at 10:01

## 2021-05-10 RX ADMIN — LIDOCAINE HYDROCHLORIDE 5 ML: 10 INJECTION, SOLUTION INFILTRATION; PERINEURAL at 19:51

## 2021-05-10 RX ADMIN — INSULIN LISPRO 9 UNITS: 100 INJECTION, SOLUTION INTRAVENOUS; SUBCUTANEOUS at 12:26

## 2021-05-10 RX ADMIN — GABAPENTIN 300 MG: 250 SUSPENSION ORAL at 22:39

## 2021-05-10 RX ADMIN — INSULIN LISPRO 3 UNITS: 100 INJECTION, SOLUTION INTRAVENOUS; SUBCUTANEOUS at 04:35

## 2021-05-10 RX ADMIN — Medication 600 MG: at 21:42

## 2021-05-10 RX ADMIN — INSULIN LISPRO 3 UNITS: 100 INJECTION, SOLUTION INTRAVENOUS; SUBCUTANEOUS at 21:43

## 2021-05-10 RX ADMIN — GABAPENTIN 300 MG: 250 SUSPENSION ORAL at 18:22

## 2021-05-10 RX ADMIN — ACETAMINOPHEN 1000 MG: 650 SOLUTION ORAL at 00:32

## 2021-05-10 RX ADMIN — PIPERACILLIN AND TAZOBACTAM 3375 MG: 3; .375 INJECTION, POWDER, LYOPHILIZED, FOR SOLUTION INTRAVENOUS at 09:54

## 2021-05-10 RX ADMIN — INSULIN LISPRO 6 UNITS: 100 INJECTION, SOLUTION INTRAVENOUS; SUBCUTANEOUS at 18:46

## 2021-05-10 RX ADMIN — ACETAMINOPHEN 1000 MG: 650 SOLUTION ORAL at 18:20

## 2021-05-10 RX ADMIN — SODIUM CHLORIDE 25 ML: 9 INJECTION, SOLUTION INTRAVENOUS at 18:40

## 2021-05-10 RX ADMIN — IPRATROPIUM BROMIDE AND ALBUTEROL SULFATE 1 AMPULE: .5; 2.5 SOLUTION RESPIRATORY (INHALATION) at 15:44

## 2021-05-10 RX ADMIN — CALCIUM GLUCONATE: 98 INJECTION, SOLUTION INTRAVENOUS at 19:06

## 2021-05-10 RX ADMIN — Medication 600 MG: at 08:00

## 2021-05-10 RX ADMIN — HEPARIN SODIUM 5000 UNITS: 5000 INJECTION INTRAVENOUS; SUBCUTANEOUS at 21:42

## 2021-05-10 RX ADMIN — OXYCODONE HYDROCHLORIDE 2.5 MG: 5 SOLUTION ORAL at 15:29

## 2021-05-10 RX ADMIN — INSULIN LISPRO 3 UNITS: 100 INJECTION, SOLUTION INTRAVENOUS; SUBCUTANEOUS at 08:00

## 2021-05-10 RX ADMIN — IPRATROPIUM BROMIDE AND ALBUTEROL SULFATE 1 AMPULE: .5; 2.5 SOLUTION RESPIRATORY (INHALATION) at 12:26

## 2021-05-10 RX ADMIN — SODIUM CHLORIDE, PRESERVATIVE FREE 5 ML: 5 INJECTION INTRAVENOUS at 12:12

## 2021-05-10 RX ADMIN — PANTOPRAZOLE SODIUM 40 MG: 40 INJECTION, POWDER, FOR SOLUTION INTRAVENOUS at 09:35

## 2021-05-10 RX ADMIN — GABAPENTIN 300 MG: 250 SUSPENSION ORAL at 00:33

## 2021-05-10 RX ADMIN — POLYETHYLENE GLYCOL 3350 17 G: 17 POWDER, FOR SOLUTION ORAL at 12:26

## 2021-05-10 RX ADMIN — PIPERACILLIN AND TAZOBACTAM 3375 MG: 3; .375 INJECTION, POWDER, LYOPHILIZED, FOR SOLUTION INTRAVENOUS at 01:24

## 2021-05-10 RX ADMIN — ACETAMINOPHEN 1000 MG: 650 SOLUTION ORAL at 09:55

## 2021-05-10 RX ADMIN — PIPERACILLIN AND TAZOBACTAM 3375 MG: 3; .375 INJECTION, POWDER, LYOPHILIZED, FOR SOLUTION INTRAVENOUS at 18:40

## 2021-05-10 RX ADMIN — INSULIN LISPRO 9 UNITS: 100 INJECTION, SOLUTION INTRAVENOUS; SUBCUTANEOUS at 00:33

## 2021-05-10 RX ADMIN — Medication 1250 MG: at 09:37

## 2021-05-10 RX ADMIN — IPRATROPIUM BROMIDE AND ALBUTEROL SULFATE 1 AMPULE: .5; 2.5 SOLUTION RESPIRATORY (INHALATION) at 09:27

## 2021-05-10 RX ADMIN — PANTOPRAZOLE SODIUM 40 MG: 40 INJECTION, POWDER, FOR SOLUTION INTRAVENOUS at 10:04

## 2021-05-10 RX ADMIN — LEVOTHYROXINE SODIUM ANHYDROUS 56 MCG: 100 INJECTION, POWDER, LYOPHILIZED, FOR SOLUTION INTRAVENOUS at 09:36

## 2021-05-10 RX ADMIN — GABAPENTIN 300 MG: 250 SUSPENSION ORAL at 08:00

## 2021-05-10 RX ADMIN — SODIUM CHLORIDE, PRESERVATIVE FREE 10 ML: 5 INJECTION INTRAVENOUS at 10:00

## 2021-05-10 RX ADMIN — SODIUM CHLORIDE, PRESERVATIVE FREE 10 ML: 5 INJECTION INTRAVENOUS at 09:35

## 2021-05-10 RX ADMIN — INSULIN GLARGINE 10 UNITS: 100 INJECTION, SOLUTION SUBCUTANEOUS at 21:43

## 2021-05-10 ASSESSMENT — ENCOUNTER SYMPTOMS
ABDOMINAL PAIN: 1
SHORTNESS OF BREATH: 0
WHEEZING: 0
ABDOMINAL DISTENTION: 1
COUGH: 0

## 2021-05-10 ASSESSMENT — PAIN DESCRIPTION - LOCATION: LOCATION: ABDOMEN

## 2021-05-10 ASSESSMENT — PAIN SCALES - GENERAL
PAINLEVEL_OUTOF10: 4

## 2021-05-10 ASSESSMENT — PAIN DESCRIPTION - PAIN TYPE: TYPE: ACUTE PAIN

## 2021-05-10 NOTE — PROGRESS NOTES
PULMONARY & CRITICAL CARE MEDICINE PROGRESS  NOTE     Patient:  Maxene Severin  MRN: [id number]  Admit date: 5/3/2021    SUBJECTIVE     I personally interviewed/examined the patient, reviewed interval history and interpreted all available radiographic, laboratory data at the time of service. Chief Compliant/Reason for Initial Consult: Acute respiratory failure on vent support, gastric perforation    Brief Hospital Course and Interval History:  The patient is a 78 y.o. female with known medical history of diabetes mellitus, hypertension, hypothyroidism, abdominal hernia presented to the hospital with nausea, vomiting, diarrhea. Patient had similar symptoms on 4/28 but was apparently sent home. Patient had dizzy spells and lightheadedness, had a syncopal episode in the ER on this admission. Patient was transferred from the [facility] to [place].  In [facility] patient lab revealed lactic acidosis of 6, glucose 585, patient was seen to be in DKA. Elevated lipase of 1451. Leukocytotic with WBC 23.5, hemoglobin 18.3. Patient also had elevated troponins, elevated creatinine of 3.8. In [place] repeat labs showed creatinine of 2.49 with lactic acid of 2.4. Troponin was 85, recheck of 94. LFTs showed elevated bilirubin.     General surgery was consulted. CT chest showed ascites and pneumoperitoneum with apparent free-flowing oral contrast in the left upper quadrant concerning for viscus perforation possibly within the subdiaphragmatic portion of the stomach. Patient then went for robotic hernia repair 5/4 with wedge gastrectomy, gastropexy and placement of 2 TAISHA drains and 2 PEG tubes. Patient is n.p.o.     Pulmonology consulted for vent management. Interval history  5/10/2021  Patient awake, alert, and interactive. Tolerating clear liquids well. Nauseous overnight  No fever    Not appear septic. Comfortable on room air. Pain control adequate. I/O+ 3.2 L  Review of Systems:  Review of Systems   Constitutional: Positive for activity change. HENT: Negative for congestion. Respiratory: Negative for cough, shortness of breath and wheezing. Gastrointestinal: Positive for abdominal distention and abdominal pain. Genitourinary: Negative for difficulty urinating. Musculoskeletal: Negative. Neurological: Negative for light-headedness and numbness. Psychiatric/Behavioral: Negative for agitation.          OBJECTIVE     VITAL SIGNS:   LAST-  /79   Pulse 91   Temp 97.9 °F (36.6 °C) (Oral)   Resp 23   Ht 5' 2\" (1.575 m)   Wt 195 lb 5.2 oz (88.6 kg)   SpO2 97%   BMI 35.73 kg/m²   8-24 HR RANGE-  TEMP Temp  Av.7 °F (36.5 °C)  Min: 97.4 °F (36.3 °C)  Max: 97.9 °F (48.5 °C)   BP Systolic (69OUP), JRQ:008 , Min:136 , LIE:512      Diastolic (11TFA), RQM:75, Min:79, Max:88     PULSE Pulse  Av.2  Min: 82  Max: 95   RR Resp  Av.8  Min: 18  Max: 23   O2 SAT SpO2  Av %  Min: 97 %  Max: 99 %   OXYGEN DELIVERY O2 Flow Rate (L/min)  Av L/min  Min: 2 L/min  Max: 2 L/min     Systemic Examination:   General appearance alert and oriented   mental status - alert  Eyes - pupils equal and reactive, extraocular eye movements intact  Mouth - mucous membranes moist, pharynx normal without lesions  Neck - supple, no significant adenopathy  Chest -chest symmetrical, bilateral breath sounds clear and equal  Heart - normal rate, regular rhythm, normal S1, S2, no murmurs, rubs, clicks or gallops  Abdomen -soft with one TAISHA drain, Peg and g tube  Neurological - alert, oriented, normal speech, no focal findings or movement disorder noted  Extremities - peripheral pulses normal, no pedal edema, no clubbing or cyanosis  Skin - normal coloration and turgor, no rashes, no suspicious skin lesions noted     DATA REVIEW     Medications:  Scheduled Meds:   alteplase  1 mg Intracatheter Once    insulin glargine  10 Units Subcutaneous Nightly    polyethylene glycol  17 g Oral Daily    heparin (porcine)  5,000 Units Subcutaneous 3 times per day    vancomycin (VANCOCIN) intermittent dosing (placeholder)   Other RX Placeholder    vancomycin  1,250 mg Intravenous Q24H    acetaminophen  1,000 mg Oral Q8H    gabapentin  300 mg Oral 3 times per day    ipratropium-albuterol  1 ampule Inhalation Q4H WA    acetylcysteine  600 mg Oral BID    piperacillin-tazobactam  3,375 mg Intravenous Q8H    insulin lispro  0-18 Units Subcutaneous Q4H    levothyroxine  56 mcg Intravenous Daily    And    sodium chloride (PF)  5 mL Intravenous Daily    pantoprazole  40 mg Intravenous Daily    And    sodium chloride (PF)  10 mL Intravenous Daily    sodium chloride flush  5-40 mL Intravenous 2 times per day     Continuous Infusions:   dextrose      PN-Adult 2-in-1 Central Line (Standard) 60 mL/hr at 05/09/21 1738    sodium chloride 25 mL (05/08/21 1026)     LABS:-  ABGs:   No results found for: PH, PCO2, PO2, HCO3, O2SAT  No results for input(s): PHART, PO2ART, VKI0VTC, WTA0PHW, BEART, Q7KVRXRQ in the last 72 hours.   Lab Results   Component Value Date    POCPH 7.433 05/06/2021    POCPCO2 36.8 05/06/2021    POCPO2 150.9 (H) 05/06/2021    POCHCO3 24.6 05/06/2021    XBCY5LXA 99 (H) 05/06/2021     CBC:   Recent Labs     05/08/21  0355 05/09/21  0501 05/10/21  0504   WBC 7.1 8.4 9.4   HGB 8.9* 8.8* 8.8*   HCT 27.9* 28.7* 29.0*   MCV 88.6 93.2 90.9   PLT 72* See Reflexed IPF Result 122*   LYMPHOPCT 5* 18* 27   RBC 3.15* 3.08* 3.19*   MCH 28.3 28.6 27.6   MCHC 31.9 30.7 30.3   RDW 14.9* 15.4* 15.5*     BMP:   Recent Labs     05/08/21  0355 05/08/21  1753 05/09/21  0501 05/09/21  1701 05/10/21  0504   * 145* 145*  --  146*   K 3.0* 4.0 3.4* 3.7 3.8   * 111* 112*  --  111*   CO2 24 21 22  --  25   BUN 39*  --  34*  --  33*   CREATININE 1.40*  --  1.26*  --  1.20*   GLUCOSE 221*  --  264*  --  219*   PHOS 2.6  --  2.9  -- 3.5     Liver Function Test:   Recent Labs     05/10/21  0504   PROT 5.5*   LABALBU 1.8*   ALT 24   AST 12   ALKPHOS 82   BILITOT 0.34     Amylase/Lipase:  No results for input(s): AMYLASE, LIPASE in the last 72 hours. Coagulation Profile:   Recent Labs     05/08/21  0355 05/09/21  0501 05/10/21  0504   INR 1.0 1.0 1.0   PROTIME 10.6 10.8 10.9   APTT 27.9 29.1 22.7     Cardiac Enzymes:  No results for input(s): CKTOTAL, CKMB, CKMBINDEX, TROPONINI in the last 72 hours. Lactic Acid:  Lab Results   Component Value Date    LACTA 3.2 (H) 05/03/2021    LACTA 6.0 (HH) 05/03/2021     BNP:   No results found for: BNP  D-Dimer:  No results found for: DDIMER  Others:   Lab Results   Component Value Date    TSH 13.36 (H) 05/03/2021     No results found for: LILLY, RHEUMFACTOR, SEDRATE, CRP  No results found for: Daylene Ankush  No results found for: IRON, TIBC, FERRITIN  No results found for: SPEP, UPEP  No results found for: PSA, CEA, , PM5065,     Input/Output:    Intake/Output Summary (Last 24 hours) at 5/10/2021 1313  Last data filed at 5/10/2021 1252  Gross per 24 hour   Intake 2046.48 ml   Output 4290 ml   Net -2243.52 ml       Microbiology:    Pathology:    Radiology Reports:  CT CHEST ABDOMEN PELVIS WO CONTRAST   Final Result   1. Within the chest, the patient has consolidative processes in both lower   lobes likely pneumonitis. Bilateral pleural effusions and basilar   atelectasis are noted. 2. Postoperative changes within the abdomen. The patient had a hiatal hernia   repair. Although the stomach is decompressed, gastric walls appear thickened   likely secondary to inflammation which may be postoperative. 3. Fluid in the right pericolic gutter and pelvis. 4. Thickened small bowel loops in the right lower quadrant which may   represent secondary changes to surgery. 5. Two drain placements in the upper abdomen. Left inguinal terminates in   the left iliac vein.          FL ESOPHAGRAM   Final Result   1. Drainage of contrast material through what appears to be the gastrostomy   tubes following the ingestion of contrast.  Contrast does migrate beyond the   postoperative region into the proximal small bowel. 2. No extraneous/extraluminal collection of contrast is seen beyond the   confines of the stomach. 3. 2 surgical drains and 2 presumed gastrostomy tubes are seen overlying the   left upper quadrant. There does appear to be contrast slightly marginating   the more lateral gastrostomy tube balloon. 4. Delayed migration of contrast through the distal esophagus and GE junction   with mild gastroesophageal reflux. The findings were sent to the Radiology Results [address] at 12:43   pm on 5/7/2021to be communicated to a licensed caregiver. XR CHEST PORTABLE   Final Result   Bibasilar consolidation new from prior study. Blunting of the costophrenic   angles bilaterally         XR CHEST PORTABLE   Final Result   ETT tip position stable. Decreased left subcutaneous emphysema. Slightly   improved suspected left basilar volume loss with persistent findings as   above. No overt vascular congestion. US RENAL COMPLETE   Final Result   Unremarkable ultrasound of the kidneys and urinary bladder. Trace right pleural effusion         XR CHEST PORTABLE   Final Result   Volume loss continues left hemithorax with haziness at the left base either   atelectasis and or fluid. Subcutaneous emphysema along the left lateral   chest wall has decreased. No appreciable extrapleural air. Endotracheal   tube in appropriate position. XR CHEST PORTABLE   Final Result   1. Recommend repositioning of left internal jugular approach central venous   catheter. 2. Low lung volumes with left basilar atelectasis plus or minus mild pleural   effusion. 3. Left chest wall scattered soft tissue emphysema.          XR CHEST PORTABLE   Final Result   Intestinal tube deviates to the left side of a sizable hiatal hernia,   traversing below the hemidiaphragm and terminating just underneath the left   hemidiaphragm. Side port of the intestinal tube is below the diaphragmatic   hiatus. CT CHEST WO CONTRAST   Final Result   1. Ascites and pneumoperitoneum with apparent free-flowing oral contrast in   the left upper quadrant is concerning for viscus perforation, possibly within   the subdiaphragmatic portion of the stomach. 2. Large hiatal hernia with organoaxial volvulus. Majority of contrast is   retained within the esophagus and supradiaphragmatic stomach. 3. Enteric tube extends below the diaphragm with tip outside the field of   view. 4. Moderate bilateral pleural effusions with adjacent consolidation,   atelectasis versus pneumonia. Critical results were called by Dr. Zafar Rodriguez MD to [facility] on   5/4/2021 at 18:30. XR ABDOMEN (KUB) (SINGLE AP VIEW)   Final Result   No significant subdiaphragmatic contrast progression, as above. RECOMMENDATION:   Consider chest x-ray to further assess a organic-axial gastric volvulus         FL UGI   Final Result   Organo-axial volvulus the stomach. Large paraesophageal hernia containing the majority of the rotated gastric   body. The greater curvature is positioned superiorly within the   paraesophageal hernia defect. There is narrowing of the gastroesophageal   junction as well as of the gastroduodenal junction through the hernia defect. MRI ABDOMEN WO CONTRAST MRCP   Final Result   1. No evidence of intrahepatic or extrahepatic ductal dilatation. 2. Incompletely imaged large hiatal hernia with organoaxial malrotation of   the stomach. 3. Small to moderate amount of ascites. 4. Diffuse small bowel wall thickening likely due to 3rd spacing as the post   enteritis. 5. Trace bilateral pleural effusions.          XR CHEST PORTABLE   Final Result   No acute cardiopulmonary disease      Large hiatal hernia Echocardiogram:   Results for orders placed during the hospital encounter of 05/03/21   ECHO Complete 2D W Doppler W Color    Narrative Transthoracic Echocardiography Report (TTE)     Patient Name Leny Banuelos   Date of Study               05/05/2021      Date of      1950  Gender                      Female   Birth      Age          78 year(s)  Race                              Room Number  6413        Height:                     62 inch, 157.48 cm      Corporate ID [id number]    Weight:                     166 pounds, 75.3 kg   #      Patient Acct [de-identified]   BSA:          1.77 m^2      BMI:      30.36   #                                                              kg/m^2      MR #         [id number]     Sonographer                 Kvng Santana      Accession #  [id number]  Interpreting Physician      Shellie Vizcarra 63      Fellow                   Referring Nurse                            Practitioner      Interpreting             Referring Physician         Sourav Quijano   Fellow     Additional Comments  Technically difficult study, patient supine on ventilator. Type of Study      TTE procedure:2D Echocardiogram, M-Mode, Doppler, Color Doppler. Procedure Date  Date: 05/05/2021 Start: 07:32 AM    Study Location: [facility]  Technical Quality: Adequate visualization    Indications:Elevated troponin. History / Tech. Comments:  Procedure explained to patient. No known medical Hx. Patient Status: Inpatient    Height: 62 inches Weight: 166 pounds BSA: 1.77 m^2 BMI: 30.36 kg/m^2    CONCLUSIONS    Summary  Normal LV size and wall thickness. No obvious wall motion abnormality seen. Normal LV systolic function with LVEF 55%. RV appears dilated with reduced function. RV systolic pressure 37 mmHg  LA appears normal in size. No obvious significant structural valvular abnormality noted. No significant valvular stenosis or regurgitation noted. Normal aortic root dimension.   No significant pericardial effusion noted. Signature  ----------------------------------------------------------------------------   Electronically signed by Connor Montoya(Interpreting physician) on   2021 12:15 PM  ----------------------------------------------------------------------------    ----------------------------------------------------------------------------   Electronically signed by Olimpia Nielsen(Sonographer) on 2021 11:37 AM  ----------------------------------------------------------------------------  FINDINGS  Left Atrium  Left atrium is normal in size. Inter-atrial septum is intact with no evidence for an atrial septal defect,  by color doppler. Left Ventricle  Left ventricle is small in size, normal wall thickness, global left  ventricular systolic function is normal, calculated ejection fraction is  53%. All wall segments are not well visualized (poor acoustic windows.)  Right Atrium  Right atrial dilatation. Right Ventricle  Right ventricular dilatation with reduced systolic function. Abnormal RV strain. Mitral Valve  Normal mitral valve structure. Trivial mitral regurgitation. Aortic Valve  Aortic valve is trileaflet. No evidence of aortic insufficiency or stenosis. Tricuspid Valve  Normal tricuspid valve leaflets. Moderate tricuspid regurgitation. Estimated right ventricular systolic pressure is 37 mmHg, suggesting  pulmonary HTN. Pulmonic Valve  Pulmonic valve not well visualized but Doppler velocities are normal.  Pericardial Effusion  No significant pericardial effusion is seen. Miscellaneous  Normal aortic root dimension. E/E' average = 15.05. IVC dilated but unable to assess respiratory collapse due to patient on  ventilator.     M-mode / 2D Measurements & Calculations:      LVIDd:4 cm(3.7 - 5.6 cm)          Diastolic PUSQEJ:46 ml   DDWA:7.3 cm(0.6 - 1.1 cm)         Systolic PGUZW.11 ml   LVPWd:0.9 cm(0.6 - 1.1 cm)        Aortic Root:2.9 cm(2.0 - 3.7 cm) LA Dimension: 3.1 cm(1.9 - 4.0 cm)   Calculated LVEF (%): 52.95 %      LA volume/Index: 31.04 ml /18m^2                                     LVOT:1.9 cm                                     RVDd:3 cm      Mitral:                                 Aortic      Valve Area (P1/2-Time): 5.12 cm^2       Peak Velocity: 1.55 m/s   Peak E-Wave: 0.78 m/s                   Mean Velocity: 0.99 m/s   Peak A-Wave: 1.00 m/s                   Peak Gradient: 9.61 mmHg   E/A Ratio: 0.78                         Mean Gradient: 5 mmHg   Peak Gradient: 2.45 mmHg   Mean Gradient: 2 mmHg   Deceleration Time: 145 msec             Area (continuity): 2.16 cm^2   P1/2t: 43 msec                          AV VTI: 25.9 cm      Area (continuity): 2.1 cm^2   Mean Velocity: 0.60 m/s      Tricuspid:                              Pulmonic:      Estimated RVSP: 37 mmHg                 Peak Velocity: 0.80 m/s   Peak TR Velocity: 2.85 m/s              Peak Gradient: 2.57 mmHg   Peak TR Gradient: 32.49 mmHg     Diastology / Tissue Doppler  Septal Wall E' velocity:0.06 m/s  Septal Wall E/E':13.8  Lateral Wall E' velocity:0.05 m/s  Lateral Wall E/E':16.3       Cardiac Catheterization:   No results found for this or any previous visit. ASSESSMENT AND PLAN     Assessment:    Gastric volvulus with Viscus perforation  Sepsis   moderate bilateral pleural effusions  Bilateral lower lobe pneumonia  Peritonitis  Blood cultures positive for staph epidermidis, methicillin resistant  Diabetes mellitusDKA now resolved  Lactic acidosis resolving  BONNIE  Thrombocytopenia   Elevated troponins  Subclinical hypothyroidism  Elevated bilirubin  Hypernatremia    Plan:    Observe closely for signs of pleural space/mediastinal sepsis. Encourage ambulation. Would mobilize up out of bed. Antibiotics per infectious disease.   Sepsis likely secondary to gastric perforation on Zosyn, fluconazole   Blood cultures grew staph epidermidis, vancomycin added  Thrombocytopenia-patient had count stable, hit negative, DVT prophylaxis with heparin  Gastric volvulus with viscus perforation s/p robotic assisted laparoscopic hiatal hernia reduction with gastropexy. Has 2 PEG, 1 TAISHA drain in placeon Zosyn. On TPN  Acute kidney injurynonoliguric, good urine output after Walker change, nephrology following. Hypernatremia-on Ringer lactate  Elevated troponins cardiology evaluation to follow, echo shows EF of 55%. Diabetes mellituscover with insulin   Remove Walker and central line. Continue to monitor I/O with a goal of even/negative fluid balance  Maintain epc cuffs    Carolina Luis,   Pulmonary and Critical Care Medicine           5/10/2021, 1:13 PM    Please note that this chart was generated using voice recognition Dragon dictation software. Although every effort was made to ensure the accuracy of this automated transcription, some errors in transcription may have occurred. Attending Physician Statement  I have discussed the care of Jennifer Rojas, including pertinent history and exam findings,  with the resident. I have seen and examined the patient and the key elements of all parts of the encounter have been performed by me. I agree with the assessment, plan and orders as documented by the resident with additions . Obtain chest x-ray. If worsening pleural effusion or signs of Sirs then will need bilateral diagnostic thoracentesis to rule out empyema  Recommend place PICC line and discontinue femoral line  Treatment plan Discussed with nursing staff in detail , all questions answered . Electronically signed by Clare Corado MD on   5/10/21 at 4:26 PM EDT    Please note that this chart was generated using voice recognition Dragon dictation software. Although every effort was made to ensure the accuracy of this automated transcription, some errors in transcription may have occurred.

## 2021-05-10 NOTE — PROGRESS NOTES
Physical Therapy  Facility/Department: [facility] NEURO  Daily Treatment Note  NAME: Naida Parikh  : 1950  MRN: [id number]    Date of Service: 5/10/2021    Discharge Recommendations:  Further therapy recommended at discharge and the patient should be able to tolerate at least 3 hours per day over 5 days or 15 hours over 7 days. PT Equipment Recommendations  Equipment Needed: No    Assessment   Body structures, Functions, Activity limitations: Decreased functional mobility ; Decreased ROM; Decreased strength;Decreased endurance;Decreased balance  Assessment: Pt stood x 1 min with RW, requiring MOD Ax2-MIN Ax2 for all mobility. Limited by pain and significant weakness. Motivated to improve. WOuld benefit from aggressive PT after d/c to address deficits. Prognosis: Good  REQUIRES PT FOLLOW UP: Yes  Activity Tolerance  Activity Tolerance: Patient limited by endurance; Patient limited by fatigue;Patient limited by pain     Patient Diagnosis(es): The primary encounter diagnosis was Acute pancreatitis, unspecified complication status, unspecified pancreatitis type. Diagnoses of BONNIE (acute kidney injury) ([address] and Hiatal hernia were also pertinent to this visit. has a past medical history of Diabetes mellitus ([address] Gout, Hiatal hernia, Hyperlipidemia, Hypertension, and Thyroid disease. has a past surgical history that includes Cholecystectomy (); Hysterectomy (); Breast surgery; other surgical history (); other surgical history (); Tubal ligation (); and Gastric fundoplication (N/A, 8080). Restrictions  Restrictions/Precautions  Restrictions/Precautions: General Precautions, Fall Risk, Surgical Protocols  Required Braces or Orthoses?: No  Position Activity Restriction  Other position/activity restrictions:  Up with assist, multiple drains, / \"PARAESPHAGEAL HERNIA REPAIR LAPAROSCOPIC ROBOTIC\"  Subjective   General  Response To Previous Treatment: Patient with no complaints from previous session. Family / Caregiver Present: Yes(son)  Subjective  Subjective: Pt resting in bed upon arrival, agreeable to PT. Reports increased pain and feeling weak  Pain Screening  Patient Currently in Pain: Yes  Pain Assessment  Pain Assessment: 0-10  Pain Level: 4  Pain Type: Acute pain  Pain Location: Abdomen  Response to Pain Intervention: Patient Satisfied  Vital Signs  Patient Currently in Pain: Yes       Orientation  Orientation  Overall Orientation Status: Within Functional Limits  Orientation Level: Oriented to person;Disoriented to time;Oriented to place;Oriented to situation  Cognition      Objective   Bed mobility  Rolling to Left: Moderate assistance;2 Person assistance  Supine to Sit: Moderate assistance;2 Person assistance  Sit to Supine: Moderate assistance;2 Person assistance  Scooting: Moderate assistance  Transfers  Sit to Stand: Minimal Assistance;2 Person Assistance  Stand to sit: Minimal Assistance;2 Person Assistance  Ambulation  Ambulation?: No(unable to take any steps)     Balance  Posture: Fair  Sitting - Static: Fair  Sitting - Dynamic: Fair;-(Pt seated EOB x 10 min, demo F-balance due to post lean throughout. LImited by c/o lightheaded and feeling weak. Bp in seated 144/91)  Standing - Static: Poor; +(Pt stood with RW x1 min, requiring MIN A x2 throughout due to significant weakness)    Exercise  Seated ankle pumps, LAQ x10, AAROM   Goals  Short term goals  Time Frame for Short term goals: 14 visits  Short term goal 1: Prevent contractures through ROM and stretching. Short term goal 2: Pt to follow most commands for active participation in treatment  Short term goal 3: Progress with mobility as appropriate. Patient Goals   Patient goals : Get tube out.     Plan    Plan  Times per week: 5x/week  Current Treatment Recommendations: Strengthening, Endurance Training, Cognitive Reorientation, Functional Mobility Training  Safety Devices  Type of devices: Left in bed, Nurse notified, Call light within reach, Gait belt  Restraints  Initially in place: No     Therapy Time   Individual Concurrent Group Co-treatment   Time In 1015         Time Out 1044         Minutes 29         Timed Code Treatment Minutes: [address] [facility], [facility] [place]

## 2021-05-10 NOTE — PROGRESS NOTES
[facility] -Progress Note    Today's Date and Time: 5/10/2021, 2:13 PM    Impression :     Paraesophageal Hiatal hernia  Perforation of the esophagus  S/p laparoscopic, robotic para esophageal hernia repair 5-4-21  Acute pancreatitis  Shock   Hyperglycemia  NSTEMI  BONNIE  Not a MRSA carrier  Coagulase negative Staphylococci bacteremia x 2 on 5-7-21. Repeat blood cultures 5-8-21 x 2 : No growth . Recommendations:   Continue Zosyn  Diflucan 200 mg IV q day. Monitor QTc  Repeat blood cultures x 2 on 5-7-21    Medical Decision Making/Summary/Discussion:5/10/2021     Patient with large paraesophageal hiatal hernia with perforation of esophagus  Hyperglycemia   Acute pancreatitis  Shock   S/p laparoscopic, robotic para esophageal hernia repair 5-4-21  Improvement in overall picture but is developing basilar infiltrates  Not a MRSA carrier  Unclear whether this is fluid retention vs residual leakage vs secondary pneumonia  Esophagogram 5-7-21 does not show a leak  Will plan to continue zosyn and add Diflucan. Monitor temps and CXR. If further fluid retention becomes apparent then it may be wise to switch to meropenem to decrease the fluid and NA loads associated with Zosyn  Infection Control Recommendations   [place] Precautions      Antimicrobial Stewardship Recommendations     Simplification of therapy  Targeted therapy    Coordination of Outpatient Care:   Estimated Length of IV antimicrobials: TBD  Patient will need Midline Catheter Insertion:No   Patient will need PICC line Insertion:No  Patient will need: Home IV , [place],  SNF,  LTAC:  Patient will need outpatient wound care:Yes    Chief complaint/reason for consultation:   Septic shock/sepsis    History of Present Illness:   Sveta Duong is a 78y.o.-year-old   female who was initially admitted on 5/3/2021. Patient seen at the request of Joseph Kerr.     INITIAL HISTORY : 05/07/2021    Pt with a history of diabetes mellitus type 2, hypertension, hyperlipidemia, thyroid disease, gout and hiatal hernia. She initially presented on 05/03/2021 to an outlying facility with a chief complaint of nausea vomiting, diarrhea and syncopal attack . Patient was found to have blood glucose of 585,WBC of 23.5, elevated lipase 1451 and elevated beta hydroxybutyrate. MRCP on 05/03/2021 showed diffuse small bowel wall thickening likely due to third spacing as opposed to enteritis. CT scan of the abdomen showed extremely large fluid-filled hiatal hernia and distended and fluid-filled stomach below the diaphragm. Patient was given Zosyn at the outlying facility and was transferred to [address] [place] [address] ED for evaluation by the surgery team.    CT scan of the chest without contrast was performed on 05/04/2021 which showed ascites and pneumoperitoneum with apparent free-flowing oral contrast in the left upper quadrant concerning for viscus perforation possibly within the subdiaphragmatic portion of the stomach. Moderate bilateral pleural effusions and hiatal hernia with organoaxial volvulus. Bariatric surgery performed a laparoscopic robotic para esophageal hernia repair. Cardiology is also following the patient because of elevated troponins with unremarkable echocardiography. Antibiotics wise the patient was given Zosyn on 05/03/2021 at the outside facility and it has been continued through the present time. Blood and urine cultures on 05/03/2021 show No growth . Repeat urine culture on 05/04/2021 shows No growth     Patient is currently having temperature elevations. Temp max of 101.1 on 05/07/2021 around 4 AM in the morning. Patient WBC count is improving from 23.5 on presentation to 6.2 today. Repeat chest x-ray 5-7-21 shows bibasilar consolidation new from prior study with blunting of the costophrenic angles bilaterally.   Patient is off of the pressors since 5-6-21    CURRENT EVALUATION : 5/10/2021    Afebrile  VS stable     Coagulase negative Staphylococci bacteremia x 2 on 5-7-21. Repeat blood cultures 5-8-21 x 2 : No growth . Femoral line was removed  Is on Vancomycin. Abdomen remains sore  Tolerating clear liquids  TAISHA with serosanguinous fluid. Labs, X rays reviewed: 5/10/2021    BUN: 49-93-97-60-60-54->48>33  Cr: 2.032.102.061.981.80>1.71-->1.20    WBC: 23. 54.113.210.67.8>6.2-->9.4  Hb: 11.911.511.19.5>8.8  Plat: 449-080-386-163-14475>66-->122    Cultures:    Urine:  05/03/2021: No growth   05/0 4/2021: No growth     Blood:  05/03/2021: Blood cultures x 2:  No growth    Sputum :    Wound:      Discussed with patient, RN. Surgery      I have personally reviewed the past medical history, past surgical history, medications, social history, and family history, and I have updated the database accordingly.   Past Medical History:     Past Medical History:   Diagnosis Date    Diabetes mellitus ([address]     Gout     Hiatal hernia     Hyperlipidemia     Hypertension     Thyroid disease        Past Surgical  History:     Past Surgical History:   Procedure Laterality Date    BREAST SURGERY      Bx 1993    CHOLECYSTECTOMY  1999    GASTRIC FUNDOPLICATION N/A 6/1/0637    PARAESPHAGEAL HERNIA REPAIR LAPAROSCOPIC ROBOTIC performed by Justine Amaya DO at [address] [place] [address]    Remove spur L ankle    OTHER SURGICAL HISTORY  1978    Pin and wire repair R ankle    TUBAL LIGATION  1988       Medications:      alteplase  1 mg Intracatheter Once    insulin glargine  10 Units Subcutaneous Nightly    polyethylene glycol  17 g Oral Daily    alteplase  1 mg Intracatheter Once    alteplase  1 mg Intracatheter Once    heparin (porcine)  5,000 Units Subcutaneous 3 times per day    vancomycin (VANCOCIN) intermittent dosing (placeholder)   Other RX Placeholder    vancomycin  1,250 mg Intravenous Q24H    acetaminophen  1,000 mg Oral Q8H  gabapentin  300 mg Oral 3 times per day    ipratropium-albuterol  1 ampule Inhalation Q4H WA    acetylcysteine  600 mg Oral BID    piperacillin-tazobactam  3,375 mg Intravenous Q8H    insulin lispro  0-18 Units Subcutaneous Q4H    levothyroxine  56 mcg Intravenous Daily    And    sodium chloride (PF)  5 mL Intravenous Daily    pantoprazole  40 mg Intravenous Daily    And    sodium chloride (PF)  10 mL Intravenous Daily    sodium chloride flush  5-40 mL Intravenous 2 times per day       Social History:     Social History     Socioeconomic History    Marital status:      Spouse name: Not on file    Number of children: Not on file    Years of education: Not on file    Highest education level: Not on file   Occupational History    Not on file   Social Needs    Financial resource strain: Not on file    Food insecurity     Worry: Not on file     Inability: Not on file    Transportation needs     Medical: Not on file     Non-medical: Not on file   Tobacco Use    Smoking status: Never Smoker    Smokeless tobacco: Never Used   Substance and Sexual Activity    Alcohol use: Never     Frequency: Never    Drug use: Never    Sexual activity: Not on file   Lifestyle    Physical activity     Days per week: Not on file     Minutes per session: Not on file    Stress: Not on file   Relationships    Social connections     Talks on phone: Not on file     Gets together: Not on file     Attends Christian service: Not on file     Active member of club or organization: Not on file     Attends meetings of clubs or organizations: Not on file     Relationship status: Not on file    Intimate partner violence     Fear of current or ex partner: Not on file     Emotionally abused: Not on file     Physically abused: Not on file     Forced sexual activity: Not on file   Other Topics Concern    Not on file   Social History Narrative    Not on file       Family History:     Family History   Problem Relation Age of Onset    Breast Cancer Mother     High Blood Pressure Mother     High Cholesterol Father     Breast Cancer Sister         Allergies:   No known allergies     Review of Systems:   Review of Systems   Constitutional: Positive for fatigue and fever. Negative for activity change, appetite change, chills, diaphoresis and unexpected weight change. Respiratory: Positive for cough. Negative for apnea, choking, chest tightness, shortness of breath, wheezing and stridor. Cardiovascular: Negative for chest pain, palpitations and leg swelling. Gastrointestinal: Positive for constipation. Negative for abdominal distention, nausea and vomiting. Endocrine: Negative for cold intolerance. Genitourinary: Negative for difficulty urinating and dysuria. Musculoskeletal: Negative for arthralgias and back pain. Neurological: Negative for dizziness, tremors, syncope and facial asymmetry. Hematological: Negative for adenopathy. Psychiatric/Behavioral: Negative for agitation and behavioral problems. The patient is not hyperactive. Physical Examination :     Patient Vitals for the past 8 hrs:   BP Temp Temp src Pulse Resp SpO2 Height   05/10/21 1253       5' 2\" (1.575 m)   05/10/21 1222     23 97 %    05/10/21 1158 136/79 97.9 °F (36.6 °C) Oral 91 18 98 %    05/10/21 0926     23 98 %    05/10/21 0814 (!) 157/81 97.8 °F (36.6 °C) Oral 83 23 99 %      General Appearance: Awake, alert  Head:  Normocephalic, no trauma  Eyes: Pupils equal, round, reactive to light, sclera anicteric; conjunctivae pink. No embolic phenomena. ENT: Oropharynx clear, without erythema, exudate, or thrush. No tenderness of sinuses. Mouth/throat: mucosa pink and moist. No lesions. Dentition in good repair. Neck:Supple, without lymphadenopathy. Thyroid normal, No bruits. Pulmonary/Chest: Clear to auscultation, without wheezes, rales, or rhonchi. No dullness to percussion.    Cardiovascular: Regular rate and rhythm without murmurs, rubs, or gallops. Abdomen: Soft, non tender. Bowel sounds normal. No organomegaly. The ports entry wounds are healing well and without any erythema. All four Extremities: No cyanosis, clubbing, edema, or effusions. Neurologic: No gross sensory or motor deficits. Skin: Warm and dry with good turgor. No signs of peripheral arterial or venous insufficiency. No ulcerations. No open wounds. Medical Decision Making -Laboratory:   I have independently reviewed/ordered the following labs:    CBC with Differential:   Recent Labs     05/09/21  0501 05/10/21  0504   WBC 8.4 9.4   HGB 8.8* 8.8*   HCT 28.7* 29.0*   PLT See Reflexed IPF Result 122*   LYMPHOPCT 18* 27   MONOPCT 4 5     BMP:   Recent Labs     05/09/21  0501 05/09/21  1701 05/10/21  0504   *  --  146*   K 3.4* 3.7 3.8   *  --  111*   CO2 22  --  25   BUN 34*  --  33*   CREATININE 1.26*  --  1.20*   MG 1.6  --  1.7     Hepatic Function Panel:   Recent Labs     05/09/21  0501 05/10/21  0504   PROT 4.8* 5.5*   LABALBU 1.5* 1.8*   BILIDIR 0.17 0.16   IBILI 0.22 0.18   BILITOT 0.39 0.34   ALKPHOS 83 82   ALT 34* 24   AST 13 12     No results for input(s): RPR in the last 72 hours. No results for input(s): HIV in the last 72 hours. No results for input(s): BC in the last 72 hours.   Lab Results   Component Value Date    MUCUS NOT REPORTED 05/06/2021    RBC 3.19 05/10/2021    RBC 6.23 05/03/2021    TRICHOMONAS NOT REPORTED 05/06/2021    WBC 9.4 05/10/2021    YEAST NOT REPORTED 05/06/2021    TURBIDITY CLOUDY 05/06/2021     Lab Results   Component Value Date    CREATININE 1.20 05/10/2021    CREATININE 3.09 05/03/2021    GLUCOSE 219 05/10/2021    GLUCOSE 453 05/03/2021       Medical Decision Making-Imaging:     EXAMINATION:   SINGLE CONTRAST ESOPHAGRAM       5/7/2021 11:03 am       TECHNIQUE:   Single contrast esophagram was performed with a total 100 mL of Omnipaque 240   oral contrast.       FLUOROSCOPY DOSE AND TYPE OR TIME AND EXPOSURES: Fluoro time: 2.7 minutes; DAP: 70.44 mGy       COMPARISON:   Upper GI from 05/04/2021       HISTORY:   ORDERING SYSTEM PROVIDED HISTORY: s/p hiatal hernia reduction  with partial   gastrectomy and gastropexy   TECHNOLOGIST PROVIDED HISTORY:   s/p hiatal hernia reduction  with partial gastrectomy and gastropexy   Reason for Exam: H.H repair/gastrectomy/gastropexy/ 100 ml Omnipaque orally   Acuity: Unknown   Type of Exam: Unknown       63-year-old female status post hiatal hernia reduction with partial   gastrectomy and gastropexy       FINDINGS:   Fluoroscopic  imaging was obtained prior to the administration contrast.       2 gastrostomy tubes are seen projecting over the left upper quadrant.  There   also 2 surgical drains projecting over the left upper quadrant.  Prior   cholecystectomy.  Suture material projects over the left upper quadrant.       The patient drank contrast which migrates through the postoperative region   and into the stomach and small bowel.       There is contrast draining through what appears to be the gastrostomy tubes.       No extraneous collection of contrast is seen beyond the confines of the   stomach.       There is contrast marginating a presumed gastrostomy tube balloon.       Mild gastroesophageal reflux and delayed transit of contrast is seen within   the distal esophagus/GE junction.           Impression   1. Drainage of contrast material through what appears to be the gastrostomy   tubes following the ingestion of contrast.  Contrast does migrate beyond the   postoperative region into the proximal small bowel. 2. No extraneous/extraluminal collection of contrast is seen beyond the   confines of the stomach. 3. 2 surgical drains and 2 presumed gastrostomy tubes are seen overlying the   left upper quadrant.  There does appear to be contrast slightly marginating   the more lateral gastrostomy tube balloon.    4. Delayed migration of contrast through the distal esophagus and GE junction   with mild gastroesophageal reflux. The findings were sent to the Radiology Results [address] at 12:43   pm on 5/7/2021to be communicated to a licensed caregiver.             EXAMINATION:   CT OF THE CHEST WITHOUT CONTRAST 5/4/2021 5:55 pm       TECHNIQUE:   CT of the chest was performed without the administration of intravenous   contrast. Multiplanar reformatted images are provided for review. Dose   modulation, iterative reconstruction, and/or weight based adjustment of the   mA/kV was utilized to reduce the radiation dose to as low as reasonably   achievable.       COMPARISON:   None.       HISTORY:   ORDERING SYSTEM PROVIDED HISTORY: large hiatal hernia, contrast progression? TECHNOLOGIST PROVIDED HISTORY:   large hiatal hernia, contrast progression? Reason for Exam: large hiatal hernia, contrast progression?       FINDINGS:   Mediastinum: Heart size is normal without pericardial effusion.  There are   shotty mediastinal lymph nodes.  The thoracic aorta and main pulmonary artery   are normal in caliber.       Lungs/pleura: Moderate bilateral pleural effusions with adjacent   consolidation.  No pneumothorax.       Upper Abdomen: There is a large hiatal hernia containing the majority of the   stomach.  There is organoaxial volvulus.  Enteric tube is noted extending   below the diaphragm with tip outside the field of view.  The herniated   stomach is distended with contrast.  There is also contrast within the distal   esophagus. Lenetta Cave is some contrast visualized within the portion of stomach   below the diaphragm.  Free air and fluid are noted within the visualized   upper abdomen with apparent free spill of contrast in the left upper quadrant.       Soft Tissues/Bones: No acute osseous abnormality.           Impression   1.  Ascites and pneumoperitoneum with apparent free-flowing oral contrast in   the left upper quadrant is concerning for viscus perforation, possibly within the subdiaphragmatic portion of the stomach. 2. Large hiatal hernia with organoaxial volvulus.  Majority of contrast is   retained within the esophagus and supradiaphragmatic stomach. 3. Enteric tube extends below the diaphragm with tip outside the field of   view. 4. Moderate bilateral pleural effusions with adjacent consolidation,   atelectasis versus pneumonia. Critical results were called by Dr. Kyle Loyd MD to Leo Vogelkajal on   5/4/2021 at 18:30.           Medical Decision [id number]:       Medical Decision Making-Other:     Note:  Labs, medications, radiologic studies were reviewed with personal review of films   Large amounts of data were reviewed  Discussed with nursing Staff, Discharge planner  Infection Control and Prevention measures reviewed  All prior entries were reviewed  Administer medications as ordered  Prognosis: Guarded  Discharge planning reviewed  Follow up as outpatient. Thank you for allowing us to participate in the care of this patient. Please call with questions.     Vamshi Franks MD    2:13 PM

## 2021-05-10 NOTE — PROGRESS NOTES
Comprehensive Nutrition Assessment    Type and Reason for Visit:  Reassess    Nutrition Recommendations/Plan: Continue current TPN. Monitor labs. Oral diet as tolerated. Nutrition Assessment:  Pt sleeping at time of visit. Spoke with RN who reports pt having some difficulty with nausea when PEG is clamped, improves when returned to suction. Consumed 1-25% of clear liquid tray this morning. Lantus increased to 20 units nightly. Remains on high dose sliding scale insulin. Triglycerides remain elevated today. TPN to continue. Estimated Daily Nutrient Needs:  Energy (kcal):  1600 kcal/day; Weight Used for Energy Requirements:  Current     Protein (g):  75 g pro/day; Weight Used for Protein Requirements:  Ideal(1.5)        Fluid (ml/day):  1686-1414 mL/day (or per MD);  Method Used for Fluid Requirements:  ml/Kg(25-30)      Nutrition Related Findings:  Meds/labs reviewed      Wounds:  Surgical Incision(abdomen)       Current Nutrition Therapies:    DIET BARIATRIC CLEAR LIQUID;  Dietary Nutrition Supplements: Clear Liquid Oral Supplement  PN-Adult 2-in-1 Central Line (Standard)  Current Parenteral Nutrition Orders:  · Type and Formula: 2-in-1 Custom(225 gm dextrose, 75 gm protein)   · Lipids: None  · Duration: Continuous  · Rate/Volume: 60 mL/hr (1440 mL/day)  · Current PN Order Provides: 1065 kcals, 75 gm protein      Anthropometric Measures:  · Height: 5' 2\" (157.5 cm)  · Current Body Weight: 195 lb 5.2 oz (88.6 kg)   · Admission Body Weight: 202 lb (91.6 kg)    · Usual Body Weight: (166 lb - stated)     · Ideal Body Weight: 110 lbs; % Ideal Body Weight 177.6 %   · BMI: 35.7  · BMI Categories: Obese Class 2 (BMI 35.0 -39.9)       Nutrition Diagnosis:   · Inadequate oral intake related to altered GI function as evidenced by NPO or clear liquid status due to medical condition, intake 0-25%, nutrition support - parenteral nutrition      Nutrition Interventions:   Food and/or Nutrient Delivery:  Continue Current Diet, Continue Oral Nutrition Supplement, Continue Current Parenteral Nutrition  Nutrition Education/Counseling:  No recommendation at this time   Coordination of Nutrition Care:  Continue to monitor while inpatient    Goals:  meet % of estimated nutrient needs       Nutrition Monitoring and Evaluation:   Behavioral-Environmental Outcomes:  None Identified   Food/Nutrient Intake Outcomes:  Diet Advancement/Tolerance, Food and Nutrient Intake, Supplement Intake, Parenteral Nutrition Intake/Tolerance  Physical Signs/Symptoms Outcomes:  Weight, Biochemical Data, Nutrition Focused Physical Findings, Nausea or Vomiting, GI Status     Discharge Planning:     Too soon to determine     Electronically signed by Ho Campoverde, MS, RD, LD on 5/10/21 at 1:00 PM EDT    Contact: [phone]

## 2021-05-10 NOTE — PROGRESS NOTES
Non-STEMI (non-ST elevated myocardial infarction) ([facility]    Diabetic ketoacidosis without coma associated with type 2 diabetes mellitus ([facility]    BONNIE (acute kidney injury) ([facility]    Hernia with obstruction    Essential hypertension    Thyroid disease    Syncope    Acute tubular necrosis (HCC)    Lactic acid acidosis    Acute hypoxemic respiratory failure (HCC)    Hiatal hernia    Gastric volvulus    Community acquired bilateral lower lobe pneumonia    Mediastinitis    Peritonitis ([facility]     77 yo F s/p 5/4 emergent robotic assisted laparoscopic hiatal hernia reduction with gastropexy via g-tube x2     5/7 esophagram performed no leak noted    PLAN  1. [address]38[address] Dahl[address] Irwin for bariatric CLD, plan for FLD on 5/11 if pt conts to do well, cont nutritional supplements, cont andree TAISHA and PEG tube output, g-tubes clamped at this time, ok to unclamp if pt feeling nauseated or has emesis   2. Cont recs and management per primary   3. Cont abx per ID, + blood cultures, defer abx regimen to primary   4. Encourage IS use, deep breathing, ambulation and PT/OT work   5. [id number] Dahlia Irwin for DVT ppx from surg stance   6.  Defer any need for thoracentesis or IR drain of chest per pulmonary discretion     Thank you,    Electronically signed by Angélica Lanza DO  on 5/10/2021 at 7:04 AM

## 2021-05-10 NOTE — PROGRESS NOTES
Inpatient Diabetes  Education     Type and Reason for Visit: Patient Education - Follow up Session - 5/10/21    Patient was seen prior on day of admission/ transfer on 5/4/2021 in ED room 19. Patient was not feeling well, a bit confused at this time and full education session not appropriate. Brief assessment of needs was done : per support person, daughter in law, patient had been not well at home last few days and not able to eat or take medication. Unsure if patient had been checking her BG at home. Patient did state she had a home BG meter. Per chart review:Patient home medication list with oral medications for diabetes X3, no insulin. Discussed with daughter in law and patient her currently insulin is part of the plan now due to her acute needs. A1C was checked and is in target:   Lab Results   Component Value Date    LABA1C 6.7 (H) 05/04/2021     An education folder was left at bedside as support and writer will follow patient for ongoing needs. Out patient diabetes education  contact number provided - [phone] to patient and placed on the discharge summary. 5/10/21 - Follow up  Session   Met with patient and son at bedside - Room 537. Patient not able to participate in education session, weak and fatigue looking. Son related plan is for patient to have additional therapy prior to discharge home. He also confirmed prior to this admission patient had been independent with her diabetes self care - taking oral medication and had home BG meter. Plan now looking like insulin will be needed. Writer will keep patient on education list and re- evaluate needs ongoing.   Jaylon Levine RN  CDE

## 2021-05-10 NOTE — PLAN OF CARE
Skin assessed for breakdown and redness, patient turned regularly, and heels elevated off of bed on pillows. Problem: OXYGENATION/RESPIRATORY FUNCTION  Goal: Patient will maintain patent airway  Outcome: Ongoing  Goal: Patient will achieve/maintain normal respiratory rate/effort  Description: Respiratory rate and effort will be within normal limits for the patient  Outcome: Ongoing     Problem: SKIN INTEGRITY  Goal: Skin integrity is maintained or improved  5/10/2021 0556 by Rigo Oreilly RN  Outcome: Ongoing  5/9/2021 1833 by Vale Langley RN  Outcome: Ongoing  Note: Skin assessed for breakdown and redness, patient turned regularly, and heels elevated off of bed on pillows.         Problem: Confusion - Acute:  Goal: Absence of continued neurological deterioration signs and symptoms  Description: Absence of continued neurological deterioration signs and symptoms  Outcome: Ongoing  Goal: Mental status will be restored to baseline  Description: Mental status will be restored to baseline  Outcome: Ongoing     Problem: Discharge Planning:  Goal: Ability to perform activities of daily living will improve  Description: Ability to perform activities of daily living will improve  Outcome: Ongoing  Goal: Participates in care planning  Description: Participates in care planning  Outcome: Ongoing

## 2021-05-10 NOTE — PROGRESS NOTES
Renal Progress Note      Subjective: Following for BONNIE/ATN due to hypotension following laparotomy. Has history of CKD III with baseline 1.3  Creatinine this morning improved   Urine output 1.5 L AND High NG output  Now afebrile. Stable hemodynamics    Potassium improving, TPN orders noted. PEG tubes with bilious     Blood Cultures gram positive cocci in clusters with staphylococcus epidermidis. Remains on Vanco, last dose of fluconazole last night.      Ultrasound shows right kidney 9.6 cm and left kidney 11.7 cm without hydronephrosis    D  Outpatient Medications:     Medications Prior to Admission: metFORMIN (GLUCOPHAGE) 1000 MG tablet, Take 1,000 mg by mouth daily (with breakfast)  SITagliptin (JANUVIA) 100 MG tablet, Take 100 mg by mouth daily  omeprazole (PRILOSEC) 40 MG delayed release capsule, Take 1 capsule by mouth every morning (before breakfast)  lisinopril (PRINIVIL;ZESTRIL) 40 MG tablet, Take 10 mg by mouth daily   glimepiride (AMARYL) 2 MG tablet, Take 2 mg by mouth every morning (before breakfast)  simvastatin (ZOCOR) 10 MG tablet, Take 10 mg by mouth nightly  levothyroxine (SYNTHROID) 175 MCG tablet, Take 112 mcg by mouth Daily Dose reduced in December 2020  aspirin 81 MG EC tablet, Take 81 mg by mouth daily  metFORMIN (GLUCOPHAGE) 500 MG tablet, Take 500 mg by mouth every evening   hydroCHLOROthiazide (HYDRODIURIL) 25 MG tablet, Take 25 mg by mouth daily  [DISCONTINUED] predniSONE (DELTASONE) 20 MG tablet, 60 mg x 2 days, 40 mg x 2 days, 20 mg x 2 days    Current Medications:     Scheduled Meds:    alteplase  1 mg Intracatheter Once    insulin glargine  10 Units Subcutaneous Nightly    polyethylene glycol  17 g Oral Daily    heparin (porcine)  5,000 Units Subcutaneous 3 times per day    vancomycin (VANCOCIN) intermittent dosing (placeholder)   Other RX Placeholder    vancomycin  1,250 mg Intravenous Q24H    acetaminophen  1,000 mg Oral Q8H    gabapentin  300 mg Oral 3 times per day  ipratropium-albuterol  1 ampule Inhalation Q4H WA    acetylcysteine  600 mg Oral BID    piperacillin-tazobactam  3,375 mg Intravenous Q8H    insulin lispro  0-18 Units Subcutaneous Q4H    levothyroxine  56 mcg Intravenous Daily    And    sodium chloride (PF)  5 mL Intravenous Daily    pantoprazole  40 mg Intravenous Daily    And    sodium chloride (PF)  10 mL Intravenous Daily    sodium chloride flush  5-40 mL Intravenous 2 times per day     Continuous Infusions:    dextrose      PN-Adult 2-in-1 Central Line (Standard) 60 mL/hr at 21 1738    sodium chloride 25 mL (21 1026)     PRN Meds:  glucose, dextrose, glucagon (rDNA), dextrose, potassium chloride, magnesium sulfate, oxyCODONE, acetaminophen, artificial tears, sodium chloride flush, sodium chloride, [DISCONTINUED] promethazine **OR** ondansetron, dextrose    Input/Output:       I/O last 3 completed shifts: In: 1956.5 [I.V.:6.5]  Out: 4785 [Urine:1500; Emesis/NG output:2125; Drains:45]. Patient Vitals for the past 96 hrs (Last 3 readings):   Weight   21 0542 195 lb 5.2 oz (88.6 kg)       Vital Signs:   Temperature:  Temp: 97.9 °F (36.6 °C)  TMax:   Temp (24hrs), Av.7 °F (36.5 °C), Min:97.4 °F (36.3 °C), Max:97.9 °F (36.6 °C)    Respirations:  Resp: 23  Pulse:   Pulse: 91  BP:    BP: 136/79  BP Range: Systolic (66LKW), ZGY:581 , Min:136 , CST:798       Diastolic (26HTV), IRW:13, Min:79, Max:88      Physical Examination:     General:  AAO x 3, speaking in full sentences, no accessory muscle use. HEENT: Atraumatic, normocephalic, no throat congestion, moist mucosa. Eyes:   Pupils equal, round and reactive to light, EOMI. Neck:   No JVD  Chest:              Bilateral vesicular breath sounds, no rales or wheezes. Cardiac:  S1 S2 RR, no murmurs, gallops or rubs  Abdomen: Soft, not distended, ttp without peritoneal signs, + RJP, PEG x 2   :   + Walker  Neuro:  AAO x 3, No FND.   SKIN:  No rashes, good skin turgor. Extremities:  Present  edema, palpable peripheral pulses, no calf tenderness. Labs:       Recent Labs     05/08/21  0355 05/09/21  0501 05/10/21  0504   WBC 7.1 8.4 9.4   RBC 3.15* 3.08* 3.19*   HGB 8.9* 8.8* 8.8*   HCT 27.9* 28.7* 29.0*   MCV 88.6 93.2 90.9   MCH 28.3 28.6 27.6   MCHC 31.9 30.7 30.3   RDW 14.9* 15.4* 15.5*   PLT 72* See Reflexed IPF Result 122*   MPV 12.2 NOT REPORTED 11.7      BMP:   Recent Labs     05/08/21  0355 05/08/21  1753 05/09/21  0501 05/09/21  1701 05/10/21  0504   * 145* 145*  --  146*   K 3.0* 4.0 3.4* 3.7 3.8   * 111* 112*  --  111*   CO2 24 21 22  --  25   BUN 39*  --  34*  --  33*   CREATININE 1.40*  --  1.26*  --  1.20*   GLUCOSE 221*  --  264*  --  219*   CALCIUM 8.1*  --  8.3*  --  8.8      Phosphorus:     Recent Labs     05/08/21  0355 05/09/21  0501 05/10/21  0504   PHOS 2.6 2.9 3.5     Magnesium:    Recent Labs     05/08/21  0355 05/09/21  0501 05/10/21  0504   MG 1.8 1.6 1.7     Albumin:    Recent Labs     05/08/21  0355 05/09/21  0501 05/10/21  0504   LABALBU 1.8* 1.5* 1.8*     SPEP:  Lab Results   Component Value Date    PROT 5.5 05/10/2021    PROT 7.3 05/03/2021    PATH ELECTRONICALLY SIGNED.  Maria Del Rosario Puentes M.D. 05/05/2021   C3:     Lab Results   Component Value Date    C3 50 05/05/2021     C4:     Lab Results   Component Value Date    C4 14 05/05/2021   Hep BsAg:         Lab Results   Component Value Date    HEPBSAG NONREACTIVE 05/05/2021     Hep C AB:          Lab Results   Component Value Date    HEPCAB NONREACTIVE 05/05/2021       Urinalysis/Chemistries:      Lab Results   Component Value Date    NITRU NEGATIVE 05/06/2021    COLORU YELLOW 05/06/2021    PHUR 5.5 05/06/2021    WBCUA None 05/06/2021    WBCUA 0-3 05/03/2021    RBCUA 0 TO 2 05/06/2021    RBCUA 0-2 05/03/2021    MUCUS NOT REPORTED 05/06/2021    TRICHOMONAS NOT REPORTED 05/06/2021    YEAST NOT REPORTED 05/06/2021    BACTERIA FEW 05/06/2021    CLARITYU Clear 05/03/2021    SPECGRAV 1.023 05/06/2021    LEUKOCYTESUR NEGATIVE 05/06/2021    UROBILINOGEN Normal 05/06/2021    BILIRUBINUR NEGATIVE 05/06/2021    BILIRUBINUR Moderate 05/03/2021    BLOODU Negative 05/03/2021    GLUCOSEU 1+ 05/06/2021    KETUA NEGATIVE 05/06/2021    AMORPHOUS NOT REPORTED 05/06/2021     Urine Sodium:     Lab Results   Component Value Date    KILEY 43 05/05/2021     Urine Creatinine:     Lab Results   Component Value Date    LABCREA 131.0 05/05/2021       Radiology:     CXR:     Assessment:     1. Acute kidney injury due to ischemic ATN further complicated by hypotension, GI loss, third spacing due to gastric volvulus and perforation leading to acute renal failure. Creatinine now back to presumed baseline. 2.  Possible underlying chronic kidney disease with a creatinine of 1.3 mg/dL  3. Status post laparotomy for gastric volvulus and perforation. Patient underwent wedge gastrectomy and gastropexy with drain placement. Patient tolerated procedure fairly well  4. Respiratory failure: remains extubated  5. Non-ST BENJAMIN demand ischemia  6. Longstanding type 2 diabetes  7. Hypernatremia due to free water deficit, improving  8. DM II  9. Hypokalemia - improving, TPN with potassium adjusted as recommended. Plan:     Hold diuretics  Avoid nephrotoxins    Nutrition   Please ensure that patient is on a renal diet/TF. Avoid nephrotoxic drugs/contrast exposure. We will continue to follow along with you.          Jane Alvarez MD  Nephrology Attending Physician  [facility]

## 2021-05-10 NOTE — PROGRESS NOTES
PULMONARY & CRITICAL CARE MEDICINE PROGRESS  NOTE     Patient:  Addy Quintero  MRN: [id number]  Admit date: 5/3/2021    SUBJECTIVE     I personally interviewed/examined the patient, reviewed interval history and interpreted all available radiographic, laboratory data at the time of service. Chief Compliant/Reason for Initial Consult: Acute respiratory failure on vent support, gastric perforation    Brief Hospital Course and Interval History:  The patient is a 78 y.o. female with known medical history of diabetes mellitus, hypertension, hypothyroidism, abdominal hernia presented to the hospital with nausea, vomiting, diarrhea. Patient had similar symptoms on 4/28 but was apparently sent home. Patient had dizzy spells and lightheadedness, had a syncopal episode in the ER on this admission. Patient was transferred from the [facility] to [place].  In [facility] patient lab revealed lactic acidosis of 6, glucose 585, patient was seen to be in DKA. Elevated lipase of 1451. Leukocytotic with WBC 23.5, hemoglobin 18.3. Patient also had elevated troponins, elevated creatinine of 3.8. In [place] repeat labs showed creatinine of 2.49 with lactic acid of 2.4. Troponin was 85, recheck of 94. LFTs showed elevated bilirubin.     General surgery was consulted. CT chest showed ascites and pneumoperitoneum with apparent free-flowing oral contrast in the left upper quadrant concerning for viscus perforation possibly within the subdiaphragmatic portion of the stomach. Patient then went for robotic hernia repair 5/4 with wedge gastrectomy, gastropexy and placement of 2 TAISHA drains and 2 PEG tubes. Patient is n.p.o.     Pulmonology consulted for vent management. Interval history  5/9/2021  Patient awake, alert, and interactive. Tolerating clear liquids well. No fever. No signs are stable. Discussed with Dr. Diamond Harris. contrast material through what appears to be the gastrostomy   tubes following the ingestion of contrast.  Contrast does migrate beyond the   postoperative region into the proximal small bowel. 2. No extraneous/extraluminal collection of contrast is seen beyond the   confines of the stomach. 3. 2 surgical drains and 2 presumed gastrostomy tubes are seen overlying the   left upper quadrant. There does appear to be contrast slightly marginating   the more lateral gastrostomy tube balloon. 4. Delayed migration of contrast through the distal esophagus and GE junction   with mild gastroesophageal reflux. The findings were sent to the Radiology Results [address] at 12:43   pm on 5/7/2021to be communicated to a licensed caregiver. XR CHEST PORTABLE   Final Result   Bibasilar consolidation new from prior study. Blunting of the costophrenic   angles bilaterally         XR CHEST PORTABLE   Final Result   ETT tip position stable. Decreased left subcutaneous emphysema. Slightly   improved suspected left basilar volume loss with persistent findings as   above. No overt vascular congestion. US RENAL COMPLETE   Final Result   Unremarkable ultrasound of the kidneys and urinary bladder. Trace right pleural effusion         XR CHEST PORTABLE   Final Result   Volume loss continues left hemithorax with haziness at the left base either   atelectasis and or fluid. Subcutaneous emphysema along the left lateral   chest wall has decreased. No appreciable extrapleural air. Endotracheal   tube in appropriate position. XR CHEST PORTABLE   Final Result   1. Recommend repositioning of left internal jugular approach central venous   catheter. 2. Low lung volumes with left basilar atelectasis plus or minus mild pleural   effusion. 3. Left chest wall scattered soft tissue emphysema.          XR CHEST PORTABLE   Final Result   Intestinal tube deviates to the left side of a sizable hiatal hernia,   traversing below the hemidiaphragm and terminating just underneath the left   hemidiaphragm. Side port of the intestinal tube is below the diaphragmatic   hiatus. CT CHEST WO CONTRAST   Final Result   1. Ascites and pneumoperitoneum with apparent free-flowing oral contrast in   the left upper quadrant is concerning for viscus perforation, possibly within   the subdiaphragmatic portion of the stomach. 2. Large hiatal hernia with organoaxial volvulus. Majority of contrast is   retained within the esophagus and supradiaphragmatic stomach. 3. Enteric tube extends below the diaphragm with tip outside the field of   view. 4. Moderate bilateral pleural effusions with adjacent consolidation,   atelectasis versus pneumonia. Critical results were called by Dr. Mikey Merchant MD to [facility] on   5/4/2021 at 18:30. XR ABDOMEN (KUB) (SINGLE AP VIEW)   Final Result   No significant subdiaphragmatic contrast progression, as above. RECOMMENDATION:   Consider chest x-ray to further assess a organic-axial gastric volvulus         FL UGI   Final Result   Organo-axial volvulus the stomach. Large paraesophageal hernia containing the majority of the rotated gastric   body. The greater curvature is positioned superiorly within the   paraesophageal hernia defect. There is narrowing of the gastroesophageal   junction as well as of the gastroduodenal junction through the hernia defect. MRI ABDOMEN WO CONTRAST MRCP   Final Result   1. No evidence of intrahepatic or extrahepatic ductal dilatation. 2. Incompletely imaged large hiatal hernia with organoaxial malrotation of   the stomach. 3. Small to moderate amount of ascites. 4. Diffuse small bowel wall thickening likely due to 3rd spacing as the post   enteritis. 5. Trace bilateral pleural effusions.          XR CHEST PORTABLE   Final Result   No acute cardiopulmonary disease      Large hiatal hernia Echocardiogram:   Results for orders placed during the hospital encounter of 05/03/21   ECHO Complete 2D W Doppler W Color    Narrative Transthoracic Echocardiography Report (TTE)     Patient Name Jean Carlos Moreno   Date of Study               05/05/2021      Date of      1950  Gender                      Female   Birth      Age          78 year(s)  Race                              Room Number  5577        Height:                     62 inch, 157.48 cm      Corporate ID [id number]    Weight:                     166 pounds, 75.3 kg   #      Patient Acct [de-identified]   BSA:          1.77 m^2      BMI:      30.36   #                                                              kg/m^2      MR #         [id number]     Sonographer                 [facility]      Accession #  [id number]  Interpreting Physician      Shellie Vizcarra 63      Fellow                   Referring Nurse                            Practitioner      Interpreting             Referring Physician         Sourav Qujiano   Fellow     Additional Comments  Technically difficult study, patient supine on ventilator. Type of Study      TTE procedure:2D Echocardiogram, M-Mode, Doppler, Color Doppler. Procedure Date  Date: 05/05/2021 Start: 07:32 AM    Study Location: [facility]  Technical Quality: Adequate visualization    Indications:Elevated troponin. History / Tech. Comments:  Procedure explained to patient. No known medical Hx. Patient Status: Inpatient    Height: 62 inches Weight: 166 pounds BSA: 1.77 m^2 BMI: 30.36 kg/m^2    CONCLUSIONS    Summary  Normal LV size and wall thickness. No obvious wall motion abnormality seen. Normal LV systolic function with LVEF 55%. RV appears dilated with reduced function. RV systolic pressure 37 mmHg  LA appears normal in size. No obvious significant structural valvular abnormality noted. No significant valvular stenosis or regurgitation noted. Normal aortic root dimension.   No LA Dimension: 3.1 cm(1.9 - 4.0 cm)   Calculated LVEF (%): 52.95 %      LA volume/Index: 31.04 ml /18m^2                                     LVOT:1.9 cm                                     RVDd:3 cm      Mitral:                                 Aortic      Valve Area (P1/2-Time): 5.12 cm^2       Peak Velocity: 1.55 m/s   Peak E-Wave: 0.78 m/s                   Mean Velocity: 0.99 m/s   Peak A-Wave: 1.00 m/s                   Peak Gradient: 9.61 mmHg   E/A Ratio: 0.78                         Mean Gradient: 5 mmHg   Peak Gradient: 2.45 mmHg   Mean Gradient: 2 mmHg   Deceleration Time: 145 msec             Area (continuity): 2.16 cm^2   P1/2t: 43 msec                          AV VTI: 25.9 cm      Area (continuity): 2.1 cm^2   Mean Velocity: 0.60 m/s      Tricuspid:                              Pulmonic:      Estimated RVSP: 37 mmHg                 Peak Velocity: 0.80 m/s   Peak TR Velocity: 2.85 m/s              Peak Gradient: 2.57 mmHg   Peak TR Gradient: 32.49 mmHg     Diastology / Tissue Doppler  Septal Wall E' velocity:0.06 m/s  Septal Wall E/E':13.8  Lateral Wall E' velocity:0.05 m/s  Lateral Wall E/E':16.3       Cardiac Catheterization:   No results found for this or any previous visit. ASSESSMENT AND PLAN     Assessment:    Gastric volvulus with Viscus perforation  Sepsis   moderate bilateral pleural effusions  Bilateral lower lobe pneumonia  Peritonitis  Blood cultures positive for staph epidermidis, methicillin resistant  Diabetes mellitusDKA now resolved  Lactic acidosis resolving  BONNIE  Thrombocytopenia   Elevated troponins  Subclinical hypothyroidism  Elevated bilirubin  Hypernatremia    Plan:    Observe closely for signs of pleural space/mediastinal sepsis. Encourage ambulation. Would mobilize up out of bed. Antibiotics per infectious disease.   Sepsis likely secondary to gastric perforation on Zosyn, fluconazole   Blood cultures grew staph epidermidis, vancomycin

## 2021-05-10 NOTE — PROGRESS NOTES
[facility]  Office: [address], DO, Zoila Lopez, DO, Luisito Ira, DO, Mireyaprimo Hanley Blood, DO, Venice Ponce MD, Hany Melvin MD, Torrie Maravilla MD, Deedee Brunner, MD, Cristobal Mei MD, Kadie Lock MD, Jessica Will MD, La Orellana MD, Alix Posadas, DO, Saumya Ackerman MD, Erwin Jackson DO, Shekhar Riojas MD,  Peter Driver DO, Audie Fournier MD, Dodie Goode MD, Nicolasa Dobbins MD, Chavo Edwards MD, Albania Garg, Gilford Kell, CNP, Neo Alberto, [facility], Brett Hazel, CNS, Jay Mares, CNP, Nola Phalen, CNP, Samuel Quintana, CNP, Wilbur Henson, CNP, James Gongora, CNP, Tangela Cobb PA-C, Jayme Cooper, [facility], Damir De Souza, CNP, Juice Fiore, CNP, Maico Franco, CNP, Niurka Hess, CNP, Brendan Johns, CNP, Juanis Capps, [address] [place] [address]    Progress Note    5/10/2021    8:57 AM    Name:   Eli Saunders  MRN:     [id number]     Acct:      [de-identified]   Room:   [facility]-03  IP Day:  7  Admit Date:  5/3/2021 12:49 PM    PCP:   Peace Escamilla DO  Code Status:  Full Code    Subjective:     C/C:   Chief Complaint   Patient presents with    Blood Sugar Problem     >450    Hernia     hyatial      Interval History Status: not changed. Patient seen and evaluated in room sitting in bed in no acute distress. Vital signs stable overnight. Bilateral tubes clamped this morning. Diet advanced to full liquid diet. Brief History:     Patient presented to [place] [address] emergency room from [facility].    Patient originally presented to [facility] for the evaluation of nausea vomiting and diarrhea ongoing for 6 days. Patient was recently seen in the [facility] emergency room on 4/28/2021 and sent home. She states that today (5/3/21) she woke up around 2 AM as she was on the floor. She stated the last thing she recalls was getting up to get water.   Patient states that she was lightheaded dizzy and weak and had a syncopal episode and fell. LOC of unknown time. The work-up in the outlying emergency room showed: A metabolic panel with a sodium of 144 potassium 2.2 chloride 78 CO2 greater than 40  creatinine 3.8 with lactic acid of 6 and glucose of 585. Ash Jayna Troponin I 0.404, liver function showed AST of 37 bilirubin of 5 direct bilirubin 0, and a lipase of 1451. Beta hydroxybutyrate 3.66. Hemogram with acute leukocytosis with a WBC of 23.5, hemoglobin 18.3, hematocrit 54.2 a high concern of acute pancreatitis with acute kidney injury and DKA, with non-STEMI. There was endorgan injury with a high lactic 6.6 and a creatinine of 3.8 and troponin of 0.4 with EKG showing acute ischemia but with Q waves in the inferior and anterior leads. Originally the patient was recommended for ICU and the case was discussed with Dr. Ma Buerger and deferred the case to general surgery. And at that time Dr. Rajendra Flores recommended ED to ED transfer. At that time the patient was given Zosyn and transferred ED to ED. Review of Systems:     Constitutional:  negative for chills, fevers, sweats  Respiratory:  negative for cough, dyspnea on exertion, shortness of breath, wheezing  Cardiovascular:  negative for chest pain, chest pressure/discomfort, lower extremity edema, palpitations  Gastrointestinal:  + abdominal pain, +constipation, -diarrhea, +nausea, -vomiting  Neurological:  negative for dizziness, headache    Medications: Allergies:     Allergies   Allergen Reactions    No Known Allergies        Current Meds:   Scheduled Meds:    heparin (porcine)  5,000 Units Subcutaneous 3 times per day    vancomycin (VANCOCIN) intermittent dosing (placeholder)   Other RX Placeholder    vancomycin  1,250 mg Intravenous Q24H    acetaminophen  1,000 mg Oral Q8H    gabapentin  300 mg Oral 3 times per day    ipratropium-albuterol  1 ampule Inhalation Q4H WA    acetylcysteine  600 mg Oral BID    piperacillin-tazobactam  3,375 mg Intravenous Q8H    insulin glargine  5 Units Subcutaneous Nightly    insulin lispro  0-18 Units Subcutaneous Q4H    levothyroxine  56 mcg Intravenous Daily    And    sodium chloride (PF)  5 mL Intravenous Daily    pantoprazole  40 mg Intravenous Daily    And    sodium chloride (PF)  10 mL Intravenous Daily    sodium chloride flush  5-40 mL Intravenous 2 times per day     Continuous Infusions:    PN-Adult 2-in-1 Central Line (Standard) 60 mL/hr at 21 1738    sodium chloride 25 mL (21 1026)     PRN Meds: potassium chloride, magnesium sulfate, oxyCODONE, acetaminophen, artificial tears, sodium chloride flush, sodium chloride, [DISCONTINUED] promethazine **OR** ondansetron, dextrose    Data:     Past Medical History:   has a past medical history of Diabetes mellitus ([address] Gout, Hiatal hernia, Hyperlipidemia, Hypertension, and Thyroid disease. Social History:   reports that she has never smoked. She has never used smokeless tobacco. She reports that she does not drink alcohol or use drugs. Family History:   Family History   Problem Relation Age of Onset    Breast Cancer Mother     High Blood Pressure Mother     High Cholesterol Father     Breast Cancer Sister        Vitals:  BP (!) 157/81   Pulse 83   Temp 97.8 °F (36.6 °C) (Oral)   Resp 23   Ht 5' 2\" (1.575 m)   Wt 195 lb 5.2 oz (88.6 kg)   SpO2 99%   BMI 35.73 kg/m²   Temp (24hrs), Av.8 °F (36.6 °C), Min:97.4 °F (36.3 °C), Max:98.4 °F (36.9 °C)    Recent Labs     21  2007 21  2351 05/10/21  0422 05/10/21  0734   POCGLU 245* 257* 194* 179*       I/O (24Hr):     Intake/Output Summary (Last 24 hours) at 5/10/2021 0857  Last data filed at 5/10/2021 0453  Gross per 24 hour   Intake 1956.48 ml   Output 3670 ml   Net -1713.52 ml       Labs:  Hematology:  Recent Labs     21  0355 21  0501 05/10/21  0504   WBC 7.1 8.4 9.4   RBC 3.15* 3.08* 3.19*   HGB 8.9* 8.8* 8.8* HCT 27.9* 28.7* 29.0*   MCV 88.6 93.2 90.9   MCH 28.3 28.6 27.6   MCHC 31.9 30.7 30.3   RDW 14.9* 15.4* 15.5*   PLT 72* See Reflexed IPF Result 122*   MPV 12.2 NOT REPORTED 11.7   INR 1.0 1.0 1.0     Chemistry:  Recent Labs     05/08/21 0355 05/08/21  1753 05/09/21  0501 05/09/21  1701 05/10/21  0504   * 145* 145*  --  146*   K 3.0* 4.0 3.4* 3.7 3.8   * 111* 112*  --  111*   CO2 24 21 22  --  25   GLUCOSE 221*  --  264*  --  219*   BUN 39*  --  34*  --  33*   CREATININE 1.40*  --  1.26*  --  1.20*   MG 1.8  --  1.6  --  1.7   ANIONGAP 10 13 11  --  10   LABGLOM 37*  --  42*  --  45*   GFRAA 45*  --  51*  --  54*   CALCIUM 8.1*  --  8.3*  --  8.8   PHOS 2.6  --  2.9  --  3.5     Recent Labs     05/08/21 0355 05/08/21 0355 05/09/21  0501 05/09/21  0501 05/09/21  1401 05/09/21  1701 05/09/21 2007 05/09/21  2351 05/10/21  0422 05/10/21  0504 05/10/21  0734   PROT 4.7*  --  4.8*  --   --   --   --   --   --  5.5*  --    LABALBU 1.8*  --  1.5*  --   --   --   --   --   --  1.8*  --    AST 19  --  13  --   --   --   --   --   --  12  --    ALT 56*  --  34*  --   --   --   --   --   --  24  --    ALKPHOS 84  --  83  --   --   --   --   --   --  82  --    BILITOT 0.62  --  0.39  --   --   --   --   --   --  0.34  --    BILIDIR 0.25  --  0.17  --   --   --   --   --   --  0.16  --    TRIG  --   --   --   --   --   --   --   --   --  335*  --    POCGLU  --    < >  --    < > 261* 178* 245* 257* 194*  --  179*    < > = values in this interval not displayed.      ABG:  Lab Results   Component Value Date    POCPH 7.433 05/06/2021    PHART 7.193 05/04/2021    POCPCO2 36.8 05/06/2021    NOY0NEF 52.0 05/04/2021    POCPO2 150.9 05/06/2021    PO2ART 145.0 05/04/2021    POCHCO3 24.6 05/06/2021    RSR9AQO 19.2 05/04/2021    NBEA NOT REPORTED 05/06/2021    PBEA 0 05/06/2021    LFN8ROI NOT REPORTED 05/06/2021    UANQ6QMK 99 05/06/2021    L8NSTSYQ 98.0 05/04/2021    FIO2 40.0 05/06/2021     Lab Results   Component Value Date/Time    SPECIAL L HAND 11 ML 05/08/2021 02:10 PM     Lab Results   Component Value Date/Time    CULTURE NO GROWTH 2 DAYS 05/08/2021 02:10 PM       Radiology:  Arlene Ramirez Abdomen (kub) (single Ap View)    Result Date: 5/4/2021  No significant subdiaphragmatic contrast progression, as above. RECOMMENDATION: Consider chest x-ray to further assess a organic-axial gastric volvulus     Ct Chest Wo Contrast    Result Date: 5/4/2021  1. Ascites and pneumoperitoneum with apparent free-flowing oral contrast in the left upper quadrant is concerning for viscus perforation, possibly within the subdiaphragmatic portion of the stomach. 2. Large hiatal hernia with organoaxial volvulus. Majority of contrast is retained within the esophagus and supradiaphragmatic stomach. 3. Enteric tube extends below the diaphragm with tip outside the field of view. 4. Moderate bilateral pleural effusions with adjacent consolidation, atelectasis versus pneumonia. Critical results were called by Dr. Ana Maria Bryant MD to [facility] on 5/4/2021 at 18:30. Us Renal Complete    Result Date: 5/5/2021  Unremarkable ultrasound of the kidneys and urinary bladder. Trace right pleural effusion     Fl Esophagram    Result Date: 5/7/2021  1. Drainage of contrast material through what appears to be the gastrostomy tubes following the ingestion of contrast.  Contrast does migrate beyond the postoperative region into the proximal small bowel. 2. No extraneous/extraluminal collection of contrast is seen beyond the confines of the stomach. 3. 2 surgical drains and 2 presumed gastrostomy tubes are seen overlying the left upper quadrant. There does appear to be contrast slightly marginating the more lateral gastrostomy tube balloon. 4. Delayed migration of contrast through the distal esophagus and GE junction with mild gastroesophageal reflux.  The findings were sent to the Radiology Results [address] at 12:43 pm on 5/7/2021to be communicated to a licensed caregiver. Xr Chest Portable    Result Date: 5/7/2021  Bibasilar consolidation new from prior study. Blunting of the costophrenic angles bilaterally     Xr Chest Portable    Result Date: 5/6/2021  ETT tip position stable. Decreased left subcutaneous emphysema. Slightly improved suspected left basilar volume loss with persistent findings as above. No overt vascular congestion. Xr Chest Portable    Result Date: 5/5/2021  Volume loss continues left hemithorax with haziness at the left base either atelectasis and or fluid. Subcutaneous emphysema along the left lateral chest wall has decreased. No appreciable extrapleural air. Endotracheal tube in appropriate position. Xr Chest Portable    Result Date: 5/5/2021  1. Recommend repositioning of left internal jugular approach central venous catheter. 2. Low lung volumes with left basilar atelectasis plus or minus mild pleural effusion. 3. Left chest wall scattered soft tissue emphysema. Xr Chest Portable    Result Date: 5/4/2021  Intestinal tube deviates to the left side of a sizable hiatal hernia, traversing below the hemidiaphragm and terminating just underneath the left hemidiaphragm. Side port of the intestinal tube is below the diaphragmatic hiatus. Xr Chest Portable    Result Date: 5/3/2021  No acute cardiopulmonary disease Large hiatal hernia     Mri Abdomen Wo Contrast Mrcp    Result Date: 5/3/2021  1. No evidence of intrahepatic or extrahepatic ductal dilatation. 2. Incompletely imaged large hiatal hernia with organoaxial malrotation of the stomach. 3. Small to moderate amount of ascites. 4. Diffuse small bowel wall thickening likely due to 3rd spacing as the post enteritis. 5. Trace bilateral pleural effusions. Ct Chest Abdomen Pelvis Wo Contrast    Result Date: 5/7/2021  1. Within the chest, the patient has consolidative processes in both lower lobes likely pneumonitis.   Bilateral pleural effusions and basilar atelectasis are noted. 2. Postoperative changes within the abdomen. The patient had a hiatal hernia repair. Although the stomach is decompressed, gastric walls appear thickened likely secondary to inflammation which may be postoperative. 3. Fluid in the right pericolic gutter and pelvis. 4. Thickened small bowel loops in the right lower quadrant which may represent secondary changes to surgery. 5. Two drain placements in the upper abdomen. Left inguinal terminates in the left iliac vein. Fl Ugi    Result Date: 5/4/2021  Organo-axial volvulus the stomach. Large paraesophageal hernia containing the majority of the rotated gastric body. The greater curvature is positioned superiorly within the paraesophageal hernia defect. There is narrowing of the gastroesophageal junction as well as of the gastroduodenal junction through the hernia defect.        Physical Examination:        General appearance:  alert, cooperative and no distress  Mental Status:  oriented to person, place and time and normal affect  Lungs:  clear to auscultation bilaterally, normal effort  Heart:  regular rate and rhythm, no murmur  Abdomen:  Obese, soft, tender, nondistended,hypoactive bowel sounds, no masses, hepatomegaly, splenomegaly  Extremities:  no edema, redness, tenderness in the calves  Skin:  no gross lesions, rashes, induration, abd surgical incisions     Assessment:        Hospital Problems           Last Modified POA    * (Principal) Septic shock ([address] 5/4/2021 Yes    Acute pancreatitis 5/9/2021 Yes    Non-STEMI (non-ST elevated myocardial infarction) ([address] 5/4/2021 Yes    Diabetic ketoacidosis without coma associated with type 2 diabetes mellitus ([address] 5/4/2021 Yes    BONNIE (acute kidney injury) ([address] 5/4/2021 Yes    Hernia with obstruction 5/4/2021 Yes    Essential hypertension 5/4/2021 Yes    Thyroid disease 5/4/2021 Yes    Syncope 5/4/2021 Yes    Acute tubular necrosis ([address] 5/4/2021 Yes    Lactic acid acidosis 5/8/2021 Yes    Acute ANGELES  5/10/2021  8:57 AM

## 2021-05-10 NOTE — PROGRESS NOTES
fatigue;Patient limited by pain  Safety Devices  Safety Devices in place: Yes  Type of devices: Gait belt;Left in bed;Call light within reach; Patient at risk for falls  Restraints  Initially in place: No           Patient Diagnosis(es): The primary encounter diagnosis was Acute pancreatitis, unspecified complication status, unspecified pancreatitis type. Diagnoses of BONNIE (acute kidney injury) ([facility] [address] and Hiatal hernia were also pertinent to this visit. has a past medical history of Diabetes mellitus ([facility] [address] Gout, Hiatal hernia, Hyperlipidemia, Hypertension, and Thyroid disease. has a past surgical history that includes Cholecystectomy (1999); Hysterectomy (1997); Breast surgery; other surgical history (1962); other surgical history (1978); Tubal ligation (1988); and Gastric fundoplication (N/A, 5/1/2917). Restrictions  Restrictions/Precautions  Restrictions/Precautions: General Precautions, Fall Risk, Surgical Protocols  Required Braces or Orthoses?: No  Position Activity Restriction  Other position/activity restrictions: Up with assist, multiple drains, 5/4 \"PARAESPHAGEAL HERNIA REPAIR LAPAROSCOPIC ROBOTIC\"    Subjective   General  Patient assessed for rehabilitation services?: Yes  Family / Caregiver Present: Yes(son)  General Comment  Comments: RN ok'd for OT/PT visit this AM. Pt agreeable to session, pleasent/cooperative throughout.   Pain Assessment  Pain Descriptors: Discomfort  Non-Pharmaceutical Pain Intervention(s): Ambulation/Increased Activity;Repositioned  Response to Pain Intervention: Patient Satisfied  Vital Signs  Resp: 23  Height and Weight  Height: 5' 2\" (157.5 cm)  Oxygen Therapy  SpO2: 97 %  O2 Device: Nasal cannula  O2 Flow Rate (L/min): 2 L/min  Social/Functional History  Social/Functional History  Lives With: Alone  Type of Home: Apartment  Home Layout: One level  Home Access: Elevator  Bathroom Shower/Tub: Tub/Shower unit  Bathroom Toilet: Handicap height  Bathroom Equipment: Grab bars in shower, Grab bars around toilet  Home Equipment: (No AD at baseline)  ADL Assistance: [address]: [address] [facility] Responsibilities: Yes  Ambulation Assistance: Independent  Transfer Assistance: Independent  Active : Yes  Mode of Transportation: [place]  Occupation: Retired  Leisure & Hobbies: resell on [employer]  Additional Comments: neighbor support if needed       Objective   Vision: Impaired  Vision Exceptions: Wears glasses at all times  Hearing: Within functional limits    Orientation  Overall Orientation Status: Within Functional Limits     Balance  Sitting Balance: Minimal assistance(CGA-Min A. Initially pt required Min A for static standing and with assist for proper positioning progressed to CGA. ~10 minutes sitting EOB.)  Standing Balance: Minimal assistance  Standing Balance  Time: ~1 minute  Activity: standing static  Comment: Min A x 2 for static standing EOB. Pt with significantly forward flexed posture. Verbal cues to correct with difficulty. Functional Mobility  Functional Mobility Comments: ALYSE d/t pt haivng difficulty tolerating standing static initiating sitting back down d/t fatigue. Unsafe to attempt. ADL  Feeding: Setup; Independent  Grooming: Contact guard assistance;Setup; Increased time to complete  UE Bathing: Moderate assistance; Increased time to complete;Setup  LE Bathing: Moderate assistance; Increased time to complete;Setup  UE Dressing: Increased time to complete;Minimal assistance;Setup(Lying supine in bed pt doffed/donned hospital gown with assist d/t lines/drains)  LE Dressing: Maximum assistance; Increased time to complete;Setup(Max A to don socks d/t pain and endurance)  Toileting: Moderate assistance;Setup; Increased time to complete  Tone RUE  RUE Tone: Normotonic  Tone LUE  LUE Tone: Normotonic  Coordination  Movements Are Fluid And Coordinated: Yes     Bed mobility  Supine to Sit: Moderate assistance;2 Person assistance  Sit to Supine: Moderate assistance;2 Person assistance  Scooting: Moderate assistance  Comment: Assist for B LE and trunk progression. HOB elevated. Verbal cue for use of bedrail. Transfers  Sit to stand:  Moderate assistance;2 Person assistance  Stand to sit: Moderate assistance  Transfer Comments: Use of RW     Cognition  Overall Cognitive Status: WFL        Sensation  Overall Sensation Status: WFL(denies any numbness or tingling)        LUE AROM (degrees)  LUE AROM : WFL  Left Hand AROM (degrees)  Left Hand AROM: WFL  RUE AROM (degrees)  RUE AROM : WFL  Right Hand AROM (degrees)  Right Hand AROM: WFL  LUE Strength  Gross LUE Strength: WFL  L Hand General: 4/5  LUE Strength Comment: Grossly 4/5  RUE Strength  Gross RUE Strength: WFL  R Hand General: 4/5  RUE Strength Comment: Grossly 4/5                   Plan   Plan  Times per week: 4-5x/week  Current Treatment Recommendations: Safety Education & Training, Balance Training, Patient/Caregiver Education & Training, Self-Care / ADL, Functional Mobility Training, Equipment Evaluation, Education, & procurement, Home Management Training, Endurance Training, Strengthening      AM-PAC Score        -[facility] Inpatient Daily Activity Raw Score: 16 (05/10/21 1401)  AM-PAC Inpatient ADL T-Scale Score : 35.96 (05/10/21 1401)  ADL Inpatient CMS 0-100% Score: 53.32 (05/10/21 1401)  ADL Inpatient CMS G-Code Modifier : CK (05/10/21 1401)    Goals  Short term goals  Time Frame for Short term goals: By discharge, pt will:  Short term goal 1: Demo bed mobility with Min A, using LRD  Short term goal 2: Demo functional transfers with Min A, using LRD  Short term goal 3: Demo functional mobility with Mod A, using LRD  Short term goal 4: Demo UB ADLs with CGA, using AE/DME PRN  Short term goal 5: Demo LB ADLs with Min A, setup, use of AE/DME PRN  Short term goal 6: Demo +5 minutes of static/dynamic standing with CGA to increase engagement in ADLs       Therapy Time   Individual Concurrent Group Co-treatment   Time In 1009         Time Out 1044         Minutes 35         Timed Code Treatment Minutes: 8 Minutes       Edgar Adams OTR/L

## 2021-05-10 NOTE — CARE COORDINATION
Transitional planning:  Met with pt and son, Coletta Ahumada at bedside and discussed LTACH choices and briefly ARU choices. Michael. Sheri Heimlich at [phone].   [address] requested and Faxed Demographic  to [phone]. Requested SS#, that information was faxed by referral via [address] [place] [address] Met with Coletta Ahumada, son, who states he is her only son and would like to be called when pt is being transferred. He lives in [place] and would [facility] [place]. \"   Contact info in chart.

## 2021-05-10 NOTE — PROCEDURES
History/labs/allergies reviewed  Placed by [address] [place] [address] by [facility]  Consent signed and obtained by physician  Time out performed using two identifiers  Catheter type double lumen picc  Product type solo2 w 3cg  Lot # [id number]  Expiration date 12/31/2021  Catheter size 5.5 Argentine  Trimmed at 42   Total length 42  External catheter length 4 cm  Location RBV  Number of attempts 1  Estimated blood loss 1 ml  Pre procedure cardiac Rhythm NSR per bedside telemetry and/or 3CG Tracing. Placement verified by- CXR and/or 3cg Max P wave noted by amplitude changes of the P wave, positive blood return, flushes easily  Special equipment used- ultrasound, micro-introducer technique and 3cg technology if indicated  Catheter secured with statlock  Dressing applied- Tegaderm CHG  Lidocaine administered intradermally conc. 1% 1 mL  PICC line education:   [ X ] Discussed with patient/Family or POA prior to signing Informed Procedural Consent. Risks and Benefits along with reason for procedure were discussed and teaching was reinforced with an education handout on PICC insertion. [facility] FAQ Catheter Associated Blood Stream Infections and [address] [facility] [address] REV. 7/13 Nursing and Booklet left at bedside or in chart. Patient (Family or POA) acknowledged understanding of information taught and agreed to procedure. [  ] Was not discussed with patient/family or POA due to pts medical status at time of procedure. pts family or POA not available to discuss PICC education.  [facility] FAQ Catheter Associated Blood Stream Infections and [address] [facility] REV. 7/13 Nursing and Booklet left at bedside or in chart    Electronically signed by Martínez Napier RN on 5/10/21 at 4:36 PM EDT

## 2021-05-11 ENCOUNTER — APPOINTMENT (OUTPATIENT)
Dept: GENERAL RADIOLOGY | Age: 71
DRG: 853 | End: 2021-05-11
Payer: MEDICARE

## 2021-05-11 ENCOUNTER — APPOINTMENT (OUTPATIENT)
Dept: CT IMAGING | Age: 71
DRG: 853 | End: 2021-05-11
Payer: MEDICARE

## 2021-05-11 LAB
ABSOLUTE EOS #: 0.24 K/UL (ref 0–0.4)
ABSOLUTE IMMATURE GRANULOCYTE: 0 K/UL (ref 0–0.3)
ABSOLUTE LYMPH #: 1.94 K/UL (ref 1–4.8)
ABSOLUTE MONO #: 0.49 K/UL (ref 0.1–0.8)
ALBUMIN SERPL-MCNC: 1.5 G/DL (ref 3.5–5.2)
ALBUMIN/GLOBULIN RATIO: 0.4 (ref 1–2.5)
ALP BLD-CCNC: 81 U/L (ref 35–104)
ALT SERPL-CCNC: 16 U/L (ref 5–33)
ANION GAP SERPL CALCULATED.3IONS-SCNC: 10 MMOL/L (ref 9–17)
AST SERPL-CCNC: 10 U/L
BASOPHILS # BLD: 0 % (ref 0–2)
BASOPHILS ABSOLUTE: 0 K/UL (ref 0–0.2)
BILIRUB SERPL-MCNC: 0.3 MG/DL (ref 0.3–1.2)
BILIRUBIN DIRECT: 0.18 MG/DL
BILIRUBIN, INDIRECT: 0.12 MG/DL (ref 0–1)
BUN BLDV-MCNC: 34 MG/DL (ref 8–23)
BUN/CREAT BLD: ABNORMAL (ref 9–20)
CALCIUM SERPL-MCNC: 8.4 MG/DL (ref 8.6–10.4)
CHLORIDE BLD-SCNC: 109 MMOL/L (ref 98–107)
CO2: 24 MMOL/L (ref 20–31)
CREAT SERPL-MCNC: 1.18 MG/DL (ref 0.5–0.9)
DIFFERENTIAL TYPE: ABNORMAL
EOSINOPHILS RELATIVE PERCENT: 3 % (ref 1–4)
GFR AFRICAN AMERICAN: 55 ML/MIN
GFR NON-AFRICAN AMERICAN: 46 ML/MIN
GFR SERPL CREATININE-BSD FRML MDRD: ABNORMAL ML/MIN/{1.73_M2}
GFR SERPL CREATININE-BSD FRML MDRD: ABNORMAL ML/MIN/{1.73_M2}
GLOBULIN: ABNORMAL G/DL (ref 1.5–3.8)
GLUCOSE BLD-MCNC: 152 MG/DL (ref 65–105)
GLUCOSE BLD-MCNC: 178 MG/DL (ref 65–105)
GLUCOSE BLD-MCNC: 211 MG/DL (ref 65–105)
GLUCOSE BLD-MCNC: 211 MG/DL (ref 65–105)
GLUCOSE BLD-MCNC: 211 MG/DL (ref 70–99)
GLUCOSE BLD-MCNC: 212 MG/DL (ref 65–105)
GLUCOSE BLD-MCNC: 270 MG/DL (ref 65–105)
HCT VFR BLD CALC: 28.5 % (ref 36.3–47.1)
HEMOGLOBIN: 8.5 G/DL (ref 11.9–15.1)
IMMATURE GRANULOCYTES: 0 %
INR BLD: 1.1
LYMPHOCYTES # BLD: 24 % (ref 24–44)
MCH RBC QN AUTO: 28.1 PG (ref 25.2–33.5)
MCHC RBC AUTO-ENTMCNC: 29.8 G/DL (ref 28.4–34.8)
MCV RBC AUTO: 94.1 FL (ref 82.6–102.9)
MONOCYTES # BLD: 6 % (ref 1–7)
MORPHOLOGY: ABNORMAL
NRBC AUTOMATED: 0 PER 100 WBC
PARTIAL THROMBOPLASTIN TIME: 26.1 SEC (ref 20.5–30.5)
PDW BLD-RTO: 15.4 % (ref 11.8–14.4)
PLATELET # BLD: 135 K/UL (ref 138–453)
PLATELET ESTIMATE: ABNORMAL
PMV BLD AUTO: 11.5 FL (ref 8.1–13.5)
POTASSIUM SERPL-SCNC: 4.4 MMOL/L (ref 3.7–5.3)
PROTHROMBIN TIME: 11.4 SEC (ref 9.1–12.3)
RBC # BLD: 3.03 M/UL (ref 3.95–5.11)
RBC # BLD: ABNORMAL 10*6/UL
SEG NEUTROPHILS: 67 % (ref 36–66)
SEGMENTED NEUTROPHILS ABSOLUTE COUNT: 5.43 K/UL (ref 1.8–7.7)
SODIUM BLD-SCNC: 143 MMOL/L (ref 135–144)
TOTAL PROTEIN: 5.5 G/DL (ref 6.4–8.3)
VANCOMYCIN TROUGH DATE LAST DOSE: NORMAL
VANCOMYCIN TROUGH DOSE AMOUNT: NORMAL
VANCOMYCIN TROUGH TIME LAST DOSE: NORMAL
VANCOMYCIN TROUGH: 16.2 UG/ML (ref 10–20)
WBC # BLD: 8.1 K/UL (ref 3.5–11.3)
WBC # BLD: ABNORMAL 10*3/UL

## 2021-05-11 PROCEDURE — 2060000000 HC ICU INTERMEDIATE R&B

## 2021-05-11 PROCEDURE — 80048 BASIC METABOLIC PNL TOTAL CA: CPT

## 2021-05-11 PROCEDURE — 94640 AIRWAY INHALATION TREATMENT: CPT

## 2021-05-11 PROCEDURE — 99232 SBSQ HOSP IP/OBS MODERATE 35: CPT | Performed by: INTERNAL MEDICINE

## 2021-05-11 PROCEDURE — 6370000000 HC RX 637 (ALT 250 FOR IP): Performed by: INTERNAL MEDICINE

## 2021-05-11 PROCEDURE — 6370000000 HC RX 637 (ALT 250 FOR IP): Performed by: NURSE PRACTITIONER

## 2021-05-11 PROCEDURE — 71250 CT THORAX DX C-: CPT

## 2021-05-11 PROCEDURE — 2580000003 HC RX 258: Performed by: STUDENT IN AN ORGANIZED HEALTH CARE EDUCATION/TRAINING PROGRAM

## 2021-05-11 PROCEDURE — 94761 N-INVAS EAR/PLS OXIMETRY MLT: CPT

## 2021-05-11 PROCEDURE — 2700000000 HC OXYGEN THERAPY PER DAY

## 2021-05-11 PROCEDURE — 85610 PROTHROMBIN TIME: CPT

## 2021-05-11 PROCEDURE — 6360000004 HC RX CONTRAST MEDICATION: Performed by: SURGERY

## 2021-05-11 PROCEDURE — 71045 X-RAY EXAM CHEST 1 VIEW: CPT

## 2021-05-11 PROCEDURE — 36415 COLL VENOUS BLD VENIPUNCTURE: CPT

## 2021-05-11 PROCEDURE — C9113 INJ PANTOPRAZOLE SODIUM, VIA: HCPCS | Performed by: STUDENT IN AN ORGANIZED HEALTH CARE EDUCATION/TRAINING PROGRAM

## 2021-05-11 PROCEDURE — 2500000003 HC RX 250 WO HCPCS: Performed by: NURSE PRACTITIONER

## 2021-05-11 PROCEDURE — 6360000002 HC RX W HCPCS: Performed by: STUDENT IN AN ORGANIZED HEALTH CARE EDUCATION/TRAINING PROGRAM

## 2021-05-11 PROCEDURE — 6370000000 HC RX 637 (ALT 250 FOR IP): Performed by: STUDENT IN AN ORGANIZED HEALTH CARE EDUCATION/TRAINING PROGRAM

## 2021-05-11 PROCEDURE — 94669 MECHANICAL CHEST WALL OSCILL: CPT

## 2021-05-11 PROCEDURE — 82947 ASSAY GLUCOSE BLOOD QUANT: CPT

## 2021-05-11 PROCEDURE — 74220 X-RAY XM ESOPHAGUS 1CNTRST: CPT

## 2021-05-11 PROCEDURE — 80076 HEPATIC FUNCTION PANEL: CPT

## 2021-05-11 PROCEDURE — APPSS60 APP SPLIT SHARED TIME 46-60 MINUTES: Performed by: NURSE PRACTITIONER

## 2021-05-11 PROCEDURE — 2500000003 HC RX 250 WO HCPCS: Performed by: SURGERY

## 2021-05-11 PROCEDURE — 85025 COMPLETE CBC W/AUTO DIFF WBC: CPT

## 2021-05-11 PROCEDURE — 80202 ASSAY OF VANCOMYCIN: CPT

## 2021-05-11 PROCEDURE — 85730 THROMBOPLASTIN TIME PARTIAL: CPT

## 2021-05-11 PROCEDURE — 2580000003 HC RX 258: Performed by: NURSE PRACTITIONER

## 2021-05-11 PROCEDURE — 99233 SBSQ HOSP IP/OBS HIGH 50: CPT | Performed by: INTERNAL MEDICINE

## 2021-05-11 RX ORDER — ACETAMINOPHEN 160 MG/5ML
1000 SOLUTION ORAL EVERY 8 HOURS PRN
Status: DISCONTINUED | OUTPATIENT
Start: 2021-05-11 | End: 2021-05-28 | Stop reason: HOSPADM

## 2021-05-11 RX ORDER — FUROSEMIDE 20 MG/1
20 TABLET ORAL EVERY OTHER DAY
Status: DISCONTINUED | OUTPATIENT
Start: 2021-05-11 | End: 2021-05-18

## 2021-05-11 RX ADMIN — SODIUM CHLORIDE, PRESERVATIVE FREE 10 ML: 5 INJECTION INTRAVENOUS at 21:29

## 2021-05-11 RX ADMIN — INSULIN LISPRO 3 UNITS: 100 INJECTION, SOLUTION INTRAVENOUS; SUBCUTANEOUS at 00:49

## 2021-05-11 RX ADMIN — IPRATROPIUM BROMIDE AND ALBUTEROL SULFATE 1 AMPULE: .5; 2.5 SOLUTION RESPIRATORY (INHALATION) at 16:10

## 2021-05-11 RX ADMIN — GABAPENTIN 300 MG: 250 SUSPENSION ORAL at 21:20

## 2021-05-11 RX ADMIN — HEPARIN SODIUM 5000 UNITS: 5000 INJECTION INTRAVENOUS; SUBCUTANEOUS at 06:19

## 2021-05-11 RX ADMIN — IPRATROPIUM BROMIDE AND ALBUTEROL SULFATE 1 AMPULE: .5; 2.5 SOLUTION RESPIRATORY (INHALATION) at 20:14

## 2021-05-11 RX ADMIN — HEPARIN SODIUM 5000 UNITS: 5000 INJECTION INTRAVENOUS; SUBCUTANEOUS at 15:55

## 2021-05-11 RX ADMIN — INSULIN LISPRO 6 UNITS: 100 INJECTION, SOLUTION INTRAVENOUS; SUBCUTANEOUS at 12:47

## 2021-05-11 RX ADMIN — HEPARIN SODIUM 5000 UNITS: 5000 INJECTION INTRAVENOUS; SUBCUTANEOUS at 21:20

## 2021-05-11 RX ADMIN — SODIUM CHLORIDE, PRESERVATIVE FREE 10 ML: 5 INJECTION INTRAVENOUS at 09:03

## 2021-05-11 RX ADMIN — IOHEXOL 100 ML: 240 INJECTION, SOLUTION INTRATHECAL; INTRAVASCULAR; INTRAVENOUS; ORAL at 14:55

## 2021-05-11 RX ADMIN — GABAPENTIN 300 MG: 250 SUSPENSION ORAL at 09:02

## 2021-05-11 RX ADMIN — ACETAMINOPHEN 1000 MG: 650 SOLUTION ORAL at 00:54

## 2021-05-11 RX ADMIN — Medication 1250 MG: at 09:03

## 2021-05-11 RX ADMIN — FUROSEMIDE 20 MG: 20 TABLET ORAL at 15:55

## 2021-05-11 RX ADMIN — PANTOPRAZOLE SODIUM 40 MG: 40 INJECTION, POWDER, FOR SOLUTION INTRAVENOUS at 09:02

## 2021-05-11 RX ADMIN — CALCIUM GLUCONATE: 98 INJECTION, SOLUTION INTRAVENOUS at 18:17

## 2021-05-11 RX ADMIN — INSULIN LISPRO 9 UNITS: 100 INJECTION, SOLUTION INTRAVENOUS; SUBCUTANEOUS at 18:14

## 2021-05-11 RX ADMIN — ACETAMINOPHEN 1000 MG: 650 SOLUTION ORAL at 09:02

## 2021-05-11 RX ADMIN — PIPERACILLIN AND TAZOBACTAM 3375 MG: 3; .375 INJECTION, POWDER, LYOPHILIZED, FOR SOLUTION INTRAVENOUS at 18:26

## 2021-05-11 RX ADMIN — INSULIN LISPRO 3 UNITS: 100 INJECTION, SOLUTION INTRAVENOUS; SUBCUTANEOUS at 08:50

## 2021-05-11 RX ADMIN — INSULIN LISPRO 6 UNITS: 100 INJECTION, SOLUTION INTRAVENOUS; SUBCUTANEOUS at 04:42

## 2021-05-11 RX ADMIN — SODIUM CHLORIDE, PRESERVATIVE FREE 5 ML: 5 INJECTION INTRAVENOUS at 11:05

## 2021-05-11 RX ADMIN — GABAPENTIN 300 MG: 250 SUSPENSION ORAL at 15:57

## 2021-05-11 RX ADMIN — PIPERACILLIN AND TAZOBACTAM 3375 MG: 3; .375 INJECTION, POWDER, LYOPHILIZED, FOR SOLUTION INTRAVENOUS at 11:26

## 2021-05-11 RX ADMIN — PIPERACILLIN AND TAZOBACTAM 3375 MG: 3; .375 INJECTION, POWDER, LYOPHILIZED, FOR SOLUTION INTRAVENOUS at 01:44

## 2021-05-11 RX ADMIN — LEVOTHYROXINE SODIUM ANHYDROUS 56 MCG: 100 INJECTION, POWDER, LYOPHILIZED, FOR SOLUTION INTRAVENOUS at 11:05

## 2021-05-11 RX ADMIN — INSULIN GLARGINE 10 UNITS: 100 INJECTION, SOLUTION SUBCUTANEOUS at 21:22

## 2021-05-11 RX ADMIN — INSULIN LISPRO 6 UNITS: 100 INJECTION, SOLUTION INTRAVENOUS; SUBCUTANEOUS at 21:24

## 2021-05-11 RX ADMIN — IPRATROPIUM BROMIDE AND ALBUTEROL SULFATE 1 AMPULE: .5; 2.5 SOLUTION RESPIRATORY (INHALATION) at 12:29

## 2021-05-11 ASSESSMENT — ENCOUNTER SYMPTOMS
ABDOMINAL PAIN: 1
COUGH: 0
SHORTNESS OF BREATH: 0
ABDOMINAL DISTENTION: 1
WHEEZING: 0

## 2021-05-11 ASSESSMENT — PAIN SCALES - GENERAL: PAINLEVEL_OUTOF10: 0

## 2021-05-11 NOTE — PROGRESS NOTES
Occupational Therapy    Occupational Therapy Not Seen Note    DATE: 2021  Name: Dunia Bishop  : 1950  MRN: [id number]    Patient not available for Occupational Therapy due to:     Pt.  Not seen d/t chest CT this am.    Electronically signed by RAYMUNDO Braxton on 2021 at 10:29 AM

## 2021-05-11 NOTE — CARE COORDINATION
Transitional planning:  Ricardo Gupta called from [place] and will start precert. Given son's name to contact with locations in [place].    Meena called from [place] and given SS number per his request.

## 2021-05-11 NOTE — PROGRESS NOTES
[facility]  Office: [address], DO, Sudha Rivas, DO, Florina Cramer, DO, George eRynolds Blood, DO, Neel Gill MD, Ramya Hess MD, Dawn Montesinos MD, Laith Parra MD, Ammy Abdi MD, Rae Duenas MD, Davi Dobbins MD, Eduar Torres MD, Carie Gaucher, DO, Lillie Sandy MD, Yuri Leon, DO, Isaura Green MD,  Eddi Nelson, DO, Armaan Pollock MD, Rai Rosenbaum MD, Bhavesh Solano MD, Cony Romo MD, [facility], [facility], [facility], [facility], Sunita Grant, CNP, Lawayne Klinefelter, CNS, Anand Blackmon, CNP, Loida Krishna, CNP, Shan Webster, CNP, Steven Villarreal, CNP, Michoacano Reyes, CNP, JUN DowdC, Darrel Marina, [facility], Marlee Macdonald, CNP, Karen Neely, CNP, Treasure Corado, CNP, Hiral Eugenie, CNP, Key Gomez, CNP, Vipul Butcher, [address] [place] [address]    Progress Note    5/11/2021    8:13 AM    Name:   Cami Paul  MRN:     [id number]     Acct:      [de-identified]   Room:   [facility]6405-14   Day:  8  Admit Date:  5/3/2021 12:49 PM    PCP:   Cheri Arevalo DO  Code Status:  Full Code    Subjective:     C/C:   Chief Complaint   Patient presents with    Blood Sugar Problem     >450    Hernia     hyatial      Interval History Status: mild improvement      Patient evaluated in room sitting in bed, TPN infusing, liquid diet continues, patient tolerating well with decreased nausea  Bilateral PERC drains continue, both clamped.   Small bilious output continues from right side  Left-sided effusion concerning for esophageal leak, follow-up esophagram today  Patient worked with PT and OT yesterday  Anticipating LTAC at discharge, referral sent-family requesting [employer] is willing    Brief History:     Patient presented to [place] [address] emergency room from [facility].    Patient originally presented to [facility] for the evaluation of nausea vomiting and diarrhea ongoing for 6 days. Patient was recently seen in the [facility] emergency room on 4/28/2021 and sent home. She states that today (5/3/21) she woke up around 2 AM as she was on the floor. She stated the last thing she recalls was getting up to get water. Patient states that she was lightheaded dizzy and weak and had a syncopal episode and fell. LOC of unknown time. The work-up in the outlying emergency room showed: A metabolic panel with a sodium of 144 potassium 2.2 chloride 78 CO2 greater than 40  creatinine 3.8 with lactic acid of 6 and glucose of 585. Paul Best Troponin I 0.404, liver function showed AST of 37 bilirubin of 5 direct bilirubin 0, and a lipase of 1451. Beta hydroxybutyrate 3.66. Hemogram with acute leukocytosis with a WBC of 23.5, hemoglobin 18.3, hematocrit 54.2 a high concern of acute pancreatitis with acute kidney injury and DKA, with non-STEMI. There was endorgan injury with a high lactic 6.6 and a creatinine of 3.8 and troponin of 0.4 with EKG showing acute ischemia but with Q waves in the inferior and anterior leads. Originally the patient was recommended for ICU and the case was discussed with Dr. Humaira Dominique and deferred the case to general surgery. And at that time Dr. Crispin Cabrera recommended ED to ED transfer. At that time the patient was given Zosyn and transferred ED to ED. Review of Systems:     Constitutional:  negative for chills, fevers, sweats, + fatigue  Respiratory:  negative for cough, dyspnea on exertion,+ shortness of breath, wheezing  Cardiovascular:  negative for chest pain, chest pressure/discomfort, lower extremity edema, palpitations  Gastrointestinal:  + abdominal pain, +constipation, -diarrhea, +nausea, -vomiting  Neurological:  negative for dizziness, headache    Medications: Allergies:     Allergies   Allergen Reactions    No Known Allergies        Current Meds:   Scheduled Meds:    alteplase  1 mg Intracatheter Once    insulin glargine  10 Units °F (36.5 °C) (Axillary)   Resp 10   Ht 5' 2\" (1.575 m)   Wt 195 lb 5.2 oz (88.6 kg)   SpO2 97%   BMI 35.73 kg/m²   Temp (24hrs), Av.8 °F (36.6 °C), Min:97.2 °F (36.2 °C), Max:98.4 °F (36.9 °C)    Recent Labs     05/10/21  2141 21  0041 21  0440 21  0735   POCGLU 198* 178* 211* 152*       I/O (24Hr):     Intake/Output Summary (Last 24 hours) at 2021 0813  Last data filed at 2021 0602  Gross per 24 hour   Intake 890 ml   Output 1092 ml   Net -202 ml       Labs:  Hematology:  Recent Labs     21  0501 05/10/21  0504 21  0440   WBC 8.4 9.4 8.1   RBC 3.08* 3.19* 3.03*   HGB 8.8* 8.8* 8.5*   HCT 28.7* 29.0* 28.5*   MCV 93.2 90.9 94.1   MCH 28.6 27.6 28.1   MCHC 30.7 30.3 29.8   RDW 15.4* 15.5* 15.4*   PLT See Reflexed IPF Result 122* 135*   MPV NOT REPORTED 11.7 11.5   INR 1.0 1.0 1.1     Chemistry:  Recent Labs     21  05021  1701 05/10/21  0504 21  0440   *  --  146* 143   K 3.4* 3.7 3.8 4.4   *  --  111* 109*   CO2 22  --  25 24   GLUCOSE 264*  --  219* 211*   BUN 34*  --  33* 34*   CREATININE 1.26*  --  1.20* 1.18*   MG 1.6  --  1.7  --    ANIONGAP 11  --  10 10   LABGLOM 42*  --  45* 46*   GFRAA 51*  --  54* 55*   CALCIUM 8.3*  --  8.8 8.4*   PHOS 2.9  --  3.5  --      Recent Labs     21  0501 21  0501 05/10/21  0504 05/10/21  0504 05/10/21  1116 05/10/21  1505 05/10/21  2141 21  0041 21  0440 21  0735   PROT 4.8*  --  5.5*  --   --   --   --   --  5.5*  --    LABALBU 1.5*  --  1.8*  --   --   --   --   --  1.5*  --    AST 13  --  12  --   --   --   --   --  10  --    ALT 34*  --  24  --   --   --   --   --  16  --    ALKPHOS 83  --  82  --   --   --   --   --  81  --    BILITOT 0.39  --  0.34  --   --   --   --   --  0.30  --    BILIDIR 0.17  --  0.16  --   --   --   --   --  0.18  --    TRIG  --   --  335*  --   --   --   --   --   --   --    POCGLU  --    < >  --    < > 262* 249* 198* 178* 211* 152*    < > = values in this interval not displayed. ABG:  Lab Results   Component Value Date    POCPH 7.433 05/06/2021    PHART 7.193 05/04/2021    POCPCO2 36.8 05/06/2021    OTN4AVD 52.0 05/04/2021    POCPO2 150.9 05/06/2021    PO2ART 145.0 05/04/2021    POCHCO3 24.6 05/06/2021    QYI3WAS 19.2 05/04/2021    NBEA NOT REPORTED 05/06/2021    PBEA 0 05/06/2021    PFG7WNI NOT REPORTED 05/06/2021    RPTZ4WUJ 99 05/06/2021    K8NDBYKH 98.0 05/04/2021    FIO2 40.0 05/06/2021     Lab Results   Component Value Date/Time    SPECIAL L HAND 11 ML 05/08/2021 02:10 PM     Lab Results   Component Value Date/Time    CULTURE NO GROWTH 3 DAYS 05/08/2021 02:10 PM       Radiology:  Xr Abdomen (kub) (single Ap View)    Result Date: 5/4/2021  No significant subdiaphragmatic contrast progression, as above. RECOMMENDATION: Consider chest x-ray to further assess a organic-axial gastric volvulus     Ct Chest Wo Contrast    Result Date: 5/4/2021  1. Ascites and pneumoperitoneum with apparent free-flowing oral contrast in the left upper quadrant is concerning for viscus perforation, possibly within the subdiaphragmatic portion of the stomach. 2. Large hiatal hernia with organoaxial volvulus. Majority of contrast is retained within the esophagus and supradiaphragmatic stomach. 3. Enteric tube extends below the diaphragm with tip outside the field of view. 4. Moderate bilateral pleural effusions with adjacent consolidation, atelectasis versus pneumonia. Critical results were called by Dr. Will Darling MD to [facility] on 5/4/2021 at 18:30. Us Renal Complete    Result Date: 5/5/2021  Unremarkable ultrasound of the kidneys and urinary bladder. Trace right pleural effusion     Fl Esophagram    Result Date: 5/7/2021  1. Drainage of contrast material through what appears to be the gastrostomy tubes following the ingestion of contrast.  Contrast does migrate beyond the postoperative region into the proximal small bowel.  2. No extraneous/extraluminal collection of contrast is seen beyond the confines of the stomach. 3. 2 surgical drains and 2 presumed gastrostomy tubes are seen overlying the left upper quadrant. There does appear to be contrast slightly marginating the more lateral gastrostomy tube balloon. 4. Delayed migration of contrast through the distal esophagus and GE junction with mild gastroesophageal reflux. The findings were sent to the Radiology Results [address] at 12:43 pm on 5/7/2021to be communicated to a licensed caregiver. Xr Chest Portable    Result Date: 5/7/2021  Bibasilar consolidation new from prior study. Blunting of the costophrenic angles bilaterally     Xr Chest Portable    Result Date: 5/6/2021  ETT tip position stable. Decreased left subcutaneous emphysema. Slightly improved suspected left basilar volume loss with persistent findings as above. No overt vascular congestion. Xr Chest Portable    Result Date: 5/5/2021  Volume loss continues left hemithorax with haziness at the left base either atelectasis and or fluid. Subcutaneous emphysema along the left lateral chest wall has decreased. No appreciable extrapleural air. Endotracheal tube in appropriate position. Xr Chest Portable    Result Date: 5/5/2021  1. Recommend repositioning of left internal jugular approach central venous catheter. 2. Low lung volumes with left basilar atelectasis plus or minus mild pleural effusion. 3. Left chest wall scattered soft tissue emphysema. Xr Chest Portable    Result Date: 5/4/2021  Intestinal tube deviates to the left side of a sizable hiatal hernia, traversing below the hemidiaphragm and terminating just underneath the left hemidiaphragm. Side port of the intestinal tube is below the diaphragmatic hiatus. Xr Chest Portable    Result Date: 5/3/2021  No acute cardiopulmonary disease Large hiatal hernia     Mri Abdomen Wo Contrast Mrcp    Result Date: 5/3/2021  1.  No evidence of

## 2021-05-11 NOTE — PROGRESS NOTES
Renal Progress Note      Subjective: Following for BONNIE/ATN due to hypotension following laparotomy. Has history of CKD III with baseline 1.3  Creatinine this morning improved   Urine output 1.5 L AND High NG output  Now afebrile. Stable hemodynamics    Renal function stable. Initiated Lasix today. Blood Cultures gram positive cocci in clusters with staphylococcus epidermidis. Remains on Vanco, last dose of fluconazole last night.      Ultrasound shows right kidney 9.6 cm and left kidney 11.7 cm without hydronephrosis      Outpatient Medications:     Medications Prior to Admission: metFORMIN (GLUCOPHAGE) 1000 MG tablet, Take 1,000 mg by mouth daily (with breakfast)  SITagliptin (JANUVIA) 100 MG tablet, Take 100 mg by mouth daily  omeprazole (PRILOSEC) 40 MG delayed release capsule, Take 1 capsule by mouth every morning (before breakfast)  lisinopril (PRINIVIL;ZESTRIL) 40 MG tablet, Take 10 mg by mouth daily   glimepiride (AMARYL) 2 MG tablet, Take 2 mg by mouth every morning (before breakfast)  simvastatin (ZOCOR) 10 MG tablet, Take 10 mg by mouth nightly  levothyroxine (SYNTHROID) 175 MCG tablet, Take 112 mcg by mouth Daily Dose reduced in December 2020  aspirin 81 MG EC tablet, Take 81 mg by mouth daily  metFORMIN (GLUCOPHAGE) 500 MG tablet, Take 500 mg by mouth every evening   hydroCHLOROthiazide (HYDRODIURIL) 25 MG tablet, Take 25 mg by mouth daily  [DISCONTINUED] predniSONE (DELTASONE) 20 MG tablet, 60 mg x 2 days, 40 mg x 2 days, 20 mg x 2 days    Current Medications:     Scheduled Meds:    furosemide  20 mg Oral Every Other Day    alteplase  1 mg Intracatheter Once    insulin glargine  10 Units Subcutaneous Nightly    polyethylene glycol  17 g Oral Daily    alteplase  1 mg Intracatheter Once    alteplase  1 mg Intracatheter Once    sodium chloride flush  5-40 mL Intravenous 2 times per day    heparin (porcine)  5,000 Units Subcutaneous 3 times per day    vancomycin (VANCOCIN) intermittent dosing (placeholder)   Other RX Placeholder    vancomycin  1,250 mg Intravenous Q24H    gabapentin  300 mg Oral 3 times per day    ipratropium-albuterol  1 ampule Inhalation Q4H WA    piperacillin-tazobactam  3,375 mg Intravenous Q8H    insulin lispro  0-18 Units Subcutaneous Q4H    levothyroxine  56 mcg Intravenous Daily    And    sodium chloride (PF)  5 mL Intravenous Daily    pantoprazole  40 mg Intravenous Daily    And    sodium chloride (PF)  10 mL Intravenous Daily    sodium chloride flush  5-40 mL Intravenous 2 times per day     Continuous Infusions:    dextrose      PN-Adult 2-in-1 Central Line (Standard) 60 mL/hr at 05/10/21 1906    sodium chloride 25 mL (05/10/21 1840)    sodium chloride 25 mL (21 1026)     PRN Meds:  acetaminophen, glucose, dextrose, glucagon (rDNA), dextrose, sodium chloride flush, sodium chloride, potassium chloride, magnesium sulfate, oxyCODONE, acetaminophen, artificial tears, sodium chloride flush, sodium chloride, [DISCONTINUED] promethazine **OR** ondansetron, dextrose    Input/Output:       I/O last 3 completed shifts: In: 56 [P.O.:60; I.V.:130; IV Piggyback:100]  Out: 6260 [Urine:750; Emesis/NG output:580; Drains:37]. No data found. Vital Signs:   Temperature:  Temp: 97.6 °F (36.4 °C)  TMax:   Temp (24hrs), Av.7 °F (36.5 °C), Min:97.2 °F (36.2 °C), Max:98.4 °F (36.9 °C)    Respirations:  Resp: 10  Pulse:   Pulse: 82  BP:    BP: 138/80  BP Range: Systolic (84KRN), NDH:974 , Min:131 , PXA:394       Diastolic (44IXW), YBF:58, Min:70, Max:85      Physical Examination:     General:  AAO x 3, speaking in full sentences, no accessory muscle use. HEENT: Atraumatic, normocephalic, no throat congestion, moist mucosa. Eyes:   Pupils equal, round and reactive to light, EOMI. Neck:   No JVD  Chest:              Bilateral vesicular breath sounds, no rales or wheezes.   Cardiac:  S1 S2 RR, no murmurs, gallops or rubs  Abdomen: Soft, not distended, ttp without

## 2021-05-11 NOTE — PROGRESS NOTES
Pharmacy Vancomycin Consult     Vancomycin Day: 3  Current Dosin mg IV q24h  Current indication: Bloodstream infection    Temp max:  afebrile    Recent Labs     05/10/21  0504 21  0440   BUN 33* 34*   CREATININE 1.20* 1.18*   WBC 9.4 8.1       Intake/Output Summary (Last 24 hours) at 2021 0916  Last data filed at 2021 0602  Gross per 24 hour   Intake 890 ml   Output 1092 ml   Net -202 ml       Ht Readings from Last 1 Encounters:   05/10/21 5' 2\" (1.575 m)        Wt Readings from Last 1 Encounters:   21 195 lb 5.2 oz (88.6 kg)       Body mass index is 35.73 kg/m². Estimated Creatinine Clearance: 47 mL/min (A) (based on SCr of 1.18 mg/dL (H)). Trough: 16.2 mcg/mL    Assessment/Plan:  Current trough of 16.2 mcg/mL is within the therapeutic range of 15-20 mcg/mL. Continue with vancomycin 1250 mg IV q24h as ordered. Will continue to follow. Barbara Blood, Pharm. D., [facility]  2021 9:18 AM

## 2021-05-11 NOTE — PROGRESS NOTES
[facility] -Progress Note    Today's Date and Time: 5/11/2021, 4:11 PM    Impression :     Paraesophageal Hiatal hernia  Perforation of the esophagus  S/p laparoscopic, robotic para esophageal hernia repair 5-4-21  Acute pancreatitis  Shock   Hyperglycemia  NSTEMI  BONNIE  Not a MRSA carrier  Coagulase negative Staphylococci bacteremia x 2 on 5-7-21. Repeat blood cultures 5-8-21 x 2 : No growth . Recommendations:   Zosyn started 5/6/21  Vancomycin started 5/8/21  Diflucan 200 mg IV q day. Monitor QTc  Repeat blood cultures x 2 on 5-7-21-Coagulase negative Staphylococci bacteremia x 2  Repeat blood cultures 5-8-21 x 2 : No growth . Medical Decision Making/Summary/Discussion:5/11/2021     Patient with large paraesophageal hiatal hernia with perforation of esophagus  Hyperglycemia   Acute pancreatitis  Shock   S/p laparoscopic, robotic para esophageal hernia repair 5-4-21  Improvement in overall picture but is developing basilar infiltrates  Not a MRSA carrier  Unclear whether this is fluid retention vs residual leakage vs secondary pneumonia  Esophagogram 5-7-21 does not show a leak  Will plan to continue zosyn and add Diflucan. Monitor temps and CXR.   If further fluid retention becomes apparent then it may be wise to switch to meropenem to decrease the fluid and NA loads associated with Zosyn  Infection Control Recommendations   [place] Precautions      Antimicrobial Stewardship Recommendations     Simplification of therapy  Targeted therapy    Coordination of Outpatient Care:   Estimated Length of IV antimicrobials: TBD  Patient will need Midline Catheter Insertion:No   Patient will need PICC line Insertion:No  Patient will need: Home IV , [place],  SNF,  LTAC:  Patient will need outpatient wound care:Yes    Chief complaint/reason for consultation:   Septic shock/sepsis    History of Present Illness:   Maxene Severin is a 78y.o.-year-old   female who was initially admitted on 5/3/2021. Patient seen at the request of Rommel La. INITIAL HISTORY : 05/07/2021    Pt with a history of diabetes mellitus type 2, hypertension, hyperlipidemia, thyroid disease, gout and hiatal hernia. She initially presented on 05/03/2021 to an outlying facility with a chief complaint of nausea vomiting, diarrhea and syncopal attack . Patient was found to have blood glucose of 585,WBC of 23.5, elevated lipase 1451 and elevated beta hydroxybutyrate. MRCP on 05/03/2021 showed diffuse small bowel wall thickening likely due to third spacing as opposed to enteritis. CT scan of the abdomen showed extremely large fluid-filled hiatal hernia and distended and fluid-filled stomach below the diaphragm. Patient was given Zosyn at the outlying facility and was transferred to [address] [place] [address] ED for evaluation by the surgery team.    CT scan of the chest without contrast was performed on 05/04/2021 which showed ascites and pneumoperitoneum with apparent free-flowing oral contrast in the left upper quadrant concerning for viscus perforation possibly within the subdiaphragmatic portion of the stomach. Moderate bilateral pleural effusions and hiatal hernia with organoaxial volvulus. Bariatric surgery performed a laparoscopic robotic para esophageal hernia repair. Cardiology is also following the patient because of elevated troponins with unremarkable echocardiography. Antibiotics wise the patient was given Zosyn on 05/03/2021 at the outside facility and it has been continued through the present time. Blood and urine cultures on 05/03/2021 show No growth . Repeat urine culture on 05/04/2021 shows No growth     Patient is currently having temperature elevations. Temp max of 101.1 on 05/07/2021 around 4 AM in the morning. Patient WBC count is improving from 23.5 on presentation to 6.2 today.     Repeat chest x-ray 5-7-21 shows bibasilar consolidation new from 1 mg Intracatheter Once    sodium chloride flush  5-40 mL Intravenous 2 times per day    heparin (porcine)  5,000 Units Subcutaneous 3 times per day    vancomycin (VANCOCIN) intermittent dosing (placeholder)   Other RX Placeholder    vancomycin  1,250 mg Intravenous Q24H    gabapentin  300 mg Oral 3 times per day    ipratropium-albuterol  1 ampule Inhalation Q4H WA    piperacillin-tazobactam  3,375 mg Intravenous Q8H    insulin lispro  0-18 Units Subcutaneous Q4H    levothyroxine  56 mcg Intravenous Daily    And    sodium chloride (PF)  5 mL Intravenous Daily    pantoprazole  40 mg Intravenous Daily    And    sodium chloride (PF)  10 mL Intravenous Daily    sodium chloride flush  5-40 mL Intravenous 2 times per day       Social History:     Social History     Socioeconomic History    Marital status:      Spouse name: Not on file    Number of children: Not on file    Years of education: Not on file    Highest education level: Not on file   Occupational History    Not on file   Social Needs    Financial resource strain: Not on file    Food insecurity     Worry: Not on file     Inability: Not on file    Transportation needs     Medical: Not on file     Non-medical: Not on file   Tobacco Use    Smoking status: Never Smoker    Smokeless tobacco: Never Used   Substance and Sexual Activity    Alcohol use: Never     Frequency: Never    Drug use: Never    Sexual activity: Not on file   Lifestyle    Physical activity     Days per week: Not on file     Minutes per session: Not on file    Stress: Not on file   Relationships    Social connections     Talks on phone: Not on file     Gets together: Not on file     Attends Spiritism service: Not on file     Active member of club or organization: Not on file     Attends meetings of clubs or organizations: Not on file     Relationship status: Not on file    Intimate partner violence     Fear of current or ex partner: Not on file     Emotionally abused: Not on file     Physically abused: Not on file     Forced sexual activity: Not on file   Other Topics Concern    Not on file   Social History Narrative    Not on file       Family History:     Family History   Problem Relation Age of Onset    Breast Cancer Mother     High Blood Pressure Mother     High Cholesterol Father     Breast Cancer Sister         Allergies:   No known allergies     Review of Systems:   Review of Systems   Constitutional: Positive for fatigue and fever. Negative for activity change, appetite change, chills, diaphoresis and unexpected weight change. Respiratory: Positive for cough. Negative for apnea, choking, chest tightness, shortness of breath, wheezing and stridor. Cardiovascular: Negative for chest pain, palpitations and leg swelling. Gastrointestinal: Positive for constipation. Negative for abdominal distention, nausea and vomiting. Endocrine: Negative for cold intolerance. Genitourinary: Negative for difficulty urinating and dysuria. Musculoskeletal: Negative for arthralgias and back pain. Neurological: Negative for dizziness, tremors, syncope and facial asymmetry. Hematological: Negative for adenopathy. Psychiatric/Behavioral: Negative for agitation and behavioral problems. The patient is not hyperactive. Physical Examination :     Patient Vitals for the past 8 hrs:   BP Temp Temp src Pulse Resp SpO2   05/11/21 1228     15 97 %   05/11/21 1134 (!) 147/94 97.7 °F (36.5 °C) Oral 91 22 98 %   05/11/21 0828 138/80 97.6 °F (36.4 °C) Oral 82 16      General Appearance: Awake, alert  Head:  Normocephalic, no trauma  Eyes: Pupils equal, round, reactive to light, sclera anicteric; conjunctivae pink. No embolic phenomena. ENT: Oropharynx clear, without erythema, exudate, or thrush. No tenderness of sinuses. Mouth/throat: mucosa pink and moist. No lesions. Dentition in good repair. Neck:Supple, without lymphadenopathy.  Thyroid normal, No bruits. Pulmonary/Chest: Clear to auscultation, without wheezes, rales, or rhonchi. No dullness to percussion. Cardiovascular: Regular rate and rhythm without murmurs, rubs, or gallops. Abdomen: Soft, non tender. Bowel sounds normal. No organomegaly. The ports entry wounds are healing well and without any erythema. All four Extremities: No cyanosis, clubbing, edema, or effusions. Neurologic: No gross sensory or motor deficits. Skin: Warm and dry with good turgor. No signs of peripheral arterial or venous insufficiency. No ulcerations. No open wounds. Medical Decision Making -Laboratory:   I have independently reviewed/ordered the following labs:    CBC with Differential:   Recent Labs     05/10/21  0504 05/11/21 0440   WBC 9.4 8.1   HGB 8.8* 8.5*   HCT 29.0* 28.5*   * 135*   LYMPHOPCT 27 24   MONOPCT 5 6     BMP:   Recent Labs     05/09/21  0501 05/09/21  0501 05/10/21  0504 05/11/21 0440   *  --  146* 143   K 3.4*   < > 3.8 4.4   *  --  111* 109*   CO2 22  --  25 24   BUN 34*  --  33* 34*   CREATININE 1.26*  --  1.20* 1.18*   MG 1.6  --  1.7  --     < > = values in this interval not displayed. Hepatic Function Panel:   Recent Labs     05/10/21  0504 05/11/21 0440   PROT 5.5* 5.5*   LABALBU 1.8* 1.5*   BILIDIR 0.16 0.18   IBILI 0.18 0.12   BILITOT 0.34 0.30   ALKPHOS 82 81   ALT 24 16   AST 12 10     No results for input(s): RPR in the last 72 hours. No results for input(s): HIV in the last 72 hours. No results for input(s): BC in the last 72 hours.   Lab Results   Component Value Date    MUCUS NOT REPORTED 05/06/2021    RBC 3.03 05/11/2021    RBC 6.23 05/03/2021    TRICHOMONAS NOT REPORTED 05/06/2021    WBC 8.1 05/11/2021    YEAST NOT REPORTED 05/06/2021    TURBIDITY CLOUDY 05/06/2021     Lab Results   Component Value Date    CREATININE 1.18 05/11/2021    CREATININE 3.09 05/03/2021    GLUCOSE 211 05/11/2021    GLUCOSE 453 05/03/2021       Medical Decision Making-Imaging: presumed gastrostomy tubes are seen overlying the   left upper quadrant.  There does appear to be contrast slightly marginating   the more lateral gastrostomy tube balloon. 4. Delayed migration of contrast through the distal esophagus and GE junction   with mild gastroesophageal reflux. The findings were sent to the Radiology Results [address] at 12:43   pm on 5/7/2021to be communicated to a licensed caregiver.             EXAMINATION:   CT OF THE CHEST WITHOUT CONTRAST 5/4/2021 5:55 pm       TECHNIQUE:   CT of the chest was performed without the administration of intravenous   contrast. Multiplanar reformatted images are provided for review. Dose   modulation, iterative reconstruction, and/or weight based adjustment of the   mA/kV was utilized to reduce the radiation dose to as low as reasonably   achievable.       COMPARISON:   None.       HISTORY:   ORDERING SYSTEM PROVIDED HISTORY: large hiatal hernia, contrast progression? TECHNOLOGIST PROVIDED HISTORY:   large hiatal hernia, contrast progression? Reason for Exam: large hiatal hernia, contrast progression?       FINDINGS:   Mediastinum: Heart size is normal without pericardial effusion.  There are   shotty mediastinal lymph nodes.  The thoracic aorta and main pulmonary artery   are normal in caliber.       Lungs/pleura: Moderate bilateral pleural effusions with adjacent   consolidation.  No pneumothorax.       Upper Abdomen: There is a large hiatal hernia containing the majority of the   stomach.  There is organoaxial volvulus.  Enteric tube is noted extending   below the diaphragm with tip outside the field of view.  The herniated   stomach is distended with contrast. Jerilynn Dominic is also contrast within the distal   esophagus. Jerilynn Dominic is some contrast visualized within the portion of stomach   below the diaphragm.  Free air and fluid are noted within the visualized   upper abdomen with apparent free spill of contrast in the left upper quadrant.     Soft Tissues/Bones: No acute osseous abnormality.           Impression   1. Ascites and pneumoperitoneum with apparent free-flowing oral contrast in   the left upper quadrant is concerning for viscus perforation, possibly within   the subdiaphragmatic portion of the stomach. 2. Large hiatal hernia with organoaxial volvulus.  Majority of contrast is   retained within the esophagus and supradiaphragmatic stomach. 3. Enteric tube extends below the diaphragm with tip outside the field of   view. 4. Moderate bilateral pleural effusions with adjacent consolidation,   atelectasis versus pneumonia. Critical results were called by Dr. Kyle Loyd MD to Leo Savana on   5/4/2021 at 18:30.           Medical Decision [facility]:       Medical Decision Making-Other:     Note:  Labs, medications, radiologic studies were reviewed with personal review of films   Large amounts of data were reviewed  Discussed with nursing Staff, Discharge planner  Infection Control and Prevention measures reviewed  All prior entries were reviewed  Administer medications as ordered  Prognosis: Guarded  Discharge planning reviewed  Follow up as outpatient. Thank you for allowing us to participate in the care of this patient. Please call with questions. Ned Roldan, ALFONZO - CNP     ATTESTATION:    I have discussed the case, including pertinent history and exam findings with the APRN. I have evaluated the  History, physical findings and pictures of the patient and the key elements of the encounter have been performed by me. I have reviewed the laboratory data, other diagnostic studies and discussed them with the APRN. I have updated the medical record where necessary. I agree with the assessment, plan and orders as documented by the APRN.     Sarah Salgado MD.        4:11 PM

## 2021-05-11 NOTE — PROGRESS NOTES
PULMONARY & CRITICAL CARE MEDICINE PROGRESS  NOTE     Patient:  Dino Burkett  MRN: [id number]  Admit date: 5/3/2021    SUBJECTIVE     I personally interviewed/examined the patient, reviewed interval history and interpreted all available radiographic, laboratory data at the time of service. Chief Compliant/Reason for Initial Consult: Acute respiratory failure on vent support, gastric perforation    Brief Hospital Course and Interval History:  The patient is a 78 y.o. female with known medical history of diabetes mellitus, hypertension, hypothyroidism, abdominal hernia presented to the hospital with nausea, vomiting, diarrhea. Patient had similar symptoms on 4/28 but was apparently sent home. Patient had dizzy spells and lightheadedness, had a syncopal episode in the ER on this admission. Patient was transferred from the [facility] to [place].  In [facility] patient lab revealed lactic acidosis of 6, glucose 585, patient was seen to be in DKA. Elevated lipase of 1451. Leukocytotic with WBC 23.5, hemoglobin 18.3. Patient also had elevated troponins, elevated creatinine of 3.8. In [place] repeat labs showed creatinine of 2.49 with lactic acid of 2.4. Troponin was 85, recheck of 94. LFTs showed elevated bilirubin.     General surgery was consulted. CT chest showed ascites and pneumoperitoneum with apparent free-flowing oral contrast in the left upper quadrant concerning for viscus perforation possibly within the subdiaphragmatic portion of the stomach. Patient then went for robotic hernia repair 5/4 with wedge gastrectomy, gastropexy and placement of 2 TAISHA drains and 2 PEG tubes. Patient is n.p.o.     Pulmonology consulted for vent management. Interval history  5/11/2021  Patient awake, alert, and interactive. Tolerating clear liquids well.    Patient noted to be afebrile however she is on Tylenol. Continues to be stable on 2 L nasal oxygen, saturating above 90%  Creatinine 1.18  Right TAISHA drain removed today    Central line removed, PICC placed  Chest x-ray done  showed left sided effusion    I/O+ 3.2 L  Review of Systems:  Review of Systems   Constitutional: Positive for activity change. HENT: Negative for congestion. Respiratory: Negative for cough, shortness of breath and wheezing. Gastrointestinal: Positive for abdominal distention and abdominal pain. Genitourinary: Negative for difficulty urinating. Musculoskeletal: Negative. Neurological: Negative for light-headedness and numbness. Psychiatric/Behavioral: Negative for agitation.          OBJECTIVE     VITAL SIGNS:   LAST-  /80   Pulse 82   Temp 97.6 °F (36.4 °C) (Oral)   Resp 10   Ht 5' 2\" (1.575 m)   Wt 195 lb 5.2 oz (88.6 kg)   SpO2 97%   BMI 35.73 kg/m²   8-24 HR RANGE-  TEMP Temp  Av.7 °F (36.5 °C)  Min: 97.2 °F (36.2 °C)  Max: 98.4 °F (24.4 °C)   BP Systolic (86OLY), SRX:820 , Min:131 , ODQ:287      Diastolic (20JPZ), RQQ:51, Min:70, Max:85     PULSE Pulse  Av  Min: 82  Max: 91   RR Resp  Avg: 10  Min: 10  Max: 10   O2 SAT SpO2  Av %  Min: 97 %  Max: 97 %   OXYGEN DELIVERY O2 Flow Rate (L/min)  Av L/min  Min: 2 L/min  Max: 2 L/min     Systemic Examination:   General appearance alert and oriented   mental status - alert  Eyes - pupils equal and reactive, extraocular eye movements intact  Mouth - mucous membranes moist, pharynx normal without lesions  Neck - supple, no significant adenopathy  Chest -chest symmetrical, bilateral breath sounds clear and equal  Heart - normal rate, regular rhythm, normal S1, S2, no murmurs, rubs, clicks or gallops  Abdomen -soft with one PEG tube and G-tube, clamped  Neurological - alert, oriented, normal speech, no focal findings or movement disorder noted  Extremities - peripheral pulses normal, no pedal edema, no clubbing or cyanosis  Skin - normal coloration and turgor, no rashes, no suspicious skin lesions noted   PICC line in place  DATA REVIEW     Medications:  Scheduled Meds:   alteplase  1 mg Intracatheter Once    insulin glargine  10 Units Subcutaneous Nightly    polyethylene glycol  17 g Oral Daily    alteplase  1 mg Intracatheter Once    alteplase  1 mg Intracatheter Once    sodium chloride flush  5-40 mL Intravenous 2 times per day    heparin (porcine)  5,000 Units Subcutaneous 3 times per day    vancomycin (VANCOCIN) intermittent dosing (placeholder)   Other RX Placeholder    vancomycin  1,250 mg Intravenous Q24H    gabapentin  300 mg Oral 3 times per day    ipratropium-albuterol  1 ampule Inhalation Q4H WA    piperacillin-tazobactam  3,375 mg Intravenous Q8H    insulin lispro  0-18 Units Subcutaneous Q4H    levothyroxine  56 mcg Intravenous Daily    And    sodium chloride (PF)  5 mL Intravenous Daily    pantoprazole  40 mg Intravenous Daily    And    sodium chloride (PF)  10 mL Intravenous Daily    sodium chloride flush  5-40 mL Intravenous 2 times per day     Continuous Infusions:   dextrose      PN-Adult 2-in-1 Central Line (Standard) 60 mL/hr at 05/10/21 1906    sodium chloride 25 mL (05/10/21 1840)    sodium chloride 25 mL (05/08/21 1026)     LABS:-  ABGs:   No results found for: PH, PCO2, PO2, HCO3, O2SAT  No results for input(s): PHART, PO2ART, LET3CQD, WVT7DWD, BEART, N6HRWJFO in the last 72 hours.   Lab Results   Component Value Date    POCPH 7.433 05/06/2021    POCPCO2 36.8 05/06/2021    POCPO2 150.9 (H) 05/06/2021    POCHCO3 24.6 05/06/2021    PNMZ3JNE 99 (H) 05/06/2021     CBC:   Recent Labs     05/09/21  0501 05/10/21  0504 05/11/21  0440   WBC 8.4 9.4 8.1   HGB 8.8* 8.8* 8.5*   HCT 28.7* 29.0* 28.5*   MCV 93.2 90.9 94.1   PLT See Reflexed IPF Result 122* 135*   LYMPHOPCT 18* 27 24   RBC 3.08* 3.19* 3.03*   MCH 28.6 27.6 28.1   MCHC 30.7 30.3 29.8   RDW 15.4* 15.5* 15.4*     BMP:   Recent Labs     05/09/21  0506 infiltrate or atelectasis right mid lung. CT CHEST ABDOMEN PELVIS WO CONTRAST   Final Result   1. Within the chest, the patient has consolidative processes in both lower   lobes likely pneumonitis. Bilateral pleural effusions and basilar   atelectasis are noted. 2. Postoperative changes within the abdomen. The patient had a hiatal hernia   repair. Although the stomach is decompressed, gastric walls appear thickened   likely secondary to inflammation which may be postoperative. 3. Fluid in the right pericolic gutter and pelvis. 4. Thickened small bowel loops in the right lower quadrant which may   represent secondary changes to surgery. 5. Two drain placements in the upper abdomen. Left inguinal terminates in   the left iliac vein. FL ESOPHAGRAM   Final Result   1. Drainage of contrast material through what appears to be the gastrostomy   tubes following the ingestion of contrast.  Contrast does migrate beyond the   postoperative region into the proximal small bowel. 2. No extraneous/extraluminal collection of contrast is seen beyond the   confines of the stomach. 3. 2 surgical drains and 2 presumed gastrostomy tubes are seen overlying the   left upper quadrant. There does appear to be contrast slightly marginating   the more lateral gastrostomy tube balloon. 4. Delayed migration of contrast through the distal esophagus and GE junction   with mild gastroesophageal reflux. The findings were sent to the Radiology Results [address] at 12:43   pm on 5/7/2021to be communicated to a licensed caregiver. XR CHEST PORTABLE   Final Result   Bibasilar consolidation new from prior study. Blunting of the costophrenic   angles bilaterally         XR CHEST PORTABLE   Final Result   ETT tip position stable. Decreased left subcutaneous emphysema. Slightly   improved suspected left basilar volume loss with persistent findings as   above. No overt vascular congestion. US RENAL COMPLETE   Final Result   Unremarkable ultrasound of the kidneys and urinary bladder. Trace right pleural effusion         XR CHEST PORTABLE   Final Result   Volume loss continues left hemithorax with haziness at the left base either   atelectasis and or fluid. Subcutaneous emphysema along the left lateral   chest wall has decreased. No appreciable extrapleural air. Endotracheal   tube in appropriate position. XR CHEST PORTABLE   Final Result   1. Recommend repositioning of left internal jugular approach central venous   catheter. 2. Low lung volumes with left basilar atelectasis plus or minus mild pleural   effusion. 3. Left chest wall scattered soft tissue emphysema. XR CHEST PORTABLE   Final Result   Intestinal tube deviates to the left side of a sizable hiatal hernia,   traversing below the hemidiaphragm and terminating just underneath the left   hemidiaphragm. Side port of the intestinal tube is below the diaphragmatic   hiatus. CT CHEST WO CONTRAST   Final Result   1. Ascites and pneumoperitoneum with apparent free-flowing oral contrast in   the left upper quadrant is concerning for viscus perforation, possibly within   the subdiaphragmatic portion of the stomach. 2. Large hiatal hernia with organoaxial volvulus. Majority of contrast is   retained within the esophagus and supradiaphragmatic stomach. 3. Enteric tube extends below the diaphragm with tip outside the field of   view. 4. Moderate bilateral pleural effusions with adjacent consolidation,   atelectasis versus pneumonia. Critical results were called by Dr. Karina Wetson MD to [facility] on   5/4/2021 at 18:30. XR ABDOMEN (KUB) (SINGLE AP VIEW)   Final Result   No significant subdiaphragmatic contrast progression, as above. RECOMMENDATION:   Consider chest x-ray to further assess a organic-axial gastric volvulus         FL UGI   Final Result   Organo-axial volvulus the stomach. Large paraesophageal hernia containing the majority of the rotated gastric   body. The greater curvature is positioned superiorly within the   paraesophageal hernia defect. There is narrowing of the gastroesophageal   junction as well as of the gastroduodenal junction through the hernia defect. MRI ABDOMEN WO CONTRAST MRCP   Final Result   1. No evidence of intrahepatic or extrahepatic ductal dilatation. 2. Incompletely imaged large hiatal hernia with organoaxial malrotation of   the stomach. 3. Small to moderate amount of ascites. 4. Diffuse small bowel wall thickening likely due to 3rd spacing as the post   enteritis. 5. Trace bilateral pleural effusions. XR CHEST PORTABLE   Final Result   No acute cardiopulmonary disease      Large hiatal hernia             Echocardiogram:   Results for orders placed during the hospital encounter of 05/03/21   ECHO Complete 2D W Doppler W Color    Narrative Transthoracic Echocardiography Report (TTE)     Patient Name Dorene Jacobo   Date of Study               05/05/2021      Date of      1950  Gender                      Female   Birth      Age          78 year(s)  Race                              Room Number  7514        Height:                     62 inch, 157.48 cm      Corporate ID [id number]    Weight:                     166 pounds, 75.3 kg   #      Patient Acct [de-identified]   BSA:          1.77 m^2      BMI:      30.36   #                                                              kg/m^2      MR #         [id number]     Sonographer                 Rao Finder      Accession #  [id number]  Interpreting Physician      Shellie Lew      Fellow                   Referring Nurse                            Practitioner      Interpreting             Referring Physician         Sourav Quijano   Fellow     Additional Comments  Technically difficult study, patient supine on ventilator.     Type of Study      TTE procedure:2D Echocardiogram, M-Mode, Cardiac Catheterization:   No results found for this or any previous visit. ASSESSMENT AND PLAN     Assessment:    Gastric volvulus with Viscus perforation  Sepsis   moderate bilateral pleural effusions  Bilateral lower lobe pneumonia  Peritonitis  Blood cultures positive for staph epidermidis, methicillin resistant  Diabetes mellitusDKA now resolved  Lactic acidosis resolving  BONNIE  Thrombocytopenia   Elevated troponins  Subclinical hypothyroidism  Elevated bilirubin  Hypernatremia    Plan:    Patient chest x-ray shows effusion on the left side, will plan for CT scan of the chest.  If CT scan of the chest shows loculated effusion will plan for IR guided thoracentesis and will send fluid studies. Observe closely for signs of pleural space/mediastinal sepsis. Encourage ambulation. Would mobilize up out of bed. Use Tylenol as needed to check for fever  Antibiotics per infectious disease. Sepsis likely secondary to gastric perforation on Zosyn, fluconazole  Blood cultures grew staph epidermidis, vancomycin added  Gastric volvulus with viscus perforation s/p robotic assisted laparoscopic hiatal hernia reduction with gastropexy. Has 1PEG, 1 G drain in placeon Zosyn. On TPN and liquid diet  Acute kidney injurynonoliguric, good urine output after Walker change, nephrology following.   Hypernatremia-on Ringer lactate  Continue to monitor I/O with a goal of even/negative fluid balance  dvt prophylaxis- heparin    Dalia Mcdaniel DO  Pulmonary and Critical Care Medicine           5/11/2021, 11:26 AM

## 2021-05-11 NOTE — PROGRESS NOTES
Comprehensive Nutrition Assessment    Type and Reason for Visit:  Reassess    Nutrition Recommendations/Plan: Continue current TPN. Continue full liquid diet and Ensure Clear ONS as tolerated. Monitor adequacy of PO intake. Nutrition Assessment:  Diet advanced to full liquids this morning. Pt working on first tray at time of visit. Reports tolerating clear liquids yesterday and denies nausea/vomiting yesterday or yet today. C/o early satiety. Likes Ensure Clear ONS. TPN to continue per RN. Estimated Daily Nutrient Needs:  Energy (kcal):  1600 kcal/day; Weight Used for Energy Requirements:  Current     Protein (g):  75 g pro/day; Weight Used for Protein Requirements:  Ideal(1.5)        Fluid (ml/day):  4762-7712 mL/day (or per MD);  Method Used for Fluid Requirements:  ml/Kg(25-30)      Nutrition Related Findings:  Meds/labs reviewed      Wounds:  Surgical Incision(abdomen)       Current Nutrition Therapies:    Dietary Nutrition Supplements: Clear Liquid Oral Supplement  PN-Adult 2-in-1 Central Line (Standard)  DIET FULL LIQUID;  Current Parenteral Nutrition Orders:  · Type and Formula: 2-in-1 Custom(225 gm dextrose, 75 gm protein)   · Lipids: None  · Duration: Continuous  · Rate/Volume: 60 mL/hr (1440 mL/day)  · Current PN Order Provides: 1065 kcals, 75 gm protein      Anthropometric Measures:  · Height: 5' 2\" (157.5 cm)  · Current Body Weight: 195 lb 5.2 oz (88.6 kg)   · Admission Body Weight: 202 lb (91.6 kg)    · Usual Body Weight: (166 lb - stated)     · Ideal Body Weight: 110 lbs; % Ideal Body Weight 177.6 %   · BMI: 35.7  · BMI Categories: Obese Class 2 (BMI 35.0 -39.9)       Nutrition Diagnosis:   · Inadequate oral intake related to altered GI function as evidenced by nutrition support - parenteral nutrition(slow diet advancement)      Nutrition Interventions:   Food and/or Nutrient Delivery:  Continue Current Diet, Continue Oral Nutrition Supplement, Continue Current Parenteral Nutrition  Nutrition Education/Counseling:  No recommendation at this time   Coordination of Nutrition Care:  Continue to monitor while inpatient    Goals:  meet % of estimated nutrient needs       Nutrition Monitoring and Evaluation:   Behavioral-Environmental Outcomes:  None Identified   Food/Nutrient Intake Outcomes:  Diet Advancement/Tolerance, Food and Nutrient Intake, Supplement Intake, Parenteral Nutrition Intake/Tolerance  Physical Signs/Symptoms Outcomes:  Weight, GI Status, Biochemical Data, Nutrition Focused Physical Findings     Discharge Planning:     Too soon to determine     Electronically signed by Chasidy Harrell MS, RD, LD on 5/11/21 at 12:56 PM EDT    Contact: [phone]

## 2021-05-11 NOTE — PROGRESS NOTES
Bariatric Surgery Progress Note      PATIENT NAME: Mell Pepe   TODAY'S DATE: 5/11/2021, 7:07 AM  Chief Complaint   Patient presents with    Blood Sugar Problem     >450    Hernia     Hiatal      SUBJECTIVE:    Pt seen and examined. UOP adequate. States pain is well controlled. Tolerating CLD. L TAISHA drain removed 5/7.  R TAISHA removed 5/11    PEG tubes 580ml out, bilious output   Tmax 36.9    OBJECTIVE:   Vitals:  /78   Pulse 82   Temp 97.7 °F (36.5 °C) (Axillary)   Resp 10   Ht 5' 2\" (1.575 m)   Wt 195 lb 5.2 oz (88.6 kg)   SpO2 97%   BMI 35.73 kg/m²      INTAKE/OUTPUT:      Intake/Output Summary (Last 24 hours) at 5/11/2021 0707  Last data filed at 5/11/2021 0602  Gross per 24 hour   Intake 890 ml   Output 1367 ml   Net -477 ml                 General: alert, oriented  Lungs: Symmetrical chest rise bilaterally  Heart: S1S2  Abdomen: Soft, ND, appropriately tender, non peritoneal, no rebound, incisions c/d/i, PEG x2 with no surrounding erythema   Extremity: moves all extremities x4, trace edema    Data Review:  CBC:   Recent Labs     05/09/21  0501 05/10/21  0504 05/11/21  0440   WBC 8.4 9.4 8.1   HGB 8.8* 8.8* 8.5*   PLT See Reflexed IPF Result 122* 135*     BMP:    Recent Labs     05/09/21  0501 05/09/21  1701 05/10/21  0504 05/11/21  0440   *  --  146* 143   K 3.4* 3.7 3.8 4.4   *  --  111* 109*   CO2 22  --  25 24   BUN 34*  --  33* 34*   CREATININE 1.26*  --  1.20* 1.18*   GLUCOSE 264*  --  219* 211*     Hepatic:   Recent Labs     05/09/21  0501 05/10/21  0504 05/11/21  0440   AST 13 12 10   ALT 34* 24 16   ALKPHOS 83 82 81   BILITOT 0.39 0.34 0.30   BILIDIR 0.17 0.16 0.18     Coagulation:   Recent Labs     05/09/21  0501 05/10/21  0504 05/11/21  0440   APTT 29.1 22.7 26.1   PROT 4.8* 5.5* 5.5*   INR 1.0 1.0 1.1       ASSESSMENT   Patient Active Problem List   Diagnosis    Acute pancreatitis    Septic shock ([facility]    Non-STEMI (non-ST elevated myocardial infarction) ([facility]    Diabetic ketoacidosis without coma associated with type 2 diabetes mellitus ([facility]    BONNIE (acute kidney injury) ([facility]    Hernia with obstruction    Essential hypertension    Thyroid disease    Syncope    Acute tubular necrosis (HCC)    Lactic acid acidosis    Acute hypoxemic respiratory failure (HCC)    Hiatal hernia    Gastric volvulus    Community acquired bilateral lower lobe pneumonia    Mediastinitis    Peritonitis ([facility]     77 yo F s/p 5/4 emergent robotic assisted laparoscopic hiatal hernia reduction with gastropexy via g-tube x2     5/7 esophagram performed no leak noted    PLAN  1. Ok for FLD, cont nutritional supplements, g-tubes clamped at this time, ok to unclamp if pt feeling nauseated or has emesis   2. Cont recs and management per primary/ Pulmonary following  3. Cont abx per ID, + blood cultures, defer abx regimen to primary   4. Encourage IS use, deep breathing, ambulation and PT/OT work   5.  [id number] Dahlia Irwin for DVT ppx from surg stance     Thank you,    Electronically signed by Marylu Khalil DO  on 5/11/2021 at 7:07 AM

## 2021-05-12 ENCOUNTER — APPOINTMENT (OUTPATIENT)
Dept: INTERVENTIONAL RADIOLOGY/VASCULAR | Age: 71
DRG: 853 | End: 2021-05-12
Payer: MEDICARE

## 2021-05-12 LAB
ABSOLUTE EOS #: 0.15 K/UL (ref 0–0.44)
ABSOLUTE IMMATURE GRANULOCYTE: 0.23 K/UL (ref 0–0.3)
ABSOLUTE LYMPH #: 1.08 K/UL (ref 1.1–3.7)
ABSOLUTE MONO #: 0.15 K/UL (ref 0.1–1.2)
ALBUMIN SERPL-MCNC: 1.5 G/DL (ref 3.5–5.2)
ALBUMIN/GLOBULIN RATIO: 0.3 (ref 1–2.5)
ALP BLD-CCNC: 92 U/L (ref 35–104)
ALT SERPL-CCNC: 12 U/L (ref 5–33)
AMYLASE FLUID: 13 U/L
ANION GAP SERPL CALCULATED.3IONS-SCNC: 11 MMOL/L (ref 9–17)
AST SERPL-CCNC: 11 U/L
BASOPHILS # BLD: 1 % (ref 0–2)
BASOPHILS ABSOLUTE: 0.08 K/UL (ref 0–0.2)
BILIRUB SERPL-MCNC: 0.35 MG/DL (ref 0.3–1.2)
BILIRUBIN DIRECT: 0.2 MG/DL
BILIRUBIN, INDIRECT: 0.15 MG/DL (ref 0–1)
BUN BLDV-MCNC: 35 MG/DL (ref 8–23)
BUN/CREAT BLD: ABNORMAL (ref 9–20)
CALCIUM SERPL-MCNC: 8.7 MG/DL (ref 8.6–10.4)
CHLORIDE BLD-SCNC: 104 MMOL/L (ref 98–107)
CO2: 24 MMOL/L (ref 20–31)
CREAT SERPL-MCNC: 1.3 MG/DL (ref 0.5–0.9)
DIFFERENTIAL TYPE: ABNORMAL
EOSINOPHILS RELATIVE PERCENT: 2 % (ref 1–4)
GFR AFRICAN AMERICAN: 49 ML/MIN
GFR NON-AFRICAN AMERICAN: 41 ML/MIN
GFR SERPL CREATININE-BSD FRML MDRD: ABNORMAL ML/MIN/{1.73_M2}
GFR SERPL CREATININE-BSD FRML MDRD: ABNORMAL ML/MIN/{1.73_M2}
GLOBULIN: ABNORMAL G/DL (ref 1.5–3.8)
GLUCOSE BLD-MCNC: 124 MG/DL (ref 65–105)
GLUCOSE BLD-MCNC: 128 MG/DL (ref 70–99)
GLUCOSE BLD-MCNC: 145 MG/DL (ref 65–105)
GLUCOSE BLD-MCNC: 164 MG/DL (ref 65–105)
GLUCOSE BLD-MCNC: 183 MG/DL (ref 65–105)
GLUCOSE BLD-MCNC: 222 MG/DL (ref 65–105)
GLUCOSE BLD-MCNC: 264 MG/DL (ref 65–105)
GLUCOSE, FLUID: 226 MG/DL
HCT VFR BLD CALC: 31.3 % (ref 36.3–47.1)
HEMOGLOBIN: 9.5 G/DL (ref 11.9–15.1)
IMMATURE GRANULOCYTES: 3 %
INR BLD: 1
LACTATE DEHYDROGENASE, FLUID: 536 U/L
LYMPHOCYTES # BLD: 14 % (ref 24–43)
MAGNESIUM: 1.8 MG/DL (ref 1.6–2.6)
MCH RBC QN AUTO: 27.5 PG (ref 25.2–33.5)
MCHC RBC AUTO-ENTMCNC: 30.4 G/DL (ref 28.4–34.8)
MCV RBC AUTO: 90.7 FL (ref 82.6–102.9)
MONOCYTES # BLD: 2 % (ref 3–12)
MORPHOLOGY: ABNORMAL
MORPHOLOGY: ABNORMAL
NRBC AUTOMATED: 0 PER 100 WBC
PARTIAL THROMBOPLASTIN TIME: 27.9 SEC (ref 20.5–30.5)
PDW BLD-RTO: 15 % (ref 11.8–14.4)
PH FLUID: 8
PHOSPHORUS: 3.9 MG/DL (ref 2.6–4.5)
PLATELET # BLD: 184 K/UL (ref 138–453)
PLATELET ESTIMATE: ABNORMAL
PMV BLD AUTO: 11.1 FL (ref 8.1–13.5)
POTASSIUM SERPL-SCNC: 4.2 MMOL/L (ref 3.7–5.3)
PROTHROMBIN TIME: 11.1 SEC (ref 9.1–12.3)
RBC # BLD: 3.45 M/UL (ref 3.95–5.11)
RBC # BLD: ABNORMAL 10*6/UL
SEG NEUTROPHILS: 78 % (ref 36–65)
SEGMENTED NEUTROPHILS ABSOLUTE COUNT: 6.01 K/UL (ref 1.5–8.1)
SODIUM BLD-SCNC: 139 MMOL/L (ref 135–144)
SPECIMEN TYPE: NORMAL
TOTAL PROTEIN, BODY FLUID: 3.1 G/DL
TOTAL PROTEIN: 6 G/DL (ref 6.4–8.3)
WBC # BLD: 7.7 K/UL (ref 3.5–11.3)
WBC # BLD: ABNORMAL 10*3/UL

## 2021-05-12 PROCEDURE — 2700000000 HC OXYGEN THERAPY PER DAY

## 2021-05-12 PROCEDURE — 84157 ASSAY OF PROTEIN OTHER: CPT

## 2021-05-12 PROCEDURE — 87102 FUNGUS ISOLATION CULTURE: CPT

## 2021-05-12 PROCEDURE — 2060000000 HC ICU INTERMEDIATE R&B

## 2021-05-12 PROCEDURE — 99233 SBSQ HOSP IP/OBS HIGH 50: CPT | Performed by: INTERNAL MEDICINE

## 2021-05-12 PROCEDURE — 99232 SBSQ HOSP IP/OBS MODERATE 35: CPT | Performed by: NURSE PRACTITIONER

## 2021-05-12 PROCEDURE — 2580000003 HC RX 258: Performed by: NURSE PRACTITIONER

## 2021-05-12 PROCEDURE — 80048 BASIC METABOLIC PNL TOTAL CA: CPT

## 2021-05-12 PROCEDURE — 82150 ASSAY OF AMYLASE: CPT

## 2021-05-12 PROCEDURE — 83615 LACTATE (LD) (LDH) ENZYME: CPT

## 2021-05-12 PROCEDURE — 83735 ASSAY OF MAGNESIUM: CPT

## 2021-05-12 PROCEDURE — 84100 ASSAY OF PHOSPHORUS: CPT

## 2021-05-12 PROCEDURE — 6370000000 HC RX 637 (ALT 250 FOR IP): Performed by: STUDENT IN AN ORGANIZED HEALTH CARE EDUCATION/TRAINING PROGRAM

## 2021-05-12 PROCEDURE — 82945 GLUCOSE OTHER FLUID: CPT

## 2021-05-12 PROCEDURE — 94761 N-INVAS EAR/PLS OXIMETRY MLT: CPT

## 2021-05-12 PROCEDURE — 6360000002 HC RX W HCPCS: Performed by: STUDENT IN AN ORGANIZED HEALTH CARE EDUCATION/TRAINING PROGRAM

## 2021-05-12 PROCEDURE — 36415 COLL VENOUS BLD VENIPUNCTURE: CPT

## 2021-05-12 PROCEDURE — 80076 HEPATIC FUNCTION PANEL: CPT

## 2021-05-12 PROCEDURE — 89051 BODY FLUID CELL COUNT: CPT

## 2021-05-12 PROCEDURE — 94640 AIRWAY INHALATION TREATMENT: CPT

## 2021-05-12 PROCEDURE — 6370000000 HC RX 637 (ALT 250 FOR IP): Performed by: NURSE PRACTITIONER

## 2021-05-12 PROCEDURE — 32555 ASPIRATE PLEURA W/ IMAGING: CPT

## 2021-05-12 PROCEDURE — 0W9B3ZZ DRAINAGE OF LEFT PLEURAL CAVITY, PERCUTANEOUS APPROACH: ICD-10-PCS | Performed by: RADIOLOGY

## 2021-05-12 PROCEDURE — 85025 COMPLETE CBC W/AUTO DIFF WBC: CPT

## 2021-05-12 PROCEDURE — 87106 FUNGI IDENTIFICATION YEAST: CPT

## 2021-05-12 PROCEDURE — 87075 CULTR BACTERIA EXCEPT BLOOD: CPT

## 2021-05-12 PROCEDURE — 2580000003 HC RX 258: Performed by: STUDENT IN AN ORGANIZED HEALTH CARE EDUCATION/TRAINING PROGRAM

## 2021-05-12 PROCEDURE — 97535 SELF CARE MNGMENT TRAINING: CPT

## 2021-05-12 PROCEDURE — 85610 PROTHROMBIN TIME: CPT

## 2021-05-12 PROCEDURE — 82947 ASSAY GLUCOSE BLOOD QUANT: CPT

## 2021-05-12 PROCEDURE — 99232 SBSQ HOSP IP/OBS MODERATE 35: CPT | Performed by: INTERNAL MEDICINE

## 2021-05-12 PROCEDURE — 2500000003 HC RX 250 WO HCPCS: Performed by: SURGERY

## 2021-05-12 PROCEDURE — C9113 INJ PANTOPRAZOLE SODIUM, VIA: HCPCS | Performed by: STUDENT IN AN ORGANIZED HEALTH CARE EDUCATION/TRAINING PROGRAM

## 2021-05-12 PROCEDURE — 85730 THROMBOPLASTIN TIME PARTIAL: CPT

## 2021-05-12 PROCEDURE — 94669 MECHANICAL CHEST WALL OSCILL: CPT

## 2021-05-12 PROCEDURE — 87070 CULTURE OTHR SPECIMN AEROBIC: CPT

## 2021-05-12 PROCEDURE — 2500000003 HC RX 250 WO HCPCS: Performed by: NURSE PRACTITIONER

## 2021-05-12 PROCEDURE — 83986 ASSAY PH BODY FLUID NOS: CPT

## 2021-05-12 PROCEDURE — 97530 THERAPEUTIC ACTIVITIES: CPT

## 2021-05-12 PROCEDURE — 87205 SMEAR GRAM STAIN: CPT

## 2021-05-12 RX ORDER — OXYCODONE HYDROCHLORIDE 5 MG/1
10 TABLET ORAL EVERY 4 HOURS PRN
Status: DISCONTINUED | OUTPATIENT
Start: 2021-05-12 | End: 2021-05-28 | Stop reason: HOSPADM

## 2021-05-12 RX ORDER — OXYCODONE HYDROCHLORIDE 5 MG/1
5 TABLET ORAL EVERY 4 HOURS PRN
Status: DISCONTINUED | OUTPATIENT
Start: 2021-05-12 | End: 2021-05-28 | Stop reason: HOSPADM

## 2021-05-12 RX ORDER — LEVOTHYROXINE SODIUM 112 UG/1
112 TABLET ORAL DAILY
Status: DISCONTINUED | OUTPATIENT
Start: 2021-05-12 | End: 2021-05-28 | Stop reason: HOSPADM

## 2021-05-12 RX ORDER — PANTOPRAZOLE SODIUM 40 MG/1
40 TABLET, DELAYED RELEASE ORAL
Status: DISCONTINUED | OUTPATIENT
Start: 2021-05-13 | End: 2021-05-28 | Stop reason: HOSPADM

## 2021-05-12 RX ORDER — GABAPENTIN 300 MG/1
300 CAPSULE ORAL 3 TIMES DAILY
Status: DISCONTINUED | OUTPATIENT
Start: 2021-05-12 | End: 2021-05-18

## 2021-05-12 RX ADMIN — GABAPENTIN 300 MG: 300 CAPSULE ORAL at 21:01

## 2021-05-12 RX ADMIN — GABAPENTIN 300 MG: 250 SUSPENSION ORAL at 09:31

## 2021-05-12 RX ADMIN — INSULIN GLARGINE 10 UNITS: 100 INJECTION, SOLUTION SUBCUTANEOUS at 21:01

## 2021-05-12 RX ADMIN — HEPARIN SODIUM 5000 UNITS: 5000 INJECTION INTRAVENOUS; SUBCUTANEOUS at 06:33

## 2021-05-12 RX ADMIN — CALCIUM GLUCONATE: 98 INJECTION, SOLUTION INTRAVENOUS at 18:15

## 2021-05-12 RX ADMIN — SODIUM CHLORIDE, PRESERVATIVE FREE 10 ML: 5 INJECTION INTRAVENOUS at 09:04

## 2021-05-12 RX ADMIN — IPRATROPIUM BROMIDE AND ALBUTEROL SULFATE 1 AMPULE: .5; 2.5 SOLUTION RESPIRATORY (INHALATION) at 20:18

## 2021-05-12 RX ADMIN — LEVOTHYROXINE SODIUM ANHYDROUS 56 MCG: 100 INJECTION, POWDER, LYOPHILIZED, FOR SOLUTION INTRAVENOUS at 09:03

## 2021-05-12 RX ADMIN — PIPERACILLIN AND TAZOBACTAM 3375 MG: 3; .375 INJECTION, POWDER, LYOPHILIZED, FOR SOLUTION INTRAVENOUS at 18:20

## 2021-05-12 RX ADMIN — LEVOTHYROXINE SODIUM 112 MCG: 112 TABLET ORAL at 18:25

## 2021-05-12 RX ADMIN — INSULIN LISPRO 3 UNITS: 100 INJECTION, SOLUTION INTRAVENOUS; SUBCUTANEOUS at 21:02

## 2021-05-12 RX ADMIN — SODIUM CHLORIDE, PRESERVATIVE FREE 5 ML: 5 INJECTION INTRAVENOUS at 09:10

## 2021-05-12 RX ADMIN — INSULIN LISPRO 9 UNITS: 100 INJECTION, SOLUTION INTRAVENOUS; SUBCUTANEOUS at 12:22

## 2021-05-12 RX ADMIN — SODIUM CHLORIDE, PRESERVATIVE FREE 10 ML: 5 INJECTION INTRAVENOUS at 09:37

## 2021-05-12 RX ADMIN — SODIUM CHLORIDE, PRESERVATIVE FREE 10 ML: 5 INJECTION INTRAVENOUS at 09:10

## 2021-05-12 RX ADMIN — INSULIN LISPRO 3 UNITS: 100 INJECTION, SOLUTION INTRAVENOUS; SUBCUTANEOUS at 01:22

## 2021-05-12 RX ADMIN — INSULIN LISPRO 3 UNITS: 100 INJECTION, SOLUTION INTRAVENOUS; SUBCUTANEOUS at 09:35

## 2021-05-12 RX ADMIN — Medication 1250 MG: at 09:16

## 2021-05-12 RX ADMIN — PIPERACILLIN AND TAZOBACTAM 3375 MG: 3; .375 INJECTION, POWDER, LYOPHILIZED, FOR SOLUTION INTRAVENOUS at 01:28

## 2021-05-12 RX ADMIN — PIPERACILLIN AND TAZOBACTAM 3375 MG: 3; .375 INJECTION, POWDER, LYOPHILIZED, FOR SOLUTION INTRAVENOUS at 12:25

## 2021-05-12 RX ADMIN — INSULIN LISPRO 6 UNITS: 100 INJECTION, SOLUTION INTRAVENOUS; SUBCUTANEOUS at 18:20

## 2021-05-12 RX ADMIN — PANTOPRAZOLE SODIUM 40 MG: 40 INJECTION, POWDER, FOR SOLUTION INTRAVENOUS at 09:03

## 2021-05-12 RX ADMIN — IPRATROPIUM BROMIDE AND ALBUTEROL SULFATE 1 AMPULE: .5; 2.5 SOLUTION RESPIRATORY (INHALATION) at 12:29

## 2021-05-12 ASSESSMENT — ENCOUNTER SYMPTOMS
COUGH: 0
SHORTNESS OF BREATH: 0
WHEEZING: 0
ABDOMINAL DISTENTION: 1
ABDOMINAL PAIN: 1

## 2021-05-12 ASSESSMENT — PAIN SCALES - GENERAL
PAINLEVEL_OUTOF10: 3
PAINLEVEL_OUTOF10: 0
PAINLEVEL_OUTOF10: 3
PAINLEVEL_OUTOF10: 0
PAINLEVEL_OUTOF10: 0

## 2021-05-12 ASSESSMENT — PAIN DESCRIPTION - LOCATION
LOCATION: ABDOMEN
LOCATION: ABDOMEN

## 2021-05-12 NOTE — PROGRESS NOTES
Current Diet, Modify Oral Nutrition Supplement, Continue Current Parenteral Nutrition  Nutrition Education/Counseling:  No recommendation at this time   Coordination of Nutrition Care:  Continue to monitor while inpatient    Goals:  meet % of estimated nutrient needs       Nutrition Monitoring and Evaluation:   Behavioral-Environmental Outcomes:  None Identified   Food/Nutrient Intake Outcomes:  Food and Nutrient Intake, Supplement Intake, Parenteral Nutrition Intake/Tolerance  Physical Signs/Symptoms Outcomes:  Weight, Biochemical Data, Nutrition Focused Physical Findings     Discharge Planning:     Too soon to determine     Electronically signed by Marleni Black MS, RD, LD on 5/12/21 at 2:15 PM EDT    Contact: [phone]

## 2021-05-12 NOTE — BRIEF OP NOTE
Brief Postoperative Note for Thoracentesis    Taylor Hernandes  YOB: 1950  [id number]    Pre-operative Diagnosis: left Pleural effusion      Post-operative Diagnosis: Same    Procedure: Ultrasound guided Thoracentesis     Anesthesia: 1% Lidocaine     Surgeons/Assistants: Manley Shone Redfox, PA-C    Complications: none    EBL: Minimal    Specimens: were obtained    Ultrasound guided left thoracentesis performed. 250 ml sero/sang fluid obtained. Dressing applied.       Electronically signed by NANDO Camacho on 5/12/2021 at 4:35 PM

## 2021-05-12 NOTE — PROGRESS NOTES
[facility] -Progress Note    Today's Date and Time: 5/12/2021, 1:17 PM    Impression :     Paraesophageal Hiatal hernia  Perforation of the esophagus  S/p laparoscopic, robotic para esophageal hernia repair 5-4-21  Acute pancreatitis  Shock   Hyperglycemia  NSTEMI  BONNIE  Not a MRSA carrier  Coagulase negative Staphylococci bacteremia x 2 on 5-7-21. Repeat blood cultures 5-8-21 x 2 : No growth . Recommendations:   Zosyn started 5/6/21. Will continue same. Stop date 5-20-21  Vancomycin started 5/8/21. Stop date 5-14-21 for Coagulase negative Staphylococci bacteremia    Medical Decision Making/Summary/Discussion:5/12/2021     Patient with large paraesophageal hiatal hernia with perforation of esophagus  Hyperglycemia   Acute pancreatitis  Shock   S/p laparoscopic, robotic para esophageal hernia repair 5-4-21  Improvement in overall picture but is developing basilar infiltrates  Not a MRSA carrier  Unclear whether this is fluid retention vs residual leakage vs secondary pneumonia  Esophagogram 5-7-21 does not show a leak  Will plan to continue zosyn and add Diflucan. Monitor temps and CXR. If further fluid retention becomes apparent then it may be wise to switch to meropenem to decrease the fluid and NA loads associated with Zosyn  Infection Control Recommendations   [place] Precautions      Antimicrobial Stewardship Recommendations     Simplification of therapy  Targeted therapy    Coordination of Outpatient Care:   Estimated Length of IV antimicrobials: TBD  Patient will need Midline Catheter Insertion:No   Patient will need PICC line Insertion:No  Patient will need: Home IV , [place],  SNF,  LTAC:  Patient will need outpatient wound care:Yes    Chief complaint/reason for consultation:   Septic shock/sepsis    History of Present Illness:   Raj Lopez is a 78y.o.-year-old   female who was initially admitted on 5/3/2021.  Patient seen at the request of Lionel Jones Floyd.    INITIAL HISTORY : 05/07/2021    Pt with a history of diabetes mellitus type 2, hypertension, hyperlipidemia, thyroid disease, gout and hiatal hernia. She initially presented on 05/03/2021 to an outlying facility with a chief complaint of nausea vomiting, diarrhea and syncopal attack . Patient was found to have blood glucose of 585,WBC of 23.5, elevated lipase 1451 and elevated beta hydroxybutyrate. MRCP on 05/03/2021 showed diffuse small bowel wall thickening likely due to third spacing as opposed to enteritis. CT scan of the abdomen showed extremely large fluid-filled hiatal hernia and distended and fluid-filled stomach below the diaphragm. Patient was given Zosyn at the outlying facility and was transferred to [facility] ED for evaluation by the surgery team.    CT scan of the chest without contrast was performed on 05/04/2021 which showed ascites and pneumoperitoneum with apparent free-flowing oral contrast in the left upper quadrant concerning for viscus perforation possibly within the subdiaphragmatic portion of the stomach. Moderate bilateral pleural effusions and hiatal hernia with organoaxial volvulus. Bariatric surgery performed a laparoscopic robotic para esophageal hernia repair. Cardiology is also following the patient because of elevated troponins with unremarkable echocardiography. Antibiotics wise the patient was given Zosyn on 05/03/2021 at the outside facility and it has been continued through the present time. Blood and urine cultures on 05/03/2021 show No growth . Repeat urine culture on 05/04/2021 shows No growth     Patient is currently having temperature elevations. Temp max of 101.1 on 05/07/2021 around 4 AM in the morning. Patient WBC count is improving from 23.5 on presentation to 6.2 today.     Repeat chest x-ray 5-7-21 shows bibasilar consolidation new from prior study with blunting of the costophrenic angles 20 mg Oral Every Other Day    insulin glargine  10 Units Subcutaneous Nightly    polyethylene glycol  17 g Oral Daily    [Held by provider] heparin (porcine)  5,000 Units Subcutaneous 3 times per day    vancomycin (VANCOCIN) intermittent dosing (placeholder)   Other RX Placeholder    vancomycin  1,250 mg Intravenous Q24H    ipratropium-albuterol  1 ampule Inhalation Q4H WA    piperacillin-tazobactam  3,375 mg Intravenous Q8H    insulin lispro  0-18 Units Subcutaneous Q4H    sodium chloride flush  5-40 mL Intravenous 2 times per day       Social History:     Social History     Socioeconomic History    Marital status:      Spouse name: Not on file    Number of children: Not on file    Years of education: Not on file    Highest education level: Not on file   Occupational History    Not on file   Social Needs    Financial resource strain: Not on file    Food insecurity     Worry: Not on file     Inability: Not on file    Transportation needs     Medical: Not on file     Non-medical: Not on file   Tobacco Use    Smoking status: Never Smoker    Smokeless tobacco: Never Used   Substance and Sexual Activity    Alcohol use: Never     Frequency: Never    Drug use: Never    Sexual activity: Not on file   Lifestyle    Physical activity     Days per week: Not on file     Minutes per session: Not on file    Stress: Not on file   Relationships    Social connections     Talks on phone: Not on file     Gets together: Not on file     Attends [employer] service: Not on file     Active member of club or organization: Not on file     Attends meetings of clubs or organizations: Not on file     Relationship status: Not on file    Intimate partner violence     Fear of current or ex partner: Not on file     Emotionally abused: Not on file     Physically abused: Not on file     Forced sexual activity: Not on file   Other Topics Concern    Not on file   Social History Narrative    Not on file       Family History:     Family History   Problem Relation Age of Onset    Breast Cancer Mother     High Blood Pressure Mother     High Cholesterol Father     Breast Cancer Sister         Allergies:   No known allergies     Review of Systems:   Review of Systems   Constitutional: Positive for fatigue and fever. Negative for activity change, appetite change, chills, diaphoresis and unexpected weight change. Respiratory: Positive for cough. Negative for apnea, choking, chest tightness, shortness of breath, wheezing and stridor. Cardiovascular: Negative for chest pain, palpitations and leg swelling. Gastrointestinal: Positive for constipation. Negative for abdominal distention, nausea and vomiting. Endocrine: Negative for cold intolerance. Genitourinary: Negative for difficulty urinating and dysuria. Musculoskeletal: Negative for arthralgias and back pain. Neurological: Negative for dizziness, tremors, syncope and facial asymmetry. Hematological: Negative for adenopathy. Psychiatric/Behavioral: Negative for agitation and behavioral problems. The patient is not hyperactive. Physical Examination :     Patient Vitals for the past 8 hrs:   BP Temp Temp src Pulse Resp SpO2   05/12/21 1229     (!) 35 96 %   05/12/21 1130 (!) 138/90 98 °F (36.7 °C) Oral      05/12/21 0803 (!) 143/89 98.2 °F (36.8 °C) Oral 102 18 96 %     General Appearance: Awake, alert  Head:  Normocephalic, no trauma  Eyes: Pupils equal, round, reactive to light, sclera anicteric; conjunctivae pink. No embolic phenomena. ENT: Oropharynx clear, without erythema, exudate, or thrush. No tenderness of sinuses. Mouth/throat: mucosa pink and moist. No lesions. Dentition in good repair. Neck:Supple, without lymphadenopathy. Thyroid normal, No bruits. Pulmonary/Chest: Clear to auscultation, without wheezes, rales, or rhonchi. No dullness to percussion. Cardiovascular: Regular rate and rhythm without murmurs, rubs, or gallops.    Abdomen: GI from 05/04/2021       HISTORY:   ORDERING SYSTEM PROVIDED HISTORY: s/p hiatal hernia reduction  with partial   gastrectomy and gastropexy   TECHNOLOGIST PROVIDED HISTORY:   s/p hiatal hernia reduction  with partial gastrectomy and gastropexy   Reason for Exam: H.H repair/gastrectomy/gastropexy/ 100 ml Omnipaque orally   Acuity: Unknown   Type of Exam: Unknown       70-year-old female status post hiatal hernia reduction with partial   gastrectomy and gastropexy       FINDINGS:   Fluoroscopic  imaging was obtained prior to the administration contrast.       2 gastrostomy tubes are seen projecting over the left upper quadrant.  There   also 2 surgical drains projecting over the left upper quadrant.  Prior   cholecystectomy.  Suture material projects over the left upper quadrant.       The patient drank contrast which migrates through the postoperative region   and into the stomach and small bowel.       There is contrast draining through what appears to be the gastrostomy tubes.       No extraneous collection of contrast is seen beyond the confines of the   stomach.       There is contrast marginating a presumed gastrostomy tube balloon.       Mild gastroesophageal reflux and delayed transit of contrast is seen within   the distal esophagus/GE junction.           Impression   1. Drainage of contrast material through what appears to be the gastrostomy   tubes following the ingestion of contrast.  Contrast does migrate beyond the   postoperative region into the proximal small bowel. 2. No extraneous/extraluminal collection of contrast is seen beyond the   confines of the stomach. 3. 2 surgical drains and 2 presumed gastrostomy tubes are seen overlying the   left upper quadrant.  There does appear to be contrast slightly marginating   the more lateral gastrostomy tube balloon. 4. Delayed migration of contrast through the distal esophagus and GE junction   with mild gastroesophageal reflux.    The findings with organoaxial volvulus.  Majority of contrast is   retained within the esophagus and supradiaphragmatic stomach. 3. Enteric tube extends below the diaphragm with tip outside the field of   view. 4. Moderate bilateral pleural effusions with adjacent consolidation,   atelectasis versus pneumonia. Critical results were called by Dr. Milka Fonseca MD to Sabina Blackmanhermann on   5/4/2021 at 18:30.           Medical Decision [id number]:       Medical Decision Making-Other:     Note:  Labs, medications, radiologic studies were reviewed with personal review of films   Large amounts of data were reviewed  Discussed with nursing Staff, Discharge planner  Infection Control and Prevention measures reviewed  All prior entries were reviewed  Administer medications as ordered  Prognosis: Guarded  Discharge planning reviewed  Follow up as outpatient. Thank you for allowing us to participate in the care of this patient. Please call with questions.     Paola Del Rosario MD           1:17 PM

## 2021-05-12 NOTE — PROGRESS NOTES
[facility]  Office: [address], DO, Angeli Colechristiano, DO, Rui Forrestrohan, DO, Deanna Eye Blood, DO, Eliud Junior MD, Xin Thomas MD, Sushant Benton MD, Cyndi Alonso MD, Marycruz Del Toro MD, Shea Stone MD, Gagan Kilpatrick MD, Flor Eason MD, Shonda Farley, DO, Sourav Shelby MD, Dot Caraballo DO, Murlean Gowers, MD,  Leah Srinivasan DO, Claudio Gomez MD, Lisbet Saravia MD, Garth Dotson MD, Jhoan Fuller MD, Jaguar Mackey [facility], [facility], CNP, Yousuf Simental CNP, Carmencita Menendez, CNS, [place], CNP, Yaneth Puckett, CNP, Aggie Vickers, CNP, Radha Charles, [facility], Jane Chew, CNP, Brandan Mayorga PA-C, Angie Gonzalez, [facility], Camden Voss, CNP, Franny Zamora, CNP, Kristine Mota, CNP, Anil Mcfarland, CNP, Parth Hancock, CNP, Tate Connellmsted, [address] [place] [address]    Progress Note    5/12/2021    9:48 AM    Name:   Blanca Haynes  MRN:     [id number]     Acct:      [de-identified]   Room:   [address] [place] [address] Day:  9  Admit Date:  5/3/2021 12:49 PM    PCP:   Judson Rosas DO  Code Status:  Full Code    Subjective:     C/C:   Chief Complaint   Patient presents with    Blood Sugar Problem     >450    Hernia     hyatial      Interval History Status: mild improvement      Patient evaluated in room sitting in chair, TPN infusing continues, diet advanced to low fiber diet without nausea/vomiting  G tubes clamped, perc drain to suction   Follow-up esophagram without leak on 5/11  Right loculated pleural effusion-plan for thoracentesis and chest tube insertion 5/12    Brief History:     Patient presented to [place] [address] emergency room from [facility].    Patient originally presented to [facility] for the evaluation of nausea vomiting and diarrhea ongoing for 6 days. Patient was recently seen in the [facility] emergency room on 4/28/2021 and sent home.   She states that today (5/3/21) she woke up around 2 AM as she was on the floor. She stated the last thing she recalls was getting up to get water. Patient states that she was lightheaded dizzy and weak and had a syncopal episode and fell. LOC of unknown time. The work-up in the outlying emergency room showed: A metabolic panel with a sodium of 144 potassium 2.2 chloride 78 CO2 greater than 40  creatinine 3.8 with lactic acid of 6 and glucose of 585. Rhina Linger Troponin I 0.404, liver function showed AST of 37 bilirubin of 5 direct bilirubin 0, and a lipase of 1451. Beta hydroxybutyrate 3.66. Hemogram with acute leukocytosis with a WBC of 23.5, hemoglobin 18.3, hematocrit 54.2 a high concern of acute pancreatitis with acute kidney injury and DKA, with non-STEMI. There was endorgan injury with a high lactic 6.6 and a creatinine of 3.8 and troponin of 0.4 with EKG showing acute ischemia but with Q waves in the inferior and anterior leads. Originally the patient was recommended for ICU and the case was discussed with Dr. Zheng Howell and deferred the case to general surgery. And at that time Dr. Cherise Khan recommended ED to ED transfer. At that time the patient was given Zosyn and transferred ED to ED. Review of Systems:     Constitutional:  negative for chills, fevers, sweats, + fatigue  Respiratory:  negative for cough, dyspnea on exertion,+ shortness of breath, wheezing  Cardiovascular:  negative for chest pain, chest pressure/discomfort, lower extremity edema, palpitations  Gastrointestinal:  + abdominal pain, -constipation, -diarrhea, -nausea, -vomiting  Neurological:  negative for dizziness, headache    Medications: Allergies:     Allergies   Allergen Reactions    No Known Allergies        Current Meds:   Scheduled Meds:    furosemide  20 mg Oral Every Other Day    alteplase  1 mg Intracatheter Once    insulin glargine  10 Units Subcutaneous Nightly    polyethylene glycol  17 g Oral Daily    alteplase  1 mg Intracatheter Once   [facility] alteplase  1 mg Intracatheter Once    sodium chloride flush  5-40 mL Intravenous 2 times per day    heparin (porcine)  5,000 Units Subcutaneous 3 times per day    vancomycin (VANCOCIN) intermittent dosing (placeholder)   Other RX Placeholder    vancomycin  1,250 mg Intravenous Q24H    gabapentin  300 mg Oral 3 times per day    ipratropium-albuterol  1 ampule Inhalation Q4H WA    piperacillin-tazobactam  3,375 mg Intravenous Q8H    insulin lispro  0-18 Units Subcutaneous Q4H    levothyroxine  56 mcg Intravenous Daily    And    sodium chloride (PF)  5 mL Intravenous Daily    pantoprazole  40 mg Intravenous Daily    And    sodium chloride (PF)  10 mL Intravenous Daily    sodium chloride flush  5-40 mL Intravenous 2 times per day     Continuous Infusions:    PN-Adult 2-in-1 Central Line (Standard) 60 mL/hr at 217    dextrose      sodium chloride 25 mL (05/10/21 1840)    sodium chloride 25 mL (21 1026)     PRN Meds: acetaminophen, glucose, dextrose, glucagon (rDNA), dextrose, sodium chloride flush, sodium chloride, potassium chloride, magnesium sulfate, oxyCODONE, acetaminophen, artificial tears, sodium chloride flush, sodium chloride, [DISCONTINUED] promethazine **OR** ondansetron, dextrose    Data:     Past Medical History:   has a past medical history of Diabetes mellitus ([address] Gout, Hiatal hernia, Hyperlipidemia, Hypertension, and Thyroid disease. Social History:   reports that she has never smoked. She has never used smokeless tobacco. She reports that she does not drink alcohol or use drugs.      Family History:   Family History   Problem Relation Age of Onset    Breast Cancer Mother     High Blood Pressure Mother     High Cholesterol Father     Breast Cancer Sister        Vitals:  BP (!) 143/89   Pulse 102   Temp 98.2 °F (36.8 °C) (Oral)   Resp 18   Ht 5' 2\" (1.575 m)   Wt 195 lb 5.2 oz (88.6 kg)   SpO2 96%   BMI 35.73 kg/m²   Temp (24hrs), Av.7 °F (37.1 °C), Min:97.5 °F (36.4 °C), Max:99.5 °F (37.5 °C)    Recent Labs     05/11/21 2056 05/12/21 0119 05/12/21 0420 05/12/21  0803   POCGLU 212* 145* 124* 164*       I/O (24Hr): Intake/Output Summary (Last 24 hours) at 5/12/2021 0948  Last data filed at 5/12/2021 0423  Gross per 24 hour   Intake 1811 ml   Output 2001 ml   Net -190 ml       Labs:  Hematology:  Recent Labs     05/10/21  0504 05/11/21 0440 05/12/21  0502   WBC 9.4 8.1 7.7   RBC 3.19* 3.03* 3.45*   HGB 8.8* 8.5* 9.5*   HCT 29.0* 28.5* 31.3*   MCV 90.9 94.1 90.7   MCH 27.6 28.1 27.5   MCHC 30.3 29.8 30.4   RDW 15.5* 15.4* 15.0*   * 135* 184   MPV 11.7 11.5 11.1   INR 1.0 1.1 1.0     Chemistry:  Recent Labs     05/10/21  0504 05/11/21  0440 05/12/21  0502   * 143 139   K 3.8 4.4 4.2   * 109* 104   CO2 25 24 24   GLUCOSE 219* 211* 128*   BUN 33* 34* 35*   CREATININE 1.20* 1.18* 1.30*   MG 1.7  --  1.8   ANIONGAP 10 10 11   LABGLOM 45* 46* 41*   GFRAA 54* 55* 49*   CALCIUM 8.8 8.4* 8.7   PHOS 3.5  --  3.9     Recent Labs     05/10/21  0504 05/10/21  0504 05/11/21 0440 05/11/21 0440 05/11/21  1118 05/11/21  1607 05/11/21 2056 05/12/21 0119 05/12/21 0420 05/12/21  0502 05/12/21  0803   PROT 5.5*  --  5.5*  --   --   --   --   --   --  6.0*  --    LABALBU 1.8*  --  1.5*  --   --   --   --   --   --  1.5*  --    AST 12  --  10  --   --   --   --   --   --  11  --    ALT 24  --  16  --   --   --   --   --   --  12  --    ALKPHOS 82  --  81  --   --   --   --   --   --  92  --    BILITOT 0.34  --  0.30  --   --   --   --   --   --  0.35  --    BILIDIR 0.16  --  0.18  --   --   --   --   --   --  0.20  --    TRIG 335*  --   --   --   --   --   --   --   --   --   --    POCGLU  --    < > 211*   < > 211* 270* 212* 145* 124*  --  164*    < > = values in this interval not displayed.      ABG:  Lab Results   Component Value Date    POCPH 7.433 05/06/2021    PHART 7.193 05/04/2021    POCPCO2 36.8 05/06/2021    LKD7RVO 52.0 05/04/2021    POCPO2 150.9 3rd spacing as the post enteritis. 5. Trace bilateral pleural effusions. Ct Chest Abdomen Pelvis Wo Contrast    Result Date: 5/7/2021  1. Within the chest, the patient has consolidative processes in both lower lobes likely pneumonitis. Bilateral pleural effusions and basilar atelectasis are noted. 2. Postoperative changes within the abdomen. The patient had a hiatal hernia repair. Although the stomach is decompressed, gastric walls appear thickened likely secondary to inflammation which may be postoperative. 3. Fluid in the right pericolic gutter and pelvis. 4. Thickened small bowel loops in the right lower quadrant which may represent secondary changes to surgery. 5. Two drain placements in the upper abdomen. Left inguinal terminates in the left iliac vein. Fl Ugi    Result Date: 5/4/2021  Organo-axial volvulus the stomach. Large paraesophageal hernia containing the majority of the rotated gastric body. The greater curvature is positioned superiorly within the paraesophageal hernia defect. There is narrowing of the gastroesophageal junction as well as of the gastroduodenal junction through the hernia defect. Physical Examination:        General appearance:  alert, cooperative and no distress  Mental Status:  oriented to person, place and time and normal affect  Lungs:  clear to auscultation bilaterally, normal effort  Heart:  regular rate and rhythm, no murmur  Abdomen:  Obese, soft, tender, nondistended,hypoactive bowel sounds, no masses, hepatomegaly, splenomegaly  Extremities:  no edema, redness, tenderness in the calves  Skin:  no gross lesions, rashes, induration, abd surgical incisions, bilateral PERC drain.  Right-sided PERC drain with bilious output    Assessment:        Hospital Problems           Last Modified POA    * (Principal) Septic shock ([address] 5/4/2021 Yes    Acute pancreatitis 5/9/2021 Yes    Non-STEMI (non-ST elevated myocardial infarction) ([address] 5/4/2021 Yes    Diabetic ketoacidosis without coma associated with type 2 diabetes mellitus ([address] 5/4/2021 Yes    BONNIE (acute kidney injury) ([address] 5/4/2021 Yes    Hernia with obstruction 5/4/2021 Yes    Essential hypertension 5/4/2021 Yes    Thyroid disease 5/4/2021 Yes    Syncope 5/4/2021 Yes    Acute tubular necrosis ([address] 5/4/2021 Yes    Lactic acid acidosis 5/8/2021 Yes    Acute hypoxemic respiratory failure ([address] 5/4/2021 Yes    Hiatal hernia 5/5/2021 Yes    Gastric volvulus 5/5/2021 Yes    Community acquired bilateral lower lobe pneumonia 5/8/2021 Yes    Mediastinitis 5/8/2021 Yes    Peritonitis ([address] 5/8/2021 Yes          Plan: 1. Septic shock  2. Gastric ischemia and subsequent pneumoperitoneum  - Underwent emergent wedge gastrectomy, gastropexy x2, placement of TAISHA drains x2- clamped yesterday , abdominal lavage with gen surg  -Concern for esophageal leak, esophagram ordered for 5/11  -On Zosyn, Vanco, fluconazole; ID following   - initial blood cx positive for staph epidermidis.  Likely contaminant  - urine, repeat blood cultures NGTD X D3  - no longer requiring pressors  - PICC line placed 5/10  -TPN continues      3. Type 2 DM, non-insulin dependent: improving   - complicated by hyperglycemia and ketosis  - continue to check blood glucose q4h with liquid diet  - MI-ISS,  lantus 5U qhs, increased to 10 units      4. Acute kidney injury secondary to ATN from hypotension/hypoperfusion: resolved  - continue to monitor creatinine.   - Continue resuscitative fluids-  1.4 L positive fluid balance  - Nephrology following.   - Creatinine is steadily improving, nearing plateau. Currently 1.3  - avoid nephrotoxic agents      5. Acute hypoxemic respiratory failure  - likely secondary to impaired lung expansion from large hiatal hernia/septic shock  - Extubated on 5/6, saturating well on LFO2  -Right loculated pleural effusion status post thoracentesis and chest tube placement on 5/12 with IR     6. NSTEMI  - likely demand ischemia in setting of septic shock.   - Cardiology following.    - Echo showing normal EF with mild pulm HTN  - EKG  shows inferior infarct.   - Patient will need ischemic work-up following resolution of acute issues, follow up with cardiology as outpatient      7. Hypernatremia resolved  - nephro following   - Na 139     8. Thrombocytopenia: resolved  - likely from sepsis.    - Continue to monitor.      9. Hypothyroidism  - TSH elevated to 13.36. in the setting of acute illness.   - Free T4 normal.   - transition to po levothyroxine     10. Elevated PT/INR  - resolved.     ALFONZO Vega NP  5/12/2021  9:48 AM

## 2021-05-12 NOTE — PROGRESS NOTES
General  Chart Reviewed: Yes  Response To Previous Treatment: Patient with no complaints from previous session. Family / Caregiver Present: No  Subjective  Subjective: Pt and RN agreeable to PT/OT this morning. Pt resting in bed upon arrival with minimal c/o abdominal pain. Pt cooperative throughout. General Comment  Comments: Pt retired to chair at end of session working with OT. Pain Screening  Patient Currently in Pain: Yes  Pain Assessment  Pain Assessment: 0-10  Pain Level: 3  Pain Type: Acute pain  Pain Location: Abdomen  Vital Signs  Patient Currently in Pain: Yes       Orientation  Orientation  Overall Orientation Status: Within Functional Limits  Orientation Level: Oriented to time;Oriented to person;Disoriented to place;Oriented to situation  Cognition   Cognition  Overall Cognitive Status: WFL  Objective   Bed mobility  Supine to Sit: Moderate assistance;2 Person assistance(Pt required Kirsten for BLE progression to EOB, and modA for trunk progression.)  Sit to Supine: (Pt retired to chair at end of session.)  Scooting: Moderate assistance  Comment: HOB elevated, utilized bed rails. Transfers  Sit to Stand: Minimal Assistance;2 Person Assistance  Stand to sit: Minimal Assistance;2 Person Assistance  Comment: RW used. required cues for coorect hand placement throughout with good return. Ambulation  Ambulation?: Yes  Ambulation 1  Surface: level tile  Device: Rolling Walker  Other Apparatus: O2  Assistance: Contact guard assistance;Minimal assistance;2 Person assistance(2 assist for safety)  Gait Deviations: Slow Cami;Decreased step length  Distance: 3ft to bedside chair. Comments: 1 minor posterior LOB, pt able to correct with Kirsten from therapist.  Stairs/Curb  Stairs?: No     Balance  Posture: Fair  Sitting - Static: Fair  Sitting - Dynamic: Fair;-  Standing - Static: Poor;+  Standing - Dynamic: Poor  Comments: RW used to assess standing balance.  Pt with a slight posterior lean when seated EOB requiring Norma/CGA to maintain seated balance. Exercises  Straight Leg Raise: x5  Heelslides: x10  Hip Flexion: seated marches x10  Hip Abduction: x10  Knee Long Arc Quad: x10  Ankle Pumps: x10        Goals  Short term goals  Time Frame for Short term goals: 14 visits  Short term goal 1: Prevent contractures through ROM and stretching. Short term goal 2: Pt to follow most commands for active participation in treatment  Short term goal 3: Progress with mobility as appropriate. Patient Goals   Patient goals : Get tube out.     Plan    Plan  Times per week: 5x/week  Current Treatment Recommendations: Strengthening, Endurance Training, Cognitive Reorientation, Functional Mobility Training  Safety Devices  Type of devices: Nurse notified, Call light within reach, Gait belt, Left in chair, All fall risk precautions in place  Restraints  Initially in place: No     Therapy Time   Individual Concurrent Group Co-treatment   Time In 0843         Time Out 0903         Minutes 20         Timed Code Treatment Minutes: 12 Minutes(Partial co-treat with OT)       Senait Fail, PTA

## 2021-05-12 NOTE — PLAN OF CARE
Problem: OXYGENATION/RESPIRATORY FUNCTION  Goal: Patient will maintain patent airway  Outcome: Ongoing     Problem: OXYGENATION/RESPIRATORY FUNCTION  Goal: Patient will achieve/maintain normal respiratory rate/effort  Description: Respiratory rate and effort will be within normal limits for the patient  Outcome: Ongoing     Problem: SKIN INTEGRITY  Goal: Skin integrity is maintained or improved  Outcome: Ongoing   Skin assessed for breakdown and redness, patient turned regularly, and heels elevated off of bed on pillows.

## 2021-05-12 NOTE — PROGRESS NOTES
Bariatric Surgery Progress Note      PATIENT NAME: Dunia Bishop   TODAY'S DATE: 5/12/2021, 6:19 AM  Chief Complaint   Patient presents with    Blood Sugar Problem     >450    Hernia     Hiatal      SUBJECTIVE:    Pt seen and examined. UOP adequate. States pain is well controlled. Tolerating FLD. L TAISHA drain removed 5/7.  R TAISHA removed 5/11    Tmax 37.5    OBJECTIVE:   Vitals:  BP (!) 144/78   Pulse 101   Temp 98.7 °F (37.1 °C) (Oral)   Resp 21   Ht 5' 2\" (1.575 m)   Wt 195 lb 5.2 oz (88.6 kg)   SpO2 (!) 88%   BMI 35.73 kg/m²      INTAKE/OUTPUT:      Intake/Output Summary (Last 24 hours) at 5/12/2021 0619  Last data filed at 5/12/2021 0423  Gross per 24 hour   Intake 1811 ml   Output 2351 ml   Net -540 ml                 General: alert, oriented  Lungs: Symmetrical chest rise bilaterally  Heart: S1S2  Abdomen: Soft, ND, appropriately tender, non peritoneal, no rebound, incisions c/d/i, PEG x2 with no surrounding erythema   Extremity: moves all extremities x4, trace edema    Data Review:  CBC:   Recent Labs     05/10/21  0504 05/11/21  0440 05/12/21  0502   WBC 9.4 8.1 PENDING   HGB 8.8* 8.5* 9.5*   * 135* PENDING     BMP:    Recent Labs     05/10/21  0504 05/11/21  0440 05/12/21  0502   * 143 139   K 3.8 4.4 4.2   * 109* 104   CO2 25 24 24   BUN 33* 34* 35*   CREATININE 1.20* 1.18* 1.30*   GLUCOSE 219* 211* 128*     Hepatic:   Recent Labs     05/10/21  0504 05/11/21  0440 05/12/21  0502   AST 12 10 11   ALT 24 16 12   ALKPHOS 82 81 92   BILITOT 0.34 0.30 0.35   BILIDIR 0.16 0.18 0.20     Coagulation:   Recent Labs     05/10/21  0504 05/11/21  0440 05/12/21  0502   APTT 22.7 26.1 27.9   PROT 5.5* 5.5* 6.0*   INR 1.0 1.1 1.0       ASSESSMENT   Patient Active Problem List   Diagnosis    Acute pancreatitis    Sept[facility] shock ([facility]    Non-STEMI (non-ST elevated myocardial infarction) ([facility]    Diabetic ketoacidosis without coma associated with type 2 diabetes mellitus ([facility]    [facility]ONNIE [facility]    Hernia with obstruction    Essential hypertension    Thyroid disease    Syncope    Acute tubular necrosis (HCC)    Lactic acid acidosis    Acute hypoxemic respiratory failure (HCC)    Hiatal hernia    Gastric volvulus    Community acquired bilateral lower lobe pneumonia    Mediastinitis    Peritonitis ([facility]     77 yo F s/p 5/4 emergent robotic assisted laparoscopic hiatal hernia reduction with gastropexy via g-tube x2     5/7 esophagram performed no leak noted    PLAN  1. Clay Slipper for low fiber diet as tolerated, cont nutritional supplements, g-tubes clamped at this time, ok to unclamp if pt feeling nauseated or has emesis   2. Cont recs and management per primary/ Pulmonary following  3. Cont abx per ID, + blood cultures, defer abx regimen to primary   4. Encourage IS use, deep breathing, ambulation and PT/OT work   5.  Clay Slipper for DVT ppx from surg stance     Thank you,    Electronically signed by Rosemarie Burnett DO  on 5/12/2021 at 6:19 AM

## 2021-05-12 NOTE — PROGRESS NOTES
revealed loculated right-sided effusion. I/O+ 3.2 L  Review of Systems:  Review of Systems   Constitutional: Positive for activity change. HENT: Negative for congestion. Respiratory: Negative for cough, shortness of breath and wheezing. Gastrointestinal: Positive for abdominal distention and abdominal pain. Genitourinary: Negative for difficulty urinating. Musculoskeletal: Negative. Neurological: Negative for light-headedness and numbness. Psychiatric/Behavioral: Negative for agitation.          OBJECTIVE     VITAL SIGNS:   LAST-  BP (!) 138/90   Pulse 102   Temp 98 °F (36.7 °C) (Oral)   Resp (!) 35   Ht 5' 2\" (1.575 m)   Wt 195 lb 5.2 oz (88.6 kg)   SpO2 96%   BMI 35.73 kg/m²   8-24 HR RANGE-  TEMP Temp  Av.7 °F (37.1 °C)  Min: 97.5 °F (36.4 °C)  Max: 99.5 °F (77.7 °C)   BP Systolic (78XVI), AVF:324 , Min:124 , SFL:380      Diastolic (76FBM), XHR:75, Min:78, Max:106     PULSE Pulse  Av.7  Min: 88  Max: 109   RR Resp  Av.5  Min: 18  Max: 35   O2 SAT SpO2  Av %  Min: 96 %  Max: 96 %   OXYGEN DELIVERY O2 Flow Rate (L/min)  Av L/min  Min: 2 L/min  Max: 2 L/min     Systemic Examination:   General appearance alert and oriented   mental status - alert  Eyes - pupils equal and reactive, extraocular eye movements intact  Mouth - mucous membranes moist, pharynx normal without lesions  Neck - supple, no significant adenopathy  Chest -dull to percussion bilaterally and decreased breath sounds  Heart - normal rate, regular rhythm, normal S1, S2, no murmurs, rubs, clicks or gallops  Abdomen -soft with one PEG tube and G-tube, clamped  Neurological - alert, oriented, normal speech, no focal findings or movement disorder noted  Extremities - peripheral pulses normal, no pedal edema, no clubbing or cyanosis  Skin - normal coloration and turgor, no rashes, no suspicious skin lesions noted   PICC line in place  DATA REVIEW     Medications:  Scheduled Meds:   levothyroxine  112 mcg Oral Daily    [START ON 5/13/2021] pantoprazole  40 mg Oral QAM AC    gabapentin  300 mg Oral TID    furosemide  20 mg Oral Every Other Day    insulin glargine  10 Units Subcutaneous Nightly    polyethylene glycol  17 g Oral Daily    [Held by provider] heparin (porcine)  5,000 Units Subcutaneous 3 times per day    vancomycin (VANCOCIN) intermittent dosing (placeholder)   Other RX Placeholder    vancomycin  1,250 mg Intravenous Q24H    ipratropium-albuterol  1 ampule Inhalation Q4H WA    piperacillin-tazobactam  3,375 mg Intravenous Q8H    insulin lispro  0-18 Units Subcutaneous Q4H    sodium chloride flush  5-40 mL Intravenous 2 times per day     Continuous Infusions:   PN-Adult 2-in-1 Central Line (Standard) 60 mL/hr at 05/11/21 1817    dextrose      sodium chloride 25 mL (05/10/21 1840)     LABS:-  ABGs:   No results found for: PH, PCO2, PO2, HCO3, O2SAT  No results for input(s): PHART, PO2ART, XES7XSD, BZT1UJP, BEART, W8PITGUM in the last 72 hours. Lab Results   Component Value Date    POCPH 7.433 05/06/2021    POCPCO2 36.8 05/06/2021    POCPO2 150.9 (H) 05/06/2021    POCHCO3 24.6 05/06/2021    AFWS0OOK 99 (H) 05/06/2021     CBC:   Recent Labs     05/10/21  0504 05/11/21  0440 05/12/21  0502   WBC 9.4 8.1 7.7   HGB 8.8* 8.5* 9.5*   HCT 29.0* 28.5* 31.3*   MCV 90.9 94.1 90.7   * 135* 184   LYMPHOPCT 27 24 14*   RBC 3.19* 3.03* 3.45*   MCH 27.6 28.1 27.5   MCHC 30.3 29.8 30.4   RDW 15.5* 15.4* 15.0*     BMP:   Recent Labs     05/10/21  0504 05/11/21  0440 05/12/21  0502   * 143 139   K 3.8 4.4 4.2   * 109* 104   CO2 25 24 24   BUN 33* 34* 35*   CREATININE 1.20* 1.18* 1.30*   GLUCOSE 219* 211* 128*   PHOS 3.5  --  3.9     Liver Function Test:   Recent Labs     05/12/21  0502   PROT 6.0*   LABALBU 1.5*   ALT 12   AST 11   ALKPHOS 92   BILITOT 0.35     Amylase/Lipase:  No results for input(s): AMYLASE, LIPASE in the last 72 hours.   Coagulation Profile:   Recent Labs 05/10/21  0504 05/11/21  0440 05/12/21  0502   INR 1.0 1.1 1.0   PROTIME 10.9 11.4 11.1   APTT 22.7 26.1 27.9     Cardiac Enzymes:  No results for input(s): CKTOTAL, CKMB, CKMBINDEX, TROPONINI in the last 72 hours. Lactic Acid:  Lab Results   Component Value Date    LACTA 3.2 (H) 05/03/2021    LACTA 6.0 (HH) 05/03/2021     BNP:   No results found for: BNP  D-Dimer:  No results found for: DDIMER  Others:   Lab Results   Component Value Date    TSH 13.36 (H) 05/03/2021     No results found for: LILLY, RHEUMFACTOR, SEDRATE, CRP  No results found for: Alois Dynes  No results found for: IRON, TIBC, FERRITIN  No results found for: SPEP, UPEP  No results found for: PSA, CEA, , [id number],     Input/Output:    Intake/Output Summary (Last 24 hours) at 5/12/2021 1350  Last data filed at 5/12/2021 0423  Gross per 24 hour   Intake 1811 ml   Output 1651 ml   Net 160 ml       Microbiology:    Pathology:    Radiology Reports:  FL ESOPHAGRAM   Final Result   1. No evidence for esophageal leak. 2. Postsurgical changes in the stomach. 3. Large amount of gastroesophageal reflux occurring repeatedly during the   study. .         CT CHEST WO CONTRAST   Final Result   New right-sided PICC again seen with unchanged and satisfactory tip position;   larger loculated right effusion in the azygoesophageal recess, as above. Otherwise similar findings to 05/07/2021 with suspected lower lobe   consolidation/pneumonitis, with volume loss and effusions both lower lobes. XR CHEST PORTABLE   Final Result   New right-sided PICC tip position appears satisfactory. Otherwise, similar   findings compatible with bibasilar atelectasis/consolidation, effusions and   minimal infiltrate or atelectasis right mid lung. CT CHEST ABDOMEN PELVIS WO CONTRAST   Final Result   1. Within the chest, the patient has consolidative processes in both lower   lobes likely pneumonitis.   Bilateral pleural effusions and basilar   atelectasis are noted. 2. Postoperative changes within the abdomen. The patient had a hiatal hernia   repair. Although the stomach is decompressed, gastric walls appear thickened   likely secondary to inflammation which may be postoperative. 3. Fluid in the right pericolic gutter and pelvis. 4. Thickened small bowel loops in the right lower quadrant which may   represent secondary changes to surgery. 5. Two drain placements in the upper abdomen. Left inguinal terminates in   the left iliac vein. FL ESOPHAGRAM   Final Result   1. Drainage of contrast material through what appears to be the gastrostomy   tubes following the ingestion of contrast.  Contrast does migrate beyond the   postoperative region into the proximal small bowel. 2. No extraneous/extraluminal collection of contrast is seen beyond the   confines of the stomach. 3. 2 surgical drains and 2 presumed gastrostomy tubes are seen overlying the   left upper quadrant. There does appear to be contrast slightly marginating   the more lateral gastrostomy tube balloon. 4. Delayed migration of contrast through the distal esophagus and GE junction   with mild gastroesophageal reflux. The findings were sent to the Radiology Results [address] at 12:43   pm on 5/7/2021to be communicated to a licensed caregiver. XR CHEST PORTABLE   Final Result   Bibasilar consolidation new from prior study. Blunting of the costophrenic   angles bilaterally         XR CHEST PORTABLE   Final Result   ETT tip position stable. Decreased left subcutaneous emphysema. Slightly   improved suspected left basilar volume loss with persistent findings as   above. No overt vascular congestion. US RENAL COMPLETE   Final Result   Unremarkable ultrasound of the kidneys and urinary bladder.       Trace right pleural effusion         XR CHEST PORTABLE   Final Result   Volume loss continues left hemithorax with haziness at the left base either   atelectasis and or fluid. Subcutaneous emphysema along the left lateral   chest wall has decreased. No appreciable extrapleural air. Endotracheal   tube in appropriate position. XR CHEST PORTABLE   Final Result   1. Recommend repositioning of left internal jugular approach central venous   catheter. 2. Low lung volumes with left basilar atelectasis plus or minus mild pleural   effusion. 3. Left chest wall scattered soft tissue emphysema. XR CHEST PORTABLE   Final Result   Intestinal tube deviates to the left side of a sizable hiatal hernia,   traversing below the hemidiaphragm and terminating just underneath the left   hemidiaphragm. Side port of the intestinal tube is below the diaphragmatic   hiatus. CT CHEST WO CONTRAST   Final Result   1. Ascites and pneumoperitoneum with apparent free-flowing oral contrast in   the left upper quadrant is concerning for viscus perforation, possibly within   the subdiaphragmatic portion of the stomach. 2. Large hiatal hernia with organoaxial volvulus. Majority of contrast is   retained within the esophagus and supradiaphragmatic stomach. 3. Enteric tube extends below the diaphragm with tip outside the field of   view. 4. Moderate bilateral pleural effusions with adjacent consolidation,   atelectasis versus pneumonia. Critical results were called by Dr. Yoly Garcia MD to [facility] on   5/4/2021 at 18:30. XR ABDOMEN (KUB) (SINGLE AP VIEW)   Final Result   No significant subdiaphragmatic contrast progression, as above. RECOMMENDATION:   Consider chest x-ray to further assess a organic-axial gastric volvulus         FL UGI   Final Result   Organo-axial volvulus the stomach. Large paraesophageal hernia containing the majority of the rotated gastric   body. The greater curvature is positioned superiorly within the   paraesophageal hernia defect.   There is narrowing of the gastroesophageal   junction as well as of the gastroduodenal visualization    Indications:Elevated troponin. History / Tech. Comments:  Procedure explained to patient. No known medical Hx. Patient Status: Inpatient    Height: 62 inches Weight: 166 pounds BSA: 1.77 m^2 BMI: 30.36 kg/m^2    CONCLUSIONS    Summary  Normal LV size and wall thickness. No obvious wall motion abnormality seen. Normal LV systolic function with LVEF 55%. RV appears dilated with reduced function. RV systolic pressure 37 mmHg  LA appears normal in size. No obvious significant structural valvular abnormality noted. No significant valvular stenosis or regurgitation noted. Normal aortic root dimension. No significant pericardial effusion noted. Signature  ----------------------------------------------------------------------------   Electronically signed by Connor Montoya(Interpreting physician) on   05/05/2021 12:15 PM  ----------------------------------------------------------------------------    ----------------------------------------------------------------------------   Electronically signed by Olimpia Allen(Sonographer) on 05/05/2021 11:37 AM  ----------------------------------------------------------------------------  FINDINGS  Left Atrium  Left atrium is normal in size. Inter-atrial septum is intact with no evidence for an atrial septal defect,  by color doppler. Left Ventricle  Left ventricle is small in size, normal wall thickness, global left  ventricular systolic function is normal, calculated ejection fraction is  53%. All wall segments are not well visualized (poor acoustic windows.)  Right Atrium  Right atrial dilatation. Right Ventricle  Right ventricular dilatation with reduced systolic function. Abnormal RV strain. Mitral Valve  Normal mitral valve structure. Trivial mitral regurgitation. Aortic Valve  Aortic valve is trileaflet. No evidence of aortic insufficiency or stenosis. Tricuspid Valve  Normal tricuspid valve leaflets.   Moderate tricuspid volvulus with Viscus perforation  Sepsis   Bilateral lower lobe pneumonia  Peritonitis  Blood cultures positive for staph epidermidis, methicillin resistant  Diabetes mellitusDKA now resolved  Lactic acidosis resolving  BONNIE  Thrombocytopenia   Elevated troponins  Subclinical hypothyroidism  Elevated bilirubin  Hypernatremia    Plan:    \    Observe closely for signs of pleural space/mediastinal sepsis. Encourage ambulation. Would mobilize up out of bed. Use Tylenol as needed to check for fever  Antibiotics per infectious disease. Sepsis likely secondary to gastric perforation continue on Zosyn, completed fluconazole  Blood cultures grew staph epidermidis, vancomycin added till 5/14  Gastric volvulus with viscus perforation s/p robotic assisted laparoscopic hiatal hernia reduction with gastropexy. Has 1PEG, 1 G drain in placeon Zosyn. On TPN and liquid diet  Acute kidney injurynonoliguric, good urine output after Walker change, nephrology following. Hypernatremia-on Ringer lactate  Continue to monitor I/O with a goal of even/negative fluid balance  Dvt prophylaxis- heparin    Raford Fix, DO  Pulmonary and Critical Care Medicine           5/12/2021, 1:50 PM  Attending Physician Statement  I have discussed the care of Sydnee Beauchamp, including pertinent history and exam findings,  with the resident. I have seen and examined the patient and the key elements of all parts of the encounter have been performed by me. I agree with the assessment, plan and orders as documented by the resident with additions . CT of the chest reviewed and discussed with Dr. Carola Arteaga in detail. There is right loculated pleural effusion and there is also moderate left loculated pleural effusion. Dr. Carola Arteaga agreed with the plan of proceeding with IR guided right pigtail placement and analysis of the fluid and left diagnostic thoracentesis with analysis of the fluid.   He also reviewed yesterday's esophagogram which showed that the stomach is below the diaphragm. I discussed the case with interventional radiology team.  Orders placed  Nurse taking care of patient updated    I called Mr. Arianna Ovalles patient's son and updated regarding the plan. Continue antibiotics    Treatment plan Discussed with nursing staff in detail , all questions answered . Electronically signed by Jojo Montesinos MD on   5/12/21 at 2:38 PM EDT    Please note that this chart was generated using voice recognition Dragon dictation software. Although every effort was made to ensure the accuracy of this automated transcription, some errors in transcription may have occurred.

## 2021-05-12 NOTE — PROGRESS NOTES
Patient in room 8  Pulse ox and BP taken  NANDO LINARES DR in room   KP-RT tech in room  Site prepped and draped  Access obtained  Draining RED  colored fluid  TOTAL OF 250ML DRAINED AND SENT TO LAB  Access removed  Site covered with 2x2 and tegaderm  Patient tolerated WELL  REPORT TO FLOOR RN  Stable upon transport.

## 2021-05-12 NOTE — PROGRESS NOTES
Occupational Therapy  Facility/Department: [facility] NEURO  Daily Treatment Note  NAME: Jennifer Rojas  : 1950  MRN: [id number]    Date of Service: 2021    Discharge Recommendations:  Patient would benefit from continued therapy after discharge       Assessment   Performance deficits / Impairments: Decreased functional mobility ; Decreased ADL status; Decreased endurance;Decreased high-level IADLs;Decreased safe awareness;Decreased balance;Decreased posture  Prognosis: Good  Patient Education: Pt educated on OT role, bed mobility, transfer/walker safety, hand placement, ADLs, pursed lip breathing-good return  REQUIRES OT FOLLOW UP: Yes  Activity Tolerance  Activity Tolerance: Patient Tolerated treatment well;Patient limited by fatigue  Safety Devices  Safety Devices in place: Yes  Type of devices: Gait belt;Call light within reach; Patient at risk for falls; Left in chair;Nurse notified  Restraints  Initially in place: No         Patient Diagnosis(es): The primary encounter diagnosis was Acute pancreatitis, unspecified complication status, unspecified pancreatitis type. Diagnoses of BONNIE (acute kidney injury) ([facility] [address] and Hiatal hernia were also pertinent to this visit. has a past medical history of Diabetes mellitus ([address] Gout, Hiatal hernia, Hyperlipidemia, Hypertension, and Thyroid disease. has a past surgical history that includes Cholecystectomy (); Hysterectomy (); Breast surgery; other surgical history (); other surgical history (); Tubal ligation (); Gastric fundoplication (N/A, ); and hc picc line double lumen (5/10/2021). Restrictions  Restrictions/Precautions  Restrictions/Precautions: General Precautions, Fall Risk, Surgical Protocols  Required Braces or Orthoses?: No  Position Activity Restriction  Other position/activity restrictions:  Up with assist,  \"PARAESPHAGEAL HERNIA REPAIR LAPAROSCOPIC ROBOTIC\", O2 NC 2Lm  Subjective   General  Patient assessed for rehabilitation services?: Yes  Family / Caregiver Present: No  General Comment  Pain Assessment  Pain Level: 3  Pain Type: Acute pain  Pain Location: Abdomen  Pain Descriptors: Discomfort; Sore  Non-Pharmaceutical Pain Intervention(s): Repositioned;Rest;Therapeutic presence  Vital Signs  Patient Currently in Pain: Yes   Orientation  Orientation  Overall Orientation Status: Within Functional Limits  Objective    ADL  Grooming: Setup;Stand by assistance;Verbal cueing; Increased time to complete(Face/hair washing, hair brushing seated in recliner)  UE Bathing: Increased time to complete;Setup;Verbal cueing;Stand by assistance  UE Dressing: Increased time to complete;Minimal assistance;Setup(gown management snaps and ties)  Toileting: Maximum assistance;Setup(place pure wick in)  Additional Comments: Pt seated in recliner for grooming and ADLs. Pt req mod assistance to sit up straight throughout session. Pt req verbal cues to initate and sequence throughout each tasks-fair return Pt req increased time this date d/t being fatigued at the end of the session. Pt supine in recliner for writer to replace pure wick. Balance  Sitting Balance: Moderate assistance(mod assistance sitting EOB/recliner for grooming and ADLs for ~30 minutes. Pt initially unsteady when transferred to EOB. Pt utilized bed rail for trunk support.)  Standing Balance: Minimal assistance  Standing Balance  Time: ~2 minutes  Activity: Short functional mobility using RW EOB/ recliner  Comment: Using RW. Functional Mobility  Functional - Mobility Device: Rolling Walker  Activity: Other(to recliner)  Assist Level: Minimal assistance(x2 for safety)  Functional Mobility Comments: Verbal and tactile cues for hand placement-fair return. Short functional mobility using RW EOB/chair with shuffling gait. Verbal cues to sequence throughout func mobility. Pt had one mild posterior LOB, pt unaware.   Bed mobility  Supine to Sit: Moderate assistance;2 Person assistance(for trunk and BLE progression)  Scooting: Moderate assistance  Comment: HOB elevated, Verbal cues for hand placement-fair return pt utilized bed rails for trunk support. Pt became SOB after supine to sit. Educated patient on pursed lip breathing-good return. Transfers  Sit to stand: 2 Person assistance; Moderate assistance  Stand to sit: Moderate assistance;2 Person assistance  Transfer Comments: Use of RW. Pt fatigued after functional mobility. Cognition  Overall Cognitive Status: Exceptions  Following Commands: Follows multistep commands with repitition; Follows multistep commands with increased time  Attention Span: Attends with cues to redirect  Memory: Appears intact  Safety Judgement: Decreased awareness of need for assistance  Problem Solving: Assistance required to correct errors made;Assistance required to identify errors made  Insights: Decreased awareness of deficits  Initiation: Requires cues for some  Sequencing: Requires cues for some  Cognition Comment: Pt followed commands initially but became fatigue requring more verbal cues  Plan   Plan  Times per week: 4-5x/week  Current Treatment Recommendations: Safety Education & Training, Balance Training, Patient/Caregiver Education & Training, Self-Care / ADL, Functional Mobility Training, Equipment Evaluation, Education, & procurement, Home Management Training, Endurance Training, Strengthening    Goals  Short term goals  Time Frame for Short term goals: By discharge, pt will:  Short term goal 1: Demo bed mobility with Min A, using LRD  Short term goal 2: Demo functional transfers with Min A, using LRD  Short term goal 3: Demo functional mobility with Mod A, using LRD  Short term goal 4: Demo UB ADLs with CGA, using AE/DME PRN  Short term goal 5: Demo LB ADLs with Min A, setup, use of AE/DME PRN  Short term goal 6: Demo +5 minutes of static/dynamic standing with CGA to increase engagement in ADLs       Therapy Time   Individual Concurrent Group Co-treatment   Time In 0843         Time Out 0930         Minutes 47         Timed Code Treatment Minutes: 38 Minutes             Co-treat PT     Pt supine in bed upon therapist arrival. Pt agreeable and pleasant for therapy. See above LOF for all tasks. Pt retired seated in chair at the end of the session with call light within reach.     Marbella ARSHAD/KELLEE ENCINAS/BAMBI

## 2021-05-12 NOTE — PLAN OF CARE
Problem: OXYGENATION/RESPIRATORY FUNCTION  Goal: Patient will maintain patent airway  Outcome: Ongoing     Problem: SKIN INTEGRITY  Goal: Skin integrity is maintained or improved  Outcome: Ongoing

## 2021-05-12 NOTE — CARE COORDINATION
Spoke to son Kateyrosedesiree Bill him that [facility] in [place] is unable to accept. He states he would like Juanita Graf from [place] to try the facility in [place].   Called Juanita Graf  informed him of referral.

## 2021-05-13 ENCOUNTER — APPOINTMENT (OUTPATIENT)
Dept: INTERVENTIONAL RADIOLOGY/VASCULAR | Age: 71
DRG: 853 | End: 2021-05-13
Payer: MEDICARE

## 2021-05-13 LAB
ABSOLUTE EOS #: 0.14 K/UL (ref 0–0.44)
ABSOLUTE IMMATURE GRANULOCYTE: 0.05 K/UL (ref 0–0.3)
ABSOLUTE LYMPH #: 0.61 K/UL (ref 1.1–3.7)
ABSOLUTE MONO #: 0.24 K/UL (ref 0.1–1.2)
ALBUMIN SERPL-MCNC: 1.5 G/DL (ref 3.5–5.2)
ALBUMIN/GLOBULIN RATIO: 0.3 (ref 1–2.5)
ALP BLD-CCNC: 106 U/L (ref 35–104)
ALT SERPL-CCNC: 9 U/L (ref 5–33)
AMYLASE FLUID: 7 U/L
ANION GAP SERPL CALCULATED.3IONS-SCNC: 9 MMOL/L (ref 9–17)
APPEARANCE FLUID: NORMAL
AST SERPL-CCNC: 9 U/L
BASO FLUID: NORMAL %
BASOPHILS # BLD: 1 % (ref 0–2)
BASOPHILS ABSOLUTE: 0.05 K/UL (ref 0–0.2)
BILIRUB SERPL-MCNC: 0.33 MG/DL (ref 0.3–1.2)
BUN BLDV-MCNC: 37 MG/DL (ref 8–23)
BUN/CREAT BLD: ABNORMAL (ref 9–20)
CALCIUM SERPL-MCNC: 8.2 MG/DL (ref 8.6–10.4)
CASE NUMBER:: NORMAL
CHLORIDE BLD-SCNC: 103 MMOL/L (ref 98–107)
CO2: 25 MMOL/L (ref 20–31)
COLOR FLUID: NORMAL
CREAT SERPL-MCNC: 1.34 MG/DL (ref 0.5–0.9)
CULTURE: NORMAL
DIFFERENTIAL TYPE: ABNORMAL
DIRECT EXAM: NORMAL
EOSINOPHIL FLUID: NORMAL %
EOSINOPHILS RELATIVE PERCENT: 3 % (ref 1–4)
FLUID DIFF COMMENT: NORMAL
GFR AFRICAN AMERICAN: 48 ML/MIN
GFR NON-AFRICAN AMERICAN: 39 ML/MIN
GFR SERPL CREATININE-BSD FRML MDRD: ABNORMAL ML/MIN/{1.73_M2}
GFR SERPL CREATININE-BSD FRML MDRD: ABNORMAL ML/MIN/{1.73_M2}
GLUCOSE BLD-MCNC: 150 MG/DL (ref 65–105)
GLUCOSE BLD-MCNC: 153 MG/DL (ref 65–105)
GLUCOSE BLD-MCNC: 230 MG/DL (ref 65–105)
GLUCOSE BLD-MCNC: 234 MG/DL (ref 70–99)
GLUCOSE BLD-MCNC: 306 MG/DL (ref 65–105)
GLUCOSE BLD-MCNC: 309 MG/DL (ref 65–105)
GLUCOSE BLD-MCNC: 349 MG/DL (ref 65–105)
GLUCOSE, FLUID: 122 MG/DL
HCT VFR BLD CALC: 26.7 % (ref 36.3–47.1)
HEMOGLOBIN: 8.2 G/DL (ref 11.9–15.1)
IMMATURE GRANULOCYTES: 1 %
INR BLD: 1
LACTATE DEHYDROGENASE, FLUID: 222 U/L
LACTATE DEHYDROGENASE: 345 U/L (ref 135–214)
LYMPHOCYTES # BLD: 13 % (ref 24–43)
LYMPHOCYTES, BODY FLUID: 65 %
Lab: NORMAL
MCH RBC QN AUTO: 28.2 PG (ref 25.2–33.5)
MCHC RBC AUTO-ENTMCNC: 30.7 G/DL (ref 28.4–34.8)
MCV RBC AUTO: 91.8 FL (ref 82.6–102.9)
MONOCYTE, FLUID: NORMAL %
MONOCYTES # BLD: 5 % (ref 3–12)
MORPHOLOGY: ABNORMAL
MORPHOLOGY: ABNORMAL
NEUTROPHIL, FLUID: 11 %
NRBC AUTOMATED: 0 PER 100 WBC
OTHER CELLS FLUID: NORMAL %
PARTIAL THROMBOPLASTIN TIME: 25 SEC (ref 20.5–30.5)
PDW BLD-RTO: 14.6 % (ref 11.8–14.4)
PH FLUID: 6.9
PLATELET # BLD: 179 K/UL (ref 138–453)
PLATELET ESTIMATE: ABNORMAL
PMV BLD AUTO: 10.8 FL (ref 8.1–13.5)
POTASSIUM SERPL-SCNC: 4.2 MMOL/L (ref 3.7–5.3)
PROTHROMBIN TIME: 10.7 SEC (ref 9.1–12.3)
RBC # BLD: 2.91 M/UL (ref 3.95–5.11)
RBC # BLD: ABNORMAL 10*6/UL
RBC FLUID: NORMAL /MM3
SEG NEUTROPHILS: 77 % (ref 36–65)
SEGMENTED NEUTROPHILS ABSOLUTE COUNT: 3.61 K/UL (ref 1.5–8.1)
SODIUM BLD-SCNC: 137 MMOL/L (ref 135–144)
SPECIMEN DESCRIPTION: NORMAL
SPECIMEN DESCRIPTION: NORMAL
SPECIMEN TYPE: NORMAL
TOTAL PROTEIN, BODY FLUID: 2 G/DL
TOTAL PROTEIN: 6.1 G/DL (ref 6.4–8.3)
TRIGL SERPL-MCNC: 261 MG/DL
VANCOMYCIN RANDOM DATE LAST DOSE: NORMAL
VANCOMYCIN RANDOM DOSE AMOUNT: NORMAL
VANCOMYCIN RANDOM TIME LAST DOSE: NORMAL
VANCOMYCIN RANDOM: 21.3 UG/ML
WBC # BLD: 4.7 K/UL (ref 3.5–11.3)
WBC # BLD: ABNORMAL 10*3/UL
WBC FLUID: 1052 /MM3

## 2021-05-13 PROCEDURE — 83986 ASSAY PH BODY FLUID NOS: CPT

## 2021-05-13 PROCEDURE — 6360000002 HC RX W HCPCS: Performed by: STUDENT IN AN ORGANIZED HEALTH CARE EDUCATION/TRAINING PROGRAM

## 2021-05-13 PROCEDURE — 2060000000 HC ICU INTERMEDIATE R&B

## 2021-05-13 PROCEDURE — 99233 SBSQ HOSP IP/OBS HIGH 50: CPT | Performed by: INTERNAL MEDICINE

## 2021-05-13 PROCEDURE — 89051 BODY FLUID CELL COUNT: CPT

## 2021-05-13 PROCEDURE — 99221 1ST HOSP IP/OBS SF/LOW 40: CPT | Performed by: THORACIC SURGERY (CARDIOTHORACIC VASCULAR SURGERY)

## 2021-05-13 PROCEDURE — 6370000000 HC RX 637 (ALT 250 FOR IP): Performed by: INTERNAL MEDICINE

## 2021-05-13 PROCEDURE — 2709999900 HC NON-CHARGEABLE SUPPLY

## 2021-05-13 PROCEDURE — 6360000002 HC RX W HCPCS: Performed by: RADIOLOGY

## 2021-05-13 PROCEDURE — 99232 SBSQ HOSP IP/OBS MODERATE 35: CPT | Performed by: INTERNAL MEDICINE

## 2021-05-13 PROCEDURE — 2580000003 HC RX 258: Performed by: STUDENT IN AN ORGANIZED HEALTH CARE EDUCATION/TRAINING PROGRAM

## 2021-05-13 PROCEDURE — 97110 THERAPEUTIC EXERCISES: CPT

## 2021-05-13 PROCEDURE — 0W9930Z DRAINAGE OF RIGHT PLEURAL CAVITY WITH DRAINAGE DEVICE, PERCUTANEOUS APPROACH: ICD-10-PCS | Performed by: RADIOLOGY

## 2021-05-13 PROCEDURE — 6370000000 HC RX 637 (ALT 250 FOR IP): Performed by: STUDENT IN AN ORGANIZED HEALTH CARE EDUCATION/TRAINING PROGRAM

## 2021-05-13 PROCEDURE — 94640 AIRWAY INHALATION TREATMENT: CPT

## 2021-05-13 PROCEDURE — 2500000003 HC RX 250 WO HCPCS: Performed by: SURGERY

## 2021-05-13 PROCEDURE — 87075 CULTR BACTERIA EXCEPT BLOOD: CPT

## 2021-05-13 PROCEDURE — 82945 GLUCOSE OTHER FLUID: CPT

## 2021-05-13 PROCEDURE — 85730 THROMBOPLASTIN TIME PARTIAL: CPT

## 2021-05-13 PROCEDURE — 83615 LACTATE (LD) (LDH) ENZYME: CPT

## 2021-05-13 PROCEDURE — 32557 INSERT CATH PLEURA W/ IMAGE: CPT

## 2021-05-13 PROCEDURE — 82150 ASSAY OF AMYLASE: CPT

## 2021-05-13 PROCEDURE — 84478 ASSAY OF TRIGLYCERIDES: CPT

## 2021-05-13 PROCEDURE — 85610 PROTHROMBIN TIME: CPT

## 2021-05-13 PROCEDURE — 88305 TISSUE EXAM BY PATHOLOGIST: CPT

## 2021-05-13 PROCEDURE — 94761 N-INVAS EAR/PLS OXIMETRY MLT: CPT

## 2021-05-13 PROCEDURE — 97535 SELF CARE MNGMENT TRAINING: CPT

## 2021-05-13 PROCEDURE — C1729 CATH, DRAINAGE: HCPCS

## 2021-05-13 PROCEDURE — C1769 GUIDE WIRE: HCPCS

## 2021-05-13 PROCEDURE — 2700000000 HC OXYGEN THERAPY PER DAY

## 2021-05-13 PROCEDURE — 97116 GAIT TRAINING THERAPY: CPT

## 2021-05-13 PROCEDURE — 6370000000 HC RX 637 (ALT 250 FOR IP): Performed by: NURSE PRACTITIONER

## 2021-05-13 PROCEDURE — APPSS30 APP SPLIT SHARED TIME 16-30 MINUTES: Performed by: NURSE PRACTITIONER

## 2021-05-13 PROCEDURE — 84157 ASSAY OF PROTEIN OTHER: CPT

## 2021-05-13 PROCEDURE — 85025 COMPLETE CBC W/AUTO DIFF WBC: CPT

## 2021-05-13 PROCEDURE — 80202 ASSAY OF VANCOMYCIN: CPT

## 2021-05-13 PROCEDURE — 87070 CULTURE OTHR SPECIMN AEROBIC: CPT

## 2021-05-13 PROCEDURE — 32551 INSERTION OF CHEST TUBE: CPT

## 2021-05-13 PROCEDURE — 87205 SMEAR GRAM STAIN: CPT

## 2021-05-13 PROCEDURE — 82947 ASSAY GLUCOSE BLOOD QUANT: CPT

## 2021-05-13 PROCEDURE — 80053 COMPREHEN METABOLIC PANEL: CPT

## 2021-05-13 PROCEDURE — 36415 COLL VENOUS BLD VENIPUNCTURE: CPT

## 2021-05-13 PROCEDURE — 88112 CYTOPATH CELL ENHANCE TECH: CPT

## 2021-05-13 RX ORDER — FENTANYL CITRATE 50 UG/ML
INJECTION, SOLUTION INTRAMUSCULAR; INTRAVENOUS
Status: COMPLETED | OUTPATIENT
Start: 2021-05-13 | End: 2021-05-13

## 2021-05-13 RX ORDER — MIDAZOLAM HYDROCHLORIDE 2 MG/2ML
INJECTION, SOLUTION INTRAMUSCULAR; INTRAVENOUS
Status: COMPLETED | OUTPATIENT
Start: 2021-05-13 | End: 2021-05-13

## 2021-05-13 RX ORDER — VANCOMYCIN HYDROCHLORIDE 1 G/200ML
1000 INJECTION, SOLUTION INTRAVENOUS EVERY 24 HOURS
Status: DISCONTINUED | OUTPATIENT
Start: 2021-05-13 | End: 2021-05-16

## 2021-05-13 RX ADMIN — PIPERACILLIN AND TAZOBACTAM 3375 MG: 3; .375 INJECTION, POWDER, LYOPHILIZED, FOR SOLUTION INTRAVENOUS at 18:03

## 2021-05-13 RX ADMIN — INSULIN LISPRO 12 UNITS: 100 INJECTION, SOLUTION INTRAVENOUS; SUBCUTANEOUS at 20:57

## 2021-05-13 RX ADMIN — INSULIN LISPRO 12 UNITS: 100 INJECTION, SOLUTION INTRAVENOUS; SUBCUTANEOUS at 01:19

## 2021-05-13 RX ADMIN — IPRATROPIUM BROMIDE AND ALBUTEROL SULFATE 1 AMPULE: .5; 2.5 SOLUTION RESPIRATORY (INHALATION) at 20:05

## 2021-05-13 RX ADMIN — PIPERACILLIN AND TAZOBACTAM 3375 MG: 3; .375 INJECTION, POWDER, LYOPHILIZED, FOR SOLUTION INTRAVENOUS at 02:00

## 2021-05-13 RX ADMIN — IPRATROPIUM BROMIDE AND ALBUTEROL SULFATE 1 AMPULE: .5; 2.5 SOLUTION RESPIRATORY (INHALATION) at 12:05

## 2021-05-13 RX ADMIN — PIPERACILLIN AND TAZOBACTAM 3375 MG: 3; .375 INJECTION, POWDER, LYOPHILIZED, FOR SOLUTION INTRAVENOUS at 09:43

## 2021-05-13 RX ADMIN — GABAPENTIN 300 MG: 300 CAPSULE ORAL at 09:43

## 2021-05-13 RX ADMIN — INSULIN LISPRO 6 UNITS: 100 INJECTION, SOLUTION INTRAVENOUS; SUBCUTANEOUS at 05:59

## 2021-05-13 RX ADMIN — HEPARIN SODIUM 5000 UNITS: 5000 INJECTION INTRAVENOUS; SUBCUTANEOUS at 16:45

## 2021-05-13 RX ADMIN — INSULIN LISPRO 12 UNITS: 100 INJECTION, SOLUTION INTRAVENOUS; SUBCUTANEOUS at 16:50

## 2021-05-13 RX ADMIN — VANCOMYCIN HYDROCHLORIDE 1000 MG: 1 INJECTION, SOLUTION INTRAVENOUS at 13:29

## 2021-05-13 RX ADMIN — FENTANYL CITRATE 25 MCG: 50 INJECTION, SOLUTION INTRAMUSCULAR; INTRAVENOUS at 08:49

## 2021-05-13 RX ADMIN — GABAPENTIN 300 MG: 300 CAPSULE ORAL at 20:58

## 2021-05-13 RX ADMIN — FUROSEMIDE 20 MG: 20 TABLET ORAL at 09:43

## 2021-05-13 RX ADMIN — CALCIUM GLUCONATE: 98 INJECTION, SOLUTION INTRAVENOUS at 18:10

## 2021-05-13 RX ADMIN — INSULIN LISPRO 3 UNITS: 100 INJECTION, SOLUTION INTRAVENOUS; SUBCUTANEOUS at 13:36

## 2021-05-13 RX ADMIN — SODIUM CHLORIDE, PRESERVATIVE FREE 10 ML: 5 INJECTION INTRAVENOUS at 09:48

## 2021-05-13 RX ADMIN — IPRATROPIUM BROMIDE AND ALBUTEROL SULFATE 1 AMPULE: .5; 2.5 SOLUTION RESPIRATORY (INHALATION) at 16:37

## 2021-05-13 RX ADMIN — INSULIN LISPRO 3 UNITS: 100 INJECTION, SOLUTION INTRAVENOUS; SUBCUTANEOUS at 09:49

## 2021-05-13 RX ADMIN — PANTOPRAZOLE SODIUM 40 MG: 40 TABLET, DELAYED RELEASE ORAL at 09:43

## 2021-05-13 RX ADMIN — LEVOTHYROXINE SODIUM 112 MCG: 112 TABLET ORAL at 09:43

## 2021-05-13 RX ADMIN — MIDAZOLAM HYDROCHLORIDE 0.5 MG: 1 INJECTION, SOLUTION INTRAMUSCULAR; INTRAVENOUS at 08:49

## 2021-05-13 RX ADMIN — I.V. FAT EMULSION 100 ML: 20 EMULSION INTRAVENOUS at 20:58

## 2021-05-13 RX ADMIN — POLYETHYLENE GLYCOL 3350 17 G: 17 POWDER, FOR SOLUTION ORAL at 09:09

## 2021-05-13 RX ADMIN — HEPARIN SODIUM 5000 UNITS: 5000 INJECTION INTRAVENOUS; SUBCUTANEOUS at 20:57

## 2021-05-13 RX ADMIN — INSULIN GLARGINE 10 UNITS: 100 INJECTION, SOLUTION SUBCUTANEOUS at 20:57

## 2021-05-13 RX ADMIN — SODIUM CHLORIDE, PRESERVATIVE FREE 10 ML: 5 INJECTION INTRAVENOUS at 21:07

## 2021-05-13 RX ADMIN — GABAPENTIN 300 MG: 300 CAPSULE ORAL at 13:41

## 2021-05-13 ASSESSMENT — PAIN SCALES - GENERAL: PAINLEVEL_OUTOF10: 3

## 2021-05-13 ASSESSMENT — ENCOUNTER SYMPTOMS
ABDOMINAL DISTENTION: 1
COUGH: 0
SHORTNESS OF BREATH: 0
WHEEZING: 0
ABDOMINAL PAIN: 1

## 2021-05-13 ASSESSMENT — PAIN DESCRIPTION - LOCATION
LOCATION: ABDOMEN
LOCATION: ABDOMEN

## 2021-05-13 NOTE — PROGRESS NOTES
[facility] -Progress Note    Today's Date and Time: 5/13/2021, 3:31 PM    Impression :     Paraesophageal Hiatal hernia  Perforation of the esophagus  S/p laparoscopic, robotic para esophageal hernia repair 5-4-21  Acute pancreatitis  Shock   Hyperglycemia  NSTEMI  BONNIE  Not a MRSA carrier  Coagulase negative Staphylococci bacteremia x 2 on 5-7-21. Repeat blood cultures 5-8-21 x 2 : No growth . Right sided thoracentesis 5/12/21    Recommendations:   Zosyn started 5/6/21. Will continue same. Stop date 5-20-21  Vancomycin started 5/8/21. Stop date 5-14-21 for Coagulase negative Staphylococci bacteremia    Medical Decision Making/Summary/Discussion:5/13/2021     Patient with large paraesophageal hiatal hernia with perforation of esophagus  Hyperglycemia   Acute pancreatitis  Shock   S/p laparoscopic, robotic para esophageal hernia repair 5-4-21  Improvement in overall picture but is developing basilar infiltrates  Not a MRSA carrier  Unclear whether this is fluid retention vs residual leakage vs secondary pneumonia  Esophagogram 5-7-21 does not show a leak  Will plan to continue zosyn and add Diflucan. Monitor temps and CXR. If further fluid retention becomes apparent then it may be wise to switch to meropenem to decrease the fluid and NA loads associated with Zosyn  Infection Control Recommendations   [place] Precautions      Antimicrobial Stewardship Recommendations     Simplification of therapy  Targeted therapy    Coordination of Outpatient Care:   Estimated Length of IV antimicrobials: TBD  Patient will need Midline Catheter Insertion:No   Patient will need PICC line Insertion:No  Patient will need: Home IV , [place],  SNF,  LTAC:  Patient will need outpatient wound care:Yes    Chief complaint/reason for consultation:   Septic shock/sepsis    History of Present Illness:   Negro Ng is a 78y.o.-year-old   female who was initially admitted on 5/3/2021. Patient seen at the request of Margret Almendarez. INITIAL HISTORY : 05/07/2021    Pt with a history of diabetes mellitus type 2, hypertension, hyperlipidemia, thyroid disease, gout and hiatal hernia. She initially presented on 05/03/2021 to an outlying facility with a chief complaint of nausea vomiting, diarrhea and syncopal attack . Patient was found to have blood glucose of 585,WBC of 23.5, elevated lipase 1451 and elevated beta hydroxybutyrate. MRCP on 05/03/2021 showed diffuse small bowel wall thickening likely due to third spacing as opposed to enteritis. CT scan of the abdomen showed extremely large fluid-filled hiatal hernia and distended and fluid-filled stomach below the diaphragm. Patient was given Zosyn at the outlying facility and was transferred to [address] [place] [address] ED for evaluation by the surgery team.    CT scan of the chest without contrast was performed on 05/04/2021 which showed ascites and pneumoperitoneum with apparent free-flowing oral contrast in the left upper quadrant concerning for viscus perforation possibly within the subdiaphragmatic portion of the stomach. Moderate bilateral pleural effusions and hiatal hernia with organoaxial volvulus. Bariatric surgery performed a laparoscopic robotic para esophageal hernia repair. Cardiology is also following the patient because of elevated troponins with unremarkable echocardiography. Antibiotics wise the patient was given Zosyn on 05/03/2021 at the outside facility and it has been continued through the present time. Blood and urine cultures on 05/03/2021 show No growth . Repeat urine culture on 05/04/2021 shows No growth     Patient is currently having temperature elevations. Temp max of 101.1 on 05/07/2021 around 4 AM in the morning. Patient WBC count is improving from 23.5 on presentation to 6.2 today.     Repeat chest x-ray 5-7-21 shows bibasilar consolidation new from prior study with blunting of the MD JESENIA MANCERAVZ SPECIAL PROCEDURES    OTHER SURGICAL HISTORY  1962    Remove spur L ankle    OTHER SURGICAL HISTORY  1978    Pin and wire repair R ankle    TUBAL LIGATION  1988       Medications:      vancomycin  1,000 mg Intravenous Q24H    fat emulsion  100 mL Intravenous Weekly - Thursday    levothyroxine  112 mcg Oral Daily    pantoprazole  40 mg Oral QAM AC    gabapentin  300 mg Oral TID    furosemide  20 mg Oral Every Other Day    insulin glargine  10 Units Subcutaneous Nightly    polyethylene glycol  17 g Oral Daily    heparin (porcine)  5,000 Units Subcutaneous 3 times per day    vancomycin (VANCOCIN) intermittent dosing (placeholder)   Other RX Placeholder    ipratropium-albuterol  1 ampule Inhalation Q4H WA    piperacillin-tazobactam  3,375 mg Intravenous Q8H    insulin lispro  0-18 Units Subcutaneous Q4H    sodium chloride flush  5-40 mL Intravenous 2 times per day       Social History:     Social History     Socioeconomic History    Marital status:      Spouse name: Not on file    Number of children: Not on file    Years of education: Not on file    Highest education level: Not on file   Occupational History    Not on file   Social Needs    Financial resource strain: Not on file    Food insecurity     Worry: Not on file     Inability: Not on file    Transportation needs     Medical: Not on file     Non-medical: Not on file   Tobacco Use    Smoking status: Never Smoker    Smokeless tobacco: Never Used   Substance and Sexual Activity    Alcohol use: Never     Frequency: Never    Drug use: Never    Sexual activity: Not on file   Lifestyle    Physical activity     Days per week: Not on file     Minutes per session: Not on file    Stress: Not on file   Relationships    Social connections     Talks on phone: Not on file     Gets together: Not on file     Attends Cheondo[employer] service: Not on file     Active member of club or organization: Not on file     Attends meetings of clubs or organizations: Not on file     Relationship status: Not on file    Intimate partner violence     Fear of current or ex partner: Not on file     Emotionally abused: Not on file     Physically abused: Not on file     Forced sexual activity: Not on file   Other Topics Concern    Not on file   Social History Narrative    Not on file       Family History:     Family History   Problem Relation Age of Onset    Breast Cancer Mother     High Blood Pressure Mother     High Cholesterol Father     Breast Cancer Sister         Allergies:   No known allergies     Review of Systems:   Review of Systems   Constitutional: Positive for fatigue and fever. Negative for activity change, appetite change, chills, diaphoresis and unexpected weight change. Respiratory: Positive for cough. Negative for apnea, choking, chest tightness, shortness of breath, wheezing and stridor. Cardiovascular: Negative for chest pain, palpitations and leg swelling. Gastrointestinal: Positive for constipation. Negative for abdominal distention, nausea and vomiting. Endocrine: Negative for cold intolerance. Genitourinary: Negative for difficulty urinating and dysuria. Musculoskeletal: Negative for arthralgias and back pain. Neurological: Negative for dizziness, tremors, syncope and facial asymmetry. Hematological: Negative for adenopathy. Psychiatric/Behavioral: Negative for agitation and behavioral problems. The patient is not hyperactive.     Physical Examination :     Patient Vitals for the past 8 hrs:   BP Temp Temp src Pulse Resp SpO2 Height Weight   05/13/21 1312       5' 2\" (1.575 m)    05/13/21 1210      100 %     05/13/21 1202 133/82 97.6 °F (36.4 °C) Oral 98 21 98 %     05/13/21 0905 121/81   92 24 99 %     05/13/21 0900 121/81   91 24 99 %     05/13/21 0855 124/71   101 21 100 %  194 lb 0.1 oz (88 kg)   05/13/21 0850 119/70   96 24 99 %     05/13/21 0845 122/72   96 23 100 %   05/13/21 0748 (!) 144/70 98.1 °F (36.7 °C) Oral 91 14 99 %       General Appearance: Awake, alert  Head:  Normocephalic, no trauma  Eyes: Pupils equal, round, reactive to light, sclera anicteric; conjunctivae pink. No embolic phenomena. ENT: Oropharynx clear, without erythema, exudate, or thrush. No tenderness of sinuses. Mouth/throat: mucosa pink and moist. No lesions. Dentition in good repair. Neck:Supple, without lymphadenopathy. Thyroid normal, No bruits. Pulmonary/Chest: Clear to auscultation, without wheezes, rales, or rhonchi. No dullness to percussion. Cardiovascular: Regular rate and rhythm without murmurs, rubs, or gallops. Abdomen: Soft, non tender. Bowel sounds normal. No organomegaly. The ports entry wounds are healing well and without any erythema. All four Extremities: No cyanosis, clubbing, edema, or effusions. Neurologic: No gross sensory or motor deficits. Skin: Warm and dry with good turgor. No signs of peripheral arterial or venous insufficiency. No ulcerations. No open wounds. Medical Decision Making -Laboratory:   I have independently reviewed/ordered the following labs:    CBC with Differential:   Recent Labs     05/12/21  0502 05/13/21  0551   WBC 7.7 4.7   HGB 9.5* 8.2*   HCT 31.3* 26.7*    179   LYMPHOPCT 14* 13*   MONOPCT 2* 5     BMP:   Recent Labs     05/12/21  0502 05/13/21  0551    137   K 4.2 4.2    103   CO2 24 25   BUN 35* 37*   CREATININE 1.30* 1.34*   MG 1.8  --      Hepatic Function Panel:   Recent Labs     05/11/21  0440 05/12/21  0502 05/13/21  0551   PROT 5.5* 6.0* 6.1*   LABALBU 1.5* 1.5* 1.5*   BILIDIR 0.18 0.20  --    IBILI 0.12 0.15  --    BILITOT 0.30 0.35 0.33   ALKPHOS 81 92 106*   ALT 16 12 9   AST 10 11 9     No results for input(s): RPR in the last 72 hours. No results for input(s): HIV in the last 72 hours. No results for input(s): BC in the last 72 hours.   Lab Results   Component Value Date    MUCUS NOT REPORTED 05/06/2021 RBC 2.91 05/13/2021    RBC 6.23 05/03/2021    TRICHOMONAS NOT REPORTED 05/06/2021    WBC 4.7 05/13/2021    YEAST NOT REPORTED 05/06/2021    TURBIDITY CLOUDY 05/06/2021     Lab Results   Component Value Date    CREATININE 1.34 05/13/2021    CREATININE 3.09 05/03/2021    GLUCOSE 234 05/13/2021    GLUCOSE 453 05/03/2021       Medical Decision Making-Imaging:     EXAMINATION:   SINGLE CONTRAST ESOPHAGRAM       5/7/2021 11:03 am       TECHNIQUE:   Single contrast esophagram was performed with a total 100 mL of Omnipaque 240   oral contrast.       FLUOROSCOPY DOSE AND TYPE OR TIME AND EXPOSURES:   Fluoro time: 2.7 minutes; DAP: 70.44 mGy       COMPARISON:   Upper GI from 05/04/2021       HISTORY:   ORDERING SYSTEM PROVIDED HISTORY: s/p hiatal hernia reduction  with partial   gastrectomy and gastropexy   TECHNOLOGIST PROVIDED HISTORY:   s/p hiatal hernia reduction  with partial gastrectomy and gastropexy   Reason for Exam: H.H repair/gastrectomy/gastropexy/ 100 ml Omnipaque orally   Acuity: Unknown   Type of Exam: Unknown       72-year-old female status post hiatal hernia reduction with partial   gastrectomy and gastropexy       FINDINGS:   Fluoroscopic  imaging was obtained prior to the administration contrast.       2 gastrostomy tubes are seen projecting over the left upper quadrant.  There   also 2 surgical drains projecting over the left upper quadrant.  Prior   cholecystectomy.  Suture material projects over the left upper quadrant.       The patient drank contrast which migrates through the postoperative region   and into the stomach and small bowel.       There is contrast draining through what appears to be the gastrostomy tubes.       No extraneous collection of contrast is seen beyond the confines of the   stomach.       There is contrast marginating a presumed gastrostomy tube balloon.       Mild gastroesophageal reflux and delayed transit of contrast is seen within   the distal esophagus/GE junction.         Impression   1. Drainage of contrast material through what appears to be the gastrostomy   tubes following the ingestion of contrast.  Contrast does migrate beyond the   postoperative region into the proximal small bowel. 2. No extraneous/extraluminal collection of contrast is seen beyond the   confines of the stomach. 3. 2 surgical drains and 2 presumed gastrostomy tubes are seen overlying the   left upper quadrant.  There does appear to be contrast slightly marginating   the more lateral gastrostomy tube balloon. 4. Delayed migration of contrast through the distal esophagus and GE junction   with mild gastroesophageal reflux. The findings were sent to the Radiology Results [address] at 12:43   pm on 5/7/2021to be communicated to a licensed caregiver.             EXAMINATION:   CT OF THE CHEST WITHOUT CONTRAST 5/4/2021 5:55 pm       TECHNIQUE:   CT of the chest was performed without the administration of intravenous   contrast. Multiplanar reformatted images are provided for review. Dose   modulation, iterative reconstruction, and/or weight based adjustment of the   mA/kV was utilized to reduce the radiation dose to as low as reasonably   achievable.       COMPARISON:   None.       HISTORY:   ORDERING SYSTEM PROVIDED HISTORY: large hiatal hernia, contrast progression? TECHNOLOGIST PROVIDED HISTORY:   large hiatal hernia, contrast progression? Reason for Exam: large hiatal hernia, contrast progression?       FINDINGS:   Mediastinum: Heart size is normal without pericardial effusion.  There are   shotty mediastinal lymph nodes.  The thoracic aorta and main pulmonary artery   are normal in caliber.       Lungs/pleura: Moderate bilateral pleural effusions with adjacent   consolidation.  No pneumothorax.       Upper Abdomen:  There is a large hiatal hernia containing the majority of the   stomach.  There is organoaxial volvulus.  Enteric tube is noted extending   below the diaphragm with tip outside the field of view.  The herniated   stomach is distended with contrast. Paola Mettle is also contrast within the distal   esophagus. Paola Mettle is some contrast visualized within the portion of stomach   below the diaphragm.  Free air and fluid are noted within the visualized   upper abdomen with apparent free spill of contrast in the left upper quadrant.       Soft Tissues/Bones: No acute osseous abnormality.           Impression   1. Ascites and pneumoperitoneum with apparent free-flowing oral contrast in   the left upper quadrant is concerning for viscus perforation, possibly within   the subdiaphragmatic portion of the stomach. 2. Large hiatal hernia with organoaxial volvulus.  Majority of contrast is   retained within the esophagus and supradiaphragmatic stomach. 3. Enteric tube extends below the diaphragm with tip outside the field of   view. 4. Moderate bilateral pleural effusions with adjacent consolidation,   atelectasis versus pneumonia. Critical results were called by Dr. Yahaira Green MD to Glenis Alanis on   5/4/2021 at 18:30.           Medical Decision Upicys-Frssnoyw-Fgofr:       Medical Decision Making-Other:     Note:  Labs, medications, radiologic studies were reviewed with personal review of films   Large amounts of data were reviewed  Discussed with nursing Staff, Discharge planner  Infection Control and Prevention measures reviewed  All prior entries were reviewed  Administer medications as ordered  Prognosis: Guarded  Discharge planning reviewed  Follow up as outpatient. Thank you for allowing us to participate in the care of this patient. Please call with questions. ALFONZO Gagnon - CNP     ATTESTATION:    I have discussed the case, including pertinent history and exam findings with the APRN. I have evaluated the  History, physical findings and pictures of the patient and the key elements of the encounter have been performed by me.  I have reviewed the laboratory data, other diagnostic studies and discussed them with the APRN. I have updated the medical record where necessary. I agree with the assessment, plan and orders as documented by the APRN.     Blanca Ortiz MD.

## 2021-05-13 NOTE — PROGRESS NOTES
Plan  Times per week: 5x/week  Current Treatment Recommendations: Strengthening, Endurance Training, Cognitive Reorientation, Functional Mobility Training  Safety Devices  Type of devices: Nurse notified, Call light within reach, Gait belt, Left in chair, All fall risk precautions in place  Restraints  Initially in place: No     Therapy Time   Individual Concurrent Group Co-treatment   Time In 1108         Time Out 1133         Minutes 25         Timed Code Treatment Minutes: 4605 Christine Wilson, PTA

## 2021-05-13 NOTE — PROGRESS NOTES
Pharmacy Vancomycin Consult     Vancomycin Day: 5  Current Dosin mg IV q24h  Current indication: Bloodstream infection    Temp max:  afebrile    Recent Labs     21  0502 21  0551   BUN 35* 37*   CREATININE 1.30* 1.34*   WBC 7.7 4.7       Intake/Output Summary (Last 24 hours) at 2021 0930  Last data filed at 2021 0919  Gross per 24 hour   Intake --   Output 1060 ml   Net -1060 ml       Ht Readings from Last 1 Encounters:   05/10/21 5' 2\" (1.575 m)        Wt Readings from Last 1 Encounters:   21 195 lb 5.2 oz (88.6 kg)       Body mass index is 35.73 kg/m². Estimated Creatinine Clearance: 42 mL/min (A) (based on SCr of 1.34 mg/dL (H)). Trough: 21.3 mcg/mL    Assessment/Plan:  Random level was obtained in order to evaluate drug clearance in the setting of increased SCr over the past 48 hours. Level was obtained nearly 4.5 hours early, thereby indicating that the true trough would have likely been within the therapeutic range of 15-20 mcg/mL. Will decrease dose to 1000 mg IV q24h in the setting of increasing SCr and plan to obtain another level in the next few days to evaluate drug clearance and efficacy. Will continue to follow. Ivis Tadeo Pharm. D., [facility]  2021 9:33 AM

## 2021-05-13 NOTE — CONSULTS
mg at 05/13/21 1341    oxyCODONE (ROXICODONE) immediate release tablet 5 mg, 5 mg, Oral, Q4H PRN **OR** oxyCODONE (ROXICODONE) immediate release tablet 10 mg, 10 mg, Oral, Q4H PRN, Jacob Carlson APRN - NP    PN-Adult 2-in-1 Central Line (Standard), , Intravenous, Continuous TPN, Yesika Embs, DO, Last Rate: 60 mL/hr at 05/12/21 1815, New Bag at 05/12/21 1815    acetaminophen (TYLENOL) 160 MG/5ML solution 1,000 mg, 1,000 mg, Oral, Q8H PRN, Cammie Lee MD    furosemide (LASIX) tablet 20 mg, 20 mg, Oral, Every Other Day, Prieto Rizzo MD, 20 mg at 05/13/21 0943    insulin glargine (LANTUS) injection vial 10 Units, 10 Units, Subcutaneous, Nightly, Jacob Carlson APRN - NP, 10 Units at 05/12/21 2101    glucose (GLUTOSE) 40 % oral gel 15 g, 15 g, Oral, PRN, Jacob Carlson, APRN - NP    dextrose 50 % IV solution, 12.5 g, Intravenous, PRN, Jacob Carlson APRN - NP    glucagon (rDNA) injection 1 mg, 1 mg, Intramuscular, PRN, Jacob Carlson, APRN - NP    dextrose 5 % solution, 100 mL/hr, Intravenous, PRN, Jacob Carlson, APRN - NP    polyethylene glycol (GLYCOLAX) packet 17 g, 17 g, Oral, Daily, Jacob Carlson APRN - NP, 17 g at 05/13/21 0909    0.9 % sodium chloride infusion, 25 mL, Intravenous, PRN, Jacob Carlson APRN - NP, Last Rate: 100 mL/hr at 05/10/21 1840, 25 mL at 05/10/21 1840    heparin (porcine) injection 5,000 Units, 5,000 Units, Subcutaneous, 3 times per day, Gold Teran MD, 5,000 Units at 05/12/21 9648    vancomycin (VANCOCIN) intermittent dosing (placeholder), , Other, RX Placeholder, Gold Teran MD    potassium chloride 20 mEq/50 mL IVPB (Central Line), 20 mEq, Intravenous, PRN, Vasu Pavon MD, Last Rate: 50 mL/hr at 05/09/21 1342, 20 mEq at 05/09/21 1342    magnesium sulfate 1000 mg in dextrose 5% 100 mL IVPB, 1,000 mg, Intravenous, PRN, Vasu Pavon MD    ipratropium-albuterol (DUONEB) nebulizer solution 1 ampule, 1 ampule, Inhalation, Q4H WA, ALFONZO Das - CNP, 1 ampule at 05/13/21 1205    piperacillin-tazobactam (ZOSYN) 3,375 mg in dextrose 5 % 50 mL IVPB extended infusion (mini-bag), 3,375 mg, Intravenous, Q8H, Sarah Rodriguez MD, Stopped at 05/13/21 1349    acetaminophen (TYLENOL) suppository 325 mg, 325 mg, Rectal, Q4H PRN, Neymar Mancilla PA-C, 325 mg at 05/07/21 2255    insulin lispro (HUMALOG) injection vial 0-18 Units, 0-18 Units, Subcutaneous, Q4H, Sarah Rodriguez MD, 3 Units at 05/13/21 1336    lubrifresh P.M. (artificial tears) ophthalmic ointment, , Both Eyes, PRN, Fredrick Garcia MD, Given at 05/05/21 1822    sodium chloride flush 0.9 % injection 5-40 mL, 5-40 mL, Intravenous, 2 times per day, Sarah Rodriguez MD, 10 mL at 05/13/21 0948    sodium chloride flush 0.9 % injection 5-40 mL, 5-40 mL, Intravenous, PRN, Sarah Rodriguez MD    [DISCONTINUED] promethazine (PHENERGAN) tablet 12.5 mg, 12.5 mg, Oral, Q6H PRN **OR** ondansetron (ZOFRAN) injection 4 mg, 4 mg, Intravenous, Q6H PRN, Sarah Rodriguez MD, 4 mg at 05/03/21 1704    Social Hx:   reports that she has never smoked. She has never used smokeless tobacco.    Family Hx:  family history includes Breast Cancer in her mother and sister; High Blood Pressure in her mother; High Cholesterol in her father. ROS:    Review of Systems   Constitutional: Positive for fatigue. Negative for chills and fever. HENT: Negative for congestion. Eyes: Negative for visual disturbance. Respiratory: Positive for shortness of breath. Cardiovascular: Negative for chest pain. Gastrointestinal: Negative for abdominal pain. Genitourinary: Negative for dysuria. Musculoskeletal: Negative for gait problem. Skin: Negative for color change. Neurological: Positive for weakness. Psychiatric/Behavioral: Negative for self-injury. The patient is not nervous/anxious.         Physical Examination    Vitals:  Vitals:    05/13/21 1210   BP:    Pulse:    Resp:    Temp:    SpO2: 100%     Physical Exam  Vitals signs and nursing note reviewed. Constitutional:       General: She is not in acute distress. Appearance: She is obese. She is ill-appearing. Interventions: Nasal cannula in place. Comments: drowsy   HENT:      Nose: Nose normal.      Mouth/Throat:      Mouth: Mucous membranes are moist.      Pharynx: Oropharynx is clear. Eyes:      Extraocular Movements: Extraocular movements intact. Conjunctiva/sclera: Conjunctivae normal.      Pupils: Pupils are equal, round, and reactive to light. Neck:      Musculoskeletal: Normal range of motion. Cardiovascular:      Rate and Rhythm: Normal rate and regular rhythm. Pulses: Normal pulses. Heart sounds: Normal heart sounds. No murmur. Pulmonary:      Effort: Tachypnea present. Breath sounds: Examination of the right-lower field reveals decreased breath sounds. Examination of the left-lower field reveals decreased breath sounds. Decreased breath sounds present. Abdominal:      Palpations: Abdomen is soft. Tenderness: There is no abdominal tenderness. Musculoskeletal: Normal range of motion. Skin:     General: Skin is warm and dry. Neurological:      Mental Status: She is alert. Mental status is at baseline.    Psychiatric:         Mood and Affect: Mood normal.         Behavior: Behavior normal.         Labs:   CBC:   Recent Labs     05/11/21  0440 05/12/21  0502 05/13/21  0551   WBC 8.1 7.7 4.7   HGB 8.5* 9.5* 8.2*   HCT 28.5* 31.3* 26.7*   MCV 94.1 90.7 91.8   * 184 179     BMP:  Recent Labs     05/11/21  0440 05/12/21  0502 05/13/21  0551    139 137   K 4.4 4.2 4.2   * 104 103   CO2 24 24 25   PHOS  --  3.9  --    BUN 34* 35* 37*   CREATININE 1.18* 1.30* 1.34*   MG  --  1.8  --      Accucheck Glucoses:  Recent Labs     05/12/21 2005 05/13/21  0117 05/13/21  0544 05/13/21  0750 05/13/21  1245   POCGLU 183* 349* 230* 153* 150*     Cardiac Enzymes: No results for input(s): CKTOTAL, CKMB, CKMBINDEX, TROPONINI in the last 72 similar   findings compatible with bibasilar atelectasis/consolidation, effusions and   minimal infiltrate or atelectasis right mid lung.           CT scan:   CT OF THE CHEST WITHOUT CONTRAST 5/11/2021 10:31 am       TECHNIQUE:   CT of the chest was performed without the administration of intravenous   contrast. Multiplanar reformatted images are provided for review. Dose   modulation, iterative reconstruction, and/or weight based adjustment of the   mA/kV was utilized to reduce the radiation dose to as low as reasonably   achievable.       COMPARISON:   CT scan of the chest from 05/07/2021.       HISTORY:   ORDERING SYSTEM PROVIDED HISTORY: ?left loculated pleural effusion   TECHNOLOGIST PROVIDED HISTORY:   ?left loculated pleural effusion   Reason for Exam: ?left loculated pleural effusion   Acuity: Unknown   Type of Exam: Unknown       FINDINGS:   Mediastinum: Interval placement right-sided PICC with tip position in the   mid-lower SVC.  Small unchanged mediastinal nodes; no bulky lymphadenopathy.    No acute thoracic aortic or cardiac abnormality on this unenhanced scan;   prominent LV again noted.  Tiny unchanged pericardial fluid or thickening.       Lungs/pleura: Similar bilateral pleural effusions with considerable mostly   lower lobe atelectasis or consolidation with air bronchograms; larger   loculated appearing component (measuring 10.0 x 4.9 cm) extending along the   azygoesophageal recess, and across the midline (best seen slices 84-48,   series 2).  Tracheobronchial tree patent centrally       Upper Abdomen: Similar small amount perihepatic and perisplenic ascitic fluid   and soft tissue infiltration visualized abdominal adipose tissue.  Clips   status post cholecystectomy.  Mild hepatic steatosis.       Soft Tissues/Bones: No acute abnormality.           Impression   New right-sided PICC again seen with unchanged and satisfactory tip position;   larger loculated right effusion in the azygoesophageal gretta, as above. Otherwise similar findings to 05/07/2021 with suspected lower lobe   consolidation/pneumonitis, with volume loss and effusions both lower lobes.             Imaging Studies:  I have personally reviewed the testing/imaging above with Dr Manuel Bernal, he is in agreement with the findings listed above. In summary, Ms. Jennifer Calderon is a 78y.o. year-old female with bilateral emypema. Problem List:   Empyema, bilateral  S/p chest tube placement  Septic shock, resolved  Gastric ischemia and subsequent pneumoperitoneum  Diabetes mellitus type 2 without long term use of insulin  BONNIE secondary to ATN, resolved  Nephrology following  Acute hypoxemic respiratory failure  Pulmonology following  NSTEMI  Hypothyroidism  Thrombocytopenia  Obesity, BMI 35.48      Recommendation:   Monitor chest tube output closely  Document output every shift at 06:00 and 18:00  Repeat CT chest Sunday  Plan for possible VATs with decortication next week. On this date 5/13/2021 I have spent 45 minutes reviewing previous notes, test results and face to face with the patient discussing the diagnosis and importance of compliance with the treatment plan as well as documenting on the day of the visit. At least 50% of the time documented was spent with the patient to provide counseling and/or coordination of care.     Agree with the above  Plan for chest tube placement on right  Plan for chest tube placement on left if right resolves  We will continue to follow daily x-rays  Do appreciate the consult and happy to assist in the patient's care    ALFONZO Callahan - CNP

## 2021-05-13 NOTE — PROGRESS NOTES
Comprehensive Nutrition Assessment    Type and Reason for Visit:  Reassess    Nutrition Recommendations/Plan: Recommend continuing TPN d/t continued poor PO intake. Suggest starting 100 mL 20% IL x 1 weekly d/t decrease risk for possible EFA deficiency. Encourage PO intake as tolerated. Nutrition Assessment:  Pt not eating well. Did not eat breakfast or dinner yesterday and ate 1-25% of lunch. This morning pt stated she was hungry, but then was taken for chest tube placement and was unable to eat breakfast. Pt states she typically only eats 2 meals per day and says it is hard to try and eat 3 meals per day. Reports drinking 2.5 containers of Ensure Clear per day, but RN states pt wasn't drinking much of them yesterday. Pt has not received IV lipids since 5/5 d/t elevated TG levels and has not been able to consume adequate amounts of essential fatty acids. Estimated Daily Nutrient Needs:  Energy (kcal):  1600 kcal/day; Weight Used for Energy Requirements:  Current     Protein (g):  75 g pro/day; Weight Used for Protein Requirements:  Ideal(1.5)        Fluid (ml/day):  0265-9012 mL/day (or per MD); Method Used for Fluid Requirements:  ml/Kg(25-30)      Nutrition Related Findings:  Meds/labs reviewed      Wounds:  Surgical Incision(abd)       Current Nutrition Therapies:    DIET LOW FIBER;   Dietary Nutrition Supplements: Clear Liquid Oral Supplement  PN-Adult 2-in-1 Central Line (Standard)  Current Parenteral Nutrition Orders:  · Type and Formula: 2-in-1 Custom(225 gm dextrose, 75 gm protein)   · Lipids: None  · Duration: Continuous  · Rate/Volume: 60 mL/hr (1440 mL/day)  · Current PN Order Provides: 1065 kcals, 75 gm protein    Anthropometric Measures:  · Height: 5' 2\" (157.5 cm)  · Current Body Weight: 194 lb 0.1 oz (88 kg)   · Admission Body Weight: 202 lb (91.6 kg)    · Usual Body Weight: (166 lb - stated)     · Ideal Body Weight: 110 lbs; % Ideal Body Weight 176.4 %   · BMI: 35.5  · BMI Categories: Obese Class 2 (BMI 35.0 -39.9)       Nutrition Diagnosis:   · Inadequate oral intake related to altered GI function(current condition, appetite) as evidenced by intake 0-25%, nutrition support - parenteral nutrition      Nutrition Interventions:   Food and/or Nutrient Delivery:  Modify Parenteral Nutrition, Continue Current Diet, Continue Oral Nutrition Supplement  Nutrition Education/Counseling:  No recommendation at this time   Coordination of Nutrition Care:  Continue to monitor while inpatient    Goals:  meet % of estimated nutrient needs       Nutrition Monitoring and Evaluation:   Behavioral-Environmental Outcomes:  None Identified   Food/Nutrient Intake Outcomes:  Food and Nutrient Intake, Supplement Intake, Parenteral Nutrition Intake/Tolerance  Physical Signs/Symptoms Outcomes:  Weight, Biochemical Data, Nutrition Focused Physical Findings, GI Status     Discharge Planning:     Too soon to determine     Electronically signed by Ga Metzger MS, RD, LD on 5/13/21 at 1:25 PM EDT    Contact: [phone]

## 2021-05-13 NOTE — PROGRESS NOTES
[facility]  Office: [address], DO, Alfa Jose, DO, Franci Nancy, DO, Yandy Salazar Blood, DO, Jan aLcey MD, Lupillo Schuster MD, Ford Serrato MD, Ramya Cronin MD, Loren Muñoz MD, Spencer Gonzalez MD, Shekhar Mckenzie MD, Jenifer Elias MD, Nancy Packer, DO, Farida Orellana MD, Zuleika Tristan, DO, Alexis Cervantes MD,  Sophia Cortez, DO, Moustapha Harrington MD, Shashank Barron MD, Alicia Webster MD, Aure Person MD, Luis Guzmán, [facility], [facility], CNP, Megan Patel CNP, Homa Rodriguez, CNS, Kerri Carter, CNP, Vannie Epley, CNP, Patricia Melton, CNP, Ron Ellis, CNP, Caroline Bennett, CNP, Anita Sharma PA-C, Rafael Woods, [facility], Audie Salter, CNP, Allie Lopez, CNP, Nataliya Luz, CNP, Alec Holliday, CNP, Jerson Zendejas, CNP, Duran Bookeramp, [address] [place] [address]    Progress Note    5/13/2021    1:55 PM    Name:   Walker Simon  MRN:     [id number]     Acct:      [de-identified]   Room:   [address] [place] [address] Day:  10  Admit Date:  5/3/2021 12:49 PM    PCP:   Hamlet Browne DO  Code Status:  Full Code    Subjective:     C/C:   Chief Complaint   Patient presents with    Blood Sugar Problem     >450    Hernia     hyatial      Interval History Status: mild improvement      Patient seen and examined,  Sitting in the bed,  Diet as per general surgery,   TPN infusing continues, diet advanced as per surgery   G tubes clamped, perc drain to suction   Follow-up esophagram without leak on 5/11  Right loculated pleural effusion-plan s/p  thoracentesis and chest tube insertion 5/12    Brief History:     Patient presented to [place] [address] emergency room from [facility] hospital.    Patient originally presented to [facility] for the evaluation of nausea vomiting and diarrhea ongoing for 6 days. Patient was recently seen in the [facility] emergency room on 4/28/2021 and sent home.   She states that today (5/3/21) she woke up around 2 AM as she was on the floor. She stated the last thing she recalls was getting up to get water. Patient states that she was lightheaded dizzy and weak and had a syncopal episode and fell. LOC of unknown time. The work-up in the outlying emergency room showed: A metabolic panel with a sodium of 144 potassium 2.2 chloride 78 CO2 greater than 40  creatinine 3.8 with lactic acid of 6 and glucose of 585. [place] Robert Troponin I 0.404, liver function showed AST of 37 bilirubin of 5 direct bilirubin 0, and a lipase of 1451. Beta hydroxybutyrate 3.66. Hemogram with acute leukocytosis with a WBC of 23.5, hemoglobin 18.3, hematocrit 54.2 a high concern of acute pancreatitis with acute kidney injury and DKA, with non-STEMI. There was endorgan injury with a high lactic 6.6 and a creatinine of 3.8 and troponin of 0.4 with EKG showing acute ischemia but with Q waves in the inferior and anterior leads. Originally the patient was recommended for ICU and the case was discussed with Dr. Margaret Jordan and deferred the case to general surgery. And at that time Dr. Khadar Jimenez recommended ED to ED transfer. At that time the patient was given Zosyn and transferred ED to ED. Review of Systems:     Constitutional:  negative for chills, fevers, sweats, + fatigue  Respiratory:  negative for cough, dyspnea on exertion,+ shortness of breath, wheezing  Cardiovascular:  negative for chest pain, chest pressure/discomfort, lower extremity edema, palpitations  Gastrointestinal:  + abdominal pain, -constipation, -diarrhea, -nausea, -vomiting  Neurological:  negative for dizziness, headache    Medications: Allergies:     Allergies   Allergen Reactions    No Known Allergies        Current Meds:   Scheduled Meds:    vancomycin  1,000 mg Intravenous Q24H    levothyroxine  112 mcg Oral Daily    pantoprazole  40 mg Oral QAM AC    gabapentin  300 mg Oral TID    furosemide  20 mg Oral Every Other Day    insulin glargine  10 Units Subcutaneous Nightly    polyethylene glycol  17 g Oral Daily    heparin (porcine)  5,000 Units Subcutaneous 3 times per day    vancomycin (VANCOCIN) intermittent dosing (placeholder)   Other RX Placeholder    ipratropium-albuterol  1 ampule Inhalation Q4H WA    piperacillin-tazobactam  3,375 mg Intravenous Q8H    insulin lispro  0-18 Units Subcutaneous Q4H    sodium chloride flush  5-40 mL Intravenous 2 times per day     Continuous Infusions:    PN-Adult 2-in-1 Central Line (Standard) 60 mL/hr at 21 1815    dextrose      sodium chloride 25 mL (05/10/21 1840)     PRN Meds: oxyCODONE **OR** oxyCODONE, acetaminophen, glucose, dextrose, glucagon (rDNA), dextrose, sodium chloride, potassium chloride, magnesium sulfate, acetaminophen, artificial tears, sodium chloride flush, [DISCONTINUED] promethazine **OR** ondansetron    Data:     Past Medical History:   has a past medical history of Diabetes mellitus ([address] Gout, Hiatal hernia, Hyperlipidemia, Hypertension, and Thyroid disease. Social History:   reports that she has never smoked. She has never used smokeless tobacco. She reports that she does not drink alcohol or use drugs. Family History:   Family History   Problem Relation Age of Onset    Breast Cancer Mother     High Blood Pressure Mother     High Cholesterol Father     Breast Cancer Sister        Vitals:  /82   Pulse 98   Temp 97.6 °F (36.4 °C) (Oral)   Resp 21   Ht 5' 2\" (1.575 m)   Wt 194 lb 0.1 oz (88 kg)   SpO2 100%   BMI 35.48 kg/m²   Temp (24hrs), Av.1 °F (36.7 °C), Min:97.6 °F (36.4 °C), Max:98.3 °F (36.8 °C)    Recent Labs     21  0117 21  0544 21  0750 21  1245   POCGLU 349* 230* 153* 150*       I/O (24Hr):     Intake/Output Summary (Last 24 hours) at 2021 1355  Last data filed at 2021 0919  Gross per 24 hour   Intake    Output 1060 ml   Net -1060 ml       Labs:  Hematology:  Recent Labs     21  5223 05/12/21  0502 05/13/21  0551   WBC 8.1 7.7 4.7   RBC 3.03* 3.45* 2.91*   HGB 8.5* 9.5* 8.2*   HCT 28.5* 31.3* 26.7*   MCV 94.1 90.7 91.8   MCH 28.1 27.5 28.2   MCHC 29.8 30.4 30.7   RDW 15.4* 15.0* 14.6*   * 184 179   MPV 11.5 11.1 10.8   INR 1.1 1.0 1.0     Chemistry:  Recent Labs     05/11/21 0440 05/12/21  0502 05/13/21  0551    139 137   K 4.4 4.2 4.2   * 104 103   CO2 24 24 25   GLUCOSE 211* 128* 234*   BUN 34* 35* 37*   CREATININE 1.18* 1.30* 1.34*   MG  --  1.8  --    ANIONGAP 10 11 9   LABGLOM 46* 41* 39*   GFRAA 55* 49* 48*   CALCIUM 8.4* 8.7 8.2*   PHOS  --  3.9  --      Recent Labs     05/11/21 0440 05/11/21 0440 05/12/21 0502 05/12/21 0502 05/12/21  1722 05/12/21 2005 05/13/21  0117 05/13/21  0544 05/13/21  0551 05/13/21  0750 05/13/21  1245   PROT 5.5*  --  6.0*  --   --   --   --   --  6.1*  --   --    LABALBU 1.5*  --  1.5*  --   --   --   --   --  1.5*  --   --    AST 10  --  11  --   --   --   --   --  9  --   --    ALT 16  --  12  --   --   --   --   --  9  --   --    LDH  --   --   --   --   --   --   --   --  345*  --   --    ALKPHOS 81  --  92  --   --   --   --   --  106*  --   --    BILITOT 0.30  --  0.35  --   --   --   --   --  0.33  --   --    BILIDIR 0.18  --  0.20  --   --   --   --   --   --   --   --    TRIG  --   --   --   --   --   --   --   --  261*  --   --    POCGLU 211*   < >  --    < > 222* 183* 349* 230*  --  153* 150*    < > = values in this interval not displayed.      ABG:  Lab Results   Component Value Date    POCPH 7.433 05/06/2021    PHART 7.193 05/04/2021    POCPCO2 36.8 05/06/2021    VDK1PRS 52.0 05/04/2021    POCPO2 150.9 05/06/2021    PO2ART 145.0 05/04/2021    POCHCO3 24.6 05/06/2021    YJD9DYK 19.2 05/04/2021    NBEA NOT REPORTED 05/06/2021    PBEA 0 05/06/2021    DSC5WNO NOT REPORTED 05/06/2021    FTKL7FOG 99 05/06/2021    K6GIJLOV 98.0 05/04/2021    FIO2 40.0 05/06/2021     Lab Results   Component Value Date/Time    SPECIAL NOT REPORTED Portable    Result Date: 5/7/2021  Bibasilar consolidation new from prior study. Blunting of the costophrenic angles bilaterally     Xr Chest Portable    Result Date: 5/6/2021  ETT tip position stable. Decreased left subcutaneous emphysema. Slightly improved suspected left basilar volume loss with persistent findings as above. No overt vascular congestion. Xr Chest Portable    Result Date: 5/5/2021  Volume loss continues left hemithorax with haziness at the left base either atelectasis and or fluid. Subcutaneous emphysema along the left lateral chest wall has decreased. No appreciable extrapleural air. Endotracheal tube in appropriate position. Xr Chest Portable    Result Date: 5/5/2021  1. Recommend repositioning of left internal jugular approach central venous catheter. 2. Low lung volumes with left basilar atelectasis plus or minus mild pleural effusion. 3. Left chest wall scattered soft tissue emphysema. Xr Chest Portable    Result Date: 5/4/2021  Intestinal tube deviates to the left side of a sizable hiatal hernia, traversing below the hemidiaphragm and terminating just underneath the left hemidiaphragm. Side port of the intestinal tube is below the diaphragmatic hiatus. Xr Chest Portable    Result Date: 5/3/2021  No acute cardiopulmonary disease Large hiatal hernia     Mri Abdomen Wo Contrast Mrcp    Result Date: 5/3/2021  1. No evidence of intrahepatic or extrahepatic ductal dilatation. 2. Incompletely imaged large hiatal hernia with organoaxial malrotation of the stomach. 3. Small to moderate amount of ascites. 4. Diffuse small bowel wall thickening likely due to 3rd spacing as the post enteritis. 5. Trace bilateral pleural effusions. Ct Chest Abdomen Pelvis Wo Contrast    Result Date: 5/7/2021  1. Within the chest, the patient has consolidative processes in both lower lobes likely pneumonitis. Bilateral pleural effusions and basilar atelectasis are noted.  2. Postoperative changes within the abdomen. The patient had a hiatal hernia repair. Although the stomach is decompressed, gastric walls appear thickened likely secondary to inflammation which may be postoperative. 3. Fluid in the right pericolic gutter and pelvis. 4. Thickened small bowel loops in the right lower quadrant which may represent secondary changes to surgery. 5. Two drain placements in the upper abdomen. Left inguinal terminates in the left iliac vein. Fl Ugi    Result Date: 5/4/2021  Organo-axial volvulus the stomach. Large paraesophageal hernia containing the majority of the rotated gastric body. The greater curvature is positioned superiorly within the paraesophageal hernia defect. There is narrowing of the gastroesophageal junction as well as of the gastroduodenal junction through the hernia defect. Physical Examination:        General appearance:  alert, cooperative and no distress  Mental Status:  oriented to person, place and time and normal affect  Lungs:  clear to auscultation bilaterally, normal effort  Heart:  regular rate and rhythm, no murmur  Abdomen:  Obese, soft, tender, nondistended,hypoactive bowel sounds, no masses, hepatomegaly, splenomegaly  Extremities:  no edema, redness, tenderness in the calves  Skin:  no gross lesions, rashes, induration, abd surgical incisions, bilateral PERC drain.  Right-sided PERC drain with bilious output    Assessment:        Hospital Problems           Last Modified POA    * (Principal) Septic shock ([address] 5/4/2021 Yes    Acute pancreatitis 5/9/2021 Yes    Non-STEMI (non-ST elevated myocardial infarction) ([address] 5/4/2021 Yes    Diabetic ketoacidosis without coma associated with type 2 diabetes mellitus ([address] 5/4/2021 Yes    BONNIE (acute kidney injury) ([address] 5/4/2021 Yes    Hernia with obstruction 5/4/2021 Yes    Essential hypertension 5/4/2021 Yes    Thyroid disease 5/4/2021 Yes    Syncope 5/4/2021 Yes    Acute tubular necrosis ([address] 5/4/2021 Yes    Lactic acid from sepsis.    - Continue to monitor.      9. Hypothyroidism  - TSH elevated to 13.36. in the setting of acute illness.   - Free T4 normal.   - transition to po levothyroxine  Recheck in two months      10. Elevated PT/INR  - resolved.     Valarie Mccoy MD  5/13/2021  1:55 PM

## 2021-05-13 NOTE — BRIEF OP NOTE
Brief Postoperative Note    Negro Ng  YOB: 1950 [id number]    Pre-operative Diagnosis: Right pleural effusion    Post-operative Diagnosis: Same    Procedure: Right chest tube placement    Anesthesia: Local    Surgeons/Assistants: Edu Morataya MD    Estimated Blood Loss: less than 50     Complications: None    Specimens: Was Obtained:     Findings: Successful placement of 12 fr right sided chest tube. Yellow fluid was obtained and sent to lab.       Electronically signed by NANDO Momin on 5/13/2021 at 9:01 AM

## 2021-05-13 NOTE — PROGRESS NOTES
Bariatric Surgery Progress Note      PATIENT NAME: Audie Tse   TODAY'S DATE: 5/13/2021, 7:18 AM  Chief Complaint   Patient presents with    Blood Sugar Problem     >450    Hernia     Hiatal      SUBJECTIVE:    Pt seen and examined. UOP adequate. Minimal to no pain. Tolerating low fiber diet. L TAISHA drain removed 5/7. R TAISHA removed 5/11.      Tmax 36.8    OBJECTIVE:   Vitals:  BP (!) 152/81   Pulse 87   Temp 98.3 °F (36.8 °C) (Oral)   Resp 14   Ht 5' 2\" (1.575 m)   Wt 195 lb 5.2 oz (88.6 kg)   SpO2 99%   BMI 35.73 kg/m²      INTAKE/OUTPUT:      Intake/Output Summary (Last 24 hours) at 5/13/2021 0718  Last data filed at 5/13/2021 0538  Gross per 24 hour   Intake --   Output 1050 ml   Net -1050 ml                 General: alert, oriented  Lungs: Symmetrical chest rise bilaterally  Heart: S1S2  Abdomen: Soft, ND, appropriately tender, non peritoneal, no rebound, incisions c/d/i, PEG x2 with no surrounding erythema   Extremity: moves all extremities x4, trace edema    Data Review:  CBC:   Recent Labs     05/11/21 0440 05/12/21  0502 05/13/21  0551   WBC 8.1 7.7 4.7   HGB 8.5* 9.5* 8.2*   * 184 179     BMP:    Recent Labs     05/11/21 0440 05/12/21  0502 05/13/21  0551    139 137   K 4.4 4.2 4.2   * 104 103   CO2 24 24 25   BUN 34* 35* 37*   CREATININE 1.18* 1.30* 1.34*   GLUCOSE 211* 128* 234*     Hepatic:   Recent Labs     05/11/21 0440 05/12/21  0502 05/13/21  0551   AST 10 11 9   ALT 16 12 9   ALKPHOS 81 92 106*   BILITOT 0.30 0.35 0.33   BILIDIR 0.18 0.20  --      Coagulation:   Recent Labs     05/11/21 0440 05/12/21  0502 05/13/21  0551   APTT 26.1 27.9 25.0   PROT 5.5* 6.0* 6.1*   INR 1.1 1.0 1.0       ASSESSMENT   Patient Active Problem List   Diagnosis    Acute pancreatitis    Septic shock (HCC)    Non-STEMI (non-ST elevated myocardial infarction) [facility]    Diabetic ketoacidosis without coma associated with type 2 diabetes mellitus ([facility]    BONNIE (acute kidney injury) ([facility]    Hernia with obstruction    Essential hypertension    Thyroid disease    Syncope    Acute tubular necrosis (HCC)    Lactic acid acidosis    Acute hypoxemic respiratory failure (HCC)    Hiatal hernia    Gastric volvulus    Community acquired bilateral lower lobe pneumonia    Mediastinitis    Peritonitis ([address]     79 yo F s/p 5/4 emergent robotic assisted laparoscopic hiatal hernia reduction with gastropexy via g-tube x2     5/7 esophagram performed no leak noted    PLAN  Ok for low fiber diet as tolerated, cont nutritional supplements, g-tubes clamped at this time, ok to unclamp if pt feeling nauseated or has emesis   Cont recs and management per primary/ Pulmonary following, s/p L thoracentesis 5/12  Cont abx per ID, + blood cultures, defer abx regimen to primary   Encourage IS use, deep breathing, ambulation and PT/OT work   Donte Dial for DVT ppx from surg stance  Pt to f/u with Dr. Alisia Barnard as out pt in 7-10 days     Thank you,    Electronically signed by Judith Phelps DO  on 5/13/2021 at 7:18 AM

## 2021-05-13 NOTE — PROGRESS NOTES
Demo functional transfers with Min A, using LRD  Short term goal 3: Demo functional mobility with Mod A, using LRD  Short term goal 4: Demo UB ADLs with CGA, using AE/DME PRN  Short term goal 5: Demo LB ADLs with Min A, setup, use of AE/DME PRN  Short term goal 6: Demo +5 minutes of static/dynamic standing with CGA to increase engagement in ADLs       Therapy Time   Individual Concurrent Group Co-treatment   Time In 1114         Time Out 1202         Minutes 48         Timed Code Treatment Minutes: 38 Minutes(Co-tx with PTA)       Lenore Sever, COTA/BAMBI

## 2021-05-13 NOTE — CARE COORDINATION
Called Casey from [place] to check on referral for [facility], left voicemail asking for a return call.

## 2021-05-13 NOTE — PROGRESS NOTES
PULMONARY & CRITICAL CARE MEDICINE PROGRESS  NOTE     Patient:  Eli Saunders  MRN: [id number]  Admit date: 5/3/2021    SUBJECTIVE     I personally interviewed/examined the patient, reviewed interval history and interpreted all available radiographic, laboratory data at the time of service. Chief Compliant/Reason for Initial Consult: Acute respiratory failure on vent support, gastric perforation    Brief Hospital Course and Interval History:  The patient is a 78 y.o. female with known medical history of diabetes mellitus, hypertension, hypothyroidism, abdominal hernia presented to the hospital with nausea, vomiting, diarrhea. Patient had similar symptoms on 4/28 but was apparently sent home. Patient had dizzy spells and lightheadedness, had a syncopal episode in the ER on this admission. Patient was transferred from the [facility] to [place].  In [facility] patient lab revealed lactic acidosis of 6, glucose 585, patient was seen to be in DKA. Elevated lipase of 1451. Leukocytotic with WBC 23.5, hemoglobin 18.3. Patient also had elevated troponins, elevated creatinine of 3.8. In [place] repeat labs showed creatinine of 2.49 with lactic acid of 2.4. Troponin was 85, recheck of 94. LFTs showed elevated bilirubin.     General surgery was consulted. CT chest showed ascites and pneumoperitoneum with apparent free-flowing oral contrast in the left upper quadrant concerning for viscus perforation possibly within the subdiaphragmatic portion of the stomach. Patient then went for robotic hernia repair 5/4 with wedge gastrectomy, gastropexy and placement of 2 TAISHA drains and 2 PEG tubes. Patient is n.p.o.         Interval history  5/13/2021  Patient awake, alert, and interactive.   Patient underwent thoracentesis on 5/12 on the left side  Underwent right-sided pigtail catheter placement for right-sided effusion  Patient is doing well after the procedure  Vitals are stable  On 2 L nasal oxygen saturating well    I/O+ 3.2 L  Review of Systems:  Review of Systems   Constitutional: Positive for activity change. HENT: Negative for congestion. Respiratory: Negative for cough, shortness of breath and wheezing. Gastrointestinal: Positive for abdominal distention and abdominal pain. Genitourinary: Negative for difficulty urinating. Musculoskeletal: Negative. Neurological: Negative for light-headedness and numbness. Psychiatric/Behavioral: Negative for agitation.          OBJECTIVE     VITAL SIGNS:   LAST-  /81   Pulse 92   Temp 98.1 °F (36.7 °C) (Oral)   Resp 24   Ht 5' 2\" (1.575 m)   Wt 195 lb 5.2 oz (88.6 kg)   SpO2 99%   BMI 35.73 kg/m²   8-24 HR RANGE-  TEMP Temp  Av.1 °F (36.7 °C)  Min: 98 °F (36.7 °C)  Max: 98.3 °F (70.5 °C)   BP Systolic (35PYO), JVR:170 , Min:119 , QHQ:096      Diastolic (46WES), WBC:88, Min:70, Max:91     PULSE Pulse  Av.9  Min: 87  Max: 101   RR Resp  Av.6  Min: 14  Max: 24   O2 SAT SpO2  Av.3 %  Min: 99 %  Max: 100 %   OXYGEN DELIVERY O2 Flow Rate (L/min)  Avg: 3 L/min  Min: 3 L/min  Max: 3 L/min     Systemic Examination:   General appearance alert and oriented   mental status - alert  Eyes - pupils equal and reactive, extraocular eye movements intact  Mouth - mucous membranes moist, pharynx normal without lesions  Neck - supple, no significant adenopathy  Chest -dull to percussion bilaterally and decreased breath sounds, right-sided chest tube noted  Heart - normal rate, regular rhythm, normal S1, S2, no murmurs, rubs, clicks or gallops  Abdomen -soft with one PEG tube and G-tube, clamped  Neurological - alert, oriented, normal speech, no focal findings or movement disorder noted  Extremities - peripheral pulses normal, no pedal edema, no clubbing or cyanosis  Skin - normal coloration and turgor, no rashes, no suspicious skin lesions noted   PICC line in place  DATA REVIEW     Medications:  Scheduled Meds:   vancomycin  1,000 mg Intravenous Q24H    levothyroxine  112 mcg Oral Daily    pantoprazole  40 mg Oral QAM AC    gabapentin  300 mg Oral TID    furosemide  20 mg Oral Every Other Day    insulin glargine  10 Units Subcutaneous Nightly    polyethylene glycol  17 g Oral Daily    [Held by provider] heparin (porcine)  5,000 Units Subcutaneous 3 times per day    vancomycin (VANCOCIN) intermittent dosing (placeholder)   Other RX Placeholder    ipratropium-albuterol  1 ampule Inhalation Q4H WA    piperacillin-tazobactam  3,375 mg Intravenous Q8H    insulin lispro  0-18 Units Subcutaneous Q4H    sodium chloride flush  5-40 mL Intravenous 2 times per day     Continuous Infusions:   PN-Adult 2-in-1 Central Line (Standard) 60 mL/hr at 05/12/21 1815    dextrose      sodium chloride 25 mL (05/10/21 1840)     LABS:-  ABGs:   No results found for: PH, PCO2, PO2, HCO3, O2SAT  No results for input(s): PHART, PO2ART, NGA1LCT, CPW8PFW, BEART, U0RDJJIW in the last 72 hours. Lab Results   Component Value Date    POCPH 7.433 05/06/2021    POCPCO2 36.8 05/06/2021    POCPO2 150.9 (H) 05/06/2021    POCHCO3 24.6 05/06/2021    KLKR7HKJ 99 (H) 05/06/2021     CBC:   Recent Labs     05/11/21  0440 05/12/21  0502 05/13/21  0551   WBC 8.1 7.7 4.7   HGB 8.5* 9.5* 8.2*   HCT 28.5* 31.3* 26.7*   MCV 94.1 90.7 91.8   * 184 179   LYMPHOPCT 24 14* 13*   RBC 3.03* 3.45* 2.91*   MCH 28.1 27.5 28.2   MCHC 29.8 30.4 30.7   RDW 15.4* 15.0* 14.6*     BMP:   Recent Labs     05/11/21  0440 05/12/21  0502 05/13/21  0551    139 137   K 4.4 4.2 4.2   * 104 103   CO2 24 24 25   BUN 34* 35* 37*   CREATININE 1.18* 1.30* 1.34*   GLUCOSE 211* 128* 234*   PHOS  --  3.9  --      Liver Function Test:   Recent Labs     05/13/21  0551   PROT 6.1*   LABALBU 1.5*   ALT 9   AST 9   ALKPHOS 106*   BILITOT 0.33     Amylase/Lipase:  No results for input(s):  AMYLASE, LIPASE in the last 72 hours. Coagulation Profile:   Recent Labs     05/11/21  0440 05/12/21  0502 05/13/21  0551   INR 1.1 1.0 1.0   PROTIME 11.4 11.1 10.7   APTT 26.1 27.9 25.0     Cardiac Enzymes:  No results for input(s): CKTOTAL, CKMB, CKMBINDEX, TROPONINI in the last 72 hours. Lactic Acid:  Lab Results   Component Value Date    LACTA 3.2 (H) 05/03/2021    LACTA 6.0 (HH) 05/03/2021     BNP:   No results found for: BNP  D-Dimer:  No results found for: DDIMER  Others:   Lab Results   Component Value Date    TSH 13.36 (H) 05/03/2021     No results found for: LILLY, RHEUMFACTOR, SEDRATE, CRP  No results found for: Daylene Ankush  No results found for: IRON, TIBC, FERRITIN  No results found for: SPEP, UPEP  No results found for: PSA, CEA, , LR0844,     Input/Output:    Intake/Output Summary (Last 24 hours) at 5/13/2021 1137  Last data filed at 5/13/2021 0919  Gross per 24 hour   Intake    Output 1060 ml   Net -1060 ml       Microbiology:    Pathology:    Radiology Reports:  IR GUIDED THORACENTESIS PLEURAL   Final Result   Successful ultrasound guided thoracentesis. FL ESOPHAGRAM   Final Result   1. No evidence for esophageal leak. 2. Postsurgical changes in the stomach. 3. Large amount of gastroesophageal reflux occurring repeatedly during the   study. .         CT CHEST WO CONTRAST   Final Result   New right-sided PICC again seen with unchanged and satisfactory tip position;   larger loculated right effusion in the azygoesophageal recess, as above. Otherwise similar findings to 05/07/2021 with suspected lower lobe   consolidation/pneumonitis, with volume loss and effusions both lower lobes. XR CHEST PORTABLE   Final Result   New right-sided PICC tip position appears satisfactory. Otherwise, similar   findings compatible with bibasilar atelectasis/consolidation, effusions and   minimal infiltrate or atelectasis right mid lung. CT CHEST ABDOMEN PELVIS WO CONTRAST   Final Result   1. Within the chest, the patient has consolidative processes in both lower   lobes likely pneumonitis. Bilateral pleural effusions and basilar   atelectasis are noted. 2. Postoperative changes within the abdomen. The patient had a hiatal hernia   repair. Although the stomach is decompressed, gastric walls appear thickened   likely secondary to inflammation which may be postoperative. 3. Fluid in the right pericolic gutter and pelvis. 4. Thickened small bowel loops in the right lower quadrant which may   represent secondary changes to surgery. 5. Two drain placements in the upper abdomen. Left inguinal terminates in   the left iliac vein. FL ESOPHAGRAM   Final Result   1. Drainage of contrast material through what appears to be the gastrostomy   tubes following the ingestion of contrast.  Contrast does migrate beyond the   postoperative region into the proximal small bowel. 2. No extraneous/extraluminal collection of contrast is seen beyond the   confines of the stomach. 3. 2 surgical drains and 2 presumed gastrostomy tubes are seen overlying the   left upper quadrant. There does appear to be contrast slightly marginating   the more lateral gastrostomy tube balloon. 4. Delayed migration of contrast through the distal esophagus and GE junction   with mild gastroesophageal reflux. The findings were sent to the Radiology Results [address] at 12:43   pm on 5/7/2021to be communicated to a licensed caregiver. XR CHEST PORTABLE   Final Result   Bibasilar consolidation new from prior study. Blunting of the costophrenic   angles bilaterally         XR CHEST PORTABLE   Final Result   ETT tip position stable. Decreased left subcutaneous emphysema. Slightly   improved suspected left basilar volume loss with persistent findings as   above. No overt vascular congestion. US RENAL COMPLETE   Final Result   Unremarkable ultrasound of the kidneys and urinary bladder.       Trace right pleural effusion         XR CHEST PORTABLE   Final Result   Volume loss continues left hemithorax with haziness at the left base either   atelectasis and or fluid. Subcutaneous emphysema along the left lateral   chest wall has decreased. No appreciable extrapleural air. Endotracheal   tube in appropriate position. XR CHEST PORTABLE   Final Result   1. Recommend repositioning of left internal jugular approach central venous   catheter. 2. Low lung volumes with left basilar atelectasis plus or minus mild pleural   effusion. 3. Left chest wall scattered soft tissue emphysema. XR CHEST PORTABLE   Final Result   Intestinal tube deviates to the left side of a sizable hiatal hernia,   traversing below the hemidiaphragm and terminating just underneath the left   hemidiaphragm. Side port of the intestinal tube is below the diaphragmatic   hiatus. CT CHEST WO CONTRAST   Final Result   1. Ascites and pneumoperitoneum with apparent free-flowing oral contrast in   the left upper quadrant is concerning for viscus perforation, possibly within   the subdiaphragmatic portion of the stomach. 2. Large hiatal hernia with organoaxial volvulus. Majority of contrast is   retained within the esophagus and supradiaphragmatic stomach. 3. Enteric tube extends below the diaphragm with tip outside the field of   view. 4. Moderate bilateral pleural effusions with adjacent consolidation,   atelectasis versus pneumonia. Critical results were called by Dr. Yahaira Green MD to [facility] on   5/4/2021 at 18:30. XR ABDOMEN (KUB) (SINGLE AP VIEW)   Final Result   No significant subdiaphragmatic contrast progression, as above. RECOMMENDATION:   Consider chest x-ray to further assess a organic-axial gastric volvulus         FL UGI   Final Result   Organo-axial volvulus the stomach. Large paraesophageal hernia containing the majority of the rotated gastric   body.   The greater curvature is positioned superiorly within the   paraesophageal hernia defect. There is narrowing of the gastroesophageal   junction as well as of the gastroduodenal junction through the hernia defect. MRI ABDOMEN WO CONTRAST MRCP   Final Result   1. No evidence of intrahepatic or extrahepatic ductal dilatation. 2. Incompletely imaged large hiatal hernia with organoaxial malrotation of   the stomach. 3. Small to moderate amount of ascites. 4. Diffuse small bowel wall thickening likely due to 3rd spacing as the post   enteritis. 5. Trace bilateral pleural effusions. XR CHEST PORTABLE   Final Result   No acute cardiopulmonary disease      Large hiatal hernia         IR CHEST TUBE INSERTION    (Results Pending)       Echocardiogram:   Results for orders placed during the hospital encounter of 05/03/21   ECHO Complete 2D W Doppler W Color    Narrative Transthoracic Echocardiography Report (TTE)     Patient Name Higinio Starr   Date of Study               05/05/2021      Date of      1950  Gender                      Female   Birth      Age          78 year(s)  Race                              Room Number  0977        Height:                     62 inch, 157.48 cm      Corporate ID [id number]    Weight:                     166 pounds, 75.3 kg   #      Patient Acct [de-identified]   BSA:          1.77 m^2      BMI:      30.36   #                                                              kg/m^2      MR #         [id number]     Sonographer                 Yaya Dubon      Accession #  [id number]  Interpreting Physician      Shellie Vizcarra 63      Fellow                   Referring Nurse                            Practitioner      Interpreting             Referring Physician         Sourav Quijano   Fellow     Additional Comments  Technically difficult study, patient supine on ventilator. Type of Study      TTE procedure:2D Echocardiogram, M-Mode, Doppler, Color Doppler.      Procedure Date  Date: 05/05/2021 Start: 07:32 AM    Study Location: [facility]  Technical Quality: Adequate visualization    Indications:Elevated troponin. History / Tech. Comments:  Procedure explained to patient. No known medical Hx. Patient Status: Inpatient    Height: 62 inches Weight: 166 pounds BSA: 1.77 m^2 BMI: 30.36 kg/m^2    CONCLUSIONS    Summary  Normal LV size and wall thickness. No obvious wall motion abnormality seen. Normal LV systolic function with LVEF 55%. RV appears dilated with reduced function. RV systolic pressure 37 mmHg  LA appears normal in size. No obvious significant structural valvular abnormality noted. No significant valvular stenosis or regurgitation noted. Normal aortic root dimension. No significant pericardial effusion noted. Signature  ----------------------------------------------------------------------------   Electronically signed by Connor Montoya(Interpreting physician) on   05/05/2021 12:15 PM  ----------------------------------------------------------------------------    ----------------------------------------------------------------------------   Electronically signed by Olimpia Butcher(Sonographer) on 05/05/2021 11:37 AM  ----------------------------------------------------------------------------  FINDINGS  Left Atrium  Left atrium is normal in size. Inter-atrial septum is intact with no evidence for an atrial septal defect,  by color doppler. Left Ventricle  Left ventricle is small in size, normal wall thickness, global left  ventricular systolic function is normal, calculated ejection fraction is  53%. All wall segments are not well visualized (poor acoustic windows.)  Right Atrium  Right atrial dilatation. Right Ventricle  Right ventricular dilatation with reduced systolic function. Abnormal RV strain. Mitral Valve  Normal mitral valve structure. Trivial mitral regurgitation. Aortic Valve  Aortic valve is trileaflet.   No evidence of aortic insufficiency or stenosis. Tricuspid Valve  Normal tricuspid valve leaflets. Moderate tricuspid regurgitation. Estimated right ventricular systolic pressure is 37 mmHg, suggesting  pulmonary HTN. Pulmonic Valve  Pulmonic valve not well visualized but Doppler velocities are normal.  Pericardial Effusion  No significant pericardial effusion is seen. Miscellaneous  Normal aortic root dimension. E/E' average = 15.05. IVC dilated but unable to assess respiratory collapse due to patient on  ventilator.     M-mode / 2D Measurements & Calculations:      LVIDd:4 cm(3.7 - 5.6 cm)          Diastolic MLMNDO:56 ml   YUBW:1.2 cm(0.6 - 1.1 cm)         Systolic VQATVP:49.99 ml   LVPWd:0.9 cm(0.6 - 1.1 cm)        Aortic Root:2.9 cm(2.0 - 3.7 cm)                                     LA Dimension: 3.1 cm(1.9 - 4.0 cm)   Calculated LVEF (%): 52.95 %      LA volume/Index: 31.04 ml /18m^2                                     LVOT:1.9 cm                                     RVDd:3 cm      Mitral:                                 Aortic      Valve Area (P1/2-Time): 5.12 cm^2       Peak Velocity: 1.55 m/s   Peak E-Wave: 0.78 m/s                   Mean Velocity: 0.99 m/s   Peak A-Wave: 1.00 m/s                   Peak Gradient: 9.61 mmHg   E/A Ratio: 0.78                         Mean Gradient: 5 mmHg   Peak Gradient: 2.45 mmHg   Mean Gradient: 2 mmHg   Deceleration Time: 145 msec             Area (continuity): 2.16 cm^2   P1/2t: 43 msec                          AV VTI: 25.9 cm      Area (continuity): 2.1 cm^2   Mean Velocity: 0.60 m/s      Tricuspid:                              Pulmonic:      Estimated RVSP: 37 mmHg                 Peak Velocity: 0.80 m/s   Peak TR Velocity: 2.85 m/s              Peak Gradient: 2.57 mmHg   Peak TR Gradient: 32.49 mmHg     Diastology / Tissue Doppler  Septal Wall E' velocity:0.06 m/s  Septal Wall E/E':13.8  Lateral Wall E' velocity:0.05 m/s  Lateral Wall E/E':16.3       Cardiac Catheterization:   No results found for this or any previous visit. ASSESSMENT AND PLAN     Assessment:  Left Thoracentesis 5/12/21  Right pig tail chest tube   Gastric volvulus with Viscus perforation  Sepsis   Bilateral lower lobe pneumonia  Peritonitis  Blood cultures positive for staph epidermidis, methicillin resistant  Diabetes mellitusDKA now resolved  Lactic acidosis resolving  BONNIE  Thrombocytopenia   Elevated troponins  Subclinical hypothyroidism  Elevated bilirubin  Hypernatremia    Plan:    Patient underwent right-sided 12 [place] chest tube placement today for loculated lung effusion. Fluid sent to lab  Patient also had left thoracentesis on 5/12, fluid shows elevated LDH of 536, protein 3.1, exudative effusion likely parapneumonic. We will monitor for drainage from chest tube. We will repeat CT scan in 3 days from chest tube placement to follow-up on the loculated effusion. Continue antibiotics Zosyn and vancomycin as per infectious disease  Gastric tube management per general surgery  Diet progressed per general surgery  On Lasix 20 mg every other day  Encourage ambulation  Encourage incentive spirometry  DVT prophylaxis on heparin      Judge Mcelroy,   Pulmonary and Critical Care Medicine           5/13/2021, 11:37 AM  Attending Physician Statement  I have discussed the care of Yara Smith, including pertinent history and exam findings,  with the resident. I have seen and examined the patient and the key elements of all parts of the encounter have been performed by me. I agree with the assessment, plan and orders as documented by the resident with additions . parapneumonic Left pleural effusion is exudative ( [facility] CRITERIA)- lymphocytic - so far bacterial culture is negative , fungal culture and cytology pending . Low amylase     POST RT CHEST TUBE FOR LOCULATED PLEURAL EFFUSION - LOW PH(6.9) , EXUDATE BY LDH CRITERIA, lymphocytic - EMPYEMA - CYTOLOGY AND CULTURE PENDING . Low amylase   Continue chest tube to be suction and monitor out put . Ct chest in 3 days wo contrast .  Will get CTS evaluation for VATS   Continue antibiotics . Restart heparin sc for prophylaxis             Treatment plan Discussed with nursing staff in detail , all questions answered . Electronically signed by Adolfo Malcolm MD on   5/13/21 at 1:40 PM EDT    Please note that this chart was generated using voice recognition Dragon dictation software. Although every effort was made to ensure the accuracy of this automated transcription, some errors in transcription may have occurred.

## 2021-05-13 NOTE — SEDATION DOCUMENTATION
12 fr x 25 cm pigtail drain to right chest. Sutured and dressing on. Specimen sent. 10 ml pale yellow. Placed to atrium. Report called.    Lot [id number]

## 2021-05-13 NOTE — PLAN OF CARE
BRONCHOSPASM/BRONCHOCONSTRICTION     [x]         IMPROVE AERATION/BREATH SOUNDS  [x]   ADMINISTER BRONCHODILATOR THERAPY AS APPROPRIATE  [x]   ASSESS BREATH SOUNDS  []   IMPLEMENT AEROSOL/MDI PROTOCOL  [x]   PATIENT EDUCATION AS NEEDED    PROVIDE ADEQUATE OXYGENATION WITH ACCEPTABLE SP02/ABG'S    [x]  IDENTIFY APPROPRIATE OXYGEN THERAPY  [x]   MONITOR SP02/ABG'S AS NEEDED   [x]   PATIENT EDUCATION AS NEEDED    MOBILIZE SECRETIONS    [x]   ASSESS BREATH SOUNDS  [x]   ASSESS SPUTUM PRODUCTION  [x]   COUGH AND DEEP BREATHING  []  IMPLEMENT SECRETION MANAGEMENT PROTOCOL  [x]   PATIENT EDUCATION AS NEEDED

## 2021-05-13 NOTE — PLAN OF CARE
Problem: Injury - Risk of, Physical Injury:  Goal: Absence of physical injury  Description: Absence of physical injury  Outcome: Met This Shift     Problem: Injury - Risk of, Physical Injury:  Goal: Will remain free from falls  Description: Will remain free from falls  Outcome: Met This Shift   Patient remained free from injury. Patient verbalized understanding of need for the safety precautions. Demonstrates proper use of assistive devices. Bed remains in the lowest position. Call light remains within reach. [place] [facility] in use.

## 2021-05-14 ENCOUNTER — APPOINTMENT (OUTPATIENT)
Dept: GENERAL RADIOLOGY | Age: 71
DRG: 853 | End: 2021-05-14
Payer: MEDICARE

## 2021-05-14 LAB
ABSOLUTE EOS #: 0.06 K/UL (ref 0–0.4)
ABSOLUTE IMMATURE GRANULOCYTE: 0 K/UL (ref 0–0.3)
ABSOLUTE LYMPH #: 0.74 K/UL (ref 1–4.8)
ABSOLUTE MONO #: 0.06 K/UL (ref 0.1–0.8)
ALBUMIN SERPL-MCNC: 1.4 G/DL (ref 3.5–5.2)
ALBUMIN/GLOBULIN RATIO: 0.3 (ref 1–2.5)
ALP BLD-CCNC: 121 U/L (ref 35–104)
ALT SERPL-CCNC: 11 U/L (ref 5–33)
ANION GAP SERPL CALCULATED.3IONS-SCNC: 8 MMOL/L (ref 9–17)
APPEARANCE FLUID: NORMAL
AST SERPL-CCNC: 16 U/L
BASO FLUID: NORMAL %
BASOPHILS # BLD: 0 % (ref 0–2)
BASOPHILS ABSOLUTE: 0 K/UL (ref 0–0.2)
BILIRUB SERPL-MCNC: 0.3 MG/DL (ref 0.3–1.2)
BILIRUBIN DIRECT: 0.19 MG/DL
BILIRUBIN, INDIRECT: 0.11 MG/DL (ref 0–1)
BUN BLDV-MCNC: 35 MG/DL (ref 8–23)
BUN/CREAT BLD: ABNORMAL (ref 9–20)
CALCIUM SERPL-MCNC: 8.2 MG/DL (ref 8.6–10.4)
CHLORIDE BLD-SCNC: 101 MMOL/L (ref 98–107)
CO2: 28 MMOL/L (ref 20–31)
COLOR FLUID: NORMAL
CREAT SERPL-MCNC: 1.42 MG/DL (ref 0.5–0.9)
CULTURE: NORMAL
CULTURE: NORMAL
DIFFERENTIAL TYPE: ABNORMAL
EOSINOPHIL FLUID: NORMAL %
EOSINOPHILS RELATIVE PERCENT: 1 % (ref 1–4)
FLUID DIFF COMMENT: NORMAL
GFR AFRICAN AMERICAN: 45 ML/MIN
GFR NON-AFRICAN AMERICAN: 37 ML/MIN
GFR SERPL CREATININE-BSD FRML MDRD: ABNORMAL ML/MIN/{1.73_M2}
GFR SERPL CREATININE-BSD FRML MDRD: ABNORMAL ML/MIN/{1.73_M2}
GLOBULIN: ABNORMAL G/DL (ref 1.5–3.8)
GLUCOSE BLD-MCNC: 193 MG/DL (ref 65–105)
GLUCOSE BLD-MCNC: 205 MG/DL (ref 65–105)
GLUCOSE BLD-MCNC: 208 MG/DL (ref 70–99)
GLUCOSE BLD-MCNC: 265 MG/DL (ref 65–105)
GLUCOSE BLD-MCNC: 279 MG/DL (ref 65–105)
GLUCOSE BLD-MCNC: 282 MG/DL (ref 65–105)
HCT VFR BLD CALC: 26.6 % (ref 36.3–47.1)
HEMOGLOBIN: 8.4 G/DL (ref 11.9–15.1)
IMMATURE GRANULOCYTES: 0 %
INR BLD: 1
LYMPHOCYTES # BLD: 13 % (ref 24–44)
LYMPHOCYTES, BODY FLUID: 86 %
Lab: NORMAL
Lab: NORMAL
MAGNESIUM: 1.5 MG/DL (ref 1.6–2.6)
MCH RBC QN AUTO: 28.4 PG (ref 25.2–33.5)
MCHC RBC AUTO-ENTMCNC: 31.6 G/DL (ref 28.4–34.8)
MCV RBC AUTO: 89.9 FL (ref 82.6–102.9)
MONOCYTE, FLUID: NORMAL %
MONOCYTES # BLD: 1 % (ref 1–7)
MORPHOLOGY: NORMAL
NEUTROPHIL, FLUID: 7 %
NRBC AUTOMATED: 0 PER 100 WBC
OTHER CELLS FLUID: NORMAL %
PARTIAL THROMBOPLASTIN TIME: 25.4 SEC (ref 20.5–30.5)
PDW BLD-RTO: 14.2 % (ref 11.8–14.4)
PHOSPHORUS: 4.5 MG/DL (ref 2.6–4.5)
PLATELET # BLD: 191 K/UL (ref 138–453)
PLATELET ESTIMATE: ABNORMAL
PMV BLD AUTO: 10.4 FL (ref 8.1–13.5)
POTASSIUM SERPL-SCNC: 3.7 MMOL/L (ref 3.7–5.3)
PROTHROMBIN TIME: 10.4 SEC (ref 9.1–12.3)
RBC # BLD: 2.96 M/UL (ref 3.95–5.11)
RBC # BLD: ABNORMAL 10*6/UL
RBC FLUID: <3000 /MM3
SEG NEUTROPHILS: 85 % (ref 36–66)
SEGMENTED NEUTROPHILS ABSOLUTE COUNT: 4.84 K/UL (ref 1.8–7.7)
SODIUM BLD-SCNC: 137 MMOL/L (ref 135–144)
SPECIMEN DESCRIPTION: NORMAL
SPECIMEN DESCRIPTION: NORMAL
SPECIMEN TYPE: NORMAL
SURGICAL PATHOLOGY REPORT: NORMAL
TOTAL PROTEIN: 6.1 G/DL (ref 6.4–8.3)
WBC # BLD: 5.7 K/UL (ref 3.5–11.3)
WBC # BLD: ABNORMAL 10*3/UL
WBC FLUID: 433 /MM3

## 2021-05-14 PROCEDURE — 99232 SBSQ HOSP IP/OBS MODERATE 35: CPT | Performed by: INTERNAL MEDICINE

## 2021-05-14 PROCEDURE — 6360000002 HC RX W HCPCS: Performed by: STUDENT IN AN ORGANIZED HEALTH CARE EDUCATION/TRAINING PROGRAM

## 2021-05-14 PROCEDURE — 80048 BASIC METABOLIC PNL TOTAL CA: CPT

## 2021-05-14 PROCEDURE — 84100 ASSAY OF PHOSPHORUS: CPT

## 2021-05-14 PROCEDURE — 2580000003 HC RX 258: Performed by: STUDENT IN AN ORGANIZED HEALTH CARE EDUCATION/TRAINING PROGRAM

## 2021-05-14 PROCEDURE — 6370000000 HC RX 637 (ALT 250 FOR IP): Performed by: STUDENT IN AN ORGANIZED HEALTH CARE EDUCATION/TRAINING PROGRAM

## 2021-05-14 PROCEDURE — 97110 THERAPEUTIC EXERCISES: CPT

## 2021-05-14 PROCEDURE — 6370000000 HC RX 637 (ALT 250 FOR IP): Performed by: NURSE PRACTITIONER

## 2021-05-14 PROCEDURE — 85610 PROTHROMBIN TIME: CPT

## 2021-05-14 PROCEDURE — 85730 THROMBOPLASTIN TIME PARTIAL: CPT

## 2021-05-14 PROCEDURE — 2700000000 HC OXYGEN THERAPY PER DAY

## 2021-05-14 PROCEDURE — 71045 X-RAY EXAM CHEST 1 VIEW: CPT

## 2021-05-14 PROCEDURE — 94669 MECHANICAL CHEST WALL OSCILL: CPT

## 2021-05-14 PROCEDURE — 36415 COLL VENOUS BLD VENIPUNCTURE: CPT

## 2021-05-14 PROCEDURE — 99233 SBSQ HOSP IP/OBS HIGH 50: CPT | Performed by: INTERNAL MEDICINE

## 2021-05-14 PROCEDURE — 82947 ASSAY GLUCOSE BLOOD QUANT: CPT

## 2021-05-14 PROCEDURE — 80076 HEPATIC FUNCTION PANEL: CPT

## 2021-05-14 PROCEDURE — 2060000000 HC ICU INTERMEDIATE R&B

## 2021-05-14 PROCEDURE — 85025 COMPLETE CBC W/AUTO DIFF WBC: CPT

## 2021-05-14 PROCEDURE — APPSS30 APP SPLIT SHARED TIME 16-30 MINUTES: Performed by: NURSE PRACTITIONER

## 2021-05-14 PROCEDURE — 94640 AIRWAY INHALATION TREATMENT: CPT

## 2021-05-14 PROCEDURE — 94761 N-INVAS EAR/PLS OXIMETRY MLT: CPT

## 2021-05-14 PROCEDURE — 83735 ASSAY OF MAGNESIUM: CPT

## 2021-05-14 PROCEDURE — 2500000003 HC RX 250 WO HCPCS: Performed by: SURGERY

## 2021-05-14 RX ADMIN — GABAPENTIN 300 MG: 300 CAPSULE ORAL at 20:47

## 2021-05-14 RX ADMIN — INSULIN LISPRO 3 UNITS: 100 INJECTION, SOLUTION INTRAVENOUS; SUBCUTANEOUS at 23:45

## 2021-05-14 RX ADMIN — CALCIUM GLUCONATE: 98 INJECTION, SOLUTION INTRAVENOUS at 18:02

## 2021-05-14 RX ADMIN — PIPERACILLIN AND TAZOBACTAM 3375 MG: 3; .375 INJECTION, POWDER, LYOPHILIZED, FOR SOLUTION INTRAVENOUS at 22:27

## 2021-05-14 RX ADMIN — INSULIN LISPRO 9 UNITS: 100 INJECTION, SOLUTION INTRAVENOUS; SUBCUTANEOUS at 20:54

## 2021-05-14 RX ADMIN — VANCOMYCIN HYDROCHLORIDE 1000 MG: 1 INJECTION, SOLUTION INTRAVENOUS at 11:08

## 2021-05-14 RX ADMIN — IPRATROPIUM BROMIDE AND ALBUTEROL SULFATE 1 AMPULE: .5; 2.5 SOLUTION RESPIRATORY (INHALATION) at 20:28

## 2021-05-14 RX ADMIN — HEPARIN SODIUM 5000 UNITS: 5000 INJECTION INTRAVENOUS; SUBCUTANEOUS at 13:23

## 2021-05-14 RX ADMIN — IPRATROPIUM BROMIDE AND ALBUTEROL SULFATE 1 AMPULE: .5; 2.5 SOLUTION RESPIRATORY (INHALATION) at 08:25

## 2021-05-14 RX ADMIN — IPRATROPIUM BROMIDE AND ALBUTEROL SULFATE 1 AMPULE: .5; 2.5 SOLUTION RESPIRATORY (INHALATION) at 15:52

## 2021-05-14 RX ADMIN — INSULIN LISPRO 6 UNITS: 100 INJECTION, SOLUTION INTRAVENOUS; SUBCUTANEOUS at 09:24

## 2021-05-14 RX ADMIN — LEVOTHYROXINE SODIUM 112 MCG: 112 TABLET ORAL at 05:40

## 2021-05-14 RX ADMIN — INSULIN LISPRO 9 UNITS: 100 INJECTION, SOLUTION INTRAVENOUS; SUBCUTANEOUS at 02:30

## 2021-05-14 RX ADMIN — GABAPENTIN 300 MG: 300 CAPSULE ORAL at 13:23

## 2021-05-14 RX ADMIN — HEPARIN SODIUM 5000 UNITS: 5000 INJECTION INTRAVENOUS; SUBCUTANEOUS at 20:54

## 2021-05-14 RX ADMIN — PIPERACILLIN AND TAZOBACTAM 3375 MG: 3; .375 INJECTION, POWDER, LYOPHILIZED, FOR SOLUTION INTRAVENOUS at 13:24

## 2021-05-14 RX ADMIN — OXYCODONE HYDROCHLORIDE 5 MG: 5 TABLET ORAL at 20:53

## 2021-05-14 RX ADMIN — HEPARIN SODIUM 5000 UNITS: 5000 INJECTION INTRAVENOUS; SUBCUTANEOUS at 05:39

## 2021-05-14 RX ADMIN — IPRATROPIUM BROMIDE AND ALBUTEROL SULFATE 1 AMPULE: .5; 2.5 SOLUTION RESPIRATORY (INHALATION) at 12:08

## 2021-05-14 RX ADMIN — INSULIN LISPRO 9 UNITS: 100 INJECTION, SOLUTION INTRAVENOUS; SUBCUTANEOUS at 18:29

## 2021-05-14 RX ADMIN — SODIUM CHLORIDE, PRESERVATIVE FREE 10 ML: 5 INJECTION INTRAVENOUS at 20:47

## 2021-05-14 RX ADMIN — GABAPENTIN 300 MG: 300 CAPSULE ORAL at 09:33

## 2021-05-14 RX ADMIN — INSULIN LISPRO 9 UNITS: 100 INJECTION, SOLUTION INTRAVENOUS; SUBCUTANEOUS at 13:23

## 2021-05-14 RX ADMIN — PANTOPRAZOLE SODIUM 40 MG: 40 TABLET, DELAYED RELEASE ORAL at 05:40

## 2021-05-14 RX ADMIN — INSULIN GLARGINE 10 UNITS: 100 INJECTION, SOLUTION SUBCUTANEOUS at 22:28

## 2021-05-14 ASSESSMENT — PAIN SCALES - GENERAL
PAINLEVEL_OUTOF10: 0
PAINLEVEL_OUTOF10: 4
PAINLEVEL_OUTOF10: 4

## 2021-05-14 ASSESSMENT — PAIN DESCRIPTION - PAIN TYPE
TYPE: ACUTE PAIN;SURGICAL PAIN
TYPE: SURGICAL PAIN

## 2021-05-14 ASSESSMENT — ENCOUNTER SYMPTOMS
COLOR CHANGE: 0
SHORTNESS OF BREATH: 1
ABDOMINAL PAIN: 0

## 2021-05-14 ASSESSMENT — PAIN DESCRIPTION - FREQUENCY
FREQUENCY: CONTINUOUS
FREQUENCY: CONTINUOUS

## 2021-05-14 ASSESSMENT — PAIN DESCRIPTION - DESCRIPTORS: DESCRIPTORS: ACHING;DISCOMFORT

## 2021-05-14 ASSESSMENT — PAIN DESCRIPTION - ONSET
ONSET: GRADUAL
ONSET: ON-GOING

## 2021-05-14 ASSESSMENT — PAIN DESCRIPTION - LOCATION: LOCATION: ABDOMEN

## 2021-05-14 ASSESSMENT — PAIN DESCRIPTION - PROGRESSION: CLINICAL_PROGRESSION: NOT CHANGED

## 2021-05-14 NOTE — PROGRESS NOTES
[facility]  Daily Progress Note    Surgeon: Dr Alix Willis  CC:  Empyema s/p chest tube placement   OR: No plan for acute surgical intervention at this time    Subjective:  Ms. Da Anderson feels well today with no acute complaints. Pain is controlled on current medication regimen. Encourage OOBTC and ambulating. Denies chest pain or shortness of breath. Plan of care reviewed and questions answered. Physical Exam  Vital Signs: /75   Pulse 89   Temp 98.3 °F (36.8 °C)   Resp 15   Ht 5' 2\" (1.575 m)   Wt 194 lb 0.1 oz (88 kg)   SpO2 99%   BMI 35.48 kg/m²  O2 Flow Rate (L/min): 2 L/min   Admit Weight: Weight: 166 lb (75.3 kg)   WEIGHTWeight: 194 lb 0.1 oz (88 kg)     General: alert and oriented to person, place and time, well-developed and well-nourished, in no acute distress, in no acute distress and appears fatigued  Heart:Normal S1 and S2.  Regular rhythm. No murmurs, gallops, or rubs.     Lungs: clear to auscultation bilaterally and diminished breath sounds bibasilar  Abdomen: soft, non tender, non distended, BSx4  Extremities: Trace edema  Skin: dressings c/d/i, skin otherwise warm and dry     Scheduled Meds:    vancomycin  1,000 mg Intravenous Q24H    fat emulsion  100 mL Intravenous Weekly - Thursday    levothyroxine  112 mcg Oral Daily    pantoprazole  40 mg Oral QAM AC    gabapentin  300 mg Oral TID    furosemide  20 mg Oral Every Other Day    insulin glargine  10 Units Subcutaneous Nightly    polyethylene glycol  17 g Oral Daily    heparin (porcine)  5,000 Units Subcutaneous 3 times per day    vancomycin (VANCOCIN) intermittent dosing (placeholder)   Other RX Placeholder    ipratropium-albuterol  1 ampule Inhalation Q4H WA    piperacillin-tazobactam  3,375 mg Intravenous Q8H    insulin lispro  0-18 Units Subcutaneous Q4H    sodium chloride flush  5-40 mL Intravenous 2 times per day     Continuous Infusions:    PN-Adult 2-in-1 Central Line (Standard) 60 mL/hr at 05/13/21 1810    dextrose      sodium chloride 25 mL (05/10/21 1840)       Data:  CBC:   Recent Labs     05/12/21  0502 05/13/21  0551 05/14/21  0705   WBC 7.7 4.7 5.7   HGB 9.5* 8.2* 8.4*   HCT 31.3* 26.7* 26.6*   MCV 90.7 91.8 89.9    179 191     BMP:   Recent Labs     05/12/21  0502 05/13/21  0551 05/14/21  0705    137 137   K 4.2 4.2 3.7    103 101   CO2 24 25 28   PHOS 3.9  --  4.5   BUN 35* 37* 35*   CREATININE 1.30* 1.34* 1.42*     PT/INR:   Recent Labs     05/12/21  0502 05/13/21  0551 05/14/21  0705   PROTIME 11.1 10.7 10.4   INR 1.0 1.0 1.0     APTT:   Recent Labs     05/12/21  0502 05/13/21  0551 05/14/21  0705   APTT 27.9 25.0 25.4       Chest X-Ray:   ONE XRAY VIEW OF THE CHEST       5/14/2021 8:27 am       COMPARISON:   Chest radiograph May 11, 2021       HISTORY:   ORDERING SYSTEM PROVIDED HISTORY: Pleural effusion; s/p chest tube placement   TECHNOLOGIST PROVIDED HISTORY:   s/p chest tube placement       FINDINGS:   Right chest tube in satisfactory position.  Right pleural effusion appears   significantly improved.  Bibasilar atelectasis and left pleural effusion   similar to prior study.  Right upper extremity PICC in satisfactory position. Cardiomediastinal silhouette appears unchanged.           Impression   Right chest tube in satisfactory position with significantly improved right   pleural effusion.       Bibasilar airspace disease and small left pleural effusion not significantly   changed. I/O:  I/O last 3 completed shifts:  In: -   Out: 2175 [Urine:1725;  Chest Tube:450]      Assessment:  Empyema, bilateral  S/p chest tube placement  Septic shock, resolved  Gastric ischemia and subsequent pneumoperitoneum  Diabetes mellitus type 2 without long term use of insulin  BONNIE secondary to ATN, resolved  Nephrology following  Acute hypoxemic respiratory failure  Pulmonology following  NSTEMI  Hypothyroidism  Thrombocytopenia  Obesity, BMI 35.48       Plan:   Remains inpatient on telemetry,    Monitor vitals closely including continuous pulse oximetry  Oxygen as needed via nasal cannula to maintain SpO2 > 92%  Chest x-ray daily  Maintain Chest Tube to low wall suction  Document output at 06:00 and 18:00 daily  May consider intrapleural TPA if General Surgery team is agreeable  Repeat CT chest Sunday  Will consider left chest tube placement as right chest tube output decreases. No plan for surgical intervention acutely      The above recommendations including medications and orders were discussed and agreed upon with Dr. Jeffery Phelps, the attending on service for the cardiothoracic surgery group today. Electronically signed by ALFONZO Patterson CNP on 5/14/2021 at 7:54 AM    On this date 5/14/2021 I have spent 35 minutes reviewing previous notes, test results and face to face with the patient discussing the diagnosis and importance of compliance with the treatment plan as well as documenting on the day of the visit. At least 50% of the time documented was spent with the patient to provide counseling and/or coordination of care. This note was created with the assistance of a speech-recognition program.  Although the intention is to generate a document that actually reflects the content of the visit, no guarantees can be provided that every mistake has been identified and corrected by editing. Note was updated later by me after  physical examination and  completion of the assessment.

## 2021-05-14 NOTE — PROGRESS NOTES
Physical Therapy  Facility/Department: [facility]UR[facility]  Daily Treatment Note  NAME: Cami Paul  : 1950  MRN: [id number]    Date of Service: 2021    Discharge Recommendations:  Patient would benefit from continued therapy after discharge   PT Equipment Recommendations  Equipment Needed: No    Assessment   Body structures, Functions, Activity limitations: Decreased functional mobility ; Decreased ROM; Decreased strength;Decreased endurance;Decreased balance  Assessment: Pt performed supine exercises this date due to increased fatigue. Pt significantly limited by decreased strength and endurance and would benefit from continued PT to address deficits and return to prior level of independence. Prognosis: Good  PT Education: Goals;Plan of Care;Home Exercise Program  Patient Education: Educated pt on proper home exercise program dosage and frequency  REQUIRES PT FOLLOW UP: Yes  Activity Tolerance  Activity Tolerance: Patient limited by endurance; Patient limited by fatigue     Patient Diagnosis(es): The primary encounter diagnosis was Acute pancreatitis, unspecified complication status, unspecified pancreatitis type. Diagnoses of BONNIE (acute kidney injury) ([address] and Hiatal hernia were also pertinent to this visit. has a past medical history of Diabetes mellitus ([address] Gout, Hiatal hernia, Hyperlipidemia, Hypertension, and Thyroid disease. has a past surgical history that includes Cholecystectomy (); Hysterectomy (); Breast surgery; other surgical history (); other surgical history (); Tubal ligation (); Gastric fundoplication (N/A, 9066); hc picc line double lumen (5/10/2021); and IR CHEST TUBE INSERTION (2021). Restrictions  Restrictions/Precautions  Restrictions/Precautions: General Precautions, Fall Risk, Surgical Protocols  Required Braces or Orthoses?: No  Position Activity Restriction  Other position/activity restrictions:  Up with assist,  \"PARAESPHAGEAL HERNIA REPAIR LAPAROSCOPIC ROBOTIC\", 2 PEG tubes  Subjective   General  Response To Previous Treatment: Patient with no complaints from previous session. Family / Caregiver Present: No  Subjective  Subjective: RN and pt in agreement for PT eval; pt supine in bed upon PT arrival, pt on 2L NC, pt reporting significant fatigue, agreeable to supine exerices. Pain Screening  Patient Currently in Pain: Yes  Pain Assessment  Pain Assessment: 0-10  Pain Level: 3  Pain Type: Acute pain;Surgical pain  Pain Location: Abdomen  Non-Pharmaceutical Pain Intervention(s): Ambulation/Increased Activity;Repositioned  Response to Pain Intervention: Patient Satisfied  Multiple Pain Sites: No  Vital Signs  Patient Currently in Pain: Yes       Orientation  Orientation  Orientation Level: Oriented to person;Oriented to situation;Oriented to time;Disoriented to place  Cognition   Cognition  Overall Cognitive Status: WFL  Objective   Bed mobility  Comment: Did not assess this date- pt reporting significant fatigue this date  Transfers  Comment: Did not assess this date- pt reporting significant fatigue this date  Ambulation  Ambulation?: No(Did not assess this date- pt reporting significant fatigue this date)  More Ambulation?: No  Stairs/Curb  Stairs?: No     Balance  Comments: Did not assess this date- pt reporting significant fatigue this date        Supine Exercises: Ankle Pumps, Gluteal sets, Heel Slides, Hip ABD/ADD, Quad Sets. SLR. Reps: x15 BLE  Comments: frequent rest breaks required due to fatigue this date. Goals  Short term goals  Time Frame for Short term goals: 14 visits  Short term goal 1: Prevent contractures through ROM and stretching. Short term goal 2: Pt to follow most commands for active participation in treatment  Short term goal 3: Progress with mobility as appropriate. Patient Goals   Patient goals : Get tube out.     Plan    Plan  Times per week: 5x/week  Current Treatment Recommendations: Strengthening, Endurance Training, Cognitive Reorientation, Functional Mobility Training  Safety Devices  Type of devices: Nurse notified, Call light within reach, Gait belt, All fall risk precautions in place, Left in bed, Bed alarm in place  Restraints  Initially in place: No     Therapy Time   Individual Concurrent Group Co-treatment   Time In 1522         Time Out 1539         Minutes 17         Timed Code Treatment Minutes: [address] [facility]       Sigrid Rueda, PT

## 2021-05-14 NOTE — CARE COORDINATION
Called Christie from [facility], He tells me that he has a meeting this AM and will call the facility after the meeting and call me back     Janet Barahona calls back and informs me that they have accepted patient at [facility] and will start precert.   Notified son Kalpesh Friend

## 2021-05-14 NOTE — PROGRESS NOTES
Bariatric Surgery Progress Note      PATIENT NAME: Negro Ng   TODAY'S DATE: 5/14/2021, 6:47 AM  Chief Complaint   Patient presents with    Blood Sugar Problem     >450    Hernia     Hiatal      SUBJECTIVE:    Pt seen and examined. UOP adequate. Minimal to no pain. Tolerating low fiber diet but poor overall intake. L TAISHA drain removed 5/7. R TAISHA removed 5/11.      Tmax 36.9    OBJECTIVE:   Vitals:  /75   Pulse 89   Temp 98.3 °F (36.8 °C)   Resp 15   Ht 5' 2\" (1.575 m)   Wt 194 lb 0.1 oz (88 kg)   SpO2 99%   BMI 35.48 kg/m²      INTAKE/OUTPUT:      Intake/Output Summary (Last 24 hours) at 5/14/2021 0647  Last data filed at 5/14/2021 8592  Gross per 24 hour   Intake --   Output 2085 ml   Net -2085 ml                 General: alert, oriented  Lungs: Symmetrical chest rise bilaterally  Heart: S1S2  Abdomen: Soft, ND, appropriately tender, non peritoneal, no rebound, incisions c/d/i, PEG x2 with no surrounding erythema   Extremity: moves all extremities x4, trace edema    Data Review:  CBC:   Recent Labs     05/12/21  0502 05/13/21  0551   WBC 7.7 4.7   HGB 9.5* 8.2*    179     BMP:    Recent Labs     05/12/21  0502 05/13/21  0551    137   K 4.2 4.2    103   CO2 24 25   BUN 35* 37*   CREATININE 1.30* 1.34*   GLUCOSE 128* 234*     Hepatic:   Recent Labs     05/12/21  0502 05/13/21  0551   AST 11 9   ALT 12 9   ALKPHOS 92 106*   BILITOT 0.35 0.33   BILIDIR 0.20  --      Coagulation:   Recent Labs     05/12/21  0502 05/13/21  0551   APTT 27.9 25.0   PROT 6.0* 6.1*   INR 1.0 1.0       ASSESSMENT   Patient Active Problem List   Diagnosis    Acute pancreatitis    Septic shock (HCC)    Non-STEMI (non-ST elevated myocardial infarction) ([facility]    Diabetic ketoacidosis without coma associated with type 2 diabetes mellitus [facility]    BONNIE (acute kidney injury) ([facility]    Hernia with obstruction    Essential hypertension    Thyroid disease    Syncope    Acute tubular necrosis (HCC)    Lactic acid acidosis

## 2021-05-14 NOTE — PLAN OF CARE
Problem: SKIN INTEGRITY  Goal: Skin integrity is maintained or improved  Outcome: Met This Shift     Problem: Injury - Risk of, Physical Injury:  Goal: Absence of physical injury  Description: Absence of physical injury  Outcome: Met This Shift     Problem: Injury - Risk of, Physical Injury:  Goal: Will remain free from falls  Description: Will remain free from falls  Outcome: Met This Shift     Problem: Falls - Risk of:  Goal: Absence of physical injury  Description: Absence of physical injury  Outcome: Met This Shift     Problem: Falls - Risk of:  Goal: Will remain free from falls  Description: Will remain free from falls  Outcome: Met This Shift   Patient remained free from injury. Patient verbalized understanding of need for the safety precautions. Demonstrates proper use of assistive devices. Bed remains in the lowest position. Call light remains within reach. [place] [facility] in use.

## 2021-05-14 NOTE — PROGRESS NOTES
PULMONARY & CRITICAL CARE MEDICINE PROGRESS  NOTE     Patient:  Audie Tse  MRN: [id number]  Admit date: 5/3/2021    SUBJECTIVE     I personally interviewed/examined the patient, reviewed interval history and interpreted all available radiographic, laboratory data at the time of service. Chief Compliant/Reason for Initial Consult: Acute respiratory failure on vent support, gastric perforation    Brief Hospital Course and Interval History:  The patient is a 78 y.o. female with known medical history of diabetes mellitus, hypertension, hypothyroidism, abdominal hernia presented to the hospital with nausea, vomiting, diarrhea. Patient had similar symptoms on 4/28 but was apparently sent home. Patient had dizzy spells and lightheadedness, had a syncopal episode in the ER on this admission. Patient was transferred from the [facility] to [place].  In [facility] patient lab revealed lactic acidosis of 6, glucose 585, patient was seen to be in DKA. Elevated lipase of 1451. Leukocytotic with WBC 23.5, hemoglobin 18.3. Patient also had elevated troponins, elevated creatinine of 3.8. In [place] repeat labs showed creatinine of 2.49 with lactic acid of 2.4. Troponin was 85, recheck of 94. LFTs showed elevated bilirubin.     General surgery was consulted. CT chest showed ascites and pneumoperitoneum with apparent free-flowing oral contrast in the left upper quadrant concerning for viscus perforation possibly within the subdiaphragmatic portion of the stomach. Patient then went for robotic hernia repair 5/4 with wedge gastrectomy, gastropexy and placement of 2 TAISHA drains and 2 PEG tubes. Patient is n.p.o.         Interval history  5/14/2021  Sleeping   No acute event   450 ml out pt in rt chest tube . since yesterday am .    I/O+ 3.2 L      OBJECTIVE     VITAL SIGNS:   LAST-  /65   Pulse 88   Temp 98.1 °F (36.7 °C) (Oral)   Resp 16   Ht 5' 2\" (1.575 m)   Wt 194 lb 0.1 oz (88 kg)   SpO2 92%   BMI 35.48 kg/m²   8-24 HR RANGE-  TEMP Temp  Av.1 °F (36.7 °C)  Min: 97.6 °F (36.4 °C)  Max: 98.4 °F (05.0 °C)   BP Systolic (60BZT), LPV:669 , Min:128 , KESHA:831      Diastolic (74DHZ), PKU:09, Min:65, Max:86     PULSE Pulse  Av.5  Min: 88  Max: 100   RR Resp  Avg: 15.5  Min: 15  Max: 16   O2 SAT SpO2  Av.5 %  Min: 92 %  Max: 99 %   OXYGEN DELIVERY O2 Flow Rate (L/min)  Avg: 3.3 L/min  Min: 2 L/min  Max: 4.5 L/min     Systemic Examination:   General appearance sleeping   Chest -dull to percussion bilaterally and decreased breath sounds, right-sided chest tube   Heart - normal rate, regular rhythm, normal S1, S2, no murmurs, rubs, clicks or gallops  Abdomen -soft with one PEG tube and G-tube, clamped  Extremities - peripheral pulses normal, no pedal edema, no clubbing or cyanosis  Skin - normal coloration and turgor, no rashes, no suspicious skin lesions noted   PICC line in place  DATA REVIEW     Medications:  Scheduled Meds:   vancomycin  1,000 mg Intravenous Q24H    fat emulsion  100 mL Intravenous Weekly - Thursday    levothyroxine  112 mcg Oral Daily    pantoprazole  40 mg Oral QAM AC    gabapentin  300 mg Oral TID    furosemide  20 mg Oral Every Other Day    insulin glargine  10 Units Subcutaneous Nightly    polyethylene glycol  17 g Oral Daily    heparin (porcine)  5,000 Units Subcutaneous 3 times per day    vancomycin (VANCOCIN) intermittent dosing (placeholder)   Other RX Placeholder    ipratropium-albuterol  1 ampule Inhalation Q4H WA    piperacillin-tazobactam  3,375 mg Intravenous Q8H    insulin lispro  0-18 Units Subcutaneous Q4H    sodium chloride flush  5-40 mL Intravenous 2 times per day     Continuous Infusions:   PN-Adult 2-in-1 Central Line (Standard) 60 mL/hr at 05/13/21 1810    dextrose      sodium chloride 25 mL (05/10/21 1840)     LABS:-  ABGs:   No results found for: PH, upper abdomen. Left inguinal terminates in   the left iliac vein. FL ESOPHAGRAM   Final Result   1. Drainage of contrast material through what appears to be the gastrostomy   tubes following the ingestion of contrast.  Contrast does migrate beyond the   postoperative region into the proximal small bowel. 2. No extraneous/extraluminal collection of contrast is seen beyond the   confines of the stomach. 3. 2 surgical drains and 2 presumed gastrostomy tubes are seen overlying the   left upper quadrant. There does appear to be contrast slightly marginating   the more lateral gastrostomy tube balloon. 4. Delayed migration of contrast through the distal esophagus and GE junction   with mild gastroesophageal reflux. The findings were sent to the Radiology Results [address] at 12:43   pm on 5/7/2021to be communicated to a licensed caregiver. XR CHEST PORTABLE   Final Result   Bibasilar consolidation new from prior study. Blunting of the costophrenic   angles bilaterally         XR CHEST PORTABLE   Final Result   ETT tip position stable. Decreased left subcutaneous emphysema. Slightly   improved suspected left basilar volume loss with persistent findings as   above. No overt vascular congestion. US RENAL COMPLETE   Final Result   Unremarkable ultrasound of the kidneys and urinary bladder. Trace right pleural effusion         XR CHEST PORTABLE   Final Result   Volume loss continues left hemithorax with haziness at the left base either   atelectasis and or fluid. Subcutaneous emphysema along the left lateral   chest wall has decreased. No appreciable extrapleural air. Endotracheal   tube in appropriate position. XR CHEST PORTABLE   Final Result   1. Recommend repositioning of left internal jugular approach central venous   catheter. 2. Low lung volumes with left basilar atelectasis plus or minus mild pleural   effusion.    3. Left chest wall scattered soft tissue emphysema. XR CHEST PORTABLE   Final Result   Intestinal tube deviates to the left side of a sizable hiatal hernia,   traversing below the hemidiaphragm and terminating just underneath the left   hemidiaphragm. Side port of the intestinal tube is below the diaphragmatic   hiatus. CT CHEST WO CONTRAST   Final Result   1. Ascites and pneumoperitoneum with apparent free-flowing oral contrast in   the left upper quadrant is concerning for viscus perforation, possibly within   the subdiaphragmatic portion of the stomach. 2. Large hiatal hernia with organoaxial volvulus. Majority of contrast is   retained within the esophagus and supradiaphragmatic stomach. 3. Enteric tube extends below the diaphragm with tip outside the field of   view. 4. Moderate bilateral pleural effusions with adjacent consolidation,   atelectasis versus pneumonia. Critical results were called by Dr. Stanislav Renee MD to [facility] on   5/4/2021 at 18:30. XR ABDOMEN (KUB) (SINGLE AP VIEW)   Final Result   No significant subdiaphragmatic contrast progression, as above. RECOMMENDATION:   Consider chest x-ray to further assess a organic-axial gastric volvulus         FL UGI   Final Result   Organo-axial volvulus the stomach. Large paraesophageal hernia containing the majority of the rotated gastric   body. The greater curvature is positioned superiorly within the   paraesophageal hernia defect. There is narrowing of the gastroesophageal   junction as well as of the gastroduodenal junction through the hernia defect. MRI ABDOMEN WO CONTRAST MRCP   Final Result   1. No evidence of intrahepatic or extrahepatic ductal dilatation. 2. Incompletely imaged large hiatal hernia with organoaxial malrotation of   the stomach. 3. Small to moderate amount of ascites. 4. Diffuse small bowel wall thickening likely due to 3rd spacing as the post   enteritis. 5. Trace bilateral pleural effusions. XR CHEST PORTABLE   Final Result   No acute cardiopulmonary disease      Large hiatal hernia             Echocardiogram:   Results for orders placed during the hospital encounter of 05/03/21   ECHO Complete 2D W Doppler W Color    Narrative Transthoracic Echocardiography Report (TTE)     Patient Name Leny Banuelos   Date of Study               05/05/2021      Date of      1950  Gender                      Female   Birth      Age          78 year(s)  Race                              Room Number  0218        Height:                     62 inch, 157.48 cm      Corporate ID [id number]    Weight:                     166 pounds, 75.3 kg   #      Patient Acct [de-identified]   BSA:          1.77 m^2      BMI:      30.36   #                                                              kg/m^2      MR #         [id number]     Sonographer                 Kvng Santana      Accession #  [id number]  Interpreting Physician      Shellie Vizcarra 63      Fellow                   Referring Nurse                            Practitioner      Interpreting             Referring Physician         Sourav Quijano   Fellow     Additional Comments  Technically difficult study, patient supine on ventilator. Type of Study      TTE procedure:2D Echocardiogram, M-Mode, Doppler, Color Doppler. Procedure Date  Date: 05/05/2021 Start: 07:32 AM    Study Location: [facility]  Technical Quality: Adequate visualization    Indications:Elevated troponin. History / Tech. Comments:  Procedure explained to patient. No known medical Hx. Patient Status: Inpatient    Height: 62 inches Weight: 166 pounds BSA: 1.77 m^2 BMI: 30.36 kg/m^2    CONCLUSIONS    Summary  Normal LV size and wall thickness. No obvious wall motion abnormality seen. Normal LV systolic function with LVEF 55%. RV appears dilated with reduced function. RV systolic pressure 37 mmHg  LA appears normal in size.   No obvious significant structural valvular abnormality noted. No significant valvular stenosis or regurgitation noted. Normal aortic root dimension. No significant pericardial effusion noted. Signature  ----------------------------------------------------------------------------   Electronically signed by Connor Montoya(Interpreting physician) on   05/05/2021 12:15 PM  ----------------------------------------------------------------------------    ----------------------------------------------------------------------------   Electronically signed by Olimpia Ortega(Sonographer) on 05/05/2021 11:37 AM  ----------------------------------------------------------------------------  FINDINGS  Left Atrium  Left atrium is normal in size. Inter-atrial septum is intact with no evidence for an atrial septal defect,  by color doppler. Left Ventricle  Left ventricle is small in size, normal wall thickness, global left  ventricular systolic function is normal, calculated ejection fraction is  53%. All wall segments are not well visualized (poor acoustic windows.)  Right Atrium  Right atrial dilatation. Right Ventricle  Right ventricular dilatation with reduced systolic function. Abnormal RV strain. Mitral Valve  Normal mitral valve structure. Trivial mitral regurgitation. Aortic Valve  Aortic valve is trileaflet. No evidence of aortic insufficiency or stenosis. Tricuspid Valve  Normal tricuspid valve leaflets. Moderate tricuspid regurgitation. Estimated right ventricular systolic pressure is 37 mmHg, suggesting  pulmonary HTN. Pulmonic Valve  Pulmonic valve not well visualized but Doppler velocities are normal.  Pericardial Effusion  No significant pericardial effusion is seen. Miscellaneous  Normal aortic root dimension. E/E' average = 15.05. IVC dilated but unable to assess respiratory collapse due to patient on  ventilator.     M-mode / 2D Measurements & Calculations:      LVIDd:4 cm(3.7 - 5.6 cm)          Diastolic OLYOSS:96 ml   PZPM:2.6 cm(0.6 - 1.1 cm) Systolic LLEODN:35.59 ml   LVPWd:0.9 cm(0.6 - 1.1 cm)        Aortic Root:2.9 cm(2.0 - 3.7 cm)                                     LA Dimension: 3.1 cm(1.9 - 4.0 cm)   Calculated LVEF (%): 52.95 %      LA volume/Index: 31.04 ml /18m^2                                     LVOT:1.9 cm                                     RVDd:3 cm      Mitral:                                 Aortic      Valve Area (P1/2-Time): 5.12 cm^2       Peak Velocity: 1.55 m/s   Peak E-Wave: 0.78 m/s                   Mean Velocity: 0.99 m/s   Peak A-Wave: 1.00 m/s                   Peak Gradient: 9.61 mmHg   E/A Ratio: 0.78                         Mean Gradient: 5 mmHg   Peak Gradient: 2.45 mmHg   Mean Gradient: 2 mmHg   Deceleration Time: 145 msec             Area (continuity): 2.16 cm^2   P1/2t: 43 msec                          AV VTI: 25.9 cm      Area (continuity): 2.1 cm^2   Mean Velocity: 0.60 m/s      Tricuspid:                              Pulmonic:      Estimated RVSP: 37 mmHg                 Peak Velocity: 0.80 m/s   Peak TR Velocity: 2.85 m/s              Peak Gradient: 2.57 mmHg   Peak TR Gradient: 32.49 mmHg     Diastology / Tissue Doppler  Septal Wall E' velocity:0.06 m/s  Septal Wall E/E':13.8  Lateral Wall E' velocity:0.05 m/s  Lateral Wall E/E':16.3       Cardiac Catheterization:   No results found for this or any previous visit. Date of Procedure: 5/4/2021     Pre-Op Diagnosis: PARAESOPHAGEAL HERNIA, PNEUMOPERITONEUM     Post-Op Diagnosis: Same, Gastric perforation due to ischemia from paraesophageal hernia. Feculent peritonitis       Procedure(s):  PARAESPHAGEAL HERNIA REPAIR LAPAROSCOPIC ROBOTIC   PARTIAL GASTRECTOMY  GASTROPEXY  IRRIGATION OF ABDOMEN  ASSESSMENT AND PLAN     Assessment:  B/l empyema post gastric perforation due to ischemia from para esohageal hernia .   Left loculated pleural effusion -Left Thoracentesis 5/12/21 - exudate - gram positive rods   Right larger loculated pleural effusion -Right pig tail chest

## 2021-05-14 NOTE — PLAN OF CARE
Problem: OXYGENATION/RESPIRATORY FUNCTION  Goal: Patient will maintain patent airway  5/13/2021 2010 by Lupe Carranza RCP  Note: BRONCHOSPASM/BRONCHOCONSTRICTION   [x]  IMPROVE AERATION/BREATH SOUNDS  [x]   ADMINISTER BRONCHODILATOR THERAPY AS APPROPRIATE  [x]   ASSESS BREATH SOUNDS  [x]   IMPLEMENT AEROSOL/MDI PROTOCOL  [x]   PATIENT EDUCATION AS NEEDED      BRONCHOSPASM/BRONCHOCONSTRICTION     [x]         IMPROVE AERATION/BREATH SOUNDS  [x]   ADMINISTER BRONCHODILATOR THERAPY AS APPROPRIATE  [x]   ASSESS BREATH SOUNDS  []   IMPLEMENT AEROSOL/MDI PROTOCOL  [x]   PATIENT EDUCATION AS NEEDED

## 2021-05-14 NOTE — PROGRESS NOTES
Order in place to start tube feed. General surgery messaged via [employer] to clarify if there was a preferred g-tube to use for this purpose. Resident, Dr. Doc Gonzalez, states either g tube is fine. Will start tube feed per order.

## 2021-05-14 NOTE — PROGRESS NOTES
care:Yes    Chief complaint/reason for consultation:   Septic shock/sepsis    History of Present Illness:   Nguyen Rome is a 78y.o.-year-old   female who was initially admitted on 5/3/2021. Patient seen at the request of Rommel La. INITIAL HISTORY : 05/07/2021    Pt with a history of diabetes mellitus type 2, hypertension, hyperlipidemia, thyroid disease, gout and hiatal hernia. She initially presented on 05/03/2021 to an outlying facility with a chief complaint of nausea vomiting, diarrhea and syncopal attack . Patient was found to have blood glucose of 585,WBC of 23.5, elevated lipase 1451 and elevated beta hydroxybutyrate. MRCP on 05/03/2021 showed diffuse small bowel wall thickening likely due to third spacing as opposed to enteritis. CT scan of the abdomen showed extremely large fluid-filled hiatal hernia and distended and fluid-filled stomach below the diaphragm. Patient was given Zosyn at the outlying facility and was transferred to [facility] ED for evaluation by the surgery team.    CT scan of the chest without contrast was performed on 05/04/2021 which showed ascites and pneumoperitoneum with apparent free-flowing oral contrast in the left upper quadrant concerning for viscus perforation possibly within the subdiaphragmatic portion of the stomach. Moderate bilateral pleural effusions and hiatal hernia with organoaxial volvulus. Bariatric surgery performed a laparoscopic robotic para esophageal hernia repair. Cardiology is also following the patient because of elevated troponins with unremarkable echocardiography. Antibiotics wise the patient was given Zosyn on 05/03/2021 at the outside facility and it has been continued through the present time. Blood and urine cultures on 05/03/2021 show No growth . Repeat urine culture on 05/04/2021 shows No growth     Patient is currently having temperature elevations.  Temp max of 101.1 on 05/07/2021 around 4 AM in the morning. Patient WBC count is improving from 23.5 on presentation to 6.2 today. Repeat chest x-ray 5-7-21 shows bibasilar consolidation new from prior study with blunting of the costophrenic angles bilaterally. Patient is off of the pressors since 5-6-21    CURRENT EVALUATION : 5/14/2021    Afebrile  VS stable  On 2-->4.5 L NC  RR 17  02 sat 97    Patient has been started on a diet but does not have much of an appetite. She is requiring increased supplemental oxygen. Thoracentesis on 5/12/21 for left loculated empyema-Pigtail catheter placed with pus noted. Fluid shows elevated LDH of 536, protein 3.1, exudative effusion likely para-pneumonic  5/12/21 Fluid culture positive for Gram positive rods. Not identified as yet. Right sided 12 [place]e chest tube placed on 5/13/21 for empyema. CT surgery consult for possible VATs/decortication next week. Repeat CT chest on 5/16/21    Follow-up esophagram on 5/11/21 negative for a leak. Coagulase negative Staphylococci bacteremia x 2 on 5-7-21. Repeat blood cultures 5-8-21 x 2 : No growth. Femoral line and morel catheter were removed. Right TAISHA removed 5/11/21 5/12/21 Thoracentesis fluid culture positive for Gram positive rods  Will continue Vancomycin & Zosyn.     Rt and Lt TAISHA drains have been removed, on 5-7- and 5-11-21, respectively  G-tube is clamped and patient remains on TPN-surgery to switch to TF per G tube today    Discussed with RN and Dr. Raul Cabezas, X rays reviewed: 5/14/2021    BUN: 48>33-->37-->35  Cr: 1.71-->1.20-->1.34-->1.42    WBC: 6.2-->9.4-->4.7-->5.7  Hb: 8.8-->8.2-->8.4  Plat: 66-->122-->191    LD: 345  Triglycerides: 261    Cultures:    Urine:  05/03/2021: No growth   05/0 4/2021: No growth     Blood:  05/03/2021: Blood cultures x 2:  No growth  5/7/21: Coagulase negative Staphylococci bacteremia x 2  5/8/21: No growth  Sputum :    Thoracentesis fluid:  5/12/21 GRAM POSITIVE RODS     CT chest 5/11/21  New right-sided PICC again seen with unchanged and satisfactory tip position;   larger loculated right effusion in the azygoesophageal recess, as above. Otherwise similar findings to 05/07/2021 with suspected lower lobe   consolidation/pneumonitis, with volume loss and effusions both lower lobes. Discussed with patient, RN      I have personally reviewed the past medical history, past surgical history, medications, social history, and family history, and I have updated the database accordingly.   Past Medical History:     Past Medical History:   Diagnosis Date    Diabetes mellitus ([address]     Gout     Hiatal hernia     Hyperlipidemia     Hypertension     Thyroid disease        Past Surgical  History:     Past Surgical History:   Procedure Laterality Date    BREAST SURGERY      Bx 1993    CHOLECYSTECTOMY  1999    GASTRIC FUNDOPLICATION N/A 8/0/3534    PARAESPHAGEAL HERNIA REPAIR LAPAROSCOPIC ROBOTIC performed by Ryan Moon DO at [address] [place] [address]  5/10/2021         HYSTERECTOMY  1997    IR CHEST TUBE INSERTION  5/13/2021    IR CHEST TUBE INSERTION 5/13/2021 Husam Jack [facility]    OTHER SURGICAL HISTORY  1962    Remove spur L ankle    OTHER SURGICAL HISTORY  1978    Pin and wire repair R ankle    TUBAL LIGATION  1988       Medications:      vancomycin  1,000 mg Intravenous Q24H    fat emulsion  100 mL Intravenous Weekly - Thursday    levothyroxine  112 mcg Oral Daily    pantoprazole  40 mg Oral QAM AC    gabapentin  300 mg Oral TID    furosemide  20 mg Oral Every Other Day    insulin glargine  10 Units Subcutaneous Nightly    polyethylene glycol  17 g Oral Daily    heparin (porcine)  5,000 Units Subcutaneous 3 times per day    vancomycin (VANCOCIN) intermittent dosing (placeholder)   Other RX Placeholder    ipratropium-albuterol  1 ampule Inhalation Q4H WA    piperacillin-tazobactam  3,375 mg Intravenous Q8H    insulin apnea, choking, chest tightness, shortness of breath, wheezing and stridor. Cardiovascular: Negative for chest pain, palpitations and leg swelling. Gastrointestinal: Positive for constipation. Negative for abdominal distention, nausea and vomiting. Endocrine: Negative for cold intolerance. Genitourinary: Negative for difficulty urinating and dysuria. Musculoskeletal: Negative for arthralgias and back pain. Neurological: Negative for dizziness, tremors, syncope and facial asymmetry. Hematological: Negative for adenopathy. Psychiatric/Behavioral: Negative for agitation and behavioral problems. The patient is not hyperactive. Physical Examination :     Patient Vitals for the past 8 hrs:   BP Temp Temp src Pulse Resp SpO2   05/14/21 0745 128/65 98.1 °F (36.7 °C) Oral 88 16 92 %   05/14/21 0345 128/75 98.3 °F (36.8 °C)  89 15 99 %     General Appearance: Awake, alert  Head:  Normocephalic, no trauma  Eyes: Pupils equal, round, reactive to light, sclera anicteric; conjunctivae pink. No embolic phenomena. ENT: Oropharynx clear, without erythema, exudate, or thrush. No tenderness of sinuses. Mouth/throat: mucosa pink and moist. No lesions. Dentition in good repair. Neck:Supple, without lymphadenopathy. Thyroid normal, No bruits. Pulmonary/Chest: Clear to auscultation, without wheezes, rales, or rhonchi. No dullness to percussion. Cardiovascular: Regular rate and rhythm without murmurs, rubs, or gallops. Abdomen: Soft, non tender. Bowel sounds normal. No organomegaly. The ports entry wounds are healing well and without any erythema. All four Extremities: No cyanosis, clubbing, edema, or effusions. Neurologic: No gross sensory or motor deficits. Skin: Warm and dry with good turgor. No signs of peripheral arterial or venous insufficiency. No ulcerations. No open wounds.     Medical Decision Making -Laboratory:   I have independently reviewed/ordered the following labs:    CBC with Differential: Recent Labs     05/13/21  0551 05/14/21  0705   WBC 4.7 5.7   HGB 8.2* 8.4*   HCT 26.7* 26.6*    191   LYMPHOPCT 13* 13*   MONOPCT 5 1     BMP:   Recent Labs     05/12/21  0502 05/13/21  0551 05/14/21  0705    137 137   K 4.2 4.2 3.7    103 101   CO2 24 25 28   BUN 35* 37* 35*   CREATININE 1.30* 1.34* 1.42*   MG 1.8  --  1.5*     Hepatic Function Panel:   Recent Labs     05/12/21  0502 05/13/21  0551 05/14/21  0705   PROT 6.0* 6.1* 6.1*   LABALBU 1.5* 1.5* 1.4*   BILIDIR 0.20  --  0.19   IBILI 0.15  --  0.11   BILITOT 0.35 0.33 0.30   ALKPHOS 92 106* 121*   ALT 12 9 11   AST 11 9 16     No results for input(s): RPR in the last 72 hours. No results for input(s): HIV in the last 72 hours. No results for input(s): BC in the last 72 hours.   Lab Results   Component Value Date    MUCUS NOT REPORTED 05/06/2021    RBC 2.96 05/14/2021    RBC 6.23 05/03/2021    TRICHOMONAS NOT REPORTED 05/06/2021    WBC 5.7 05/14/2021    YEAST NOT REPORTED 05/06/2021    TURBIDITY CLOUDY 05/06/2021     Lab Results   Component Value Date    CREATININE 1.42 05/14/2021    CREATININE 3.09 05/03/2021    GLUCOSE 208 05/14/2021    GLUCOSE 453 05/03/2021       Medical Decision Making-Imaging:     EXAMINATION:   SINGLE CONTRAST ESOPHAGRAM       5/7/2021 11:03 am       TECHNIQUE:   Single contrast esophagram was performed with a total 100 mL of Omnipaque 240   oral contrast.       FLUOROSCOPY DOSE AND TYPE OR TIME AND EXPOSURES:   Fluoro time: 2.7 minutes; DAP: 70.44 mGy       COMPARISON:   Upper GI from 05/04/2021       HISTORY:   ORDERING SYSTEM PROVIDED HISTORY: s/p hiatal hernia reduction  with partial   gastrectomy and gastropexy   TECHNOLOGIST PROVIDED HISTORY:   s/p hiatal hernia reduction  with partial gastrectomy and gastropexy   Reason for Exam: H.H repair/gastrectomy/gastropexy/ 100 ml Omnipaque orally   Acuity: Unknown   Type of Exam: Unknown       29-year-old female status post hiatal hernia reduction with partial gastrectomy and gastropexy       FINDINGS:   Fluoroscopic  imaging was obtained prior to the administration contrast.       2 gastrostomy tubes are seen projecting over the left upper quadrant.  There   also 2 surgical drains projecting over the left upper quadrant.  Prior   cholecystectomy.  Suture material projects over the left upper quadrant.       The patient drank contrast which migrates through the postoperative region   and into the stomach and small bowel.       There is contrast draining through what appears to be the gastrostomy tubes.       No extraneous collection of contrast is seen beyond the confines of the   stomach.       There is contrast marginating a presumed gastrostomy tube balloon.       Mild gastroesophageal reflux and delayed transit of contrast is seen within   the distal esophagus/GE junction.           Impression   1. Drainage of contrast material through what appears to be the gastrostomy   tubes following the ingestion of contrast.  Contrast does migrate beyond the   postoperative region into the proximal small bowel. 2. No extraneous/extraluminal collection of contrast is seen beyond the   confines of the stomach. 3. 2 surgical drains and 2 presumed gastrostomy tubes are seen overlying the   left upper quadrant.  There does appear to be contrast slightly marginating   the more lateral gastrostomy tube balloon. 4. Delayed migration of contrast through the distal esophagus and GE junction   with mild gastroesophageal reflux. The findings were sent to the Radiology Results [address] at 12:43   pm on 5/7/2021to be communicated to a licensed caregiver.             EXAMINATION:   CT OF THE CHEST WITHOUT CONTRAST 5/4/2021 5:55 pm       TECHNIQUE:   CT of the chest was performed without the administration of intravenous   contrast. Multiplanar reformatted images are provided for review.  Dose   modulation, iterative reconstruction, and/or weight based adjustment of the   mA/kV was utilized to reduce the radiation dose to as low as reasonably   achievable.       COMPARISON:   None.       HISTORY:   ORDERING SYSTEM PROVIDED HISTORY: large hiatal hernia, contrast progression? TECHNOLOGIST PROVIDED HISTORY:   large hiatal hernia, contrast progression? Reason for Exam: large hiatal hernia, contrast progression?       FINDINGS:   Mediastinum: Heart size is normal without pericardial effusion.  There are   shotty mediastinal lymph nodes.  The thoracic aorta and main pulmonary artery   are normal in caliber.       Lungs/pleura: Moderate bilateral pleural effusions with adjacent   consolidation.  No pneumothorax.       Upper Abdomen: There is a large hiatal hernia containing the majority of the   stomach.  There is organoaxial volvulus.  Enteric tube is noted extending   below the diaphragm with tip outside the field of view.  The herniated   stomach is distended with contrast.  There is also contrast within the distal   esophagus. Lodema Hendrum is some contrast visualized within the portion of stomach   below the diaphragm.  Free air and fluid are noted within the visualized   upper abdomen with apparent free spill of contrast in the left upper quadrant.       Soft Tissues/Bones: No acute osseous abnormality.           Impression   1. Ascites and pneumoperitoneum with apparent free-flowing oral contrast in   the left upper quadrant is concerning for viscus perforation, possibly within   the subdiaphragmatic portion of the stomach. 2. Large hiatal hernia with organoaxial volvulus.  Majority of contrast is   retained within the esophagus and supradiaphragmatic stomach. 3. Enteric tube extends below the diaphragm with tip outside the field of   view. 4. Moderate bilateral pleural effusions with adjacent consolidation,   atelectasis versus pneumonia.    Critical results were called by Dr. Vy Lisa MD to [facility] on   5/4/2021 at 18:30.            CXR:  ONE XRAY VIEW OF THE CHEST       5/11/2021 9:22 am       COMPARISON:   AP chest from 05/07/2021; CT scan of the chest, abdomen and pelvis from   05/07/2021       HISTORY:   ORDERING SYSTEM PROVIDED HISTORY: follow up on bibasilar consolidation   TECHNOLOGIST PROVIDED HISTORY:   follow up on bibasilar consolidation       History of diabetes and hypertension.       FINDINGS:   Overlying ECG monitor leads and gown snaps.  New right-sided PICC tip   position in the SVC.       Low lung volumes accentuating findings, including cardiomegaly with bibasilar   opacities suggesting atelectasis/consolidation and effusions.  Patchy   infiltrate right mid lung also again noted.       Bones stable.           Impression   New right-sided PICC tip position appears satisfactory.  Otherwise, similar   findings compatible with bibasilar atelectasis/consolidation, effusions and   minimal infiltrate or atelectasis right mid lung.             CT scan:   CT OF THE CHEST WITHOUT CONTRAST 5/11/2021 10:31 am       TECHNIQUE:   CT of the chest was performed without the administration of intravenous   contrast. Multiplanar reformatted images are provided for review. Dose   modulation, iterative reconstruction, and/or weight based adjustment of the   mA/kV was utilized to reduce the radiation dose to as low as reasonably   achievable.       COMPARISON:   CT scan of the chest from 05/07/2021.       HISTORY:   ORDERING SYSTEM PROVIDED HISTORY: ?left loculated pleural effusion   TECHNOLOGIST PROVIDED HISTORY:   ?left loculated pleural effusion   Reason for Exam: ?left loculated pleural effusion   Acuity: Unknown   Type of Exam: Unknown       FINDINGS:   Mediastinum: Interval placement right-sided PICC with tip position in the   mid-lower SVC.  Small unchanged mediastinal nodes; no bulky lymphadenopathy.    No acute thoracic aortic or cardiac abnormality on this unenhanced scan;   prominent LV again noted.  Tiny unchanged pericardial fluid or thickening.     Lungs/pleura: Similar bilateral pleural effusions with considerable mostly   lower lobe atelectasis or consolidation with air bronchograms; larger   loculated appearing component (measuring 10.0 x 4.9 cm) extending along the   azygoesophageal recess, and across the midline (best seen slices 99-03,   series 2).  Tracheobronchial tree patent centrally       Upper Abdomen: Similar small amount perihepatic and perisplenic ascitic fluid   and soft tissue infiltration visualized abdominal adipose tissue.  Clips   status post cholecystectomy.  Mild hepatic steatosis.       Soft Tissues/Bones: No acute abnormality.           Impression   New right-sided PICC again seen with unchanged and satisfactory tip position;   larger loculated right effusion in the azygoesophageal recess, as above. Otherwise similar findings to 05/07/2021 with suspected lower lobe   consolidation/pneumonitis, with volume loss and effusions both lower lobes.               Medical Decision [id number]:     Specimen Description 05/12/2021  5:09  [address]   . THORACENTESIS FLUID LEFT    Special Requests 05/12/2021  5:09  [address]   NOT REPORTED    Direct Exam Abnormal  05/12/2021  5:09 PM [facility]    Direct Exam 05/12/2021  5:09  [address]   NO BACTERIA SEEN    Direct Exam 05/12/2021  5:09  [address]   Gram stain made from cytocentrifuged specimen.  Organisms and cells will be concentrated.     Culture Abnormal  05/12/2021  5:09 PM [address] FLUID CULTURE, RN NOTIFIED Results called to and read back by: ROSALINDA WALLER 05/14/21 0430    Culture 05/12/2021  5:09 PM [facility] FROM BOTTLE: [place] POSITIVE RODS          Medical Decision Making-Other:     Note:  Labs, medications, radiologic studies were reviewed with personal review of films   Large amounts of data were reviewed  Discussed with nursing Staff, Discharge planner  Infection Control and Prevention measures reviewed  All prior entries were reviewed  Administer medications as ordered  Prognosis: Guarded  Discharge planning reviewed  Follow up as outpatient. Thank you for allowing us to participate in the care of this patient. Please call with questions. Ellen Rich, APRN - CNP     ATTESTATION:    I have discussed the case, including pertinent history and exam findings with the APRN. I have evaluated the  History, physical findings and pictures of the patient and the key elements of the encounter have been performed by me. I have reviewed the laboratory data, other diagnostic studies and discussed them with the APRN. I have updated the medical record where necessary. I agree with the assessment, plan and orders as documented by the APRN.     Ciro Galicia MD.

## 2021-05-14 NOTE — PROGRESS NOTES
Comprehensive Nutrition Assessment    Type and Reason for Visit:  Reassess, Consult(TF ordering/management)    Nutrition Recommendations/Plan:    - Continue PO diet/ONS as tolerated. - Modify TPN: Decrease dextrose to 200 gm d/t elevated glucose and start of TF. TPN will provide 980 kcals, 75 gm protein     - TF recommendations: Semi elemental at 10 mL/hr. Advance slowly as tolerated to goal of 55 mL/hr x 23 hours (held for Synthroid). *Note recommended TF formula is low in carbohydrates*     - Begin to wean TPN once pt tolerating TF at a minimum of 30 mL/hr. Nutrition Assessment:  Consult received for TF ordering/management. Per Gen Sx note, plan to start TF and wean TPN as PO/TF improves. At time of visit, pt sleeping at bedside with untouched meal tray. Estimated Daily Nutrient Needs:  Energy (kcal):  1600 kcal/day; Weight Used for Energy Requirements:  Current     Protein (g):  75 g pro/day; Weight Used for Protein Requirements:  Ideal(1.5)        Fluid (ml/day):  1404-0212 mL/day (or per MD); Method Used for Fluid Requirements:  ml/Kg(25-30)      Nutrition Related Findings:  Meds/labs reviewed ; Synthroid     Wounds:  Surgical Incision       Current Nutrition Therapies:    DIET LOW FIBER;   Dietary Nutrition Supplements: Clear Liquid Oral Supplement  PN-Adult 2-in-1 Central Line (Standard)  Diet Tube Feed Continuous/Cyclic w/ Diet  Current Parenteral Nutrition Orders:  · Type and Formula: 2-in-1 Custom(225 gm dextrose, 75 gm protein)   · Lipids: 100ml(weekly)  · Duration: Continuous  · Rate/Volume: 60 mL/hr (1440 mL/day)  · Current PN Order Provides: average intake of 1094 kcals, 75 gm protein (with 100 mL IL once weekly)      Anthropometric Measures:  · Height: 5' 2\" (157.5 cm)  · Current Body Weight: 194 lb 0.1 oz (88 kg)   · Admission Body Weight: 202 lb (91.6 kg)    · Usual Body Weight: (166 lb - stated)     · Ideal Body Weight: 110 lbs; % Ideal Body Weight 176.4 %   · BMI: 35.5  · BMI

## 2021-05-14 NOTE — PROGRESS NOTES
[facility]  Office: [address], DO, Macrina Odonnell, DO, Eulalia Barnes, DO, Henok Wolfe Blood, DO, Valarie Harrell MD, Ulisses Mukherjee MD, Juliet Vanegas MD, Eloy Riggs MD, Magaly Smith MD, Exie Ahumada, MD, Stan Chen MD, Chester Modi MD, Brenden Live, DO, Shankar Evans MD, Mumtaz Bettencourt DO, Saroj Quesada MD,  Patricia Nieto DO, Walker Elizabeth MD, Dyan Bowen MD, Darryl Olson MD, Marry Duong MD, Marycruz John, [facility], [facility], [facility], Mike Dior, [facility], Sydney Brooks, CNS, Gonzalo Quesada, CNP, [facility], CNP, Sharmaine Valerio, CNP, Tasha Mcconnell, CNP, Jagdeep Serna, CNP, Nito Olivares PA-C, Lupillo Muniz, [facility], Dagmar Argueta, CNP, Melly Washington, CNP, Laury Palomo, CNP, Alexandro Ortiz, CNP, Kennedy Mahajan, CNP, Evon Morse, [address] [place] [address]    Progress Note    5/14/2021    6:38 PM    Name:   Richelle Cedeño  MRN:     [id number]     Acct:      [de-identified]   Room:   [facility] Day:  6  Admit Date:  5/3/2021 12:49 PM    PCP:   Esdras Berrios DO  Code Status:  Full Code    Subjective:     C/C:   Chief Complaint   Patient presents with    Blood Sugar Problem     >450    Hernia     hyatial      Interval History Status: mild improvement      Patient seen and examined,  Laying in the bed, no appetite, tube feeding will start today  Diet as per general surgery,   TPN infusing continues, diet advanced as per surgery   G tube feeding will start today as per general surgery  Follow-up esophagram without leak on 5/11  Right loculated pleural effusion-plan s/p  thoracentesis and chest tube insertion 5/12  Right larger loculated pleural effusion -Right pig tail chest tube 5/13/21 - culture pending - exudate - ph -6.9  CT of the chest on Sunday    Brief History:     Patient presented to [place] [address] emergency room from [facility] hospital.    Patient originally presented to [facility] vancomycin  1,000 mg Intravenous Q24H    fat emulsion  100 mL Intravenous Weekly - Thursday    levothyroxine  112 mcg Oral Daily    pantoprazole  40 mg Oral QAM AC    gabapentin  300 mg Oral TID    furosemide  20 mg Oral Every Other Day    insulin glargine  10 Units Subcutaneous Nightly    polyethylene glycol  17 g Oral Daily    heparin (porcine)  5,000 Units Subcutaneous 3 times per day    vancomycin (VANCOCIN) intermittent dosing (placeholder)   Other RX Placeholder    ipratropium-albuterol  1 ampule Inhalation Q4H WA    piperacillin-tazobactam  3,375 mg Intravenous Q8H    insulin lispro  0-18 Units Subcutaneous Q4H    sodium chloride flush  5-40 mL Intravenous 2 times per day     Continuous Infusions:    PN-Adult 2-in-1 Central Line (Standard) 60 mL/hr at 21 1802    dextrose      sodium chloride 25 mL (05/10/21 1840)     PRN Meds: oxyCODONE **OR** oxyCODONE, acetaminophen, glucose, dextrose, glucagon (rDNA), dextrose, sodium chloride, potassium chloride, magnesium sulfate, acetaminophen, artificial tears, sodium chloride flush, [DISCONTINUED] promethazine **OR** ondansetron    Data:     Past Medical History:   has a past medical history of Diabetes mellitus ([address] Gout, Hiatal hernia, Hyperlipidemia, Hypertension, and Thyroid disease. Social History:   reports that she has never smoked. She has never used smokeless tobacco. She reports that she does not drink alcohol or use drugs.      Family History:   Family History   Problem Relation Age of Onset    Breast Cancer Mother     High Blood Pressure Mother     High Cholesterol Father     Breast Cancer Sister        Vitals:  /78   Pulse 102   Temp 98.8 °F (37.1 °C) (Oral)   Resp 24   Ht 5' 2\" (1.575 m)   Wt 194 lb 0.1 oz (88 kg)   SpO2 99%   BMI 35.48 kg/m²   Temp (24hrs), Av.2 °F (36.8 °C), Min:97.9 °F (36.6 °C), Max:98.8 °F (37.1 °C)    Recent Labs     21  0222 21  0748 21  1212 21  1625   POCGLU through the distal esophagus and GE junction with mild gastroesophageal reflux. The findings were sent to the Radiology Results [address] at 12:43 pm on 5/7/2021to be communicated to a licensed caregiver. Xr Chest Portable    Result Date: 5/7/2021  Bibasilar consolidation new from prior study. Blunting of the costophrenic angles bilaterally     Xr Chest Portable    Result Date: 5/6/2021  ETT tip position stable. Decreased left subcutaneous emphysema. Slightly improved suspected left basilar volume loss with persistent findings as above. No overt vascular congestion. Xr Chest Portable    Result Date: 5/5/2021  Volume loss continues left hemithorax with haziness at the left base either atelectasis and or fluid. Subcutaneous emphysema along the left lateral chest wall has decreased. No appreciable extrapleural air. Endotracheal tube in appropriate position. Xr Chest Portable    Result Date: 5/5/2021  1. Recommend repositioning of left internal jugular approach central venous catheter. 2. Low lung volumes with left basilar atelectasis plus or minus mild pleural effusion. 3. Left chest wall scattered soft tissue emphysema. Xr Chest Portable    Result Date: 5/4/2021  Intestinal tube deviates to the left side of a sizable hiatal hernia, traversing below the hemidiaphragm and terminating just underneath the left hemidiaphragm. Side port of the intestinal tube is below the diaphragmatic hiatus. Xr Chest Portable    Result Date: 5/3/2021  No acute cardiopulmonary disease Large hiatal hernia     Mri Abdomen Wo Contrast Mrcp    Result Date: 5/3/2021  1. No evidence of intrahepatic or extrahepatic ductal dilatation. 2. Incompletely imaged large hiatal hernia with organoaxial malrotation of the stomach. 3. Small to moderate amount of ascites. 4. Diffuse small bowel wall thickening likely due to 3rd spacing as the post enteritis. 5. Trace bilateral pleural effusions.      Ct Chest Abdomen Pelvis Wo Contrast    Result Date: 5/7/2021  1. Within the chest, the patient has consolidative processes in both lower lobes likely pneumonitis. Bilateral pleural effusions and basilar atelectasis are noted. 2. Postoperative changes within the abdomen. The patient had a hiatal hernia repair. Although the stomach is decompressed, gastric walls appear thickened likely secondary to inflammation which may be postoperative. 3. Fluid in the right pericolic gutter and pelvis. 4. Thickened small bowel loops in the right lower quadrant which may represent secondary changes to surgery. 5. Two drain placements in the upper abdomen. Left inguinal terminates in the left iliac vein. Fl Ugi    Result Date: 5/4/2021  Organo-axial volvulus the stomach. Large paraesophageal hernia containing the majority of the rotated gastric body. The greater curvature is positioned superiorly within the paraesophageal hernia defect. There is narrowing of the gastroesophageal junction as well as of the gastroduodenal junction through the hernia defect. Physical Examination:        General appearance:  alert, cooperative and no distress  Mental Status:  oriented to person, place and time and normal affect  Lungs:  clear to auscultation bilaterally, normal effort  Heart:  regular rate and rhythm, no murmur  Abdomen:  Obese, soft, tender, nondistended,hypoactive bowel sounds, no masses, hepatomegaly, splenomegaly  Extremities:  no edema, redness, tenderness in the calves  Skin:  no gross lesions, rashes, induration, abd surgical incisions, bilateral PERC drain.  Right-sided PERC drain with bilious output    Assessment:        Hospital Problems           Last Modified POA    * (Principal) Septic shock ([address] 5/4/2021 Yes    Acute pancreatitis 5/9/2021 Yes    Non-STEMI (non-ST elevated myocardial infarction) ([address] 5/4/2021 Yes    Diabetic ketoacidosis without coma associated with type 2 diabetes mellitus ([address] 5/4/2021 Yes    BONNIE (acute kidney injury) ([address] 5/4/2021 Yes    Hernia with obstruction 5/4/2021 Yes    Essential hypertension 5/4/2021 Yes    Thyroid disease 5/4/2021 Yes    Syncope 5/4/2021 Yes    Acute tubular necrosis ([address] 5/4/2021 Yes    Lactic acid acidosis 5/8/2021 Yes    Acute hypoxemic respiratory failure ([address] 5/4/2021 Yes    Hiatal hernia 5/5/2021 Yes    Gastric volvulus 5/5/2021 Yes    Community acquired bilateral lower lobe pneumonia 5/8/2021 Yes    Mediastinitis 5/8/2021 Yes    Peritonitis ([address] 5/8/2021 Yes          Plan: 1. Septic shock, resolved   2. Gastric ischemia and subsequent pneumoperitoneum  - Underwent emergent wedge gastrectomy, gastropexy x2, placement of TAISHA drains x2- clamped yesterday , abdominal lavage with gen surg  -Concern for esophageal leak, esophagram ordered for 5/11  -On Zosyn, Vanco, fluconazole; ID following   - initial blood cx positive for staph epidermidis.  Likely contaminant  - urine, repeat blood cultures NGTD X D3  - no longer requiring pressors  - PICC line placed 5/10  -TPN continues    -Right loculated pleural effusion-plan s/p  thoracentesis and chest tube insertion 5/12  Right larger loculated pleural effusion -Right pig tail chest tube 5/13/21 - culture pending - exudate - ph -6.9  CT of the chest on Sunday    3. Type 2 DM, non-insulin dependent: improving   - complicated by hyperglycemia and ketosis  - continue to check blood glucose q4h with liquid diet  - MI-ISS,  lantus  10 units , sugars better      4. Acute kidney injury secondary to ATN from hypotension/hypoperfusion: resolved  - continue to monitor creatinine.   - Continue resuscitative fluids-  - Nephrology following.   - Creatinine is steadily improving, nearing plateau. Currently 8.97-2.70, will monitor  - avoid nephrotoxic agents      5. Acute hypoxemic respiratory failure  - likely secondary to impaired lung expansion from large hiatal hernia/septic shock  - Extubated on 5/6, saturating well on LFO2  -Right loculated pleural effusion status post thoracentesis and chest tube placement on 5/12 with IR  -Right larger loculated pleural effusion -Right pig tail chest tube 5/13/21 - culture pending - exudate - ph -6.9  CT of the chest on Sunday     6. NSTEMI  - likely demand ischemia in setting of septic shock.   - Cardiology following.    - Echo showing normal EF with mild pulm HTN  - EKG  shows inferior infarct.   - Patient will need ischemic work-up following resolution of acute issues, follow up with cardiology as outpatient      7. Hypernatremia resolved  - nephro following   - Na 139     8. Thrombocytopenia: resolved  - likely from sepsis.    - Continue to monitor.      9. Hypothyroidism  - TSH elevated to 13.36. in the setting of acute illness.   - Free T4 normal.   - transition to po levothyroxine  Recheck in two months      10. Elevated PT/INR  - resolved.     Chacho Alcaraz MD  5/14/2021  6:38 PM

## 2021-05-15 ENCOUNTER — APPOINTMENT (OUTPATIENT)
Dept: GENERAL RADIOLOGY | Age: 71
DRG: 853 | End: 2021-05-15
Payer: MEDICARE

## 2021-05-15 LAB
ABSOLUTE EOS #: 0.05 K/UL (ref 0–0.4)
ABSOLUTE IMMATURE GRANULOCYTE: 0.05 K/UL (ref 0–0.3)
ABSOLUTE LYMPH #: 0.64 K/UL (ref 1–4.8)
ABSOLUTE MONO #: 0.48 K/UL (ref 0.1–0.8)
ALBUMIN SERPL-MCNC: 1.7 G/DL (ref 3.5–5.2)
ALBUMIN/GLOBULIN RATIO: 0.4 (ref 1–2.5)
ALP BLD-CCNC: 122 U/L (ref 35–104)
ALT SERPL-CCNC: 12 U/L (ref 5–33)
ANION GAP SERPL CALCULATED.3IONS-SCNC: 12 MMOL/L (ref 9–17)
AST SERPL-CCNC: 15 U/L
BASOPHILS # BLD: 0 % (ref 0–2)
BASOPHILS ABSOLUTE: 0 K/UL (ref 0–0.2)
BILIRUB SERPL-MCNC: 0.37 MG/DL (ref 0.3–1.2)
BILIRUBIN DIRECT: 0.21 MG/DL
BILIRUBIN, INDIRECT: 0.16 MG/DL (ref 0–1)
BUN BLDV-MCNC: 30 MG/DL (ref 8–23)
BUN/CREAT BLD: ABNORMAL (ref 9–20)
CALCIUM SERPL-MCNC: 8.4 MG/DL (ref 8.6–10.4)
CHLORIDE BLD-SCNC: 97 MMOL/L (ref 98–107)
CO2: 27 MMOL/L (ref 20–31)
CREAT SERPL-MCNC: 1.25 MG/DL (ref 0.5–0.9)
DIFFERENTIAL TYPE: ABNORMAL
EOSINOPHILS RELATIVE PERCENT: 1 % (ref 1–4)
GFR AFRICAN AMERICAN: 52 ML/MIN
GFR NON-AFRICAN AMERICAN: 43 ML/MIN
GFR SERPL CREATININE-BSD FRML MDRD: ABNORMAL ML/MIN/{1.73_M2}
GFR SERPL CREATININE-BSD FRML MDRD: ABNORMAL ML/MIN/{1.73_M2}
GLOBULIN: ABNORMAL G/DL (ref 1.5–3.8)
GLUCOSE BLD-MCNC: 203 MG/DL (ref 65–105)
GLUCOSE BLD-MCNC: 217 MG/DL (ref 70–99)
GLUCOSE BLD-MCNC: 236 MG/DL (ref 65–105)
GLUCOSE BLD-MCNC: 240 MG/DL (ref 65–105)
GLUCOSE BLD-MCNC: 247 MG/DL (ref 65–105)
GLUCOSE BLD-MCNC: 256 MG/DL (ref 65–105)
HCT VFR BLD CALC: 26.2 % (ref 36.3–47.1)
HEMOGLOBIN: 8.2 G/DL (ref 11.9–15.1)
IMMATURE GRANULOCYTES: 1 %
INR BLD: 0.9
LYMPHOCYTES # BLD: 12 % (ref 24–44)
MAGNESIUM: 1.5 MG/DL (ref 1.6–2.6)
MCH RBC QN AUTO: 27.9 PG (ref 25.2–33.5)
MCHC RBC AUTO-ENTMCNC: 31.3 G/DL (ref 28.4–34.8)
MCV RBC AUTO: 89.1 FL (ref 82.6–102.9)
MONOCYTES # BLD: 9 % (ref 1–7)
MORPHOLOGY: NORMAL
NRBC AUTOMATED: 0 PER 100 WBC
PARTIAL THROMBOPLASTIN TIME: 24.2 SEC (ref 20.5–30.5)
PDW BLD-RTO: 14.1 % (ref 11.8–14.4)
PLATELET # BLD: 225 K/UL (ref 138–453)
PLATELET ESTIMATE: ABNORMAL
PMV BLD AUTO: 10.6 FL (ref 8.1–13.5)
POTASSIUM SERPL-SCNC: 4.4 MMOL/L (ref 3.7–5.3)
PROTHROMBIN TIME: 10 SEC (ref 9.1–12.3)
RBC # BLD: 2.94 M/UL (ref 3.95–5.11)
RBC # BLD: ABNORMAL 10*6/UL
SEG NEUTROPHILS: 77 % (ref 36–66)
SEGMENTED NEUTROPHILS ABSOLUTE COUNT: 4.08 K/UL (ref 1.8–7.7)
SODIUM BLD-SCNC: 136 MMOL/L (ref 135–144)
TOTAL PROTEIN: 6.3 G/DL (ref 6.4–8.3)
WBC # BLD: 5.3 K/UL (ref 3.5–11.3)
WBC # BLD: ABNORMAL 10*3/UL

## 2021-05-15 PROCEDURE — 6360000002 HC RX W HCPCS: Performed by: INTERNAL MEDICINE

## 2021-05-15 PROCEDURE — 6360000002 HC RX W HCPCS: Performed by: NURSE PRACTITIONER

## 2021-05-15 PROCEDURE — 2500000003 HC RX 250 WO HCPCS: Performed by: SURGERY

## 2021-05-15 PROCEDURE — 6370000000 HC RX 637 (ALT 250 FOR IP): Performed by: NURSE PRACTITIONER

## 2021-05-15 PROCEDURE — 99233 SBSQ HOSP IP/OBS HIGH 50: CPT | Performed by: INTERNAL MEDICINE

## 2021-05-15 PROCEDURE — 83735 ASSAY OF MAGNESIUM: CPT

## 2021-05-15 PROCEDURE — 94640 AIRWAY INHALATION TREATMENT: CPT

## 2021-05-15 PROCEDURE — 99232 SBSQ HOSP IP/OBS MODERATE 35: CPT | Performed by: INTERNAL MEDICINE

## 2021-05-15 PROCEDURE — 74018 RADEX ABDOMEN 1 VIEW: CPT

## 2021-05-15 PROCEDURE — 2580000003 HC RX 258: Performed by: STUDENT IN AN ORGANIZED HEALTH CARE EDUCATION/TRAINING PROGRAM

## 2021-05-15 PROCEDURE — 36415 COLL VENOUS BLD VENIPUNCTURE: CPT

## 2021-05-15 PROCEDURE — 85730 THROMBOPLASTIN TIME PARTIAL: CPT

## 2021-05-15 PROCEDURE — 80048 BASIC METABOLIC PNL TOTAL CA: CPT

## 2021-05-15 PROCEDURE — 99232 SBSQ HOSP IP/OBS MODERATE 35: CPT | Performed by: NURSE PRACTITIONER

## 2021-05-15 PROCEDURE — 6360000002 HC RX W HCPCS: Performed by: STUDENT IN AN ORGANIZED HEALTH CARE EDUCATION/TRAINING PROGRAM

## 2021-05-15 PROCEDURE — 97116 GAIT TRAINING THERAPY: CPT

## 2021-05-15 PROCEDURE — 2700000000 HC OXYGEN THERAPY PER DAY

## 2021-05-15 PROCEDURE — 2060000000 HC ICU INTERMEDIATE R&B

## 2021-05-15 PROCEDURE — 97110 THERAPEUTIC EXERCISES: CPT

## 2021-05-15 PROCEDURE — 94761 N-INVAS EAR/PLS OXIMETRY MLT: CPT

## 2021-05-15 PROCEDURE — 6370000000 HC RX 637 (ALT 250 FOR IP): Performed by: STUDENT IN AN ORGANIZED HEALTH CARE EDUCATION/TRAINING PROGRAM

## 2021-05-15 PROCEDURE — 85025 COMPLETE CBC W/AUTO DIFF WBC: CPT

## 2021-05-15 PROCEDURE — 6370000000 HC RX 637 (ALT 250 FOR IP): Performed by: INTERNAL MEDICINE

## 2021-05-15 PROCEDURE — 80076 HEPATIC FUNCTION PANEL: CPT

## 2021-05-15 PROCEDURE — 82947 ASSAY GLUCOSE BLOOD QUANT: CPT

## 2021-05-15 PROCEDURE — 85610 PROTHROMBIN TIME: CPT

## 2021-05-15 RX ADMIN — MAGNESIUM SULFATE HEPTAHYDRATE 1000 MG: 1 INJECTION, SOLUTION INTRAVENOUS at 16:40

## 2021-05-15 RX ADMIN — GABAPENTIN 300 MG: 300 CAPSULE ORAL at 09:22

## 2021-05-15 RX ADMIN — SODIUM CHLORIDE, PRESERVATIVE FREE 10 ML: 5 INJECTION INTRAVENOUS at 09:21

## 2021-05-15 RX ADMIN — VANCOMYCIN HYDROCHLORIDE 1000 MG: 1 INJECTION, SOLUTION INTRAVENOUS at 10:25

## 2021-05-15 RX ADMIN — GABAPENTIN 300 MG: 300 CAPSULE ORAL at 15:13

## 2021-05-15 RX ADMIN — CALCIUM GLUCONATE: 98 INJECTION, SOLUTION INTRAVENOUS at 17:36

## 2021-05-15 RX ADMIN — LEVOTHYROXINE SODIUM 112 MCG: 112 TABLET ORAL at 05:10

## 2021-05-15 RX ADMIN — POLYETHYLENE GLYCOL 3350 17 G: 17 POWDER, FOR SOLUTION ORAL at 09:21

## 2021-05-15 RX ADMIN — PIPERACILLIN AND TAZOBACTAM 3375 MG: 3; .375 INJECTION, POWDER, LYOPHILIZED, FOR SOLUTION INTRAVENOUS at 20:00

## 2021-05-15 RX ADMIN — INSULIN LISPRO 6 UNITS: 100 INJECTION, SOLUTION INTRAVENOUS; SUBCUTANEOUS at 10:09

## 2021-05-15 RX ADMIN — IPRATROPIUM BROMIDE AND ALBUTEROL SULFATE 1 AMPULE: .5; 2.5 SOLUTION RESPIRATORY (INHALATION) at 12:19

## 2021-05-15 RX ADMIN — PIPERACILLIN AND TAZOBACTAM 3375 MG: 3; .375 INJECTION, POWDER, LYOPHILIZED, FOR SOLUTION INTRAVENOUS at 14:30

## 2021-05-15 RX ADMIN — INSULIN LISPRO 6 UNITS: 100 INJECTION, SOLUTION INTRAVENOUS; SUBCUTANEOUS at 23:39

## 2021-05-15 RX ADMIN — SODIUM CHLORIDE, PRESERVATIVE FREE 10 ML: 5 INJECTION INTRAVENOUS at 20:00

## 2021-05-15 RX ADMIN — IPRATROPIUM BROMIDE AND ALBUTEROL SULFATE 1 AMPULE: .5; 2.5 SOLUTION RESPIRATORY (INHALATION) at 07:57

## 2021-05-15 RX ADMIN — ONDANSETRON 4 MG: 2 INJECTION INTRAMUSCULAR; INTRAVENOUS at 20:15

## 2021-05-15 RX ADMIN — HEPARIN SODIUM 5000 UNITS: 5000 INJECTION INTRAVENOUS; SUBCUTANEOUS at 15:13

## 2021-05-15 RX ADMIN — IPRATROPIUM BROMIDE AND ALBUTEROL SULFATE 1 AMPULE: .5; 2.5 SOLUTION RESPIRATORY (INHALATION) at 16:28

## 2021-05-15 RX ADMIN — INSULIN LISPRO 9 UNITS: 100 INJECTION, SOLUTION INTRAVENOUS; SUBCUTANEOUS at 15:36

## 2021-05-15 RX ADMIN — PANTOPRAZOLE SODIUM 40 MG: 40 TABLET, DELAYED RELEASE ORAL at 05:10

## 2021-05-15 RX ADMIN — HEPARIN SODIUM 5000 UNITS: 5000 INJECTION INTRAVENOUS; SUBCUTANEOUS at 05:10

## 2021-05-15 RX ADMIN — FUROSEMIDE 20 MG: 20 TABLET ORAL at 09:22

## 2021-05-15 RX ADMIN — INSULIN LISPRO 6 UNITS: 100 INJECTION, SOLUTION INTRAVENOUS; SUBCUTANEOUS at 05:00

## 2021-05-15 RX ADMIN — PIPERACILLIN AND TAZOBACTAM 3375 MG: 3; .375 INJECTION, POWDER, LYOPHILIZED, FOR SOLUTION INTRAVENOUS at 06:21

## 2021-05-15 RX ADMIN — HEPARIN SODIUM 5000 UNITS: 5000 INJECTION INTRAVENOUS; SUBCUTANEOUS at 21:19

## 2021-05-15 RX ADMIN — INSULIN GLARGINE 10 UNITS: 100 INJECTION, SOLUTION SUBCUTANEOUS at 20:00

## 2021-05-15 RX ADMIN — INSULIN LISPRO 6 UNITS: 100 INJECTION, SOLUTION INTRAVENOUS; SUBCUTANEOUS at 20:29

## 2021-05-15 RX ADMIN — FLUCONAZOLE 400 MG: 2 INJECTION, SOLUTION INTRAVENOUS at 12:12

## 2021-05-15 RX ADMIN — MAGNESIUM SULFATE HEPTAHYDRATE 1000 MG: 1 INJECTION, SOLUTION INTRAVENOUS at 15:13

## 2021-05-15 ASSESSMENT — ENCOUNTER SYMPTOMS
WHEEZING: 0
SHORTNESS OF BREATH: 1
ABDOMINAL DISTENTION: 1
COUGH: 0
ABDOMINAL PAIN: 1

## 2021-05-15 ASSESSMENT — PAIN SCALES - GENERAL
PAINLEVEL_OUTOF10: 3
PAINLEVEL_OUTOF10: 4

## 2021-05-15 ASSESSMENT — PAIN DESCRIPTION - LOCATION
LOCATION: ABDOMEN
LOCATION: ABDOMEN

## 2021-05-15 ASSESSMENT — PAIN DESCRIPTION - DESCRIPTORS: DESCRIPTORS: ACHING;DISCOMFORT

## 2021-05-15 ASSESSMENT — PAIN DESCRIPTION - PROGRESSION
CLINICAL_PROGRESSION: NOT CHANGED

## 2021-05-15 ASSESSMENT — PAIN DESCRIPTION - ORIENTATION: ORIENTATION: RIGHT;MID

## 2021-05-15 ASSESSMENT — PAIN - FUNCTIONAL ASSESSMENT: PAIN_FUNCTIONAL_ASSESSMENT: ACTIVITIES ARE NOT PREVENTED

## 2021-05-15 ASSESSMENT — PAIN DESCRIPTION - ONSET
ONSET: ON-GOING
ONSET: ON-GOING

## 2021-05-15 ASSESSMENT — PAIN DESCRIPTION - PAIN TYPE: TYPE: SURGICAL PAIN

## 2021-05-15 NOTE — PROGRESS NOTES
Comprehensive Nutrition Assessment    Type and Reason for Visit:  Reassess    Nutrition Recommendations/Plan:   - Continue Low Fiber Diet with oral nutrition supplements as tolerated  - Continue TPN at 60 mL/hr with 200 gm Dextrose and 75 gm AA; increase Mg in next bag  - Continue tube feeding via PEG with semi-elemental formula (Vital AF) and increase slowly toward goal rate of 55 mL/hr   - Begin to wean TPN once patient tolerating tube feeding at minimum of 30 mL/hr    Nutrition Assessment:  Spoke with RN who reports that patient had \"maybe five bites\" of breakfast this morning. Tube feeding turned off a time of visit. Prior, tube feeding was running at 40 mL/hr (note goal rate of 55 mL/hr). RN reports she will turn tube feedings back on soon. Note 5lb (2.6%) weight loss x past 7 days, which is considered clinically significant. Last BM noted on 5/12. RN reports absent to hypoactive bowel sounds. Malnutrition Assessment:  Malnutrition Status: Moderate malnutrition    Context:  Acute Illness     Findings of the 6 clinical characteristics of malnutrition:  Energy Intake:  1 - 75% or less of estimated energy requirements for 7 or more days  Weight Loss:  7 - Greater than 2% over 1 week     Body Fat Loss:  No significant body fat loss     Muscle Mass Loss:  No significant muscle mass loss    Fluid Accumulation:  1 - Mild- Extremities   Strength:  Not Performed    Estimated Daily Nutrient Needs:  Energy (kcal):  1600 kcal/day; Weight Used for Energy Requirements:  Current     Protein (g):  75 g pro/day; Weight Used for Protein Requirements:  Ideal (1.5)        Fluid (ml/day):  9753-1656 mL/day (or per MD); Method Used for Fluid Requirements:  ml/Kg (25-30)      Nutrition Related Findings:  Labs: Mg 1.5 (L). Meds: Lasix, Lantus, Humalog, Synthroid, Glycolax. Last BM 5/12. Non-pitting BUE, +1 BLE edema. +Peg tube x2.       Wounds:  Surgical Incision, Multiple       Current Nutrition Therapies:    DIET LOW Weight     Discharge Planning:     Too soon to determine     Electronically signed by Stevie Sands, RD, LD on 5/15/21 at 1:21 PM EDT    Contact: [phone]

## 2021-05-15 NOTE — PLAN OF CARE
Problem: OXYGENATION/RESPIRATORY FUNCTION  Goal: Patient will maintain patent airway  Outcome: Ongoing     Problem: OXYGENATION/RESPIRATORY FUNCTION  Goal: Patient will achieve/maintain normal respiratory rate/effort  Description: Respiratory rate and effort will be within normal limits for the patient  Outcome: Ongoing     Problem: SKIN INTEGRITY  Goal: Skin integrity is maintained or improved  Outcome: Ongoing     Problem: Confusion - Acute:  Goal: Absence of continued neurological deterioration signs and symptoms  Description: Absence of continued neurological deterioration signs and symptoms  Outcome: Ongoing     Problem: Confusion - Acute:  Goal: Mental status will be restored to baseline  Description: Mental status will be restored to baseline  Outcome: Ongoing     Problem: Discharge Planning:  Goal: Ability to perform activities of daily living will improve  Description: Ability to perform activities of daily living will improve  Outcome: Ongoing     Problem: Discharge Planning:  Goal: Participates in care planning  Description: Participates in care planning  Outcome: Ongoing     Problem: Injury - Risk of, Physical Injury:  Goal: Absence of physical injury  Description: Absence of physical injury  Outcome: Ongoing     Problem: Injury - Risk of, Physical Injury:  Goal: Will remain free from falls  Description: Will remain free from falls  Outcome: Ongoing     Problem: Mood - Altered:  Goal: Mood stable  Description: Mood stable  Outcome: Ongoing     Problem: Mood - Altered:  Goal: Verbalizations of feeling emotionally comfortable while being cared for will increase  Description: Verbalizations of feeling emotionally comfortable while being cared for will increase  Outcome: Ongoing     Problem: Sensory Perception - Impaired:  Goal: Demonstrations of improved sensory functioning will increase  Description: Demonstrations of improved sensory functioning will increase  Outcome: Ongoing  Goal: Decrease in sensory misperception frequency  Description: Decrease in sensory misperception frequency  Outcome: Ongoing  Goal: Able to refrain from responding to false sensory perceptions  Description: Able to refrain from responding to false sensory perceptions  Outcome: Ongoing  Goal: Demonstrates accurate environmental perceptions  Description: Demonstrates accurate environmental perceptions  Outcome: Ongoing  Goal: Able to distinguish between reality-based and nonreality-based thinking  Description: Able to distinguish between reality-based and nonreality-based thinking  Outcome: Ongoing  Goal: Able to interrupt nonreality-based thinking  Description: Able to interrupt nonreality-based thinking  Outcome: Ongoing     Problem: Skin Integrity:  Goal: Will show no infection signs and symptoms  Description: Will show no infection signs and symptoms  Outcome: Ongoing  Goal: Absence of new skin breakdown  Description: Absence of new skin breakdown  Outcome: Ongoing     Problem: Falls - Risk of:  Goal: Absence of physical injury  Description: Absence of physical injury  Outcome: Ongoing  Goal: Will remain free from falls  Description: Will remain free from falls  Outcome: Ongoing     Problem: Pain:  Goal: Control of acute pain  Description: Control of acute pain  Outcome: Ongoing     Problem: Infection - Central Venous Catheter-Associated Bloodstream Infection:  Goal: Will show no infection signs and symptoms  Description: Will show no infection signs and symptoms  Outcome: Ongoing     Care plan in progress. Will cont to monitor.

## 2021-05-15 NOTE — PROGRESS NOTES
[facility]  Office: [address], DO, Maurilio Darnell, DO, Christian Field, DO, Naima Jauregui, DO, Odilon Mejia MD, Nikolai Espinosa MD, Odilon Xiao MD, Julia Allan MD, Jose Perkins MD, Yazan Durham MD, Art Rod MD, Brian Lorenz MD, Tejal White DO, Elisa Stein MD, Neva Malone DO, Nicol Interiano MD,  Jose Alberto Navarro DO, Nirmal Blood MD, Derald Jeans, MD, Saul Rodriguez MD, Ute Osborn MD, See Keith, [facility], [facility], [facility], Olvin Burgos CNP, Gabriele Tadeo, CNS, Alexandra Dias, CNP, Lio Hernandez, CNP, Shanta Gonzalez, CNP, Janessa Kemp, CNP, Jarrett Bashir, CNP, Sana Strickland PA-C, Darryl Penaloza, [facility], Marry Wei, CNP, Gabo Root, CNP, Narendra Saini, CNP, Antoine Holter, CNP, Vamshi Johnson, CNP, [facility], [address] [place] [address]    Progress Note    5/15/2021    7:14 PM    Name:   Audie Tse  MRN:     [id number]     Acct:      [de-identified]   Room:   [facility] Day:  12  Admit Date:  5/3/2021 12:49 PM    PCP:   Mirlande Hill DO  Code Status:  Full Code    Subjective:     C/C:   Chief Complaint   Patient presents with    Blood Sugar Problem     >450    Hernia     hyatial      Interval History Status: mild improvement      Patient seen and examined,  Episode of emesis ,   Seen by surgery stat , no interventions   Follow-up esophagram without leak on 5/11  Right loculated pleural effusion-plan s/p  thoracentesis and chest tube insertion 5/12  Right larger loculated pleural effusion -Right pig tail chest tube 5/13/21 - culture pending - exudate - ph -6.9  CT of the chest on Sunday    Brief History:     Patient presented to [place] [address] emergency room from [facility].    Patient originally presented to [facility] for the evaluation of nausea vomiting and diarrhea ongoing for 6 days.   Patient was recently seen in the [facility] emergency levothyroxine  112 mcg Oral Daily    pantoprazole  40 mg Oral QAM AC    gabapentin  300 mg Oral TID    furosemide  20 mg Oral Every Other Day    insulin glargine  10 Units Subcutaneous Nightly    polyethylene glycol  17 g Oral Daily    heparin (porcine)  5,000 Units Subcutaneous 3 times per day    vancomycin (VANCOCIN) intermittent dosing (placeholder)   Other RX Placeholder    ipratropium-albuterol  1 ampule Inhalation Q4H WA    piperacillin-tazobactam  3,375 mg Intravenous Q8H    insulin lispro  0-18 Units Subcutaneous Q4H    sodium chloride flush  5-40 mL Intravenous 2 times per day     Continuous Infusions:    PN-Adult 2-in-1 Central Line (Standard) 60 mL/hr at 05/15/21 1736    dextrose      sodium chloride 25 mL (05/10/21 1840)     PRN Meds: oxyCODONE **OR** oxyCODONE, acetaminophen, glucose, dextrose, glucagon (rDNA), dextrose, sodium chloride, potassium chloride, magnesium sulfate, acetaminophen, artificial tears, sodium chloride flush, [DISCONTINUED] promethazine **OR** ondansetron    Data:     Past Medical History:   has a past medical history of Diabetes mellitus ([facility] [address] Gout, Hiatal hernia, Hyperlipidemia, Hypertension, and Thyroid disease. Social History:   reports that she has never smoked. She has never used smokeless tobacco. She reports that she does not drink alcohol and does not use drugs. Family History:   Family History   Problem Relation Age of Onset    Breast Cancer Mother     High Blood Pressure Mother     High Cholesterol Father     Breast Cancer Sister        Vitals:  /83   Pulse 99   Temp 98 °F (36.7 °C) (Axillary)   Resp 19   Ht 5' 2\" (1.575 m)   Wt 190 lb 14.7 oz (86.6 kg)   SpO2 100%   BMI 34.92 kg/m²   Temp (24hrs), Av.4 °F (36.9 °C), Min:98 °F (36.7 °C), Max:99.1 °F (37.3 °C)    Recent Labs     21  2335 05/15/21  0403 05/15/21  1008 05/15/21  1518   POCGLU 193* 203* 236* 256*       I/O (24Hr):     Intake/Output Summary (Last 24 hours) at 5/15/2021 1914  Last data filed at 5/15/2021 1756  Gross per 24 hour   Intake 2459.1 ml   Output 1130 ml   Net 1329.1 ml       Labs:  Hematology:  Recent Labs     05/13/21  0551 05/14/21  0705 05/15/21  0513   WBC 4.7 5.7 5.3   RBC 2.91* 2.96* 2.94*   HGB 8.2* 8.4* 8.2*   HCT 26.7* 26.6* 26.2*   MCV 91.8 89.9 89.1   MCH 28.2 28.4 27.9   MCHC 30.7 31.6 31.3   RDW 14.6* 14.2 14.1    191 225   MPV 10.8 10.4 10.6   INR 1.0 1.0 0.9     Chemistry:  Recent Labs     05/13/21  0551 05/14/21  0705 05/15/21  0513    137 136   K 4.2 3.7 4.4    101 97*   CO2 25 28 27   GLUCOSE 234* 208* 217*   BUN 37* 35* 30*   CREATININE 1.34* 1.42* 1.25*   MG  --  1.5* 1.5*   ANIONGAP 9 8* 12   LABGLOM 39* 37* 43*   GFRAA 48* 45* 52*   CALCIUM 8.2* 8.2* 8.4*   PHOS  --  4.5  --      Recent Labs     05/13/21  0551 05/14/21  0705 05/14/21  1212 05/14/21  1625 05/14/21  2335 05/15/21  0403 05/15/21  0513 05/15/21  1008 05/15/21  1518   PROT 6.1* 6.1*  --   --   --   --  6.3*  --   --    LABALBU 1.5* 1.4*  --   --   --   --  1.7*  --   --    AST 9 16  --   --   --   --  15  --   --    ALT 9 11  --   --   --   --  12  --   --    *  --   --   --   --   --   --   --   --    ALKPHOS 106* 121*  --   --   --   --  122*  --   --    BILITOT 0.33 0.30  --   --   --   --  0.37  --   --    BILIDIR  --  0.19  --   --   --   --  0.21  --   --    TRIG 261*  --   --   --   --   --   --   --   --    POCGLU  --   --  282* 265* 193* 203*  --  236* 256*     ABG:  Lab Results   Component Value Date    POCPH 7.433 05/06/2021    PHART 7.193 05/04/2021    POCPCO2 36.8 05/06/2021    SUK5LFG 52.0 05/04/2021    POCPO2 150.9 05/06/2021    PO2ART 145.0 05/04/2021    POCHCO3 24.6 05/06/2021    UES6XEV 19.2 05/04/2021    NBEA NOT REPORTED 05/06/2021    PBEA 0 05/06/2021    OJO7GWF NOT REPORTED 05/06/2021    SWTW3WFP 99 05/06/2021    Z2RIPXSY 98.0 05/04/2021    FIO2 40.0 05/06/2021     Lab Results   Component Value Date/Time    SPECIAL NOT REPORTED changes within the abdomen. The patient had a hiatal hernia repair. Although the stomach is decompressed, gastric walls appear thickened likely secondary to inflammation which may be postoperative. 3. Fluid in the right pericolic gutter and pelvis. 4. Thickened small bowel loops in the right lower quadrant which may represent secondary changes to surgery. 5. Two drain placements in the upper abdomen. Left inguinal terminates in the left iliac vein. Fl Ugi    Result Date: 5/4/2021  Organo-axial volvulus the stomach. Large paraesophageal hernia containing the majority of the rotated gastric body. The greater curvature is positioned superiorly within the paraesophageal hernia defect. There is narrowing of the gastroesophageal junction as well as of the gastroduodenal junction through the hernia defect. Physical Examination:        General appearance:  alert, cooperative and no distress  Mental Status:  oriented to person, place and time and normal affect  Lungs:  clear to auscultation bilaterally, normal effort  Heart:  regular rate and rhythm, no murmur  Abdomen:  Obese, soft, tender, nondistended,hypoactive bowel sounds, no masses, hepatomegaly, splenomegaly  Extremities:  no edema, redness, tenderness in the calves  Skin:  no gross lesions, rashes, induration, abd surgical incisions, bilateral PERC drain.  Right-sided PERC drain with bilious output    Assessment:        Hospital Problems         Last Modified POA    * (Principal) Septic shock ([address] 5/4/2021 Yes    Acute pancreatitis 5/9/2021 Yes    Non-STEMI (non-ST elevated myocardial infarction) ([address] 5/4/2021 Yes    Diabetic ketoacidosis without coma associated with type 2 diabetes mellitus ([address] 5/4/2021 Yes    BONNIE (acute kidney injury) ([address] 5/4/2021 Yes    Hernia with obstruction 5/4/2021 Yes    Essential hypertension 5/4/2021 Yes    Thyroid disease 5/4/2021 Yes    Syncope 5/4/2021 Yes    Acute tubular necrosis ([address] 5/4/2021 Yes    Lactic acid acidosis 5/8/2021 Yes    Acute hypoxemic respiratory failure ([address] 5/4/2021 Yes    Hiatal hernia 5/5/2021 Yes    Gastric volvulus 5/5/2021 Yes    Community acquired bilateral lower lobe pneumonia 5/8/2021 Yes    Mediastinitis 5/8/2021 Yes    Peritonitis ([address] 5/8/2021 Yes          Plan: 1. Septic shock, resolved   2. Gastric ischemia and subsequent pneumoperitoneum  - Underwent emergent wedge gastrectomy, gastropexy x2, placement of TAISHA drains x2- clamped yesterday , abdominal lavage with gen surg  -Concern for esophageal leak, esophagram ordered for 5/11  -On Zosyn, Vanco, fluconazole; ID following   - initial blood cx positive for staph epidermidis.  Likely contaminant  - urine, repeat blood cultures NGTD X D3  - no longer requiring pressors  - PICC line placed 5/10  -TPN continues    -Right loculated pleural effusion-plan s/p  thoracentesis and chest tube insertion 5/12  Right larger loculated pleural effusion -Right pig tail chest tube 5/13/21 - culture pending - exudate - ph -6.9  CT of the chest on Sunday  Emesis  Seen by surgery   Resolved   KUB reviewed       3. Type 2 DM, non-insulin dependent: improving   - complicated by hyperglycemia and ketosis  - continue to check blood glucose q4h with liquid diet  - MI-ISS,  lantus  10 units , sugars better      4. Acute kidney injury secondary to ATN from hypotension/hypoperfusion: resolved  - continue to monitor creatinine.   - Continue resuscitative fluids-  - Nephrology following.   - Creatinine is steadily improving, nearing plateau. Currently 0.99-8.19, will monitor  - avoid nephrotoxic agents      5. Acute hypoxemic respiratory failure  - likely secondary to impaired lung expansion from large hiatal hernia/septic shock  - Extubated on 5/6, saturating well on LFO2  -Right loculated pleural effusion status post thoracentesis and chest tube placement on 5/12 with IR  -Right larger loculated pleural effusion -Right pig tail chest tube 5/13/21 - culture pending - exudate - ph -6.9  CT of the chest on Sunday     6. NSTEMI  - likely demand ischemia in setting of septic shock.   - Cardiology following.    - Echo showing normal EF with mild pulm HTN  - EKG  shows inferior infarct.   - Patient will need ischemic work-up following resolution of acute issues, follow up with cardiology as outpatient      7. Hypernatremia resolved  - nephro following   - Na 139     8. Thrombocytopenia: resolved  - likely from sepsis.    - Continue to monitor.      9. Hypothyroidism  - TSH elevated to 13.36. in the setting of acute illness.   - Free T4 normal.   - transition to po levothyroxine  Recheck in two months      10. Elevated PT/INR  - resolved.     Melissa Perry MD  5/15/2021  7:14 PM

## 2021-05-15 NOTE — PLAN OF CARE
Nutrition Problem #1: Inadequate oral intake  Intervention: Food and/or Nutrient Delivery: Continue Current Diet, Continue Current Tube Feeding, Modify Parenteral Nutrition, Continue Oral Nutrition Supplement  Nutritional Goals: meet % of estimated nutrient needs

## 2021-05-15 NOTE — CARE COORDINATION
Transition planning   Met with pt's son and daughter in-law at bedside, they state they have looked into reviews for [facility] and do not want patient to go there. They relate they would like to review LTACHs and other rehabs in the [place] area. Discussed differences in ARU, SNF and LTACH. Son and daughter in-law state they are really hopeful that patient would be able to go to a acute rehab facility. With patient's current needs of tube feeding, TPN and chest tube patient is most likely more appropriate for LTACH. Son and daughter in-law given acute rehab, SNF and LTACH lists per their request to review to make new choices.   36 Pt's son and daughter in-law request to talk with CM, they state they have chosen [facility] for [facility], [facility] and if patient is appropriate their first choice would be [facility]. Will need to contact Ralf Cortez on Monday to relate they want patient to go to [place] and not [facility] in [place], [address] sent to [facility] per family request.

## 2021-05-15 NOTE — PROGRESS NOTES
Physical Therapy  Facility/Department: [address] STE[address]OWN  Daily Treatment Note  NAME: Cami Paul  : 1950  MRN: [id number]    Date of Service: 5/15/2021    Discharge Recommendations:  Patient would benefit from continued therapy after discharge   PT Equipment Recommendations  Equipment Needed: No    Assessment   Body structures, Functions, Activity limitations: Decreased functional mobility ; Decreased ROM; Decreased strength;Decreased endurance;Decreased balance  Assessment: Pt amb 8 ft with Rw and MIN ALBA Zamora imporved tolerance to therapy this date. Recommend contined PT after d/c to address deficits  Prognosis: Good  REQUIRES PT FOLLOW UP: Yes  Activity Tolerance  Activity Tolerance: Patient limited by fatigue;Patient Tolerated treatment well     Patient Diagnosis(es): The primary encounter diagnosis was Acute pancreatitis, unspecified complication status, unspecified pancreatitis type. Diagnoses of BONNIE (acute kidney injury) ([facility] [address] and Hiatal hernia were also pertinent to this visit. has a past medical history of Diabetes mellitus ([facility] [address] Gout, Hiatal hernia, Hyperlipidemia, Hypertension, and Thyroid disease. has a past surgical history that includes Cholecystectomy (); Hysterectomy (); Breast surgery; other surgical history (); other surgical history (); Tubal ligation (); Gastric fundoplication (N/A, 1/3/1563); hc picc line double lumen (5/10/2021); and IR CHEST TUBE INSERTION (2021). Restrictions  Restrictions/Precautions  Restrictions/Precautions: General Precautions, Fall Risk, Surgical Protocols  Required Braces or Orthoses?: No  Position Activity Restriction  Other position/activity restrictions: Up with assist,  \"PARAESPHAGEAL HERNIA REPAIR LAPAROSCOPIC ROBOTIC\", 2 PEG tubes  Subjective   General  Response To Previous Treatment: Patient with no complaints from previous session.   Family / Caregiver Present: No  Subjective  Subjective: Pt resting in bed upon arrival, eager to participate in PT  Pain Screening  Patient Currently in Pain: Yes  Pain Assessment  Pain Assessment: 0-10  Pain Level: 4  Pain Type: Surgical pain  Pain Location: Abdomen  Functional Pain Assessment: Prevents or interferes some active activities and ADLs  Response to Pain Intervention: Patient Satisfied  Vital Signs  Patient Currently in Pain: Yes       Orientation  Orientation  Overall Orientation Status: Within Functional Limits  Cognition      Objective   Bed mobility  Rolling to Right: Moderate assistance  Supine to Sit: Moderate assistance  Scooting: Minimal assistance  Transfers  Sit to Stand: Minimal Assistance  Stand to sit: Minimal Assistance  Bed to Chair: Minimal assistance  Ambulation  Ambulation?: Yes  Ambulation 1  Surface: level tile  Device: Rolling Walker  Assistance: Minimal assistance  Quality of Gait: short, shuffled steps, decrased alissa  Distance: 8 ft  Comments: Improved [place] to amb this date with improved posture     Balance  Posture: Good  Sitting - Static: Good  Sitting - Dynamic: Good  Standing - Static: Fair  Standing - Dynamic: Fair  Comments: with RW     Exercise  Seated LE exercise program: Long Arc Quads, hip abduction/adduction, heel/toe raises, and marches. Reps: 15x    Goals  Short term goals  Time Frame for Short term goals: 14 visits  Short term goal 1: Prevent contractures through ROM and stretching. Short term goal 2: Pt to follow most commands for active participation in treatment  Short term goal 3: Progress with mobility as appropriate. Patient Goals   Patient goals : Get tube out.     Plan    Plan  Times per week: 5x/week  Current Treatment Recommendations: Strengthening, Endurance Training, Cognitive Reorientation, Functional Mobility Training  Safety Devices  Type of devices: Nurse notified, Call light within reach, Gait belt, All fall risk precautions in place, Left in bed  Restraints  Initially in place: No     Therapy Time   Individual Concurrent Group Co-treatment   Time In 1140         Time Out 1212         Minutes 32         Timed Code Treatment Minutes: [facility]

## 2021-05-15 NOTE — PROGRESS NOTES
Called to assess patient due to episode of emesis and hypoactive bowel sounds. Patient was started on TF yesterday in addition to PO diet. Patient had one episode of emesis. Reports nausea is resolving with zofran. Denies any abdominal pain. Abdomen is soft, mildly distended, and non-peritoneal on exam. Reports that she is passing flatus, but has not had a bowel movement in 2-3 days. KUB reviewed. No surgical intervention at this time. Recommend resuming TF. If emesis occurs again, recommend holding feeds and administering an enema. Patient discussed with senior resident.      Sil Serrato,   PGY-1

## 2021-05-15 NOTE — PLAN OF CARE
Problem: OXYGENATION/RESPIRATORY FUNCTION  Goal: Patient will maintain patent airway  Outcome: Ongoing  Goal: Patient will achieve/maintain normal respiratory rate/effort  Description: Respiratory rate and effort will be within normal limits for the patient  Outcome: Ongoing     Problem: SKIN INTEGRITY  Goal: Skin integrity is maintained or improved  Outcome: Ongoing     Problem: Confusion - Acute:  Goal: Absence of continued neurological deterioration signs and symptoms  Description: Absence of continued neurological deterioration signs and symptoms  Outcome: Ongoing  Goal: Mental status will be restored to baseline  Description: Mental status will be restored to baseline  Outcome: Ongoing     Problem: Discharge Planning:  Goal: Ability to perform activities of daily living will improve  Description: Ability to perform activities of daily living will improve  Outcome: Ongoing  Goal: Participates in care planning  Description: Participates in care planning  Outcome: Ongoing     Problem: Injury - Risk of, Physical Injury:  Goal: Absence of physical injury  Description: Absence of physical injury  Outcome: Ongoing  Goal: Will remain free from falls  Description: Will remain free from falls  Outcome: Ongoing     Problem: Mood - Altered:  Goal: Mood stable  Description: Mood stable  Outcome: Ongoing  Goal: Absence of abusive behavior  Description: Absence of abusive behavior  Outcome: Ongoing  Goal: Verbalizations of feeling emotionally comfortable while being cared for will increase  Description: Verbalizations of feeling emotionally comfortable while being cared for will increase  Outcome: Ongoing     Problem: Psychomotor Activity - Altered:  Goal: Absence of psychomotor disturbance signs and symptoms  Description: Absence of psychomotor disturbance signs and symptoms  Outcome: Ongoing     Problem: Sensory Perception - Impaired:  Goal: Demonstrations of improved sensory functioning will increase  Description: Demonstrations of improved sensory functioning will increase  Outcome: Ongoing  Goal: Decrease in sensory misperception frequency  Description: Decrease in sensory misperception frequency  Outcome: Ongoing  Goal: Able to refrain from responding to false sensory perceptions  Description: Able to refrain from responding to false sensory perceptions  Outcome: Ongoing  Goal: Demonstrates accurate environmental perceptions  Description: Demonstrates accurate environmental perceptions  Outcome: Ongoing  Goal: Able to distinguish between reality-based and nonreality-based thinking  Description: Able to distinguish between reality-based and nonreality-based thinking  Outcome: Ongoing  Goal: Able to interrupt nonreality-based thinking  Description: Able to interrupt nonreality-based thinking  Outcome: Ongoing     Problem: Sleep Pattern Disturbance:  Goal: Appears well-rested  Description: Appears well-rested  Outcome: Ongoing     Problem: Skin Integrity:  Goal: Will show no infection signs and symptoms  Description: Will show no infection signs and symptoms  5/14/2021 2244 by Ruperto Gibson RN  Outcome: Ongoing  5/14/2021 1646 by Liane Fulton RN  Outcome: Met This Shift  Note: Site assessed for any signs of infection. CHG bath performed. Goal: Absence of new skin breakdown  Description: Absence of new skin breakdown  Outcome: Ongoing     Problem: Falls - Risk of:  Goal: Absence of physical injury  Description: Absence of physical injury  Outcome: Ongoing  Goal: Will remain free from falls  Description: Will remain free from falls  Outcome: Ongoing     Problem: Nutrition  Goal: Optimal nutrition therapy  Description: Nutrition Problem #1: Inadequate oral intake  Intervention: Food and/or Nutrient Delivery: Continue NPO, Start Parenteral Nutrition-suggest start with 150 g Dex, 50 g AA at 41.7 mL/hr with 100 mL 20% lipids.   Nutritional Goals: meet % of estimated nutrient needs     5/14/2021 2244 by Ruperto Gibson RN  Outcome: Ongoing  5/14/2021 1412 by Mary Noble, MS, RD, LD  Outcome: Ongoing  Note: Nutrition Problem #1: Inadequate oral intake  Intervention: Food and/or Nutrient Delivery: Continue Current Diet, Continue Oral Nutrition Supplement, Modify Parenteral Nutrition, Start Tube Feeding  Nutritional Goals: meet % of estimated nutrient needs      Problem: Pain:  Goal: Pain level will decrease  Description: Pain level will decrease  Outcome: Ongoing  Goal: Control of acute pain  Description: Control of acute pain  Outcome: Ongoing  Goal: Control of chronic pain  Description: Control of chronic pain  Outcome: Ongoing     Problem: Infection - Central Venous Catheter-Associated Bloodstream Infection:  Goal: Will show no infection signs and symptoms  Description: Will show no infection signs and symptoms  5/14/2021 2244 by Edyta Sheldon RN  Outcome: Ongoing  5/14/2021 1646 by Janet Chilel RN  Outcome: Met This Shift  Note: Site assessed for any signs of infection. CHG bath performed.

## 2021-05-15 NOTE — PROGRESS NOTES
PULMONARY & CRITICAL CARE MEDICINE PROGRESS  NOTE     Patient:  Jolanta Chavez  MRN: [id number]  Admit date: 5/3/2021    SUBJECTIVE     I personally interviewed/examined the patient, reviewed interval history and interpreted all available radiographic, laboratory data at the time of service. Chief Compliant/Reason for Initial Consult:   Acute respiratory failure on vent support, gastric perforation    Brief Hospital Course and Interval History:  The patient is a 78 y.o. female with known medical history of diabetes mellitus, hypertension, hypothyroidism, abdominal hernia presented to the hospital with nausea, vomiting, diarrhea. Patient had similar symptoms on 4/28 but was apparently sent home. Patient had dizzy spells and lightheadedness, had a syncopal episode in the ER on this admission. Patient was transferred from the [facility] to [place].  In [facility] patient lab revealed lactic acidosis of 6, glucose 585, patient was seen to be in DKA. Elevated lipase of 1451. Leukocytotic with WBC 23.5, hemoglobin 18.3. Patient also had elevated troponins, elevated creatinine of 3.8. In [place] repeat labs showed creatinine of 2.49 with lactic acid of 2.4. Troponin was 85, recheck of 94. LFTs showed elevated bilirubin. General surgery was consulted. CT chest showed ascites and pneumoperitoneum with apparent free-flowing oral contrast in the left upper quadrant concerning for viscus perforation possibly within the subdiaphragmatic portion of the stomach. Patient then went for robotic hernia repair 5/4 with wedge gastrectomy, gastropexy and placement of 2 TAISHA drains and 2 PEG tubes. Patient is n.p.o.         Interval history  5/15/2021  Night events noted, chart seen, other notes infectious disease note culture data chest x-ray and CT scan imaging studies reviewed.   Overnight she had a T-max of 99.1 she is been hemodynamically stable systolic blood pressure above 110 heart rate is between 80s to 100. She remains on nasal cannula 2 L maintaining saturation 97 to 98%. She is lethargic but arousable follows command tachypneic with rapid shallow breathing and weak cough. She is on therapy vest for mobilization of secretion. Right-sided pigtail catheter in place 150 mL of fluid overnight last 24 hours. She is currently on Zosyn, Diflucan and vancomycin and have been followed by infectious disease. She is currently on TPN apparently she was started on tube feeding through the PEG yesterday currently she is off tube feeding. Status post left-sided thoracentesis on 2021. pH 8.0. WBC 1052 and . Growing Candida. Status post left-sided chest tube/pigtail catheter placement on right side. Pleural fluid with pH of 6.9 lymphocyte 86% neutrophils 7%  suggestive of empyema or esophageal leak. So far culture negative    I/O+ 3.2 L  Review of Systems:  Review of Systems   Constitutional: Positive for activity change and fatigue. HENT: Negative for congestion. Respiratory: Positive for shortness of breath. Negative for cough and wheezing. Gastrointestinal: Positive for abdominal distention and abdominal pain. Genitourinary: Negative for difficulty urinating. Musculoskeletal: Negative. Neurological: Negative for light-headedness and numbness. Psychiatric/Behavioral: Negative for agitation.          OBJECTIVE     VITAL SIGNS:   LAST-  /83   Pulse 99   Temp 99.1 °F (37.3 °C) (Axillary)   Resp 18   Ht 5' 2\" (1.575 m)   Wt 190 lb 14.7 oz (86.6 kg)   SpO2 97%   BMI 34.92 kg/m²   8-24 HR RANGE-  TEMP Temp  Av.6 °F (37 °C)  Min: 98.2 °F (36.8 °C)  Max: 99.1 °F (62.0 °C)   BP Systolic (24XHB), SHS:027 , Min:113 , RLZ:693      Diastolic (70SVX), VRD:47, Min:66, Max:83     PULSE Pulse  Av.5  Min: 87  Max: 102   RR Resp  Av.5  Min: 9  Max: 18   O2 SAT SpO2  Av.7 % Min: 97 %  Max: 98 %   OXYGEN DELIVERY O2 Flow Rate (L/min)  Av L/min  Min: 2 L/min  Max: 2 L/min     Systemic Examination:   General appearance lethargic but arousable rapid shallow breathing and mildly tachypneic  mental status -arousable but lethargic  Eyes - pupils equal and reactive, extraocular eye movements intact  Mouth - mucous membranes moist, pharynx normal without lesions  Neck - supple, no significant adenopathy  Chest -dull to percussion bilaterally and decreased breath sounds, right-sided chest tube noted  Heart - normal rate, regular rhythm, normal S1, S2, no murmurs, rubs, clicks or gallops  Abdomen -soft with one PEG tube and G-tube, clamped abdomen slightly distended bowel sounds are positive  Neurological -arousable lethargic, normal speech, no focal findings or movement disorder noted  Extremities - peripheral pulses normal, positive pedal edema, no clubbing or cyanosis  Skin - normal coloration and turgor, no rashes, no suspicious skin lesions noted   PICC line in place    DATA REVIEW     Medications:  Scheduled Meds:   vancomycin  1,000 mg Intravenous Q24H    fat emulsion  100 mL Intravenous Weekly - Thursday    levothyroxine  112 mcg Oral Daily    pantoprazole  40 mg Oral QAM AC    gabapentin  300 mg Oral TID    furosemide  20 mg Oral Every Other Day    insulin glargine  10 Units Subcutaneous Nightly    polyethylene glycol  17 g Oral Daily    heparin (porcine)  5,000 Units Subcutaneous 3 times per day    vancomycin (VANCOCIN) intermittent dosing (placeholder)   Other RX Placeholder    ipratropium-albuterol  1 ampule Inhalation Q4H WA    piperacillin-tazobactam  3,375 mg Intravenous Q8H    insulin lispro  0-18 Units Subcutaneous Q4H    sodium chloride flush  5-40 mL Intravenous 2 times per day     Continuous Infusions:   PN-Adult 2-in-1 Central Line (Standard) 60 mL/hr at 21 1802    dextrose      sodium chloride 25 mL (05/10/21 1840)     LABS:-  ABGs:   No results found for: PH, PCO2, PO2, HCO3, O2SAT  No results for input(s): PHART, PO2ART, BDQ3ROC, JNB5APB, BEART, D3MYALPY in the last 72 hours. Lab Results   Component Value Date    POCPH 7.433 05/06/2021    POCPCO2 36.8 05/06/2021    POCPO2 150.9 (H) 05/06/2021    POCHCO3 24.6 05/06/2021    QESI0NEE 99 (H) 05/06/2021     CBC:   Recent Labs     05/13/21  0551 05/14/21  0705 05/15/21  0513   WBC 4.7 5.7 5.3   HGB 8.2* 8.4* 8.2*   HCT 26.7* 26.6* 26.2*   MCV 91.8 89.9 89.1    191 225   LYMPHOPCT 13* 13* 12*   RBC 2.91* 2.96* 2.94*   MCH 28.2 28.4 27.9   MCHC 30.7 31.6 31.3   RDW 14.6* 14.2 14.1     BMP:   Recent Labs     05/13/21  0551 05/14/21  0705 05/15/21  0513    137 136   K 4.2 3.7 4.4    101 97*   CO2 25 28 27   BUN 37* 35* 30*   CREATININE 1.34* 1.42* 1.25*   GLUCOSE 234* 208* 217*   PHOS  --  4.5  --      Liver Function Test:   Recent Labs     05/15/21  0513   PROT 6.3*   LABALBU 1.7*   ALT 12   AST 15   ALKPHOS 122*   BILITOT 0.37     Amylase/Lipase:  No results for input(s): AMYLASE, LIPASE in the last 72 hours. Coagulation Profile:   Recent Labs     05/13/21  0551 05/14/21  0705 05/15/21  0513   INR 1.0 1.0 0.9   PROTIME 10.7 10.4 10.0   APTT 25.0 25.4 24.2     Cardiac Enzymes:  No results for input(s): CKTOTAL, CKMB, CKMBINDEX, TROPONINI in the last 72 hours.   Lactic Acid:  Lab Results   Component Value Date    LACTA 3.2 (H) 05/03/2021    LACTA 6.0 (HH) 05/03/2021     BNP:   No results found for: BNP  D-Dimer:  No results found for: DDIMER  Others:   Lab Results   Component Value Date    TSH 13.36 (H) 05/03/2021     No results found for: LILLY, RHEUMFACTOR, SEDRATE, CRP  No results found for: Lee [place]  No results found for: IRON, TIBC, FERRITIN  No results found for: SPEP, UPEP  No results found for: PSA, CEA, , KN1975,     Input/Output:    Intake/Output Summary (Last 24 hours) at 5/15/2021 1033  Last data filed at 5/15/2021 0600  Gross per 24 hour   Intake 1212 ml   Output placements in the upper abdomen. Left inguinal terminates in   the left iliac vein. FL ESOPHAGRAM   Final Result   1. Drainage of contrast material through what appears to be the gastrostomy   tubes following the ingestion of contrast.  Contrast does migrate beyond the   postoperative region into the proximal small bowel. 2. No extraneous/extraluminal collection of contrast is seen beyond the   confines of the stomach. 3. 2 surgical drains and 2 presumed gastrostomy tubes are seen overlying the   left upper quadrant. There does appear to be contrast slightly marginating   the more lateral gastrostomy tube balloon. 4. Delayed migration of contrast through the distal esophagus and GE junction   with mild gastroesophageal reflux. The findings were sent to the Radiology Results [address] at 12:43   pm on 5/7/2021to be communicated to a licensed caregiver. XR CHEST PORTABLE   Final Result   Bibasilar consolidation new from prior study. Blunting of the costophrenic   angles bilaterally         XR CHEST PORTABLE   Final Result   ETT tip position stable. Decreased left subcutaneous emphysema. Slightly   improved suspected left basilar volume loss with persistent findings as   above. No overt vascular congestion. US RENAL COMPLETE   Final Result   Unremarkable ultrasound of the kidneys and urinary bladder. Trace right pleural effusion         XR CHEST PORTABLE   Final Result   Volume loss continues left hemithorax with haziness at the left base either   atelectasis and or fluid. Subcutaneous emphysema along the left lateral   chest wall has decreased. No appreciable extrapleural air. Endotracheal   tube in appropriate position. XR CHEST PORTABLE   Final Result   1. Recommend repositioning of left internal jugular approach central venous   catheter. 2. Low lung volumes with left basilar atelectasis plus or minus mild pleural   effusion.    3. Left chest wall scattered soft tissue emphysema. XR CHEST PORTABLE   Final Result   Intestinal tube deviates to the left side of a sizable hiatal hernia,   traversing below the hemidiaphragm and terminating just underneath the left   hemidiaphragm. Side port of the intestinal tube is below the diaphragmatic   hiatus. CT CHEST WO CONTRAST   Final Result   1. Ascites and pneumoperitoneum with apparent free-flowing oral contrast in   the left upper quadrant is concerning for viscus perforation, possibly within   the subdiaphragmatic portion of the stomach. 2. Large hiatal hernia with organoaxial volvulus. Majority of contrast is   retained within the esophagus and supradiaphragmatic stomach. 3. Enteric tube extends below the diaphragm with tip outside the field of   view. 4. Moderate bilateral pleural effusions with adjacent consolidation,   atelectasis versus pneumonia. Critical results were called by Dr. Tiffanie Flores MD to [facility] on   5/4/2021 at 18:30. XR ABDOMEN (KUB) (SINGLE AP VIEW)   Final Result   No significant subdiaphragmatic contrast progression, as above. RECOMMENDATION:   Consider chest x-ray to further assess a organic-axial gastric volvulus         FL UGI   Final Result   Organo-axial volvulus the stomach. Large paraesophageal hernia containing the majority of the rotated gastric   body. The greater curvature is positioned superiorly within the   paraesophageal hernia defect. There is narrowing of the gastroesophageal   junction as well as of the gastroduodenal junction through the hernia defect. MRI ABDOMEN WO CONTRAST MRCP   Final Result   1. No evidence of intrahepatic or extrahepatic ductal dilatation. 2. Incompletely imaged large hiatal hernia with organoaxial malrotation of   the stomach. 3. Small to moderate amount of ascites. 4. Diffuse small bowel wall thickening likely due to 3rd spacing as the post   enteritis.    5. Trace bilateral pleural effusions. XR CHEST PORTABLE   Final Result   No acute cardiopulmonary disease      Large hiatal hernia         CT CHEST WO CONTRAST    (Results Pending)       Echocardiogram:   Results for orders placed during the hospital encounter of 05/03/21   ECHO Complete 2D W Doppler W Color    Narrative Transthoracic Echocardiography Report (TTE)     Patient Name Tee Eagle   Date of Study               05/05/2021      Date of      1950  Gender                      Female   Birth      Age          78 year(s)  Race                              Room Number  6498        Height:                     62 inch, 157.48 cm      Corporate ID [id number]    Weight:                     166 pounds, 75.3 kg   #      Patient Acct [de-identified]   BSA:          1.77 m^2      BMI:      30.36   #                                                              kg/m^2      MR #         [id number]     Sonographer                 Toya Ge      Accession #  [id number]  Interpreting Physician      Shellie Vizcarra 63      Fellow                   Referring Nurse                            Practitioner      Interpreting             Referring Physician         Sourav Quijano   Fellow     Additional Comments  Technically difficult study, patient supine on ventilator. Type of Study      TTE procedure:2D Echocardiogram, M-Mode, Doppler, Color Doppler. Procedure Date  Date: 05/05/2021 Start: 07:32 AM    Study Location: [facility]  Technical Quality: Adequate visualization    Indications:Elevated troponin. History / Tech. Comments:  Procedure explained to patient. No known medical Hx. Patient Status: Inpatient    Height: 62 inches Weight: 166 pounds BSA: 1.77 m^2 BMI: 30.36 kg/m^2    CONCLUSIONS    Summary  Normal LV size and wall thickness. No obvious wall motion abnormality seen. Normal LV systolic function with LVEF 55%. RV appears dilated with reduced function.  RV systolic pressure 37 mmHg  LA appears normal in size.  No obvious significant structural valvular abnormality noted. No significant valvular stenosis or regurgitation noted. Normal aortic root dimension. No significant pericardial effusion noted. Signature  ----------------------------------------------------------------------------   Electronically signed by Connor Montoya(Interpreting physician) on   05/05/2021 12:15 PM  ----------------------------------------------------------------------------    ----------------------------------------------------------------------------   Electronically signed by Olimpia Ortega(Sonographer) on 05/05/2021 11:37 AM  ----------------------------------------------------------------------------  FINDINGS  Left Atrium  Left atrium is normal in size. Inter-atrial septum is intact with no evidence for an atrial septal defect,  by color doppler. Left Ventricle  Left ventricle is small in size, normal wall thickness, global left  ventricular systolic function is normal, calculated ejection fraction is  53%. All wall segments are not well visualized (poor acoustic windows.)  Right Atrium  Right atrial dilatation. Right Ventricle  Right ventricular dilatation with reduced systolic function. Abnormal RV strain. Mitral Valve  Normal mitral valve structure. Trivial mitral regurgitation. Aortic Valve  Aortic valve is trileaflet. No evidence of aortic insufficiency or stenosis. Tricuspid Valve  Normal tricuspid valve leaflets. Moderate tricuspid regurgitation. Estimated right ventricular systolic pressure is 37 mmHg, suggesting  pulmonary HTN. Pulmonic Valve  Pulmonic valve not well visualized but Doppler velocities are normal.  Pericardial Effusion  No significant pericardial effusion is seen. Miscellaneous  Normal aortic root dimension. E/E' average = 15.05. IVC dilated but unable to assess respiratory collapse due to patient on  ventilator.     M-mode / 2D Measurements & Calculations:      LVIDd:4 cm(3.7 - 5.6 cm) Diastolic ZBOHRB:44 ml   OIQR:1.2 cm(0.6 - 1.1 cm)         Systolic WIKMJZ:85.81 ml   LVPWd:0.9 cm(0.6 - 1.1 cm)        Aortic Root:2.9 cm(2.0 - 3.7 cm)                                     LA Dimension: 3.1 cm(1.9 - 4.0 cm)   Calculated LVEF (%): 52.95 %      LA volume/Index: 31.04 ml /18m^2                                     LVOT:1.9 cm                                     RVDd:3 cm      Mitral:                                 Aortic      Valve Area (P1/2-Time): 5.12 cm^2       Peak Velocity: 1.55 m/s   Peak E-Wave: 0.78 m/s                   Mean Velocity: 0.99 m/s   Peak A-Wave: 1.00 m/s                   Peak Gradient: 9.61 mmHg   E/A Ratio: 0.78                         Mean Gradient: 5 mmHg   Peak Gradient: 2.45 mmHg   Mean Gradient: 2 mmHg   Deceleration Time: 145 msec             Area (continuity): 2.16 cm^2   P1/2t: 43 msec                          AV VTI: 25.9 cm      Area (continuity): 2.1 cm^2   Mean Velocity: 0.60 m/s      Tricuspid:                              Pulmonic:      Estimated RVSP: 37 mmHg                 Peak Velocity: 0.80 m/s   Peak TR Velocity: 2.85 m/s              Peak Gradient: 2.57 mmHg   Peak TR Gradient: 32.49 mmHg     Diastology / Tissue Doppler  Septal Wall E' velocity:0.06 m/s  Septal Wall E/E':13.8  Lateral Wall E' velocity:0.05 m/s  Lateral Wall E/E':16.3       Cardiac Catheterization:   No results found for this or any previous visit. ASSESSMENT AND PLAN     Assessment:  Bilateral pleural effusion/mediastinal fluid collection. Left Thoracentesis 5/12/21 positive for yeast  Right pig tail chest tube culture negative  Paraesophageal hernia status post repair.   Gastric perforation status post surgery  Sepsis   Bilateral lower lobe pneumonia  Peritonitis  Blood cultures positive for staph epidermidis, methicillin resistant  Diabetes mellitusDKA resolved  Lactic acidosis improved  BONNIE  Thrombocytopenia improved  Elevated troponins  Subclinical hypothyroidism      Plan:    Patient underwent right-sided 12 [place] chest tube placement on 05/13/2021 for loculated lung effusion. Fluid so far negative for culture. Low pH 6.9 although not neutrophilic and lymphocytic and glucose 122. Patient had left thoracentesis on 5/12, fluid shows elevated LDH of 536, protein 3.1, exudative effusion likely parapneumonic. Positive for yeast/Candida    Continue to monitor chest tube drainage from right side chest tube. Last 24-hour 150 mL  She will need CT scan of the chest for loculated fluid. Follow-up with CT surgery for likely VATS  Continue antibiotics Zosyn and vancomycin and Diflucan as per infectious disease  Gastric tube management per general surgery  Continued effort for mobilization of secretions/continue therapy vest  On TPN and tube feeding  On Lasix 20 mg every other day  Monitor intake and output with goal of even/negative balance  Aspiration precautions  Encourage ambulation/physical therapy  Encourage incentive spirometry  DVT prophylaxis on heparin subcutaneous    Discussed in detail with nursing staff. Lindalee Hodgkins, MD  Pulmonary and Critical Care Medicine           5/15/2021, 10:33 AM       Please note that this chart was generated using voice recognition Dragon dictation software. Although every effort was made to ensure the accuracy of this automated transcription, some errors in transcription may have occurred.

## 2021-05-15 NOTE — PROGRESS NOTES
[facility] -Progress Note    Today's Date and Time: 5/15/2021, 9:46 AM    Impression :     Paraesophageal Hiatal hernia  Perforation of the esophagus  S/p laparoscopic, robotic para esophageal hernia repair 5-4-21  Acute pancreatitis  Shock   Hyperglycemia  NSTEMI  BONNIE  Not a MRSA carrier  Coagulase negative Staphylococci bacteremia x 2 on 5-7-21. Repeat blood cultures 5-8-21 x 2 : No growth . Bilateral Empyema 5/13/21  Left sided thoracentesis 5/12/21-Identified as yeast  Right sided pigtail chest tube placed on 5/12/21 for right-sided effusion    Recommendations:   Zosyn started 5/6/21. Will continue until VATS  Vancomycin started 5/8/21 for Coagulase negative Staphylococci bacteremia-Will continue till VATS  Gram positive rods from thoracentesis identified as yeast. (Candida albicans). Fluconazole started 5-15-21. Medical Decision Making/Summary/Discussion:5/15/2021     Patient with large paraesophageal hiatal hernia with perforation of esophagus  Hyperglycemia   Acute pancreatitis  Shock   S/p laparoscopic, robotic para esophageal hernia repair 5-4-21  Improvement in overall picture but is developing basilar infiltrates  Not a MRSA carrier  Unclear whether this is fluid retention vs residual leakage vs secondary pneumonia  Esophagogram 5-7-21 does not show a leak  Will plan to continue zosyn and add Diflucan. Monitor temps and CXR. Zosyn and Vanco to continue while awaiting possible VATS/decortication procedure.     Infection Control Recommendations   [place] Precautions      Antimicrobial Stewardship Recommendations     Simplification of therapy  Targeted therapy    Coordination of Outpatient Care:   Estimated Length of IV antimicrobials: TBD  Patient will need Midline Catheter Insertion:No   Patient will need PICC line Insertion:No  Patient will need: Home IV , [place],  SNF,  LTAC:  Patient will need outpatient wound care:Yes    Chief complaint/reason for consultation:   Septic shock/sepsis    History of Present Illness:   Sydnee Beauchamp is a 78y.o.-year-old   female who was initially admitted on 5/3/2021. Patient seen at the request of Elizabeth Lanza. INITIAL HISTORY : 05/07/2021    Pt with a history of diabetes mellitus type 2, hypertension, hyperlipidemia, thyroid disease, gout and hiatal hernia. She initially presented on 05/03/2021 to an outlying facility with a chief complaint of nausea vomiting, diarrhea and syncopal attack . Patient was found to have blood glucose of 585,WBC of 23.5, elevated lipase 1451 and elevated beta hydroxybutyrate. MRCP on 05/03/2021 showed diffuse small bowel wall thickening likely due to third spacing as opposed to enteritis. CT scan of the abdomen showed extremely large fluid-filled hiatal hernia and distended and fluid-filled stomach below the diaphragm. Patient was given Zosyn at the outlying facility and was transferred to [address] [place] [address] ED for evaluation by the surgery team.    CT scan of the chest without contrast was performed on 05/04/2021 which showed ascites and pneumoperitoneum with apparent free-flowing oral contrast in the left upper quadrant concerning for viscus perforation possibly within the subdiaphragmatic portion of the stomach. Moderate bilateral pleural effusions and hiatal hernia with organoaxial volvulus. Bariatric surgery performed a laparoscopic robotic para esophageal hernia repair. Cardiology is also following the patient because of elevated troponins with unremarkable echocardiography. Antibiotics wise the patient was given Zosyn on 05/03/2021 at the outside facility and it has been continued through the present time. Blood and urine cultures on 05/03/2021 show No growth . Repeat urine culture on 05/04/2021 shows No growth     Patient is currently having temperature elevations. Temp max of 101.1 on 05/07/2021 around 4 AM in the morning.     Patient WBC count is improving from 23.5 on presentation to 6.2 today. Repeat chest x-ray 5-7-21 shows bibasilar consolidation new from prior study with blunting of the costophrenic angles bilaterally. Patient is off of the pressors since 5-6-21    CURRENT EVALUATION : 5/15/2021    Afebrile  VS stable    On 2-->4.5 -->2 L NC  RR 17-->18  02 sat 97    Patient has been started on a diet but does not have much of an appetite. Left Thoracentesis on 5/12/21 for left loculated empyema-Pigtail catheter placed with pus noted. Fluid shows elevated LDH of 536, protein 3.1, exudative effusion likely para-pneumonic  5/12/21 Fluid culture positive for Gram positive rods. Not identified as yet. Right sided 12 Romanian chest tube placed on 5/13/21 for empyema. CT surgery consult for possible VATs/decortication next week. Repeat CT chest planned on 5/16/21    Follow-up esophagram on 5/11/21 negative for a leak. Coagulase negative Staphylococci bacteremia x 2 on 5-7-21. Repeat blood cultures 5-8-21 x 2 : No growth. Femoral line and morel catheter were removed. Right TAISHA removed 5/11/21 5/12/21 Thoracentesis fluid culture positive for Gram positive rods. Identification shows Kassandra albicans  Will continue Vancomycin & Zosyn. Add fluconazole.     Rt and Lt TAISHA drains have been removed, on 5-7- and 5-11-21, respectively  G-tube is clamped and patient remains on TPN-surgery to switch to TF per G tube       Labs, X rays reviewed: 5/15/2021    BUN: 48>37-->35-->30  Cr: 1.71-->1.34-->1.42-->1.25    WBC: 9.4-->4.7-->5.3  Hb: 8.2-->8.4-->8.2  Plat: 66-->191-->225    LD: 345  Triglycerides: 261    Cultures:    Urine:  05/03/2021: No growth   05/0 4/2021: No growth     Blood:  05/03/2021: Blood cultures x 2:  No growth  5/7/21: Coagulase negative Staphylococci bacteremia x 2  5/8/21: No growth  Sputum :    Thoracentesis fluid:  Lt side 5/12/21 GRAM POSITIVE RODS   Rt side 5-13-21: No growth     CT chest 5/11/21  New right-sided PICC again seen with unchanged and satisfactory tip position;   larger loculated right effusion in the azygoesophageal recess, as above. Otherwise similar findings to 05/07/2021 with suspected lower lobe   consolidation/pneumonitis, with volume loss and effusions both lower lobes. Discussed with patient, RN, IM, Pulmonary      I have personally reviewed the past medical history, past surgical history, medications, social history, and family history, and I have updated the database accordingly.   Past Medical History:     Past Medical History:   Diagnosis Date    Diabetes mellitus ([address]     Gout     Hiatal hernia     Hyperlipidemia     Hypertension     Thyroid disease        Past Surgical  History:     Past Surgical History:   Procedure Laterality Date    BREAST SURGERY      Bx 1993    CHOLECYSTECTOMY  1999    GASTRIC FUNDOPLICATION N/A 7/3/2546    PARAESPHAGEAL HERNIA REPAIR LAPAROSCOPIC ROBOTIC performed by Ivonne Neal DO at [address] [place] [address]  5/10/2021         HYSTERECTOMY  1997    IR CHEST TUBE INSERTION  5/13/2021    IR CHEST TUBE INSERTION 5/13/2021  [facility]    OTHER SURGICAL HISTORY  1962    Remove spur L ankle    OTHER SURGICAL HISTORY  1978    Pin and wire repair R ankle    TUBAL LIGATION  1988       Medications:      vancomycin  1,000 mg Intravenous Q24H    fat emulsion  100 mL Intravenous Weekly - Thursday    levothyroxine  112 mcg Oral Daily    pantoprazole  40 mg Oral QAM AC    gabapentin  300 mg Oral TID    furosemide  20 mg Oral Every Other Day    insulin glargine  10 Units Subcutaneous Nightly    polyethylene glycol  17 g Oral Daily    heparin (porcine)  5,000 Units Subcutaneous 3 times per day    vancomycin (VANCOCIN) intermittent dosing (placeholder)   Other RX Placeholder    ipratropium-albuterol  1 ampule Inhalation Q4H WA    piperacillin-tazobactam  3,375 mg Intravenous Q8H    insulin lispro  0-18 Units Subcutaneous Q4H    sodium chloride flush  5-40 mL Intravenous 2 times per day       Social History:     Social History     Socioeconomic History    Marital status:      Spouse name: Not on file    Number of children: Not on file    Years of education: Not on file    Highest education level: Not on file   Occupational History    Not on file   Tobacco Use    Smoking status: Never Smoker    Smokeless tobacco: Never Used   Substance and Sexual Activity    Alcohol use: Never    Drug use: Never    Sexual activity: Not on file   Other Topics Concern    Not on file   Social History Narrative    Not on file     Social Determinants of Health     Financial Resource Strain:     Difficulty of Paying Living Expenses:    Food Insecurity:     Worried About Running Out of Food in the Last Year:     [address] in the Last Year:    Transportation Needs:     Lack of Transportation (Medical):  Lack of Transportation (Non-Medical):    Physical Activity:     Days of Exercise per Week:     Minutes of Exercise per Session:    Stress:     Feeling of Stress :    Social Connections:     Frequency of Communication with Friends and Family:     Frequency of Social Gatherings with Friends and Family:     Attends [employer] Services:     Active Member of Clubs or Organizations:     Attends Club or Organization Meetings:     Marital Status:    Intimate Partner Violence:     Fear of Current or Ex-Partner:     Emotionally Abused:     Physically Abused:     Sexually Abused:        Family History:     Family History   Problem Relation Age of Onset    Breast Cancer Mother     High Blood Pressure Mother     High Cholesterol Father     Breast Cancer Sister         Allergies:   No known allergies     Review of Systems:   Review of Systems   Constitutional: Positive for fatigue and fever. Negative for activity change, appetite change, chills, diaphoresis and unexpected weight change.    Respiratory: Positive for cough. Negative for apnea, choking, chest tightness, shortness of breath, wheezing and stridor. Cardiovascular: Negative for chest pain, palpitations and leg swelling. Gastrointestinal: Positive for constipation. Negative for abdominal distention, nausea and vomiting. Endocrine: Negative for cold intolerance. Genitourinary: Negative for difficulty urinating and dysuria. Musculoskeletal: Negative for arthralgias and back pain. Neurological: Negative for dizziness, tremors, syncope and facial asymmetry. Hematological: Negative for adenopathy. Psychiatric/Behavioral: Negative for agitation and behavioral problems. The patient is not hyperactive. Physical Examination :     Patient Vitals for the past 8 hrs:   BP Temp Temp src Pulse Resp SpO2 Weight   05/15/21 0913 137/83 99.1 °F (37.3 °C) Axillary 99 18 97 %    05/15/21 0758      98 %    05/15/21 0500       190 lb 14.7 oz (86.6 kg)   05/15/21 0400 113/66 98.2 °F (36.8 °C) Oral 87 9 98 %      General Appearance: Awake, alert  Head:  Normocephalic, no trauma  Eyes: Pupils equal, round, reactive to light, sclera anicteric; conjunctivae pink. No embolic phenomena. ENT: Oropharynx clear, without erythema, exudate, or thrush. No tenderness of sinuses. Mouth/throat: mucosa pink and moist. No lesions. Dentition in good repair. Neck:Supple, without lymphadenopathy. Thyroid normal, No bruits. Pulmonary/Chest: Clear to auscultation, without wheezes, rales, or rhonchi. No dullness to percussion. Cardiovascular: Regular rate and rhythm without murmurs, rubs, or gallops. Abdomen: Soft, non tender. Bowel sounds normal. No organomegaly. The ports entry wounds are healing well and without any erythema. All four Extremities: No cyanosis, clubbing, edema, or effusions. Neurologic: No gross sensory or motor deficits. Skin: Warm and dry with good turgor. No signs of peripheral arterial or venous insufficiency. No ulcerations.  No open wounds. Medical Decision Making -Laboratory:   I have independently reviewed/ordered the following labs:    CBC with Differential:   Recent Labs     05/14/21  0705 05/15/21  0513   WBC 5.7 5.3   HGB 8.4* 8.2*   HCT 26.6* 26.2*    225   LYMPHOPCT 13* 12*   MONOPCT 1 9*     BMP:   Recent Labs     05/14/21  0705 05/15/21  0513    136   K 3.7 4.4    97*   CO2 28 27   BUN 35* 30*   CREATININE 1.42* 1.25*   MG 1.5* 1.5*     Hepatic Function Panel:   Recent Labs     05/14/21  0705 05/15/21  0513   PROT 6.1* 6.3*   LABALBU 1.4* 1.7*   BILIDIR 0.19 0.21   IBILI 0.11 0.16   BILITOT 0.30 0.37   ALKPHOS 121* 122*   ALT 11 12   AST 16 15     No results for input(s): RPR in the last 72 hours. No results for input(s): HIV in the last 72 hours. No results for input(s): BC in the last 72 hours.   Lab Results   Component Value Date    MUCUS NOT REPORTED 05/06/2021    RBC 2.94 05/15/2021    RBC 6.23 05/03/2021    TRICHOMONAS NOT REPORTED 05/06/2021    WBC 5.3 05/15/2021    YEAST NOT REPORTED 05/06/2021    TURBIDITY CLOUDY 05/06/2021     Lab Results   Component Value Date    CREATININE 1.25 05/15/2021    CREATININE 3.09 05/03/2021    GLUCOSE 217 05/15/2021    GLUCOSE 453 05/03/2021       Medical Decision Making-Imaging:     EXAMINATION:   SINGLE CONTRAST ESOPHAGRAM       5/7/2021 11:03 am       TECHNIQUE:   Single contrast esophagram was performed with a total 100 mL of Omnipaque 240   oral contrast.       FLUOROSCOPY DOSE AND TYPE OR TIME AND EXPOSURES:   Fluoro time: 2.7 minutes; DAP: 70.44 mGy       COMPARISON:   Upper GI from 05/04/2021       HISTORY:   ORDERING SYSTEM PROVIDED HISTORY: s/p hiatal hernia reduction  with partial   gastrectomy and gastropexy   TECHNOLOGIST PROVIDED HISTORY:   s/p hiatal hernia reduction  with partial gastrectomy and gastropexy   Reason for Exam: H.H repair/gastrectomy/gastropexy/ 100 ml Omnipaque orally   Acuity: Unknown   Type of Exam: Unknown       73-year-old female status post hiatal hernia reduction with partial   gastrectomy and gastropexy       FINDINGS:   Fluoroscopic  imaging was obtained prior to the administration contrast.       2 gastrostomy tubes are seen projecting over the left upper quadrant.  There   also 2 surgical drains projecting over the left upper quadrant.  Prior   cholecystectomy.  Suture material projects over the left upper quadrant.       The patient drank contrast which migrates through the postoperative region   and into the stomach and small bowel.       There is contrast draining through what appears to be the gastrostomy tubes.       No extraneous collection of contrast is seen beyond the confines of the   stomach.       There is contrast marginating a presumed gastrostomy tube balloon.       Mild gastroesophageal reflux and delayed transit of contrast is seen within   the distal esophagus/GE junction.           Impression   1. Drainage of contrast material through what appears to be the gastrostomy   tubes following the ingestion of contrast.  Contrast does migrate beyond the   postoperative region into the proximal small bowel. 2. No extraneous/extraluminal collection of contrast is seen beyond the   confines of the stomach. 3. 2 surgical drains and 2 presumed gastrostomy tubes are seen overlying the   left upper quadrant.  There does appear to be contrast slightly marginating   the more lateral gastrostomy tube balloon. 4. Delayed migration of contrast through the distal esophagus and GE junction   with mild gastroesophageal reflux. The findings were sent to the Radiology Results [address] at 12:43   pm on 5/7/2021to be communicated to a licensed caregiver.             EXAMINATION:   CT OF THE CHEST WITHOUT CONTRAST 5/4/2021 5:55 pm       TECHNIQUE:   CT of the chest was performed without the administration of intravenous   contrast. Multiplanar reformatted images are provided for review.  Dose   modulation, iterative reconstruction, and/or weight based adjustment of the   mA/kV was utilized to reduce the radiation dose to as low as reasonably   achievable.       COMPARISON:   None.       HISTORY:   ORDERING SYSTEM PROVIDED HISTORY: large hiatal hernia, contrast progression? TECHNOLOGIST PROVIDED HISTORY:   large hiatal hernia, contrast progression? Reason for Exam: large hiatal hernia, contrast progression?       FINDINGS:   Mediastinum: Heart size is normal without pericardial effusion.  There are   shotty mediastinal lymph nodes.  The thoracic aorta and main pulmonary artery   are normal in caliber.       Lungs/pleura: Moderate bilateral pleural effusions with adjacent   consolidation.  No pneumothorax.       Upper Abdomen: There is a large hiatal hernia containing the majority of the   stomach.  There is organoaxial volvulus.  Enteric tube is noted extending   below the diaphragm with tip outside the field of view.  The herniated   stomach is distended with contrast.  There is also contrast within the distal   esophagus. Joseph Motto is some contrast visualized within the portion of stomach   below the diaphragm.  Free air and fluid are noted within the visualized   upper abdomen with apparent free spill of contrast in the left upper quadrant.       Soft Tissues/Bones: No acute osseous abnormality.           Impression   1. Ascites and pneumoperitoneum with apparent free-flowing oral contrast in   the left upper quadrant is concerning for viscus perforation, possibly within   the subdiaphragmatic portion of the stomach. 2. Large hiatal hernia with organoaxial volvulus.  Majority of contrast is   retained within the esophagus and supradiaphragmatic stomach. 3. Enteric tube extends below the diaphragm with tip outside the field of   view. 4. Moderate bilateral pleural effusions with adjacent consolidation,   atelectasis versus pneumonia.    Critical results were called by Dr. Martin Ly MD to [facility] on   5/4/2021 at 18:30.            CXR:  ONE XRAY VIEW OF THE CHEST       5/11/2021 9:22 am       COMPARISON:   AP chest from 05/07/2021; CT scan of the chest, abdomen and pelvis from   05/07/2021       HISTORY:   ORDERING SYSTEM PROVIDED HISTORY: follow up on bibasilar consolidation   TECHNOLOGIST PROVIDED HISTORY:   follow up on bibasilar consolidation       History of diabetes and hypertension.       FINDINGS:   Overlying ECG monitor leads and gown snaps.  New right-sided PICC tip   position in the SVC.       Low lung volumes accentuating findings, including cardiomegaly with bibasilar   opacities suggesting atelectasis/consolidation and effusions.  Patchy   infiltrate right mid lung also again noted.       Bones stable.           Impression   New right-sided PICC tip position appears satisfactory.  Otherwise, similar   findings compatible with bibasilar atelectasis/consolidation, effusions and   minimal infiltrate or atelectasis right mid lung.             CT scan:   CT OF THE CHEST WITHOUT CONTRAST 5/11/2021 10:31 am       TECHNIQUE:   CT of the chest was performed without the administration of intravenous   contrast. Multiplanar reformatted images are provided for review. Dose   modulation, iterative reconstruction, and/or weight based adjustment of the   mA/kV was utilized to reduce the radiation dose to as low as reasonably   achievable.       COMPARISON:   CT scan of the chest from 05/07/2021.       HISTORY:   ORDERING SYSTEM PROVIDED HISTORY: ?left loculated pleural effusion   TECHNOLOGIST PROVIDED HISTORY:   ?left loculated pleural effusion   Reason for Exam: ?left loculated pleural effusion   Acuity: Unknown   Type of Exam: Unknown       FINDINGS:   Mediastinum: Interval placement right-sided PICC with tip position in the   mid-lower SVC.  Small unchanged mediastinal nodes; no bulky lymphadenopathy.    No acute thoracic aortic or cardiac abnormality on this unenhanced scan;   prominent LV again noted.  Tiny unchanged pericardial fluid or thickening.       Lungs/pleura: Similar bilateral pleural effusions with considerable mostly   lower lobe atelectasis or consolidation with air bronchograms; larger   loculated appearing component (measuring 10.0 x 4.9 cm) extending along the   azygoesophageal recess, and across the midline (best seen slices 10-65,   series 2).  Tracheobronchial tree patent centrally       Upper Abdomen: Similar small amount perihepatic and perisplenic ascitic fluid   and soft tissue infiltration visualized abdominal adipose tissue.  Clips   status post cholecystectomy.  Mild hepatic steatosis.       Soft Tissues/Bones: No acute abnormality.           Impression   New right-sided PICC again seen with unchanged and satisfactory tip position;   larger loculated right effusion in the azygoesophageal recess, as above. Otherwise similar findings to 05/07/2021 with suspected lower lobe   consolidation/pneumonitis, with volume loss and effusions both lower lobes.               Medical Decision Qjaxxh-Ansbgymn-Aoelv:     Specimen Description 05/12/2021  5:09  [address]   . THORACENTESIS FLUID LEFT    Special Requests 05/12/2021  5:09  [address]   NOT REPORTED    Direct Exam Abnormal  05/12/2021  5:09 PM [facility]    Direct Exam 05/12/2021  5:09  [address]   NO BACTERIA SEEN    Direct Exam 05/12/2021  5:09  [address]   Gram stain made from cytocentrifuged specimen.  Organisms and cells will be concentrated.     Culture Abnormal  05/12/2021  5:09 PM [address] FLUID CULTURE, RN NOTIFIED Results called to and read back by: ROSALINDA WALLER 05/14/21 0430    Culture 05/12/2021  5:09 PM [facility] STAIN FROM BOTTLE: Bria Keith POSITIVE RODS          Medical Decision Making-Other:     Note:  Labs, medications, radiologic studies were reviewed with personal review of films   Large amounts of data were reviewed  Discussed with nursing Staff, Discharge planner  Infection Control and Prevention measures reviewed  All prior entries were reviewed  Administer medications as ordered  Prognosis: Guarded  Discharge planning reviewed  Follow up as outpatient. Thank you for allowing us to participate in the care of this patient. Please call with questions.     Dayron Villegas MD

## 2021-05-16 ENCOUNTER — APPOINTMENT (OUTPATIENT)
Dept: CT IMAGING | Age: 71
DRG: 853 | End: 2021-05-16
Payer: MEDICARE

## 2021-05-16 PROBLEM — E43 SEVERE MALNUTRITION (HCC): Status: ACTIVE | Noted: 2021-05-16

## 2021-05-16 LAB
ABSOLUTE EOS #: 0.13 K/UL (ref 0–0.44)
ABSOLUTE IMMATURE GRANULOCYTE: 0.2 K/UL (ref 0–0.3)
ABSOLUTE LYMPH #: 0.59 K/UL (ref 1.1–3.7)
ABSOLUTE MONO #: 0.4 K/UL (ref 0.1–1.2)
ALBUMIN SERPL-MCNC: 1.7 G/DL (ref 3.5–5.2)
ALBUMIN/GLOBULIN RATIO: 0.3 (ref 1–2.5)
ALP BLD-CCNC: 147 U/L (ref 35–104)
ALT SERPL-CCNC: 15 U/L (ref 5–33)
ANION GAP SERPL CALCULATED.3IONS-SCNC: 12 MMOL/L (ref 9–17)
AST SERPL-CCNC: 16 U/L
BASOPHILS # BLD: 1 % (ref 0–2)
BASOPHILS ABSOLUTE: 0.07 K/UL (ref 0–0.2)
BILIRUB SERPL-MCNC: 0.3 MG/DL (ref 0.3–1.2)
BILIRUBIN DIRECT: 0.17 MG/DL
BILIRUBIN, INDIRECT: 0.13 MG/DL (ref 0–1)
BUN BLDV-MCNC: 35 MG/DL (ref 8–23)
BUN/CREAT BLD: ABNORMAL (ref 9–20)
CALCIUM SERPL-MCNC: 8.5 MG/DL (ref 8.6–10.4)
CHLORIDE BLD-SCNC: 95 MMOL/L (ref 98–107)
CO2: 27 MMOL/L (ref 20–31)
CREAT SERPL-MCNC: 1.32 MG/DL (ref 0.5–0.9)
CULTURE: ABNORMAL
DIFFERENTIAL TYPE: ABNORMAL
DIRECT EXAM: ABNORMAL
EOSINOPHILS RELATIVE PERCENT: 2 % (ref 1–4)
GFR AFRICAN AMERICAN: 49 ML/MIN
GFR NON-AFRICAN AMERICAN: 40 ML/MIN
GFR SERPL CREATININE-BSD FRML MDRD: ABNORMAL ML/MIN/{1.73_M2}
GFR SERPL CREATININE-BSD FRML MDRD: ABNORMAL ML/MIN/{1.73_M2}
GLOBULIN: ABNORMAL G/DL (ref 1.5–3.8)
GLUCOSE BLD-MCNC: 211 MG/DL (ref 65–105)
GLUCOSE BLD-MCNC: 213 MG/DL (ref 70–99)
GLUCOSE BLD-MCNC: 220 MG/DL (ref 65–105)
GLUCOSE BLD-MCNC: 279 MG/DL (ref 65–105)
HCT VFR BLD CALC: 28.7 % (ref 36.3–47.1)
HEMOGLOBIN: 8.6 G/DL (ref 11.9–15.1)
IMMATURE GRANULOCYTES: 3 %
INR BLD: 0.9
LYMPHOCYTES # BLD: 9 % (ref 24–43)
Lab: ABNORMAL
MCH RBC QN AUTO: 27.5 PG (ref 25.2–33.5)
MCHC RBC AUTO-ENTMCNC: 30 G/DL (ref 28.4–34.8)
MCV RBC AUTO: 91.7 FL (ref 82.6–102.9)
MONOCYTES # BLD: 6 % (ref 3–12)
MORPHOLOGY: ABNORMAL
NRBC AUTOMATED: 0 PER 100 WBC
PARTIAL THROMBOPLASTIN TIME: 23.9 SEC (ref 20.5–30.5)
PDW BLD-RTO: 14.1 % (ref 11.8–14.4)
PLATELET # BLD: 310 K/UL (ref 138–453)
PLATELET ESTIMATE: ABNORMAL
PMV BLD AUTO: 10.2 FL (ref 8.1–13.5)
POTASSIUM SERPL-SCNC: 4.1 MMOL/L (ref 3.7–5.3)
PROTHROMBIN TIME: 10 SEC (ref 9.1–12.3)
RBC # BLD: 3.13 M/UL (ref 3.95–5.11)
RBC # BLD: ABNORMAL 10*6/UL
SEG NEUTROPHILS: 79 % (ref 36–65)
SEGMENTED NEUTROPHILS ABSOLUTE COUNT: 5.21 K/UL (ref 1.5–8.1)
SODIUM BLD-SCNC: 134 MMOL/L (ref 135–144)
SPECIMEN DESCRIPTION: ABNORMAL
TOTAL PROTEIN: 6.8 G/DL (ref 6.4–8.3)
VANCOMYCIN TROUGH DATE LAST DOSE: ABNORMAL
VANCOMYCIN TROUGH DOSE AMOUNT: ABNORMAL
VANCOMYCIN TROUGH TIME LAST DOSE: ABNORMAL
VANCOMYCIN TROUGH: 23.3 UG/ML (ref 10–20)
WBC # BLD: 6.6 K/UL (ref 3.5–11.3)
WBC # BLD: ABNORMAL 10*3/UL

## 2021-05-16 PROCEDURE — 94640 AIRWAY INHALATION TREATMENT: CPT

## 2021-05-16 PROCEDURE — 85025 COMPLETE CBC W/AUTO DIFF WBC: CPT

## 2021-05-16 PROCEDURE — 2580000003 HC RX 258: Performed by: STUDENT IN AN ORGANIZED HEALTH CARE EDUCATION/TRAINING PROGRAM

## 2021-05-16 PROCEDURE — 71250 CT THORAX DX C-: CPT

## 2021-05-16 PROCEDURE — 80076 HEPATIC FUNCTION PANEL: CPT

## 2021-05-16 PROCEDURE — 85610 PROTHROMBIN TIME: CPT

## 2021-05-16 PROCEDURE — 2700000000 HC OXYGEN THERAPY PER DAY

## 2021-05-16 PROCEDURE — 99233 SBSQ HOSP IP/OBS HIGH 50: CPT | Performed by: INTERNAL MEDICINE

## 2021-05-16 PROCEDURE — 80048 BASIC METABOLIC PNL TOTAL CA: CPT

## 2021-05-16 PROCEDURE — 6370000000 HC RX 637 (ALT 250 FOR IP): Performed by: NURSE PRACTITIONER

## 2021-05-16 PROCEDURE — 99232 SBSQ HOSP IP/OBS MODERATE 35: CPT | Performed by: INTERNAL MEDICINE

## 2021-05-16 PROCEDURE — 80202 ASSAY OF VANCOMYCIN: CPT

## 2021-05-16 PROCEDURE — 2500000003 HC RX 250 WO HCPCS: Performed by: SURGERY

## 2021-05-16 PROCEDURE — 36415 COLL VENOUS BLD VENIPUNCTURE: CPT

## 2021-05-16 PROCEDURE — 6360000002 HC RX W HCPCS: Performed by: STUDENT IN AN ORGANIZED HEALTH CARE EDUCATION/TRAINING PROGRAM

## 2021-05-16 PROCEDURE — 82947 ASSAY GLUCOSE BLOOD QUANT: CPT

## 2021-05-16 PROCEDURE — 99233 SBSQ HOSP IP/OBS HIGH 50: CPT | Performed by: NURSE PRACTITIONER

## 2021-05-16 PROCEDURE — 94660 CPAP INITIATION&MGMT: CPT

## 2021-05-16 PROCEDURE — 85730 THROMBOPLASTIN TIME PARTIAL: CPT

## 2021-05-16 PROCEDURE — 2060000000 HC ICU INTERMEDIATE R&B

## 2021-05-16 PROCEDURE — 6370000000 HC RX 637 (ALT 250 FOR IP): Performed by: STUDENT IN AN ORGANIZED HEALTH CARE EDUCATION/TRAINING PROGRAM

## 2021-05-16 PROCEDURE — 94761 N-INVAS EAR/PLS OXIMETRY MLT: CPT

## 2021-05-16 PROCEDURE — 6360000002 HC RX W HCPCS: Performed by: INTERNAL MEDICINE

## 2021-05-16 RX ORDER — METOCLOPRAMIDE HYDROCHLORIDE 5 MG/ML
10 INJECTION INTRAMUSCULAR; INTRAVENOUS EVERY 6 HOURS
Status: DISCONTINUED | OUTPATIENT
Start: 2021-05-16 | End: 2021-05-16

## 2021-05-16 RX ORDER — METOCLOPRAMIDE HYDROCHLORIDE 5 MG/ML
5 INJECTION INTRAMUSCULAR; INTRAVENOUS EVERY 6 HOURS
Status: COMPLETED | OUTPATIENT
Start: 2021-05-16 | End: 2021-05-19

## 2021-05-16 RX ADMIN — IPRATROPIUM BROMIDE AND ALBUTEROL SULFATE 1 AMPULE: .5; 2.5 SOLUTION RESPIRATORY (INHALATION) at 16:22

## 2021-05-16 RX ADMIN — IPRATROPIUM BROMIDE AND ALBUTEROL SULFATE 1 AMPULE: .5; 2.5 SOLUTION RESPIRATORY (INHALATION) at 13:50

## 2021-05-16 RX ADMIN — METOCLOPRAMIDE 5 MG: 5 INJECTION, SOLUTION INTRAMUSCULAR; INTRAVENOUS at 10:54

## 2021-05-16 RX ADMIN — PIPERACILLIN AND TAZOBACTAM 3375 MG: 3; .375 INJECTION, POWDER, LYOPHILIZED, FOR SOLUTION INTRAVENOUS at 04:10

## 2021-05-16 RX ADMIN — SODIUM CHLORIDE, PRESERVATIVE FREE 10 ML: 5 INJECTION INTRAVENOUS at 20:45

## 2021-05-16 RX ADMIN — IPRATROPIUM BROMIDE AND ALBUTEROL SULFATE 1 AMPULE: .5; 2.5 SOLUTION RESPIRATORY (INHALATION) at 20:51

## 2021-05-16 RX ADMIN — HEPARIN SODIUM 5000 UNITS: 5000 INJECTION INTRAVENOUS; SUBCUTANEOUS at 20:52

## 2021-05-16 RX ADMIN — PIPERACILLIN AND TAZOBACTAM 3375 MG: 3; .375 INJECTION, POWDER, LYOPHILIZED, FOR SOLUTION INTRAVENOUS at 20:44

## 2021-05-16 RX ADMIN — HEPARIN SODIUM 5000 UNITS: 5000 INJECTION INTRAVENOUS; SUBCUTANEOUS at 05:16

## 2021-05-16 RX ADMIN — ONDANSETRON 4 MG: 2 INJECTION INTRAMUSCULAR; INTRAVENOUS at 06:19

## 2021-05-16 RX ADMIN — INSULIN LISPRO 6 UNITS: 100 INJECTION, SOLUTION INTRAVENOUS; SUBCUTANEOUS at 04:15

## 2021-05-16 RX ADMIN — CALCIUM GLUCONATE: 98 INJECTION, SOLUTION INTRAVENOUS at 18:01

## 2021-05-16 RX ADMIN — METOCLOPRAMIDE 5 MG: 5 INJECTION, SOLUTION INTRAMUSCULAR; INTRAVENOUS at 22:32

## 2021-05-16 RX ADMIN — INSULIN LISPRO 9 UNITS: 100 INJECTION, SOLUTION INTRAVENOUS; SUBCUTANEOUS at 17:39

## 2021-05-16 RX ADMIN — PIPERACILLIN AND TAZOBACTAM 3375 MG: 3; .375 INJECTION, POWDER, LYOPHILIZED, FOR SOLUTION INTRAVENOUS at 15:30

## 2021-05-16 RX ADMIN — FLUCONAZOLE 400 MG: 2 INJECTION, SOLUTION INTRAVENOUS at 10:54

## 2021-05-16 RX ADMIN — METOCLOPRAMIDE 5 MG: 5 INJECTION, SOLUTION INTRAMUSCULAR; INTRAVENOUS at 15:31

## 2021-05-16 RX ADMIN — GABAPENTIN 300 MG: 300 CAPSULE ORAL at 20:52

## 2021-05-16 RX ADMIN — INSULIN LISPRO 6 UNITS: 100 INJECTION, SOLUTION INTRAVENOUS; SUBCUTANEOUS at 20:55

## 2021-05-16 RX ADMIN — INSULIN GLARGINE 10 UNITS: 100 INJECTION, SOLUTION SUBCUTANEOUS at 20:53

## 2021-05-16 ASSESSMENT — ENCOUNTER SYMPTOMS
WHEEZING: 0
SHORTNESS OF BREATH: 1
ABDOMINAL PAIN: 1
COUGH: 0
ABDOMINAL DISTENTION: 1

## 2021-05-16 ASSESSMENT — PAIN DESCRIPTION - PROGRESSION
CLINICAL_PROGRESSION: NOT CHANGED

## 2021-05-16 ASSESSMENT — PAIN SCALES - GENERAL: PAINLEVEL_OUTOF10: 0

## 2021-05-16 NOTE — PROGRESS NOTES
2030: Pt c/o nausea. Gave 4mg Zofran as ordered. Checked tube feed residual and pulled out 1000cc. Notified Dr. Dmitry Jackson via perfectserv. Was instructed to slowly over several hours try to return residual and to keep TF off rest of night. 0030: Pt vomitted 350cc yellow, bilious looking emesis. Notified Dr. Dmitry Jackson of emesis. Diet orders changed. Asked Dr. Dmitry Jackson to please come see pt tonight. 0115: Dr. Dmitry Jackson at bedside. Assessed pt. RN instructed to put G tube to drain to gravity.  No other orders at this time

## 2021-05-16 NOTE — PROGRESS NOTES
Family had called [facility] safety hotline before arriving to hospital today with a list of concerns over the past few days. Writer called to room, family at bedside voiced concerns about change in patients condition with her oxygen demands as well as distention in abdomen. Patients oxygen demand increased from 2 liters nasal canula to 6 liters with an SPO2 of only 90 percent. Family requested no tube feed to be given as it is not part of patients wishes. Tube feed was given yesterday without families knowledge or consent to which patient had complications (vomiting, hiccups, and distention) over night and in the morning. House supervisor, and all physicians involving patients care were notified of the complaints listed above. General surgery team came to bedside and addressed concerns and plan of care moving forward. This was done after a lengthy discussion was had by the house supervisor with patients family.

## 2021-05-16 NOTE — PROGRESS NOTES
Sips of Water with Meds  [facility]  Current Tube Feeding (TF) Orders:  · Feeding Route: PEG  · Formula: Semi-Elemental (Vital AF)  · Schedule: Continuous  · Current TF & Flush Orders Provides: Stopped  · Goal TF & Flush Orders Provides: @ 55 mL/hr = 1518 kcal, 95 g protein    Current Parenteral Nutrition Orders:  · Type and Formula: 2-in-1 Custom (300 gm Dextrose, 75 gm AA)   · Lipids: 100ml (Weekly)  · Duration: Continuous  · Rate/Volume: 65 mL/hr (1560 mL/day)  · Current PN Order Provides: 1320 kcal, 75 g protein    Anthropometric Measures:  · Height: 5' 2\" (157.5 cm)  · Current Body Weight: 191 lb 12.8 oz (87 kg)   · Admission Body Weight: 202 lb (91.6 kg)    · Usual Body Weight:  166 lb - stated     · Ideal Body Weight: 110 lbs; % Ideal Body Weight 173.6 %   · BMI: 35.1  · Adjusted Body Weight:  No Adjustment   · BMI Categories: Obese Class 2 (BMI 35.0 -39.9)       Nutrition Diagnosis:   · Inadequate oral intake related to altered GI function, current condition, appetite as evidenced by intake 0-25%, nutrition support - parenteral nutrition, nutrition support - enteral nutrition    Nutrition Interventions:   Food and/or Nutrient Delivery:  Continue NPO, Modify Parenteral Nutrition  Nutrition Education/Counseling:  Education not indicated, No recommendation at this time   Coordination of Nutrition Care:  Continue to monitor while inpatient    Goals:  meet % of estimated nutrient needs       Nutrition Monitoring and Evaluation:   Behavioral-Environmental Outcomes:  None Identified   Food/Nutrient Intake Outcomes:  Parenteral Nutrition Intake/Tolerance  Physical Signs/Symptoms Outcomes:  Biochemical Data, Diarrhea, Nausea or Vomiting, Nutrition Focused Physical Findings, Skin, Weight     Discharge Planning:     Too soon to determine     Electronically signed by Mor Bolden RD, LD on 5/16/21 at 3:30 PM EDT    Contact: [phone]

## 2021-05-16 NOTE — PROGRESS NOTES
[facility] -Progress Note    Today's Date and Time: 5/16/2021, 6:16 PM    Impression :     Paraesophageal Hiatal hernia  Perforation of the esophagus  S/p laparoscopic, robotic para esophageal hernia repair 5-4-21  Acute pancreatitis  Shock   Hyperglycemia  NSTEMI  BONNIE  Not a MRSA carrier  Coagulase negative Staphylococci bacteremia x 2 on 5-7-21. Repeat blood cultures 5-8-21 x 2 : No growth . Bilateral Empyema 5/13/21  Left sided thoracentesis 5/12/21-Identified as yeast  Right sided pigtail chest tube placed on 5/12/21 for right-sided effusion    Recommendations:   Zosyn started 5/6/21. Will continue until VATS  Vancomycin started 5/8/21 for Coagulase negative Staphylococci bacteremia-Will continue till VATS  Gram positive rods from thoracentesis identified as yeast. (Candida albicans). Fluconazole started 5-15-21. Medical Decision Making/Summary/Discussion:5/16/2021     Patient with large paraesophageal hiatal hernia with perforation of esophagus  Hyperglycemia   Acute pancreatitis  Shock   S/p laparoscopic, robotic para esophageal hernia repair 5-4-21  Improvement in overall picture but is developing basilar infiltrates  Not a MRSA carrier  Unclear whether this is fluid retention vs residual leakage vs secondary pneumonia  Esophagogram 5-7-21 does not show a leak  Will plan to continue zosyn and add Diflucan. Monitor temps and CXR. Zosyn and Vanco to continue while awaiting possible VATS/decortication procedure.     Infection Control Recommendations   [place] Precautions      Antimicrobial Stewardship Recommendations     Simplification of therapy  Targeted therapy    Coordination of Outpatient Care:   Estimated Length of IV antimicrobials: TBD  Patient will need Midline Catheter Insertion:No   Patient will need PICC line Insertion:No  Patient will need: Home IV , [place],  SNF,  LTAC:  Patient will need outpatient wound care:Yes    Chief complaint/reason for consultation:   Septic shock/sepsis    History of Present Illness:   Jolanta Chavez is a 78y.o.-year-old   female who was initially admitted on 5/3/2021. Patient seen at the request of Shaan Burkett. INITIAL HISTORY : 05/07/2021    Pt with a history of diabetes mellitus type 2, hypertension, hyperlipidemia, thyroid disease, gout and hiatal hernia. She initially presented on 05/03/2021 to an outlying facility with a chief complaint of nausea vomiting, diarrhea and syncopal attack . Patient was found to have blood glucose of 585,WBC of 23.5, elevated lipase 1451 and elevated beta hydroxybutyrate. MRCP on 05/03/2021 showed diffuse small bowel wall thickening likely due to third spacing as opposed to enteritis. CT scan of the abdomen showed extremely large fluid-filled hiatal hernia and distended and fluid-filled stomach below the diaphragm. Patient was given Zosyn at the outlying facility and was transferred to [facility] ED for evaluation by the surgery team.    CT scan of the chest without contrast was performed on 05/04/2021 which showed ascites and pneumoperitoneum with apparent free-flowing oral contrast in the left upper quadrant concerning for viscus perforation possibly within the subdiaphragmatic portion of the stomach. Moderate bilateral pleural effusions and hiatal hernia with organoaxial volvulus. Bariatric surgery performed a laparoscopic robotic para esophageal hernia repair. Cardiology is also following the patient because of elevated troponins with unremarkable echocardiography. Antibiotics wise the patient was given Zosyn on 05/03/2021 at the outside facility and it has been continued through the present time. Blood and urine cultures on 05/03/2021 show No growth . Repeat urine culture on 05/04/2021 shows No growth     Patient is currently having temperature elevations. Temp max of 101.1 on 05/07/2021 around 4 AM in the morning.     Patient WBC Inhalation Q4H WA    piperacillin-tazobactam  3,375 mg Intravenous Q8H    insulin lispro  0-18 Units Subcutaneous Q4H    sodium chloride flush  5-40 mL Intravenous 2 times per day       Social History:     Social History     Socioeconomic History    Marital status:      Spouse name: Not on file    Number of children: Not on file    Years of education: Not on file    Highest education level: Not on file   Occupational History    Not on file   Tobacco Use    Smoking status: Never Smoker    Smokeless tobacco: Never Used   Substance and Sexual Activity    Alcohol use: Never    Drug use: Never    Sexual activity: Not on file   Other Topics Concern    Not on file   Social History Narrative    Not on file     Social Determinants of Health     Financial Resource Strain:     Difficulty of Paying Living Expenses:    Food Insecurity:     Worried About Running Out of Food in the Last Year:     Ran Out of Food in the Last Year:    Transportation Needs:     Lack of Transportation (Medical):  Lack of Transportation (Non-Medical):    Physical Activity:     Days of Exercise per Week:     Minutes of Exercise per Session:    Stress:     Feeling of Stress :    Social Connections:     Frequency of Communication with Friends and Family:     Frequency of Social Gatherings with Friends and Family:     Attends Mosque Services:     Active Member of Clubs or Organizations:     Attends Club or Organization Meetings:     Marital Status:    Intimate Partner Violence:     Fear of Current or Ex-Partner:     Emotionally Abused:     Physically Abused:     Sexually Abused:        Family History:     Family History   Problem Relation Age of Onset    Breast Cancer Mother     High Blood Pressure Mother     High Cholesterol Father     Breast Cancer Sister         Allergies:   No known allergies     Review of Systems:   Review of Systems   Constitutional: Positive for fatigue and fever.  Negative for activity change, appetite change, chills, diaphoresis and unexpected weight change. Respiratory: Positive for cough. Negative for apnea, choking, chest tightness, shortness of breath, wheezing and stridor. Cardiovascular: Negative for chest pain, palpitations and leg swelling. Gastrointestinal: Positive for constipation. Negative for abdominal distention, nausea and vomiting. Endocrine: Negative for cold intolerance. Genitourinary: Negative for difficulty urinating and dysuria. Musculoskeletal: Negative for arthralgias and back pain. Neurological: Negative for dizziness, tremors, syncope and facial asymmetry. Hematological: Negative for adenopathy. Psychiatric/Behavioral: Negative for agitation and behavioral problems. The patient is not hyperactive. Physical Examination :     Patient Vitals for the past 8 hrs:   BP Temp Temp src Pulse Resp SpO2   05/16/21 1625      95 %   05/16/21 1530 116/75 97.6 °F (36.4 °C) Oral 86 12 96 %   05/16/21 1410      92 %   05/16/21 1300  99.3 °F (37.4 °C)       05/16/21 1100 120/84 98.1 °F (36.7 °C) Oral 97 19 92 %     General Appearance: Awake, alert  Head:  Normocephalic, no trauma  Eyes: Pupils equal, round, reactive to light, sclera anicteric; conjunctivae pink. No embolic phenomena. ENT: Oropharynx clear, without erythema, exudate, or thrush. No tenderness of sinuses. Mouth/throat: mucosa pink and moist. No lesions. Dentition in good repair. Neck:Supple, without lymphadenopathy. Thyroid normal, No bruits. Pulmonary/Chest: Clear to auscultation, without wheezes, rales, or rhonchi. No dullness to percussion. Cardiovascular: Regular rate and rhythm without murmurs, rubs, or gallops. Abdomen: Soft, non tender. Bowel sounds normal. No organomegaly. The ports entry wounds are healing well and without any erythema. All four Extremities: No cyanosis, clubbing, edema, or effusions. Neurologic: No gross sensory or motor deficits.   Skin: Warm and dry with good turgor. No signs of peripheral arterial or venous insufficiency. No ulcerations. No open wounds. Medical Decision Making -Laboratory:   I have independently reviewed/ordered the following labs:    CBC with Differential:   Recent Labs     05/15/21  0513 05/16/21  0651   WBC 5.3 6.6   HGB 8.2* 8.6*   HCT 26.2* 28.7*    310   LYMPHOPCT 12* 9*   MONOPCT 9* 6     BMP:   Recent Labs     05/14/21  0705 05/15/21  0513 05/16/21  0651    136 134*   K 3.7 4.4 4.1    97* 95*   CO2 28 27 27   BUN 35* 30* 35*   CREATININE 1.42* 1.25* 1.32*   MG 1.5* 1.5*  --      Hepatic Function Panel:   Recent Labs     05/15/21  0513 05/16/21  0651   PROT 6.3* 6.8   LABALBU 1.7* 1.7*   BILIDIR 0.21 0.17   IBILI 0.16 0.13   BILITOT 0.37 0.30   ALKPHOS 122* 147*   ALT 12 15   AST 15 16     No results for input(s): RPR in the last 72 hours. No results for input(s): HIV in the last 72 hours. No results for input(s): BC in the last 72 hours.   Lab Results   Component Value Date    MUCUS NOT REPORTED 05/06/2021    RBC 3.13 05/16/2021    RBC 6.23 05/03/2021    TRICHOMONAS NOT REPORTED 05/06/2021    WBC 6.6 05/16/2021    YEAST NOT REPORTED 05/06/2021    TURBIDITY CLOUDY 05/06/2021     Lab Results   Component Value Date    CREATININE 1.32 05/16/2021    CREATININE 3.09 05/03/2021    GLUCOSE 213 05/16/2021    GLUCOSE 453 05/03/2021       Medical Decision Making-Imaging:     EXAMINATION:   SINGLE CONTRAST ESOPHAGRAM       5/7/2021 11:03 am       TECHNIQUE:   Single contrast esophagram was performed with a total 100 mL of Omnipaque 240   oral contrast.       FLUOROSCOPY DOSE AND TYPE OR TIME AND EXPOSURES:   Fluoro time: 2.7 minutes; DAP: 70.44 mGy       COMPARISON:   Upper GI from 05/04/2021       HISTORY:   ORDERING SYSTEM PROVIDED HISTORY: s/p hiatal hernia reduction  with partial   gastrectomy and gastropexy   TECHNOLOGIST PROVIDED HISTORY:   s/p hiatal hernia reduction  with partial gastrectomy and gastropexy Reason for Exam: H.H repair/gastrectomy/gastropexy/ 100 ml Omnipaque orally   Acuity: Unknown   Type of Exam: Unknown       21-year-old female status post hiatal hernia reduction with partial   gastrectomy and gastropexy       FINDINGS:   Fluoroscopic  imaging was obtained prior to the administration contrast.       2 gastrostomy tubes are seen projecting over the left upper quadrant.  There   also 2 surgical drains projecting over the left upper quadrant.  Prior   cholecystectomy.  Suture material projects over the left upper quadrant.       The patient drank contrast which migrates through the postoperative region   and into the stomach and small bowel.       There is contrast draining through what appears to be the gastrostomy tubes.       No extraneous collection of contrast is seen beyond the confines of the   stomach.       There is contrast marginating a presumed gastrostomy tube balloon.       Mild gastroesophageal reflux and delayed transit of contrast is seen within   the distal esophagus/GE junction.           Impression   1. Drainage of contrast material through what appears to be the gastrostomy   tubes following the ingestion of contrast.  Contrast does migrate beyond the   postoperative region into the proximal small bowel. 2. No extraneous/extraluminal collection of contrast is seen beyond the   confines of the stomach. 3. 2 surgical drains and 2 presumed gastrostomy tubes are seen overlying the   left upper quadrant.  There does appear to be contrast slightly marginating   the more lateral gastrostomy tube balloon. 4. Delayed migration of contrast through the distal esophagus and GE junction   with mild gastroesophageal reflux.    The findings were sent to the Radiology Results [address] at 12:43   pm on 5/7/2021to be communicated to a licensed caregiver.             EXAMINATION:   CT OF THE CHEST WITHOUT CONTRAST 5/4/2021 5:55 pm       TECHNIQUE:   CT of the chest was performed without the administration of intravenous   contrast. Multiplanar reformatted images are provided for review. Dose   modulation, iterative reconstruction, and/or weight based adjustment of the   mA/kV was utilized to reduce the radiation dose to as low as reasonably   achievable.       COMPARISON:   None.       HISTORY:   ORDERING SYSTEM PROVIDED HISTORY: large hiatal hernia, contrast progression? TECHNOLOGIST PROVIDED HISTORY:   large hiatal hernia, contrast progression? Reason for Exam: large hiatal hernia, contrast progression?       FINDINGS:   Mediastinum: Heart size is normal without pericardial effusion.  There are   shotty mediastinal lymph nodes.  The thoracic aorta and main pulmonary artery   are normal in caliber.       Lungs/pleura: Moderate bilateral pleural effusions with adjacent   consolidation.  No pneumothorax.       Upper Abdomen: There is a large hiatal hernia containing the majority of the   stomach.  There is organoaxial volvulus.  Enteric tube is noted extending   below the diaphragm with tip outside the field of view.  The herniated   stomach is distended with contrast.  There is also contrast within the distal   esophagus. Pollie Dy is some contrast visualized within the portion of stomach   below the diaphragm.  Free air and fluid are noted within the visualized   upper abdomen with apparent free spill of contrast in the left upper quadrant.       Soft Tissues/Bones: No acute osseous abnormality.           Impression   1. Ascites and pneumoperitoneum with apparent free-flowing oral contrast in   the left upper quadrant is concerning for viscus perforation, possibly within   the subdiaphragmatic portion of the stomach. 2. Large hiatal hernia with organoaxial volvulus.  Majority of contrast is   retained within the esophagus and supradiaphragmatic stomach. 3. Enteric tube extends below the diaphragm with tip outside the field of   view.    4. Moderate bilateral pleural effusions with adjacent consolidation,   atelectasis versus pneumonia. Critical results were called by Dr. Vy Lisa MD to [facility] on   5/4/2021 at 18:30.            CXR:  ONE XRAY VIEW OF THE CHEST       5/11/2021 9:22 am       COMPARISON:   AP chest from 05/07/2021; CT scan of the chest, abdomen and pelvis from   05/07/2021       HISTORY:   ORDERING SYSTEM PROVIDED HISTORY: follow up on bibasilar consolidation   TECHNOLOGIST PROVIDED HISTORY:   follow up on bibasilar consolidation       History of diabetes and hypertension.       FINDINGS:   Overlying ECG monitor leads and gown snaps.  New right-sided PICC tip   position in the SVC.       Low lung volumes accentuating findings, including cardiomegaly with bibasilar   opacities suggesting atelectasis/consolidation and effusions.  Patchy   infiltrate right mid lung also again noted.       Bones stable.           Impression   New right-sided PICC tip position appears satisfactory.  Otherwise, similar   findings compatible with bibasilar atelectasis/consolidation, effusions and   minimal infiltrate or atelectasis right mid lung.             CT scan:   CT OF THE CHEST WITHOUT CONTRAST 5/11/2021 10:31 am       TECHNIQUE:   CT of the chest was performed without the administration of intravenous   contrast. Multiplanar reformatted images are provided for review.  Dose   modulation, iterative reconstruction, and/or weight based adjustment of the   mA/kV was utilized to reduce the radiation dose to as low as reasonably   achievable.       COMPARISON:   CT scan of the chest from 05/07/2021.       HISTORY:   ORDERING SYSTEM PROVIDED HISTORY: ?left loculated pleural effusion   TECHNOLOGIST PROVIDED HISTORY:   ?left loculated pleural effusion   Reason for Exam: ?left loculated pleural effusion   Acuity: Unknown   Type of Exam: Unknown       FINDINGS:   Mediastinum: Interval placement right-sided PICC with tip position in the   mid-lower SVC.  Small unchanged mediastinal nodes; no bulky lymphadenopathy. No acute thoracic aortic or cardiac abnormality on this unenhanced scan;   prominent LV again noted.  Tiny unchanged pericardial fluid or thickening.       Lungs/pleura: Similar bilateral pleural effusions with considerable mostly   lower lobe atelectasis or consolidation with air bronchograms; larger   loculated appearing component (measuring 10.0 x 4.9 cm) extending along the   azygoesophageal recess, and across the midline (best seen slices 70-80,   series 2).  Tracheobronchial tree patent centrally       Upper Abdomen: Similar small amount perihepatic and perisplenic ascitic fluid   and soft tissue infiltration visualized abdominal adipose tissue.  Clips   status post cholecystectomy.  Mild hepatic steatosis.       Soft Tissues/Bones: No acute abnormality.           Impression   New right-sided PICC again seen with unchanged and satisfactory tip position;   larger loculated right effusion in the azygoesophageal recess, as above. Otherwise similar findings to 05/07/2021 with suspected lower lobe   consolidation/pneumonitis, with volume loss and effusions both lower lobes.               Medical Decision [id number]-Reljc:     Specimen Description 05/12/2021  5:09  [address]   . THORACENTESIS FLUID LEFT    Special Requests 05/12/2021  5:09  [address]   NOT REPORTED    Direct Exam Abnormal  05/12/2021  5:09 PM [address]    Direct Exam 05/12/2021  5:09  [address]   NO BACTERIA SEEN    Direct Exam 05/12/2021  5:09  [address]   Gram stain made from cytocentrifuged specimen.  Organisms and cells will be concentrated.     Culture Abnormal  05/12/2021  5:09 PM [address] FLUID CULTURE, RN NOTIFIED Results called to and read back by: ROSALINDA WALLER 05/14/21 0430    Culture 05/12/2021  5:09 PM [facility] FROM BOTTLE: GRAM POSITIVE RODS          Medical Decision Making-Other:     Note:  Labs, medications, radiologic studies were reviewed with personal review of films   Large amounts of data were reviewed  Discussed with nursing Staff, Discharge planner  Infection Control and Prevention measures reviewed  All prior entries were reviewed  Administer medications as ordered  Prognosis: Guarded  Discharge planning reviewed  Follow up as outpatient. Thank you for allowing us to participate in the care of this patient. Please call with questions.     Casie Huerta MD

## 2021-05-16 NOTE — PLAN OF CARE
Problem: OXYGENATION/RESPIRATORY FUNCTION  Goal: Patient will maintain patent airway  5/15/2021 2204 by Carine Johnson RN  Outcome: Ongoing  5/15/2021 1837 by Chin Skinner RN  Outcome: Ongoing  Goal: Patient will achieve/maintain normal respiratory rate/effort  Description: Respiratory rate and effort will be within normal limits for the patient  5/15/2021 2204 by Carine Johnson RN  Outcome: Ongoing  5/15/2021 1837 by Chin Skinner RN  Outcome: Ongoing     Problem: SKIN INTEGRITY  Goal: Skin integrity is maintained or improved  5/15/2021 2204 by Carine Johnson RN  Outcome: Ongoing  5/15/2021 1837 by Chin Skinner RN  Outcome: Ongoing     Problem: Confusion - Acute:  Goal: Absence of continued neurological deterioration signs and symptoms  Description: Absence of continued neurological deterioration signs and symptoms  5/15/2021 2204 by Carine Johnson RN  Outcome: Ongoing  5/15/2021 1837 by Chin Skinner RN  Outcome: Ongoing  Goal: Mental status will be restored to baseline  Description: Mental status will be restored to baseline  5/15/2021 2204 by Carine Johnson RN  Outcome: Ongoing  5/15/2021 1837 by Chin Skinner RN  Outcome: Ongoing     Problem: Discharge Planning:  Goal: Ability to perform activities of daily living will improve  Description: Ability to perform activities of daily living will improve  5/15/2021 2204 by Carine Johnson RN  Outcome: Ongoing  5/15/2021 1837 by Chin Skinner RN  Outcome: Ongoing  Goal: Participates in care planning  Description: Participates in care planning  5/15/2021 2204 by Carine Johnson RN  Outcome: Ongoing  5/15/2021 1837 by Chin Skinner RN  Outcome: Ongoing     Problem: Injury - Risk of, Physical Injury:  Goal: Absence of physical injury  Description: Absence of physical injury  5/15/2021 2204 by Carine Johnson RN  Outcome: Ongoing  5/15/2021 1837 by Chin Skinner RN  Outcome: Ongoing  Goal: Will remain free from falls  Description: Will remain free from falls  5/15/2021 2204 by Byron Rodríguez RN  Outcome: Ongoing  5/15/2021 1837 by Reinaldo Lund RN  Outcome: Ongoing     Problem: Mood - Altered:  Goal: Mood stable  Description: Mood stable  5/15/2021 2204 by Byron Rodríguez RN  Outcome: Ongoing  5/15/2021 1837 by Reinaldo Lund RN  Outcome: Ongoing  Goal: Absence of abusive behavior  Description: Absence of abusive behavior  5/15/2021 2204 by Byron Rodríguez RN  Outcome: Ongoing  5/15/2021 1837 by Reinaldo Lund RN  Outcome: Ongoing  Goal: Verbalizations of feeling emotionally comfortable while being cared for will increase  Description: Verbalizations of feeling emotionally comfortable while being cared for will increase  5/15/2021 2204 by Byron Rodríguez RN  Outcome: Ongoing  5/15/2021 1837 by Reinaldo Lund RN  Outcome: Ongoing     Problem: Psychomotor Activity - Altered:  Goal: Absence of psychomotor disturbance signs and symptoms  Description: Absence of psychomotor disturbance signs and symptoms  5/15/2021 2204 by Byron Rodríguez RN  Outcome: Ongoing  5/15/2021 1837 by Reinaldo Lund RN  Outcome: Ongoing     Problem: Sensory Perception - Impaired:  Goal: Demonstrations of improved sensory functioning will increase  Description: Demonstrations of improved sensory functioning will increase  5/15/2021 2204 by Byron Rodríguez RN  Outcome: Ongoing  5/15/2021 1837 by Reinaldo Lund RN  Outcome: Ongoing  Goal: Decrease in sensory misperception frequency  Description: Decrease in sensory misperception frequency  5/15/2021 2204 by Byron Rodríguez RN  Outcome: Ongoing  5/15/2021 1837 by Reinaldo Lund RN  Outcome: Ongoing  Goal: Able to refrain from responding to false sensory perceptions  Description: Able to refrain from responding to false sensory perceptions  5/15/2021 2204 by Byron Rodríguez RN  Outcome: Ongoing  5/15/2021 1837 by Reinaldo Lund RN  Outcome: Ongoing  Goal: Demonstrates accurate environmental perceptions  Description: Demonstrates accurate environmental perceptions  5/15/2021 2204 by Jaleesa Nam RN  Outcome: Ongoing  5/15/2021 1837 by Felecia Koenig RN  Outcome: Ongoing  Goal: Able to distinguish between reality-based and nonreality-based thinking  Description: Able to distinguish between reality-based and nonreality-based thinking  5/15/2021 2204 by Jaleesa Nam RN  Outcome: Ongoing  5/15/2021 1837 by Felecia Koenig RN  Outcome: Ongoing  Goal: Able to interrupt nonreality-based thinking  Description: Able to interrupt nonreality-based thinking  5/15/2021 2204 by Jaleesa Nam RN  Outcome: Ongoing  5/15/2021 1837 by Felecia Koenig RN  Outcome: Ongoing     Problem: Sleep Pattern Disturbance:  Goal: Appears well-rested  Description: Appears well-rested  5/15/2021 2204 by Jaleesa Nam RN  Outcome: Ongoing  5/15/2021 1837 by Felecia Koenig RN  Outcome: Ongoing     Problem: Skin Integrity:  Goal: Will show no infection signs and symptoms  Description: Will show no infection signs and symptoms  5/15/2021 2204 by Jaleesa Nam RN  Outcome: Ongoing  5/15/2021 1837 by Felecia Koenig RN  Outcome: Ongoing  Goal: Absence of new skin breakdown  Description: Absence of new skin breakdown  5/15/2021 2204 by Jaleesa Nam RN  Outcome: Ongoing  5/15/2021 1837 by Felecia Koenig RN  Outcome: Ongoing     Problem: Falls - Risk of:  Goal: Absence of physical injury  Description: Absence of physical injury  5/15/2021 2204 by Jaleesa Nam RN  Outcome: Ongoing  5/15/2021 1837 by Felecia Koenig RN  Outcome: Ongoing  Goal: Will remain free from falls  Description: Will remain free from falls  5/15/2021 2204 by Jaleesa Nam RN  Outcome: Ongoing  5/15/2021 1837 by Felecia Koenig RN  Outcome: Ongoing     Problem: Nutrition  Goal: Optimal nutrition therapy  Description: Nutrition Problem #1: Inadequate oral intake  Intervention: Food and/or Nutrient Delivery: Continue NPO, Start Parenteral Nutrition-suggest start with 150 g Dex, 50 g AA at 41.7 mL/hr with 100 mL 20% lipids.   Nutritional Goals: meet % of estimated nutrient needs     5/15/2021 2204 by Solomon Mccormack RN  Outcome: Ongoing  5/15/2021 1837 by Fredi Ya RN  Outcome: Ongoing  5/15/2021 1325 by Daniel Amaya RD, LD  Note: Nutrition Problem #1: Inadequate oral intake  Intervention: Food and/or Nutrient Delivery: Continue Current Diet, Continue Current Tube Feeding, Modify Parenteral Nutrition, Continue Oral Nutrition Supplement  Nutritional Goals: meet % of estimated nutrient needs       Problem: Pain:  Goal: Pain level will decrease  Description: Pain level will decrease  5/15/2021 2204 by Solomon Mccormack RN  Outcome: Ongoing  5/15/2021 1837 by Fredi Ya RN  Outcome: Ongoing  Goal: Control of acute pain  Description: Control of acute pain  5/15/2021 2204 by Solomon Mccormack RN  Outcome: Ongoing  5/15/2021 1837 by Fredi Ya RN  Outcome: Ongoing  Goal: Control of chronic pain  Description: Control of chronic pain  5/15/2021 2204 by Solomon Mccormack RN  Outcome: Ongoing  5/15/2021 1837 by Fredi Ya RN  Outcome: Ongoing     Problem: Infection - Central Venous Catheter-Associated Bloodstream Infection:  Goal: Will show no infection signs and symptoms  Description: Will show no infection signs and symptoms  5/15/2021 2204 by Solomon Mccormack RN  Outcome: Ongoing  5/15/2021 1837 by Fredi Ya RN  Outcome: Ongoing

## 2021-05-16 NOTE — PROGRESS NOTES
Component Value Date    WBC 6.6 05/16/2021    RBC 3.13 05/16/2021    RBC 6.23 05/03/2021    HGB 8.6 05/16/2021    HCT 28.7 05/16/2021     05/16/2021    MCV 91.7 05/16/2021    MCH 27.5 05/16/2021    MCHC 30.0 05/16/2021    RDW 14.1 05/16/2021    NRBC 1 05/05/2021    LYMPHOPCT 9 05/16/2021    LYMPHOPCT 6 05/03/2021    MONOPCT 6 05/16/2021    BASOPCT 1 05/16/2021    MONOSABS 0.40 05/16/2021    MONOSABS 1.8 05/03/2021    LYMPHSABS 0.59 05/16/2021    LYMPHSABS 1.4 05/03/2021    EOSABS 0.13 05/16/2021    EOSABS 0.0 05/03/2021    BASOSABS 0.07 05/16/2021    DIFFTYPE NOT REPORTED 05/16/2021     BMP:    Lab Results   Component Value Date     05/16/2021    K 4.1 05/16/2021    CL 95 05/16/2021    CO2 27 05/16/2021    BUN 35 05/16/2021    LABALBU 1.7 05/16/2021    CREATININE 1.32 05/16/2021    CREATININE 3.09 05/03/2021    CALCIUM 8.5 05/16/2021    GFRAA 49 05/16/2021    LABGLOM 40 05/16/2021    GLUCOSE 213 05/16/2021    GLUCOSE 453 05/03/2021       Radiology Review:    XR ABDOMEN (KUB) (SINGLE AP VIEW)    Result Date: 5/15/2021  EXAMINATION: ONE SUPINE XRAY VIEW(S) OF THE ABDOMEN 5/15/2021 5:49 pm COMPARISON: May 4, 2021 HISTORY: ORDERING SYSTEM PROVIDED HISTORY: r/o ileus TECHNOLOGIST PROVIDED HISTORY: r/o ileus portable please FINDINGS: Surgical clips right upper quadrant. 2 gastrostomy tubes project over the gastric air bubble. Oral contrast and gas noted within the small bowel. Metallic coils in the pelvis. 2 new gastrostomy tubes noted. Ileus. XR CHEST PORTABLE    Result Date: 5/14/2021  EXAMINATION: ONE XRAY VIEW OF THE CHEST 5/14/2021 8:27 am COMPARISON: Chest radiograph May 11, 2021 HISTORY: ORDERING SYSTEM PROVIDED HISTORY: Pleural effusion; s/p chest tube placement TECHNOLOGIST PROVIDED HISTORY: s/p chest tube placement FINDINGS: Right chest tube in satisfactory position. Right pleural effusion appears significantly improved.   Bibasilar atelectasis and left pleural effusion similar to prior study.  Right upper extremity PICC in satisfactory position. Cardiomediastinal silhouette appears unchanged. Right chest tube in satisfactory position with significantly improved right pleural effusion. Bibasilar airspace disease and small left pleural effusion not significantly changed. ASSESSMENT   3 78year old female s/p robotic reduction of hiatal hernia, wedge gastrectomy, gastropexy x2, and placement of TAISHA drains 5/4 with vomiting and ileus on KUB     Plan  1. Continue PEG to gravity   2. Start Reglan 10 mg q6H x 72 hours   3. TF @ 20 cc/hr   4.  Will continue to monitor       Joann Dodson DO PGY-1   5/16/2021 at 7:53 AM

## 2021-05-16 NOTE — PROGRESS NOTES
[facility]  Office: [address], DO, Lucy Home, DO, Jyoti Guzman, DO, Zeenat Mcdonald Blood, DO, Alex Wilkinson MD, Suzie Feliz MD, Zaheer Garcia MD, Angel Hickey MD, Claire Carrillo MD, Michel Rodriguez MD, Thien Huerta MD, Diana Pimentel MD, Maria Teresa Crain, DO, Inga West MD, Nancy Benitez, DO, Jared Alejandre MD,  Susan Hernandez, DO, Juan Solis MD, Any Rg MD, Rebecca Hendricks MD, Spencer Spann MD, Lala Roach, [facility], [facility], CNP, Purvi Adhikari, CNP, Jeremiah Barnes, CNS, Heather Vega, CNP, Janna Villegas, CNP, Karon Sandoval, CNP, Tasha Mcconnell, CNP, Li Krueger, CNP, Suhas Rosas PA-C, Kathleen Givens, [facility], Lu Valente, CNP, Damaso Donald, [facility], Philippe Sargent, CNP, David Contreras, CNP, Kavon Miranda, CNP, Kayy Salt, [address] [place] [address]    Progress Note    5/16/2021    11:03 AM    Name:   Reese Gudino  MRN:     [id number]     Acct:      [de-identified]   Room:   [address] [place], [place] [id number] Day:  15  Admit Date:  5/3/2021 12:49 PM    PCP:   Edin Walsh DO  Code Status:  Full Code    Subjective:     C/C:   Chief Complaint   Patient presents with    Blood Sugar Problem     >450    Hernia     hyatial      Interval History Status: mild improvement      Patient seen and examined,  Few episodes of emesis in last 24 hours,  Seen by surgery stat yesterday, no interventions   CT of the chest  this morning,  Follow-up esophagram without leak on 5/11  Right loculated pleural effusion-plan s/p  thoracentesis and chest tube insertion 5/12  Right larger loculated pleural effusion -Right pig tail chest tube 5/13/21 - culture pending - exudate - ph -6.9    Patient has big residual, acute fluids on hold at this time general surgery to comment,  Pleural fluid growing fungus, infectious disease on board    Brief History:     Patient presented to [place] [address] emergency room from [facility]. Patient originally presented to [facility] for the evaluation of nausea vomiting and diarrhea ongoing for 6 days. Patient was recently seen in the [facility] emergency room on 4/28/2021 and sent home. She states that today (5/3/21) she woke up around 2 AM as she was on the floor. She stated the last thing she recalls was getting up to get water. Patient states that she was lightheaded dizzy and weak and had a syncopal episode and fell. LOC of unknown time. The work-up in the outlying emergency room showed: A metabolic panel with a sodium of 144 potassium 2.2 chloride 78 CO2 greater than 40  creatinine 3.8 with lactic acid of 6 and glucose of 585. Delmon Green Troponin I 0.404, liver function showed AST of 37 bilirubin of 5 direct bilirubin 0, and a lipase of 1451. Beta hydroxybutyrate 3.66. Hemogram with acute leukocytosis with a WBC of 23.5, hemoglobin 18.3, hematocrit 54.2 a high concern of acute pancreatitis with acute kidney injury and DKA, with non-STEMI. There was endorgan injury with a high lactic 6.6 and a creatinine of 3.8 and troponin of 0.4 with EKG showing acute ischemia but with Q waves in the inferior and anterior leads. Originally the patient was recommended for ICU and the case was discussed with Dr. Fermín Naazrio and deferred the case to general surgery. And at that time Dr. Mirela Collins recommended ED to ED transfer. At that time the patient was given Zosyn and transferred ED to ED. Review of Systems:     Constitutional:  negative for chills, fevers, sweats, + fatigue  Respiratory:  negative for cough, dyspnea on exertion,+ shortness of breath, wheezing  Cardiovascular:  negative for chest pain, chest pressure/discomfort, lower extremity edema, palpitations  Gastrointestinal:  + abdominal pain, distention, -constipation, -diarrhea, -nausea, -vomiting  Neurological:  negative for dizziness, headache    Medications: Allergies:     Allergies   Allergen Reactions    No Known Allergies        Current Meds:   Scheduled Meds:    metoclopramide  5 mg Intravenous Q6H    [START ON 5/17/2021] vancomycin  750 mg Intravenous Q24H    fluconazole  400 mg Intravenous Q24H    fat emulsion  100 mL Intravenous Weekly - Thursday    levothyroxine  112 mcg Oral Daily    pantoprazole  40 mg Oral QAM AC    gabapentin  300 mg Oral TID    furosemide  20 mg Oral Every Other Day    insulin glargine  10 Units Subcutaneous Nightly    polyethylene glycol  17 g Oral Daily    heparin (porcine)  5,000 Units Subcutaneous 3 times per day    vancomycin (VANCOCIN) intermittent dosing (placeholder)   Other RX Placeholder    ipratropium-albuterol  1 ampule Inhalation Q4H WA    piperacillin-tazobactam  3,375 mg Intravenous Q8H    insulin lispro  0-18 Units Subcutaneous Q4H    sodium chloride flush  5-40 mL Intravenous 2 times per day     Continuous Infusions:    PN-Adult 2-in-1 Central Line (Standard) 60 mL/hr at 05/15/21 1736    dextrose      sodium chloride 25 mL (05/10/21 1840)     PRN Meds: oxyCODONE **OR** oxyCODONE, acetaminophen, glucose, dextrose, glucagon (rDNA), dextrose, sodium chloride, potassium chloride, magnesium sulfate, acetaminophen, artificial tears, sodium chloride flush, [DISCONTINUED] promethazine **OR** ondansetron    Data:     Past Medical History:   has a past medical history of Diabetes mellitus ([address] Gout, Hiatal hernia, Hyperlipidemia, Hypertension, and Thyroid disease. Social History:   reports that she has never smoked. She has never used smokeless tobacco. She reports that she does not drink alcohol and does not use drugs.      Family History:   Family History   Problem Relation Age of Onset    Breast Cancer Mother     High Blood Pressure Mother     High Cholesterol Father     Breast Cancer Sister        Vitals:  /84   Pulse 96   Temp 98.1 °F (36.7 °C) (Oral)   Resp 17   Ht 5' 2\" (1.575 m)   Wt 191 lb 12.8 oz (87 kg)   SpO2 (!) 89%   BMI 35.08 kg/m² Temp (24hrs), Av.6 °F (37 °C), Min:98 °F (36.7 °C), Max:99.8 °F (37.7 °C)    Recent Labs     05/15/21  1518 05/15/21  1947 05/15/21  2336 05/16/21  0413   POCGLU 256* 240* 247* 211*       I/O (24Hr):     Intake/Output Summary (Last 24 hours) at 2021 1103  Last data filed at 2021 0522  Gross per 24 hour   Intake 2.1 ml   Output 1680 ml   Net 342.1 ml       Labs:  Hematology:  Recent Labs     21  0705 05/15/21  0521  0651   WBC 5.7 5.3 6.6   RBC 2.96* 2.94* 3.13*   HGB 8.4* 8.2* 8.6*   HCT 26.6* 26.2* 28.7*   MCV 89.9 89.1 91.7   MCH 28.4 27.9 27.5   MCHC 31.6 31.3 30.0   RDW 14.2 14.1 14.1    225 310   MPV 10.4 10.6 10.2   INR 1.0 0.9 0.9     Chemistry:  Recent Labs     21  0705 05/15/21  0513 05/16/21  0651    136 134*   K 3.7 4.4 4.1    97* 95*   CO2 28 27 27   GLUCOSE 208* 217* 213*   BUN 35* 30* 35*   CREATININE 1.42* 1.25* 1.32*   MG 1.5* 1.5*  --    ANIONGAP 8* 12 12   LABGLOM 37* 43* 40*   GFRAA 45* 52* 49*   CALCIUM 8.2* 8.4* 8.5*   PHOS 4.5  --   --      Recent Labs     21  0705 05/15/21  0403 05/15/21  0513 05/15/21  1008 05/15/21  1518 05/15/21  1947 05/15/21  2336 05/16/21  0413 21  0651   PROT 6.1*  --  6.3*  --   --   --   --   --  6.8   LABALBU 1.4*  --  1.7*  --   --   --   --   --  1.7*   AST 16  --  15  --   --   --   --   --  16   ALT 11  --  12  --   --   --   --   --  15   ALKPHOS 121*  --  122*  --   --   --   --   --  147*   BILITOT 0.30  --  0.37  --   --   --   --   --  0.30   BILIDIR 0.19  --  0.21  --   --   --   --   --  0.17   POCGLU  --  203*  --  236* 256* 240* 247* 211*  --      ABG:  Lab Results   Component Value Date    POCPH 7.433 2021    PHART 7.193 2021    POCPCO2 36.8 2021    FSY2LMN 52.0 2021    POCPO2 150.9 2021    PO2ART 145.0 2021    POCHCO3 24.6 2021    SCP8YBC 19.2 2021    NBEA NOT REPORTED 2021    PBEA 0 2021    IUQ1WLN NOT REPORTED 2021 BAFF3PHO 99 05/06/2021    P3YJIVFV 98.0 05/04/2021    FIO2 40.0 05/06/2021     Lab Results   Component Value Date/Time    SPECIAL NOT REPORTED 05/13/2021 10:19 AM     Lab Results   Component Value Date/Time    CULTURE NO GROWTH 2 DAYS 05/13/2021 10:19 AM       Radiology:  Sameer Saver Abdomen (kub) (single Ap View)    Result Date: 5/4/2021  No significant subdiaphragmatic contrast progression, as above. RECOMMENDATION: Consider chest x-ray to further assess a organic-axial gastric volvulus     Ct Chest Wo Contrast    Result Date: 5/4/2021  1. Ascites and pneumoperitoneum with apparent free-flowing oral contrast in the left upper quadrant is concerning for viscus perforation, possibly within the subdiaphragmatic portion of the stomach. 2. Large hiatal hernia with organoaxial volvulus. Majority of contrast is retained within the esophagus and supradiaphragmatic stomach. 3. Enteric tube extends below the diaphragm with tip outside the field of view. 4. Moderate bilateral pleural effusions with adjacent consolidation, atelectasis versus pneumonia. Critical results were called by Dr. Dino Jung MD to [facility] on 5/4/2021 at 18:30. Us Renal Complete    Result Date: 5/5/2021  Unremarkable ultrasound of the kidneys and urinary bladder. Trace right pleural effusion     Fl Esophagram    Result Date: 5/7/2021  1. Drainage of contrast material through what appears to be the gastrostomy tubes following the ingestion of contrast.  Contrast does migrate beyond the postoperative region into the proximal small bowel. 2. No extraneous/extraluminal collection of contrast is seen beyond the confines of the stomach. 3. 2 surgical drains and 2 presumed gastrostomy tubes are seen overlying the left upper quadrant. There does appear to be contrast slightly marginating the more lateral gastrostomy tube balloon. 4. Delayed migration of contrast through the distal esophagus and GE junction with mild gastroesophageal reflux.  The findings were sent to the Radiology Results [address] at 12:43 pm on 5/7/2021to be communicated to a licensed caregiver. Xr Chest Portable    Result Date: 5/7/2021  Bibasilar consolidation new from prior study. Blunting of the costophrenic angles bilaterally     Xr Chest Portable    Result Date: 5/6/2021  ETT tip position stable. Decreased left subcutaneous emphysema. Slightly improved suspected left basilar volume loss with persistent findings as above. No overt vascular congestion. Xr Chest Portable    Result Date: 5/5/2021  Volume loss continues left hemithorax with haziness at the left base either atelectasis and or fluid. Subcutaneous emphysema along the left lateral chest wall has decreased. No appreciable extrapleural air. Endotracheal tube in appropriate position. Xr Chest Portable    Result Date: 5/5/2021  1. Recommend repositioning of left internal jugular approach central venous catheter. 2. Low lung volumes with left basilar atelectasis plus or minus mild pleural effusion. 3. Left chest wall scattered soft tissue emphysema. Xr Chest Portable    Result Date: 5/4/2021  Intestinal tube deviates to the left side of a sizable hiatal hernia, traversing below the hemidiaphragm and terminating just underneath the left hemidiaphragm. Side port of the intestinal tube is below the diaphragmatic hiatus. Xr Chest Portable    Result Date: 5/3/2021  No acute cardiopulmonary disease Large hiatal hernia     Mri Abdomen Wo Contrast Mrcp    Result Date: 5/3/2021  1. No evidence of intrahepatic or extrahepatic ductal dilatation. 2. Incompletely imaged large hiatal hernia with organoaxial malrotation of the stomach. 3. Small to moderate amount of ascites. 4. Diffuse small bowel wall thickening likely due to 3rd spacing as the post enteritis. 5. Trace bilateral pleural effusions. Ct Chest Abdomen Pelvis Wo Contrast    Result Date: 5/7/2021  1.  Within the chest, the patient has Syncope 5/4/2021 Yes    Acute tubular necrosis ([address] 5/4/2021 Yes    Lactic acid acidosis 5/8/2021 Yes    Acute hypoxemic respiratory failure ([address] 5/4/2021 Yes    Hiatal hernia 5/5/2021 Yes    Gastric volvulus 5/5/2021 Yes    Community acquired bilateral lower lobe pneumonia 5/8/2021 Yes    Mediastinitis 5/8/2021 Yes    Peritonitis ([address] 5/8/2021 Yes    Severe malnutrition ([address] 5/16/2021 Yes          Plan: 1. Septic shock, resolved   2. Gastric ischemia and subsequent pneumoperitoneum  - Underwent emergent wedge gastrectomy, gastropexy x2, placement of TAISHA drains x2- , abdominal lavage with gen surg  -Concern for esophageal leak, esophagram ordered for 5/11  -On Zosyn, Vanco, fluconazole; ID following   - initial blood cx positive for staph epidermidis. Likely contaminant  - urine, repeat blood cultures NGTD X D3  - no longer requiring pressors  - PICC line placed 5/10  -TPN continues    -Right loculated pleural effusion-plan s/p  thoracentesis and chest tube insertion 5/12  Right larger loculated pleural effusion -Right pig tail chest tube 5/13/21 - culture showing fungus- exudate - ph -6.9  CT of the chest done today,  1.  Right chest tube in place with near complete resolution of right   effusion.  No pneumothorax or loculated fluid in the right hemithorax.       2.  Trace left pleural effusion.       3.  Dependent opacities in the lower lobes, left greater than right, again   demonstrated, although improved since prior chest CT exam.       4.  Moderate amount of fluid within the esophageal hiatus.       5.  Partially visualized stomach appears distended with 2 gastrostomy tubes   in place.          Emesis few episodes in last 24 hours  Seen by surgery   Resolved   KUB reviewed       3. Type 2 DM, non-insulin dependent: improving   - complicated by hyperglycemia and ketosis  - continue to check blood glucose q4h with liquid diet  - MI-ISS,  lantus  10 units , sugars better      4. Acute kidney injury secondary to ATN from hypotension/hypoperfusion: resolved  - continue to monitor creatinine.   - Continue resuscitative fluids-  - Nephrology following.   - Creatinine is steadily improving, nearing plateau. Currently 7.78  - avoid nephrotoxic agents      5. Acute hypoxemic respiratory failure  - likely secondary to impaired lung expansion from large hiatal hernia/septic shock  - Extubated on 5/6, saturating well on LFO2  -Right loculated pleural effusion status post thoracentesis and chest tube placement on 5/12 with IR  -Right larger loculated pleural effusion -Right pig tail chest tube 5/13/21 - culture pending - exudate - ph -6.9  CT of the chest done today, reviewed     6. NSTEMI  - likely demand ischemia in setting of septic shock.   - Cardiology following.    - Echo showing normal EF with mild pulm HTN  - EKG  shows inferior infarct.   - Patient will need ischemic work-up following resolution of acute issues, follow up with cardiology as outpatient      7. Hypernatremia resolved  - nephro following   -      8. Thrombocytopenia: resolved  - likely from sepsis.    - Continue to monitor.      9. Hypothyroidism  - TSH elevated to 13.36. in the setting of acute illness.   - Free T4 normal.   - transition to po levothyroxine  Recheck in two months      10. Elevated PT/INR  - resolved.     Ross Ashton MD  5/16/2021  11:03 AM

## 2021-05-16 NOTE — PROGRESS NOTES
General surgery discussed plan of care with patients family and writer at bedside. Both gastrostomy tubes were hooked up to drain by gravity, and Reglan was started. Patient received a breathing treatment in the afternoon that significantly improved her status, she is now requiring 3 liters nasal canula with an SPO2 of 97 percent. Respiratory therapy had reported that patient had declined the last 2 treatments due to being too tired. Patient was educated on the importance of receiving treatments accordingly.

## 2021-05-16 NOTE — PROGRESS NOTES
[facility] Cardiothoracic Surgery  Progress Note    5/16/2021 8:48 AM     Subjective:  Ms. Rivera Fearing   Pt was resting comfortable in bed. No need to wake her up at this time. Objective:  BP (!) 141/79   Pulse 99   Temp 98.5 °F (36.9 °C) (Oral)   Resp 19   Ht 5' 2\" (1.575 m)   Wt 191 lb 12.8 oz (87 kg)   SpO2 96%   BMI 35.08 kg/m²   Chest -dull to percussion bilaterally and decreased breath sounds, right-sided chest tube   Heart - normal rate, regular rhythm, normal S1, S2, no murmurs, rubs, clicks or gallops  Abdomen -soft with one PEG tube and G-tube, clamped  Extremities - peripheral pulses normal, no pedal edema, no clubbing or cyanosis  Skin - normal coloration and turgor, no rashes, no suspicious skin lesions noted   PICC line in place    Labs:   CBC:   Recent Labs     05/14/21  0705 05/15/21  0513 05/16/21  0651   WBC 5.7 5.3 6.6   HGB 8.4* 8.2* 8.6*   HCT 26.6* 26.2* 28.7*   MCV 89.9 89.1 91.7    225 310     BMP:   Recent Labs     05/14/21  0705 05/15/21  0513 05/16/21  0651    136 134*   K 3.7 4.4 4.1    97* 95*   CO2 28 27 27   PHOS 4.5  --   --    BUN 35* 30* 35*   CREATININE 1.42* 1.25* 1.32*       I/O: I/O last 3 completed shifts:   In: 2082.1 [P.O.:80; I.V.:330; NG/GT:314]  Out: 1680 [Urine:1150; Emesis/NG output:470; Chest Tube:60]  Scheduled Meds:   metoclopramide  5 mg Intravenous Q6H    fluconazole  400 mg Intravenous Q24H    vancomycin  1,000 mg Intravenous Q24H    fat emulsion  100 mL Intravenous Weekly - Thursday    levothyroxine  112 mcg Oral Daily    pantoprazole  40 mg Oral QAM AC    gabapentin  300 mg Oral TID    furosemide  20 mg Oral Every Other Day    insulin glargine  10 Units Subcutaneous Nightly    polyethylene glycol  17 g Oral Daily    heparin (porcine)  5,000 Units Subcutaneous 3 times per day    vancomycin (VANCOCIN) intermittent dosing (placeholder)   Other RX Placeholder    ipratropium-albuterol  1 ampule Inhalation Q4H WA    piperacillin-tazobactam  3,375 mg Intravenous Q8H    insulin lispro  0-18 Units Subcutaneous Q4H    sodium chloride flush  5-40 mL Intravenous 2 times per day     Continuous Infusions:   PN-Adult 2-in-1 Central Line (Standard) 60 mL/hr at 05/15/21 1736    dextrose      sodium chloride 25 mL (05/10/21 1840)     PRN Meds:oxyCODONE **OR** oxyCODONE, acetaminophen, glucose, dextrose, glucagon (rDNA), dextrose, sodium chloride, potassium chloride, magnesium sulfate, acetaminophen, artificial tears, sodium chloride flush, [DISCONTINUED] promethazine **OR** ondansetron      Assessment/ Plan:  Agree with repeat Ct chest to determine empyema/effusions remaining after chest tube placement  Chest tube output is minimal.   Dc planning in progress SNF vs LTACH  No schedule plans at this time for surgery       On this date 5/3/2021 I have spent 15 minutes reviewing previous notes, test results and face to face with the patient discussing the diagnosis and importance of compliance with the treatment plan as well as documenting on the day of the visit.   ALFONZO Page - NP

## 2021-05-16 NOTE — PLAN OF CARE
Problem: OXYGENATION/RESPIRATORY FUNCTION  Goal: Patient will maintain patent airway  5/15/2021 2329 by Ryan Rodriguez RCP  Outcome: Ongoing     Problem: OXYGENATION/RESPIRATORY FUNCTION  Goal: Patient will achieve/maintain normal respiratory rate/effort  Description: Respiratory rate and effort will be within normal limits for the patient  5/15/2021 2329 by Ryan Rodriguez RCP  Outcome: Ongoing     Problem: Respiratory  Intervention: Respiratory assessment  Note:   PROVIDE ADEQUATE OXYGENATION WITH ACCEPTABLE SP02/ABG'S    [x]  IDENTIFY APPROPRIATE OXYGEN THERAPY  [x]   MONITOR SP02/ABG'S AS NEEDED   [x]   PATIENT EDUCATION AS NEEDED

## 2021-05-16 NOTE — PROGRESS NOTES
[address] [facility] Cardiothoracic Surgery  Progress Note    5/16/2021 9:12 AM     Subjective:  Ms. Thor Dey   Pt was resting comfortable in bed. No need to wake her up at this time. Objective:  BP (!) 141/79   Pulse 99   Temp 98.5 °F (36.9 °C) (Oral)   Resp 19   Ht 5' 2\" (1.575 m)   Wt 191 lb 12.8 oz (87 kg)   SpO2 96%   BMI 35.08 kg/m²   Chest -dull to percussion bilaterally and decreased breath sounds, right-sided chest tube   Heart - normal rate, regular rhythm, normal S1, S2, no murmurs, rubs, clicks or gallops  Abdomen -soft with one PEG tube and G-tube, clamped  Extremities - peripheral pulses normal, no pedal edema, no clubbing or cyanosis  Skin - normal coloration and turgor, no rashes, no suspicious skin lesions noted   PICC line in place    Labs:   CBC:   Recent Labs     05/14/21  0705 05/15/21  0513 05/16/21  0651   WBC 5.7 5.3 6.6   HGB 8.4* 8.2* 8.6*   HCT 26.6* 26.2* 28.7*   MCV 89.9 89.1 91.7    225 310     BMP:   Recent Labs     05/14/21  0705 05/15/21  0513 05/16/21  0651    136 134*   K 3.7 4.4 4.1    97* 95*   CO2 28 27 27   PHOS 4.5  --   --    BUN 35* 30* 35*   CREATININE 1.42* 1.25* 1.32*       I/O: I/O last 3 completed shifts:   In: 2082.1 [P.O.:80; I.V.:330; NG/GT:314]  Out: 1680 [Urine:1150; Emesis/NG output:470; Chest Tube:60]  Scheduled Meds:   metoclopramide  5 mg Intravenous Q6H    fluconazole  400 mg Intravenous Q24H    vancomycin  1,000 mg Intravenous Q24H    fat emulsion  100 mL Intravenous Weekly - Thursday    levothyroxine  112 mcg Oral Daily    pantoprazole  40 mg Oral QAM AC    gabapentin  300 mg Oral TID    furosemide  20 mg Oral Every Other Day    insulin glargine  10 Units Subcutaneous Nightly    polyethylene glycol  17 g Oral Daily    heparin (porcine)  5,000 Units Subcutaneous 3 times per day    vancomycin (VANCOCIN) intermittent dosing (placeholder)   Other RX Placeholder    ipratropium-albuterol  1 ampule Inhalation Q4H WA   

## 2021-05-16 NOTE — PROGRESS NOTES
Pharmacy Note     Renal Dose Adjustment    Reese Gduino is a 78 y.o. female. Pharmacist assessment of renally cleared medications. Recent Labs     05/15/21  0513 05/16/21  0651   BUN 30* 35*       Recent Labs     05/15/21  0513 05/16/21  0651   CREATININE 1.25* 1.32*       Estimated Creatinine Clearance: 42 mL/min (A) (based on SCr of 1.32 mg/dL (H)). Estimated CrCl using Ideal Body Weight: ~32 mL/min (based on IBW 50.6 kg)    Height:   Ht Readings from Last 1 Encounters:   05/15/21 5' 2\" (1.575 m)     Weight:  Wt Readings from Last 1 Encounters:   05/16/21 191 lb 12.8 oz (87 kg)       The following medication dose has been adjusted based upon renal function per P&T Guidelines:             Changed to Reglan 5 mg Q 6 hours based on renal function. Mer[facility] Glory Abed, PharmD  5/16/2021 8:37 AM

## 2021-05-16 NOTE — PROGRESS NOTES
Pharmacy Vancomycin Consult     Vancomycin Day: 8  Current Dosin mg every 24 hours  Current indication: Bloodstream infection    Temp max:  99.8F    Recent Labs     05/15/21  0513 21  0651   BUN 30* 35*   CREATININE 1.25* 1.32*   WBC 5.3 6.6       Intake/Output Summary (Last 24 hours) at 2021 0942  Last data filed at 2021 0522  Gross per 24 hour   Intake 2082.1 ml   Output 1680 ml   Net 402.1 ml       Ht Readings from Last 1 Encounters:   05/15/21 5' 2\" (1.575 m)        Wt Readings from Last 1 Encounters:   21 191 lb 12.8 oz (87 kg)       Body mass index is 35.08 kg/m². Estimated Creatinine Clearance: 42 mL/min (A) (based on SCr of 1.32 mg/dL (H)). Trough: 23.3    Assessment/Plan:    Trough level supratherapeutic today. Trough was drawn approximately 2 hours early. Serum creatinine trending up. Will skip dose of vancomycin today and resume at reduced dose of 750 mg every 24 hours tomorrow.

## 2021-05-16 NOTE — PROGRESS NOTES
PULMONARY & CRITICAL CARE MEDICINE PROGRESS  NOTE     Patient:  Jolanta Chavez  MRN: [id number]  Admit date: 5/3/2021    SUBJECTIVE     I personally interviewed/examined the patient, reviewed interval history and interpreted all available radiographic, laboratory data at the time of service. Chief Compliant/Reason for Initial Consult:   Acute respiratory failure on vent support, gastric perforation    Brief Hospital Course and Interval History:  The patient is a 78 y.o. female with known medical history of diabetes mellitus, hypertension, hypothyroidism, abdominal hernia presented to the hospital with nausea, vomiting, diarrhea. Patient had similar symptoms on 4/28 but was apparently sent home. Patient had dizzy spells and lightheadedness, had a syncopal episode in the ER on this admission. Patient was transferred from the [facility] to [place].  In [facility] patient lab revealed lactic acidosis of 6, glucose 585, patient was seen to be in DKA. Elevated lipase of 1451. Leukocytotic with WBC 23.5, hemoglobin 18.3. Patient also had elevated troponins, elevated creatinine of 3.8. In [place] repeat labs showed creatinine of 2.49 with lactic acid of 2.4. Troponin was 85, recheck of 94. LFTs showed elevated bilirubin. General surgery was consulted. CT chest showed ascites and pneumoperitoneum with apparent free-flowing oral contrast in the left upper quadrant concerning for viscus perforation possibly within the subdiaphragmatic portion of the stomach. Patient then went for robotic hernia repair 5/4 with wedge gastrectomy, gastropexy and placement of 2 TAISHA drains and 2 PEG tubes. Patient is n.p.o.         Interval history  5/16/2021  Patient was seen this morning and overnight events noted. She had a T-max of 99.8 overnight low-grade temperature. She has rapid shallow respiration as before.   Very weak cough.  Hemodynamically stable with systolic blood pressure above 120. According to nursing staff she had not been tolerating tube feed she had high drainage from the G-tube which is on gravity and high G-tube output. Seen by surgery. Currently tube feedings on hold. She remains on TPN. According to nursing staff no vomiting has been reported overnight of this morning but abdomen was distended. This morning she was on 2 L of nasal cannula and her saturation was 95% on 2 L she was arousable but very weak lethargic follows command move all extremities. Her urine output was reported to be 1150 and right-sided chest tube 60 ml in last 24 hours of yellow-colored fluid. Night events noted, chart seen, other notes infectious disease note culture data chest x-ray and CT scan imaging studies reviewed. Overnight she had a T-max of 99.1 she is been hemodynamically stable systolic blood pressure above 110 heart rate is between 80s to 100. She remains on nasal cannula 2 L maintaining saturation 97 to 98%. She is lethargic but arousable follows command tachypneic with rapid shallow breathing and weak cough. She is on therapy vest for mobilization of secretion. Right-sided pigtail catheter in place 150 mL of fluid overnight last 24 hours. She is currently on Zosyn, Diflucan and vancomycin and have been followed by infectious disease. Status post left-sided thoracentesis on 05/12/2021. pH 8.0. WBC 1052 and . Group Candida. Status post right-sided chest tube/pigtail catheter placement. Pleural fluid with pH of 6.9 lymphocyte 86% neutrophils 7%  suggestive of empyema or esophageal leak. So far culture negative    I/O+ 3.2 L  Review of Systems:  Review of Systems   Constitutional: Positive for activity change and fatigue. HENT: Negative for congestion. Respiratory: Positive for shortness of breath. Negative for cough and wheezing.     Gastrointestinal: Positive for abdominal distention and abdominal pain. Genitourinary: Negative for difficulty urinating. Musculoskeletal: Negative. Neurological: Negative for light-headedness and numbness. Psychiatric/Behavioral: Negative for agitation. OBJECTIVE     VITAL SIGNS:   LAST-  BP (!) 141/79   Pulse 99   Temp 98.5 °F (36.9 °C) (Oral)   Resp 19   Ht 5' 2\" (1.575 m)   Wt 191 lb 12.8 oz (87 kg)   SpO2 96%   BMI 35.08 kg/m²   8-24 HR RANGE-  TEMP Temp  Av.8 °F (37.1 °C)  Min: 98 °F (36.7 °C)  Max: 99.8 °F (31.9 °C)   BP Systolic (26IVV), BIO:150 , Min:136 , JFU:595      Diastolic (13DGR), UPJ:67, Min:79, Max:86     PULSE Pulse  Av.3  Min: 94  Max: 101   RR Resp  Av  Min: 19  Max: 19   O2 SAT SpO2  Av %  Min: 96 %  Max: 96 %   OXYGEN DELIVERY O2 Flow Rate (L/min)  Av L/min  Min: 2 L/min  Max: 2 L/min     Systemic Examination:   General appearance lethargic but arousable rapid shallow breathing and mildly tachypneic  mental status -arousable but lethargic  Eyes - pupils equal and reactive, extraocular eye movements intact  Mouth - mucous membranes moist, pharynx normal without lesions  Neck - supple, no significant adenopathy  Chest -dull to percussion bilaterally and decreased breath sounds at both the bases but poor inspiratory effort, right-sided chest tube noted  Heart - normal rate, regular rhythm, normal S1, S2, no murmurs, rubs, clicks or gallops  Abdomen -soft with one PEG tube and G-tube abdomen distended bowel sounds are positive.   Nonrigid abdomen  Neurological -arousable lethargic, normal speech, no focal findings or movement disorder noted  Extremities - peripheral pulses normal, positive pedal edema, no clubbing or cyanosis  Skin - normal coloration and turgor, no rashes, no suspicious skin lesions noted   PICC line in place    DATA REVIEW     Medications:  Scheduled Meds:   metoclopramide  5 mg Intravenous Q6H    fluconazole  400 mg Intravenous Q24H    vancomycin  1,000 mg Intravenous Q24H    fat emulsion  100 mL Intravenous Weekly - Thursday    levothyroxine  112 mcg Oral Daily    pantoprazole  40 mg Oral QAM AC    gabapentin  300 mg Oral TID    furosemide  20 mg Oral Every Other Day    insulin glargine  10 Units Subcutaneous Nightly    polyethylene glycol  17 g Oral Daily    heparin (porcine)  5,000 Units Subcutaneous 3 times per day    vancomycin (VANCOCIN) intermittent dosing (placeholder)   Other RX Placeholder    ipratropium-albuterol  1 ampule Inhalation Q4H WA    piperacillin-tazobactam  3,375 mg Intravenous Q8H    insulin lispro  0-18 Units Subcutaneous Q4H    sodium chloride flush  5-40 mL Intravenous 2 times per day     Continuous Infusions:   PN-Adult 2-in-1 Central Line (Standard) 60 mL/hr at 05/15/21 1736    dextrose      sodium chloride 25 mL (05/10/21 1840)     LABS:-  ABGs:   No results found for: PH, PCO2, PO2, HCO3, O2SAT  No results for input(s): PHART, PO2ART, MSK2AMM, BVJ9YUT, BEART, K6XYQMAX in the last 72 hours. Lab Results   Component Value Date    POCPH 7.433 05/06/2021    POCPCO2 36.8 05/06/2021    POCPO2 150.9 (H) 05/06/2021    POCHCO3 24.6 05/06/2021    BRSX4LIO 99 (H) 05/06/2021     CBC:   Recent Labs     05/14/21  0705 05/15/21  0513 05/16/21  0651   WBC 5.7 5.3 6.6   HGB 8.4* 8.2* 8.6*   HCT 26.6* 26.2* 28.7*   MCV 89.9 89.1 91.7    225 310   LYMPHOPCT 13* 12* 9*   RBC 2.96* 2.94* 3.13*   MCH 28.4 27.9 27.5   MCHC 31.6 31.3 30.0   RDW 14.2 14.1 14.1     BMP:   Recent Labs     05/14/21  0705 05/15/21  0513 05/16/21  0651    136 134*   K 3.7 4.4 4.1    97* 95*   CO2 28 27 27   BUN 35* 30* 35*   CREATININE 1.42* 1.25* 1.32*   GLUCOSE 208* 217* 213*   PHOS 4.5  --   --      Liver Function Test:   Recent Labs     05/16/21  0651   PROT 6.8   LABALBU 1.7*   ALT 15   AST 16   ALKPHOS 147*   BILITOT 0.30     Amylase/Lipase:  No results for input(s): AMYLASE, LIPASE in the last 72 hours.   Coagulation Profile:   Recent Labs     05/14/21  0705 05/15/21  0513 05/16/21  0651   INR 1.0 0.9 0.9   PROTIME 10.4 10.0 10.0   APTT 25.4 24.2 23.9     Cardiac Enzymes:  No results for input(s): CKTOTAL, CKMB, CKMBINDEX, TROPONINI in the last 72 hours. Lactic Acid:  Lab Results   Component Value Date    LACTA 3.2 (H) 05/03/2021    LACTA 6.0 (HH) 05/03/2021     BNP:   No results found for: BNP  D-Dimer:  No results found for: DDIMER  Others:   Lab Results   Component Value Date    TSH 13.36 (H) 05/03/2021     No results found for: LILLY, RHEUMFACTOR, SEDRATE, CRP  No results found for: Marla Grain  No results found for: IRON, TIBC, FERRITIN  No results found for: SPEP, UPEP  No results found for: PSA, CEA, , [id number],     Input/Output:    Intake/Output Summary (Last 24 hours) at 5/16/2021 0933  Last data filed at 5/16/2021 0522  Gross per 24 hour   Intake 2082.1 ml   Output 1680 ml   Net 402.1 ml       Microbiology:    Pathology:    Radiology Reports:  XR ABDOMEN (KUB) (SINGLE AP VIEW)   Final Result   2 new gastrostomy tubes noted. Ileus. XR CHEST PORTABLE   Final Result   Right chest tube in satisfactory position with significantly improved right   pleural effusion. Bibasilar airspace disease and small left pleural effusion not significantly   changed. IR CHEST TUBE INSERTION   Final Result   Successful fluoroscopic guided placement of a right chest tube. IR GUIDED THORACENTESIS PLEURAL   Final Result   Successful ultrasound guided thoracentesis. FL ESOPHAGRAM   Final Result   1. No evidence for esophageal leak. 2. Postsurgical changes in the stomach. 3. Large amount of gastroesophageal reflux occurring repeatedly during the   study. .         CT CHEST WO CONTRAST   Final Result   New right-sided PICC again seen with unchanged and satisfactory tip position;   larger loculated right effusion in the azygoesophageal recess, as above.    Otherwise similar findings to 05/07/2021 with suspected lower lobe consolidation/pneumonitis, with volume loss and effusions both lower lobes. XR CHEST PORTABLE   Final Result   New right-sided PICC tip position appears satisfactory. Otherwise, similar   findings compatible with bibasilar atelectasis/consolidation, effusions and   minimal infiltrate or atelectasis right mid lung. CT CHEST ABDOMEN PELVIS WO CONTRAST   Final Result   1. Within the chest, the patient has consolidative processes in both lower   lobes likely pneumonitis. Bilateral pleural effusions and basilar   atelectasis are noted. 2. Postoperative changes within the abdomen. The patient had a hiatal hernia   repair. Although the stomach is decompressed, gastric walls appear thickened   likely secondary to inflammation which may be postoperative. 3. Fluid in the right pericolic gutter and pelvis. 4. Thickened small bowel loops in the right lower quadrant which may   represent secondary changes to surgery. 5. Two drain placements in the upper abdomen. Left inguinal terminates in   the left iliac vein. FL ESOPHAGRAM   Final Result   1. Drainage of contrast material through what appears to be the gastrostomy   tubes following the ingestion of contrast.  Contrast does migrate beyond the   postoperative region into the proximal small bowel. 2. No extraneous/extraluminal collection of contrast is seen beyond the   confines of the stomach. 3. 2 surgical drains and 2 presumed gastrostomy tubes are seen overlying the   left upper quadrant. There does appear to be contrast slightly marginating   the more lateral gastrostomy tube balloon. 4. Delayed migration of contrast through the distal esophagus and GE junction   with mild gastroesophageal reflux. The findings were sent to the Radiology Results [id number] at 12:43   pm on 5/7/2021to be communicated to a licensed caregiver. XR CHEST PORTABLE   Final Result   Bibasilar consolidation new from prior study. Blunting of the costophrenic   angles bilaterally         XR CHEST PORTABLE   Final Result   ETT tip position stable. Decreased left subcutaneous emphysema. Slightly   improved suspected left basilar volume loss with persistent findings as   above. No overt vascular congestion. US RENAL COMPLETE   Final Result   Unremarkable ultrasound of the kidneys and urinary bladder. Trace right pleural effusion         XR CHEST PORTABLE   Final Result   Volume loss continues left hemithorax with haziness at the left base either   atelectasis and or fluid. Subcutaneous emphysema along the left lateral   chest wall has decreased. No appreciable extrapleural air. Endotracheal   tube in appropriate position. XR CHEST PORTABLE   Final Result   1. Recommend repositioning of left internal jugular approach central venous   catheter. 2. Low lung volumes with left basilar atelectasis plus or minus mild pleural   effusion. 3. Left chest wall scattered soft tissue emphysema. XR CHEST PORTABLE   Final Result   Intestinal tube deviates to the left side of a sizable hiatal hernia,   traversing below the hemidiaphragm and terminating just underneath the left   hemidiaphragm. Side port of the intestinal tube is below the diaphragmatic   hiatus. CT CHEST WO CONTRAST   Final Result   1. Ascites and pneumoperitoneum with apparent free-flowing oral contrast in   the left upper quadrant is concerning for viscus perforation, possibly within   the subdiaphragmatic portion of the stomach. 2. Large hiatal hernia with organoaxial volvulus. Majority of contrast is   retained within the esophagus and supradiaphragmatic stomach. 3. Enteric tube extends below the diaphragm with tip outside the field of   view. 4. Moderate bilateral pleural effusions with adjacent consolidation,   atelectasis versus pneumonia. Critical results were called by Dr. Kaila Durbin MD to [facility] on   5/4/2021 at 18:30. XR ABDOMEN (KUB) (SINGLE AP VIEW)   Final Result   No significant subdiaphragmatic contrast progression, as above. RECOMMENDATION:   Consider chest x-ray to further assess a organic-axial gastric volvulus         FL UGI   Final Result   Organo-axial volvulus the stomach. Large paraesophageal hernia containing the majority of the rotated gastric   body. The greater curvature is positioned superiorly within the   paraesophageal hernia defect. There is narrowing of the gastroesophageal   junction as well as of the gastroduodenal junction through the hernia defect. MRI ABDOMEN WO CONTRAST MRCP   Final Result   1. No evidence of intrahepatic or extrahepatic ductal dilatation. 2. Incompletely imaged large hiatal hernia with organoaxial malrotation of   the stomach. 3. Small to moderate amount of ascites. 4. Diffuse small bowel wall thickening likely due to 3rd spacing as the post   enteritis. 5. Trace bilateral pleural effusions.          XR CHEST PORTABLE   Final Result   No acute cardiopulmonary disease      Large hiatal hernia         CT CHEST WO CONTRAST    (Results Pending)       Echocardiogram:   Results for orders placed during the hospital encounter of 05/03/21   ECHO Complete 2D W Doppler W Color    Narrative Transthoracic Echocardiography Report (TTE)     Patient Name Muna Sargent   Date of Study               05/05/2021      Date of      1950  Gender                      Female   Birth      Age          78 year(s)  Race                              Room Number  9586        Height:                     62 inch, 157.48 cm      Corporate ID [id number]    Weight:                     166 pounds, 75.3 kg   #      Patient Acct [de-identified]   BSA:          1.77 m^2      BMI:      30.36   #                                                              kg/m^2      MR #         [id number]     Sonographer                 Jos Gan      Accession #  [id number]  Interpreting Physician Shellie Vizcarra 63      Fellow                   Referring Nurse                            Practitioner      Interpreting             Referring Physician         Sourav Quijano   Fellow     Additional Comments  Technically difficult study, patient supine on ventilator. Type of Study      TTE procedure:2D Echocardiogram, M-Mode, Doppler, Color Doppler. Procedure Date  Date: 05/05/2021 Start: 07:32 AM    Study Location: [facility]  Technical Quality: Adequate visualization    Indications:Elevated troponin. History / Tech. Comments:  Procedure explained to patient. No known medical Hx. Patient Status: Inpatient    Height: 62 inches Weight: 166 pounds BSA: 1.77 m^2 BMI: 30.36 kg/m^2    CONCLUSIONS    Summary  Normal LV size and wall thickness. No obvious wall motion abnormality seen. Normal LV systolic function with LVEF 55%. RV appears dilated with reduced function. RV systolic pressure 37 mmHg  LA appears normal in size. No obvious significant structural valvular abnormality noted. No significant valvular stenosis or regurgitation noted. Normal aortic root dimension. No significant pericardial effusion noted. Signature  ----------------------------------------------------------------------------   Electronically signed by Connor Montoya(Interpreting physician) on   05/05/2021 12:15 PM  ----------------------------------------------------------------------------    ----------------------------------------------------------------------------   Electronically signed by Olimpia Jain(Sonographer) on 05/05/2021 11:37 AM  ----------------------------------------------------------------------------  FINDINGS  Left Atrium  Left atrium is normal in size. Inter-atrial septum is intact with no evidence for an atrial septal defect,  by color doppler.   Left Ventricle  Left ventricle is small in size, normal wall thickness, global left  ventricular systolic function is normal, calculated ejection fraction is  53%. All wall segments are not well visualized (poor acoustic windows.)  Right Atrium  Right atrial dilatation. Right Ventricle  Right ventricular dilatation with reduced systolic function. Abnormal RV strain. Mitral Valve  Normal mitral valve structure. Trivial mitral regurgitation. Aortic Valve  Aortic valve is trileaflet. No evidence of aortic insufficiency or stenosis. Tricuspid Valve  Normal tricuspid valve leaflets. Moderate tricuspid regurgitation. Estimated right ventricular systolic pressure is 37 mmHg, suggesting  pulmonary HTN. Pulmonic Valve  Pulmonic valve not well visualized but Doppler velocities are normal.  Pericardial Effusion  No significant pericardial effusion is seen. Miscellaneous  Normal aortic root dimension. E/E' average = 15.05. IVC dilated but unable to assess respiratory collapse due to patient on  ventilator.     M-mode / 2D Measurements & Calculations:      LVIDd:4 cm(3.7 - 5.6 cm)          Diastolic EJMQEP:23 ml   OIXT:4.9 cm(0.6 - 1.1 cm)         Systolic EQVQKD:01.43 ml   LVPWd:0.9 cm(0.6 - 1.1 cm)        Aortic Root:2.9 cm(2.0 - 3.7 cm)                                     LA Dimension: 3.1 cm(1.9 - 4.0 cm)   Calculated LVEF (%): 52.95 %      LA volume/Index: 31.04 ml /18m^2                                     LVOT:1.9 cm                                     RVDd:3 cm      Mitral:                                 Aortic      Valve Area (P1/2-Time): 5.12 cm^2       Peak Velocity: 1.55 m/s   Peak E-Wave: 0.78 m/s                   Mean Velocity: 0.99 m/s   Peak A-Wave: 1.00 m/s                   Peak Gradient: 9.61 mmHg   E/A Ratio: 0.78                         Mean Gradient: 5 mmHg   Peak Gradient: 2.45 mmHg   Mean Gradient: 2 mmHg   Deceleration Time: 145 msec             Area (continuity): 2.16 cm^2   P1/2t: 43 msec                          AV VTI: 25.9 cm      Area (continuity): 2.1 cm^2   Mean Velocity: 0.60 m/s      Tricuspid: Pulmonic:      Estimated RVSP: 37 mmHg                 Peak Velocity: 0.80 m/s   Peak TR Velocity: 2.85 m/s              Peak Gradient: 2.57 mmHg   Peak TR Gradient: 32.49 mmHg     Diastology / Tissue Doppler  Septal Wall E' velocity:0.06 m/s  Septal Wall E/E':13.8  Lateral Wall E' velocity:0.05 m/s  Lateral Wall E/E':16.3       Cardiac Catheterization:   No results found for this or any previous visit. ASSESSMENT AND PLAN     Assessment:  Bilateral pleural effusion/mediastinal fluid collection. Left Thoracentesis 5/12/21 positive for yeast  Right pig tail chest tube culture negative  Paraesophageal hernia status post repair. Gastric perforation status post surgery  Sepsis   Bilateral lower lobe pneumonia  Peritonitis  Blood cultures positive for staph epidermidis methicillin resistant  Diabetes mellitusDKA resolved  Lactic acidosis improved  BONNIE  Thrombocytopenia improved  Elevated troponins  Subclinical hypothyroidism      Plan:      She is currently on 2 L nasal cannula this morning although she has rapid shallow respiration poor cough poor effort. She does have abdominal distention and able to tolerate tube feeding and increased G-tube drainage need follow-up with surgery. Because of the surgery abdominal distention and gastric/esophageal surgery she is increased risk for aspiration need to keep head of bed up NG tube to gravity to feedings off. Patient underwent right-sided 12 American chest tube placement on 05/13/2021 for loculated lung effusion. Fluid so far negative for culture. Low pH 6.9 although not neutrophilic and lymphocytic and glucose 122. Patient had left thoracentesis on 5/12, fluid shows elevated LDH of 536, protein 3.1, exudative effusion likely parapneumonic. Positive for yeast/Candida  She is a scheduled for CT scan of the chest today for follow-up of pleural effusion  Continue to monitor chest tube drainage from right side chest tube.   Last 24-hour 60 mL  Follow-up with CT surgery  Continue antibiotics Zosyn and vancomycin and Diflucan as per infectious disease  Gastric tube management per general surgery  Continued effort for mobilization of secretions/continue therapy vest  On TPN   On Lasix 20 mg every other day  Monitor intake and output with goal of even/negative balance  Aspiration precautions  Encourage ambulation/physical therapy and incentive spirometry  DVT prophylaxis on heparin subcutaneous    Discussed in detail with nursing staff. Wing Jonny MD, MD  Pulmonary and Critical Care Medicine           5/16/2021, 9:33 AM       Please note that this chart was generated using voice recognition Dragon dictation software. Although every effort was made to ensure the accuracy of this automated transcription, some errors in transcription may have occurred.

## 2021-05-17 PROBLEM — A41.9 SEPTIC SHOCK (HCC): Status: RESOLVED | Noted: 2021-05-04 | Resolved: 2021-05-17

## 2021-05-17 PROBLEM — J96.01 ACUTE HYPOXEMIC RESPIRATORY FAILURE (HCC): Status: RESOLVED | Noted: 2021-05-04 | Resolved: 2021-05-17

## 2021-05-17 PROBLEM — R65.21 SEPTIC SHOCK (HCC): Status: RESOLVED | Noted: 2021-05-04 | Resolved: 2021-05-17

## 2021-05-17 PROBLEM — N17.0 ACUTE TUBULAR NECROSIS (HCC): Status: RESOLVED | Noted: 2021-05-04 | Resolved: 2021-05-17

## 2021-05-17 PROBLEM — E87.20 LACTIC ACID ACIDOSIS: Status: RESOLVED | Noted: 2021-05-04 | Resolved: 2021-05-17

## 2021-05-17 PROBLEM — E11.10 DIABETIC KETOACIDOSIS WITHOUT COMA ASSOCIATED WITH TYPE 2 DIABETES MELLITUS (HCC): Status: RESOLVED | Noted: 2021-05-04 | Resolved: 2021-05-17

## 2021-05-17 PROBLEM — N17.9 AKI (ACUTE KIDNEY INJURY) (HCC): Status: RESOLVED | Noted: 2021-05-04 | Resolved: 2021-05-17

## 2021-05-17 LAB
ABSOLUTE EOS #: 0.28 K/UL (ref 0–0.4)
ABSOLUTE IMMATURE GRANULOCYTE: 0.05 K/UL (ref 0–0.3)
ABSOLUTE LYMPH #: 0.74 K/UL (ref 1–4.8)
ABSOLUTE MONO #: 0.09 K/UL (ref 0.1–0.8)
ALBUMIN SERPL-MCNC: 1.7 G/DL (ref 3.5–5.2)
ALBUMIN/GLOBULIN RATIO: 0.4 (ref 1–2.5)
ALP BLD-CCNC: 127 U/L (ref 35–104)
ALT SERPL-CCNC: 13 U/L (ref 5–33)
ANION GAP SERPL CALCULATED.3IONS-SCNC: 7 MMOL/L (ref 9–17)
AST SERPL-CCNC: 16 U/L
BASOPHILS # BLD: 1 % (ref 0–2)
BASOPHILS ABSOLUTE: 0.05 K/UL (ref 0–0.2)
BILIRUB SERPL-MCNC: 0.27 MG/DL (ref 0.3–1.2)
BILIRUBIN DIRECT: 0.14 MG/DL
BILIRUBIN, INDIRECT: 0.13 MG/DL (ref 0–1)
BUN BLDV-MCNC: 37 MG/DL (ref 8–23)
BUN/CREAT BLD: ABNORMAL (ref 9–20)
CALCIUM SERPL-MCNC: 8.5 MG/DL (ref 8.6–10.4)
CHLORIDE BLD-SCNC: 96 MMOL/L (ref 98–107)
CO2: 31 MMOL/L (ref 20–31)
CREAT SERPL-MCNC: 1.35 MG/DL (ref 0.5–0.9)
DIFFERENTIAL TYPE: ABNORMAL
EOSINOPHILS RELATIVE PERCENT: 6 % (ref 1–4)
GFR AFRICAN AMERICAN: 47 ML/MIN
GFR NON-AFRICAN AMERICAN: 39 ML/MIN
GFR SERPL CREATININE-BSD FRML MDRD: ABNORMAL ML/MIN/{1.73_M2}
GFR SERPL CREATININE-BSD FRML MDRD: ABNORMAL ML/MIN/{1.73_M2}
GLOBULIN: ABNORMAL G/DL (ref 1.5–3.8)
GLUCOSE BLD-MCNC: 162 MG/DL (ref 65–105)
GLUCOSE BLD-MCNC: 170 MG/DL (ref 65–105)
GLUCOSE BLD-MCNC: 170 MG/DL (ref 65–105)
GLUCOSE BLD-MCNC: 171 MG/DL (ref 65–105)
GLUCOSE BLD-MCNC: 172 MG/DL (ref 70–99)
GLUCOSE BLD-MCNC: 209 MG/DL (ref 65–105)
GLUCOSE BLD-MCNC: 210 MG/DL (ref 65–105)
GLUCOSE BLD-MCNC: 215 MG/DL (ref 65–105)
GLUCOSE BLD-MCNC: 272 MG/DL (ref 65–105)
HCT VFR BLD CALC: 25.9 % (ref 36.3–47.1)
HEMOGLOBIN: 8.1 G/DL (ref 11.9–15.1)
IMMATURE GRANULOCYTES: 1 %
INR BLD: 1
LYMPHOCYTES # BLD: 16 % (ref 24–44)
MAGNESIUM: 1.8 MG/DL (ref 1.6–2.6)
MCH RBC QN AUTO: 27.9 PG (ref 25.2–33.5)
MCHC RBC AUTO-ENTMCNC: 31.3 G/DL (ref 28.4–34.8)
MCV RBC AUTO: 89.3 FL (ref 82.6–102.9)
MONOCYTES # BLD: 2 % (ref 1–7)
MORPHOLOGY: NORMAL
NRBC AUTOMATED: 0 PER 100 WBC
PARTIAL THROMBOPLASTIN TIME: 22.9 SEC (ref 20.5–30.5)
PDW BLD-RTO: 13.9 % (ref 11.8–14.4)
PHOSPHORUS: 4.2 MG/DL (ref 2.6–4.5)
PLATELET # BLD: 335 K/UL (ref 138–453)
PLATELET ESTIMATE: ABNORMAL
PMV BLD AUTO: 9.9 FL (ref 8.1–13.5)
POTASSIUM SERPL-SCNC: 4.2 MMOL/L (ref 3.7–5.3)
PROTHROMBIN TIME: 10.4 SEC (ref 9.1–12.3)
RBC # BLD: 2.9 M/UL (ref 3.95–5.11)
RBC # BLD: ABNORMAL 10*6/UL
SEG NEUTROPHILS: 74 % (ref 36–66)
SEGMENTED NEUTROPHILS ABSOLUTE COUNT: 3.39 K/UL (ref 1.8–7.7)
SODIUM BLD-SCNC: 134 MMOL/L (ref 135–144)
TOTAL PROTEIN: 6.3 G/DL (ref 6.4–8.3)
WBC # BLD: 4.6 K/UL (ref 3.5–11.3)
WBC # BLD: ABNORMAL 10*3/UL

## 2021-05-17 PROCEDURE — 2580000003 HC RX 258: Performed by: STUDENT IN AN ORGANIZED HEALTH CARE EDUCATION/TRAINING PROGRAM

## 2021-05-17 PROCEDURE — 80076 HEPATIC FUNCTION PANEL: CPT

## 2021-05-17 PROCEDURE — 85025 COMPLETE CBC W/AUTO DIFF WBC: CPT

## 2021-05-17 PROCEDURE — 94640 AIRWAY INHALATION TREATMENT: CPT

## 2021-05-17 PROCEDURE — 2060000000 HC ICU INTERMEDIATE R&B

## 2021-05-17 PROCEDURE — 99233 SBSQ HOSP IP/OBS HIGH 50: CPT | Performed by: FAMILY MEDICINE

## 2021-05-17 PROCEDURE — 6370000000 HC RX 637 (ALT 250 FOR IP): Performed by: NURSE PRACTITIONER

## 2021-05-17 PROCEDURE — 82947 ASSAY GLUCOSE BLOOD QUANT: CPT

## 2021-05-17 PROCEDURE — 80048 BASIC METABOLIC PNL TOTAL CA: CPT

## 2021-05-17 PROCEDURE — 6370000000 HC RX 637 (ALT 250 FOR IP): Performed by: STUDENT IN AN ORGANIZED HEALTH CARE EDUCATION/TRAINING PROGRAM

## 2021-05-17 PROCEDURE — 85730 THROMBOPLASTIN TIME PARTIAL: CPT

## 2021-05-17 PROCEDURE — 97116 GAIT TRAINING THERAPY: CPT

## 2021-05-17 PROCEDURE — 97530 THERAPEUTIC ACTIVITIES: CPT

## 2021-05-17 PROCEDURE — 6360000002 HC RX W HCPCS: Performed by: STUDENT IN AN ORGANIZED HEALTH CARE EDUCATION/TRAINING PROGRAM

## 2021-05-17 PROCEDURE — 97110 THERAPEUTIC EXERCISES: CPT

## 2021-05-17 PROCEDURE — 6360000002 HC RX W HCPCS: Performed by: INTERNAL MEDICINE

## 2021-05-17 PROCEDURE — 2500000003 HC RX 250 WO HCPCS: Performed by: SURGERY

## 2021-05-17 PROCEDURE — 84100 ASSAY OF PHOSPHORUS: CPT

## 2021-05-17 PROCEDURE — 6370000000 HC RX 637 (ALT 250 FOR IP): Performed by: FAMILY MEDICINE

## 2021-05-17 PROCEDURE — 94761 N-INVAS EAR/PLS OXIMETRY MLT: CPT

## 2021-05-17 PROCEDURE — 85610 PROTHROMBIN TIME: CPT

## 2021-05-17 PROCEDURE — 99232 SBSQ HOSP IP/OBS MODERATE 35: CPT | Performed by: INTERNAL MEDICINE

## 2021-05-17 PROCEDURE — 36415 COLL VENOUS BLD VENIPUNCTURE: CPT

## 2021-05-17 PROCEDURE — 2700000000 HC OXYGEN THERAPY PER DAY

## 2021-05-17 PROCEDURE — 99233 SBSQ HOSP IP/OBS HIGH 50: CPT | Performed by: INTERNAL MEDICINE

## 2021-05-17 PROCEDURE — 6370000000 HC RX 637 (ALT 250 FOR IP): Performed by: INTERNAL MEDICINE

## 2021-05-17 PROCEDURE — 83735 ASSAY OF MAGNESIUM: CPT

## 2021-05-17 RX ORDER — GABAPENTIN 300 MG/1
300 CAPSULE ORAL 3 TIMES DAILY
Qty: 90 CAPSULE | Refills: 0 | DISCHARGE
Start: 2021-05-17 | End: 2021-05-20 | Stop reason: HOSPADM

## 2021-05-17 RX ORDER — ATORVASTATIN CALCIUM 10 MG/1
10 TABLET, FILM COATED ORAL DAILY
Status: DISCONTINUED | OUTPATIENT
Start: 2021-05-17 | End: 2021-05-28 | Stop reason: HOSPADM

## 2021-05-17 RX ORDER — ASPIRIN 81 MG/1
81 TABLET ORAL DAILY
Status: DISCONTINUED | OUTPATIENT
Start: 2021-05-17 | End: 2021-05-28 | Stop reason: HOSPADM

## 2021-05-17 RX ADMIN — PIPERACILLIN AND TAZOBACTAM 3375 MG: 3; .375 INJECTION, POWDER, LYOPHILIZED, FOR SOLUTION INTRAVENOUS at 04:16

## 2021-05-17 RX ADMIN — METOCLOPRAMIDE 5 MG: 5 INJECTION, SOLUTION INTRAMUSCULAR; INTRAVENOUS at 15:33

## 2021-05-17 RX ADMIN — INSULIN LISPRO 6 UNITS: 100 INJECTION, SOLUTION INTRAVENOUS; SUBCUTANEOUS at 12:37

## 2021-05-17 RX ADMIN — PANTOPRAZOLE SODIUM 40 MG: 40 TABLET, DELAYED RELEASE ORAL at 05:31

## 2021-05-17 RX ADMIN — VANCOMYCIN HYDROCHLORIDE 750 MG: 10 INJECTION, POWDER, LYOPHILIZED, FOR SOLUTION INTRAVENOUS at 09:37

## 2021-05-17 RX ADMIN — GABAPENTIN 300 MG: 300 CAPSULE ORAL at 21:33

## 2021-05-17 RX ADMIN — HEPARIN SODIUM 5000 UNITS: 5000 INJECTION INTRAVENOUS; SUBCUTANEOUS at 13:30

## 2021-05-17 RX ADMIN — METOCLOPRAMIDE 5 MG: 5 INJECTION, SOLUTION INTRAMUSCULAR; INTRAVENOUS at 09:26

## 2021-05-17 RX ADMIN — IPRATROPIUM BROMIDE AND ALBUTEROL SULFATE 1 AMPULE: .5; 2.5 SOLUTION RESPIRATORY (INHALATION) at 11:23

## 2021-05-17 RX ADMIN — IPRATROPIUM BROMIDE AND ALBUTEROL SULFATE 1 AMPULE: .5; 2.5 SOLUTION RESPIRATORY (INHALATION) at 19:30

## 2021-05-17 RX ADMIN — PIPERACILLIN AND TAZOBACTAM 3375 MG: 3; .375 INJECTION, POWDER, LYOPHILIZED, FOR SOLUTION INTRAVENOUS at 21:32

## 2021-05-17 RX ADMIN — IPRATROPIUM BROMIDE AND ALBUTEROL SULFATE 1 AMPULE: .5; 2.5 SOLUTION RESPIRATORY (INHALATION) at 15:47

## 2021-05-17 RX ADMIN — METFORMIN HYDROCHLORIDE 500 MG: 500 TABLET ORAL at 17:06

## 2021-05-17 RX ADMIN — FLUCONAZOLE 400 MG: 2 INJECTION, SOLUTION INTRAVENOUS at 11:38

## 2021-05-17 RX ADMIN — INSULIN LISPRO 6 UNITS: 100 INJECTION, SOLUTION INTRAVENOUS; SUBCUTANEOUS at 09:27

## 2021-05-17 RX ADMIN — INSULIN GLARGINE 10 UNITS: 100 INJECTION, SOLUTION SUBCUTANEOUS at 21:31

## 2021-05-17 RX ADMIN — LEVOTHYROXINE SODIUM 112 MCG: 112 TABLET ORAL at 05:31

## 2021-05-17 RX ADMIN — INSULIN LISPRO 3 UNITS: 100 INJECTION, SOLUTION INTRAVENOUS; SUBCUTANEOUS at 17:06

## 2021-05-17 RX ADMIN — Medication 81 MG: at 12:53

## 2021-05-17 RX ADMIN — INSULIN LISPRO 3 UNITS: 100 INJECTION, SOLUTION INTRAVENOUS; SUBCUTANEOUS at 04:17

## 2021-05-17 RX ADMIN — INSULIN LISPRO 6 UNITS: 100 INJECTION, SOLUTION INTRAVENOUS; SUBCUTANEOUS at 21:32

## 2021-05-17 RX ADMIN — CALCIUM GLUCONATE: 98 INJECTION, SOLUTION INTRAVENOUS at 18:12

## 2021-05-17 RX ADMIN — GABAPENTIN 300 MG: 300 CAPSULE ORAL at 09:26

## 2021-05-17 RX ADMIN — HEPARIN SODIUM 5000 UNITS: 5000 INJECTION INTRAVENOUS; SUBCUTANEOUS at 05:31

## 2021-05-17 RX ADMIN — INSULIN LISPRO 3 UNITS: 100 INJECTION, SOLUTION INTRAVENOUS; SUBCUTANEOUS at 00:53

## 2021-05-17 RX ADMIN — HEPARIN SODIUM 5000 UNITS: 5000 INJECTION INTRAVENOUS; SUBCUTANEOUS at 21:33

## 2021-05-17 RX ADMIN — METOCLOPRAMIDE 5 MG: 5 INJECTION, SOLUTION INTRAMUSCULAR; INTRAVENOUS at 02:23

## 2021-05-17 RX ADMIN — PIPERACILLIN AND TAZOBACTAM 3375 MG: 3; .375 INJECTION, POWDER, LYOPHILIZED, FOR SOLUTION INTRAVENOUS at 13:45

## 2021-05-17 RX ADMIN — FUROSEMIDE 20 MG: 20 TABLET ORAL at 09:26

## 2021-05-17 RX ADMIN — METOCLOPRAMIDE 5 MG: 5 INJECTION, SOLUTION INTRAMUSCULAR; INTRAVENOUS at 21:33

## 2021-05-17 RX ADMIN — ATORVASTATIN CALCIUM 10 MG: 10 TABLET, FILM COATED ORAL at 21:33

## 2021-05-17 RX ADMIN — SODIUM CHLORIDE, PRESERVATIVE FREE 10 ML: 5 INJECTION INTRAVENOUS at 09:26

## 2021-05-17 RX ADMIN — GABAPENTIN 300 MG: 300 CAPSULE ORAL at 13:29

## 2021-05-17 RX ADMIN — IPRATROPIUM BROMIDE AND ALBUTEROL SULFATE 1 AMPULE: .5; 2.5 SOLUTION RESPIRATORY (INHALATION) at 07:23

## 2021-05-17 ASSESSMENT — ENCOUNTER SYMPTOMS
RHINORRHEA: 0
ABDOMINAL PAIN: 1
COUGH: 0
CONSTIPATION: 0
BLOOD IN STOOL: 0
WHEEZING: 0
CHEST TIGHTNESS: 0
SHORTNESS OF BREATH: 0
NAUSEA: 0
DIARRHEA: 0
VOMITING: 0

## 2021-05-17 ASSESSMENT — PAIN SCALES - GENERAL: PAINLEVEL_OUTOF10: 3

## 2021-05-17 ASSESSMENT — PAIN DESCRIPTION - PROGRESSION
CLINICAL_PROGRESSION: NOT CHANGED

## 2021-05-17 ASSESSMENT — PAIN DESCRIPTION - PAIN TYPE: TYPE: SURGICAL PAIN

## 2021-05-17 ASSESSMENT — PAIN DESCRIPTION - LOCATION: LOCATION: ABDOMEN

## 2021-05-17 NOTE — PLAN OF CARE
Problem: Respiratory  Intervention: Respiratory assessment  Note: BRONCHOSPASM/BRONCHOCONSTRICTION    IMPROVE  AERATION/BREATHSOUNDS  ADMINISTER BRONCHODILATOR THERAPY AS APPROPRIATE  ASSESS BREATH SOUNDS  PATIENT EDUCATION AS NEEDED     PROVIDE ADEQUATE OXYGENATION WITH ACCEPTABLE SP02/ABG'S    [x]  IDENTIFY APPROPRIATE OXYGEN THERAPY  [x]   MONITOR SP02/ABG'S AS NEEDED   [x]   PATIENT EDUCATION AS NEEDED     NONINVASIVE VENTILATION    PROVIDE OPTIMAL VENTILATION/ACCEPTABLE SPO2   IMPLEMENT NONINVASIVE VENTILATION PROTOCOL   MAINTAIN ACCEPTABLE SPO2   ASSESS SKIN INTEGRITY/BREAKDOWN SCORE   PATIENT EDUCATION AS NEEDED   BIPAP AS NEEDED

## 2021-05-17 NOTE — CARE COORDINATION
Transitional Planning     Left VM for Khanh with Colleen regarding previous CM note.      [address] with Suzi Mckeon at [facility]; precert now initiated for [facility] instead of [facility] in [place]

## 2021-05-17 NOTE — PROGRESS NOTES
[facility] -Progress Note    Today's Date and Time: 5/17/2021, 2:26 PM    Impression :     Paraesophageal Hiatal hernia  Perforation of the esophagus  S/p laparoscopic, robotic para esophageal hernia repair 5-4-21  Acute pancreatitis  Shock   Hyperglycemia  NSTEMI  BONNIE  Not a MRSA carrier  Coagulase negative Staphylococci bacteremia x 2 on 5-7-21. Repeat blood cultures 5-8-21 x 2 : No growth . Bilateral Empyema 5/13/21  Left sided thoracentesis 5/12/21-Identified as yeast  Right sided pigtail chest tube placed on 5/12/21 for right-sided effusion    Recommendations:   Zosyn started 5/6/21. Will continue until VATS  Vancomycin started 5/8/21 for Coagulase negative Staphylococci bacteremia-Will continue till VATS  Gram positive rods from thoracentesis identified as yeast. (Candida albicans). Fluconazole started 5-15-21. Medical Decision Making/Summary/Discussion:5/17/2021     Patient with large paraesophageal hiatal hernia with perforation of esophagus  Hyperglycemia   Acute pancreatitis  Shock   S/p laparoscopic, robotic para esophageal hernia repair 5-4-21  Improvement in overall picture but is developing basilar infiltrates  Not a MRSA carrier  Unclear whether this is fluid retention vs residual leakage vs secondary pneumonia  Esophagogram 5-7-21 does not show a leak  Will plan to continue zosyn and add Diflucan. Monitor temps and CXR. Zosyn and Vanco to continue while awaiting possible VATS/decortication procedure.     Infection Control Recommendations   [place] Precautions      Antimicrobial Stewardship Recommendations     Simplification of therapy  Targeted therapy    Coordination of Outpatient Care:   Estimated Length of IV antimicrobials: TBD  Patient will need Midline Catheter Insertion:No   Patient will need PICC line Insertion:No  Patient will need: Home IV , [place],  SNF,  LTAC:  Patient will need outpatient wound care:Yes    Chief complaint/reason for consultation:   Septic shock/sepsis    History of Present Illness:   Audie Tse is a 78y.o.-year-old   female who was initially admitted on 5/3/2021. Patient seen at the request of Radha Weinstein. INITIAL HISTORY : 05/07/2021    Pt with a history of diabetes mellitus type 2, hypertension, hyperlipidemia, thyroid disease, gout and hiatal hernia. She initially presented on 05/03/2021 to an outlying facility with a chief complaint of nausea vomiting, diarrhea and syncopal attack . Patient was found to have blood glucose of 585,WBC of 23.5, elevated lipase 1451 and elevated beta hydroxybutyrate. MRCP on 05/03/2021 showed diffuse small bowel wall thickening likely due to third spacing as opposed to enteritis. CT scan of the abdomen showed extremely large fluid-filled hiatal hernia and distended and fluid-filled stomach below the diaphragm. Patient was given Zosyn at the outlying facility and was transferred to [facility] ED for evaluation by the surgery team.    CT scan of the chest without contrast was performed on 05/04/2021 which showed ascites and pneumoperitoneum with apparent free-flowing oral contrast in the left upper quadrant concerning for viscus perforation possibly within the subdiaphragmatic portion of the stomach. Moderate bilateral pleural effusions and hiatal hernia with organoaxial volvulus. Bariatric surgery performed a laparoscopic robotic para esophageal hernia repair. Cardiology is also following the patient because of elevated troponins with unremarkable echocardiography. Antibiotics wise the patient was given Zosyn on 05/03/2021 at the outside facility and it has been continued through the present time. Blood and urine cultures on 05/03/2021 show No growth . Repeat urine culture on 05/04/2021 shows No growth     Patient is currently having temperature elevations. Temp max of 101.1 on 05/07/2021 around 4 AM in the morning.     Patient WBC count is improving from 23.5 on presentation to 6.2 today. Repeat chest x-ray 5-7-21 shows bibasilar consolidation new from prior study with blunting of the costophrenic angles bilaterally. Patient is off of the pressors since 5-6-21    CURRENT EVALUATION : 5/17/2021    Afebrile  VS stable  On 2-->4.5 -->2 L NC  50 cc drainage from chest tube last 24 hours. Repeat CT chest shows almost complete resolution of the prior fluid collection. Left Thoracentesis on 5/12/21 for left loculated empyema-Pigtail catheter placed with pus noted. Fluid shows elevated LDH of 536, protein 3.1, exudative effusion likely para-pneumonic  5/12/21 Fluid culture positive for Gram positive rods. Candida albicans    Right sided 12 [place] chest tube placed on 5/13/21 for empyema. CT surgery consult for possible VATs/decortication next week. Repeat CT chest planned on 5/16/21    Follow-up esophagram on 5/11/21 negative for a leak. Coagulase negative Staphylococci bacteremia x 2 on 5-7-21. Repeat blood cultures 5-8-21 x 2 : No growth. Femoral line and morel catheter were removed. Right TAISHA removed 5/11/21 5/12/21 Thoracentesis fluid culture positive for Gram positive rods. Identification shows Kassandra albicans  Will continue Vancomycin & Zosyn. Add fluconazole. Rt and Lt TAISHA drains have been removed, on 5-7- and 5-11-21, respectively  G-tube is clamped and patient remains on TPN-surgery to switch to TF per G tube       Labs, X rays reviewed: 5/17/2021    BUN: 37  Cr: 1.71-->1.34-->1.42-->1.25->1.35    WBC: 9.4-->4.7-->5.3->6.6->4.6  Hb: 8.1  Plat: 335    LD: 345  Triglycerides: 261    Cultures:    Urine:  05/03/2021: No growth   05/04/2021: No growth     Blood:  05/03/2021: Blood cultures x 2:  No growth  5/7/21: Coagulase negative Staphylococci bacteremia x 2  5/8/21: No growth  Sputum :    Thoracentesis fluid:  Lt side 5/12/21 gram positive rods/ candida albicans.    Rt side 5-13-21: No growth     CT chest 5/11/21  New right-sided PICC again seen with unchanged and satisfactory tip position;   larger loculated right effusion in the azygoesophageal recess, as above. Otherwise similar findings to 05/07/2021 with suspected lower lobe   consolidation/pneumonitis, with volume loss and effusions both lower lobes. CT chest 5/16:  Impression:        1.  Right chest tube in place with near complete resolution of right   effusion.  No pneumothorax or loculated fluid in the right hemithorax. 2.  Trace left pleural effusion. 3.  Dependent opacities in the lower lobes, left greater than right, again   demonstrated, although improved since prior chest CT exam.     4.  Moderate amount of fluid within the esophageal hiatus. 5.  Partially visualized stomach appears distended with 2 gastrostomy tubes   in place. Discussed with patient, RN, IM, Pulmonary      I have personally reviewed the past medical history, past surgical history, medications, social history, and family history, and I have updated the database accordingly.   Past Medical History:     Past Medical History:   Diagnosis Date    Diabetes mellitus ([address]     Gout     Hiatal hernia     Hyperlipidemia     Hypertension     Thyroid disease        Past Surgical  History:     Past Surgical History:   Procedure Laterality Date    BREAST SURGERY      Bx 1993    CHOLECYSTECTOMY  1999    GASTRIC FUNDOPLICATION N/A 3/7/3950    PARAESPHAGEAL HERNIA REPAIR LAPAROSCOPIC ROBOTIC performed by Arturo Dixon DO at [address]  5/10/2021         HYSTERECTOMY  1997    IR CHEST TUBE INSERTION  5/13/2021    [facility]    OTHER SURGICAL HISTORY  1962    Remove spur L ankle    OTHER SURGICAL HISTORY  1978    Pin and wire repair R ankle    TUBAL LIGATION  1988       Medications:      aspirin  81 mg Oral Daily    atorvastatin  10 mg Oral Daily    metFORMIN  500 mg Oral BID WC    metoclopramide  5 mg Intravenous Q6H    vancomycin  750 mg Intravenous Q24H    fluconazole  400 mg Intravenous Q24H    fat emulsion  100 mL Intravenous Weekly - Thursday    levothyroxine  112 mcg Oral Daily    pantoprazole  40 mg Oral QAM AC    gabapentin  300 mg Oral TID    furosemide  20 mg Oral Every Other Day    insulin glargine  10 Units Subcutaneous Nightly    polyethylene glycol  17 g Oral Daily    heparin (porcine)  5,000 Units Subcutaneous 3 times per day    vancomycin (VANCOCIN) intermittent dosing (placeholder)   Other RX Placeholder    ipratropium-albuterol  1 ampule Inhalation Q4H WA    piperacillin-tazobactam  3,375 mg Intravenous Q8H    insulin lispro  0-18 Units Subcutaneous Q4H    sodium chloride flush  5-40 mL Intravenous 2 times per day       Social History:     Social History     Socioeconomic History    Marital status:      Spouse name: Not on file    Number of children: Not on file    Years of education: Not on file    Highest education level: Not on file   Occupational History    Not on file   Tobacco Use    Smoking status: Never Smoker    Smokeless tobacco: Never Used   Substance and Sexual Activity    Alcohol use: Never    Drug use: Never    Sexual activity: Not on file   Other Topics Concern    Not on file   Social History Narrative    Not on file     Social Determinants of Health     Financial Resource Strain:     Difficulty of Paying Living Expenses:    Food Insecurity:     Worried About Running Out of Food in the Last Year:     Ran Out of Food in the Last Year:    Transportation Needs:     Lack of Transportation (Medical):      Lack of Transportation (Non-Medical):    Physical Activity:     Days of Exercise per Week:     Minutes of Exercise per Session:    Stress:     Feeling of Stress :    Social Connections:     Frequency of Communication with Friends and Family:     Frequency of Social Gatherings with Friends and Family:     Attends Anabaptist Services:     Active Member of Clubs or Organizations:     Attends Club or Organization Meetings:     Marital Status:    Intimate Partner Violence:     Fear of Current or Ex-Partner:     Emotionally Abused:     Physically Abused:     Sexually Abused:        Family History:     Family History   Problem Relation Age of Onset    Breast Cancer Mother     High Blood Pressure Mother     High Cholesterol Father     Breast Cancer Sister         Allergies:   No known allergies     Review of Systems:   Review of Systems   Constitutional: Positive for fatigue and fever. Negative for activity change, appetite change, chills, diaphoresis and unexpected weight change. Respiratory: Positive for cough. Negative for apnea, choking, chest tightness, shortness of breath, wheezing and stridor. Cardiovascular: Negative for chest pain, palpitations and leg swelling. Gastrointestinal: Positive for constipation. Negative for abdominal distention, nausea and vomiting. Endocrine: Negative for cold intolerance. Genitourinary: Negative for difficulty urinating and dysuria. Musculoskeletal: Negative for arthralgias and back pain. Neurological: Negative for dizziness, tremors, syncope and facial asymmetry. Hematological: Negative for adenopathy. Psychiatric/Behavioral: Negative for agitation and behavioral problems. The patient is not hyperactive. Physical Examination :     Patient Vitals for the past 8 hrs:   BP Temp Temp src Pulse Resp SpO2   05/17/21 1235 123/73 98.1 °F (36.7 °C) Oral 88 21 92 %   05/17/21 1123      93 %   05/17/21 0800 121/66 98.8 °F (37.1 °C) Temporal 80 20 94 %     General Appearance: Awake, alert  Head:  Normocephalic, no trauma  Eyes: Pupils equal, round, reactive to light, sclera anicteric; conjunctivae pink. No embolic phenomena. ENT: Oropharynx clear, without erythema, exudate, or thrush. No tenderness of sinuses. Mouth/throat: mucosa pink and moist. No lesions. SINGLE CONTRAST ESOPHAGRAM       5/7/2021 11:03 am       TECHNIQUE:   Single contrast esophagram was performed with a total 100 mL of Omnipaque 240   oral contrast.       FLUOROSCOPY DOSE AND TYPE OR TIME AND EXPOSURES:   Fluoro time: 2.7 minutes; DAP: 70.44 mGy       COMPARISON:   Upper GI from 05/04/2021       HISTORY:   ORDERING SYSTEM PROVIDED HISTORY: s/p hiatal hernia reduction  with partial   gastrectomy and gastropexy   TECHNOLOGIST PROVIDED HISTORY:   s/p hiatal hernia reduction  with partial gastrectomy and gastropexy   Reason for Exam: H.H repair/gastrectomy/gastropexy/ 100 ml Omnipaque orally   Acuity: Unknown   Type of Exam: Unknown       80-year-old female status post hiatal hernia reduction with partial   gastrectomy and gastropexy       FINDINGS:   Fluoroscopic  imaging was obtained prior to the administration contrast.       2 gastrostomy tubes are seen projecting over the left upper quadrant.  There   also 2 surgical drains projecting over the left upper quadrant.  Prior   cholecystectomy.  Suture material projects over the left upper quadrant.       The patient drank contrast which migrates through the postoperative region   and into the stomach and small bowel.       There is contrast draining through what appears to be the gastrostomy tubes.       No extraneous collection of contrast is seen beyond the confines of the   stomach.       There is contrast marginating a presumed gastrostomy tube balloon.       Mild gastroesophageal reflux and delayed transit of contrast is seen within   the distal esophagus/GE junction.           Impression   1. Drainage of contrast material through what appears to be the gastrostomy   tubes following the ingestion of contrast.  Contrast does migrate beyond the   postoperative region into the proximal small bowel. 2. No extraneous/extraluminal collection of contrast is seen beyond the   confines of the stomach.    3. 2 surgical drains and 2 presumed Tissues/Bones: No acute osseous abnormality.           Impression   1. Ascites and pneumoperitoneum with apparent free-flowing oral contrast in   the left upper quadrant is concerning for viscus perforation, possibly within   the subdiaphragmatic portion of the stomach. 2. Large hiatal hernia with organoaxial volvulus.  Majority of contrast is   retained within the esophagus and supradiaphragmatic stomach. 3. Enteric tube extends below the diaphragm with tip outside the field of   view. 4. Moderate bilateral pleural effusions with adjacent consolidation,   atelectasis versus pneumonia. Critical results were called by Dr. Dino Jung MD to [facility] on   5/4/2021 at 18:30.            CXR:  ONE XRAY VIEW OF THE CHEST       5/11/2021 9:22 am       COMPARISON:   AP chest from 05/07/2021; CT scan of the chest, abdomen and pelvis from   05/07/2021       HISTORY:   ORDERING SYSTEM PROVIDED HISTORY: follow up on bibasilar consolidation   TECHNOLOGIST PROVIDED HISTORY:   follow up on bibasilar consolidation       History of diabetes and hypertension.       FINDINGS:   Overlying ECG monitor leads and gown snaps.  New right-sided PICC tip   position in the SVC.       Low lung volumes accentuating findings, including cardiomegaly with bibasilar   opacities suggesting atelectasis/consolidation and effusions.  Patchy   infiltrate right mid lung also again noted.       Bones stable.           Impression   New right-sided PICC tip position appears satisfactory.  Otherwise, similar   findings compatible with bibasilar atelectasis/consolidation, effusions and   minimal infiltrate or atelectasis right mid lung.             CT scan:   CT OF THE CHEST WITHOUT CONTRAST 5/11/2021 10:31 am       TECHNIQUE:   CT of the chest was performed without the administration of intravenous   contrast. Multiplanar reformatted images are provided for review.  Dose   modulation, iterative reconstruction, and/or weight based adjustment of the   mA/kV was utilized to reduce the radiation dose to as low as reasonably   achievable.       COMPARISON:   CT scan of the chest from 05/07/2021.       HISTORY:   ORDERING SYSTEM PROVIDED HISTORY: ?left loculated pleural effusion   TECHNOLOGIST PROVIDED HISTORY:   ?left loculated pleural effusion   Reason for Exam: ?left loculated pleural effusion   Acuity: Unknown   Type of Exam: Unknown       FINDINGS:   Mediastinum: Interval placement right-sided PICC with tip position in the   mid-lower SVC.  Small unchanged mediastinal nodes; no bulky lymphadenopathy. No acute thoracic aortic or cardiac abnormality on this unenhanced scan;   prominent LV again noted.  Tiny unchanged pericardial fluid or thickening.       Lungs/pleura: Similar bilateral pleural effusions with considerable mostly   lower lobe atelectasis or consolidation with air bronchograms; larger   loculated appearing component (measuring 10.0 x 4.9 cm) extending along the   azygoesophageal recess, and across the midline (best seen slices 71-15,   series 2).  Tracheobronchial tree patent centrally       Upper Abdomen: Similar small amount perihepatic and perisplenic ascitic fluid   and soft tissue infiltration visualized abdominal adipose tissue.  Clips   status post cholecystectomy.  Mild hepatic steatosis.       Soft Tissues/Bones: No acute abnormality.           Impression   New right-sided PICC again seen with unchanged and satisfactory tip position;   larger loculated right effusion in the azygoesophageal recess, as above. Otherwise similar findings to 05/07/2021 with suspected lower lobe   consolidation/pneumonitis, with volume loss and effusions both lower lobes.               Medical Decision Plvjzm-Dfbalvul-Osoic:     Specimen Description 05/12/2021  5:09  [address]   . THORACENTESIS FLUID LEFT    Special Requests 05/12/2021  5:09  [address]   NOT REPORTED    Direct Exam Abnormal  05/12/2021  5:09 PM [address] [facility]    Direct Exam 05/12/2021  5:09  [address]   NO BACTERIA SEEN    Direct Exam 05/12/2021  5:09  [address]   Gram stain made from cytocentrifuged specimen.  Organisms and cells will be concentrated. Culture Abnormal  05/12/2021  5:09 PM [address] FLUID CULTURE, RN NOTIFIED Results called to and read back by: ROSALINDA WALLER 05/14/21 0430    Culture 05/12/2021  5:09 PM [facility] FROM BOTTLE: [place] POSITIVE RODS          Medical Decision Making-Other:     Note:  Labs, medications, radiologic studies were reviewed with personal review of films   Large amounts of data were reviewed  Discussed with nursing Staff, Discharge planner  Infection Control and Prevention measures reviewed  All prior entries were reviewed  Administer medications as ordered  Prognosis: Guarded  Discharge planning reviewed  Follow up as outpatient. Thank you for allowing us to participate in the care of this patient. Please call with questions. Mart Rodgers MD  Internal Medicine Resident, PGY-3  [address] [place], [place]  5/17/2021, 2:38 PM      ATTESTATION:    I have discussed the case, including pertinent history and exam findings with the residents and students. I have seen and examined the patient and the key elements of the encounter have been performed by me. I was present when the student obtained his information or examined the patient. I have reviewed the laboratory data, other diagnostic studies and discussed them with the residents. I have updated the medical record where necessary. I agree with the assessment, plan and orders as documented by the resident/ student.     Rajendra Lacey MD.

## 2021-05-17 NOTE — PLAN OF CARE
Nutrition Problem #1: Inadequate oral intake  Intervention: Food and/or Nutrient Delivery: Continue NPO, Modify Parenteral Nutrition (Monitor for restart of TF vs start of oral diet.)  Nutritional Goals: meet % of estimated nutrient needs

## 2021-05-17 NOTE — DISCHARGE INSTR - COC
Continuity of Care Form    Patient Name: Janay Rodriguez   :  1950  MRN:  [id number]    [facility] date:  5/3/2021  Discharge date:  2021    Code Status Order: Full Code   Advance Directives:      Admitting Physician:  Rafael Shepherd MD  PCP: Jeannie Lara DO    Discharging Nurse: [facility] Unit/Room#: 9435/8802-92  Discharging Unit Phone Number: [phone]    Emergency Contact:   Extended Emergency Contact Information  Primary Emergency Contact: [address], [address] Phone: [phone]  Mobile Phone: [phone]  Relation: Child  Secondary Emergency Contact: [facility]  Mobile Phone: [phone]  Relation: Other    Past Surgical History:  Past Surgical History:   Procedure Laterality Date    BREAST SURGERY      Bx [address]    GASTRIC FUNDOPLICATION N/A 6643    PARAESPHAGEAL HERNIA REPAIR LAPAROSCOPIC ROBOTIC performed by Milan Wright DO at [address] [facility]  5/10/2021         HYSTERECTOMY      IR CHEST TUBE INSERTION  2021    IR CHEST TUBE INSERTION 2021 Omar Kearns MD [facility] SPECIAL PROCEDURES    OTHER SURGICAL HISTORY      Remove spur L ankle    OTHER SURGICAL HISTORY      Pin and wire repair R ankle    TUBAL LIGATION         Immunization History: There is no immunization history on file for this patient.     Active Problems:  Patient Active Problem List   Diagnosis Code    Hernia with obstruction K46.0    Essential hypertension I10    Thyroid disease E07.9    Syncope R55    Hiatal hernia K44.9    Gastric volvulus K31.89    Mediastinitis J98.51    Peritonitis (HCC) K65.9    Severe malnutrition ([address] E43       Isolation/Infection:   Isolation            No Isolation          Patient Infection Status       Infection Onset Added Last Indicated Last Indicated By Review Planned Expiration Resolved Resolved By    None active    Resolved    COVID-19 Rule Out 21 COVID-19 Rapid (ID NOW) (Ordered)   05/03/21 Rule-Out Test Resulted            Nurse Assessment:  Last Vital Signs: /66   Pulse 80   Temp 98.8 °F (37.1 °C) (Temporal)   Resp 20   Ht 5' 2\" (1.575 m)   Wt 194 lb 0.1 oz (88 kg)   SpO2 93%   BMI 35.48 kg/m²     Last documented pain score (0-10 scale): Pain Level: 0  Last Weight:   Wt Readings from Last 1 Encounters:   05/17/21 194 lb 0.1 oz (88 kg)     Mental Status:  oriented and alert    IV Access:  - None    Nursing Mobility/ADLs:  Walking   Assisted  Transfer  Assisted  Bathing  Assisted  Dressing  Assisted  Toileting  Assisted  Feeding  Independent  Med Admin  Assisted  Med Delivery   whole    Wound Care Documentation and Therapy:        Elimination:  Continence: Bowel: Yes  Bladder: Yes  Urinary Catheter: None   Colostomy/Ileostomy/Ileal Conduit: No       Date of Last BM: 05/27/2021    Intake/Output Summary (Last 24 hours) at 5/17/2021 1147  Last data filed at 5/17/2021 1055  Gross per 24 hour   Intake 1604 ml   Output 2175 ml   Net -571 ml     I/O last 3 completed shifts: In: 3143 [I.V.:270]  Out: 5 [Urine:800; Emesis/NG output:1025; Chest Tube:50]    Safety Concerns: At Risk for Falls    Impairments/Disabilities:      None    Nutrition Therapy:  Current Nutrition Therapy:   - Oral Diet:  Low Fiber    Routes of Feeding: Oral  Liquids: No Restrictions  Daily Fluid Restriction: no  Last Modified Barium Swallow with Video (Video Swallowing Test): not done    Treatments at the Time of Hospital Discharge:   Respiratory Treatments: N/A  Oxygen Therapy:  is not on home oxygen therapy.   Ventilator:    - No ventilator support    Rehab Therapies: Physical Therapy and Occupational Therapy  Weight Bearing Status/Restrictions: No weight bearing restirctions  Other Medical Equipment (for information only, NOT a DME order):  walker  Other Treatments: N/A    Patient's personal belongings (please select all that are sent with patient):  Glasses, Cellphone    RN SIGNATURE: Suzan Corbin RN    CASE MANAGEMENT/SOCIAL WORK SECTION    Inpatient Status Date: 5/3/2021    Readmission Risk Assessment Score:  Readmission Risk              Risk of Unplanned Readmission:  32           Discharging to Facility/ Agency   Name:   [facility] Details  FAX            [address] [facility] [address], [facility] [address]       Phone: [phone]       Fax: [phone]          Dialysis Facility (if applicable)   Name:  Address:  Dialysis Schedule:  Phone:  Fax:    / signature: Electronically signed by Dilip Ramires RN on 5/28/21 at 10:01 AM EDT    PHYSICIAN SECTION    Prognosis: Fair    Condition at Discharge: Stable    Rehab Potential (if transferring to Rehab): Fair    Recommended Labs or Other Treatments After Discharge: PT , OT   Follow up with Pulmonary - Dr Mohan Blood   Follow up with CT surgery Dr Zach Melton   Follow up with Dr Adriane Mancia MD  Diet as tolearted  Encourage PO intake, follow up with bariatric surgery as outpatient  Wean off oxygen as tolearted  Follow up with Dr Jossue Ignacio surgery   Monitor diet, start oral hypoglycemics, metformin discontinued due to renal clearance fluctuating    Physician Certification: I certify the above information and transfer of Sveta Duong  is necessary for the continuing treatment of the diagnosis listed and that she requires [facility] for less 30 days.      Update Admission H&P: No change in H&P    PHYSICIAN SIGNATURE:  Electronically signed by Lisa Dennis MD on 5/20/21 at 11:49 AM EDT

## 2021-05-17 NOTE — PROGRESS NOTES
[facility]  Office: [address], DO, Jose Alfredo Ng, DO, Yane Gordon, DO, Olivier Lyndsay Blood, DO, Kolby North MD, Cheo Guerrero MD, Be Benitez MD, Makayla Stock MD, Naida Subramanian MD, Zoran Khalil MD, Heather Pina MD, Erika Moya MD, Katelin Chua, DO, Keith Barron MD, Nir Gomez DO, Adia Whitehead MD,  Fior Cool DO, Helen Metzger MD, Jen Traore MD, Srinivasa Hamlin MD, Acosta Dillard MD, Nadine Olivarez [facility], [address], [facility], Jaleesa Gandara, CNP, Joseph Knott, CNS, Samara Solomon, [facility], Beryle Fix, CNP, Liv Gan, CNP, Tasha Mcconnell, CNP, Joseph Puri, CNP, Flaca Rose PA-C, Silvio Flores, [facility], Derik Chaidez, CNP, Mike Ko, CNP, Oralia Selby, CNP, Cony August, CNP, Avery Hernandez, CNP, Peter Bu, [facility]      Daily Progress Note     Admit Date: 5/3/2021  Bed/Room No.  4767/8016-32  Admitting Physician : Be Benitez MD  Code Status :Full Code  Hospital Day:  LOS: 14 days   Chief Complaint:     Chief Complaint   Patient presents with    Blood Sugar Problem     >450    Hernia     hyatial      Principal Problem:    Septic shock [facility])  Active Problems:    Acute pancreatitis    Non-STEMI (non-ST elevated myocardial infarction) [facility])    Diabetic ketoacidosis without coma associated with type 2 diabetes mellitus ([facility]    BONNIE (acute kidney injury) ([address]    Hernia with obstruction    Essential hypertension    Thyroid disease    Syncope    Acute tubular necrosis (HCC)    Lactic acid acidosis    Acute hypoxemic respiratory failure ([address]    Hiatal hernia    Gastric volvulus    Community acquired bilateral lower lobe pneumonia    Mediastinitis    Peritonitis ([address]    Severe malnutrition ([facility]  Resolved Problems:    * No resolved hospital problems. *    Subjective : Interval History/Significant events :  05/17/21    Patient is having mild abd pain. Pain is controlled.  She is on supplemental oxygen for hypoxia but denies acute breathing difficulty . She is on TPN. G tubes to gravity. No drainage. Vitals - Stable afebrile  Labs - elevated creatinine , hyponatremia , Low HGB 8.1     Nursing notes , Consults notes reviewed. Overnight events and updates discussed with Nursing staff . Background History:         Mary Roblero is 78 y.o. female  Who was admitted to the hospital on 5/3/2021 for treatment of Septic shock ([address] Patient was transferred from [facility] where she presented with nausea vomiting and diarrhea for 6 days. Patient had earlier been seen at [facility] 3 days before current admission for similar symptoms and sent home. Patient then found herself on the floor after feeling dizzy and lightheaded. Work-up in the emergency room this time in ER showed severe hypokalemia 2.2, bicarb 40, BUN 16 creatinine 3.8 with lactic acid of 6, glucose 585. Patient had elevated lipase 1451, hemoglobin 18.3. Patient was admitted with diagnosis of acute kidney injury and acute pancreatitis. EKG was concerning for acute ischemia with anterior inferior lead infarct. CT abdomen pelvis showed very large hiatal hernia with concern for gastric ischemia. patient was transferred to [facility] for further evaluation and treatment in intensive care unit. Nasogastric tube was inserted for stomach decompression, patient was treated with empiric antibiotic Zosyn, evaluated by general surgery. She underwent emergent surgery for perforation of gastric volvulus with wedge gastrectomy, gastropexy x2, placement of TAISHA drain x2, abdominal lavage on 5/4/2021. Patient was extubated on 5/6/2021. She was later found to have loculated right pleural effusion and underwent thoracentesis and chest tube insertion on 5/12/2021. Esophagogram on 5/7/2021 did not show any leak. Antifungal Diflucan was added on 5/7/2021.     PMH:  Past Medical History:   Diagnosis Date    Diabetes mellitus ([address]     Gout Vitals   Patient Vitals for the past 24 hrs:   BP Temp Temp src Pulse Resp SpO2 Weight   05/17/21 0600       194 lb 0.1 oz (88 kg)   05/17/21 0303 (!) 98/57 98 °F (36.7 °C) Axillary 75 16 94 %    05/16/21 2342 (!) 99/55 98.5 °F (36.9 °C) Oral 76 10 99 %    05/16/21 2101     20 98 %    05/16/21 2051     9 98 %    05/16/21 2039 107/64 98.5 °F (36.9 °C) Axillary 76 11 98 %    05/16/21 1625      95 %    05/16/21 1530 116/75 97.6 °F (36.4 °C) Oral 86 12 96 %    05/16/21 1410      92 %    05/16/21 1300  99.3 °F (37.4 °C)        05/16/21 1100 120/84 98.1 °F (36.7 °C) Oral 97 19 92 %    05/16/21 1000 125/84 98.1 °F (36.7 °C) Oral 96 17 (!) 89 %      Intake / output   05/16 0701 - 05/17 0700  In: 8329 [I.V.:270]  Out: 1875 [Urine:800]  Physical Exam:  Physical Exam  Vitals and nursing note reviewed. Constitutional:       General: She is not in acute distress. Appearance: She is not diaphoretic. HENT:      Head: Normocephalic and atraumatic. Nose:      Right Sinus: No maxillary sinus tenderness or frontal sinus tenderness. Left Sinus: No maxillary sinus tenderness or frontal sinus tenderness. Mouth/Throat:      Pharynx: No oropharyngeal exudate. Eyes:      General: No scleral icterus. Conjunctiva/sclera: Conjunctivae normal.      Pupils: Pupils are equal, round, and reactive to light. Neck:      Thyroid: No thyromegaly. Vascular: No JVD. Cardiovascular:      Rate and Rhythm: Normal rate and regular rhythm. Pulses:           Dorsalis pedis pulses are 2+ on the right side and 2+ on the left side. Heart sounds: Normal heart sounds. No murmur heard. Pulmonary:      Effort: Pulmonary effort is normal.      Breath sounds: Normal breath sounds. No wheezing or rales. Chest:      Comments: R pigtail tube in place   Abdominal:      Palpations: Abdomen is soft. There is no mass. Tenderness: There is no abdominal tenderness.       Comments: Value Ref Range Status   05/06/2021 NEGATIVE NEGATIVE Final       Imaging / Clinical Data :-   XR ABDOMEN (KUB) (SINGLE AP VIEW)    Result Date: 5/15/2021  2 new gastrostomy tubes noted. Ileus. CT CHEST WO CONTRAST    Result Date: 5/16/2021  1. Right chest tube in place with near complete resolution of right effusion. No pneumothorax or loculated fluid in the right hemithorax. 2.  Trace left pleural effusion. 3.  Dependent opacities in the lower lobes, left greater than right, again demonstrated, although improved since prior chest CT exam. 4.  Moderate amount of fluid within the esophageal hiatus. 5.  Partially visualized stomach appears distended with 2 gastrostomy tubes in place. CT CHEST WO CONTRAST    Result Date: 5/11/2021  New right-sided PICC again seen with unchanged and satisfactory tip position; larger loculated right effusion in the azygoesophageal recess, as above. Otherwise similar findings to 05/07/2021 with suspected lower lobe consolidation/pneumonitis, with volume loss and effusions both lower lobes. FL ESOPHAGRAM    Result Date: 5/11/2021  1. No evidence for esophageal leak. 2. Postsurgical changes in the stomach. 3. Large amount of gastroesophageal reflux occurring repeatedly during the study. .     XR CHEST PORTABLE    Result Date: 5/14/2021  Right chest tube in satisfactory position with significantly improved right pleural effusion. Bibasilar airspace disease and small left pleural effusion not significantly changed. XR CHEST PORTABLE    Result Date: 5/11/2021  New right-sided PICC tip position appears satisfactory. Otherwise, similar findings compatible with bibasilar atelectasis/consolidation, effusions and minimal infiltrate or atelectasis right mid lung. IR CHEST TUBE INSERTION    Result Date: 5/13/2021  Successful fluoroscopic guided placement of a right chest tube.      IR GUIDED THORACENTESIS PLEURAL    Result Date: 5/13/2021  Successful ultrasound guided

## 2021-05-17 NOTE — PLAN OF CARE
Modify Parenteral Nutrition  Nutritional Goals: meet % of estimated nutrient needs       Problem: Pain:  Goal: Pain level will decrease  Description: Pain level will decrease  Outcome: Ongoing  Goal: Control of acute pain  Description: Control of acute pain  Outcome: Ongoing  Goal: Control of chronic pain  Description: Control of chronic pain  Outcome: Ongoing     Problem: Infection - Central Venous Catheter-Associated Bloodstream Infection:  Goal: Will show no infection signs and symptoms  Description: Will show no infection signs and symptoms  Outcome: Ongoing

## 2021-05-17 NOTE — PROGRESS NOTES
Bariatric Surgery Progress Note      PATIENT NAME: Mary Roblero   TODAY'S DATE: 5/17/2021, 6:33 AM  Chief Complaint   Patient presents with    Blood Sugar Problem     >450    Hernia     Hiatal      SUBJECTIVE:    Pt seen and examined. UOP adequate. Minimal to no pain. Had some nausea and emesis over the weekend. Feeling better now since PEGs to gravity.       Tmax 37.4    OBJECTIVE:   Vitals:  BP (!) 98/57   Pulse 75   Temp 98 °F (36.7 °C) (Axillary)   Resp 16   Ht 5' 2\" (1.575 m)   Wt 194 lb 0.1 oz (88 kg)   SpO2 94%   BMI 35.48 kg/m²      INTAKE/OUTPUT:      Intake/Output Summary (Last 24 hours) at 5/17/2021 4898  Last data filed at 5/17/2021 0538  Gross per 24 hour   Intake 1604 ml   Output 1875 ml   Net -271 ml                 General: alert, oriented  Lungs: Symmetrical chest rise bilaterally  Heart: S1S2  Abdomen: Soft, ND, appropriately tender, non peritoneal, no rebound, incisions c/d/i, PEG x2 with no surrounding erythema   Extremity: moves all extremities x4, trace edema    Data Review:  CBC:   Recent Labs     05/15/21  0513 05/16/21  0651 05/17/21  0550   WBC 5.3 6.6 4.6   HGB 8.2* 8.6* 8.1*    310 335     BMP:    Recent Labs     05/14/21  0705 05/15/21  0513 05/16/21  0651    136 134*   K 3.7 4.4 4.1    97* 95*   CO2 28 27 27   BUN 35* 30* 35*   CREATININE 1.42* 1.25* 1.32*   GLUCOSE 208* 217* 213*     Hepatic:   Recent Labs     05/14/21  0705 05/15/21  0513 05/16/21  0651   AST 16 15 16   ALT 11 12 15   ALKPHOS 121* 122* 147*   BILITOT 0.30 0.37 0.30   BILIDIR 0.19 0.21 0.17     Coagulation:   Recent Labs     05/14/21  0705 05/15/21  0513 05/16/21  0651 05/17/21  0550   APTT 25.4 24.2 23.9 22.9   PROT 6.1* 6.3* 6.8  --    INR 1.0 0.9 0.9  --        ASSESSMENT   Patient Active Problem List   Diagnosis    Acute pancreatitis    Septic shock ([facility]    Non-STEMI (non-ST elevated myocardial infarction) ([facility]    Diabetic ketoacidosis without coma associated with type 2 diabetes mellitus [facility]    BONNIE (acute kidney injury) ([facility]    Hernia with obstruction    Essential hypertension    Thyroid disease    Syncope    Acute tubular necrosis (HCC)    Lactic acid acidosis    Acute hypoxemic respiratory failure (HCC)    Hiatal hernia    Gastric volvulus    Community acquired bilateral lower lobe pneumonia    Mediastinitis    Peritonitis ([facility]    Severe malnutrition ([address]     77 yo F s/p 5/4 emergent robotic assisted laparoscopic hiatal hernia reduction with gastropexy via g-tube x2     5/7 esophagram performed no leak noted    PLAN  1. Lashawn Lacey for low fiber diet as tolerated, cont nutritional supplements, g-tubes ok to use for tube feeds, TF management and ordering per dietician/primary,can wean from TPN as TF and PO intake improves, attempt PEG clamp trial today   2. Cont recs and management per primary/ Pulmonary/ CT Sx  3. Cont abx per ID   4. Encourage IS use, deep breathing, ambulation and PT/OT work   5. Lashawn Lacey for DVT ppx from surg stance  6.  Pt to f/u with Dr. Ilya Connor as out pt in 7-10 days     Thank you,    Electronically signed by Ferrell Cockayne, DO  on 5/17/2021 at 6:33 AM

## 2021-05-17 NOTE — PROGRESS NOTES
PULMONARY & CRITICAL CARE MEDICINE PROGRESS  NOTE     Patient:  Yara Smith  MRN: [id number]  Admit date: 5/3/2021    SUBJECTIVE     I personally interviewed/examined the patient, reviewed interval history and interpreted all available radiographic, laboratory data at the time of service. Chief Compliant/Reason for Initial Consult: Acute respiratory failure on vent support, gastric perforation    Brief Hospital Course and Interval History:  The patient is a 78 y.o. female with known medical history of diabetes mellitus, hypertension, hypothyroidism, abdominal hernia presented to the hospital with nausea, vomiting, diarrhea. Patient had similar symptoms on 4/28 but was apparently sent home. Patient had dizzy spells and lightheadedness, had a syncopal episode in the ER on this admission. Patient was transferred from the [facility] to [place].  In [facility] patient lab revealed lactic acidosis of 6, glucose 585, patient was seen to be in DKA. Elevated lipase of 1451. Leukocytotic with WBC 23.5, hemoglobin 18.3. Patient also had elevated troponins, elevated creatinine of 3.8. In [place] repeat labs showed creatinine of 2.49 with lactic acid of 2.4. Troponin was 85, recheck of 94. LFTs showed elevated bilirubin.     General surgery was consulted. CT chest showed ascites and pneumoperitoneum with apparent free-flowing oral contrast in the left upper quadrant concerning for viscus perforation possibly within the subdiaphragmatic portion of the stomach. Patient then went for robotic hernia repair 5/4 with wedge gastrectomy, gastropexy and placement of 2 TAISHA drains and 2 PEG tubes.   Patient is n.p.o.         Interval history  5/17/2021  Sleeping   No acute event   50 mL in the right chest tube yesterday          OBJECTIVE     VITAL SIGNS:   LAST-  /73   Pulse 88   Temp 98.1 °F (36.7 °C) (Oral)   Resp 21   Ht 5' 2\" (1.575 m)   Wt 194 lb 0.1 oz (88 kg)   SpO2 92%   BMI 35.48 kg/m²   8-24 HR RANGE-  TEMP Temp  Av.3 °F (36.8 °C)  Min: 97.6 °F (36.4 °C)  Max: 98.8 °F (18.3 °C)   BP Systolic (51GWE), ZTV:404 , Min:98 , NCJ:461      Diastolic (96TBP), PG, Min:55, Max:75     PULSE Pulse  Av.2  Min: 75  Max: 88   RR Resp  Av.5  Min: 20  Max: 21   O2 SAT SpO2  Av %  Min: 92 %  Max: 94 %   OXYGEN DELIVERY O2 Flow Rate (L/min)  Av L/min  Min: 2 L/min  Max: 2 L/min     Systemic Examination:   General appearance sleeping   Chest -dull to percussion bilaterally and decreased breath sounds, right-sided chest tube   Heart - normal rate, regular rhythm, normal S1, S2, no murmurs, rubs, clicks or gallops  Abdomen -soft with one PEG tube and G-tube, clamped  Extremities - peripheral pulses normal, no pedal edema, no clubbing or cyanosis  Skin - normal coloration and turgor, no rashes, no suspicious skin lesions noted   PICC line in place  DATA REVIEW     Medications:  Scheduled Meds:   aspirin  81 mg Oral Daily    atorvastatin  10 mg Oral Daily    metFORMIN  500 mg Oral BID WC    metoclopramide  5 mg Intravenous Q6H    vancomycin  750 mg Intravenous Q24H    fluconazole  400 mg Intravenous Q24H    fat emulsion  100 mL Intravenous Weekly - Thursday    levothyroxine  112 mcg Oral Daily    pantoprazole  40 mg Oral QAM AC    gabapentin  300 mg Oral TID    furosemide  20 mg Oral Every Other Day    insulin glargine  10 Units Subcutaneous Nightly    polyethylene glycol  17 g Oral Daily    heparin (porcine)  5,000 Units Subcutaneous 3 times per day    vancomycin (VANCOCIN) intermittent dosing (placeholder)   Other RX Placeholder    ipratropium-albuterol  1 ampule Inhalation Q4H WA    piperacillin-tazobactam  3,375 mg Intravenous Q8H    insulin lispro  0-18 Units Subcutaneous Q4H    sodium chloride flush  5-40 mL Intravenous 2 times per day     Continuous Infusions:   PN-Adult 2-in-1 Central Line (Standard) 65 mL/hr at 05/16/21 1801    dextrose      sodium chloride 25 mL (05/10/21 1840)     LABS:-  ABGs:   No results found for: PH, PCO2, PO2, HCO3, O2SAT  No results for input(s): PHART, PO2ART, TCF6QEG, XLE7JFS, BEART, R9GFETFL in the last 72 hours. Lab Results   Component Value Date    POCPH 7.433 05/06/2021    POCPCO2 36.8 05/06/2021    POCPO2 150.9 (H) 05/06/2021    POCHCO3 24.6 05/06/2021    XYSW9XMA 99 (H) 05/06/2021     CBC:   Recent Labs     05/15/21  0513 05/16/21  0651 05/17/21  0550   WBC 5.3 6.6 4.6   HGB 8.2* 8.6* 8.1*   HCT 26.2* 28.7* 25.9*   MCV 89.1 91.7 89.3    310 335   LYMPHOPCT 12* 9* 16*   RBC 2.94* 3.13* 2.90*   MCH 27.9 27.5 27.9   MCHC 31.3 30.0 31.3   RDW 14.1 14.1 13.9     BMP:   Recent Labs     05/15/21  0513 05/16/21  0651 05/17/21  0550    134* 134*   K 4.4 4.1 4.2   CL 97* 95* 96*   CO2 27 27 31   BUN 30* 35* 37*   CREATININE 1.25* 1.32* 1.35*   GLUCOSE 217* 213* 172*   PHOS  --   --  4.2     Liver Function Test:   Recent Labs     05/17/21  0550   PROT 6.3*   LABALBU 1.7*   ALT 13   AST 16   ALKPHOS 127*   BILITOT 0.27*     Amylase/Lipase:  No results for input(s): AMYLASE, LIPASE in the last 72 hours. Coagulation Profile:   Recent Labs     05/15/21  0513 05/16/21  0651 05/17/21  0550   INR 0.9 0.9 1.0   PROTIME 10.0 10.0 10.4   APTT 24.2 23.9 22.9     Cardiac Enzymes:  No results for input(s): CKTOTAL, CKMB, CKMBINDEX, TROPONINI in the last 72 hours.   Lactic Acid:  Lab Results   Component Value Date    LACTA 3.2 (H) 05/03/2021    LACTA 6.0 (HH) 05/03/2021     BNP:   No results found for: BNP  D-Dimer:  No results found for: DDIMER  Others:   Lab Results   Component Value Date    TSH 13.36 (H) 05/03/2021     No results found for: LILLY, RHEUMFACTOR, SEDRATE, CRP  No results found for: Tollie Pop  No results found for: IRON, TIBC, FERRITIN  No results found for: SPEP, UPEP  No results found for: PSA, CEA, , IE1279,     Input/Output:    Intake/Output Summary (Last 24 hours) at 5/17/2021 1457  Last data filed at 5/17/2021 1055  Gross per 24 hour   Intake 1604 ml   Output 2175 ml   Net -571 ml       Microbiology:    Pathology:    Radiology Reports:  CT CHEST WO CONTRAST   Final Result   1. Right chest tube in place with near complete resolution of right   effusion. No pneumothorax or loculated fluid in the right hemithorax. 2.  Trace left pleural effusion. 3.  Dependent opacities in the lower lobes, left greater than right, again   demonstrated, although improved since prior chest CT exam.      4.  Moderate amount of fluid within the esophageal hiatus. 5.  Partially visualized stomach appears distended with 2 gastrostomy tubes   in place. XR ABDOMEN (KUB) (SINGLE AP VIEW)   Final Result   2 new gastrostomy tubes noted. Ileus. XR CHEST PORTABLE   Final Result   Right chest tube in satisfactory position with significantly improved right   pleural effusion. Bibasilar airspace disease and small left pleural effusion not significantly   changed. IR CHEST TUBE INSERTION   Final Result   Successful fluoroscopic guided placement of a right chest tube. IR GUIDED THORACENTESIS PLEURAL   Final Result   Successful ultrasound guided thoracentesis. FL ESOPHAGRAM   Final Result   1. No evidence for esophageal leak. 2. Postsurgical changes in the stomach. 3. Large amount of gastroesophageal reflux occurring repeatedly during the   study. .         CT CHEST WO CONTRAST   Final Result   New right-sided PICC again seen with unchanged and satisfactory tip position;   larger loculated right effusion in the azygoesophageal recess, as above. Otherwise similar findings to 05/07/2021 with suspected lower lobe   consolidation/pneumonitis, with volume loss and effusions both lower lobes. XR CHEST PORTABLE   Final Result   New right-sided PICC tip position appears satisfactory. Otherwise, similar   findings compatible with bibasilar atelectasis/consolidation, effusions and   minimal infiltrate or atelectasis right mid lung. CT CHEST ABDOMEN PELVIS WO CONTRAST   Final Result   1. Within the chest, the patient has consolidative processes in both lower   lobes likely pneumonitis. Bilateral pleural effusions and basilar   atelectasis are noted. 2. Postoperative changes within the abdomen. The patient had a hiatal hernia   repair. Although the stomach is decompressed, gastric walls appear thickened   likely secondary to inflammation which may be postoperative. 3. Fluid in the right pericolic gutter and pelvis. 4. Thickened small bowel loops in the right lower quadrant which may   represent secondary changes to surgery. 5. Two drain placements in the upper abdomen. Left inguinal terminates in   the left iliac vein. FL ESOPHAGRAM   Final Result   1. Drainage of contrast material through what appears to be the gastrostomy   tubes following the ingestion of contrast.  Contrast does migrate beyond the   postoperative region into the proximal small bowel. 2. No extraneous/extraluminal collection of contrast is seen beyond the   confines of the stomach. 3. 2 surgical drains and 2 presumed gastrostomy tubes are seen overlying the   left upper quadrant. There does appear to be contrast slightly marginating   the more lateral gastrostomy tube balloon. 4. Delayed migration of contrast through the distal esophagus and GE junction   with mild gastroesophageal reflux. The findings were sent to the Radiology Results [address] at 12:43   pm on 5/7/2021to be communicated to a licensed caregiver. XR CHEST PORTABLE   Final Result   Bibasilar consolidation new from prior study. Blunting of the costophrenic   angles bilaterally         XR CHEST PORTABLE   Final Result   ETT tip position stable. Decreased left subcutaneous emphysema.   Slightly   improved suspected left basilar volume loss with persistent findings as   above. No overt vascular congestion. US RENAL COMPLETE   Final Result   Unremarkable ultrasound of the kidneys and urinary bladder. Trace right pleural effusion         XR CHEST PORTABLE   Final Result   Volume loss continues left hemithorax with haziness at the left base either   atelectasis and or fluid. Subcutaneous emphysema along the left lateral   chest wall has decreased. No appreciable extrapleural air. Endotracheal   tube in appropriate position. XR CHEST PORTABLE   Final Result   1. Recommend repositioning of left internal jugular approach central venous   catheter. 2. Low lung volumes with left basilar atelectasis plus or minus mild pleural   effusion. 3. Left chest wall scattered soft tissue emphysema. XR CHEST PORTABLE   Final Result   Intestinal tube deviates to the left side of a sizable hiatal hernia,   traversing below the hemidiaphragm and terminating just underneath the left   hemidiaphragm. Side port of the intestinal tube is below the diaphragmatic   hiatus. CT CHEST WO CONTRAST   Final Result   1. Ascites and pneumoperitoneum with apparent free-flowing oral contrast in   the left upper quadrant is concerning for viscus perforation, possibly within   the subdiaphragmatic portion of the stomach. 2. Large hiatal hernia with organoaxial volvulus. Majority of contrast is   retained within the esophagus and supradiaphragmatic stomach. 3. Enteric tube extends below the diaphragm with tip outside the field of   view. 4. Moderate bilateral pleural effusions with adjacent consolidation,   atelectasis versus pneumonia. Critical results were called by Dr. Karina Weston MD to [facility] on   5/4/2021 at 18:30. XR ABDOMEN (KUB) (SINGLE AP VIEW)   Final Result   No significant subdiaphragmatic contrast progression, as above.       RECOMMENDATION:   Consider chest x-ray to further assess a organic-axial gastric volvulus         FL UGI   Final Result   Organo-axial volvulus the stomach. Large paraesophageal hernia containing the majority of the rotated gastric   body. The greater curvature is positioned superiorly within the   paraesophageal hernia defect. There is narrowing of the gastroesophageal   junction as well as of the gastroduodenal junction through the hernia defect. MRI ABDOMEN WO CONTRAST MRCP   Final Result   1. No evidence of intrahepatic or extrahepatic ductal dilatation. 2. Incompletely imaged large hiatal hernia with organoaxial malrotation of   the stomach. 3. Small to moderate amount of ascites. 4. Diffuse small bowel wall thickening likely due to 3rd spacing as the post   enteritis. 5. Trace bilateral pleural effusions.          XR CHEST PORTABLE   Final Result   No acute cardiopulmonary disease      Large hiatal hernia             Echocardiogram:   Results for orders placed during the hospital encounter of 05/03/21   ECHO Complete 2D W Doppler W Color    Narrative Transthoracic Echocardiography Report (TTE)     Patient Name Bindu Herron   Date of Study               05/05/2021      Date of      1950  Gender                      Female   Birth      Age          78 year(s)  Race                              Room Number  1922        Height:                     62 inch, 157.48 cm      Corporate ID [id number]    Weight:                     166 pounds, 75.3 kg   #      Patient Acct [de-identified]   BSA:          1.77 m^2      BMI:      30.36   #                                                              kg/m^2      MR #         [id number]     Sonographer                 [facility]      Accession #  [id number]  Interpreting Physician      Shellie Vizcarra 63      Fellow                   Referring Nurse                            Practitioner      Interpreting             Referring Physician         Sourav Quijano   Fellow     Additional Comments  Technically difficult study, patient supine on ventilator. Type of Study      TTE procedure:2D Echocardiogram, M-Mode, Doppler, Color Doppler. Procedure Date  Date: 05/05/2021 Start: 07:32 AM    Study Location: [facility]  Technical Quality: Adequate visualization    Indications:Elevated troponin. History / Tech. Comments:  Procedure explained to patient. No known medical Hx. Patient Status: Inpatient    Height: 62 inches Weight: 166 pounds BSA: 1.77 m^2 BMI: 30.36 kg/m^2    CONCLUSIONS    Summary  Normal LV size and wall thickness. No obvious wall motion abnormality seen. Normal LV systolic function with LVEF 55%. RV appears dilated with reduced function. RV systolic pressure 37 mmHg  LA appears normal in size. No obvious significant structural valvular abnormality noted. No significant valvular stenosis or regurgitation noted. Normal aortic root dimension. No significant pericardial effusion noted. Signature  ----------------------------------------------------------------------------   Electronically signed by Connor Montoya(Interpreting physician) on   05/05/2021 12:15 PM  ----------------------------------------------------------------------------    ----------------------------------------------------------------------------   Electronically signed by Olimpia Nielsen(Sonographer) on 05/05/2021 11:37 AM  ----------------------------------------------------------------------------  FINDINGS  Left Atrium  Left atrium is normal in size. Inter-atrial septum is intact with no evidence for an atrial septal defect,  by color doppler. Left Ventricle  Left ventricle is small in size, normal wall thickness, global left  ventricular systolic function is normal, calculated ejection fraction is  53%. All wall segments are not well visualized (poor acoustic windows.)  Right Atrium  Right atrial dilatation. Right Ventricle  Right ventricular dilatation with reduced systolic function.   Abnormal RV strain. Mitral Valve  Normal mitral valve structure. Trivial mitral regurgitation. Aortic Valve  Aortic valve is trileaflet. No evidence of aortic insufficiency or stenosis. Tricuspid Valve  Normal tricuspid valve leaflets. Moderate tricuspid regurgitation. Estimated right ventricular systolic pressure is 37 mmHg, suggesting  pulmonary HTN. Pulmonic Valve  Pulmonic valve not well visualized but Doppler velocities are normal.  Pericardial Effusion  No significant pericardial effusion is seen. Miscellaneous  Normal aortic root dimension. E/E' average = 15.05. IVC dilated but unable to assess respiratory collapse due to patient on  ventilator.     M-mode / 2D Measurements & Calculations:      LVIDd:4 cm(3.7 - 5.6 cm)          Diastolic HCILFE:08 ml   YMZU:9.7 cm(0.6 - 1.1 cm)         Systolic QIUTYN:51.53 ml   LVPWd:0.9 cm(0.6 - 1.1 cm)        Aortic Root:2.9 cm(2.0 - 3.7 cm)                                     LA Dimension: 3.1 cm(1.9 - 4.0 cm)   Calculated LVEF (%): 52.95 %      LA volume/Index: 31.04 ml /18m^2                                     LVOT:1.9 cm                                     RVDd:3 cm      Mitral:                                 Aortic      Valve Area (P1/2-Time): 5.12 cm^2       Peak Velocity: 1.55 m/s   Peak E-Wave: 0.78 m/s                   Mean Velocity: 0.99 m/s   Peak A-Wave: 1.00 m/s                   Peak Gradient: 9.61 mmHg   E/A Ratio: 0.78                         Mean Gradient: 5 mmHg   Peak Gradient: 2.45 mmHg   Mean Gradient: 2 mmHg   Deceleration Time: 145 msec             Area (continuity): 2.16 cm^2   P1/2t: 43 msec                          AV VTI: 25.9 cm      Area (continuity): 2.1 cm^2   Mean Velocity: 0.60 m/s      Tricuspid:                              Pulmonic:      Estimated RVSP: 37 mmHg                 Peak Velocity: 0.80 m/s   Peak TR Velocity: 2.85 m/s              Peak Gradient: 2.57 mmHg   Peak TR Gradient: 32.49 mmHg     Diastology / Tissue Doppler  Septal Wall E' velocity:0.06 m/s  Septal Wall E/E':13.8  Lateral Wall E' velocity:0.05 m/s  Lateral Wall E/E':16.3       Cardiac Catheterization:   No results found for this or any previous visit. Date of Procedure: 5/4/2021     Pre-Op Diagnosis: PARAESOPHAGEAL HERNIA, PNEUMOPERITONEUM     Post-Op Diagnosis: Same, Gastric perforation due to ischemia from paraesophageal hernia. Feculent peritonitis       Procedure(s):  PARAESPHAGEAL HERNIA REPAIR LAPAROSCOPIC ROBOTIC   PARTIAL GASTRECTOMY  GASTROPEXY  IRRIGATION OF ABDOMEN  ASSESSMENT AND PLAN     Assessment:  B/l empyema post gastric perforation due to ischemia from para esohageal hernia . Left loculated pleural effusion -Left Thoracentesis 5/12/21 - exudate - gram positive rods   Right larger loculated pleural effusion -Right pig tail chest tube 5/13/21 - culture pending - exudate - ph -6.9  Mediastinal fluid collection  Gastric volvulus with Viscus perforation  Sepsis   Bilateral lower lobe pneumonia  Peritonitis  Blood cultures positive for staph epidermidis, methicillin resistant  Diabetes mellitusDKA now resolved  Lactic acidosis resolving  BONNIE  Thrombocytopenia   Elevated troponins  Subclinical hypothyroidism  Elevated bilirubin  Hypernatremia    Plan:    CT chest yesterday shows right pleural fluid collection is better. Output is 50 mL yesterday. Cultures so far are negative from right pleural fluid but left pleural fluid is growing Candida. Mediastinal fluid collection is still present will defer to general surgery and thoracic surgery.     Continue antibiotics and antifungal    Adolfo Malcolm MD, DO  Pulmonary and Critical Care Medicine           5/17/2021, 2:57 PM  Attending Physician Statement

## 2021-05-17 NOTE — PLAN OF CARE
Problem: SKIN INTEGRITY  Goal: Skin integrity is maintained or improved  Outcome: Ongoing     Problem: Confusion - Acute:  Goal: Mental status will be restored to baseline  Description: Mental status will be restored to baseline  Outcome: Ongoing     Problem: Discharge Planning:  Goal: Ability to perform activities of daily living will improve  Description: Ability to perform activities of daily living will improve  Outcome: Ongoing     Problem: Injury - Risk of, Physical Injury:  Goal: Absence of physical injury  Description: Absence of physical injury  Outcome: Ongoing  Goal: Will remain free from falls  Description: Will remain free from falls  Outcome: Ongoing     Problem: Skin Integrity:  Goal: Absence of new skin breakdown  Description: Absence of new skin breakdown  Outcome: Ongoing     Problem: Falls - Risk of:  Goal: Absence of physical injury  Description: Absence of physical injury  Outcome: Ongoing  Goal: Will remain free from falls  Description: Will remain free from falls  Outcome: Ongoing

## 2021-05-17 NOTE — PROGRESS NOTES
Multiple, Surgical Incision       Current Nutrition Therapies:    DIET TUBE FEED CONTINUOUS/CYCLIC NPO; Semi-elemental; Gastrostomy; 20; 20; 23; Exceptions are: Ice Chips, Sips of Water with Meds - TF on hold. · Feeding Route: PEG  · Formula: Semi-Elemental  · Schedule: Continuous  · Current TF & Flush Orders Provides: TF held - 0 kcals, 0 gm pro/day    PN-Adult 2-in-1 Central Line (Standard):   · Type and Formula: 2-in-1 Custom (300 gm Dextrose, 75 gm AA)   · Lipids: 100ml (Weekly)  · Duration: Continuous  · Rate/Volume: 65 mL/hr (1560 mL/day)  · Current PN Order Provides: 1320 kcals, 75 gm protein    Anthropometric Measures:  · Height: 5' 2\" (157.5 cm)  · Current Body Weight: 194 lb 0.1 oz (88 kg)   · Admission Body Weight: 202 lb (91.6 kg)    · Usual Body Weight:  (166 lb - stated)     · Ideal Body Weight: 110 lbs; % Ideal Body Weight 176.4 %   · BMI: 35.5  · BMI Categories: Obese Class 2 (BMI 35.0 -39.9)       Nutrition Diagnosis:   · Inadequate oral intake related to altered GI function as evidenced by NPO or clear liquid status due to medical condition, nutrition support - parenteral nutrition    Nutrition Interventions:   Food and/or Nutrient Delivery:  Continue NPO, Modify Parenteral Nutrition (Monitor for restart of TF vs start of oral diet.)  Nutrition Education/Counseling:  No recommendation at this time   Coordination of Nutrition Care:  Continue to monitor while inpatient    Goals:  meet % of estimated nutrient needs       Nutrition Monitoring and Evaluation:   Food/Nutrient Intake Outcomes:  Parenteral Nutrition Intake/Tolerance, Diet Advancement/Tolerance  Physical Signs/Symptoms Outcomes:  Biochemical Data, GI Status, Nausea or Vomiting, Hemodynamic Status, Nutrition Focused Physical Findings, Skin, Weight     Electronically signed by Mindy Renee RD, LD on 5/17/21 at 2:50 PM EDT    Contact: [phone]

## 2021-05-17 NOTE — PROGRESS NOTES
Physical Therapy  Facility/Department: [facility] 4B STEPDOWN  Daily Treatment Note  NAME: Mary Roblero  : 1950  MRN: [id number]    Date of Service: 2021    Discharge Recommendations:  Patient would benefit from continued therapy after discharge   PT Equipment Recommendations  Equipment Needed: No  Mobility Devices: Vinicius Knee: Rolling    Assessment   Body structures, Functions, Activity limitations: Decreased functional mobility ; Decreased ROM; Decreased strength;Decreased endurance;Decreased balance  Assessment: Pt amb ~12 ft with Rw and MIN ALBA Zamora imporved tolerance to therapy this date. Recommend contined PT after d/c to address deficits  Prognosis: Good  REQUIRES PT FOLLOW UP: Yes  Activity Tolerance  Activity Tolerance: Patient limited by fatigue;Patient Tolerated treatment well     Patient Diagnosis(es): The primary encounter diagnosis was Acute pancreatitis, unspecified complication status, unspecified pancreatitis type. Diagnoses of BONNIE (acute kidney injury) ([facility] [address] and Hiatal hernia were also pertinent to this visit. has a past medical history of Diabetes mellitus ([address] Gout, Hiatal hernia, Hyperlipidemia, Hypertension, and Thyroid disease. has a past surgical history that includes Cholecystectomy (); Hysterectomy (); Breast surgery; other surgical history (); other surgical history (); Tubal ligation (); Gastric fundoplication (N/A, 688); hc picc line double lumen (5/10/2021); and IR CHEST TUBE INSERTION (2021). Restrictions  Restrictions/Precautions  Restrictions/Precautions: General Precautions, Fall Risk, Surgical Protocols  Required Braces or Orthoses?: No  Position Activity Restriction  Other position/activity restrictions: Up with assist,  \"PARAESPHAGEAL HERNIA REPAIR LAPAROSCOPIC ROBOTIC\", 2 PEG tubes  Subjective   General  Chart Reviewed: Yes  Response To Previous Treatment: Patient with no complaints from previous session.   Family / Caregiver

## 2021-05-18 ENCOUNTER — APPOINTMENT (OUTPATIENT)
Dept: GENERAL RADIOLOGY | Age: 71
DRG: 853 | End: 2021-05-18
Payer: MEDICARE

## 2021-05-18 LAB
ABSOLUTE EOS #: 0.23 K/UL (ref 0–0.4)
ABSOLUTE IMMATURE GRANULOCYTE: 0.17 K/UL (ref 0–0.3)
ABSOLUTE LYMPH #: 1.22 K/UL (ref 1–4.8)
ABSOLUTE MONO #: 0.17 K/UL (ref 0.1–0.8)
ALBUMIN SERPL-MCNC: 1.7 G/DL (ref 3.5–5.2)
ALBUMIN/GLOBULIN RATIO: 0.4 (ref 1–2.5)
ALP BLD-CCNC: 127 U/L (ref 35–104)
ALT SERPL-CCNC: 14 U/L (ref 5–33)
ANION GAP SERPL CALCULATED.3IONS-SCNC: 8 MMOL/L (ref 9–17)
AST SERPL-CCNC: 19 U/L
BASOPHILS # BLD: 1 % (ref 0–2)
BASOPHILS ABSOLUTE: 0.06 K/UL (ref 0–0.2)
BILIRUB SERPL-MCNC: 0.25 MG/DL (ref 0.3–1.2)
BILIRUBIN DIRECT: 0.15 MG/DL
BILIRUBIN, INDIRECT: 0.1 MG/DL (ref 0–1)
BUN BLDV-MCNC: 35 MG/DL (ref 8–23)
BUN/CREAT BLD: ABNORMAL (ref 9–20)
CALCIUM SERPL-MCNC: 8.2 MG/DL (ref 8.6–10.4)
CHLORIDE BLD-SCNC: 96 MMOL/L (ref 98–107)
CO2: 31 MMOL/L (ref 20–31)
CREAT SERPL-MCNC: 1.41 MG/DL (ref 0.5–0.9)
CULTURE: NORMAL
DIFFERENTIAL TYPE: ABNORMAL
DIRECT EXAM: NORMAL
DIRECT EXAM: NORMAL
EOSINOPHILS RELATIVE PERCENT: 4 % (ref 1–4)
GFR AFRICAN AMERICAN: 45 ML/MIN
GFR NON-AFRICAN AMERICAN: 37 ML/MIN
GFR SERPL CREATININE-BSD FRML MDRD: ABNORMAL ML/MIN/{1.73_M2}
GFR SERPL CREATININE-BSD FRML MDRD: ABNORMAL ML/MIN/{1.73_M2}
GLOBULIN: ABNORMAL G/DL (ref 1.5–3.8)
GLUCOSE BLD-MCNC: 149 MG/DL (ref 65–105)
GLUCOSE BLD-MCNC: 151 MG/DL (ref 65–105)
GLUCOSE BLD-MCNC: 152 MG/DL (ref 65–105)
GLUCOSE BLD-MCNC: 155 MG/DL (ref 70–99)
GLUCOSE BLD-MCNC: 179 MG/DL (ref 65–105)
GLUCOSE BLD-MCNC: 198 MG/DL (ref 65–105)
GLUCOSE BLD-MCNC: 215 MG/DL (ref 65–105)
GLUCOSE BLD-MCNC: 248 MG/DL (ref 65–105)
HCT VFR BLD CALC: 26.5 % (ref 36.3–47.1)
HEMOGLOBIN: 8.1 G/DL (ref 11.9–15.1)
IMMATURE GRANULOCYTES: 3 %
INR BLD: 1
LYMPHOCYTES # BLD: 21 % (ref 24–44)
Lab: NORMAL
MCH RBC QN AUTO: 28 PG (ref 25.2–33.5)
MCHC RBC AUTO-ENTMCNC: 30.6 G/DL (ref 28.4–34.8)
MCV RBC AUTO: 91.7 FL (ref 82.6–102.9)
MONOCYTES # BLD: 3 % (ref 1–7)
MORPHOLOGY: NORMAL
NRBC AUTOMATED: 0.3 PER 100 WBC
PARTIAL THROMBOPLASTIN TIME: 24.1 SEC (ref 20.5–30.5)
PDW BLD-RTO: 14.4 % (ref 11.8–14.4)
PLATELET # BLD: 328 K/UL (ref 138–453)
PLATELET ESTIMATE: ABNORMAL
PMV BLD AUTO: 10 FL (ref 8.1–13.5)
POTASSIUM SERPL-SCNC: 4 MMOL/L (ref 3.7–5.3)
PROTHROMBIN TIME: 10.3 SEC (ref 9.1–12.3)
RBC # BLD: 2.89 M/UL (ref 3.95–5.11)
RBC # BLD: ABNORMAL 10*6/UL
SEG NEUTROPHILS: 68 % (ref 36–66)
SEGMENTED NEUTROPHILS ABSOLUTE COUNT: 3.95 K/UL (ref 1.8–7.7)
SODIUM BLD-SCNC: 135 MMOL/L (ref 135–144)
SPECIMEN DESCRIPTION: NORMAL
TOTAL PROTEIN: 6.5 G/DL (ref 6.4–8.3)
WBC # BLD: 5.8 K/UL (ref 3.5–11.3)
WBC # BLD: ABNORMAL 10*3/UL

## 2021-05-18 PROCEDURE — 2700000000 HC OXYGEN THERAPY PER DAY

## 2021-05-18 PROCEDURE — 99232 SBSQ HOSP IP/OBS MODERATE 35: CPT | Performed by: STUDENT IN AN ORGANIZED HEALTH CARE EDUCATION/TRAINING PROGRAM

## 2021-05-18 PROCEDURE — 6360000002 HC RX W HCPCS: Performed by: STUDENT IN AN ORGANIZED HEALTH CARE EDUCATION/TRAINING PROGRAM

## 2021-05-18 PROCEDURE — 99233 SBSQ HOSP IP/OBS HIGH 50: CPT | Performed by: INTERNAL MEDICINE

## 2021-05-18 PROCEDURE — 36415 COLL VENOUS BLD VENIPUNCTURE: CPT

## 2021-05-18 PROCEDURE — 6370000000 HC RX 637 (ALT 250 FOR IP): Performed by: FAMILY MEDICINE

## 2021-05-18 PROCEDURE — 94664 DEMO&/EVAL PT USE INHALER: CPT

## 2021-05-18 PROCEDURE — 6370000000 HC RX 637 (ALT 250 FOR IP): Performed by: STUDENT IN AN ORGANIZED HEALTH CARE EDUCATION/TRAINING PROGRAM

## 2021-05-18 PROCEDURE — 2060000000 HC ICU INTERMEDIATE R&B

## 2021-05-18 PROCEDURE — 2709999900 HC NON-CHARGEABLE SUPPLY

## 2021-05-18 PROCEDURE — 85025 COMPLETE CBC W/AUTO DIFF WBC: CPT

## 2021-05-18 PROCEDURE — 80076 HEPATIC FUNCTION PANEL: CPT

## 2021-05-18 PROCEDURE — 2580000003 HC RX 258: Performed by: STUDENT IN AN ORGANIZED HEALTH CARE EDUCATION/TRAINING PROGRAM

## 2021-05-18 PROCEDURE — 6360000002 HC RX W HCPCS: Performed by: INTERNAL MEDICINE

## 2021-05-18 PROCEDURE — 99232 SBSQ HOSP IP/OBS MODERATE 35: CPT | Performed by: INTERNAL MEDICINE

## 2021-05-18 PROCEDURE — 6370000000 HC RX 637 (ALT 250 FOR IP): Performed by: NURSE PRACTITIONER

## 2021-05-18 PROCEDURE — 97535 SELF CARE MNGMENT TRAINING: CPT

## 2021-05-18 PROCEDURE — 71045 X-RAY EXAM CHEST 1 VIEW: CPT

## 2021-05-18 PROCEDURE — 94640 AIRWAY INHALATION TREATMENT: CPT

## 2021-05-18 PROCEDURE — 80048 BASIC METABOLIC PNL TOTAL CA: CPT

## 2021-05-18 PROCEDURE — 85610 PROTHROMBIN TIME: CPT

## 2021-05-18 PROCEDURE — 85730 THROMBOPLASTIN TIME PARTIAL: CPT

## 2021-05-18 PROCEDURE — 94761 N-INVAS EAR/PLS OXIMETRY MLT: CPT

## 2021-05-18 RX ORDER — POLYETHYLENE GLYCOL 3350 17 G/17G
17 POWDER, FOR SOLUTION ORAL ONCE
Status: DISCONTINUED | OUTPATIENT
Start: 2021-05-18 | End: 2021-05-20

## 2021-05-18 RX ORDER — GABAPENTIN 100 MG/1
100 CAPSULE ORAL 3 TIMES DAILY
Status: DISCONTINUED | OUTPATIENT
Start: 2021-05-18 | End: 2021-05-28 | Stop reason: HOSPADM

## 2021-05-18 RX ORDER — BISACODYL 10 MG
10 SUPPOSITORY, RECTAL RECTAL DAILY
Status: DISCONTINUED | OUTPATIENT
Start: 2021-05-18 | End: 2021-05-19

## 2021-05-18 RX ADMIN — IPRATROPIUM BROMIDE AND ALBUTEROL SULFATE 1 AMPULE: .5; 2.5 SOLUTION RESPIRATORY (INHALATION) at 19:54

## 2021-05-18 RX ADMIN — HEPARIN SODIUM 5000 UNITS: 5000 INJECTION INTRAVENOUS; SUBCUTANEOUS at 14:09

## 2021-05-18 RX ADMIN — PIPERACILLIN AND TAZOBACTAM 3375 MG: 3; .375 INJECTION, POWDER, LYOPHILIZED, FOR SOLUTION INTRAVENOUS at 05:00

## 2021-05-18 RX ADMIN — IPRATROPIUM BROMIDE AND ALBUTEROL SULFATE 1 AMPULE: .5; 2.5 SOLUTION RESPIRATORY (INHALATION) at 08:01

## 2021-05-18 RX ADMIN — Medication 81 MG: at 09:00

## 2021-05-18 RX ADMIN — IPRATROPIUM BROMIDE AND ALBUTEROL SULFATE 1 AMPULE: .5; 2.5 SOLUTION RESPIRATORY (INHALATION) at 16:14

## 2021-05-18 RX ADMIN — GABAPENTIN 300 MG: 300 CAPSULE ORAL at 09:01

## 2021-05-18 RX ADMIN — HEPARIN SODIUM 5000 UNITS: 5000 INJECTION INTRAVENOUS; SUBCUTANEOUS at 05:00

## 2021-05-18 RX ADMIN — POLYETHYLENE GLYCOL 3350 17 G: 17 POWDER, FOR SOLUTION ORAL at 09:35

## 2021-05-18 RX ADMIN — PIPERACILLIN AND TAZOBACTAM 3375 MG: 3; .375 INJECTION, POWDER, LYOPHILIZED, FOR SOLUTION INTRAVENOUS at 22:00

## 2021-05-18 RX ADMIN — PANTOPRAZOLE SODIUM 40 MG: 40 TABLET, DELAYED RELEASE ORAL at 09:00

## 2021-05-18 RX ADMIN — METOCLOPRAMIDE 5 MG: 5 INJECTION, SOLUTION INTRAMUSCULAR; INTRAVENOUS at 20:45

## 2021-05-18 RX ADMIN — METFORMIN HYDROCHLORIDE 500 MG: 500 TABLET ORAL at 09:01

## 2021-05-18 RX ADMIN — INSULIN LISPRO 3 UNITS: 100 INJECTION, SOLUTION INTRAVENOUS; SUBCUTANEOUS at 05:00

## 2021-05-18 RX ADMIN — INSULIN LISPRO 3 UNITS: 100 INJECTION, SOLUTION INTRAVENOUS; SUBCUTANEOUS at 01:03

## 2021-05-18 RX ADMIN — IPRATROPIUM BROMIDE AND ALBUTEROL SULFATE 1 AMPULE: .5; 2.5 SOLUTION RESPIRATORY (INHALATION) at 11:50

## 2021-05-18 RX ADMIN — HEPARIN SODIUM 5000 UNITS: 5000 INJECTION INTRAVENOUS; SUBCUTANEOUS at 20:45

## 2021-05-18 RX ADMIN — SODIUM CHLORIDE, PRESERVATIVE FREE 10 ML: 5 INJECTION INTRAVENOUS at 09:01

## 2021-05-18 RX ADMIN — METOCLOPRAMIDE 5 MG: 5 INJECTION, SOLUTION INTRAMUSCULAR; INTRAVENOUS at 03:55

## 2021-05-18 RX ADMIN — SODIUM CHLORIDE, PRESERVATIVE FREE 10 ML: 5 INJECTION INTRAVENOUS at 20:45

## 2021-05-18 RX ADMIN — GABAPENTIN 100 MG: 100 CAPSULE ORAL at 20:45

## 2021-05-18 RX ADMIN — INSULIN LISPRO 6 UNITS: 100 INJECTION, SOLUTION INTRAVENOUS; SUBCUTANEOUS at 11:55

## 2021-05-18 RX ADMIN — METOCLOPRAMIDE 5 MG: 5 INJECTION, SOLUTION INTRAMUSCULAR; INTRAVENOUS at 15:39

## 2021-05-18 RX ADMIN — FLUCONAZOLE 400 MG: 2 INJECTION, SOLUTION INTRAVENOUS at 11:12

## 2021-05-18 RX ADMIN — GABAPENTIN 100 MG: 100 CAPSULE ORAL at 14:09

## 2021-05-18 RX ADMIN — METOCLOPRAMIDE 5 MG: 5 INJECTION, SOLUTION INTRAMUSCULAR; INTRAVENOUS at 09:00

## 2021-05-18 RX ADMIN — BISACODYL 10 MG: 10 SUPPOSITORY RECTAL at 09:00

## 2021-05-18 RX ADMIN — VANCOMYCIN HYDROCHLORIDE 750 MG: 10 INJECTION, POWDER, LYOPHILIZED, FOR SOLUTION INTRAVENOUS at 09:01

## 2021-05-18 RX ADMIN — LEVOTHYROXINE SODIUM 112 MCG: 112 TABLET ORAL at 09:00

## 2021-05-18 RX ADMIN — INSULIN GLARGINE 10 UNITS: 100 INJECTION, SOLUTION SUBCUTANEOUS at 21:04

## 2021-05-18 RX ADMIN — PIPERACILLIN AND TAZOBACTAM 3375 MG: 3; .375 INJECTION, POWDER, LYOPHILIZED, FOR SOLUTION INTRAVENOUS at 14:09

## 2021-05-18 RX ADMIN — ATORVASTATIN CALCIUM 10 MG: 10 TABLET, FILM COATED ORAL at 20:45

## 2021-05-18 RX ADMIN — INSULIN LISPRO 3 UNITS: 100 INJECTION, SOLUTION INTRAVENOUS; SUBCUTANEOUS at 20:45

## 2021-05-18 RX ADMIN — INSULIN LISPRO 3 UNITS: 100 INJECTION, SOLUTION INTRAVENOUS; SUBCUTANEOUS at 09:01

## 2021-05-18 ASSESSMENT — PAIN DESCRIPTION - PAIN TYPE
TYPE: SURGICAL PAIN
TYPE: SURGICAL PAIN

## 2021-05-18 ASSESSMENT — PAIN DESCRIPTION - LOCATION: LOCATION: ABDOMEN

## 2021-05-18 ASSESSMENT — PAIN DESCRIPTION - ORIENTATION: ORIENTATION: RIGHT;MID

## 2021-05-18 ASSESSMENT — PAIN SCALES - GENERAL
PAINLEVEL_OUTOF10: 2
PAINLEVEL_OUTOF10: 3

## 2021-05-18 NOTE — PROGRESS NOTES
Physical Therapy    DATE: 2021    NAME: Gwendolyn Díaz  MRN: [id number]   : 1950      Patient not seen this date for Physical Therapy due to:    Patient Declined: Pt refused rehab at this time due to fatigue from OT treatment. Will check back as time allows.       Electronically signed by Christen Nunes PTA on 2021 at 11:19 AM

## 2021-05-18 NOTE — PLAN OF CARE
Nutrition Problem #1: Inadequate oral intake  Intervention: Food and/or Nutrient Delivery: Continue Current Diet, Start Oral Nutrition Supplement, Discontinue Parenteral Nutrition (Provide Ensure Clear Liquid ONS with all meals.   TPN discontinued at bedside by medical team.)  Nutritional Goals: meet % of estimated nutrient needs

## 2021-05-18 NOTE — PROGRESS NOTES
Occupational Therapy  Facility/Department: [address] STEPDOWN  Daily Treatment Note  NAME: Naida Parikh  : 1950  MRN: [id number]    Date of Service: 2021    Discharge Recommendations:  Patient would benefit from continued therapy after discharge to increase pt func mobility, ADL status, endurance and balance   Assessment   Performance deficits / Impairments: Decreased functional mobility ; Decreased ADL status; Decreased endurance;Decreased high-level IADLs;Decreased safe awareness;Decreased balance;Decreased posture  Prognosis: Good  Patient Education: Pt educated on OT role, bed mobility, transfer/walker safety, hand placement, ADLs, pursed lip breathing-good return  REQUIRES OT FOLLOW UP: Yes  Activity Tolerance  Activity Tolerance: Patient Tolerated treatment well;Patient limited by fatigue  Safety Devices  Safety Devices in place: Yes  Type of devices: Gait belt;Call light within reach; Patient at risk for falls; Left in chair;Nurse notified; Chair alarm in place  Restraints  Initially in place: No     Patient Diagnosis(es): The primary encounter diagnosis was Acute pancreatitis, unspecified complication status, unspecified pancreatitis type. Diagnoses of BONNIE (acute kidney injury) ([address] and Hiatal hernia were also pertinent to this visit. has a past medical history of Diabetes mellitus ([address] Gout, Hiatal hernia, Hyperlipidemia, Hypertension, and Thyroid disease. has a past surgical history that includes Cholecystectomy (); Hysterectomy (); Breast surgery; other surgical history (); other surgical history (); Tubal ligation (); Gastric fundoplication (N/A, 3/9/2111); hc picc line double lumen (5/10/2021); and IR CHEST TUBE INSERTION (2021). Restrictions  Restrictions/Precautions  Restrictions/Precautions: General Precautions, Fall Risk, Surgical Protocols  Required Braces or Orthoses?: No  Position Activity Restriction  Other position/activity restrictions:  Up with assist,  \"PARAESPHAGEAL HERNIA REPAIR LAPAROSCOPIC ROBOTIC\", 2 PEG tubes  Subjective   General  Chart Reviewed: Yes  Patient assessed for rehabilitation services?: Yes  Family / Caregiver Present: No  General Comment  Comments: RN ok'd for OT visit this AM. Pt agreeable to session, pleasent/cooperative throughout. Pain Assessment  Pain Level: 2  Pain Type: Surgical pain  Pain Location: Abdomen  Pain Orientation: Right;Mid  Non-Pharmaceutical Pain Intervention(s): Ambulation/Increased Activity;Repositioned;Rest;Therapeutic presence  Vital Signs  Patient Currently in Pain: Yes   Orientation  Orientation  Overall Orientation Status: Within Functional Limits  Objective    ADL  Grooming: Setup; Increased time to complete;Stand by assistance (Pt brushed teeth, washed face sitting EOB)  LE Dressing: Maximum assistance; Increased time to complete;Setup (Max A to don socks sitting EOB, d/t endurance)  Balance  Sitting Balance: Stand by assistance (Sitting EOB for grooming tasks)  Standing Balance: Contact guard assistance (Func mobility to chair from EOB)  Standing Balance  Time: ~2 minutes  Activity: Func mobility to chair  Comment: Utilizing RW, no LOB noted this date. req increased time to complete d/t line mgmt.   Functional Mobility  Functional - Mobility Device: Rolling Walker  Activity: Other (To chair from EOB)  Assist Level: Contact guard assistance  Bed mobility  Supine to Sit: Contact guard assistance  Sit to Supine: Contact guard assistance  Scooting: Contact guard assistance  Comment: HOB elevated, utilizing bed rails for bed mobility CGA for safety  Transfers  Sit to stand: Minimal assistance  Stand to sit: Minimal assistance  Transfer Comments: Use of RW, pt req v/c for proper hand placement for sit to stand with F return  Cognition  Overall Cognitive Status: [facility]  Plan   Plan  Times per week: 4-5x/week  Current Treatment Recommendations: Safety Education & Training, Balance Training, Patient/Caregiver Education & Training, Self-Care / ADL, Functional Mobility Training, Equipment Evaluation, Education, & procurement, Home Management Training, Endurance Training, Strengthening  Goals  Short term goals  Time Frame for Short term goals: By discharge, pt will:  Short term goal 1: Demo bed mobility with Min A, using LRD  Short term goal 2: Demo functional transfers with Min A, using LRD  Short term goal 3: Demo functional mobility with Mod A, using LRD  Short term goal 4: Demo UB ADLs with CGA, using AE/DME PRN  Short term goal 5: Demo LB ADLs with Min A, setup, use of AE/DME PRN  Short term goal 6: Demo +5 minutes of static/dynamic standing with CGA to increase engagement in ADLs     Therapy Time   Individual Concurrent Group Co-treatment   Time In 0958         Time Out 1036         Minutes 38         Timed Code Treatment Minutes: 38 Minutes   Pt in bed upon arrival, pleasant and agreeable to tx this date. Pt retired to chair at end of session, call light within reach, chair alarm on, RN notified.      RAYMUNDO Rey

## 2021-05-18 NOTE — PROGRESS NOTES
Bariatric Surgery Progress Note      PATIENT NAME: Gwendolyn Díaz   TODAY'S DATE: 5/18/2021, 6:31 AM  Chief Complaint   Patient presents with    Blood Sugar Problem     >450    Hernia     Hiatal      SUBJECTIVE:    Pt seen and examined. UOP adequate. Minimal to no pain. Still with some nausea yesterday but no emesis.  PEG tubes clamped since yesterday am.      Tmax 37.2    OBJECTIVE:   Vitals:  /77   Pulse 76   Temp 98.8 °F (37.1 °C) (Oral)   Resp 9   Ht 5' 2\" (1.575 m)   Wt 194 lb 0.1 oz (88 kg)   SpO2 99%   BMI 35.48 kg/m²      INTAKE/OUTPUT:      Intake/Output Summary (Last 24 hours) at 5/18/2021 0631  Last data filed at 5/17/2021 2301  Gross per 24 hour   Intake 1300 ml   Output 2150 ml   Net -850 ml                 General: alert, oriented  Lungs: Symmetrical chest rise bilaterally  Heart: S1S2  Abdomen: Soft, ND, appropriately tender, non peritoneal, no rebound, incisions c/d/i, PEG x2 with no surrounding erythema   Extremity: moves all extremities x4, trace edema    Data Review:  CBC:   Recent Labs     05/16/21  0651 05/17/21  0550 05/18/21  0538   WBC 6.6 4.6 5.8   HGB 8.6* 8.1* 8.1*    335 328     BMP:    Recent Labs     05/16/21  0651 05/17/21  0550   * 134*   K 4.1 4.2   CL 95* 96*   CO2 27 31   BUN 35* 37*   CREATININE 1.32* 1.35*   GLUCOSE 213* 172*     Hepatic:   Recent Labs     05/16/21  0651 05/17/21  0550   AST 16 16   ALT 15 13   ALKPHOS 147* 127*   BILITOT 0.30 0.27*   BILIDIR 0.17 0.14     Coagulation:   Recent Labs     05/16/21  0651 05/17/21  0550 05/18/21  0538   APTT 23.9 22.9 24.1   PROT 6.8 6.3*  --    INR 0.9 1.0 1.0       ASSESSMENT   Patient Active Problem List   Diagnosis    Hernia with obstruction    Essential hypertension    Thyroid disease    Syncope    Hiatal hernia    Necrosis of stomach    Mediastinitis    Peritonitis (HCC)    Severe malnutrition ([address]    Strangulated paraesophageal hernia     77 yo F s/p 5/4 emergent robotic assisted laparoscopic hiatal hernia reduction with gastropexy via g-tube x2     5/7 esophagram performed no leak noted    PLAN  1. [address] [address] for low fiber diet as tolerated, cont nutritional supplements, g-tubes ok to use for tube feeds, TF management and ordering per dietician/primary,can wean from TPN as TF and PO intake improves, continue PEGs to clamp, pt completing 72hr Reglan and suppository for ileus     2. Cont recs and management per primary/ Pulmonary/ CT Sx  3. Cont abx per ID   4. Encourage IS use, deep breathing, ambulation and PT/OT work   5. [address] [address] Irwin for DVT ppx from surg stance  6.  Pt to f/u with Dr. Hortencia Siddiqui as out pt in 7-10 days     Thank you,    Electronically signed by Ammy Field DO  on 5/18/2021 at 6:31 AM

## 2021-05-18 NOTE — PROGRESS NOTES
PULMONARY & CRITICAL CARE MEDICINE PROGRESS  NOTE     Patient:  Erick Oreilly  MRN: [id number]  Admit date: 5/3/2021    SUBJECTIVE     I personally interviewed/examined the patient, reviewed interval history and interpreted all available radiographic, laboratory data at the time of service. Chief Compliant/Reason for Initial Consult: Acute respiratory failure on vent support, gastric perforation    Brief Hospital Course and Interval History:  The patient is a 78 y.o. female with known medical history of diabetes mellitus, hypertension, hypothyroidism, abdominal hernia presented to the hospital with nausea, vomiting, diarrhea. Patient had similar symptoms on 4/28 but was apparently sent home. Patient had dizzy spells and lightheadedness, had a syncopal episode in the ER on this admission. Patient was transferred from the [facility] to [place].  In [facility] patient lab revealed lactic acidosis of 6, glucose 585, patient was seen to be in DKA. Elevated lipase of 1451. Leukocytotic with WBC 23.5, hemoglobin 18.3. Patient also had elevated troponins, elevated creatinine of 3.8. In [place] repeat labs showed creatinine of 2.49 with lactic acid of 2.4. Troponin was 85, recheck of 94. LFTs showed elevated bilirubin.     General surgery was consulted. CT chest showed ascites and pneumoperitoneum with apparent free-flowing oral contrast in the left upper quadrant concerning for viscus perforation possibly within the subdiaphragmatic portion of the stomach. Patient then went for robotic hernia repair 5/4 with wedge gastrectomy, gastropexy and placement of 2 TAISHA drains and 2 PEG tubes.   Patient is n.p.o.         Interval history  5/18/2021  No acute events   CTS note reviewed - rec noted   t max - 100.3   No respiratory distress   Cough without purulent sputum         OBJECTIVE     VITAL SIGNS:   LAST-  BP (!) 140/90   Pulse 96   Temp 100.3 °F (37.9 °C) (Axillary)   Resp 20   Ht 5' 2\" (1.575 m)   Wt 194 lb 0.1 oz (88 kg)   SpO2 93%   BMI 35.48 kg/m²   8-24 HR RANGE-  TEMP Temp  Av °F (37.2 °C)  Min: 97.8 °F (36.6 °C)  Max: 100.3 °F (49.8 °C)   BP Systolic (21PZY), OCE:053 , Min:107 , YCP:963      Diastolic (71NOX), KHX:89, Min:70, Max:90     PULSE Pulse  Av.2  Min: 76  Max: 96   RR Resp  Av.4  Min: 9  Max: 20   O2 SAT SpO2  Av.3 %  Min: 93 %  Max: 99 %   OXYGEN DELIVERY O2 Flow Rate (L/min)  Av L/min  Min: 1 L/min  Max: 3 L/min     Systemic Examination:   General appearance aaox3  Chest -dull to percussion bilaterally and decreased breath sounds, right-sided chest tube   Heart - normal rate, regular rhythm, normal S1, S2, no murmurs, rubs, clicks or gallops  Abdomen -soft with one PEG tube and G-tube, clamped  Extremities - peripheral pulses normal, no pedal edema, no clubbing or cyanosis  Skin - normal coloration and turgor, no rashes, no suspicious skin lesions noted   PICC line in place  DATA REVIEW     Medications:  Scheduled Meds:   bisacodyl  10 mg Rectal Daily    gabapentin  100 mg Oral TID    polyethylene glycol  17 g Per G Tube Once    aspirin  81 mg Oral Daily    atorvastatin  10 mg Oral Daily    metoclopramide  5 mg Intravenous Q6H    vancomycin  750 mg Intravenous Q24H    fluconazole  400 mg Intravenous Q24H    fat emulsion  100 mL Intravenous Weekly - Thursday    levothyroxine  112 mcg Oral Daily    pantoprazole  40 mg Oral QAM AC    insulin glargine  10 Units Subcutaneous Nightly    polyethylene glycol  17 g Oral Daily    heparin (porcine)  5,000 Units Subcutaneous 3 times per day    vancomycin (VANCOCIN) intermittent dosing (placeholder)   Other RX Placeholder    ipratropium-albuterol  1 ampule Inhalation Q4H WA    piperacillin-tazobactam  3,375 mg Intravenous Q8H    insulin lispro  0-18 Units Subcutaneous Q4H    sodium chloride flush  5-40 mL Intravenous 2 times per day     Continuous Infusions:   PN-Adult 2-in-1 Central Line (Standard) 65 mL/hr at 05/17/21 1812    dextrose      sodium chloride 25 mL (05/10/21 1840)     LABS:-  ABGs:   No results found for: PH, PCO2, PO2, HCO3, O2SAT  No results for input(s): PHART, PO2ART, RNS0LPJ, RUY2MEJ, BEART, R3ICKIIS in the last 72 hours. Lab Results   Component Value Date    POCPH 7.433 05/06/2021    POCPCO2 36.8 05/06/2021    POCPO2 150.9 (H) 05/06/2021    POCHCO3 24.6 05/06/2021    PCKI6OIN 99 (H) 05/06/2021     CBC:   Recent Labs     05/16/21  0651 05/17/21  0550 05/18/21  0538   WBC 6.6 4.6 5.8   HGB 8.6* 8.1* 8.1*   HCT 28.7* 25.9* 26.5*   MCV 91.7 89.3 91.7    335 328   LYMPHOPCT 9* 16* 21*   RBC 3.13* 2.90* 2.89*   MCH 27.5 27.9 28.0   MCHC 30.0 31.3 30.6   RDW 14.1 13.9 14.4     BMP:   Recent Labs     05/16/21  0651 05/17/21  0550 05/18/21  0538   * 134* 135   K 4.1 4.2 4.0   CL 95* 96* 96*   CO2 27 31 31   BUN 35* 37* 35*   CREATININE 1.32* 1.35* 1.41*   GLUCOSE 213* 172* 155*   PHOS  --  4.2  --      Liver Function Test:   Recent Labs     05/18/21  0538   PROT 6.5   LABALBU 1.7*   ALT 14   AST 19   ALKPHOS 127*   BILITOT 0.25*     Amylase/Lipase:  No results for input(s): AMYLASE, LIPASE in the last 72 hours. Coagulation Profile:   Recent Labs     05/16/21  0651 05/17/21  0550 05/18/21  0538   INR 0.9 1.0 1.0   PROTIME 10.0 10.4 10.3   APTT 23.9 22.9 24.1     Cardiac Enzymes:  No results for input(s): CKTOTAL, CKMB, CKMBINDEX, TROPONINI in the last 72 hours.   Lactic Acid:  Lab Results   Component Value Date    LACTA 3.2 (H) 05/03/2021    LACTA 6.0 (HH) 05/03/2021     BNP:   No results found for: BNP  D-Dimer:  No results found for: DDIMER  Others:   Lab Results   Component Value Date    TSH 13.36 (H) 05/03/2021     No results found for: LILLY, RHEUMFACTOR, SEDRATE, CRP  No results found for: Emelia Keel  No results found for: IRON, TIBC, FERRITIN  No results found for: SPEP, UPEP  No results found for: PSA, CEA, , [id number],     Input/Output:    Intake/Output Summary (Last 24 hours) at 5/18/2021 1341  Last data filed at 5/18/2021 4537  Gross per 24 hour   Intake 2179 ml   Output 2700 ml   Net -521 ml       Microbiology:    Pathology:    Radiology Reports:  CT CHEST WO CONTRAST   Final Result   1. Right chest tube in place with near complete resolution of right   effusion. No pneumothorax or loculated fluid in the right hemithorax. 2.  Trace left pleural effusion. 3.  Dependent opacities in the lower lobes, left greater than right, again   demonstrated, although improved since prior chest CT exam.      4.  Moderate amount of fluid within the esophageal hiatus. 5.  Partially visualized stomach appears distended with 2 gastrostomy tubes   in place. XR ABDOMEN (KUB) (SINGLE AP VIEW)   Final Result   2 new gastrostomy tubes noted. Ileus. XR CHEST PORTABLE   Final Result   Right chest tube in satisfactory position with significantly improved right   pleural effusion. Bibasilar airspace disease and small left pleural effusion not significantly   changed. IR CHEST TUBE INSERTION   Final Result   Successful fluoroscopic guided placement of a right chest tube. IR GUIDED THORACENTESIS PLEURAL   Final Result   Successful ultrasound guided thoracentesis. FL ESOPHAGRAM   Final Result   1. No evidence for esophageal leak. 2. Postsurgical changes in the stomach. 3. Large amount of gastroesophageal reflux occurring repeatedly during the   study. .         CT CHEST WO CONTRAST   Final Result   New right-sided PICC again seen with unchanged and satisfactory tip position;   larger loculated right effusion in the azygoesophageal recess, as above. Otherwise similar findings to 05/07/2021 with suspected lower lobe   consolidation/pneumonitis, with volume loss and effusions both lower lobes.          XR CHEST PORTABLE   Final Result   New right-sided PICC tip position appears satisfactory. Otherwise, similar   findings compatible with bibasilar atelectasis/consolidation, effusions and   minimal infiltrate or atelectasis right mid lung. CT CHEST ABDOMEN PELVIS WO CONTRAST   Final Result   1. Within the chest, the patient has consolidative processes in both lower   lobes likely pneumonitis. Bilateral pleural effusions and basilar   atelectasis are noted. 2. Postoperative changes within the abdomen. The patient had a hiatal hernia   repair. Although the stomach is decompressed, gastric walls appear thickened   likely secondary to inflammation which may be postoperative. 3. Fluid in the right pericolic gutter and pelvis. 4. Thickened small bowel loops in the right lower quadrant which may   represent secondary changes to surgery. 5. Two drain placements in the upper abdomen. Left inguinal terminates in   the left iliac vein. FL ESOPHAGRAM   Final Result   1. Drainage of contrast material through what appears to be the gastrostomy   tubes following the ingestion of contrast.  Contrast does migrate beyond the   postoperative region into the proximal small bowel. 2. No extraneous/extraluminal collection of contrast is seen beyond the   confines of the stomach. 3. 2 surgical drains and 2 presumed gastrostomy tubes are seen overlying the   left upper quadrant. There does appear to be contrast slightly marginating   the more lateral gastrostomy tube balloon. 4. Delayed migration of contrast through the distal esophagus and GE junction   with mild gastroesophageal reflux. The findings were sent to the Radiology Results [id number] at 12:43   pm on 5/7/2021to be communicated to a licensed caregiver. XR CHEST PORTABLE   Final Result   Bibasilar consolidation new from prior study. Blunting of the costophrenic   angles bilaterally         XR CHEST PORTABLE   Final Result   ETT tip position stable.   Decreased left subcutaneous emphysema. Slightly   improved suspected left basilar volume loss with persistent findings as   above. No overt vascular congestion. US RENAL COMPLETE   Final Result   Unremarkable ultrasound of the kidneys and urinary bladder. Trace right pleural effusion         XR CHEST PORTABLE   Final Result   Volume loss continues left hemithorax with haziness at the left base either   atelectasis and or fluid. Subcutaneous emphysema along the left lateral   chest wall has decreased. No appreciable extrapleural air. Endotracheal   tube in appropriate position. XR CHEST PORTABLE   Final Result   1. Recommend repositioning of left internal jugular approach central venous   catheter. 2. Low lung volumes with left basilar atelectasis plus or minus mild pleural   effusion. 3. Left chest wall scattered soft tissue emphysema. XR CHEST PORTABLE   Final Result   Intestinal tube deviates to the left side of a sizable hiatal hernia,   traversing below the hemidiaphragm and terminating just underneath the left   hemidiaphragm. Side port of the intestinal tube is below the diaphragmatic   hiatus. CT CHEST WO CONTRAST   Final Result   1. Ascites and pneumoperitoneum with apparent free-flowing oral contrast in   the left upper quadrant is concerning for viscus perforation, possibly within   the subdiaphragmatic portion of the stomach. 2. Large hiatal hernia with organoaxial volvulus. Majority of contrast is   retained within the esophagus and supradiaphragmatic stomach. 3. Enteric tube extends below the diaphragm with tip outside the field of   view. 4. Moderate bilateral pleural effusions with adjacent consolidation,   atelectasis versus pneumonia. Critical results were called by Dr. Gabriela Herrera MD to [facility] on   5/4/2021 at 18:30. XR ABDOMEN (KUB) (SINGLE AP VIEW)   Final Result   No significant subdiaphragmatic contrast progression, as above. RECOMMENDATION:   Consider chest x-ray to further assess a organic-axial gastric volvulus         FL UGI   Final Result   Organo-axial volvulus the stomach. Large paraesophageal hernia containing the majority of the rotated gastric   body. The greater curvature is positioned superiorly within the   paraesophageal hernia defect. There is narrowing of the gastroesophageal   junction as well as of the gastroduodenal junction through the hernia defect. MRI ABDOMEN WO CONTRAST MRCP   Final Result   1. No evidence of intrahepatic or extrahepatic ductal dilatation. 2. Incompletely imaged large hiatal hernia with organoaxial malrotation of   the stomach. 3. Small to moderate amount of ascites. 4. Diffuse small bowel wall thickening likely due to 3rd spacing as the post   enteritis. 5. Trace bilateral pleural effusions.          XR CHEST PORTABLE   Final Result   No acute cardiopulmonary disease      Large hiatal hernia         IR PROCEDURAL REQUEST    (Results Pending)       Echocardiogram:   Results for orders placed during the hospital encounter of 05/03/21   ECHO Complete 2D W Doppler W Color    Narrative Transthoracic Echocardiography Report (TTE)     Patient Name Zora Chang   Date of Study               05/05/2021      Date of      1950  Gender                      Female   Birth      Age          78 year(s)  Race                              Room Number  5912        Height:                     62 inch, 157.48 cm      Corporate ID [id number]    Weight:                     166 pounds, 75.3 kg   #      Patient Acct [de-identified]   BSA:          1.77 m^2      BMI:      30.36   #                                                              kg/m^2      MR #         [id number]     Sonographer                 Roney Santos      Accession #  [id number]  Interpreting Physician      Shellie Vizcarra 63      Fellow                   Referring Nurse                            Practitioner      Interpreting dilatation. Right Ventricle  Right ventricular dilatation with reduced systolic function. Abnormal RV strain. Mitral Valve  Normal mitral valve structure. Trivial mitral regurgitation. Aortic Valve  Aortic valve is trileaflet. No evidence of aortic insufficiency or stenosis. Tricuspid Valve  Normal tricuspid valve leaflets. Moderate tricuspid regurgitation. Estimated right ventricular systolic pressure is 37 mmHg, suggesting  pulmonary HTN. Pulmonic Valve  Pulmonic valve not well visualized but Doppler velocities are normal.  Pericardial Effusion  No significant pericardial effusion is seen. Miscellaneous  Normal aortic root dimension. E/E' average = 15.05. IVC dilated but unable to assess respiratory collapse due to patient on  ventilator.     M-mode / 2D Measurements & Calculations:      LVIDd:4 cm(3.7 - 5.6 cm)          Diastolic UJIYXN:73 ml   ZRNN:9.7 cm(0.6 - 1.1 cm)         Systolic ADWGRX:39.04 ml   LVPWd:0.9 cm(0.6 - 1.1 cm)        Aortic Root:2.9 cm(2.0 - 3.7 cm)                                     LA Dimension: 3.1 cm(1.9 - 4.0 cm)   Calculated LVEF (%): 52.95 %      LA volume/Index: 31.04 ml /18m^2                                     LVOT:1.9 cm                                     RVDd:3 cm      Mitral:                                 Aortic      Valve Area (P1/2-Time): 5.12 cm^2       Peak Velocity: 1.55 m/s   Peak E-Wave: 0.78 m/s                   Mean Velocity: 0.99 m/s   Peak A-Wave: 1.00 m/s                   Peak Gradient: 9.61 mmHg   E/A Ratio: 0.78                         Mean Gradient: 5 mmHg   Peak Gradient: 2.45 mmHg   Mean Gradient: 2 mmHg   Deceleration Time: 145 msec             Area (continuity): 2.16 cm^2   P1/2t: 43 msec                          AV VTI: 25.9 cm      Area (continuity): 2.1 cm^2   Mean Velocity: 0.60 m/s      Tricuspid:                              Pulmonic:      Estimated RVSP: 37 mmHg                 Peak Velocity: 0.80 m/s   Peak TR Velocity: 2.85 m/s Peak Gradient: 2.57 mmHg   Peak TR Gradient: 32.49 mmHg     Diastology / Tissue Doppler  Septal Wall E' velocity:0.06 m/s  Septal Wall E/E':13.8  Lateral Wall E' velocity:0.05 m/s  Lateral Wall E/E':16.3       Cardiac Catheterization:   No results found for this or any previous visit. Date of Procedure: 5/4/2021     Pre-Op Diagnosis: PARAESOPHAGEAL HERNIA, PNEUMOPERITONEUM     Post-Op Diagnosis: Same, Gastric perforation due to ischemia from paraesophageal hernia. Feculent peritonitis       Procedure(s):  PARAESPHAGEAL HERNIA REPAIR LAPAROSCOPIC ROBOTIC   PARTIAL GASTRECTOMY  GASTROPEXY  IRRIGATION OF ABDOMEN  ASSESSMENT AND PLAN     Assessment:  B/l empyema post gastric perforation due to ischemia from para esohageal hernia . Left fungal empyema   Left loculated pleural effusion -Left Thoracentesis 5/12/21 - exudate - candida   Right larger loculated pleural effusion -Right pig tail chest tube 5/13/21 - culture negative  - exudate - ph -6.9  Mediastinal fluid collection  Gastric volvulus with Viscus perforation  Sepsis   Bilateral lower lobe pneumonia  Peritonitis  Blood cultures positive for staph epidermidis, methicillin resistant  Diabetes mellitusDKA now resolved  Lactic acidosis resolving  BONNIE  Thrombocytopenia   Elevated troponins  Subclinical hypothyroidism  Elevated bilirubin  Hypernatremia    Plan:  CTS note reviewed - will request IR to remove chest tube . Xray chest 4 hrs after   Repeat ct chest in 3 days after removal   Question about mediastinal fluid collection still not addressed ? Infected .    Continue antifungal and antibiotics   Ashli Dunn MD, DO  Pulmonary and Critical Care Medicine           5/18/2021, 1:41 PM

## 2021-05-18 NOTE — PROGRESS NOTES
consultation:   Septic shock/sepsis    History of Present Illness:   Addy Quintero is a 78y.o.-year-old   female who was initially admitted on 5/3/2021. Patient seen at the request of Arielle Luis. INITIAL HISTORY : 05/07/2021    Pt with a history of diabetes mellitus type 2, hypertension, hyperlipidemia, thyroid disease, gout and hiatal hernia. She initially presented on 05/03/2021 to an outlying facility with a chief complaint of nausea vomiting, diarrhea and syncopal attack . Patient was found to have blood glucose of 585,WBC of 23.5, elevated lipase 1451 and elevated beta hydroxybutyrate. MRCP on 05/03/2021 showed diffuse small bowel wall thickening likely due to third spacing as opposed to enteritis. CT scan of the abdomen showed extremely large fluid-filled hiatal hernia and distended and fluid-filled stomach below the diaphragm. Patient was given Zosyn at the outlying facility and was transferred to [facility] ED for evaluation by the surgery team.    CT scan of the chest without contrast was performed on 05/04/2021 which showed ascites and pneumoperitoneum with apparent free-flowing oral contrast in the left upper quadrant concerning for viscus perforation possibly within the subdiaphragmatic portion of the stomach. Moderate bilateral pleural effusions and hiatal hernia with organoaxial volvulus. Bariatric surgery performed a laparoscopic robotic para esophageal hernia repair. Cardiology is also following the patient because of elevated troponins with unremarkable echocardiography. Antibiotics wise the patient was given Zosyn on 05/03/2021 at the outside facility and it has been continued through the present time. Blood and urine cultures on 05/03/2021 show No growth . Repeat urine culture on 05/04/2021 shows No growth     Patient is currently having temperature elevations. Temp max of 101.1 on 05/07/2021 around 4 AM in the morning.     Patient WBC count is improving from 23.5 on presentation to 6.2 today. Repeat chest x-ray 5-7-21 shows bibasilar consolidation new from prior study with blunting of the costophrenic angles bilaterally. Patient is off of the pressors since 5-6-21    CURRENT EVALUATION : 5/18/2021    Afebrile  VS stable  On 3 L NC  100 cc drainage from chest tube over the last 24 hours. CT chest 5/16: Right chest tube in place with near complete resolution of the right effusion. Trace left effusion. G tube clamped. Ok for low fiber diet per GS. Left Thoracentesis on 5/12/21 for left loculated empyema-Pigtail catheter placed with pus noted. Fluid shows elevated LDH of 536, protein 3.1, exudative effusion likely para-pneumonic  5/12/21 Fluid culture positive for Gram positive rods. Candida albicans  Right sided 12 Polish chest tube placed on 5/13/21 for empyema. CT surgery consult for possible VATs/decortication next week. Follow-up esophagram on 5/11/21 negative for a leak. Coagulase negative Staphylococci bacteremia x 2 on 5-7-21. Repeat blood cultures 5-8-21 x 2 : No growth. Femoral line and morel catheter were removed. Right TAISHA removed 5/11/21 5/12/21 Thoracentesis fluid culture positive for Gram positive rods. Identification shows Kassandra albicans  Will continue Vancomycin & Zosyn. Add fluconazole. Rt and Lt TAISHA drains have been removed, on 5-7- and 5-11-21, respectively  G-tube is clamped and patient remains on TPN-surgery to switch to TF per G tube       Labs, X rays reviewed: 5/18/2021    BUN: 35  Cr: 1.71-->1.34-->1.42-->1.25->1.35->1.4    WBC: 9.4-->4.7-->5.3->6.6->4.6->5.8  Hb: 8.1  Plat: 328    LD: 345  Triglycerides: 261    Cultures:    Urine:  05/03/2021: No growth   05/04/2021: No growth     Blood:  05/03/2021: Blood cultures x 2:  No growth  5/7/21: Coagulase negative Staphylococci bacteremia x 2  5/8/21: No growth  Sputum :    Thoracentesis fluid:  Lt side 5/12/21 gram positive rods/ candida albicans. aspirin  81 mg Oral Daily    atorvastatin  10 mg Oral Daily    metFORMIN  500 mg Oral BID WC    metoclopramide  5 mg Intravenous Q6H    vancomycin  750 mg Intravenous Q24H    fluconazole  400 mg Intravenous Q24H    fat emulsion  100 mL Intravenous Weekly - Thursday    levothyroxine  112 mcg Oral Daily    pantoprazole  40 mg Oral QAM AC    gabapentin  300 mg Oral TID    furosemide  20 mg Oral Every Other Day    insulin glargine  10 Units Subcutaneous Nightly    polyethylene glycol  17 g Oral Daily    heparin (porcine)  5,000 Units Subcutaneous 3 times per day    vancomycin (VANCOCIN) intermittent dosing (placeholder)   Other RX Placeholder    ipratropium-albuterol  1 ampule Inhalation Q4H WA    piperacillin-tazobactam  3,375 mg Intravenous Q8H    insulin lispro  0-18 Units Subcutaneous Q4H    sodium chloride flush  5-40 mL Intravenous 2 times per day       Social History:     Social History     Socioeconomic History    Marital status:      Spouse name: Not on file    Number of children: Not on file    Years of education: Not on file    Highest education level: Not on file   Occupational History    Not on file   Tobacco Use    Smoking status: Never Smoker    Smokeless tobacco: Never Used   Substance and Sexual Activity    Alcohol use: Never    Drug use: Never    Sexual activity: Not on file   Other Topics Concern    Not on file   Social History Narrative    Not on file     Social Determinants of Health     Financial Resource Strain:     Difficulty of Paying Living Expenses:    Food Insecurity:     Worried About Running Out of Food in the Last Year:     Ran Out of Food in the Last Year:    Transportation Needs:     Lack of Transportation (Medical):      Lack of Transportation (Non-Medical):    Physical Activity:     Days of Exercise per Week:     Minutes of Exercise per Session:    Stress:     Feeling of Stress :    Social Connections:     Frequency of Communication with Friends and Family:     Frequency of Social Gatherings with Friends and Family:     Attends Rastafari Services:     Active Member of Clubs or Organizations:     Attends Club or Organization Meetings:     Marital Status:    Intimate Partner Violence:     Fear of Current or Ex-Partner:     Emotionally Abused:     Physically Abused:     Sexually Abused:        Family History:     Family History   Problem Relation Age of Onset    Breast Cancer Mother     High Blood Pressure Mother     High Cholesterol Father     Breast Cancer Sister         Allergies:   No known allergies     Review of Systems:   Review of Systems   Constitutional: Positive for fatigue and fever. Negative for activity change, appetite change, chills, diaphoresis and unexpected weight change. Respiratory: Positive for cough. Negative for apnea, choking, chest tightness, shortness of breath, wheezing and stridor. Cardiovascular: Negative for chest pain, palpitations and leg swelling. Gastrointestinal: Positive for constipation. Negative for abdominal distention, nausea and vomiting. Endocrine: Negative for cold intolerance. Genitourinary: Negative for difficulty urinating and dysuria. Musculoskeletal: Negative for arthralgias and back pain. Neurological: Negative for dizziness, tremors, syncope and facial asymmetry. Hematological: Negative for adenopathy. Psychiatric/Behavioral: Negative for agitation and behavioral problems. The patient is not hyperactive. Physical Examination :     Patient Vitals for the past 8 hrs:   BP Temp Temp src Pulse Resp SpO2   05/18/21 0801    81 18 98 %   05/18/21 0620    76 9 99 %   05/18/21 0619    77 14 99 %   05/18/21 0618    77 11 99 %   05/18/21 0617    77 11 99 %   05/18/21 0355 122/77 98.8 °F (37.1 °C) Oral 84 17 99 %     General Appearance: Awake, alert  Head:  Normocephalic, no trauma  Eyes: Pupils equal, round, reactive to light, sclera anicteric; conjunctivae pink. No embolic phenomena. ENT: Oropharynx clear, without erythema, exudate, or thrush. No tenderness of sinuses. Mouth/throat: mucosa pink and moist. No lesions. Dentition in good repair. Neck:Supple, without lymphadenopathy. Thyroid normal, No bruits. Pulmonary/Chest: Clear to auscultation, without wheezes, rales, or rhonchi. No dullness to percussion. Cardiovascular: Regular rate and rhythm without murmurs, rubs, or gallops. Abdomen: Soft, non tender. Bowel sounds normal. No organomegaly. The ports entry wounds are healing well and without any erythema. All four Extremities: No cyanosis, clubbing, edema, or effusions. Neurologic: No gross sensory or motor deficits. Skin: Warm and dry with good turgor. No signs of peripheral arterial or venous insufficiency. No ulcerations. No open wounds. Medical Decision Making -Laboratory:   I have independently reviewed/ordered the following labs:    CBC with Differential:   Recent Labs     05/17/21  0550 05/18/21  0538   WBC 4.6 5.8   HGB 8.1* 8.1*   HCT 25.9* 26.5*    328   LYMPHOPCT 16* 21*   MONOPCT 2 3     BMP:   Recent Labs     05/17/21  0550 05/18/21  0538   * 135   K 4.2 4.0   CL 96* 96*   CO2 31 31   BUN 37* 35*   CREATININE 1.35* 1.41*   MG 1.8  --      Hepatic Function Panel:   Recent Labs     05/17/21  0550 05/18/21  0538   PROT 6.3* 6.5   LABALBU 1.7* 1.7*   BILIDIR 0.14 0.15   IBILI 0.13 0.10   BILITOT 0.27* 0.25*   ALKPHOS 127* 127*   ALT 13 14   AST 16 19     No results for input(s): RPR in the last 72 hours. No results for input(s): HIV in the last 72 hours. No results for input(s): BC in the last 72 hours.   Lab Results   Component Value Date    MUCUS NOT REPORTED 05/06/2021    RBC 2.89 05/18/2021    RBC 6.23 05/03/2021    TRICHOMONAS NOT REPORTED 05/06/2021    WBC 5.8 05/18/2021    YEAST NOT REPORTED 05/06/2021    TURBIDITY CLOUDY 05/06/2021     Lab Results   Component Value Date    CREATININE 1.41 05/18/2021    CREATININE 3.09 05/03/2021    GLUCOSE 155 05/18/2021    GLUCOSE 453 05/03/2021       Medical Decision Making-Imaging:     EXAMINATION:   SINGLE CONTRAST ESOPHAGRAM       5/7/2021 11:03 am       TECHNIQUE:   Single contrast esophagram was performed with a total 100 mL of Omnipaque 240   oral contrast.       FLUOROSCOPY DOSE AND TYPE OR TIME AND EXPOSURES:   Fluoro time: 2.7 minutes; DAP: 70.44 mGy       COMPARISON:   Upper GI from 05/04/2021       HISTORY:   ORDERING SYSTEM PROVIDED HISTORY: s/p hiatal hernia reduction  with partial   gastrectomy and gastropexy   TECHNOLOGIST PROVIDED HISTORY:   s/p hiatal hernia reduction  with partial gastrectomy and gastropexy   Reason for Exam: H.H repair/gastrectomy/gastropexy/ 100 ml Omnipaque orally   Acuity: Unknown   Type of Exam: Unknown       82-year-old female status post hiatal hernia reduction with partial   gastrectomy and gastropexy       FINDINGS:   Fluoroscopic  imaging was obtained prior to the administration contrast.       2 gastrostomy tubes are seen projecting over the left upper quadrant.  There   also 2 surgical drains projecting over the left upper quadrant.  Prior   cholecystectomy.  Suture material projects over the left upper quadrant.       The patient drank contrast which migrates through the postoperative region   and into the stomach and small bowel.       There is contrast draining through what appears to be the gastrostomy tubes.       No extraneous collection of contrast is seen beyond the confines of the   stomach.       There is contrast marginating a presumed gastrostomy tube balloon.       Mild gastroesophageal reflux and delayed transit of contrast is seen within   the distal esophagus/GE junction.           Impression   1. Drainage of contrast material through what appears to be the gastrostomy   tubes following the ingestion of contrast.  Contrast does migrate beyond the   postoperative region into the proximal small bowel.    2. No extraneous/extraluminal collection of contrast is seen beyond the   confines of the stomach. 3. 2 surgical drains and 2 presumed gastrostomy tubes are seen overlying the   left upper quadrant.  There does appear to be contrast slightly marginating   the more lateral gastrostomy tube balloon. 4. Delayed migration of contrast through the distal esophagus and GE junction   with mild gastroesophageal reflux. The findings were sent to the Radiology Results [address] at 12:43   pm on 5/7/2021to be communicated to a licensed caregiver.             EXAMINATION:   CT OF THE CHEST WITHOUT CONTRAST 5/4/2021 5:55 pm       TECHNIQUE:   CT of the chest was performed without the administration of intravenous   contrast. Multiplanar reformatted images are provided for review. Dose   modulation, iterative reconstruction, and/or weight based adjustment of the   mA/kV was utilized to reduce the radiation dose to as low as reasonably   achievable.       COMPARISON:   None.       HISTORY:   ORDERING SYSTEM PROVIDED HISTORY: large hiatal hernia, contrast progression? TECHNOLOGIST PROVIDED HISTORY:   large hiatal hernia, contrast progression? Reason for Exam: large hiatal hernia, contrast progression?       FINDINGS:   Mediastinum: Heart size is normal without pericardial effusion.  There are   shotty mediastinal lymph nodes.  The thoracic aorta and main pulmonary artery   are normal in caliber.       Lungs/pleura: Moderate bilateral pleural effusions with adjacent   consolidation.  No pneumothorax.       Upper Abdomen:  There is a large hiatal hernia containing the majority of the   stomach.  There is organoaxial volvulus.  Enteric tube is noted extending   below the diaphragm with tip outside the field of view.  The herniated   stomach is distended with contrast. Paola Mettle is also contrast within the distal   esophagus. Paola Mettle is some contrast visualized within the portion of stomach   below the diaphragm.  Free air and LEFT    Special Requests 05/12/2021  5:09  [address]   NOT REPORTED    Direct Exam Abnormal  05/12/2021  5:09 PM [address]    Direct Exam 05/12/2021  5:09  [address]   NO BACTERIA SEEN    Direct Exam 05/12/2021  5:09  [address]   Gram stain made from cytocentrifuged specimen.  Organisms and cells will be concentrated. Culture Abnormal  05/12/2021  5:09 PM [address] FLUID CULTURE, RN NOTIFIED Results called to and read back by: ROSALINDA WALLER 05/14/21 0430    Culture 05/12/2021  5:09 PM [facility] FROM BOTTLE: Mireille Santanesha POSITIVE RODS          Medical Decision Making-Other:     Note:  Labs, medications, radiologic studies were reviewed with personal review of films   Large amounts of data were reviewed  Discussed with nursing Staff, Discharge planner  Infection Control and Prevention measures reviewed  All prior entries were reviewed  Administer medications as ordered  Prognosis: Guarded  Discharge planning reviewed  Follow up as outpatient. Thank you for allowing us to participate in the care of this patient. Please call with questions. Anna Multani MD  Internal Medicine Resident, PGY-3  [facility]; [facility]  5/18/2021, 8:36 AM      ATTESTATION:    I have discussed the case, including pertinent history and exam findings with the residents and students. I have seen and examined the patient and the key elements of the encounter have been performed by me. I was present when the student obtained his information or examined the patient. I have reviewed the laboratory data, other diagnostic studies and discussed them with the residents. I have updated the medical record where necessary. I agree with the assessment, plan and orders as documented by the resident/ student.     Tiffanie Pastor MD.

## 2021-05-18 NOTE — PLAN OF CARE
Problem: SKIN INTEGRITY  Goal: Skin integrity is maintained or improved  Outcome: Met This Shift     Problem: Injury - Risk of, Physical Injury:  Goal: Will remain free from falls  Description: Will remain free from falls  Outcome: Met This Shift     Problem: Mood - Altered:  Goal: Mood stable  Description: Mood stable  Outcome: Met This Shift     Problem: Skin Integrity:  Goal: Absence of new skin breakdown  Description: Absence of new skin breakdown  Outcome: Met This Shift     Problem: Falls - Risk of:  Goal: Will remain free from falls  Description: Will remain free from falls  Outcome: Met This Shift     Problem: Sleep Pattern Disturbance:  Goal: Appears well-rested  Description: Appears well-rested  Outcome: Ongoing     Problem: Nutrition  Goal: Optimal nutrition therapy  Description: Nutrition Problem #1: Inadequate oral intake  Intervention: Food and/or Nutrient Delivery: Continue NPO, Start Parenteral Nutrition-suggest start with 150 g Dex, 50 g AA at 41.7 mL/hr with 100 mL 20% lipids.   Nutritional Goals: meet % of estimated nutrient needs     5/18/2021 1638 by Lanny Martin RN  Outcome: Not Met This Shift

## 2021-05-18 NOTE — PROGRESS NOTES
[address] [facility] Daljit Hunt notified about patients low urine output of only 300mL. Awaiting response.    Electronically signed by Kimberly Martinez RN on 5/18/2021 at 5:48 PM

## 2021-05-18 NOTE — PROGRESS NOTES
[facility]  Office: [address], DO, Jossy Mayberry, DO, Ruth Judge, DO, Juan Jauregui, DO, Michael Austin MD, Clif Bingham MD, Jeffery Beckett MD, Dannielle Cam MD, Linda Chen MD, Agustina Barrett MD, Jair Ledesma MD, Rosa Beltran MD, Linda Penaloza DO, Cruz Dewey MD, Matias Izaguirre DO, Jamarcus Tejada MD,  Odessa Hoover DO, Charan Lay MD, Amadou Minaya MD, Pedro Lius Anthony MD, Liza Herrera MD, Alicia Sunshine, [facility], [facility], CNP, Merly Lockett, CNP, Leila Santacruz, CNS, Claudia Dobbs, CNP, Farzana Sanderson, CNP, Delia Johnson, CNP, Marlene Quiroga, CNP, Blaine Keller, CNP, Nickolas Connor PA-C, Zora Guzman, [facility], Nita Henao, CNP, Annita Mendes, CNP, Bhavna Tavarez, CNP, Nicoletta Kocher, CNP, Aden Cortez, CNP, Pere Siemens, [address] [place] [address]    Progress Note    5/18/2021    9:04 AM    Name:   Talon Morataya  MRN:     [id number]     Acct:      [de-identified]   Room:   [address] [place] [address] Day:  13  Admit Date:  5/3/2021 12:49 PM    PCP:   Jono Velásquez DO  Code Status:  Full Code    Subjective:     C/C:   Chief Complaint   Patient presents with    Blood Sugar Problem     >450    Hernia     hyatial      Interval History Status: improved. Patient seen examined bedside. Having flatus, was able to tolerate breakfast with a bowl of cereal and half of her muffin. No acute issues overnight. Currently on IV TPN. No leukocytosis, hemoglobin overall stable. Brief History:     63-[facility] to [facility] and was transferred for further evaluation and patient underwent emergent robotic laparoscopic hiatal hernia reduction with gastropexy via G-tube on 5/4/2021 and required intubation and was ultimately extubated on 4/6/2021.   Patient underwent thoracentesis and had chest tube insertion 5/12/2021 follow-up with cardiothoracic surgery as well as pulmonology. Is a concern for possible esophageal leak and esophagram was performed on 5/7/2021 did not show any leak. Patient was started on Diflucan due to concern for fungal infection. Review of Systems:     Constitutional:  negative for chills, fevers, sweats  Respiratory:  negative for cough, dyspnea on exertion, shortness of breath, wheezing  Cardiovascular:  negative for chest pain, chest pressure/discomfort, lower extremity edema, palpitations  Gastrointestinal:  negative for abdominal pain, constipation, diarrhea, nausea, vomiting  Neurological:  negative for dizziness, headache    Medications: Allergies:     Allergies   Allergen Reactions    No Known Allergies        Current Meds:   Scheduled Meds:    bisacodyl  10 mg Rectal Daily    aspirin  81 mg Oral Daily    atorvastatin  10 mg Oral Daily    metFORMIN  500 mg Oral BID WC    metoclopramide  5 mg Intravenous Q6H    vancomycin  750 mg Intravenous Q24H    fluconazole  400 mg Intravenous Q24H    fat emulsion  100 mL Intravenous Weekly - Thursday    levothyroxine  112 mcg Oral Daily    pantoprazole  40 mg Oral QAM AC    gabapentin  300 mg Oral TID    furosemide  20 mg Oral Every Other Day    insulin glargine  10 Units Subcutaneous Nightly    polyethylene glycol  17 g Oral Daily    heparin (porcine)  5,000 Units Subcutaneous 3 times per day    vancomycin (VANCOCIN) intermittent dosing (placeholder)   Other RX Placeholder    ipratropium-albuterol  1 ampule Inhalation Q4H WA    piperacillin-tazobactam  3,375 mg Intravenous Q8H    insulin lispro  0-18 Units Subcutaneous Q4H    sodium chloride flush  5-40 mL Intravenous 2 times per day     Continuous Infusions:    PN-Adult 2-in-1 Central Line (Standard) 65 mL/hr at 05/17/21 1812    dextrose      sodium chloride 25 mL (05/10/21 1840)     PRN Meds: oxyCODONE **OR** oxyCODONE, acetaminophen, glucose, dextrose, glucagon (rDNA), dextrose, sodium chloride, potassium chloride, magnesium sulfate, acetaminophen, artificial tears, sodium chloride flush, [DISCONTINUED] promethazine **OR** ondansetron    Data:     Past Medical History:   has a past medical history of Diabetes mellitus ([address] Gout, Hiatal hernia, Hyperlipidemia, Hypertension, and Thyroid disease. Social History:   reports that she has never smoked. She has never used smokeless tobacco. She reports that she does not drink alcohol and does not use drugs. Family History:   Family History   Problem Relation Age of Onset    Breast Cancer Mother     High Blood Pressure Mother     High Cholesterol Father     Breast Cancer Sister        Vitals:  /77   Pulse 81   Temp 98.8 °F (37.1 °C) (Oral)   Resp 18   Ht 5' 2\" (1.575 m)   Wt 194 lb 0.1 oz (88 kg)   SpO2 98%   BMI 35.48 kg/m²   Temp (24hrs), Av.5 °F (36.9 °C), Min:97.8 °F (36.6 °C), Max:99 °F (37.2 °C)    Recent Labs     21  0023 21  0354 21  0653   POCGLU 215* 179* 149* 151*       I/O (24Hr):     Intake/Output Summary (Last 24 hours) at 2021 0904  Last data filed at 2021 0649  Gross per 24 hour   Intake 2179 ml   Output 3000 ml   Net -821 ml       Labs:  Hematology:  Recent Labs     21  0651 21  0550 21  0538   WBC 6.6 4.6 5.8   RBC 3.13* 2.90* 2.89*   HGB 8.6* 8.1* 8.1*   HCT 28.7* 25.9* 26.5*   MCV 91.7 89.3 91.7   MCH 27.5 27.9 28.0   MCHC 30.0 31.3 30.6   RDW 14.1 13.9 14.4    335 328   MPV 10.2 9.9 10.0   INR 0.9 1.0 1.0     Chemistry:  Recent Labs     21  0651 21  0550 21  0538   * 134* 135   K 4.1 4.2 4.0   CL 95* 96* 96*   CO2 27 31 31   GLUCOSE 213* 172* 155*   BUN 35* 37* 35*   CREATININE 1.32* 1.35* 1.41*   MG  --  1.8  --    ANIONGAP 12 7* 8*   LABGLOM 40* 39* 37*   GFRAA 49* 47* 45*   CALCIUM 8.5* 8.5* 8.2*   PHOS  --  4.2  --      Recent Labs     21  3640 21  0550 21  1642 21  2018 21  0023 21  0354 05/18/21  0538 05/18/21  0653   PROT 6.8 6.3*  --   --   --   --   --  6.5  --    LABALBU 1.7* 1.7*  --   --   --   --   --  1.7*  --    AST 16 16  --   --   --   --   --  19  --    ALT 15 13  --   --   --   --   --  14  --    ALKPHOS 147* 127*  --   --   --   --   --  127*  --    BILITOT 0.30 0.27*  --   --   --   --   --  0.25*  --    BILIDIR 0.17 0.14  --   --   --   --   --  0.15  --    POCGLU  --   --  170* 209* 215* 179* 149*  --  151*     ABG:  Lab Results   Component Value Date    POCPH 7.433 05/06/2021    PHART 7.193 05/04/2021    POCPCO2 36.8 05/06/2021    NUO0WMI 52.0 05/04/2021    POCPO2 150.9 05/06/2021    PO2ART 145.0 05/04/2021    POCHCO3 24.6 05/06/2021    PXI0JGM 19.2 05/04/2021    NBEA NOT REPORTED 05/06/2021    PBEA 0 05/06/2021    QOK5EXJ NOT REPORTED 05/06/2021    TGMD4MZS 99 05/06/2021    L2NXWTUQ 98.0 05/04/2021    FIO2 40.0 05/06/2021     Lab Results   Component Value Date/Time    SPECIAL NOT REPORTED 05/13/2021 10:23 AM     Lab Results   Component Value Date/Time    CULTURE NO GROWTH 4 DAYS 05/13/2021 10:23 AM       Radiology:  XR ABDOMEN (KUB) (SINGLE AP VIEW)    Result Date: 5/15/2021  2 new gastrostomy tubes noted. Ileus. CT CHEST WO CONTRAST    Result Date: 5/16/2021  1. Right chest tube in place with near complete resolution of right effusion. No pneumothorax or loculated fluid in the right hemithorax. 2.  Trace left pleural effusion. 3.  Dependent opacities in the lower lobes, left greater than right, again demonstrated, although improved since prior chest CT exam. 4.  Moderate amount of fluid within the esophageal hiatus. 5.  Partially visualized stomach appears distended with 2 gastrostomy tubes in place. CT CHEST WO CONTRAST    Result Date: 5/11/2021  New right-sided PICC again seen with unchanged and satisfactory tip position; larger loculated right effusion in the azygoesophageal recess, as above.  Otherwise similar findings to 05/07/2021 with suspected lower lobe shock secondary to strangulated paraesophageal hernia status post laparoscopic repair on 5/4/2021. Appreciate bariatric surgery recommendations. Stop IV TPN, encourage PO intake, nutritional supplements, Reglan for total of 72 hours  Acute respiratory failure requiring oxygen with empyema showing gram positive and yeast, still requiring 1L nasal cannula fluconazole for candida infection, vancomycin, zosyn. Appreciate ID, pulm, and CTS recommendations, plan for chest tube removal today. Monitor if patient is a candidate for VATS  HTN, CAD, ASA, lipitor  Diabetes. lantus 10 units, ISS  Hypothyroidism. Synthroid 112 mcg  Decrease Neurontin 100 mg 3 times daily. Stop Metformin. Monitor renal clearance  Continue with bowel regimen. Discharge planning. Most likely need skilled nursing facility.     Jose Bowman MD  5/18/2021  9:04 AM

## 2021-05-19 ENCOUNTER — APPOINTMENT (OUTPATIENT)
Dept: GENERAL RADIOLOGY | Age: 71
DRG: 853 | End: 2021-05-19
Payer: MEDICARE

## 2021-05-19 LAB
ABSOLUTE EOS #: 0.21 K/UL (ref 0–0.44)
ABSOLUTE IMMATURE GRANULOCYTE: 0.31 K/UL (ref 0–0.3)
ABSOLUTE LYMPH #: 1.24 K/UL (ref 1.1–3.7)
ABSOLUTE MONO #: 0.62 K/UL (ref 0.1–1.2)
ALBUMIN SERPL-MCNC: 1.9 G/DL (ref 3.5–5.2)
ALBUMIN/GLOBULIN RATIO: 0.4 (ref 1–2.5)
ALP BLD-CCNC: 123 U/L (ref 35–104)
ALT SERPL-CCNC: 13 U/L (ref 5–33)
ANION GAP SERPL CALCULATED.3IONS-SCNC: 10 MMOL/L (ref 9–17)
AST SERPL-CCNC: 17 U/L
BASOPHILS # BLD: 1 % (ref 0–2)
BASOPHILS ABSOLUTE: 0.1 K/UL (ref 0–0.2)
BILIRUB SERPL-MCNC: 0.35 MG/DL (ref 0.3–1.2)
BILIRUBIN DIRECT: 0.16 MG/DL
BILIRUBIN, INDIRECT: 0.19 MG/DL (ref 0–1)
BUN BLDV-MCNC: 32 MG/DL (ref 8–23)
BUN/CREAT BLD: ABNORMAL (ref 9–20)
CALCIUM SERPL-MCNC: 8.1 MG/DL (ref 8.6–10.4)
CHLORIDE BLD-SCNC: 95 MMOL/L (ref 98–107)
CO2: 29 MMOL/L (ref 20–31)
CREAT SERPL-MCNC: 1.6 MG/DL (ref 0.5–0.9)
DIFFERENTIAL TYPE: ABNORMAL
EOSINOPHILS RELATIVE PERCENT: 2 % (ref 1–4)
GFR AFRICAN AMERICAN: 39 ML/MIN
GFR NON-AFRICAN AMERICAN: 32 ML/MIN
GFR SERPL CREATININE-BSD FRML MDRD: ABNORMAL ML/MIN/{1.73_M2}
GFR SERPL CREATININE-BSD FRML MDRD: ABNORMAL ML/MIN/{1.73_M2}
GLOBULIN: ABNORMAL G/DL (ref 1.5–3.8)
GLUCOSE BLD-MCNC: 151 MG/DL (ref 65–105)
GLUCOSE BLD-MCNC: 152 MG/DL (ref 65–105)
GLUCOSE BLD-MCNC: 157 MG/DL (ref 65–105)
GLUCOSE BLD-MCNC: 164 MG/DL (ref 65–105)
GLUCOSE BLD-MCNC: 77 MG/DL (ref 65–105)
GLUCOSE BLD-MCNC: 83 MG/DL (ref 65–105)
GLUCOSE BLD-MCNC: 83 MG/DL (ref 70–99)
HCT VFR BLD CALC: 27.9 % (ref 36.3–47.1)
HEMOGLOBIN: 8.6 G/DL (ref 11.9–15.1)
IMMATURE GRANULOCYTES: 3 %
INR BLD: 1
LYMPHOCYTES # BLD: 12 % (ref 24–43)
MAGNESIUM: 1.4 MG/DL (ref 1.6–2.6)
MCH RBC QN AUTO: 27.4 PG (ref 25.2–33.5)
MCHC RBC AUTO-ENTMCNC: 30.8 G/DL (ref 28.4–34.8)
MCV RBC AUTO: 88.9 FL (ref 82.6–102.9)
MONOCYTES # BLD: 6 % (ref 3–12)
MORPHOLOGY: ABNORMAL
NRBC AUTOMATED: 0.4 PER 100 WBC
PARTIAL THROMBOPLASTIN TIME: 25.4 SEC (ref 20.5–30.5)
PDW BLD-RTO: 14.4 % (ref 11.8–14.4)
PHOSPHORUS: 3.6 MG/DL (ref 2.6–4.5)
PLATELET # BLD: 415 K/UL (ref 138–453)
PLATELET ESTIMATE: ABNORMAL
PMV BLD AUTO: 9.3 FL (ref 8.1–13.5)
POTASSIUM SERPL-SCNC: 3.6 MMOL/L (ref 3.7–5.3)
PROTHROMBIN TIME: 10.3 SEC (ref 9.1–12.3)
RBC # BLD: 3.14 M/UL (ref 3.95–5.11)
RBC # BLD: ABNORMAL 10*6/UL
SEG NEUTROPHILS: 76 % (ref 36–65)
SEGMENTED NEUTROPHILS ABSOLUTE COUNT: 7.82 K/UL (ref 1.5–8.1)
SODIUM BLD-SCNC: 134 MMOL/L (ref 135–144)
TOTAL PROTEIN: 6.8 G/DL (ref 6.4–8.3)
VANCOMYCIN RANDOM DATE LAST DOSE: NORMAL
VANCOMYCIN RANDOM DOSE AMOUNT: NORMAL
VANCOMYCIN RANDOM TIME LAST DOSE: NORMAL
VANCOMYCIN RANDOM: 16.7 UG/ML
WBC # BLD: 10.3 K/UL (ref 3.5–11.3)
WBC # BLD: ABNORMAL 10*3/UL

## 2021-05-19 PROCEDURE — 85610 PROTHROMBIN TIME: CPT

## 2021-05-19 PROCEDURE — APPSS30 APP SPLIT SHARED TIME 16-30 MINUTES: Performed by: NURSE PRACTITIONER

## 2021-05-19 PROCEDURE — 80048 BASIC METABOLIC PNL TOTAL CA: CPT

## 2021-05-19 PROCEDURE — 2060000000 HC ICU INTERMEDIATE R&B

## 2021-05-19 PROCEDURE — 36415 COLL VENOUS BLD VENIPUNCTURE: CPT

## 2021-05-19 PROCEDURE — 97535 SELF CARE MNGMENT TRAINING: CPT

## 2021-05-19 PROCEDURE — 83735 ASSAY OF MAGNESIUM: CPT

## 2021-05-19 PROCEDURE — 99233 SBSQ HOSP IP/OBS HIGH 50: CPT | Performed by: INTERNAL MEDICINE

## 2021-05-19 PROCEDURE — 94640 AIRWAY INHALATION TREATMENT: CPT

## 2021-05-19 PROCEDURE — 6370000000 HC RX 637 (ALT 250 FOR IP): Performed by: NURSE PRACTITIONER

## 2021-05-19 PROCEDURE — 6370000000 HC RX 637 (ALT 250 FOR IP): Performed by: FAMILY MEDICINE

## 2021-05-19 PROCEDURE — 84100 ASSAY OF PHOSPHORUS: CPT

## 2021-05-19 PROCEDURE — 85025 COMPLETE CBC W/AUTO DIFF WBC: CPT

## 2021-05-19 PROCEDURE — 6360000002 HC RX W HCPCS: Performed by: STUDENT IN AN ORGANIZED HEALTH CARE EDUCATION/TRAINING PROGRAM

## 2021-05-19 PROCEDURE — 6360000002 HC RX W HCPCS: Performed by: INTERNAL MEDICINE

## 2021-05-19 PROCEDURE — 2580000003 HC RX 258: Performed by: STUDENT IN AN ORGANIZED HEALTH CARE EDUCATION/TRAINING PROGRAM

## 2021-05-19 PROCEDURE — 80076 HEPATIC FUNCTION PANEL: CPT

## 2021-05-19 PROCEDURE — 74018 RADEX ABDOMEN 1 VIEW: CPT

## 2021-05-19 PROCEDURE — 99232 SBSQ HOSP IP/OBS MODERATE 35: CPT | Performed by: STUDENT IN AN ORGANIZED HEALTH CARE EDUCATION/TRAINING PROGRAM

## 2021-05-19 PROCEDURE — 85730 THROMBOPLASTIN TIME PARTIAL: CPT

## 2021-05-19 PROCEDURE — 82947 ASSAY GLUCOSE BLOOD QUANT: CPT

## 2021-05-19 PROCEDURE — 80202 ASSAY OF VANCOMYCIN: CPT

## 2021-05-19 PROCEDURE — 6370000000 HC RX 637 (ALT 250 FOR IP): Performed by: STUDENT IN AN ORGANIZED HEALTH CARE EDUCATION/TRAINING PROGRAM

## 2021-05-19 RX ORDER — FLUCONAZOLE 40 MG/ML
400 POWDER, FOR SUSPENSION ORAL DAILY
Status: DISCONTINUED | OUTPATIENT
Start: 2021-05-19 | End: 2021-05-28 | Stop reason: HOSPADM

## 2021-05-19 RX ORDER — MAGNESIUM SULFATE HEPTAHYDRATE 40 MG/ML
4000 INJECTION, SOLUTION INTRAVENOUS ONCE
Status: COMPLETED | OUTPATIENT
Start: 2021-05-19 | End: 2021-05-19

## 2021-05-19 RX ADMIN — PANTOPRAZOLE SODIUM 40 MG: 40 TABLET, DELAYED RELEASE ORAL at 06:15

## 2021-05-19 RX ADMIN — VANCOMYCIN HYDROCHLORIDE 750 MG: 10 INJECTION, POWDER, LYOPHILIZED, FOR SOLUTION INTRAVENOUS at 10:22

## 2021-05-19 RX ADMIN — METOCLOPRAMIDE 5 MG: 5 INJECTION, SOLUTION INTRAMUSCULAR; INTRAVENOUS at 03:34

## 2021-05-19 RX ADMIN — INSULIN LISPRO 3 UNITS: 100 INJECTION, SOLUTION INTRAVENOUS; SUBCUTANEOUS at 11:47

## 2021-05-19 RX ADMIN — FLUCONAZOLE 400 MG: 2 INJECTION, SOLUTION INTRAVENOUS at 11:22

## 2021-05-19 RX ADMIN — INSULIN LISPRO 3 UNITS: 100 INJECTION, SOLUTION INTRAVENOUS; SUBCUTANEOUS at 00:49

## 2021-05-19 RX ADMIN — MAGNESIUM SULFATE IN WATER 4000 MG: 40 INJECTION, SOLUTION INTRAVENOUS at 09:05

## 2021-05-19 RX ADMIN — HEPARIN SODIUM 5000 UNITS: 5000 INJECTION INTRAVENOUS; SUBCUTANEOUS at 13:30

## 2021-05-19 RX ADMIN — FLUCONAZOLE 400 MG: 40 POWDER, FOR SUSPENSION ORAL at 16:22

## 2021-05-19 RX ADMIN — INSULIN LISPRO 3 UNITS: 100 INJECTION, SOLUTION INTRAVENOUS; SUBCUTANEOUS at 16:01

## 2021-05-19 RX ADMIN — GABAPENTIN 100 MG: 100 CAPSULE ORAL at 20:20

## 2021-05-19 RX ADMIN — GABAPENTIN 100 MG: 100 CAPSULE ORAL at 08:58

## 2021-05-19 RX ADMIN — LEVOTHYROXINE SODIUM 112 MCG: 112 TABLET ORAL at 06:14

## 2021-05-19 RX ADMIN — INSULIN LISPRO 3 UNITS: 100 INJECTION, SOLUTION INTRAVENOUS; SUBCUTANEOUS at 20:19

## 2021-05-19 RX ADMIN — PIPERACILLIN AND TAZOBACTAM 3375 MG: 3; .375 INJECTION, POWDER, LYOPHILIZED, FOR SOLUTION INTRAVENOUS at 05:11

## 2021-05-19 RX ADMIN — IPRATROPIUM BROMIDE AND ALBUTEROL SULFATE 1 AMPULE: .5; 2.5 SOLUTION RESPIRATORY (INHALATION) at 14:56

## 2021-05-19 RX ADMIN — ATORVASTATIN CALCIUM 10 MG: 10 TABLET, FILM COATED ORAL at 20:20

## 2021-05-19 RX ADMIN — INSULIN GLARGINE 10 UNITS: 100 INJECTION, SOLUTION SUBCUTANEOUS at 20:20

## 2021-05-19 RX ADMIN — IPRATROPIUM BROMIDE AND ALBUTEROL SULFATE 1 AMPULE: .5; 2.5 SOLUTION RESPIRATORY (INHALATION) at 11:37

## 2021-05-19 RX ADMIN — HEPARIN SODIUM 5000 UNITS: 5000 INJECTION INTRAVENOUS; SUBCUTANEOUS at 22:19

## 2021-05-19 RX ADMIN — SODIUM CHLORIDE, PRESERVATIVE FREE 10 ML: 5 INJECTION INTRAVENOUS at 20:20

## 2021-05-19 RX ADMIN — Medication 81 MG: at 08:57

## 2021-05-19 RX ADMIN — PIPERACILLIN AND TAZOBACTAM 3375 MG: 3; .375 INJECTION, POWDER, LYOPHILIZED, FOR SOLUTION INTRAVENOUS at 13:21

## 2021-05-19 RX ADMIN — IPRATROPIUM BROMIDE AND ALBUTEROL SULFATE 1 AMPULE: .5; 2.5 SOLUTION RESPIRATORY (INHALATION) at 07:40

## 2021-05-19 RX ADMIN — HEPARIN SODIUM 5000 UNITS: 5000 INJECTION INTRAVENOUS; SUBCUTANEOUS at 06:15

## 2021-05-19 RX ADMIN — ONDANSETRON 4 MG: 2 INJECTION INTRAMUSCULAR; INTRAVENOUS at 19:30

## 2021-05-19 RX ADMIN — GABAPENTIN 100 MG: 100 CAPSULE ORAL at 13:29

## 2021-05-19 RX ADMIN — SODIUM CHLORIDE, PRESERVATIVE FREE 10 ML: 5 INJECTION INTRAVENOUS at 08:58

## 2021-05-19 RX ADMIN — POLYETHYLENE GLYCOL 3350 17 G: 17 POWDER, FOR SOLUTION ORAL at 08:58

## 2021-05-19 ASSESSMENT — PAIN DESCRIPTION - ORIENTATION: ORIENTATION: RIGHT;MID

## 2021-05-19 NOTE — PROGRESS NOTES
[facility] -Progress Note    Today's Date and Time: 5/19/2021, 1:57 PM    Impression :     Paraesophageal Hiatal hernia  Perforation of the esophagus  S/p laparoscopic, robotic para esophageal hernia repair 5-4-21  Acute pancreatitis  Shock   Hyperglycemia  NSTEMI  BONNIE  Not a MRSA carrier  Coagulase negative Staphylococci bacteremia x 2 on 5-7-21. Repeat blood cultures 5-8-21 x 2 : No growth . Bilateral Empyema 5/13/21  Left sided thoracentesis 5/12/21-Identified as yeast  Right sided pigtail chest tube placed on 5/12/21 for right-sided effusion    Recommendations:   D/C Zosyn started 5/6/21. Stop date 4-19-21. D/C Vancomycin started 5/8/21 for Coagulase negative Staph. Stop date 5-19-21  Gram positive rods from thoracentesis identified as yeast. (Candida albicans). Fluconazole started 5-15-21. Will continue liquid Diflucan 400 mg q day. Stop date 5-30-21      Medical Decision Making/Summary/Discussion:5/19/2021     Patient with large paraesophageal hiatal hernia with perforation of esophagus  Hyperglycemia   Acute pancreatitis  Shock   S/p laparoscopic, robotic para esophageal hernia repair 5-4-21  Improvement in overall picture but is developing basilar infiltrates  Not a MRSA carrier  Unclear whether this is fluid retention vs residual leakage vs secondary pneumonia  Esophagogram 5-7-21 does not show a leak  Will plan to continue zosyn and add Diflucan. Monitor temps and CXR. Zosyn and Vanco to continue while awaiting possible VATS/decortication procedure. VATS not to be done. Will D/C antibiotics. Continue Diflucan liquid for 2  weeks.     Infection Control Recommendations   [place] Precautions    Antimicrobial Stewardship Recommendations     Simplification of therapy  Targeted therapy    Coordination of Outpatient Care:   Estimated Length of IV antimicrobials: TBD  Patient will need Midline Catheter Insertion:No   Patient will need PICC line Insertion:No  Patient will need: Home IV , [place],  SNF,  LTAC:  Patient will need outpatient wound care:Yes    Chief complaint/reason for consultation:   Septic shock/sepsis    History of Present Illness:   Addy Quintero is a 78y.o.-year-old   female who was initially admitted on 5/3/2021. Patient seen at the request of Arielle Luis. INITIAL HISTORY : 05/07/2021    Pt with a history of diabetes mellitus type 2, hypertension, hyperlipidemia, thyroid disease, gout and hiatal hernia. She initially presented on 05/03/2021 to an outlying facility with a chief complaint of nausea vomiting, diarrhea and syncopal attack . Patient was found to have blood glucose of 585,WBC of 23.5, elevated lipase 1451 and elevated beta hydroxybutyrate. MRCP on 05/03/2021 showed diffuse small bowel wall thickening likely due to third spacing as opposed to enteritis. CT scan of the abdomen showed extremely large fluid-filled hiatal hernia and distended and fluid-filled stomach below the diaphragm. Patient was given Zosyn at the outlying facility and was transferred to [address] [place] [address] ED for evaluation by the surgery team.    CT scan of the chest without contrast was performed on 05/04/2021 which showed ascites and pneumoperitoneum with apparent free-flowing oral contrast in the left upper quadrant concerning for viscus perforation possibly within the subdiaphragmatic portion of the stomach. Moderate bilateral pleural effusions and hiatal hernia with organoaxial volvulus. Bariatric surgery performed a laparoscopic robotic para esophageal hernia repair. Cardiology is also following the patient because of elevated troponins with unremarkable echocardiography. Antibiotics wise the patient was given Zosyn on 05/03/2021 at the outside facility and it has been continued through the present time. Blood and urine cultures on 05/03/2021 show No growth .    Repeat urine culture on 05/04/2021 shows No growth Patient is currently having temperature elevations. Temp max of 101.1 on 05/07/2021 around 4 AM in the morning. Patient WBC count is improving from 23.5 on presentation to 6.2 today. Repeat chest x-ray 5-7-21 shows bibasilar consolidation new from prior study with blunting of the costophrenic angles bilaterally. Patient is off of the pressors since 5-6-21    CURRENT EVALUATION : 5/19/2021    Afebrile  VS stable on room air    Chest tube removed on 5/18/21    G tube OK for TF as needed and she is ok for low fiber diet per general surgery. She had multiple bowel movements yesterday. Left Thoracentesis on 5/12/21 for left loculated empyema-Pigtail catheter placed with pus noted. Fluid shows elevated LDH of 536, protein 3.1, exudative effusion likely para-pneumonic  5/12/21 Fluid culture positive for Gram positive rods. Candida albicans  Right sided 12 french chest tube placed on 5/13/21 for empyema. CT surgery consult for possible VATs/decortication next week. Follow-up esophagram on 5/11/21 negative for a leak. Coagulase negative Staphylococci bacteremia x 2 on 5-7-21. Repeat blood cultures 5-8-21 x 2 : No growth. Femoral line and morel catheter were removed. Right TAISHA removed 5/11/21 5/12/21 Thoracentesis fluid culture positive for Gram positive rods. Identification shows Kassandra albicans  Will continue Vancomycin & Zosyn. Add fluconazole. Rt and Lt TAISHA drains have been removed, on 5-7- and 5-11-21, respectively    Plan is to D/C to SNF-discharge planning started.     Discussed with RN    Labs, X rays reviewed: 5/19/2021    BUN: 35-->32  Cr: 1.60    WBC: 10.3  Hb: 8.1  Plat: 328    LD: 345  Triglycerides: 261    Cultures:    Urine:  05/03/2021: No growth   05/04/2021: No growth     Blood:  05/03/2021: Blood cultures x 2:  No growth  5/7/21: Coagulase negative Staphylococci bacteremia x 2  5/8/21: No growth  Sputum :    Thoracentesis fluid:  Lt side 5/12/21 gram positive rods/ candida albicans. Rt side 5-13-21: No growth     CT chest 5/11/21  New right-sided PICC again seen with unchanged and satisfactory tip position;   larger loculated right effusion in the azygoesophageal recess, as above. Otherwise similar findings to 05/07/2021 with suspected lower lobe   consolidation/pneumonitis, with volume loss and effusions both lower lobes. CT chest 5/16:  Impression:        1.  Right chest tube in place with near complete resolution of right   effusion.  No pneumothorax or loculated fluid in the right hemithorax. 2.  Trace left pleural effusion. 3.  Dependent opacities in the lower lobes, left greater than right, again   demonstrated, although improved since prior chest CT exam.     4.  Moderate amount of fluid within the esophageal hiatus. 5.  Partially visualized stomach appears distended with 2 gastrostomy tubes   in place. Discussed with patient, RN, IM, Pulmonary      I have personally reviewed the past medical history, past surgical history, medications, social history, and family history, and I have updated the database accordingly.   Past Medical History:     Past Medical History:   Diagnosis Date    Diabetes mellitus ([address]     Gout     Hiatal hernia     Hyperlipidemia     Hypertension     Thyroid disease        Past Surgical  History:     Past Surgical History:   Procedure Laterality Date    BREAST SURGERY      Bx 1993    CHOLECYSTECTOMY  1999    GASTRIC FUNDOPLICATION N/A 0/0/4753    PARAESPHAGEAL HERNIA REPAIR LAPAROSCOPIC ROBOTIC performed by Diego Olivarez DO at [address] [place] [address]  5/10/2021         HYSTERECTOMY  1997    IR CHEST TUBE INSERTION  5/13/2021    IR CHEST T[facility]    OTHER SURGICAL HISTORY  1962    Remove spur L ankle    OTHER SURGICAL HISTORY  1978    Pin and wire repair R ankle    TUBAL LIGATION  1988       Medications:      gabapentin  100 mg Oral Organizations:     Attends Club or Organization Meetings:     Marital Status:    Intimate Partner Violence:     Fear of Current or Ex-Partner:     Emotionally Abused:     Physically Abused:     Sexually Abused:        Family History:     Family History   Problem Relation Age of Onset    Breast Cancer Mother     High Blood Pressure Mother     High Cholesterol Father     Breast Cancer Sister         Allergies:   No known allergies     Review of Systems:   Review of Systems   Constitutional: Positive for fatigue and fever. Negative for activity change, appetite change, chills, diaphoresis and unexpected weight change. Respiratory: Positive for cough. Negative for apnea, choking, chest tightness, shortness of breath, wheezing and stridor. Cardiovascular: Negative for chest pain, palpitations and leg swelling. Gastrointestinal: Positive for constipation. Negative for abdominal distention, nausea and vomiting. Endocrine: Negative for cold intolerance. Genitourinary: Negative for difficulty urinating and dysuria. Musculoskeletal: Negative for arthralgias and back pain. Neurological: Negative for dizziness, tremors, syncope and facial asymmetry. Hematological: Negative for adenopathy. Psychiatric/Behavioral: Negative for agitation and behavioral problems. The patient is not hyperactive. Physical Examination :     Patient Vitals for the past 8 hrs:   BP Temp Temp src Pulse Resp SpO2 Height   05/19/21 1139       5' 2\" (1.575 m)   05/19/21 1137     13 90 %    05/19/21 1120 117/71 98 °F (36.7 °C) Oral 81 16 92 %    05/19/21 0749 135/86 98.7 °F (37.1 °C) Oral 100 22 94 %    05/19/21 0740     22 91 %      General Appearance: Awake, alert  Head:  Normocephalic, no trauma  Eyes: Pupils equal, round, reactive to light, sclera anicteric; conjunctivae pink. No embolic phenomena. ENT: Oropharynx clear, without erythema, exudate, or thrush. No tenderness of sinuses.  Mouth/throat: mucosa pink and moist. No lesions. Dentition in good repair. Neck:Supple, without lymphadenopathy. Thyroid normal, No bruits. Pulmonary/Chest: Clear to auscultation, without wheezes, rales, or rhonchi. No dullness to percussion. Cardiovascular: Regular rate and rhythm without murmurs, rubs, or gallops. Abdomen: Soft, non tender. Bowel sounds normal. No organomegaly. The ports entry wounds are healing well and without any erythema. All four Extremities: No cyanosis, clubbing, edema, or effusions. Neurologic: No gross sensory or motor deficits. Skin: Warm and dry with good turgor. No signs of peripheral arterial or venous insufficiency. No ulcerations. No open wounds. Medical Decision Making -Laboratory:   I have independently reviewed/ordered the following labs:    CBC with Differential:   Recent Labs     05/18/21  0538 05/19/21  0610   WBC 5.8 10.3   HGB 8.1* 8.6*   HCT 26.5* 27.9*    415   LYMPHOPCT 21* 12*   MONOPCT 3 6     BMP:   Recent Labs     05/17/21  0550 05/18/21  0538 05/19/21  0610   * 135 134*   K 4.2 4.0 3.6*   CL 96* 96* 95*   CO2 31 31 29   BUN 37* 35* 32*   CREATININE 1.35* 1.41* 1.60*   MG 1.8  --  1.4*     Hepatic Function Panel:   Recent Labs     05/18/21  0538 05/19/21  0610   PROT 6.5 6.8   LABALBU 1.7* 1.9*   BILIDIR 0.15 0.16   IBILI 0.10 0.19   BILITOT 0.25* 0.35   ALKPHOS 127* 123*   ALT 14 13   AST 19 17     No results for input(s): RPR in the last 72 hours. No results for input(s): HIV in the last 72 hours. No results for input(s): BC in the last 72 hours.   Lab Results   Component Value Date    MUCUS NOT REPORTED 05/06/2021    RBC 3.14 05/19/2021    RBC 6.23 05/03/2021    TRICHOMONAS NOT REPORTED 05/06/2021    WBC 10.3 05/19/2021    YEAST NOT REPORTED 05/06/2021    TURBIDITY CLOUDY 05/06/2021     Lab Results   Component Value Date    CREATININE 1.60 05/19/2021    CREATININE 3.09 05/03/2021    GLUCOSE 83 05/19/2021    GLUCOSE 453 05/03/2021       Medical Decision Making-Imaging:     EXAMINATION:   SINGLE CONTRAST ESOPHAGRAM       5/7/2021 11:03 am       TECHNIQUE:   Single contrast esophagram was performed with a total 100 mL of Omnipaque 240   oral contrast.       FLUOROSCOPY DOSE AND TYPE OR TIME AND EXPOSURES:   Fluoro time: 2.7 minutes; DAP: 70.44 mGy       COMPARISON:   Upper GI from 05/04/2021       HISTORY:   ORDERING SYSTEM PROVIDED HISTORY: s/p hiatal hernia reduction  with partial   gastrectomy and gastropexy   TECHNOLOGIST PROVIDED HISTORY:   s/p hiatal hernia reduction  with partial gastrectomy and gastropexy   Reason for Exam: H.H repair/gastrectomy/gastropexy/ 100 ml Omnipaque orally   Acuity: Unknown   Type of Exam: Unknown       81-year-old female status post hiatal hernia reduction with partial   gastrectomy and gastropexy       FINDINGS:   Fluoroscopic  imaging was obtained prior to the administration contrast.       2 gastrostomy tubes are seen projecting over the left upper quadrant.  There   also 2 surgical drains projecting over the left upper quadrant.  Prior   cholecystectomy.  Suture material projects over the left upper quadrant.       The patient drank contrast which migrates through the postoperative region   and into the stomach and small bowel.       There is contrast draining through what appears to be the gastrostomy tubes.       No extraneous collection of contrast is seen beyond the confines of the   stomach.       There is contrast marginating a presumed gastrostomy tube balloon.       Mild gastroesophageal reflux and delayed transit of contrast is seen within   the distal esophagus/GE junction.           Impression   1. Drainage of contrast material through what appears to be the gastrostomy   tubes following the ingestion of contrast.  Contrast does migrate beyond the   postoperative region into the proximal small bowel. 2. No extraneous/extraluminal collection of contrast is seen beyond the   confines of the stomach.    3. 2 surgical drains and 2 presumed gastrostomy tubes are seen overlying the   left upper quadrant.  There does appear to be contrast slightly marginating   the more lateral gastrostomy tube balloon. 4. Delayed migration of contrast through the distal esophagus and GE junction   with mild gastroesophageal reflux. The findings were sent to the Radiology Results [address] at 12:43   pm on 5/7/2021to be communicated to a licensed caregiver.             EXAMINATION:   CT OF THE CHEST WITHOUT CONTRAST 5/4/2021 5:55 pm       TECHNIQUE:   CT of the chest was performed without the administration of intravenous   contrast. Multiplanar reformatted images are provided for review. Dose   modulation, iterative reconstruction, and/or weight based adjustment of the   mA/kV was utilized to reduce the radiation dose to as low as reasonably   achievable.       COMPARISON:   None.       HISTORY:   ORDERING SYSTEM PROVIDED HISTORY: large hiatal hernia, contrast progression? TECHNOLOGIST PROVIDED HISTORY:   large hiatal hernia, contrast progression? Reason for Exam: large hiatal hernia, contrast progression?       FINDINGS:   Mediastinum: Heart size is normal without pericardial effusion.  There are   shotty mediastinal lymph nodes.  The thoracic aorta and main pulmonary artery   are normal in caliber.       Lungs/pleura: Moderate bilateral pleural effusions with adjacent   consolidation.  No pneumothorax.       Upper Abdomen:  There is a large hiatal hernia containing the majority of the   stomach.  There is organoaxial volvulus.  Enteric tube is noted extending   below the diaphragm with tip outside the field of view.  The herniated   stomach is distended with contrast. L[place]ema [place] is also contrast within the distal   esophagus. L[place]ema [place] is some contrast visualized within the portion of stomach   below the diaphragm.  Free air and fluid are noted within the visualized   upper abdomen with apparent free spill of contrast in the left upper quadrant.       Soft Tissues/Bones: No acute osseous abnormality.           Impression   1. Ascites and pneumoperitoneum with apparent free-flowing oral contrast in   the left upper quadrant is concerning for viscus perforation, possibly within   the subdiaphragmatic portion of the stomach. 2. Large hiatal hernia with organoaxial volvulus.  Majority of contrast is   retained within the esophagus and supradiaphragmatic stomach. 3. Enteric tube extends below the diaphragm with tip outside the field of   view. 4. Moderate bilateral pleural effusions with adjacent consolidation,   atelectasis versus pneumonia. Critical results were called by Dr. Mikey Merchant MD to [facility] on   5/4/2021 at 18:30.            CXR:  ONE XRAY VIEW OF THE CHEST       5/11/2021 9:22 am       COMPARISON:   AP chest from 05/07/2021; CT scan of the chest, abdomen and pelvis from   05/07/2021       HISTORY:   ORDERING SYSTEM PROVIDED HISTORY: follow up on bibasilar consolidation   TECHNOLOGIST PROVIDED HISTORY:   follow up on bibasilar consolidation       History of diabetes and hypertension.       FINDINGS:   Overlying ECG monitor leads and gown snaps.  New right-sided PICC tip   position in the SVC.       Low lung volumes accentuating findings, including cardiomegaly with bibasilar   opacities suggesting atelectasis/consolidation and effusions.  Patchy   infiltrate right mid lung also again noted.       Bones stable.           Impression   New right-sided PICC tip position appears satisfactory.  Otherwise, similar   findings compatible with bibasilar atelectasis/consolidation, effusions and   minimal infiltrate or atelectasis right mid lung.             CT scan:   CT OF THE CHEST WITHOUT CONTRAST 5/11/2021 10:31 am       TECHNIQUE:   CT of the chest was performed without the administration of intravenous   contrast. Multiplanar reformatted images are provided for review.  Dose   modulation, iterative reconstruction, and/or weight based adjustment of the   mA/kV was utilized to reduce the radiation dose to as low as reasonably   achievable.       COMPARISON:   CT scan of the chest from 05/07/2021.       HISTORY:   ORDERING SYSTEM PROVIDED HISTORY: ?left loculated pleural effusion   TECHNOLOGIST PROVIDED HISTORY:   ?left loculated pleural effusion   Reason for Exam: ?left loculated pleural effusion   Acuity: Unknown   Type of Exam: Unknown       FINDINGS:   Mediastinum: Interval placement right-sided PICC with tip position in the   mid-lower SVC.  Small unchanged mediastinal nodes; no bulky lymphadenopathy. No acute thoracic aortic or cardiac abnormality on this unenhanced scan;   prominent LV again noted.  Tiny unchanged pericardial fluid or thickening.       Lungs/pleura: Similar bilateral pleural effusions with considerable mostly   lower lobe atelectasis or consolidation with air bronchograms; larger   loculated appearing component (measuring 10.0 x 4.9 cm) extending along the   azygoesophageal recess, and across the midline (best seen slices 28-76,   series 2).  Tracheobronchial tree patent centrally       Upper Abdomen: Similar small amount perihepatic and perisplenic ascitic fluid   and soft tissue infiltration visualized abdominal adipose tissue.  Clips   status post cholecystectomy.  Mild hepatic steatosis.       Soft Tissues/Bones: No acute abnormality.           Impression   New right-sided PICC again seen with unchanged and satisfactory tip position;   larger loculated right effusion in the azygoesophageal recess, as above. Otherwise similar findings to 05/07/2021 with suspected lower lobe   consolidation/pneumonitis, with volume loss and effusions both lower lobes.               Medical Decision [id number]:     Specimen Description 05/12/2021  5:09  [facility] [place]   . THORACENTESIS FLUID LEFT    Special Requests 05/12/2021  5:09  [facility]   NOT REPORTED    Direct Exam Abnormal  05/12/2021  5:09 PM [facility]    Direct Exam 05/12/2021  5:09  [address]   NO BACTERIA SEEN    Direct Exam 05/12/2021  5:09  [address]   Gram stain made from cytocentrifuged specimen.  Organisms and cells will be concentrated. Culture Abnormal  05/12/2021  5:09 PM [address] FLUID CULTURE, RN NOTIFIED Results called to and read back by: ROSALINDA WALLER 05/14/21 0430    Culture 05/12/2021  5:09 PM [address] [facility] FROM BOTTLE: Jacquie Broseley POSITIVE RODS          Medical Decision Making-Other:     Note:  Labs, medications, radiologic studies were reviewed with personal review of films   Large amounts of data were reviewed  Discussed with nursing Staff, Discharge planner  Infection Control and Prevention measures reviewed  All prior entries were reviewed  Administer medications as ordered  Prognosis: Guarded  Discharge planning reviewed  Follow up as outpatient. Thank you for allowing us to participate in the care of this patient. Please call with questions. Aida Bolanos, APRN-CNP    ATTESTATION:    I have discussed the case, including pertinent history and exam findings with the APRN. I have evaluated the  History, physical findings and pictures of the patient and the key elements of the encounter have been performed by me. I have reviewed the laboratory data, other diagnostic studies and discussed them with the APRN. I have updated the medical record where necessary. I agree with the assessment, plan and orders as documented by the APRN.     Pallavi Jose MD.

## 2021-05-19 NOTE — PLAN OF CARE
Problem: OXYGENATION/RESPIRATORY FUNCTION  Goal: Patient will maintain patent airway  Outcome: Ongoing     Problem: OXYGENATION/RESPIRATORY FUNCTION  Goal: Patient will achieve/maintain normal respiratory rate/effort  Description: Respiratory rate and effort will be within normal limits for the patient  Outcome: Ongoing     Problem: SKIN INTEGRITY  Goal: Skin integrity is maintained or improved  5/19/2021 0316 by Conner Mills RN  Outcome: Ongoing     Problem: Confusion - Acute:  Goal: Absence of continued neurological deterioration signs and symptoms  Description: Absence of continued neurological deterioration signs and symptoms  Outcome: Ongoing     Problem: Confusion - Acute:  Goal: Mental status will be restored to baseline  Description: Mental status will be restored to baseline  Outcome: Ongoing     Problem: Discharge Planning:  Goal: Ability to perform activities of daily living will improve  Description: Ability to perform activities of daily living will improve  Outcome: Ongoing     Problem: Discharge Planning:  Goal: Participates in care planning  Description: Participates in care planning  Outcome: Ongoing     Problem: Injury - Risk of, Physical Injury:  Goal: Absence of physical injury  Description: Absence of physical injury  Outcome: Ongoing     Problem: Injury - Risk of, Physical Injury:  Goal: Will remain free from falls  Description: Will remain free from falls  5/19/2021 0316 by Conner Mills RN  Outcome: Ongoing     Problem: Mood - Altered:  Goal: Mood stable  Description: Mood stable  5/19/2021 0316 by Conner Mills RN  Outcome: Ongoing     Problem: Sensory Perception - Impaired:  Goal: Demonstrations of improved sensory functioning will increase  Description: Demonstrations of improved sensory functioning will increase  Outcome: Ongoing     Problem: Psychomotor Activity - Altered:  Goal: Absence of psychomotor disturbance signs and symptoms  Description: Absence of psychomotor disturbance signs and symptoms  Outcome: Ongoing     Problem: Sleep Pattern Disturbance:  Goal: Appears well-rested  Description: Appears well-rested  5/19/2021 0316 by Jordan Peter RN  Outcome: Ongoing     Problem: Skin Integrity:  Goal: Absence of new skin breakdown  Description: Absence of new skin breakdown  5/19/2021 0316 by Jordan Peter RN  Outcome: Ongoing

## 2021-05-19 NOTE — PLAN OF CARE
Problem: SKIN INTEGRITY  Goal: Skin integrity is maintained or improved  5/19/2021 1459 by Xochitl Bahena RN  Outcome: Ongoing     Problem: Discharge Planning:  Goal: Ability to perform activities of daily living will improve  Description: Ability to perform activities of daily living will improve  5/19/2021 1459 by Xochitl Bahena RN  Outcome: Ongoing     Problem: Discharge Planning:  Goal: Participates in care planning  Description: Participates in care planning  5/19/2021 1459 by Xochitl Bahena RN  Outcome: Ongoing     Problem: Injury - Risk of, Physical Injury:  Goal: Absence of physical injury  Description: Absence of physical injury  5/19/2021 1459 by Xochitl Bahena RN  Outcome: Ongoing    Problem: Injury - Risk of, Physical Injury:  Goal: Will remain free from falls  Description: Will remain free from falls  5/19/2021 1459 by Xochitl Bahena RN  Outcome: Ongoing     Problem: Falls - Risk of:  Goal: Will remain free from falls  Description: Will remain free from falls  5/19/2021 1459 by Xochitl Bahena RN  Outcome: Ongoing     Problem: Falls - Risk of:  Goal: Absence of physical injury  Description: Absence of physical injury  5/19/2021 1459 by Xochitl Bahena RN  Outcome: Ongoing

## 2021-05-19 NOTE — PROGRESS NOTES
disease. Social History:   reports that she has never smoked. She has never used smokeless tobacco. She reports that she does not drink alcohol and does not use drugs. Family History:   Family History   Problem Relation Age of Onset    Breast Cancer Mother     High Blood Pressure Mother     High Cholesterol Father     Breast Cancer Sister        Vitals:  /86   Pulse 100   Temp 98.7 °F (37.1 °C) (Oral)   Resp 22   Ht 5' 2\" (1.575 m)   Wt 186 lb 11.7 oz (84.7 kg)   SpO2 94%   BMI 34.15 kg/m²   Temp (24hrs), Av.3 °F (37.4 °C), Min:98.7 °F (37.1 °C), Max:100.3 °F (37.9 °C)    Recent Labs     21  1914 21  0049 21  0340 21  0611   POCGLU 198* 151* 83 77       I/O (24Hr):     Intake/Output Summary (Last 24 hours) at 2021 0850  Last data filed at 2021 8453  Gross per 24 hour   Intake 1474 ml   Output 1150 ml   Net 324 ml       Labs:  Hematology:  Recent Labs     21  0550 21  0538 21  0610   WBC 4.6 5.8 10.3   RBC 2.90* 2.89* 3.14*   HGB 8.1* 8.1* 8.6*   HCT 25.9* 26.5* 27.9*   MCV 89.3 91.7 88.9   MCH 27.9 28.0 27.4   MCHC 31.3 30.6 30.8   RDW 13.9 14.4 14.4    328 415   MPV 9.9 10.0 9.3   INR 1.0 1.0 1.0     Chemistry:  Recent Labs     21  0550 21  0538 21  0610   * 135 134*   K 4.2 4.0 3.6*   CL 96* 96* 95*   CO2 31 31 29   GLUCOSE 172* 155* 83   BUN 37* 35* 32*   CREATININE 1.35* 1.41* 1.60*   MG 1.8  --  1.4*   ANIONGAP 7* 8* 10   LABGLOM 39* 37* 32*   GFRAA 47* 45* 39*   CALCIUM 8.5* 8.2* 8.1*   PHOS 4.2  --  3.6     Recent Labs     21  0550 21  0538 21  1154 21  1735 21  1914 21  0049 21  0340 21  0610 21  0611   PROT 6.3* 6.5  --   --   --   --   --  6.8  --    LABALBU 1.7* 1.7*  --   --   --   --   --  1.9*  --    AST 16 19  --   --   --   --   --  17  --    ALT 13 14  --   --   --   --   --  13  --    ALKPHOS 127* 127*  --   --   --   --   --  123*  -- BILITOT 0.27* 0.25*  --   --   --   --   --  0.35  --    BILIDIR 0.14 0.15  --   --   --   --   --  0.16  --    POCGLU  --   --  248* 152* 198* 151* 83  --  77     ABG:  Lab Results   Component Value Date    POCPH 7.433 05/06/2021    PHART 7.193 05/04/2021    POCPCO2 36.8 05/06/2021    IFM8VWK 52.0 05/04/2021    POCPO2 150.9 05/06/2021    PO2ART 145.0 05/04/2021    POCHCO3 24.6 05/06/2021    GGL7MBI 19.2 05/04/2021    NBEA NOT REPORTED 05/06/2021    PBEA 0 05/06/2021    UAY8UCB NOT REPORTED 05/06/2021    HVZD7ZFV 99 05/06/2021    G6KBBMCR 98.0 05/04/2021    FIO2 40.0 05/06/2021     Lab Results   Component Value Date/Time    SPECIAL NOT REPORTED 05/13/2021 10:23 AM     Lab Results   Component Value Date/Time    CULTURE NO GROWTH 4 DAYS 05/13/2021 10:23 AM       Radiology:  XR ABDOMEN (KUB) (SINGLE AP VIEW)    Result Date: 5/15/2021  2 new gastrostomy tubes noted. Ileus. CT CHEST WO CONTRAST    Result Date: 5/16/2021  1. Right chest tube in place with near complete resolution of right effusion. No pneumothorax or loculated fluid in the right hemithorax. 2.  Trace left pleural effusion. 3.  Dependent opacities in the lower lobes, left greater than right, again demonstrated, although improved since prior chest CT exam. 4.  Moderate amount of fluid within the esophageal hiatus. 5.  Partially visualized stomach appears distended with 2 gastrostomy tubes in place. CT CHEST WO CONTRAST    Result Date: 5/11/2021  New right-sided PICC again seen with unchanged and satisfactory tip position; larger loculated right effusion in the azygoesophageal recess, as above. Otherwise similar findings to 05/07/2021 with suspected lower lobe consolidation/pneumonitis, with volume loss and effusions both lower lobes. FL ESOPHAGRAM    Result Date: 5/11/2021  1. No evidence for esophageal leak. 2. Postsurgical changes in the stomach. 3. Large amount of gastroesophageal reflux occurring repeatedly during the study. .     XR CHEST PORTABLE    Result Date: 5/14/2021  Right chest tube in satisfactory position with significantly improved right pleural effusion. Bibasilar airspace disease and small left pleural effusion not significantly changed. XR CHEST PORTABLE    Result Date: 5/11/2021  New right-sided PICC tip position appears satisfactory. Otherwise, similar findings compatible with bibasilar atelectasis/consolidation, effusions and minimal infiltrate or atelectasis right mid lung. IR CHEST TUBE INSERTION    Result Date: 5/13/2021  Successful fluoroscopic guided placement of a right chest tube. IR GUIDED THORACENTESIS PLEURAL    Result Date: 5/13/2021  Successful ultrasound guided thoracentesis. Physical Examination:        General appearance:  alert, cooperative and no distress  Mental Status:  oriented to person, place and time and normal affect  Lungs:  clear to auscultation bilaterally, normal effort, s/p right chest tube removal  Heart:  regular rate and rhythm, no murmur  Abdomen:  soft, slightly distended, bowel sounds present  Extremities:  no edema, redness, tenderness in the calves  Skin:  no gross lesions, rashes, induration    Assessment:        Hospital Problems         Last Modified POA    * (Principal) Necrosis of stomach 5/17/2021 Yes    Hernia with obstruction 5/4/2021 Yes    Essential hypertension 5/4/2021 Yes    Thyroid disease 5/4/2021 Yes    Syncope 5/4/2021 Yes    Hiatal hernia 5/5/2021 Yes    Mediastinitis 5/8/2021 Yes    Peritonitis ([address] 5/8/2021 Yes    Severe malnutrition ([address] 5/16/2021 Yes    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 Appreciate bariatric surgery recommendations.  Stop IV TPN, encourage PO intake, nutritional supplements, completed Reglan for total of 72 hours  Acute respiratory failure requiring oxygen with empyema showing gram positive and yeast, still requiring 1L nasal cannula fluconazole for candida infection, vancomycin, zosyn. Appreciate ID, pulm, and CTS recommendations, s/p chest tube removal and tolerating well. Monitor if patient is a candidate for VATS  Follow up CT chest abdomen pelvis for continued observation of concern of mediastinal leak and post op changes  HTN, CAD, ASA, lipitor  Diabetes. lantus 10 units, ISS  Hypothyroidism. Synthroid 112 mcg  Neurontin 100 mg 3 times daily. Decrease bowel regimen to PRN, monitor for signs of obstruction  Discharge planning. Most likely need skilled nursing facility.     Belinda Stanton MD  5/19/2021  8:50 AM

## 2021-05-19 NOTE — PLAN OF CARE
Nutrition Problem #1: Inadequate oral intake  Intervention: Food and/or Nutrient Delivery: Continue Current Diet, Continue Oral Nutrition Supplement  Nutritional Goals: meet % of estimated nutrient needs

## 2021-05-19 NOTE — PROGRESS NOTES
PULMONARY & CRITICAL CARE MEDICINE PROGRESS  NOTE     Patient:  Jolanta Chavez  MRN: [id number]  Admit date: 5/3/2021    SUBJECTIVE     I personally interviewed/examined the patient, reviewed interval history and interpreted all available radiographic, laboratory data at the time of service. Chief Compliant/Reason for Initial Consult: Acute respiratory failure on vent support, gastric perforation    Brief Hospital Course and Interval History:  The patient is a 78 y.o. female with known medical history of diabetes mellitus, hypertension, hypothyroidism, abdominal hernia presented to the hospital with nausea, vomiting, diarrhea. Patient had similar symptoms on 4/28 but was apparently sent home. Patient had dizzy spells and lightheadedness, had a syncopal episode in the ER on this admission. Patient was transferred from the [facility] to [facility].  In [facility] patient lab revealed lactic acidosis of 6, glucose 585, patient was seen to be in DKA. Elevated lipase of 1451. Leukocytotic with WBC 23.5, hemoglobin 18.3. Patient also had elevated troponins, elevated creatinine of 3.8. In [facility] repeat labs showed creatinine of 2.49 with lactic acid of 2.4. Troponin was 85, recheck of 94. LFTs showed elevated bilirubin.     General surgery was consulted. CT chest showed ascites and pneumoperitoneum with apparent free-flowing oral contrast in the left upper quadrant concerning for viscus perforation possibly within the subdiaphragmatic portion of the stomach. Patient then went for robotic hernia repair 5/4 with wedge gastrectomy, gastropexy and placement of 2 TAISHA drains and 2 PEG tubes.   Patient is n.p.o.         Interval history  5/19/2021  No acute events   Tolerated removal of the chest tubes  No respiratory distress   Cough without purulent sputum  Afebrile  Patient is on room air        [address] 25 mL (05/10/21 1840)     LABS:-  ABGs:   No results found for: PH, PCO2, PO2, HCO3, O2SAT  No results for input(s): PHART, PO2ART, WUN9DHX, HQL3NPK, BEART, J4WPGMQO in the last 72 hours. Lab Results   Component Value Date    POCPH 7.433 05/06/2021    POCPCO2 36.8 05/06/2021    POCPO2 150.9 (H) 05/06/2021    POCHCO3 24.6 05/06/2021    XVPS2APW 99 (H) 05/06/2021     CBC:   Recent Labs     05/17/21  0550 05/18/21  0538 05/19/21  0610   WBC 4.6 5.8 10.3   HGB 8.1* 8.1* 8.6*   HCT 25.9* 26.5* 27.9*   MCV 89.3 91.7 88.9    328 415   LYMPHOPCT 16* 21* 12*   RBC 2.90* 2.89* 3.14*   MCH 27.9 28.0 27.4   MCHC 31.3 30.6 30.8   RDW 13.9 14.4 14.4     BMP:   Recent Labs     05/17/21  0550 05/18/21  0538 05/19/21  0610   * 135 134*   K 4.2 4.0 3.6*   CL 96* 96* 95*   CO2 31 31 29   BUN 37* 35* 32*   CREATININE 1.35* 1.41* 1.60*   GLUCOSE 172* 155* 83   PHOS 4.2  --  3.6     Liver Function Test:   Recent Labs     05/19/21  0610   PROT 6.8   LABALBU 1.9*   ALT 13   AST 17   ALKPHOS 123*   BILITOT 0.35     Amylase/Lipase:  No results for input(s): AMYLASE, LIPASE in the last 72 hours. Coagulation Profile:   Recent Labs     05/17/21  0550 05/18/21  0538 05/19/21  0610   INR 1.0 1.0 1.0   PROTIME 10.4 10.3 10.3   APTT 22.9 24.1 25.4     Cardiac Enzymes:  No results for input(s): CKTOTAL, CKMB, CKMBINDEX, TROPONINI in the last 72 hours.   Lactic Acid:  Lab Results   Component Value Date    LACTA 3.2 (H) 05/03/2021    LACTA 6.0 (HH) 05/03/2021     BNP:   No results found for: BNP  D-Dimer:  No results found for: DDIMER  Others:   Lab Results   Component Value Date    TSH 13.36 (H) 05/03/2021     No results found for: LILLY, RHEUMFACTOR, SEDRATE, CRP  No results found for: Onetha   No results found for: IRON, TIBC, FERRITIN  No results found for: SPEP, UPEP  No results found for: PSA, CEA, , BL5420,     Input/Output:    Intake/Output Summary (Last 24 hours) at 5/19/2021 0814  Last data filed at 5/19/2021 7151  Gross per 24 hour   Intake 1474 ml   Output 1150 ml   Net 324 ml       Microbiology:    Pathology:    Radiology Reports:  XR CHEST (SINGLE VIEW FRONTAL)   Final Result   1. Right chest tube removal without pneumothorax. 2. Small left pleural effusion and bibasilar opacities are similar to prior   radiograph. CT CHEST WO CONTRAST   Final Result   1. Right chest tube in place with near complete resolution of right   effusion. No pneumothorax or loculated fluid in the right hemithorax. 2.  Trace left pleural effusion. 3.  Dependent opacities in the lower lobes, left greater than right, again   demonstrated, although improved since prior chest CT exam.      4.  Moderate amount of fluid within the esophageal hiatus. 5.  Partially visualized stomach appears distended with 2 gastrostomy tubes   in place. XR ABDOMEN (KUB) (SINGLE AP VIEW)   Final Result   2 new gastrostomy tubes noted. Ileus. XR CHEST PORTABLE   Final Result   Right chest tube in satisfactory position with significantly improved right   pleural effusion. Bibasilar airspace disease and small left pleural effusion not significantly   changed. IR CHEST TUBE INSERTION   Final Result   Successful fluoroscopic guided placement of a right chest tube. IR GUIDED THORACENTESIS PLEURAL   Final Result   Successful ultrasound guided thoracentesis. FL ESOPHAGRAM   Final Result   1. No evidence for esophageal leak. 2. Postsurgical changes in the stomach. 3. Large amount of gastroesophageal reflux occurring repeatedly during the   study. .         CT CHEST WO CONTRAST   Final Result   New right-sided PICC again seen with unchanged and satisfactory tip position;   larger loculated right effusion in the azygoesophageal recess, as above. Otherwise similar findings to 05/07/2021 with suspected lower lobe   consolidation/pneumonitis, with volume loss and effusions both lower lobes.          XR ETT tip position stable. Decreased left subcutaneous emphysema. Slightly   improved suspected left basilar volume loss with persistent findings as   above. No overt vascular congestion. US RENAL COMPLETE   Final Result   Unremarkable ultrasound of the kidneys and urinary bladder. Trace right pleural effusion         XR CHEST PORTABLE   Final Result   Volume loss continues left hemithorax with haziness at the left base either   atelectasis and or fluid. Subcutaneous emphysema along the left lateral   chest wall has decreased. No appreciable extrapleural air. Endotracheal   tube in appropriate position. XR CHEST PORTABLE   Final Result   1. Recommend repositioning of left internal jugular approach central venous   catheter. 2. Low lung volumes with left basilar atelectasis plus or minus mild pleural   effusion. 3. Left chest wall scattered soft tissue emphysema. XR CHEST PORTABLE   Final Result   Intestinal tube deviates to the left side of a sizable hiatal hernia,   traversing below the hemidiaphragm and terminating just underneath the left   hemidiaphragm. Side port of the intestinal tube is below the diaphragmatic   hiatus. CT CHEST WO CONTRAST   Final Result   1. Ascites and pneumoperitoneum with apparent free-flowing oral contrast in   the left upper quadrant is concerning for viscus perforation, possibly within   the subdiaphragmatic portion of the stomach. 2. Large hiatal hernia with organoaxial volvulus. Majority of contrast is   retained within the esophagus and supradiaphragmatic stomach. 3. Enteric tube extends below the diaphragm with tip outside the field of   view. 4. Moderate bilateral pleural effusions with adjacent consolidation,   atelectasis versus pneumonia. Critical results were called by Dr. Mary Lou Rizo MD to [facility] on   5/4/2021 at 18:30.          XR ABDOMEN (KUB) (SINGLE AP VIEW)   Final Result   No significant subdiaphragmatic Referring Physician         Sourav Quijano   Fellow     Additional Comments  Technically difficult study, patient supine on ventilator. Type of Study      TTE procedure:2D Echocardiogram, M-Mode, Doppler, Color Doppler. Procedure Date  Date: 05/05/2021 Start: 07:32 AM    Study Location: [facility]  Technical Quality: Adequate visualization    Indications:Elevated troponin. History / Tech. Comments:  Procedure explained to patient. No known medical Hx. Patient Status: Inpatient    Height: 62 inches Weight: 166 pounds BSA: 1.77 m^2 BMI: 30.36 kg/m^2    CONCLUSIONS    Summary  Normal LV size and wall thickness. No obvious wall motion abnormality seen. Normal LV systolic function with LVEF 55%. RV appears dilated with reduced function. RV systolic pressure 37 mmHg  LA appears normal in size. No obvious significant structural valvular abnormality noted. No significant valvular stenosis or regurgitation noted. Normal aortic root dimension. No significant pericardial effusion noted. Signature  ----------------------------------------------------------------------------   Electronically signed by Connor Montoya(Interpreting physician) on   05/05/2021 12:15 PM  ----------------------------------------------------------------------------    ----------------------------------------------------------------------------   Electronically signed by Olimpia Crawley(Sonographer) on 05/05/2021 11:37 AM  ----------------------------------------------------------------------------  FINDINGS  Left Atrium  Left atrium is normal in size. Inter-atrial septum is intact with no evidence for an atrial septal defect,  by color doppler. Left Ventricle  Left ventricle is small in size, normal wall thickness, global left  ventricular systolic function is normal, calculated ejection fraction is  53%. All wall segments are not well visualized (poor acoustic windows.)  Right Atrium  Right atrial dilatation.   Right Ventricle  Right ventricular dilatation with reduced systolic function. Abnormal RV strain. Mitral Valve  Normal mitral valve structure. Trivial mitral regurgitation. Aortic Valve  Aortic valve is trileaflet. No evidence of aortic insufficiency or stenosis. Tricuspid Valve  Normal tricuspid valve leaflets. Moderate tricuspid regurgitation. Estimated right ventricular systolic pressure is 37 mmHg, suggesting  pulmonary HTN. Pulmonic Valve  Pulmonic valve not well visualized but Doppler velocities are normal.  Pericardial Effusion  No significant pericardial effusion is seen. Miscellaneous  Normal aortic root dimension. E/E' average = 15.05. IVC dilated but unable to assess respiratory collapse due to patient on  ventilator.     M-mode / 2D Measurements & Calculations:      LVIDd:4 cm(3.7 - 5.6 cm)          Diastolic GZNRKJ:13 ml   UFFP:8.6 cm(0.6 - 1.1 cm)         Systolic DVAIFK:13.54 ml   LVPWd:0.9 cm(0.6 - 1.1 cm)        Aortic Root:2.9 cm(2.0 - 3.7 cm)                                     LA Dimension: 3.1 cm(1.9 - 4.0 cm)   Calculated LVEF (%): 52.95 %      LA volume/Index: 31.04 ml /18m^2                                     LVOT:1.9 cm                                     RVDd:3 cm      Mitral:                                 Aortic      Valve Area (P1/2-Time): 5.12 cm^2       Peak Velocity: 1.55 m/s   Peak E-Wave: 0.78 m/s                   Mean Velocity: 0.99 m/s   Peak A-Wave: 1.00 m/s                   Peak Gradient: 9.61 mmHg   E/A Ratio: 0.78                         Mean Gradient: 5 mmHg   Peak Gradient: 2.45 mmHg   Mean Gradient: 2 mmHg   Deceleration Time: 145 msec             Area (continuity): 2.16 cm^2   P1/2t: 43 msec                          AV VTI: 25.9 cm      Area (continuity): 2.1 cm^2   Mean Velocity: 0.60 m/s      Tricuspid:                              Pulmonic:      Estimated RVSP: 37 mmHg                 Peak Velocity: 0.80 m/s   Peak TR Velocity: 2.85 m/s              Peak

## 2021-05-19 NOTE — PROGRESS NOTES
Comprehensive Nutrition Assessment    Type and Reason for Visit:  Reassess    Nutrition Recommendations/Plan:   - Continue current Low Fiber diet with Ensure Clear Liquid oral supplements at all meals. Encourage/monitor PO intakes as tolerated. - Monitor need for supplemental Tube Feedings - if requested, suggest Vital AF 1.2 (semi-elemental) with slow advancement to goal 30 mL/hr (will provide 50% of estimated kcals and 72% estimated protein needs). - Monitor labs, weights, bowel function, and plan of care. Nutrition Assessment:  TPN discontinued yesterday. Pt continues to tolerate a Low Fiber diet. At visit pt sleeping but breakfast tray at bedside. Pt ate 75% of cherrios with 2% milk and 100% of an Ensure clear liquid supplement. Noted pt with another Ensure clear liquid supplement opened that she was also drinking. Discussed PO intakes with RN who reports pt has been very lethargic today. G-tubes remain clamped. +BM's. Labs reviewed: Na 134 mmol/L, K 3.6 mmol/L, Mg 1.4 mg/dL. Meds reviewed: Lantus, Humalog SS, Synthroid, Glycolax, Antibiotics, Zofran PRN. Malnutrition Assessment:  Malnutrition Status: Moderate malnutrition    Context:  Acute Illness     Findings of the 6 clinical characteristics of malnutrition:  Energy Intake:  1 - 75% or less of estimated energy requirements for 7 or more days  - Previously meeting est needs with nutrition support.   Weight Loss:  1 - 1% to 2% over 1 week  - 7.9% since admission (x 2 weeks)     Body Fat Loss:  No significant body fat loss   Muscle Mass Loss:  1 - Mild muscle mass loss Clavicles (pectoralis & deltoids)  Fluid Accumulation:  1 - Mild Extremities, Generalized   Strength:  Not Performed    Estimated Daily Nutrient Needs:  Energy (kcal):  MSJ x 1.2 = 1600 kcals/day; Weight Used for Energy Requirements:  Current     Protein (g):  75 g pro/day; Weight Used for Protein Requirements:  Ideal (1.5)        Fluid (ml/day):  6970-5823 mL/day (or per MD); Method Used for Fluid Requirements:  ml/Kg (25-30)      Nutrition Related Findings:  Labs/Meds reviewed. Distended abdomen. Active bowel sounds. Clamped G-tubes. 5/4: S/p hiatal hernia repair, gastrectomy, gastropexy, G-tubes x 2. Wounds:  Multiple, Surgical Incision       Current Nutrition Therapies:    DIET LOW FIBER;- 50-75% breakfast today; intakes of 1-75% noted  Dietary Nutrition Supplements: Clear Liquid Oral Supplement - %    Anthropometric Measures:  · Height: 5' 2\" (157.5 cm)  · Current Body Weight: 186 lb 11.7 oz (84.7 kg)   · Admission Body Weight: 202 lb (91.6 kg)    · Usual Body Weight:  (166 lb - stated)     · Ideal Body Weight: 110 lbs; % Ideal Body Weight 169.8 %   · BMI: 34.1  · BMI Categories: Obese Class 1 (BMI 30.0-34. 9)       Nutrition Diagnosis:   · Inadequate oral intake related to altered GI function as evidenced by intake 0-25%, intake 26-50%, intake 51-75% (variable PO intakes; need for ONS; previous need for supplemental nutrition support)    Nutrition Interventions:   Food and/or Nutrient Delivery:  Continue Current Diet, Continue Oral Nutrition Supplement  Nutrition Education/Counseling:  No recommendation at this time   Coordination of Nutrition Care:  Continue to monitor while inpatient    Goals:  meet % of estimated nutrient needs       Nutrition Monitoring and Evaluation:   Food/Nutrient Intake Outcomes:  Food and Nutrient Intake, Supplement Intake  Physical Signs/Symptoms Outcomes:  Biochemical Data, GI Status, Hemodynamic Status, Nutrition Focused Physical Findings, Skin, Weight     Electronically signed by Cristela Barton RD, LD on 5/19/21 at 12:08 PM EDT    Contact: [phone]

## 2021-05-19 NOTE — PROGRESS NOTES
Precautions, Fall Risk, Surgical Protocols  Required Braces or Orthoses?: No  Position Activity Restriction  Other position/activity restrictions: Up with assist, 5/4 \"PARAESPHAGEAL HERNIA REPAIR LAPAROSCOPIC ROBOTIC\", 2 PEG tubes  Subjective   General  Chart Reviewed: Yes  Patient assessed for rehabilitation services?: Yes  Family / Caregiver Present: No  General Comment  Comments: RN ok'd for OT visit this AM. Pt agreeable to session, pleasent/cooperative throughout. Pain Assessment  Pain Level: 2  Pain Type: Surgical pain  Pain Location: Abdomen  Pain Orientation: Right;Mid  Non-Pharmaceutical Pain Intervention(s): Ambulation/Increased Activity;Repositioned;Rest;Therapeutic presence  Vital Signs  Patient Currently in Pain: Yes   Orientation  Orientation  Overall Orientation Status: Within Functional Limits  Objective    ADL  Grooming: Setup; Increased time to complete;Stand by assistance (Pt brushed teeth sitting EOB)  UE Dressing: Increased time to complete;Minimal assistance;Setup (Min A for gown and line mgmt)  Additional Comments: Pt declined further ADLs this date, pt reports washing up with aid in AM  Balance  Sitting Balance: Stand by assistance (Sitting EOB for grooming tasks)  Standing Balance: Contact guard assistance (Func mobility in room)  Standing Balance  Time: Approx 3 min  Activity: Func mobility in room, static standing at bedside  Comment: Utilizing RW, no LOB noted this date. req increased time to complete d/t line mgmt.   Functional Mobility  Functional - Mobility Device: Rolling Walker  Activity: Other  Assist Level: Contact guard assistance  Bed mobility  Supine to Sit: Contact guard assistance  Sit to Supine: Contact guard assistance  Scooting: Contact guard assistance  Comment: HOB elevated, utilizing bed rails for bed mobility, pt educated on log roll technique, demo good return  Transfers  Sit to stand: Minimal assistance  Stand to sit: Minimal assistance  Transfer Comments: Use of RW, pt req v/c for proper hand placement for sit to stand with F return  Cognition  Overall Cognitive Status: [facility]  Plan   Plan  Times per week: 4-5x/week  Current Treatment Recommendations: Safety Education & Training, Balance Training, Patient/Caregiver Education & Training, Self-Care / ADL, Functional Mobility Training, Equipment Evaluation, Education, & procurement, Home Management Training, Endurance Training, Strengthening  Goals  Short term goals  Time Frame for Short term goals: By discharge, pt will:  Short term goal 1: Demo bed mobility with Min A, using LRD  Short term goal 2: Demo functional transfers with Min A, using LRD  Short term goal 3: Demo functional mobility with Mod A, using LRD  Short term goal 4: Demo UB ADLs with CGA, using AE/DME PRN  Short term goal 5: Demo LB ADLs with Min A, setup, use of AE/DME PRN  Short term goal 6: Demo +5 minutes of static/dynamic standing with CGA to increase engagement in ADLs     Therapy Time   Individual Concurrent Group Co-treatment   Time In 1404         Time Out 1442         Minutes 38         Timed Code Treatment Minutes: 38 Minutes   Pt in bed upon arrival, pleasant and agreeable to tx this date. Pt retired to bed at end of session, call light within reach, bed alarm on, RN notified.      RAYMUNDO Mccann

## 2021-05-19 NOTE — PROGRESS NOTES
Bariatric Surgery Progress Note      PATIENT NAME: Taylor Hernandes   TODAY'S DATE: 5/19/2021, 5:37 AM  Chief Complaint   Patient presents with    Blood Sugar Problem     >450    Hernia     Hiatal      SUBJECTIVE:    Pt seen and examined. UOP difficult to measure. Minimal to no pain. Nausea improved and pt having multiple non bloody BMs yesterday. PEG tubes clamped still and pt tolerating. Tolerating PO intake but still not meeting daily caloric needs. Family still apprehensive to TFs.      Tmax 37.9    OBJECTIVE:   Vitals:  /81   Pulse 96   Temp 99.3 °F (37.4 °C) (Axillary)   Resp 16   Ht 5' 2\" (1.575 m)   Wt 186 lb 11.7 oz (84.7 kg)   SpO2 91%   BMI 34.15 kg/m²      INTAKE/OUTPUT:      Intake/Output Summary (Last 24 hours) at 5/19/2021 0537  Last data filed at 5/18/2021 1810  Gross per 24 hour   Intake 2021 ml   Output 1150 ml   Net 871 ml                 General: alert, oriented  Lungs: Symmetrical chest rise bilaterally  Heart: S1S2  Abdomen: Soft, ND, appropriately tender, non peritoneal, no rebound, incisions c/d/i, PEG x2 with no surrounding erythema   Extremity: moves all extremities x4, trace edema    Data Review:  CBC:   Recent Labs     05/16/21  0651 05/17/21  0550 05/18/21  0538   WBC 6.6 4.6 5.8   HGB 8.6* 8.1* 8.1*    335 328     BMP:    Recent Labs     05/16/21  0651 05/17/21  0550 05/18/21  0538   * 134* 135   K 4.1 4.2 4.0   CL 95* 96* 96*   CO2 27 31 31   BUN 35* 37* 35*   CREATININE 1.32* 1.35* 1.41*   GLUCOSE 213* 172* 155*     Hepatic:   Recent Labs     05/16/21  0651 05/17/21  0550 05/18/21  0538   AST 16 16 19   ALT 15 13 14   ALKPHOS 147* 127* 127*   BILITOT 0.30 0.27* 0.25*   BILIDIR 0.17 0.14 0.15     Coagulation:   Recent Labs     05/16/21  0651 05/17/21  0550 05/18/21  0538   APTT 23.9 22.9 24.1   PROT 6.8 6.3* 6.5   INR 0.9 1.0 1.0       ASSESSMENT   Patient Active Problem List   Diagnosis    Hernia with obstruction    Essential hypertension    Thyroid disease    Syncope    Hiatal hernia    Necrosis of stomach    Mediastinitis    Peritonitis (HCC)    Severe malnutrition (HCC)    Strangulated paraesophageal hernia     79 yo F s/p 5/4 emergent robotic assisted laparoscopic hiatal hernia reduction with gastropexy via g-tube x2     5/7 esophagram performed no leak noted    PLAN  1. Gorge Elizabeth for low fiber diet as tolerated, cont nutritional supplements, g-tubes ok to use for tube feeds, TF management and ordering per dietician/primary, continue PEGs to clamp, pt completing 72hr Reglan and suppository for ileus     2. Cont recs and management per primary/ Pulmonary/ CT Sx  3. Cont abx per ID   4. Encourage IS use, deep breathing, ambulation and PT/OT work   5. Gorge Elizabeth for DVT ppx from surg stance  6.  Pt to f/u with Dr. Vickey Conrad as out pt in 7-10 days     Thank you,    Electronically signed by Ronn Miranda DO  on 5/19/2021 at 5:37 AM

## 2021-05-19 NOTE — CARE COORDINATION
Transitional Planning   Spoke with Beverly Baer; precert pending    [id number] spoke with Beverly Baer; peer to peer requested. TPN and chest tube d/c. Left VM for Gris for SNF choice.      [facility] initiated at [facility] per Jose Gutiérrez and Lesley request.

## 2021-05-20 ENCOUNTER — APPOINTMENT (OUTPATIENT)
Dept: CT IMAGING | Age: 71
DRG: 853 | End: 2021-05-20
Payer: MEDICARE

## 2021-05-20 ENCOUNTER — APPOINTMENT (OUTPATIENT)
Dept: GENERAL RADIOLOGY | Age: 71
DRG: 853 | End: 2021-05-20
Payer: MEDICARE

## 2021-05-20 PROBLEM — K22.89: Status: ACTIVE | Noted: 2021-05-20

## 2021-05-20 LAB
ABSOLUTE EOS #: 0.11 K/UL (ref 0–0.4)
ABSOLUTE IMMATURE GRANULOCYTE: 0.22 K/UL (ref 0–0.3)
ABSOLUTE LYMPH #: 0.55 K/UL (ref 1–4.8)
ABSOLUTE MONO #: 0.65 K/UL (ref 0.1–0.8)
ALBUMIN SERPL-MCNC: 1.7 G/DL (ref 3.5–5.2)
ALBUMIN/GLOBULIN RATIO: 0.4 (ref 1–2.5)
ALLEN TEST: ABNORMAL
ALP BLD-CCNC: 113 U/L (ref 35–104)
ALT SERPL-CCNC: 12 U/L (ref 5–33)
ANION GAP SERPL CALCULATED.3IONS-SCNC: 11 MMOL/L (ref 9–17)
AST SERPL-CCNC: 17 U/L
BASOPHILS # BLD: 1 % (ref 0–2)
BASOPHILS ABSOLUTE: 0.11 K/UL (ref 0–0.2)
BILIRUB SERPL-MCNC: 0.34 MG/DL (ref 0.3–1.2)
BUN BLDV-MCNC: 28 MG/DL (ref 8–23)
BUN/CREAT BLD: ABNORMAL (ref 9–20)
CALCIUM SERPL-MCNC: 7.5 MG/DL (ref 8.6–10.4)
CARBOXYHEMOGLOBIN: 0.9 % (ref 0–5)
CHLORIDE BLD-SCNC: 98 MMOL/L (ref 98–107)
CO2: 29 MMOL/L (ref 20–31)
CREAT SERPL-MCNC: 1.76 MG/DL (ref 0.5–0.9)
CULTURE: ABNORMAL
CULTURE: ABNORMAL
DIFFERENTIAL TYPE: ABNORMAL
EOSINOPHILS RELATIVE PERCENT: 1 % (ref 1–4)
FIO2: ABNORMAL
GFR AFRICAN AMERICAN: 35 ML/MIN
GFR NON-AFRICAN AMERICAN: 29 ML/MIN
GFR SERPL CREATININE-BSD FRML MDRD: ABNORMAL ML/MIN/{1.73_M2}
GFR SERPL CREATININE-BSD FRML MDRD: ABNORMAL ML/MIN/{1.73_M2}
GLUCOSE BLD-MCNC: 100 MG/DL (ref 65–105)
GLUCOSE BLD-MCNC: 106 MG/DL (ref 65–105)
GLUCOSE BLD-MCNC: 111 MG/DL (ref 65–105)
GLUCOSE BLD-MCNC: 146 MG/DL (ref 65–105)
GLUCOSE BLD-MCNC: 174 MG/DL (ref 65–105)
GLUCOSE BLD-MCNC: 88 MG/DL (ref 65–105)
GLUCOSE BLD-MCNC: 91 MG/DL (ref 70–99)
HCO3 VENOUS: 29.3 MMOL/L (ref 24–30)
HCT VFR BLD CALC: 26 % (ref 36.3–47.1)
HEMOGLOBIN: 8.1 G/DL (ref 11.9–15.1)
IMMATURE GRANULOCYTES: 2 %
LYMPHOCYTES # BLD: 5 % (ref 24–44)
Lab: ABNORMAL
MCH RBC QN AUTO: 27.6 PG (ref 25.2–33.5)
MCHC RBC AUTO-ENTMCNC: 31.2 G/DL (ref 28.4–34.8)
MCV RBC AUTO: 88.4 FL (ref 82.6–102.9)
METHEMOGLOBIN: ABNORMAL % (ref 0–1.5)
MODE: ABNORMAL
MONOCYTES # BLD: 6 % (ref 1–7)
MORPHOLOGY: ABNORMAL
MORPHOLOGY: ABNORMAL
NEGATIVE BASE EXCESS, VEN: ABNORMAL MMOL/L (ref 0–2)
NOTIFICATION TIME: ABNORMAL
NOTIFICATION: ABNORMAL
NRBC AUTOMATED: 0 PER 100 WBC
O2 DEVICE/FLOW/%: ABNORMAL
O2 SAT, VEN: 96.6 % (ref 60–85)
OXYHEMOGLOBIN: ABNORMAL % (ref 95–98)
PATIENT TEMP: 37
PCO2, VEN, TEMP ADJ: ABNORMAL MMHG (ref 39–55)
PCO2, VEN: 37.5 (ref 39–55)
PDW BLD-RTO: 14.6 % (ref 11.8–14.4)
PEEP/CPAP: ABNORMAL
PH VENOUS: 7.5 (ref 7.32–7.42)
PH, VEN, TEMP ADJ: ABNORMAL (ref 7.32–7.42)
PLATELET # BLD: 385 K/UL (ref 138–453)
PLATELET ESTIMATE: ABNORMAL
PMV BLD AUTO: 9.3 FL (ref 8.1–13.5)
PO2, VEN, TEMP ADJ: ABNORMAL MMHG (ref 30–50)
PO2, VEN: 83.4 (ref 30–50)
POSITIVE BASE EXCESS, VEN: 6 MMOL/L (ref 0–2)
POTASSIUM SERPL-SCNC: 3.3 MMOL/L (ref 3.7–5.3)
PSV: ABNORMAL
PT. POSITION: ABNORMAL
RBC # BLD: 2.94 M/UL (ref 3.95–5.11)
RBC # BLD: ABNORMAL 10*6/UL
RESPIRATORY RATE: ABNORMAL
SAMPLE SITE: ABNORMAL
SEG NEUTROPHILS: 85 % (ref 36–66)
SEGMENTED NEUTROPHILS ABSOLUTE COUNT: 9.26 K/UL (ref 1.8–7.7)
SET RATE: ABNORMAL
SODIUM BLD-SCNC: 138 MMOL/L (ref 135–144)
SPECIMEN DESCRIPTION: ABNORMAL
TEXT FOR RESPIRATORY: ABNORMAL
TOTAL HB: ABNORMAL G/DL (ref 12–16)
TOTAL PROTEIN: 6.5 G/DL (ref 6.4–8.3)
TOTAL RATE: ABNORMAL
VT: ABNORMAL
WBC # BLD: 10.9 K/UL (ref 3.5–11.3)
WBC # BLD: ABNORMAL 10*3/UL

## 2021-05-20 PROCEDURE — 6370000000 HC RX 637 (ALT 250 FOR IP): Performed by: FAMILY MEDICINE

## 2021-05-20 PROCEDURE — 80053 COMPREHEN METABOLIC PANEL: CPT

## 2021-05-20 PROCEDURE — 6370000000 HC RX 637 (ALT 250 FOR IP): Performed by: STUDENT IN AN ORGANIZED HEALTH CARE EDUCATION/TRAINING PROGRAM

## 2021-05-20 PROCEDURE — 6360000002 HC RX W HCPCS: Performed by: STUDENT IN AN ORGANIZED HEALTH CARE EDUCATION/TRAINING PROGRAM

## 2021-05-20 PROCEDURE — 2700000000 HC OXYGEN THERAPY PER DAY

## 2021-05-20 PROCEDURE — 99233 SBSQ HOSP IP/OBS HIGH 50: CPT | Performed by: INTERNAL MEDICINE

## 2021-05-20 PROCEDURE — 94640 AIRWAY INHALATION TREATMENT: CPT

## 2021-05-20 PROCEDURE — 71045 X-RAY EXAM CHEST 1 VIEW: CPT

## 2021-05-20 PROCEDURE — 97530 THERAPEUTIC ACTIVITIES: CPT

## 2021-05-20 PROCEDURE — 2580000003 HC RX 258: Performed by: STUDENT IN AN ORGANIZED HEALTH CARE EDUCATION/TRAINING PROGRAM

## 2021-05-20 PROCEDURE — 82947 ASSAY GLUCOSE BLOOD QUANT: CPT

## 2021-05-20 PROCEDURE — 2060000000 HC ICU INTERMEDIATE R&B

## 2021-05-20 PROCEDURE — 6370000000 HC RX 637 (ALT 250 FOR IP): Performed by: NURSE PRACTITIONER

## 2021-05-20 PROCEDURE — 51701 INSERT BLADDER CATHETER: CPT

## 2021-05-20 PROCEDURE — 99232 SBSQ HOSP IP/OBS MODERATE 35: CPT | Performed by: INTERNAL MEDICINE

## 2021-05-20 PROCEDURE — 85025 COMPLETE CBC W/AUTO DIFF WBC: CPT

## 2021-05-20 PROCEDURE — 97164 PT RE-EVAL EST PLAN CARE: CPT

## 2021-05-20 PROCEDURE — 36415 COLL VENOUS BLD VENIPUNCTURE: CPT

## 2021-05-20 PROCEDURE — 99232 SBSQ HOSP IP/OBS MODERATE 35: CPT | Performed by: STUDENT IN AN ORGANIZED HEALTH CARE EDUCATION/TRAINING PROGRAM

## 2021-05-20 PROCEDURE — 71250 CT THORAX DX C-: CPT

## 2021-05-20 PROCEDURE — 82805 BLOOD GASES W/O2 SATURATION: CPT

## 2021-05-20 PROCEDURE — 51798 US URINE CAPACITY MEASURE: CPT

## 2021-05-20 RX ORDER — GABAPENTIN 100 MG/1
100 CAPSULE ORAL 3 TIMES DAILY
Qty: 30 CAPSULE | Refills: 0 | Status: SHIPPED | OUTPATIENT
Start: 2021-05-20 | End: 2021-05-30

## 2021-05-20 RX ORDER — POTASSIUM CHLORIDE 20 MEQ/1
20 TABLET, EXTENDED RELEASE ORAL ONCE
Status: COMPLETED | OUTPATIENT
Start: 2021-05-20 | End: 2021-05-20

## 2021-05-20 RX ORDER — LEVOTHYROXINE SODIUM 112 UG/1
112 TABLET ORAL DAILY
Qty: 30 TABLET | Refills: 3 | Status: SHIPPED | OUTPATIENT
Start: 2021-05-21

## 2021-05-20 RX ORDER — FLUCONAZOLE 40 MG/ML
400 POWDER, FOR SUSPENSION ORAL DAILY
Qty: 90 ML | Refills: 0 | Status: SHIPPED | OUTPATIENT
Start: 2021-05-21 | End: 2021-05-30

## 2021-05-20 RX ADMIN — SODIUM CHLORIDE, PRESERVATIVE FREE 10 ML: 5 INJECTION INTRAVENOUS at 08:10

## 2021-05-20 RX ADMIN — GABAPENTIN 100 MG: 100 CAPSULE ORAL at 13:41

## 2021-05-20 RX ADMIN — Medication 81 MG: at 08:00

## 2021-05-20 RX ADMIN — IPRATROPIUM BROMIDE AND ALBUTEROL SULFATE 1 AMPULE: .5; 2.5 SOLUTION RESPIRATORY (INHALATION) at 15:53

## 2021-05-20 RX ADMIN — FLUCONAZOLE 400 MG: 40 POWDER, FOR SUSPENSION ORAL at 08:00

## 2021-05-20 RX ADMIN — LEVOTHYROXINE SODIUM 112 MCG: 112 TABLET ORAL at 06:22

## 2021-05-20 RX ADMIN — HEPARIN SODIUM 5000 UNITS: 5000 INJECTION INTRAVENOUS; SUBCUTANEOUS at 06:22

## 2021-05-20 RX ADMIN — POTASSIUM CHLORIDE 20 MEQ: 1500 TABLET, EXTENDED RELEASE ORAL at 07:59

## 2021-05-20 RX ADMIN — SODIUM CHLORIDE, PRESERVATIVE FREE 10 ML: 5 INJECTION INTRAVENOUS at 21:46

## 2021-05-20 RX ADMIN — POLYETHYLENE GLYCOL 3350 17 G: 17 POWDER, FOR SOLUTION ORAL at 08:00

## 2021-05-20 RX ADMIN — ATORVASTATIN CALCIUM 10 MG: 10 TABLET, FILM COATED ORAL at 21:46

## 2021-05-20 RX ADMIN — HEPARIN SODIUM 5000 UNITS: 5000 INJECTION INTRAVENOUS; SUBCUTANEOUS at 21:46

## 2021-05-20 RX ADMIN — PANTOPRAZOLE SODIUM 40 MG: 40 TABLET, DELAYED RELEASE ORAL at 06:22

## 2021-05-20 RX ADMIN — IPRATROPIUM BROMIDE AND ALBUTEROL SULFATE 1 AMPULE: .5; 2.5 SOLUTION RESPIRATORY (INHALATION) at 20:30

## 2021-05-20 RX ADMIN — IPRATROPIUM BROMIDE AND ALBUTEROL SULFATE 1 AMPULE: .5; 2.5 SOLUTION RESPIRATORY (INHALATION) at 08:38

## 2021-05-20 RX ADMIN — GABAPENTIN 100 MG: 100 CAPSULE ORAL at 08:00

## 2021-05-20 RX ADMIN — HEPARIN SODIUM 5000 UNITS: 5000 INJECTION INTRAVENOUS; SUBCUTANEOUS at 13:41

## 2021-05-20 RX ADMIN — INSULIN LISPRO 3 UNITS: 100 INJECTION, SOLUTION INTRAVENOUS; SUBCUTANEOUS at 21:45

## 2021-05-20 RX ADMIN — GABAPENTIN 100 MG: 100 CAPSULE ORAL at 21:46

## 2021-05-20 RX ADMIN — INSULIN GLARGINE 10 UNITS: 100 INJECTION, SOLUTION SUBCUTANEOUS at 21:45

## 2021-05-20 RX ADMIN — INSULIN LISPRO 3 UNITS: 100 INJECTION, SOLUTION INTRAVENOUS; SUBCUTANEOUS at 17:11

## 2021-05-20 NOTE — PROGRESS NOTES
[facility]  Office: [address], DO, Maurilio Freedro, DO, Christian Rashida, DO, Naima oWods Blood, DO, Odilon Mejia MD, Nikolai Espinosa MD, Odilon Xiao MD, Julia Allan MD, Jose Perkins MD, Yazan Durham MD, Art Rod MD, Brian Lorenz MD, Tejal White, DO, Elisa Stein MD, Neva Malone DO, Nicol Interiano MD,  Jose Alberto Navarro DO, Nirmal Blood MD, Derald Jeans, MD, Saul Rodriguez MD, Ute Osborn MD, See Keith, [facility], [facility], [facility], Olvin Burgos CNP, Gabriele Tadeo, CNS, Alexandra Dias, CNP, Lio Hernandez, CNP, Shanta Gonzalez, CNP, Janessa Kemp, CNP, Jarrett Bashir, CNP, Sana Strickland PA-C, Darryl Penaloza, [facility], Marry Wei, CNP, Gabo Root, CNP, Narendra Saini, CNP, Antoine Holter, CNP, Vamshi Johnson, CNP, [facility], [address] [place] [address]    Progress Note    5/20/2021    9:59 AM    Name:   Audie Tse  MRN:     [id number]     Acct:      [de-identified]   Room:   5497/0404-79   Day:  16  Admit Date:  5/3/2021 12:49 PM    PCP:   Mirlande Hill DO  Code Status:  Full Code    Subjective:     C/C:   Chief Complaint   Patient presents with    Blood Sugar Problem     >450    Hernia     hyatial      Interval History Status: improved. Patient seen examined bedside. Patient has slight increase in distention of her abdomen, otherwise feeling well. Requiring intermittent oxygen. No acute respiratory distress. Patient states that she does feel hungry and that she does want to continue eating. Has been having regular bowel movements. Brief History:     68-year-old female originally presenting to outlying facility due to nausea and vomiting and was transferred for further evaluation and patient underwent emergent robotic laparoscopic hiatal hernia reduction with gastropexy via G-tube on 5/4/2021 and required intubation and was ultimately extubated on 4/6/2021.   Patient underwent thoracentesis and had chest tube insertion 5/12/2021 follow-up with cardiothoracic surgery as well as pulmonology. Is a concern for possible esophageal leak and esophagram was performed on 5/7/2021 did not show any leak. Patient was started on Diflucan due to concern for Candida albicans    Review of Systems:     Constitutional:  negative for chills, fevers, sweats  Respiratory:  negative for cough, dyspnea on exertion, shortness of breath, wheezing  Cardiovascular:  negative for chest pain, chest pressure/discomfort, lower extremity edema, palpitations  Gastrointestinal:  negative for abdominal pain, constipation, diarrhea, nausea, vomiting  Neurological:  negative for dizziness, headache    Medications: Allergies: Allergies   Allergen Reactions    No Known Allergies        Current Meds:   Scheduled Meds:    fluconazole  400 mg Oral Daily    gabapentin  100 mg Oral TID    aspirin  81 mg Oral Daily    atorvastatin  10 mg Oral Daily    levothyroxine  112 mcg Oral Daily    pantoprazole  40 mg Oral QAM AC    insulin glargine  10 Units Subcutaneous Nightly    polyethylene glycol  17 g Oral Daily    heparin (porcine)  5,000 Units Subcutaneous 3 times per day    ipratropium-albuterol  1 ampule Inhalation Q4H WA    insulin lispro  0-18 Units Subcutaneous Q4H    sodium chloride flush  5-40 mL Intravenous 2 times per day     Continuous Infusions:    dextrose      sodium chloride 25 mL (05/10/21 1840)     PRN Meds: oxyCODONE **OR** oxyCODONE, acetaminophen, glucose, dextrose, glucagon (rDNA), dextrose, sodium chloride, magnesium sulfate, acetaminophen, artificial tears, sodium chloride flush, [DISCONTINUED] promethazine **OR** ondansetron    Data:     Past Medical History:   has a past medical history of Diabetes mellitus ([address] Gout, Hiatal hernia, Hyperlipidemia, Hypertension, and Thyroid disease. Social History:   reports that she has never smoked.  She has never used smokeless tobacco. She reports that she does not drink alcohol and does not use drugs. Family History:   Family History   Problem Relation Age of Onset    Breast Cancer Mother     High Blood Pressure Mother     High Cholesterol Father     Breast Cancer Sister        Vitals:  /72   Pulse 86   Temp 98.7 °F (37.1 °C) (Oral)   Resp 20   Ht 5' 2\" (1.575 m)   Wt 184 lb 15.5 oz (83.9 kg)   SpO2 96%   BMI 33.83 kg/m²   Temp (24hrs), Av.2 °F (36.8 °C), Min:97.6 °F (36.4 °C), Max:99.3 °F (37.4 °C)    Recent Labs     21  0047 21  0411 21  0624   POCGLU 164* 111* 106* 88       I/O (24Hr):     Intake/Output Summary (Last 24 hours) at 2021 0959  Last data filed at 2021 0758  Gross per 24 hour   Intake 1330 ml   Output 1050 ml   Net 280 ml       Labs:  Hematology:  Recent Labs     21  0610 05/20/21  0548   WBC 5.8 10.3 10.9   RBC 2.89* 3.14* 2.94*   HGB 8.1* 8.6* 8.1*   HCT 26.5* 27.9* 26.0*   MCV 91.7 88.9 88.4   MCH 28.0 27.4 27.6   MCHC 30.6 30.8 31.2   RDW 14.4 14.4 14.6*    415 385   MPV 10.0 9.3 9.3   INR 1.0 1.0  --      Chemistry:  Recent Labs     21  0610 21  0548    134* 138   K 4.0 3.6* 3.3*   CL 96* 95* 98   CO2 31 29 29   GLUCOSE 155* 83 91   BUN 35* 32* 28*   CREATININE 1.41* 1.60* 1.76*   MG  --  1.4*  --    ANIONGAP 8* 10 11   LABGLOM 37* 32* 29*   GFRAA 45* 39* 35*   CALCIUM 8.2* 8.1* 7.5*   PHOS  --  3.6  --      Recent Labs     21  0538 21  0610 21  1144 21  1541 21  0047 21  0411 21  0548 21  0624   PROT 6.5 6.8  --   --   --   --   --  6.5  --    LABALBU 1.7* 1.9*  --   --   --   --   --  1.7*  --    AST 19 17  --   --   --   --   --  17  --    ALT 14 13  --   --   --   --   --  12  --    ALKPHOS 127* 123*  --   --   --   --   --  113*  --    BILITOT 0.25* 0.35  --   --   --   --   --  0.34  --    BILIDIR 0.15 0.16  --   --   --   --   --   --   --    POCGLU  -- pleural effusion. Bibasilar airspace disease and small left pleural effusion not significantly changed. XR CHEST PORTABLE    Result Date: 5/11/2021  New right-sided PICC tip position appears satisfactory. Otherwise, similar findings compatible with bibasilar atelectasis/consolidation, effusions and minimal infiltrate or atelectasis right mid lung. IR CHEST TUBE INSERTION    Result Date: 5/13/2021  Successful fluoroscopic guided placement of a right chest tube. IR GUIDED THORACENTESIS PLEURAL    Result Date: 5/13/2021  Successful ultrasound guided thoracentesis. Physical Examination:        General appearance:  alert, cooperative and no distress  Mental Status:  oriented to person, place and time and normal affect  Lungs:  clear to auscultation bilaterally, normal effort, s/p right chest tube removal  Heart:  regular rate and rhythm, no murmur  Abdomen:  soft, slightly distended, bowel sounds present  Extremities:  no edema, redness, tenderness in the calves  Skin:  no gross lesions, rashes, induration    Assessment:        Hospital Problems         Last Modified POA    * (Principal) Necrosis of stomach 5/17/2021 Yes    Hernia with obstruction 5/4/2021 Yes    Essential hypertension 5/4/2021 Yes    Thyroid disease 5/4/2021 Yes    Syncope 5/4/2021 Yes    Hiatal hernia 5/5/2021 Yes    Mediastinitis 5/8/2021 Yes    Peritonitis ([address] 5/8/2021 Yes    Severe malnutrition ([address] 5/16/2021 Yes    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 Appreciate bariatric surgery recommendations. encourage PO intake, nutritional supplements, completed Reglan for total of 72 hours. Appreciate ID recommendations. Currently on Diflucan for Candida albicans, final sensitives still pending.   Acute respiratory failure requiring oxygen with empyema showing gram positive and yeast, still requiring 1L nasal cannula fluconazole for candida infection,Appreciate ID, pulm, and CTS recommendations, s/p chest tube removal and tolerating well. Monitor if patient is a candidate for VATS  Paraesophageal collection appears stable in size. Discussed with surgery, no acute intervention planned at this time. Will plan for continued follow up. HTN, CAD, ASA, lipitor  Diabetes. lantus 10 units, ISS  Hypothyroidism. Synthroid 112 mcg  Neurontin 100 mg 3 times daily. Free water flushes encourage oral intake  Discharge planning. Most likely need skilled nursing facility.     Leonidas Segura MD  5/20/2021  9:59 AM

## 2021-05-20 NOTE — PLAN OF CARE
Problem: OXYGENATION/RESPIRATORY FUNCTION  Goal: Patient will maintain patent airway  5/20/2021 1725 by Kaushik Shetty RN  Outcome: Ongoing     Problem: SKIN INTEGRITY  Goal: Skin integrity is maintained or improved  5/20/2021 1725 by Kaushik Shetty RN  Outcome: Ongoing    Problem: Falls - Risk of:  Goal: Absence of physical injury  Description: Absence of physical injury  5/20/2021 1725 by Kaushik Shetty RN  Outcome: Ongoingg     Problem: Falls - Risk of:  Goal: Will remain free from falls  Description: Will remain free from falls  5/20/2021 1725 by Kaushik Shetty RN  Outcome: Ongoing

## 2021-05-20 NOTE — PROGRESS NOTES
Physical Therapy    Facility/Department: [facility] 4B STEPDOWN  Re-Assessment    NAME: Reese Gudino  : 1950  MRN: [id number]    Chief Complaint   Patient presents with    Blood Sugar Problem     >450    Hernia     hyatial        Date of Service: 2021    Discharge Recommendations:  Patient would benefit from continued therapy after discharge   PT Equipment Recommendations  Equipment Needed: Yes  Mobility Devices: Laurance Keating: Rolling  Other: Pt requires assistance for safe amb with RW. Equipment recommendations listed below are based on what the patient would need if they were able to return to prior living arrangements at the time of discharge. Assessment   Body structures, Functions, Activity limitations: Decreased functional mobility ; Decreased strength;Decreased endurance;Decreased balance  Assessment: Pt requires Kirsten for all mobility and amb 10' with RW. Pt is a fall risk and is unsafe to perform functional mobility unassisted at this time. Pt to benefit from continued PT to address remaining deficits. Prognosis: Good  Decision Making: Medium Complexity  PT Education: Goals;PT Role;Plan of Care;General Safety; Functional Mobility Training  Patient Education: verbal cues for safe transfer technique  REQUIRES PT FOLLOW UP: Yes  Activity Tolerance  Activity Tolerance: Patient Tolerated treatment well;Patient limited by fatigue;Patient limited by endurance       Patient Diagnosis(es): The primary encounter diagnosis was Acute pancreatitis, unspecified complication status, unspecified pancreatitis type. Diagnoses of BONNIE (acute kidney injury) ([address] and Hiatal hernia were also pertinent to this visit. has a past medical history of Diabetes mellitus ([address] Gout, Hiatal hernia, Hyperlipidemia, Hypertension, and Thyroid disease. has a past surgical history that includes Cholecystectomy (); Hysterectomy (); Breast surgery; other surgical history (); other surgical history ();  Tubal ligation (1988); Gastric fundoplication (N/A, 0/3/0087); hc picc line double lumen (5/10/2021); and IR CHEST TUBE INSERTION (5/13/2021). Restrictions  Restrictions/Precautions  Restrictions/Precautions: General Precautions, Fall Risk  Required Braces or Orthoses?: No  Position Activity Restriction  Other position/activity restrictions: up with assist, s/p paraesphageal hernia repair 5/4, 2 PEG tubes  Vision/Hearing  Vision: Impaired  Vision Exceptions: Wears glasses at all times  Hearing: Within functional limits     Subjective  General  Chart Reviewed: Yes  Patient assessed for rehabilitation services?: Yes  Response To Previous Treatment: Patient with no complaints from previous session. Family / Caregiver Present: No  Follows Commands: Within Functional Limits  Subjective  Subjective: RN and pt agreeable to PT.  Pt sleeping in bed on 3.5L O2 upon arrival.  Pain Screening  Patient Currently in Pain: Denies  Vital Signs  Patient Currently in Pain: Denies  Pre Treatment Pain Screening  Intervention List: Patient able to continue with treatment    Orientation  Orientation  Orientation Level: Oriented to time;Oriented to situation;Oriented to person;Disoriented to place (could not recall name of hospital, only knew she [place] in [place])  Social/Functional History  Social/Functional History  Lives With: Alone  Type of Home: Apartment  Home Layout: One level  Home Access: Elevator  Bathroom Shower/Tub: Tub/Shower unit  Bathroom Toilet: Handicap height  Bathroom Equipment: Grab bars in shower, Grab bars around toilet  Home Equipment: Cane (not used at baseline)  Tono Help From:  (reports not having anyone to help)  ADL Assistance: Independent  Homemaking Assistance: Independent  Homemaking Responsibilities: Yes  Ambulation Assistance: Independent  Transfer Assistance: Independent  Active : Yes  Mode of Transportation: Rabia Cruz  Occupation: Retired  Leisure & Hobbies: resell on [employer]  Additional Comments: neighbor support if needed  Cognition   Cognition  Overall Cognitive Status: WFL    Objective          AROM RLE (degrees)  RLE AROM: WFL  AROM LLE (degrees)  LLE AROM : WFL  AROM RUE (degrees)  RUE AROM : WFL  AROM LUE (degrees)  LUE AROM : WFL  Strength RLE  Comment: grossly 4-/5, except hip 3+/5  Strength LLE  Comment: grossly 4-/5, except hip 3+/5  Strength RUE  Comment: grossly 4/5, except shoulder flex 3+/5  Strength LUE  Comment: grossly 4/5, except shoulder flex 3+/5     Sensation  Overall Sensation Status: WFL (pt denies any n/t)  Bed mobility  Supine to Sit: Minimal assistance  Sit to Supine:  (Pt left up in chair upon writer's exit)  Scooting: Contact guard assistance  Comment: HOB raised ~30 degrees  Transfers  Sit to Stand: Minimal Assistance  Stand to sit: Minimal Assistance  Ambulation  Ambulation?: Yes  More Ambulation?: No  Ambulation 1  Surface: level tile  Device: Rolling Walker  Other Apparatus: O2 (3.5L)  Assistance: Minimal assistance  Quality of Gait: Pt with very slow alissa, decreased step length and height  Distance: 10'  Comments: Pt unsteady with no LOB.   Stairs/Curb  Stairs?: No     Balance  Posture: Good  Sitting - Static: Good  Sitting - Dynamic: Good;-  Standing - Static: Fair  Standing - Dynamic: Fair;-  Comments: RW used to assess standing balance        Plan   Plan  Times per week: 5-6x/week  Current Treatment Recommendations: Strengthening, Endurance Training, Functional Mobility Training, Balance Training, Transfer Training, Gait Training, Home Exercise Program, Safety Education & Training, Patient/Caregiver Education & Training, Equipment Evaluation, Education, & procurement  Safety Devices  Type of devices: Nurse notified, Call light within reach, Gait belt, All fall risk precautions in place, Left in chair, Patient at risk for falls  Restraints  Initially in place: No    AM-PAC Score     AM-PAC Inpatient Mobility without Stair Climbing Raw Score : 14 (05/20/21 8707)  AM-PAC Inpatient

## 2021-05-20 NOTE — PLAN OF CARE
for will increase  Description: Verbalizations of feeling emotionally comfortable while being cared for will increase  Outcome: Ongoing     Problem: Psychomotor Activity - Altered:  Goal: Absence of psychomotor disturbance signs and symptoms  Description: Absence of psychomotor disturbance signs and symptoms  Outcome: Ongoing     Problem: Sensory Perception - Impaired:  Goal: Demonstrations of improved sensory functioning will increase  Description: Demonstrations of improved sensory functioning will increase  Outcome: Ongoing  Goal: Decrease in sensory misperception frequency  Description: Decrease in sensory misperception frequency  Outcome: Ongoing  Goal: Able to refrain from responding to false sensory perceptions  Description: Able to refrain from responding to false sensory perceptions  Outcome: Ongoing  Goal: Demonstrates accurate environmental perceptions  Description: Demonstrates accurate environmental perceptions  Outcome: Ongoing  Goal: Able to distinguish between reality-based and nonreality-based thinking  Description: Able to distinguish between reality-based and nonreality-based thinking  Outcome: Ongoing  Goal: Able to interrupt nonreality-based thinking  Description: Able to interrupt nonreality-based thinking  Outcome: Ongoing     Problem: Sleep Pattern Disturbance:  Goal: Appears well-rested  Description: Appears well-rested  Outcome: Ongoing     Problem: Skin Integrity:  Goal: Will show no infection signs and symptoms  Description: Will show no infection signs and symptoms  Outcome: Ongoing  Goal: Absence of new skin breakdown  Description: Absence of new skin breakdown  Outcome: Ongoing     Problem: Falls - Risk of:  Goal: Absence of physical injury  Description: Absence of physical injury  5/20/2021 3992 by Angela Holloway RN  Outcome: Ongoing  5/19/2021 1459 by Axel Oliver RN  Outcome: Ongoing  Goal: Will remain free from falls  Description: Will remain free from falls  5/20/2021 0337 by Thomas Rapp, RN  Outcome: Ongoing  5/19/2021 1459 by Jeovany Frost RN  Outcome: Ongoing     Problem: Nutrition  Goal: Optimal nutrition therapy  Description: Nutrition Problem #1: Inadequate oral intake  Intervention: Food and/or Nutrient Delivery: Continue NPO, Start Parenteral Nutrition-suggest start with 150 g Dex, 50 g AA at 41.7 mL/hr with 100 mL 20% lipids.   Nutritional Goals: meet % of estimated nutrient needs     Outcome: Ongoing     Problem: Pain:  Goal: Pain level will decrease  Description: Pain level will decrease  Outcome: Ongoing  Goal: Control of acute pain  Description: Control of acute pain  Outcome: Ongoing  Goal: Control of chronic pain  Description: Control of chronic pain  Outcome: Ongoing     Problem: Infection - Central Venous Catheter-Associated Bloodstream Infection:  Goal: Will show no infection signs and symptoms  Description: Will show no infection signs and symptoms  Outcome: Ongoing

## 2021-05-20 NOTE — PROGRESS NOTES
Bariatric Surgery Progress Note      PATIENT NAME: Dino Burkett   TODAY'S DATE: 5/20/2021, 6:36 AM  Chief Complaint   Patient presents with    Blood Sugar Problem     >450    Hernia     Hiatal      SUBJECTIVE:    Pt seen and examined. Minimal to no pain. Had some nausea yesterday with small volume emesis, no nausea at this time. PEG tubes clamped. Tolerating PO intake but still not meeting daily caloric needs. Family still apprehensive to TFs.      Tmax 37.4    OBJECTIVE:   Vitals:  /74   Pulse 90   Temp 99.3 °F (37.4 °C) (Oral)   Resp 18   Ht 5' 2\" (1.575 m)   Wt 184 lb 15.5 oz (83.9 kg)   SpO2 95%   BMI 33.83 kg/m²      INTAKE/OUTPUT:      Intake/Output Summary (Last 24 hours) at 5/20/2021 0636  Last data filed at 5/20/2021 0551  Gross per 24 hour   Intake 1130 ml   Output 1050 ml   Net 80 ml                 General: alert, oriented  Lungs: Symmetrical chest rise bilaterally  Heart: S1S2  Abdomen: Soft, ND, appropriately tender, non peritoneal, no rebound, incisions c/d/i, PEG x2 with no surrounding erythema   Extremity: moves all extremities x4, trace edema    Data Review:  CBC:   Recent Labs     05/18/21  0538 05/19/21  0610 05/20/21  0548   WBC 5.8 10.3 10.9   HGB 8.1* 8.6* 8.1*    415 385     BMP:    Recent Labs     05/18/21  0538 05/19/21  0610    134*   K 4.0 3.6*   CL 96* 95*   CO2 31 29   BUN 35* 32*   CREATININE 1.41* 1.60*   GLUCOSE 155* 83     Hepatic:   Recent Labs     05/18/21  0538 05/19/21  0610   AST 19 17   ALT 14 13   ALKPHOS 127* 123*   BILITOT 0.25* 0.35   BILIDIR 0.15 0.16     Coagulation:   Recent Labs     05/18/21 0538 05/19/21  0610   APTT 24.1 25.4   PROT 6.5 6.8   INR 1.0 1.0       ASSESSMENT   Patient Active Problem List   Diagnosis    Hernia with obstruction    Essential hypertension    Thyroid disease    Syncope    Hiatal hernia    Necrosis of stomach    Mediastinitis    Peritonitis (HCC)    Severe malnutrition (HCC)    Strangulated paraesophageal

## 2021-05-20 NOTE — PROGRESS NOTES
on 05/03/2021 at the outside facility and it has been continued through the present time. Blood and urine cultures on 05/03/2021 show No growth . Repeat urine culture on 05/04/2021 shows No growth     Patient is currently having temperature elevations. Temp max of 101.1 on 05/07/2021 around 4 AM in the morning. Patient WBC count is improving from 23.5 on presentation to 6.2 today. Repeat chest x-ray 5-7-21 shows bibasilar consolidation new from prior study with blunting of the costophrenic angles bilaterally. Patient is off of the pressors since 5-6-21    CURRENT EVALUATION : 5/20/2021    Afebrile last 24 hours. Now requiring 3 L NC as patient desaturating yesterday evening. Patient appears more tachypnic and worsening shortness of breath. Chest tube removed on 5/18/21. No pneumothorax identified after removal chest tube. G tube OK for TF as needed and she is ok for low fiber diet per general surgery. She had multiple bowel movements yesterday. Left Thoracentesis on 5/12/21 for left loculated empyema-Pigtail catheter placed with pus noted. Fluid shows elevated LDH of 536, protein 3.1, exudative effusion likely para-pneumonic  5/12/21 Fluid culture positive for Gram positive rods. Candida albicans  Right sided 12 french chest tube placed on 5/13/21 for empyema. CT surgery consult for possible VATs/decortication next week. Follow-up esophagram on 5/11/21 negative for a leak. Coagulase negative Staphylococci bacteremia x 2 on 5-7-21. Repeat blood cultures 5-8-21 x 2 : No growth. Femoral line and morel catheter were removed. Right TAISHA removed 5/11/21 5/12/21 Thoracentesis fluid culture positive for Gram positive rods. Identification shows Kassandra albicans  Will continue Vancomycin & Zosyn. Add fluconazole. Rt and Lt TAISHA drains have been removed, on 5-7- and 5-11-21, respectively    Plan is to D/C to SNF-discharge planning started.     Discussed with RN    Labs, X rays Hortencia Siddiqui DO at [address]  DOUBLE LUMEN  5/10/2021         HYSTERECTOMY  1997    IR CHEST TUBE INSERTION  5/13/2021    IR CHEST TUBE INSERTION 5/13/2021 Queta Matt MD [facility]    OTHER SURGICAL HISTORY  1962    Remove spur L ankle    OTHER SURGICAL HISTORY  1978    Pin and wire repair R ankle    TUBAL LIGATION  1988       Medications:      fluconazole  400 mg Oral Daily    gabapentin  100 mg Oral TID    aspirin  81 mg Oral Daily    atorvastatin  10 mg Oral Daily    levothyroxine  112 mcg Oral Daily    pantoprazole  40 mg Oral QAM AC    insulin glargine  10 Units Subcutaneous Nightly    polyethylene glycol  17 g Oral Daily    heparin (porcine)  5,000 Units Subcutaneous 3 times per day    ipratropium-albuterol  1 ampule Inhalation Q4H WA    insulin lispro  0-18 Units Subcutaneous Q4H    sodium chloride flush  5-40 mL Intravenous 2 times per day       Social History:     Social History     Socioeconomic History    Marital status:      Spouse name: Not on file    Number of children: Not on file    Years of education: Not on file    Highest education level: Not on file   Occupational History    Not on file   Tobacco Use    Smoking status: Never Smoker    Smokeless tobacco: Never Used   Substance and Sexual Activity    Alcohol use: Never    Drug use: Never    Sexual activity: Not on file   Other Topics Concern    Not on file   Social History Narrative    Not on file     Social Determinants of Health     Financial Resource Strain:     Difficulty of Paying Living Expenses:    Food Insecurity:     Worried About Running Out of Food in the Last Year:     Ran Out of Food in the Last Year:    Transportation Needs:     Lack of Transportation (Medical):      Lack of Transportation (Non-Medical):    Physical Activity:     Days of Exercise per Week:     Minutes of Exercise per Session:    Stress:     Feeling of Stress :    Social Connections:     Frequency of Communication with Friends and Family:     Frequency of Social Gatherings with Friends and Family:     Attends [employer] Services:     Active Member of Clubs or Organizations:     Attends Club or Organization Meetings:     Marital Status:    Intimate Partner Violence:     Fear of Current or Ex-Partner:     Emotionally Abused:     Physically Abused:     Sexually Abused:        Family History:     Family History   Problem Relation Age of Onset    Breast Cancer Mother     High Blood Pressure Mother     High Cholesterol Father     Breast Cancer Sister         Allergies:   No known allergies     Review of Systems:   Review of Systems   Constitutional: Positive for fatigue and fever. Negative for activity change, appetite change, chills, diaphoresis and unexpected weight change. Respiratory: Positive for cough. Shortness of breath. Cardiovascular: Negative for chest pain, palpitations and leg swelling. Gastrointestinal: Positive for constipation. Negative for abdominal distention, nausea and vomiting. Endocrine: Negative for cold intolerance. Genitourinary: Negative for difficulty urinating and dysuria. Musculoskeletal: Negative for arthralgias and back pain. Neurological: Negative for dizziness, tremors, syncope and facial asymmetry. Hematological: Negative for adenopathy. Psychiatric/Behavioral: Negative for agitation and behavioral problems. The patient is not hyperactive.     Physical Examination :     Patient Vitals for the past 8 hrs:   BP Temp Temp src Pulse Resp SpO2 Weight   05/20/21 0842     20 96 %    05/20/21 0838     15 94 %    05/20/21 0743 127/72 98.7 °F (37.1 °C) Oral 86 16 96 %    05/20/21 0634  99.3 °F (37.4 °C) Oral 90 18 95 %    05/20/21 0600       184 lb 15.5 oz (83.9 kg)   05/20/21 0300 129/74 97.9 °F (36.6 °C) Axillary 89 17 93 %      General Appearance: Awake, alert  Head:  Normocephalic, no trauma  Eyes: Pupils equal, round, reactive to light, sclera anicteric; conjunctivae pink. No embolic phenomena. ENT: Oropharynx clear, without erythema, exudate, or thrush. No tenderness of sinuses. Mouth/throat: mucosa pink and moist. No lesions. Dentition in good repair. Neck:Supple, without lymphadenopathy. Thyroid normal, No bruits. Pulmonary/Chest: decreased breath sounds in the bilateral lower bases. + tachypnea   Cardiovascular: Regular rate and rhythm without murmurs, rubs, or gallops. Abdomen: Soft, non tender. Bowel sounds normal. No organomegaly. The ports entry wounds are healing well and without any erythema. All four Extremities: No cyanosis, clubbing, edema, or effusions. Neurologic: No gross sensory or motor deficits. Skin: Warm and dry with good turgor. No signs of peripheral arterial or venous insufficiency. No ulcerations. No open wounds. Medical Decision Making -Laboratory:   I have independently reviewed/ordered the following labs:    CBC with Differential:   Recent Labs     05/19/21  0610 05/20/21  0548   WBC 10.3 10.9   HGB 8.6* 8.1*   HCT 27.9* 26.0*    385   LYMPHOPCT 12* 5*   MONOPCT 6 6     BMP:   Recent Labs     05/19/21  0610 05/20/21  0548   * 138   K 3.6* 3.3*   CL 95* 98   CO2 29 29   BUN 32* 28*   CREATININE 1.60* 1.76*   MG 1.4*  --      Hepatic Function Panel:   Recent Labs     05/18/21  0538 05/19/21  0610 05/20/21  0548   PROT 6.5 6.8 6.5   LABALBU 1.7* 1.9* 1.7*   BILIDIR 0.15 0.16  --    IBILI 0.10 0.19  --    BILITOT 0.25* 0.35 0.34   ALKPHOS 127* 123* 113*   ALT 14 13 12   AST 19 17 17     No results for input(s): RPR in the last 72 hours. No results for input(s): HIV in the last 72 hours. No results for input(s): BC in the last 72 hours.   Lab Results   Component Value Date    MUCUS NOT REPORTED 05/06/2021    RBC 2.94 05/20/2021    RBC 6.23 05/03/2021    TRICHOMONAS NOT REPORTED 05/06/2021    WBC 10.9 05/20/2021    YEAST NOT REPORTED 05/06/2021    TURBIDITY CLOUDY 05/06/2021     Lab Results Component Value Date    CREATININE 1.76 05/20/2021    CREATININE 3.09 05/03/2021    GLUCOSE 91 05/20/2021    GLUCOSE 453 05/03/2021       Medical Decision Making-Imaging:     EXAMINATION:   ONE XRAY VIEW OF THE CHEST       5/20/2021 9:45 am       COMPARISON:   CT earlier today at 09:43 and radiograph May 18, 2021       HISTORY:   ORDERING SYSTEM PROVIDED HISTORY: s/p chest tube removal. Eval for worsening   effusion vs reoccurance pneumothorax. TECHNOLOGIST PROVIDED HISTORY:   s/p chest tube removal. Eval for worsening effusion vs reoccurance   pneumothorax. Reason for Exam: s/p chest tube removal. Eval for worsening effusion vs   reoccurance pneumothorax. Acuity: Acute   Type of Exam: Initial       FINDINGS:   Right upper extremity PICC remains in satisfactory position. Cardiomediastinal silhouette appears unchanged.  Low inspiratory volume. Bibasilar predominant airspace disease appears slightly improved particularly   on the left.  No definite effusion.  No pneumothorax or subdiaphragmatic free   air.           Impression   Improving bibasilar airspace disease.  No significant recurrent effusion.           Order History      EXAMINATION:   CT OF THE CHEST, ABDOMEN, AND PELVIS WITHOUT CONTRAST 5/20/2021 9:50 am       TECHNIQUE:   CT of the chest, abdomen and pelvis was performed without the administration   of intravenous contrast. Multiplanar reformatted images are provided for   review.  Dose modulation, iterative reconstruction, and/or weight based   adjustment of the mA/kV was utilized to reduce the radiation dose to as low   as reasonably achievable.       COMPARISON:   CT chest dated 05/16/2021, and 05/04/2021.  CT chest, abdomen and pelvis   dated 05/07/2021       HISTORY:   ORDERING SYSTEM PROVIDED HISTORY: Esophageal rupture, empyema, monitor of   abdomen   TECHNOLOGIST PROVIDED HISTORY:   Esophageal rupture, empyema, monitor of abdomen       Reason for Exam: Esophageal rupture, empyema, monitor of abdomen   Acuity: Unknown   Type of Exam: Unknown       FINDINGS:       Chest:       Mediastinum: Heart is normal in size.  There is no pericardial effusion. Thoracic aorta and pulmonary arteries are normal in caliber.  There is a   right arm PICC with distal tip in the distal SVC.  There are a couple mildly   prominent mediastinal lymph nodes, measuring up to 0.9 cm, which are   unchanged.  There is a loculated fluid collection in the right paraesophageal   space and esophageal hiatus, which measures 9.3 x 4.5 x 8.3 cm on axial   images, which is slightly decreased in size from the previous CT chest.  No   pneumomediastinum or gas within the fluid collection.       Lungs/pleura: There is a small mildly loculated left pleural effusion, which   is unchanged.  Right chest tube has been removed. David Hoyles is trace mildly   loculated right pleural fluid.  There is increased airspace consolidation in   the posterior mid and lower right lung.  Strandy and patchy opacities at the   left lung base are not significantly changed, likely atelectasis or scar.  No   pneumothorax.       Soft Tissues/Bones: There is no acute or suspicious osseous abnormality. Visualized superficial soft tissues are within normal limits.           Abdomen/Pelvis:       Organs: Limited unenhanced liver and spleen are grossly unremarkable.  The   gallbladder is surgically absent.  Limited unenhanced pancreas and adrenal   glands are unremarkable.       Kidneys are symmetric in size and attenuation.  No renal or ureteral   calculus.  No hydronephrosis or perinephric inflammation.       GI/Bowel: There is a moderate amount of ascites in the abdomen and pelvis,   which is increased from the previous CT abdomen and pelvis. Orlene Louann is no   free air.  Appendix is not seen. David Hoyles is no abnormal bowel distention.  The   majority of the stomach is intra-abdominal, and there are 2 PEG tubes in   place with retention bulbs in the mid stomach.  There is mild mesenteric   edema.  Surgical drain has been removed from the right lower quadrant.       Pelvis: Urinary bladder is unremarkable.  Uterus is surgically absent.  No   pelvic lymphadenopathy.       Peritoneum/Retroperitoneum: The abdominal aorta is normal in caliber.  There   is no retroperitoneal or mesenteric lymphadenopathy.       Bones/Soft Tissues: There is no acute or suspicious osseous abnormality. Visualized superficial soft tissues are within normal limits.           Impression   1. Loculated fluid collection in the right paraesophageal space/esophageal   hiatus, stable to slightly decreased in size when compared to 05/16/2021. The collection measures approximately 9.3 x 4.5 cm, and there is no gas in   the collection.  This could be a postoperative fluid collection or loculated   ascites herniated into the space previously occupied by intrathoracic   stomach.  Possibility of abscess is not excluded.  No pneumomediastinum.       2.  Small mildly loculated left pleural effusion, which is unchanged. Interval removal of the right chest tube with trace right pleural fluid.       3.  Increased airspace consolidation in the posterior mid and lower right   lung, concerning for pneumonia.  Unchanged opacities in the posterior left   lung, possibly atelectasis or scarring.       4.  Moderate ascites in the abdomen and pelvis, which is increased.  No free   air.          EXAMINATION:   SINGLE CONTRAST ESOPHAGRAM       5/7/2021 11:03 am       TECHNIQUE:   Single contrast esophagram was performed with a total 100 mL of Omnipaque 240   oral contrast.       FLUOROSCOPY DOSE AND TYPE OR TIME AND EXPOSURES:   Fluoro time: 2.7 minutes; DAP: 70.44 mGy       COMPARISON:   Upper GI from 05/04/2021       HISTORY:   ORDERING SYSTEM PROVIDED HISTORY: s/p hiatal hernia reduction  with partial   gastrectomy and gastropexy   TECHNOLOGIST PROVIDED HISTORY:   s/p hiatal hernia reduction  with partial gastrectomy and was performed without the administration of intravenous   contrast. Multiplanar reformatted images are provided for review. Dose   modulation, iterative reconstruction, and/or weight based adjustment of the   mA/kV was utilized to reduce the radiation dose to as low as reasonably   achievable.       COMPARISON:   None.       HISTORY:   ORDERING SYSTEM PROVIDED HISTORY: large hiatal hernia, contrast progression? TECHNOLOGIST PROVIDED HISTORY:   large hiatal hernia, contrast progression? Reason for Exam: large hiatal hernia, contrast progression?       FINDINGS:   Mediastinum: Heart size is normal without pericardial effusion.  There are   shotty mediastinal lymph nodes.  The thoracic aorta and main pulmonary artery   are normal in caliber.       Lungs/pleura: Moderate bilateral pleural effusions with adjacent   consolidation.  No pneumothorax.       Upper Abdomen: There is a large hiatal hernia containing the majority of the   stomach.  There is organoaxial volvulus.  Enteric tube is noted extending   below the diaphragm with tip outside the field of view.  The herniated   stomach is distended with contrast.  There is also contrast within the distal   esophagus. Kathy Guise is some contrast visualized within the portion of stomach   below the diaphragm.  Free air and fluid are noted within the visualized   upper abdomen with apparent free spill of contrast in the left upper quadrant.       Soft Tissues/Bones: No acute osseous abnormality.           Impression   1. Ascites and pneumoperitoneum with apparent free-flowing oral contrast in   the left upper quadrant is concerning for viscus perforation, possibly within   the subdiaphragmatic portion of the stomach. 2. Large hiatal hernia with organoaxial volvulus.  Majority of contrast is   retained within the esophagus and supradiaphragmatic stomach. 3. Enteric tube extends below the diaphragm with tip outside the field of   view.    4. Moderate bilateral pleural effusions with adjacent consolidation,   atelectasis versus pneumonia. Critical results were called by Dr. Milka Fonseca MD to [facility] on   5/4/2021 at 18:30.            CXR:  ONE XRAY VIEW OF THE CHEST       5/11/2021 9:22 am       COMPARISON:   AP chest from 05/07/2021; CT scan of the chest, abdomen and pelvis from   05/07/2021       HISTORY:   ORDERING SYSTEM PROVIDED HISTORY: follow up on bibasilar consolidation   TECHNOLOGIST PROVIDED HISTORY:   follow up on bibasilar consolidation       History of diabetes and hypertension.       FINDINGS:   Overlying ECG monitor leads and gown snaps.  New right-sided PICC tip   position in the SVC.       Low lung volumes accentuating findings, including cardiomegaly with bibasilar   opacities suggesting atelectasis/consolidation and effusions.  Patchy   infiltrate right mid lung also again noted.       Bones stable.           Impression   New right-sided PICC tip position appears satisfactory.  Otherwise, similar   findings compatible with bibasilar atelectasis/consolidation, effusions and   minimal infiltrate or atelectasis right mid lung.             CT scan:   CT OF THE CHEST WITHOUT CONTRAST 5/11/2021 10:31 am       TECHNIQUE:   CT of the chest was performed without the administration of intravenous   contrast. Multiplanar reformatted images are provided for review.  Dose   modulation, iterative reconstruction, and/or weight based adjustment of the   mA/kV was utilized to reduce the radiation dose to as low as reasonably   achievable.       COMPARISON:   CT scan of the chest from 05/07/2021.       HISTORY:   ORDERING SYSTEM PROVIDED HISTORY: ?left loculated pleural effusion   TECHNOLOGIST PROVIDED HISTORY:   ?left loculated pleural effusion   Reason for Exam: ?left loculated pleural effusion   Acuity: Unknown   Type of Exam: Unknown       FINDINGS:   Mediastinum: Interval placement right-sided PICC with tip position in the   mid-lower SVC.  Small unchanged GRAM STAIN FROM BOTTLE: GRAM POSITIVE RODS          Medical Decision Making-Other:     Note:  Labs, medications, radiologic studies were reviewed with personal review of films   Large amounts of data were reviewed  Discussed with nursing Staff, Discharge planner  Infection Control and Prevention measures reviewed  All prior entries were reviewed  Administer medications as ordered  Prognosis: Guarded  Discharge planning reviewed  Follow up as outpatient. Thank you for allowing us to participate in the care of this patient. Please call with questions. Yelitza Powell MD  Internal Medicine Resident, PGY-3  [facility]; [place], [place]  5/20/2021, 9:07 AM      ATTESTATION:    I have discussed the case, including pertinent history and exam findings with the residents and students. I have seen and examined the patient and the key elements of the encounter have been performed by me. I was present when the student obtained his information or examined the patient. I have reviewed the laboratory data, other diagnostic studies and discussed them with the residents. I have updated the medical record where necessary. I agree with the assessment, plan and orders as documented by the resident/ student.     Fela Sampson MD.

## 2021-05-20 NOTE — PLAN OF CARE
Problem: OXYGENATION/RESPIRATORY FUNCTION  Goal: Patient will maintain patent airway  5/20/2021 1000 by Tone Mckeon RCP  Outcome: Ongoing     Problem: OXYGENATION/RESPIRATORY FUNCTION  Goal: Patient will achieve/maintain normal respiratory rate/effort  Description: Respiratory rate and effort will be within normal limits for the patient  5/20/2021 1000 by Tone Mckeon RCP  Outcome: Ongoing     Problem: SKIN INTEGRITY  Goal: Skin integrity is maintained or improved  5/20/2021 1000 by Tone Mckeon RCP  Outcome: Ongoing     Problem: Respiratory  Intervention: Respiratory assessment  Note: BRONCHOSPASM/BRONCHOCONSTRICTION     [x]         IMPROVE AERATION/BREATH SOUNDS  [x]   ADMINISTER BRONCHODILATOR THERAPY AS APPROPRIATE  [x]   ASSESS BREATH SOUNDS  []   IMPLEMENT AEROSOL/MDI PROTOCOL  [x]   PATIENT EDUCATION AS NEEDED

## 2021-05-20 NOTE — PROGRESS NOTES
PULMONARY & CRITICAL CARE MEDICINE PROGRESS  NOTE     Patient:  Richelle Cedeño  MRN: [id number]  Admit date: 5/3/2021    SUBJECTIVE     I personally interviewed/examined the patient, reviewed interval history and interpreted all available radiographic, laboratory data at the time of service. Chief Compliant/Reason for Initial Consult: Acute respiratory failure on vent support, gastric perforation    Brief Hospital Course and Interval History:  The patient is a 78 y.o. female with known medical history of diabetes mellitus, hypertension, hypothyroidism, abdominal hernia presented to the hospital with nausea, vomiting, diarrhea. Patient had similar symptoms on 4/28 but was apparently sent home. Patient had dizzy spells and lightheadedness, had a syncopal episode in the ER on this admission. Patient was transferred from the [facility] to [place].  In [facility] patient lab revealed lactic acidosis of 6, glucose 585, patient was seen to be in DKA. Elevated lipase of 1451. Leukocytotic with WBC 23.5, hemoglobin 18.3. Patient also had elevated troponins, elevated creatinine of 3.8. In [place] repeat labs showed creatinine of 2.49 with lactic acid of 2.4. Troponin was 85, recheck of 94. LFTs showed elevated bilirubin.     General surgery was consulted. CT chest showed ascites and pneumoperitoneum with apparent free-flowing oral contrast in the left upper quadrant concerning for viscus perforation possibly within the subdiaphragmatic portion of the stomach. Patient then went for robotic hernia repair 5/4 with wedge gastrectomy, gastropexy and placement of 2 TAISHA drains and 2 PEG tubes.   Patient is n.p.o.         Interval history  5/20/2021  No acute events   Tolerated removal of the chest tubes  No respiratory distress   Cough without purulent sputum  Afebrile  Patient is on room air        [address] SIGNS:   LAST-  /72   Pulse 86   Temp 98.7 °F (37.1 °C) (Oral)   Resp 20   Ht 5' 2\" (1.575 m)   Wt 184 lb 15.5 oz (83.9 kg)   SpO2 96%   BMI 33.83 kg/m²   8-24 HR RANGE-  TEMP Temp  Av.2 °F (36.8 °C)  Min: 97.6 °F (36.4 °C)  Max: 99.3 °F (26.6 °C)   BP Systolic (38KIE), CQQ:937 , Min:117 , TPA:861      Diastolic (96LAE), ZCU:37, Min:71, Max:88     PULSE Pulse  Av.9  Min: 81  Max: 96   RR Resp  Av.3  Min: 15  Max: 20   O2 SAT SpO2  Av.3 %  Min: 94 %  Max: 96 %   OXYGEN DELIVERY O2 Flow Rate (L/min)  Avg: 3 L/min  Min: 3 L/min  Max: 3 L/min     Systemic Examination:   General appearance aaox3  Chest -dull to percussion bilaterally and decreased breath sounds  Heart - normal rate, regular rhythm, normal S1, S2, no murmurs, rubs, clicks or gallops  Abdomen -soft with one PEG tube and G-tube, clamped  Extremities - peripheral pulses normal, no pedal edema, no clubbing or cyanosis  Skin - normal coloration and turgor, no rashes, no suspicious skin lesions noted   PICC line in place  DATA REVIEW     Medications:  Scheduled Meds:   fluconazole  400 mg Oral Daily    gabapentin  100 mg Oral TID    aspirin  81 mg Oral Daily    atorvastatin  10 mg Oral Daily    levothyroxine  112 mcg Oral Daily    pantoprazole  40 mg Oral QAM AC    insulin glargine  10 Units Subcutaneous Nightly    polyethylene glycol  17 g Oral Daily    heparin (porcine)  5,000 Units Subcutaneous 3 times per day    ipratropium-albuterol  1 ampule Inhalation Q4H WA    insulin lispro  0-18 Units Subcutaneous Q4H    sodium chloride flush  5-40 mL Intravenous 2 times per day     Continuous Infusions:   dextrose      sodium chloride 25 mL (05/10/21 1840)     LABS:-  ABGs:   No results found for: PH, PCO2, PO2, HCO3, O2SAT  No results for input(s): PHART, PO2ART, KME7BNX, IDF8XIE, BEART, E0NNILRX in the last 72 hours.   Lab Results   Component Value Date    POCPH 7.433 2021    POCPCO2 36.8 2021    POCPO2 150.9 (H) 05/06/2021    POCHCO3 24.6 05/06/2021    EHLS9FAZ 99 (H) 05/06/2021     CBC:   Recent Labs     05/18/21  0538 05/19/21  0610 05/20/21  0548   WBC 5.8 10.3 10.9   HGB 8.1* 8.6* 8.1*   HCT 26.5* 27.9* 26.0*   MCV 91.7 88.9 88.4    415 385   LYMPHOPCT 21* 12* 5*   RBC 2.89* 3.14* 2.94*   MCH 28.0 27.4 27.6   MCHC 30.6 30.8 31.2   RDW 14.4 14.4 14.6*     BMP:   Recent Labs     05/18/21  0538 05/19/21  0610 05/20/21  0548    134* 138   K 4.0 3.6* 3.3*   CL 96* 95* 98   CO2 31 29 29   BUN 35* 32* 28*   CREATININE 1.41* 1.60* 1.76*   GLUCOSE 155* 83 91   PHOS  --  3.6  --      Liver Function Test:   Recent Labs     05/20/21  0548   PROT 6.5   LABALBU 1.7*   ALT 12   AST 17   ALKPHOS 113*   BILITOT 0.34     Amylase/Lipase:  No results for input(s): AMYLASE, LIPASE in the last 72 hours. Coagulation Profile:   Recent Labs     05/18/21  0538 05/19/21  0610   INR 1.0 1.0   PROTIME 10.3 10.3   APTT 24.1 25.4     Cardiac Enzymes:  No results for input(s): CKTOTAL, CKMB, CKMBINDEX, TROPONINI in the last 72 hours.   Lactic Acid:  Lab Results   Component Value Date    LACTA 3.2 (H) 05/03/2021    LACTA 6.0 (HH) 05/03/2021     BNP:   No results found for: BNP  D-Dimer:  No results found for: DDIMER  Others:   Lab Results   Component Value Date    TSH 13.36 (H) 05/03/2021     No results found for: LILLY, RHEUMFACTOR, SEDRATE, CRP  No results found for: [place] Mel  No results found for: IRON, TIBC, FERRITIN  No results found for: SPEP, UPEP  No results found for: PSA, CEA, , GD4954,     Input/Output:    Intake/Output Summary (Last 24 hours) at 5/20/2021 1116  Last data filed at 5/20/2021 0758  Gross per 24 hour   Intake 1330 ml   Output 1050 ml   Net 280 ml       Microbiology:    Pathology:    Radiology Reports:  CT CHEST ABDOMEN PELVIS WO CONTRAST   Preliminary Result   1. loculated fluid collection in the right paraesophageal space/esophageal   hiatus, stable to slightly decreased in size when compared to 05/16/2021. The collection measures approximately 9.3 x 4.5 cm, and there is no gas in   the collection. This could be a postoperative fluid collection or loculated   ascites herniated into the space previously occupied by intrathoracic   stomach. Possibility of abscess is not excluded. No pneumomediastinum. 2.   Small mildly loculated left pleural effusion, which is unchanged. Interval   removal of the right chest tube with trace right pleural fluid. 3.   Increased airspace consolidation in the posterior mid and lower right lung,   concerning for pneumonia. Unchanged opacities in the posterior left lung,   possibly atelectasis or scarring. 4.  Moderate ascites in the abdomen and   pelvis, which is increased. No free air. XR CHEST PORTABLE   Final Result   Improving bibasilar airspace disease. No significant recurrent effusion. XR ABDOMEN (KUB) (SINGLE AP VIEW)   Final Result   Stable ileus versus partial small bowel obstruction         XR CHEST (SINGLE VIEW FRONTAL)   Final Result   1. Right chest tube removal without pneumothorax. 2. Small left pleural effusion and bibasilar opacities are similar to prior   radiograph. CT CHEST WO CONTRAST   Final Result   1. Right chest tube in place with near complete resolution of right   effusion. No pneumothorax or loculated fluid in the right hemithorax. 2.  Trace left pleural effusion. 3.  Dependent opacities in the lower lobes, left greater than right, again   demonstrated, although improved since prior chest CT exam.      4.  Moderate amount of fluid within the esophageal hiatus. 5.  Partially visualized stomach appears distended with 2 gastrostomy tubes   in place. XR ABDOMEN (KUB) (SINGLE AP VIEW)   Final Result   2 new gastrostomy tubes noted. Ileus. XR CHEST PORTABLE   Final Result   Right chest tube in satisfactory position with significantly improved right   pleural effusion. following the ingestion of contrast.  Contrast does migrate beyond the   postoperative region into the proximal small bowel. 2. No extraneous/extraluminal collection of contrast is seen beyond the   confines of the stomach. 3. 2 surgical drains and 2 presumed gastrostomy tubes are seen overlying the   left upper quadrant. There does appear to be contrast slightly marginating   the more lateral gastrostomy tube balloon. 4. Delayed migration of contrast through the distal esophagus and GE junction   with mild gastroesophageal reflux. The findings were sent to the Radiology Results [address] at 12:43   pm on 5/7/2021to be communicated to a licensed caregiver. XR CHEST PORTABLE   Final Result   Bibasilar consolidation new from prior study. Blunting of the costophrenic   angles bilaterally         XR CHEST PORTABLE   Final Result   ETT tip position stable. Decreased left subcutaneous emphysema. Slightly   improved suspected left basilar volume loss with persistent findings as   above. No overt vascular congestion. US RENAL COMPLETE   Final Result   Unremarkable ultrasound of the kidneys and urinary bladder. Trace right pleural effusion         XR CHEST PORTABLE   Final Result   Volume loss continues left hemithorax with haziness at the left base either   atelectasis and or fluid. Subcutaneous emphysema along the left lateral   chest wall has decreased. No appreciable extrapleural air. Endotracheal   tube in appropriate position. XR CHEST PORTABLE   Final Result   1. Recommend repositioning of left internal jugular approach central venous   catheter. 2. Low lung volumes with left basilar atelectasis plus or minus mild pleural   effusion. 3. Left chest wall scattered soft tissue emphysema.          XR CHEST PORTABLE   Final Result   Intestinal tube deviates to the left side of a sizable hiatal hernia,   traversing below the hemidiaphragm and terminating just underneath the left   hemidiaphragm. Side port of the intestinal tube is below the diaphragmatic   hiatus. CT CHEST WO CONTRAST   Final Result   1. Ascites and pneumoperitoneum with apparent free-flowing oral contrast in   the left upper quadrant is concerning for viscus perforation, possibly within   the subdiaphragmatic portion of the stomach. 2. Large hiatal hernia with organoaxial volvulus. Majority of contrast is   retained within the esophagus and supradiaphragmatic stomach. 3. Enteric tube extends below the diaphragm with tip outside the field of   view. 4. Moderate bilateral pleural effusions with adjacent consolidation,   atelectasis versus pneumonia. Critical results were called by Dr. Yoly Garcia MD to [facility] on   5/4/2021 at 18:30. XR ABDOMEN (KUB) (SINGLE AP VIEW)   Final Result   No significant subdiaphragmatic contrast progression, as above. RECOMMENDATION:   Consider chest x-ray to further assess a organic-axial gastric volvulus         FL UGI   Final Result   Organo-axial volvulus the stomach. Large paraesophageal hernia containing the majority of the rotated gastric   body. The greater curvature is positioned superiorly within the   paraesophageal hernia defect. There is narrowing of the gastroesophageal   junction as well as of the gastroduodenal junction through the hernia defect. MRI ABDOMEN WO CONTRAST MRCP   Final Result   1. No evidence of intrahepatic or extrahepatic ductal dilatation. 2. Incompletely imaged large hiatal hernia with organoaxial malrotation of   the stomach. 3. Small to moderate amount of ascites. 4. Diffuse small bowel wall thickening likely due to 3rd spacing as the post   enteritis. 5. Trace bilateral pleural effusions.          XR CHEST PORTABLE   Final Result   No acute cardiopulmonary disease      Large hiatal hernia             Echocardiogram:   Results for orders placed during the hospital encounter of 05/03/21   ECHO Complete 2D W Doppler W Color    Narrative Transthoracic Echocardiography Report (TTE)     Patient Name Maranda Lee   Date of Study               05/05/2021      Date of      1950  Gender                      Female   Birth      Age          78 year(s)  Race                              Room Number  8550        Height:                     62 inch, 157.48 cm      Corporate ID [id number]    Weight:                     166 pounds, 75.3 kg   #      Patient Acct [de-identified]   BSA:          1.77 m^2      BMI:      30.36   #                                                              kg/m^2       #         [id number]     Sonographer                 [facility]      Accession #  [id number]  Interpreting Physician      Shellie Vizcarra 63      Fellow                   Referring Nurse                            Practitioner      Interpreting             Referring Physician         Sourav Quijano   Fellow     Additional Comments  Technically difficult study, patient supine on ventilator. Type of Study      TTE procedure:2D Echocardiogram, M-Mode, Doppler, Color Doppler. Procedure Date  Date: 05/05/2021 Start: 07:32 AM    Study Location: [facility]  Technical Quality: Adequate visualization    Indications:Elevated troponin. History / Tech. Comments:  Procedure explained to patient. No known medical Hx. Patient Status: Inpatient    Height: 62 inches Weight: 166 pounds BSA: 1.77 m^2 BMI: 30.36 kg/m^2    CONCLUSIONS    Summary  Normal LV size and wall thickness. No obvious wall motion abnormality seen. Normal LV systolic function with LVEF 55%. RV appears dilated with reduced function. RV systolic pressure 37 mmHg  LA appears normal in size. No obvious significant structural valvular abnormality noted. No significant valvular stenosis or regurgitation noted. Normal aortic root dimension.   No significant pericardial effusion noted.    Signature  ----------------------------------------------------------------------------   Electronically signed by Connor Montoya(Interpreting physician) on   2021 12:15 PM  ----------------------------------------------------------------------------    ----------------------------------------------------------------------------   Electronically signed by Olimpia Allen(Sonographer) on 2021 11:37 AM  ----------------------------------------------------------------------------  FINDINGS  Left Atrium  Left atrium is normal in size. Inter-atrial septum is intact with no evidence for an atrial septal defect,  by color doppler. Left Ventricle  Left ventricle is small in size, normal wall thickness, global left  ventricular systolic function is normal, calculated ejection fraction is  53%. All wall segments are not well visualized (poor acoustic windows.)  Right Atrium  Right atrial dilatation. Right Ventricle  Right ventricular dilatation with reduced systolic function. Abnormal RV strain. Mitral Valve  Normal mitral valve structure. Trivial mitral regurgitation. Aortic Valve  Aortic valve is trileaflet. No evidence of aortic insufficiency or stenosis. Tricuspid Valve  Normal tricuspid valve leaflets. Moderate tricuspid regurgitation. Estimated right ventricular systolic pressure is 37 mmHg, suggesting  pulmonary HTN. Pulmonic Valve  Pulmonic valve not well visualized but Doppler velocities are normal.  Pericardial Effusion  No significant pericardial effusion is seen. Miscellaneous  Normal aortic root dimension. E/E' average = 15.05. IVC dilated but unable to assess respiratory collapse due to patient on  ventilator.     M-mode / 2D Measurements & Calculations:      LVIDd:4 cm(3.7 - 5.6 cm)          Diastolic EZEWQP:34 ml   KBIC:0.4 cm(0.6 - 1.1 cm)         Systolic DVQQI.90 ml   LVPWd:0.9 cm(0.6 - 1.1 cm)        Aortic Root:2.9 cm(2.0 - 3.7 cm) LA Dimension: 3.1 cm(1.9 - 4.0 cm)   Calculated LVEF (%): 52.95 %      LA volume/Index: 31.04 ml /18m^2                                     LVOT:1.9 cm                                     RVDd:3 cm      Mitral:                                 Aortic      Valve Area (P1/2-Time): 5.12 cm^2       Peak Velocity: 1.55 m/s   Peak E-Wave: 0.78 m/s                   Mean Velocity: 0.99 m/s   Peak A-Wave: 1.00 m/s                   Peak Gradient: 9.61 mmHg   E/A Ratio: 0.78                         Mean Gradient: 5 mmHg   Peak Gradient: 2.45 mmHg   Mean Gradient: 2 mmHg   Deceleration Time: 145 msec             Area (continuity): 2.16 cm^2   P1/2t: 43 msec                          AV VTI: 25.9 cm      Area (continuity): 2.1 cm^2   Mean Velocity: 0.60 m/s      Tricuspid:                              Pulmonic:      Estimated RVSP: 37 mmHg                 Peak Velocity: 0.80 m/s   Peak TR Velocity: 2.85 m/s              Peak Gradient: 2.57 mmHg   Peak TR Gradient: 32.49 mmHg     Diastology / Tissue Doppler  Septal Wall E' velocity:0.06 m/s  Septal Wall E/E':13.8  Lateral Wall E' velocity:0.05 m/s  Lateral Wall E/E':16.3       Cardiac Catheterization:   No results found for this or any previous visit. Date of Procedure: 5/4/2021     Pre-Op Diagnosis: PARAESOPHAGEAL HERNIA, PNEUMOPERITONEUM     Post-Op Diagnosis: Same, Gastric perforation due to ischemia from paraesophageal hernia. Feculent peritonitis       Procedure(s):  PARAESPHAGEAL HERNIA REPAIR LAPAROSCOPIC ROBOTIC   PARTIAL GASTRECTOMY  GASTROPEXY  IRRIGATION OF ABDOMEN  ASSESSMENT AND PLAN     Assessment:  B/l empyema post gastric perforation due to ischemia from para esohageal hernia .   Left fungal empyema   Left loculated pleural effusion -Left Thoracentesis 5/12/21 - exudate - candida   Right larger loculated pleural effusion -Right pig tail chest tube 5/13/21 - culture negative  - exudate - ph -6.9  Mediastinal fluid collection  Gastric volvulus with Viscus perforation  Sepsis   Bilateral lower lobe pneumonia  Peritonitis  Blood cultures positive for staph epidermidis, methicillin resistant  Diabetes mellitusDKA now resolved  Lactic acidosis resolving  BONNIE  Thrombocytopenia   Elevated troponins  Subclinical hypothyroidism  Elevated bilirubin  Hypernatremia    Plan:  The pleural fluid from 5/13/2021 without any growth microbiologically  The pathologic evaluation of the right pleural effusion was negative for malignancy  Chest x-ray post removal of the chest tube without any residual pneumothorax  Chest x-ray today with improving bilateral airspace disease without any recurring effusions  Patient had a CT scan of the chest done today and it was reviewed and it showed-  1. loculated fluid collection in the right paraesophageal space/esophageal   hiatus, stable to slightly decreased in size when compared to 05/16/2021. The collection measures approximately 9.3 x 4.5 cm, and there is no gas in   the collection.  This could be a postoperative fluid collection or loculated   ascites herniated into the space previously occupied by intrathoracic   stomach.  Possibility of abscess is not excluded.  No pneumomediastinum.  2.   Small mildly loculated left pleural effusion, which is unchanged.  Interval   removal of the right chest tube with trace right pleural fluid.  3.   Increased airspace consolidation in the posterior mid and lower right lung,   concerning for pneumonia.  Unchanged opacities in the posterior left lung,   possibly atelectasis or scarring.  4.  Moderate ascites in the abdomen and   pelvis, which is increased.  No free air. Question about mediastinal fluid collection still not addressed ? Infected .    Continue antifungal and antibiotics  Anti atelectasis measures  Pain control  Patient is on fluconazole and subcutaneous heparin  ID and surgery following patient    Shell Umaña MD,   Pulmonary and Critical Care Medicine           5/20/2021, 11:16 AM

## 2021-05-21 LAB
ABSOLUTE EOS #: 0.3 K/UL (ref 0–0.4)
ABSOLUTE IMMATURE GRANULOCYTE: 0.1 K/UL (ref 0–0.3)
ABSOLUTE LYMPH #: 1.09 K/UL (ref 1–4.8)
ABSOLUTE MONO #: 0.3 K/UL (ref 0.1–0.8)
ALBUMIN SERPL-MCNC: 1.8 G/DL (ref 3.5–5.2)
ALBUMIN/GLOBULIN RATIO: 0.4 (ref 1–2.5)
ALP BLD-CCNC: 106 U/L (ref 35–104)
ALT SERPL-CCNC: 9 U/L (ref 5–33)
ANION GAP SERPL CALCULATED.3IONS-SCNC: 10 MMOL/L (ref 9–17)
AST SERPL-CCNC: 20 U/L
BASOPHILS # BLD: 0 % (ref 0–2)
BASOPHILS ABSOLUTE: 0 K/UL (ref 0–0.2)
BILIRUB SERPL-MCNC: 0.26 MG/DL (ref 0.3–1.2)
BILIRUBIN DIRECT: 0.12 MG/DL
BILIRUBIN, INDIRECT: 0.14 MG/DL (ref 0–1)
BUN BLDV-MCNC: 24 MG/DL (ref 8–23)
BUN/CREAT BLD: ABNORMAL (ref 9–20)
CALCIUM SERPL-MCNC: 7.3 MG/DL (ref 8.6–10.4)
CHLORIDE BLD-SCNC: 98 MMOL/L (ref 98–107)
CO2: 27 MMOL/L (ref 20–31)
CREAT SERPL-MCNC: 1.6 MG/DL (ref 0.5–0.9)
DIFFERENTIAL TYPE: ABNORMAL
EOSINOPHILS RELATIVE PERCENT: 3 % (ref 1–4)
GFR AFRICAN AMERICAN: 39 ML/MIN
GFR NON-AFRICAN AMERICAN: 32 ML/MIN
GFR SERPL CREATININE-BSD FRML MDRD: ABNORMAL ML/MIN/{1.73_M2}
GFR SERPL CREATININE-BSD FRML MDRD: ABNORMAL ML/MIN/{1.73_M2}
GLOBULIN: ABNORMAL G/DL (ref 1.5–3.8)
GLUCOSE BLD-MCNC: 102 MG/DL (ref 65–105)
GLUCOSE BLD-MCNC: 116 MG/DL (ref 65–105)
GLUCOSE BLD-MCNC: 118 MG/DL (ref 65–105)
GLUCOSE BLD-MCNC: 144 MG/DL (ref 65–105)
GLUCOSE BLD-MCNC: 215 MG/DL (ref 65–105)
GLUCOSE BLD-MCNC: 95 MG/DL (ref 70–99)
GLUCOSE BLD-MCNC: 98 MG/DL (ref 65–105)
GLUCOSE BLD-MCNC: 99 MG/DL (ref 65–105)
HCT VFR BLD CALC: 25.1 % (ref 36.3–47.1)
HEMOGLOBIN: 7.7 G/DL (ref 11.9–15.1)
IMMATURE GRANULOCYTES: 1 %
LYMPHOCYTES # BLD: 11 % (ref 24–44)
MAGNESIUM: 1.8 MG/DL (ref 1.6–2.6)
MCH RBC QN AUTO: 27.5 PG (ref 25.2–33.5)
MCHC RBC AUTO-ENTMCNC: 30.7 G/DL (ref 28.4–34.8)
MCV RBC AUTO: 89.6 FL (ref 82.6–102.9)
MONOCYTES # BLD: 3 % (ref 1–7)
MORPHOLOGY: ABNORMAL
NRBC AUTOMATED: 0 PER 100 WBC
PDW BLD-RTO: 14.4 % (ref 11.8–14.4)
PHOSPHORUS: 3.3 MG/DL (ref 2.6–4.5)
PLATELET # BLD: 346 K/UL (ref 138–453)
PLATELET ESTIMATE: ABNORMAL
PMV BLD AUTO: 9.2 FL (ref 8.1–13.5)
POTASSIUM SERPL-SCNC: 3.5 MMOL/L (ref 3.7–5.3)
RBC # BLD: 2.8 M/UL (ref 3.95–5.11)
RBC # BLD: ABNORMAL 10*6/UL
SEG NEUTROPHILS: 82 % (ref 36–66)
SEGMENTED NEUTROPHILS ABSOLUTE COUNT: 8.11 K/UL (ref 1.8–7.7)
SODIUM BLD-SCNC: 135 MMOL/L (ref 135–144)
TOTAL PROTEIN: 6.4 G/DL (ref 6.4–8.3)
WBC # BLD: 9.9 K/UL (ref 3.5–11.3)
WBC # BLD: ABNORMAL 10*3/UL

## 2021-05-21 PROCEDURE — 6370000000 HC RX 637 (ALT 250 FOR IP): Performed by: NURSE PRACTITIONER

## 2021-05-21 PROCEDURE — 36415 COLL VENOUS BLD VENIPUNCTURE: CPT

## 2021-05-21 PROCEDURE — 6370000000 HC RX 637 (ALT 250 FOR IP): Performed by: FAMILY MEDICINE

## 2021-05-21 PROCEDURE — 97110 THERAPEUTIC EXERCISES: CPT

## 2021-05-21 PROCEDURE — 2060000000 HC ICU INTERMEDIATE R&B

## 2021-05-21 PROCEDURE — 51798 US URINE CAPACITY MEASURE: CPT

## 2021-05-21 PROCEDURE — 97530 THERAPEUTIC ACTIVITIES: CPT

## 2021-05-21 PROCEDURE — 82947 ASSAY GLUCOSE BLOOD QUANT: CPT

## 2021-05-21 PROCEDURE — 80048 BASIC METABOLIC PNL TOTAL CA: CPT

## 2021-05-21 PROCEDURE — 6370000000 HC RX 637 (ALT 250 FOR IP): Performed by: STUDENT IN AN ORGANIZED HEALTH CARE EDUCATION/TRAINING PROGRAM

## 2021-05-21 PROCEDURE — 99232 SBSQ HOSP IP/OBS MODERATE 35: CPT | Performed by: INTERNAL MEDICINE

## 2021-05-21 PROCEDURE — 83735 ASSAY OF MAGNESIUM: CPT

## 2021-05-21 PROCEDURE — 84100 ASSAY OF PHOSPHORUS: CPT

## 2021-05-21 PROCEDURE — 80076 HEPATIC FUNCTION PANEL: CPT

## 2021-05-21 PROCEDURE — 97535 SELF CARE MNGMENT TRAINING: CPT

## 2021-05-21 PROCEDURE — 97116 GAIT TRAINING THERAPY: CPT

## 2021-05-21 PROCEDURE — 2580000003 HC RX 258: Performed by: STUDENT IN AN ORGANIZED HEALTH CARE EDUCATION/TRAINING PROGRAM

## 2021-05-21 PROCEDURE — 6360000002 HC RX W HCPCS: Performed by: STUDENT IN AN ORGANIZED HEALTH CARE EDUCATION/TRAINING PROGRAM

## 2021-05-21 PROCEDURE — 99232 SBSQ HOSP IP/OBS MODERATE 35: CPT | Performed by: STUDENT IN AN ORGANIZED HEALTH CARE EDUCATION/TRAINING PROGRAM

## 2021-05-21 PROCEDURE — 85025 COMPLETE CBC W/AUTO DIFF WBC: CPT

## 2021-05-21 PROCEDURE — 94640 AIRWAY INHALATION TREATMENT: CPT

## 2021-05-21 PROCEDURE — 94761 N-INVAS EAR/PLS OXIMETRY MLT: CPT

## 2021-05-21 PROCEDURE — 2700000000 HC OXYGEN THERAPY PER DAY

## 2021-05-21 RX ORDER — SENNA AND DOCUSATE SODIUM 50; 8.6 MG/1; MG/1
2 TABLET, FILM COATED ORAL DAILY
Status: DISCONTINUED | OUTPATIENT
Start: 2021-05-21 | End: 2021-05-28 | Stop reason: HOSPADM

## 2021-05-21 RX ADMIN — POLYETHYLENE GLYCOL 3350 17 G: 17 POWDER, FOR SOLUTION ORAL at 08:41

## 2021-05-21 RX ADMIN — INSULIN LISPRO 3 UNITS: 100 INJECTION, SOLUTION INTRAVENOUS; SUBCUTANEOUS at 23:42

## 2021-05-21 RX ADMIN — LEVOTHYROXINE SODIUM 112 MCG: 112 TABLET ORAL at 05:45

## 2021-05-21 RX ADMIN — HEPARIN SODIUM 5000 UNITS: 5000 INJECTION INTRAVENOUS; SUBCUTANEOUS at 21:19

## 2021-05-21 RX ADMIN — HEPARIN SODIUM 5000 UNITS: 5000 INJECTION INTRAVENOUS; SUBCUTANEOUS at 05:45

## 2021-05-21 RX ADMIN — Medication 81 MG: at 08:41

## 2021-05-21 RX ADMIN — IPRATROPIUM BROMIDE AND ALBUTEROL SULFATE 1 AMPULE: .5; 2.5 SOLUTION RESPIRATORY (INHALATION) at 20:11

## 2021-05-21 RX ADMIN — IPRATROPIUM BROMIDE AND ALBUTEROL SULFATE 1 AMPULE: .5; 2.5 SOLUTION RESPIRATORY (INHALATION) at 07:52

## 2021-05-21 RX ADMIN — GABAPENTIN 100 MG: 100 CAPSULE ORAL at 08:41

## 2021-05-21 RX ADMIN — GABAPENTIN 100 MG: 100 CAPSULE ORAL at 21:19

## 2021-05-21 RX ADMIN — SODIUM CHLORIDE, PRESERVATIVE FREE 10 ML: 5 INJECTION INTRAVENOUS at 08:41

## 2021-05-21 RX ADMIN — INSULIN GLARGINE 10 UNITS: 100 INJECTION, SOLUTION SUBCUTANEOUS at 21:19

## 2021-05-21 RX ADMIN — GABAPENTIN 100 MG: 100 CAPSULE ORAL at 13:30

## 2021-05-21 RX ADMIN — HEPARIN SODIUM 5000 UNITS: 5000 INJECTION INTRAVENOUS; SUBCUTANEOUS at 13:30

## 2021-05-21 RX ADMIN — INSULIN LISPRO 6 UNITS: 100 INJECTION, SOLUTION INTRAVENOUS; SUBCUTANEOUS at 12:09

## 2021-05-21 RX ADMIN — FLUCONAZOLE 400 MG: 40 POWDER, FOR SUSPENSION ORAL at 08:41

## 2021-05-21 RX ADMIN — DOCUSATE SODIUM 50MG AND SENNOSIDES 8.6MG 2 TABLET: 8.6; 5 TABLET, FILM COATED ORAL at 14:03

## 2021-05-21 RX ADMIN — ATORVASTATIN CALCIUM 10 MG: 10 TABLET, FILM COATED ORAL at 21:19

## 2021-05-21 RX ADMIN — PANTOPRAZOLE SODIUM 40 MG: 40 TABLET, DELAYED RELEASE ORAL at 05:45

## 2021-05-21 RX ADMIN — SODIUM CHLORIDE, PRESERVATIVE FREE 10 ML: 5 INJECTION INTRAVENOUS at 21:19

## 2021-05-21 ASSESSMENT — PAIN DESCRIPTION - DESCRIPTORS: DESCRIPTORS: ACHING;DISCOMFORT;SORE

## 2021-05-21 ASSESSMENT — PAIN DESCRIPTION - LOCATION: LOCATION: ABDOMEN

## 2021-05-21 ASSESSMENT — PAIN DESCRIPTION - FREQUENCY: FREQUENCY: CONTINUOUS

## 2021-05-21 ASSESSMENT — PAIN SCALES - WONG BAKER: WONGBAKER_NUMERICALRESPONSE: 2

## 2021-05-21 NOTE — PROGRESS NOTES
Physical Therapy  Facility/Department: [facility] 4B STEPDOWN  Daily Treatment Note  NAME: Yara Smith  : 1950  MRN: [id number]    Date of Service: 2021    Discharge Recommendations:  Patient would benefit from continued therapy after discharge   PT Equipment Recommendations  Equipment Needed: Yes  Mobility Devices: Merlynn [place]: Rolling  Other: Pt requires assistance for safe amb with RW. Assessment   Body structures, Functions, Activity limitations: Decreased functional mobility ; Decreased strength;Decreased endurance;Decreased balance  Assessment: Pt required Kirsten throughout all functional mobility this date. Pt stood and ambulated ~25ft with RW, Kirsten. Pt mildly unsteady with no LOB noted. Pt require frequent rest breaks throughout session, requiring increased time to complete all functional mobility. Pt currenty unsafe to perform functional mobility unassisted d/t being a fall risk and would benefit from further PT to address deficits. Prognosis: Good  PT Education: Goals;PT Role;Plan of Care;General Safety; Functional Mobility Training  REQUIRES PT FOLLOW UP: Yes  Activity Tolerance  Activity Tolerance: Patient Tolerated treatment well;Patient limited by fatigue;Patient limited by endurance     Patient Diagnosis(es): The primary encounter diagnosis was Acute pancreatitis, unspecified complication status, unspecified pancreatitis type. Diagnoses of BONNIE (acute kidney injury) ([address] Hiatal hernia, Necrosis of stomach, Strangulated paraesophageal hernia, Mediastinitis, Paraesophageal fluid collection, Essential hypertension, Hernia with obstruction, and Diabetic ketoacidosis without coma associated with type 2 diabetes mellitus ([address] were also pertinent to this visit. has a past medical history of Diabetes mellitus ([address] Gout, Hiatal hernia, Hyperlipidemia, Hypertension, and Thyroid disease. has a past surgical history that includes Cholecystectomy (); Hysterectomy ();  Breast surgery; other surgical history (1962); other surgical history (1978); Tubal ligation (1988); Gastric fundoplication (N/A, 9/3/0634); hc picc line double lumen (5/10/2021); and IR CHEST TUBE INSERTION (5/13/2021). Restrictions  Restrictions/Precautions  Restrictions/Precautions: General Precautions, Fall Risk  Required Braces or Orthoses?: No  Position Activity Restriction  Other position/activity restrictions: up with assist, s/p paraesphageal hernia repair 5/4, 2 PEG tubes. 2L O2 via NC  Subjective   General  Chart Reviewed: Yes  Response To Previous Treatment: Patient with no complaints from previous session. Family / Caregiver Present: No  Subjective  Subjective: RN and pt agreeable to PT. Pt resting in bed upon arrival on 2L O2 via NC. Pt cooperative throughout. General Comment  Comments: Pt returned to supine in bed at end of session with call light in reach. Pain Screening  Patient Currently in Pain: Denies  Vital Signs  Patient Currently in Pain: Denies       Orientation  Orientation  Overall Orientation Status: Impaired  Orientation Level: Oriented to situation;Oriented to person;Disoriented to time;Oriented to place  Cognition   Cognition  Overall Cognitive Status: WFL  Objective   Bed mobility  Supine to Sit: Minimal assistance (HHA for trunk support.)  Sit to Supine: Minimal assistance (Kirsten required for BLE progression.)  Scooting: Contact guard assistance  Comment: HOB raised ~30 degrees.   Transfers  Sit to Stand: Minimal Assistance  Stand to sit: Minimal Assistance  Comment: RW used  Ambulation  Ambulation?: Yes  Ambulation 1  Surface: level tile  Device: Rolling Walker  Other Apparatus: O2 (2L)  Assistance: Minimal assistance;Contact guard assistance  Gait Deviations: Slow Cami;Decreased step length;Decreased step height  Distance: ~25ft  Comments: mildly unsteady with no LOB  Stairs/Curb  Stairs?: No     Balance  Posture: Good  Sitting - Static: Good  Sitting - Dynamic: Good;-  Standing - Static: Fair  Standing - Dynamic: Fair;-  Comments: RW used to assess standing balance  Exercises  Hip Flexion: seated marches x15  Hip Abduction: Seated: x15  Knee Long Arc Quad: Seated: x15  Ankle Pumps: Seated: x20  Comments: LE exercises performed while seated EOB, required frequent rest breaks d/t SOB/fatigue. Goals  Short term goals  Time Frame for Short term goals: 14 visits  Short term goal 1: Pt will be Susy with bed mobility  Short term goal 2: Pt will be Susy with transfers  Short term goal 3: Pt will amb 150' with RW and CGA  Patient Goals   Patient goals : Get tube out.     Plan    Plan  Times per week: 5-6x/week  Current Treatment Recommendations: Strengthening, Endurance Training, Functional Mobility Training, Balance Training, Transfer Training, Gait Training, Home Exercise Program, Safety Education & Training, Patient/Caregiver Education & Training, Equipment Evaluation, Education, & procurement  Safety Devices  Type of devices: Nurse notified, Call light within reach, Gait belt, All fall risk precautions in place, Patient at risk for falls, Left in bed, Bed alarm in place  Restraints  Initially in place: No     Therapy Time   Individual Concurrent Group Co-treatment   Time In 1332         Time Out 1410         Minutes 38         Timed Code Treatment Minutes: [address], [facility]

## 2021-05-21 NOTE — CARE COORDINATION
Transitional planning. Left  for [facility] to check precert status. Ginette Tovar calls back and they are still waiting on precert.  They will contact  at this number [phone] if they receive over the weekend

## 2021-05-21 NOTE — PROGRESS NOTES
[facility]  Office: [address], DO, Alfarudy Garcia, DO, Hangmery Cruz, DO, Yandy Salazar Blood, DO, Jan Lacey MD, Lupillo Schuster MD, Ford Serrato MD, Ramya Cronin MD, Loren Muñoz MD, Spencer Gonzalez MD, Kevin Pinto MD, Jenifer Elias MD, Nancy Packer, DO, Farida Orellana MD, Zuleika Tristan, DO, Alexis Cervantes MD,  Sophia Cortez, DO, Moustapha Harrington MD, Shashank Barron MD, Alicia Webster MD, Aure Person MD, Luis Guzmán, Asher Casarez [facility], Megan Patel CNP, Homa Rodriguez, CNS, Kerri Carter, CNP, Vannie Epley, CNP, Patricia Melton, CNP, Ron Ellis, CNP, Caroline Bennett, CNP, Anita Sharma PA-C, Rafael Woods, [facility], Audie Salter, CNP, Allie Lopez, CNP, Nataliya Luz, CNP, Alec Holliday, CNP, Jerson Zendejas, CNP, Duran Diaz, [address] [place] [address]    Progress Note    5/21/2021    9:41 AM    Name:   Walker Simon  MRN:     [id number]     Acct:      [de-identified]   Room:   [facility] Day:  25  Admit Date:  5/3/2021 12:49 PM    PCP:   Hamlet Browne DO  Code Status:  Full Code    Subjective:     C/C:   Chief Complaint   Patient presents with    Blood Sugar Problem     >450    Hernia     hyatial      Interval History Status: improved. Patient seen examined in chair. Distention has improved. Patient doing well, tolerating PO    Brief History:     60-year-old female originally presenting to outlying facility due to nausea and vomiting and was transferred for further evaluation and patient underwent emergent robotic laparoscopic hiatal hernia reduction with gastropexy via G-tube on 5/4/2021 and required intubation and was ultimately extubated on 4/6/2021. Patient underwent thoracentesis and had chest tube insertion 5/12/2021 follow-up with cardiothoracic surgery as well as pulmonology. Is a concern for possible esophageal leak and esophagram was performed on 5/7/2021 did not show any leak. Patient was started on Diflucan due to concern for Candida albicans    Review of Systems:     Constitutional:  negative for chills, fevers, sweats  Respiratory:  negative for cough, dyspnea on exertion, shortness of breath, wheezing  Cardiovascular:  negative for chest pain, chest pressure/discomfort, lower extremity edema, palpitations  Gastrointestinal:  negative for abdominal pain, constipation, diarrhea, nausea, vomiting  Neurological:  negative for dizziness, headache    Medications: Allergies: Allergies   Allergen Reactions    No Known Allergies        Current Meds:   Scheduled Meds:    fluconazole  400 mg Oral Daily    gabapentin  100 mg Oral TID    aspirin  81 mg Oral Daily    atorvastatin  10 mg Oral Daily    levothyroxine  112 mcg Oral Daily    pantoprazole  40 mg Oral QAM AC    insulin glargine  10 Units Subcutaneous Nightly    polyethylene glycol  17 g Oral Daily    heparin (porcine)  5,000 Units Subcutaneous 3 times per day    ipratropium-albuterol  1 ampule Inhalation Q4H WA    insulin lispro  0-18 Units Subcutaneous Q4H    sodium chloride flush  5-40 mL Intravenous 2 times per day     Continuous Infusions:    dextrose      sodium chloride 25 mL (05/10/21 1840)     PRN Meds: oxyCODONE **OR** oxyCODONE, acetaminophen, glucose, dextrose, glucagon (rDNA), dextrose, sodium chloride, magnesium sulfate, acetaminophen, artificial tears, sodium chloride flush, [DISCONTINUED] promethazine **OR** ondansetron    Data:     Past Medical History:   has a past medical history of Diabetes mellitus ([address] Gout, Hiatal hernia, Hyperlipidemia, Hypertension, and Thyroid disease. Social History:   reports that she has never smoked. She has never used smokeless tobacco. She reports that she does not drink alcohol and does not use drugs.      Family History:   Family History   Problem Relation Age of Onset    Breast Cancer Mother     High Blood Pressure Mother     High Cholesterol Father     Breast Cancer Sister        Vitals:  /73   Pulse 85   Temp 99.8 °F (37.7 °C) (Temporal)   Resp 20   Ht 5' 2\" (1.575 m)   Wt 187 lb 13.3 oz (85.2 kg)   SpO2 99%   BMI 34.35 kg/m²   Temp (24hrs), Av.9 °F (37.2 °C), Min:98.3 °F (36.8 °C), Max:99.8 °F (37.7 °C)    Recent Labs     21  0016 21  0422 21  0628   POCGLU 174* 116* 98 99       I/O (24Hr):     Intake/Output Summary (Last 24 hours) at 2021 0941  Last data filed at 2021 0843  Gross per 24 hour   Intake 1290 ml   Output 725 ml   Net 565 ml       Labs:  Hematology:  Recent Labs     21  0610 05/20/21  0548 21  0614   WBC 10.3 10.9 9.9   RBC 3.14* 2.94* 2.80*   HGB 8.6* 8.1* 7.7*   HCT 27.9* 26.0* 25.1*   MCV 88.9 88.4 89.6   MCH 27.4 27.6 27.5   MCHC 30.8 31.2 30.7   RDW 14.4 14.6* 14.4    385 346   MPV 9.3 9.3 9.2   INR 1.0  --   --      Chemistry:  Recent Labs     21  0610 05/20/21  0548 21  0614   * 138 135   K 3.6* 3.3* PENDING   CL 95* 98 98   CO2 29 29 27   GLUCOSE 83 91 95   BUN 32* 28* 24*   CREATININE 1.60* 1.76* 1.60*   MG 1.4*  --  1.8   ANIONGAP 10 11 10   LABGLOM 32* 29* 32*   GFRAA 39* 35* 39*   CALCIUM 8.1* 7.5* 7.3*   PHOS 3.6  --  3.3     Recent Labs     21  0610 05/20/21  0548 21  1148 21  1705 21  0016 21  0422 21  0614 21  0628   PROT 6.8 6.5  --   --   --   --   --  6.4  --    LABALBU 1.9* 1.7*  --   --   --   --   --  1.8*  --    AST 17 17  --   --   --   --   --  20  --    ALT 13 12  --   --   --   --   --  9  --    ALKPHOS 123* 113*  --   --   --   --   --  106*  --    BILITOT 0.35 0.34  --   --   --   --   --  0.26*  --    BILIDIR 0.16  --   --   --   --   --   --  0.12  --    POCGLU  --   --  100 146* 174* 116* 98  --  99     ABG:  Lab Results   Component Value Date    POCPH 7.433 2021    PHART 7.193 2021    POCPCO2 36.8 2021    GLZ7XJM 52.0 2021    POCPO2 150.9 05/06/2021    PO2ART 145.0 05/04/2021    POCHCO3 24.6 05/06/2021    CBS7RMI 19.2 05/04/2021    NBEA NOT REPORTED 05/06/2021    PBEA 0 05/06/2021    BNY1BHP NOT REPORTED 05/06/2021    DVPT7NCC 99 05/06/2021    U8QLAQBZ 98.0 05/04/2021    FIO2 UNKNOWN 05/20/2021     Lab Results   Component Value Date/Time    SPECIAL NOT REPORTED 05/13/2021 10:23 AM     Lab Results   Component Value Date/Time    CULTURE NO GROWTH 4 DAYS 05/13/2021 10:23 AM       Radiology:  XR ABDOMEN (KUB) (SINGLE AP VIEW)    Result Date: 5/15/2021  2 new gastrostomy tubes noted. Ileus. CT CHEST WO CONTRAST    Result Date: 5/16/2021  1. Right chest tube in place with near complete resolution of right effusion. No pneumothorax or loculated fluid in the right hemithorax. 2.  Trace left pleural effusion. 3.  Dependent opacities in the lower lobes, left greater than right, again demonstrated, although improved since prior chest CT exam. 4.  Moderate amount of fluid within the esophageal hiatus. 5.  Partially visualized stomach appears distended with 2 gastrostomy tubes in place. CT CHEST WO CONTRAST    Result Date: 5/11/2021  New right-sided PICC again seen with unchanged and satisfactory tip position; larger loculated right effusion in the azygoesophageal recess, as above. Otherwise similar findings to 05/07/2021 with suspected lower lobe consolidation/pneumonitis, with volume loss and effusions both lower lobes. FL ESOPHAGRAM    Result Date: 5/11/2021  1. No evidence for esophageal leak. 2. Postsurgical changes in the stomach. 3. Large amount of gastroesophageal reflux occurring repeatedly during the study. .     XR CHEST PORTABLE    Result Date: 5/14/2021  Right chest tube in satisfactory position with significantly improved right pleural effusion. Bibasilar airspace disease and small left pleural effusion not significantly changed. XR CHEST PORTABLE    Result Date: 5/11/2021  New right-sided PICC tip position appears satisfactory. Otherwise, similar findings compatible with bibasilar atelectasis/consolidation, effusions and minimal infiltrate or atelectasis right mid lung. IR CHEST TUBE INSERTION    Result Date: 5/13/2021  Successful fluoroscopic guided placement of a right chest tube. IR GUIDED THORACENTESIS PLEURAL    Result Date: 5/13/2021  Successful ultrasound guided thoracentesis. Physical Examination:        General appearance:  alert, cooperative and no distress  Mental Status:  oriented to person, place and time and normal affect  Lungs:  clear to auscultation bilaterally, normal effort, s/p right chest tube removal  Heart:  regular rate and rhythm, no murmur  Abdomen:  soft, slightly distended, bowel sounds present  Extremities:  no edema, redness, tenderness in the calves  Skin:  no gross lesions, rashes, induration    Assessment:        Hospital Problems         Last Modified POA    * (Principal) Necrosis of stomach 5/17/2021 Yes    Hernia with obstruction 5/4/2021 Yes    Essential hypertension 5/4/2021 Yes    Thyroid disease 5/4/2021 Yes    Syncope 5/4/2021 Yes    Hiatal hernia 5/5/2021 Yes    Mediastinitis 5/8/2021 Yes    Peritonitis ([address] 5/8/2021 Yes    Severe malnutrition ([address] 5/16/2021 Yes    Strangulated paraesophageal hernia 5/17/2021 Yes    Paraesophageal fluid collection 5/20/2021 Yes                      Plan:        Sepsis with septic shock secondary to strangulated paraesophageal hernia status post laparoscopic repair on 5/4/2021. Appreciate bariatric surgery recommendations. encourage PO intake, nutritional supplements, completed Reglan for total of 72 hours. Appreciate ID recommendations. Currently on Diflucan for Candida albicans. Acute respiratory failure requiring oxygen with empyema showing gram positive and yeast, still requiring 1L nasal cannula fluconazole for candida infection,Appreciate ID, pulm, and CTS recommendations, s/p chest tube removal and tolerating well.  Monitor if patient is a candidate for VATS  Paraesophageal collection appears stable in size. discucsed with general surgery. Monitor as outpateint. HTN, CAD, ASA, lipitor  Diabetes. lantus 10 units, ISS  Hypothyroidism. Synthroid 112 mcg  Neurontin 100 mg 3 times daily. Free water flushes encourage oral intake  Discharge planning. Most likely need skilled nursing facility. Attempted to call daughter at [phone] to update her, but unfortunately when to voicemail.     Jose Bowman MD  5/21/2021  9:41 AM

## 2021-05-21 NOTE — PROGRESS NOTES
[facility] -Progress Note    Today's Date and Time: 5/21/2021, 3:45 PM    Impression :     Paraesophageal Hiatal hernia  Perforation of the esophagus  S/p laparoscopic, robotic para esophageal hernia repair 5-4-21  Acute pancreatitis  Shock   Hyperglycemia  NSTEMI  BONNIE  Not a MRSA carrier  Coagulase negative Staphylococci bacteremia x 2 on 5-7-21. Repeat blood cultures 5-8-21 x 2 : No growth . Bilateral Empyema 5/13/21  Left sided thoracentesis 5/12/21-grew Candida albicans  Right sided pigtail chest tube placed on 5/12/21 for right-sided effusion    Recommendations:   D/C Zosyn started 5/6/21. Stop date 4-19-21. D/C Vancomycin started 5/8/21 for Coagulase negative Staph. Stop date 5-19-21    Gram positive rods from thoracentesis identified as yeast. (Candida albicans). Fluconazole started 5-15-21. Will continue liquid Diflucan 400 mg q day. Stop date 5-30-21  Plan Follow up repeat CT chest to evaluate mediastinal collection and lung infiltrates in about 2 weeks. Medical Decision Making/Summary/Discussion:5/21/2021     Patient with large paraesophageal hiatal hernia with perforation of esophagus  Hyperglycemia   Acute pancreatitis  Shock   S/p laparoscopic, robotic para esophageal hernia repair 5-4-21  Improvement in overall picture but is developing basilar infiltrates  Not a MRSA carrier  Unclear whether this is fluid retention vs residual leakage vs secondary pneumonia  Esophagogram 5-7-21 does not show a leak  Will plan to continue zosyn and add Diflucan. Monitor temps and CXR. Zosyn and Vanco to continue while awaiting possible VATS/decortication procedure. VATS not to be done. Will D/C antibiotics. Continue Diflucan liquid for 2  weeks.     Infection Control Recommendations   [place] Precautions    Antimicrobial Stewardship Recommendations     Simplification of therapy  Targeted therapy    Coordination of Outpatient Care:   Estimated Length of IV antimicrobials: TBD  Patient will need Midline Catheter Insertion:No   Patient will need PICC line Insertion:No  Patient will need: Home IV , [place],  SNF,  LTAC:  Patient will need outpatient wound care:Yes    Chief complaint/reason for consultation:   Septic shock/sepsis    History of Present Illness:   Danielle Medina is a 78y.o.-year-old   female who was initially admitted on 5/3/2021. Patient seen at the request of Prieto Roblero. INITIAL HISTORY : 05/07/2021    Pt with a history of diabetes mellitus type 2, hypertension, hyperlipidemia, thyroid disease, gout and hiatal hernia. She initially presented on 05/03/2021 to an outlying facility with a chief complaint of nausea vomiting, diarrhea and syncopal attack . Patient was found to have blood glucose of 585,WBC of 23.5, elevated lipase 1451 and elevated beta hydroxybutyrate. MRCP on 05/03/2021 showed diffuse small bowel wall thickening likely due to third spacing as opposed to enteritis. CT scan of the abdomen showed extremely large fluid-filled hiatal hernia and distended and fluid-filled stomach below the diaphragm. Patient was given Zosyn at the outlying facility and was transferred to [facility] ED for evaluation by the surgery team.    CT scan of the chest without contrast was performed on 05/04/2021 which showed ascites and pneumoperitoneum with apparent free-flowing oral contrast in the left upper quadrant concerning for viscus perforation possibly within the subdiaphragmatic portion of the stomach. Moderate bilateral pleural effusions and hiatal hernia with organoaxial volvulus. Bariatric surgery performed a laparoscopic robotic para esophageal hernia repair. Cardiology is also following the patient because of elevated troponins with unremarkable echocardiography.     Antibiotics wise the patient was given Zosyn on 05/03/2021 at the outside facility and it has been continued through the present time.    Blood and urine cultures on 05/03/2021 show No growth . Repeat urine culture on 05/04/2021 shows No growth     Patient is currently having temperature elevations. Temp max of 101.1 on 05/07/2021 around 4 AM in the morning. Patient WBC count is improving from 23.5 on presentation to 6.2 today. Repeat chest x-ray 5-7-21 shows bibasilar consolidation new from prior study with blunting of the costophrenic angles bilaterally. Patient is off of the pressors since 5-6-21    CURRENT EVALUATION : 5/21/2021      Afebrile  VS stable    On 3 L 02 per NC   RR 10-->26-->17  02 sat 96 %    Rt and Lt TAISHA drains removed, on 5-7- and 5-11-21, respectively  Chest tube removed on 5/18/21. No pneumothorax identified after removal chest tube. Left Thoracentesis on 5/12/21 for left loculated empyema-Pigtail catheter placed with pus noted. Fluid shows Candida albicans  Right sided 12 Arabic chest tube placed on 5/13/21 for empyema. Patient on liquid Diflucan    Follow-up esophagram on 5/11/21 negative for a leak. On low fiber diet plus supplements  Meeting % of nutritional needs    Coagulase negative Staphylococci bacteremia x 2 on 5-7-21. Repeat blood cultures 5-8-21 x 2 : No growth. Femoral line and morel catheter were removed. Plan is to D/C to SNF-discharge planning started. Discussed with RN    Labs, X rays reviewed: 5/21/2021    BUN: 35-->32->28-->24  Cr: 1.60->1.7-->1.6    WBC: 10.3->10.9-->9.9  Hb: 8.1-->7.7  Plat: 385-->346    LD: 345  Triglycerides: 261    Cultures:    Urine:  05/03/2021: No growth   05/04/2021: No growth     Blood:  05/03/2021: Blood cultures x 2:  No growth  5/7/21: Coagulase negative Staphylococci bacteremia x 2  5/8/21: No growth  Sputum :    Thoracentesis fluid:  Lt side 5/12/21 gram positive rods/ candida albicans.    Rt side 5-13-21: No growth     CT chest 5/11/21  New right-sided PICC again seen with unchanged and satisfactory tip position;   larger loculated right effusion in the azygoesophageal recess, as above. Otherwise similar findings to 05/07/2021 with suspected lower lobe   consolidation/pneumonitis, with volume loss and effusions both lower lobes. CT chest 5/16:  Impression:        1.  Right chest tube in place with near complete resolution of right   effusion.  No pneumothorax or loculated fluid in the right hemithorax. 2.  Trace left pleural effusion. 3.  Dependent opacities in the lower lobes, left greater than right, again   demonstrated, although improved since prior chest CT exam.     4.  Moderate amount of fluid within the esophageal hiatus. 5.  Partially visualized stomach appears distended with 2 gastrostomy tubes   in place. Discussed with patient, RN, IM, Pulmonary, daughter in law. I have personally reviewed the past medical history, past surgical history, medications, social history, and family history, and I have updated the database accordingly.   Past Medical History:     Past Medical History:   Diagnosis Date    Diabetes mellitus ([address]     Gout     Hiatal hernia     Hyperlipidemia     Hypertension     Thyroid disease        Past Surgical  History:     Past Surgical History:   Procedure Laterality Date    BREAST SURGERY      Bx 1993    CHOLECYSTECTOMY  1999    GASTRIC FUNDOPLICATION N/A 0/6/6061    PARAESPHAGEAL HERNIA REPAIR LAPAROSCOPIC ROBOTIC performed by Keary Baumgarten, DO at [address] [place] [address]. PICC LINE DOUBLE LUMEN  5/10/2021         HYSTERECTOMY  1997    IR CHEST TUBE INSERTION  5/13/2021    IR CHEST TUBE INSERTION 5/13/2021 Martínez Knight MD [facility]    OTHER SURGICAL HISTORY  1962    Remove spur L ankle    OTHER SURGICAL HISTORY  1978    Pin and wire repair R ankle    TUBAL LIGATION  1988       Medications:      sennosides-docusate sodium  2 tablet Oral Daily    fluconazole  400 mg Oral Daily    gabapentin  100 mg Oral TID    aspirin  81 mg Oral Daily    atorvastatin  10 mg Oral Daily    levothyroxine  112 mcg Oral Daily    pantoprazole  40 mg Oral QAM AC    insulin glargine  10 Units Subcutaneous Nightly    polyethylene glycol  17 g Oral Daily    heparin (porcine)  5,000 Units Subcutaneous 3 times per day    ipratropium-albuterol  1 ampule Inhalation Q4H WA    insulin lispro  0-18 Units Subcutaneous Q4H    sodium chloride flush  5-40 mL Intravenous 2 times per day       Social History:     Social History     Socioeconomic History    Marital status:      Spouse name: Not on file    Number of children: Not on file    Years of education: Not on file    Highest education level: Not on file   Occupational History    Not on file   Tobacco Use    Smoking status: Never Smoker    Smokeless tobacco: Never Used   Substance and Sexual Activity    Alcohol use: Never    Drug use: Never    Sexual activity: Not on file   Other Topics Concern    Not on file   Social History Narrative    Not on file     Social Determinants of Health     Financial Resource Strain:     Difficulty of Paying Living Expenses:    Food Insecurity:     Worried About Running Out of Food in the Last Year:     Ran Out of Food in the Last Year:    Transportation Needs:     Lack of Transportation (Medical):      Lack of Transportation (Non-Medical):    Physical Activity:     Days of Exercise per Week:     Minutes of Exercise per Session:    Stress:     Feeling of Stress :    Social Connections:     Frequency of Communication with Friends and Family:     Frequency of Social Gatherings with Friends and Family:     Attends Voodoo Services:     Active Member of Clubs or Organizations:     Attends Club or Organization Meetings:     Marital Status:    Intimate Partner Violence:     Fear of Current or Ex-Partner:     Emotionally Abused:     Physically Abused:     Sexually Abused:        Family History:     Family History   Problem Relation Age of Onset    Breast Cancer Mother     High Blood Pressure Mother     High Cholesterol Father     Breast Cancer Sister         Allergies:   No known allergies     Review of Systems:   Review of Systems   Constitutional: Positive for fatigue and fever. Negative for activity change, appetite change, chills, diaphoresis and unexpected weight change. Respiratory: Positive for cough. Shortness of breath. Cardiovascular: Negative for chest pain, palpitations and leg swelling. Gastrointestinal: Positive for constipation. Negative for abdominal distention, nausea and vomiting. Endocrine: Negative for cold intolerance. Genitourinary: Negative for difficulty urinating and dysuria. Musculoskeletal: Negative for arthralgias and back pain. Neurological: Negative for dizziness, tremors, syncope and facial asymmetry. Hematological: Negative for adenopathy. Psychiatric/Behavioral: Negative for agitation and behavioral problems. The patient is not hyperactive. Physical Examination :     Patient Vitals for the past 8 hrs:   BP Temp Temp src Pulse Resp SpO2 Height   05/21/21 1258       5' 2\" (1.575 m)   05/21/21 1156 133/89 99.2 °F (37.3 °C) Oral 104 26 95 %    05/21/21 0800 132/73 99.8 °F (37.7 °C) Temporal 85 20 99 %    05/21/21 0754     20 96 %      General Appearance: Awake, alert  Head:  Normocephalic, no trauma  Eyes: Pupils equal, round, reactive to light, sclera anicteric; conjunctivae pink. No embolic phenomena. ENT: Oropharynx clear, without erythema, exudate, or thrush. No tenderness of sinuses. Mouth/throat: mucosa pink and moist. No lesions. Dentition in good repair. Neck:Supple, without lymphadenopathy. Thyroid normal, No bruits. Pulmonary/Chest: decreased breath sounds in the bilateral lower bases. + tachypnea   Cardiovascular: Regular rate and rhythm without murmurs, rubs, or gallops. Abdomen: Soft, non tender. Bowel sounds normal. No organomegaly. The ports entry wounds are healing well and without any erythema.   All four Extremities: No cyanosis, clubbing, edema, or effusions. Neurologic: No gross sensory or motor deficits. Skin: Warm and dry with good turgor. No signs of peripheral arterial or venous insufficiency. No ulcerations. No open wounds. Medical Decision Making -Laboratory:   I have independently reviewed/ordered the following labs:    CBC with Differential:   Recent Labs     05/20/21  0548 05/21/21  0614   WBC 10.9 9.9   HGB 8.1* 7.7*   HCT 26.0* 25.1*    346   LYMPHOPCT 5* 11*   MONOPCT 6 3     BMP:   Recent Labs     05/19/21  0610 05/20/21  0548 05/21/21  0614   * 138 135   K 3.6* 3.3* 3.5*   CL 95* 98 98   CO2 29 29 27   BUN 32* 28* 24*   CREATININE 1.60* 1.76* 1.60*   MG 1.4*  --  1.8     Hepatic Function Panel:   Recent Labs     05/19/21  0610 05/20/21  0548 05/21/21  0614   PROT 6.8 6.5 6.4   LABALBU 1.9* 1.7* 1.8*   BILIDIR 0.16  --  0.12   IBILI 0.19  --  0.14   BILITOT 0.35 0.34 0.26*   ALKPHOS 123* 113* 106*   ALT 13 12 9   AST 17 17 20     No results for input(s): RPR in the last 72 hours. No results for input(s): HIV in the last 72 hours. No results for input(s): BC in the last 72 hours. Lab Results   Component Value Date    MUCUS NOT REPORTED 05/06/2021    RBC 2.80 05/21/2021    RBC 6.23 05/03/2021    TRICHOMONAS NOT REPORTED 05/06/2021    WBC 9.9 05/21/2021    YEAST NOT REPORTED 05/06/2021    TURBIDITY CLOUDY 05/06/2021     Lab Results   Component Value Date    CREATININE 1.60 05/21/2021    CREATININE 3.09 05/03/2021    GLUCOSE 95 05/21/2021    GLUCOSE 453 05/03/2021       Medical Decision Making-Imaging:     EXAMINATION:   ONE XRAY VIEW OF THE CHEST       5/20/2021 9:45 am       COMPARISON:   CT earlier today at 09:43 and radiograph May 18, 2021       HISTORY:   ORDERING SYSTEM PROVIDED HISTORY: s/p chest tube removal. Eval for worsening   effusion vs reoccurance pneumothorax.    TECHNOLOGIST PROVIDED HISTORY:   s/p chest tube removal. Eval for worsening effusion vs reoccurance pneumothorax. Reason for Exam: s/p chest tube removal. Eval for worsening effusion vs   reoccurance pneumothorax. Acuity: Acute   Type of Exam: Initial       FINDINGS:   Right upper extremity PICC remains in satisfactory position. Cardiomediastinal silhouette appears unchanged.  Low inspiratory volume. Bibasilar predominant airspace disease appears slightly improved particularly   on the left.  No definite effusion.  No pneumothorax or subdiaphragmatic free   air.           Impression   Improving bibasilar airspace disease.  No significant recurrent effusion.           Order History      EXAMINATION:   CT OF THE CHEST, ABDOMEN, AND PELVIS WITHOUT CONTRAST 5/20/2021 9:50 am       TECHNIQUE:   CT of the chest, abdomen and pelvis was performed without the administration   of intravenous contrast. Multiplanar reformatted images are provided for   review. Dose modulation, iterative reconstruction, and/or weight based   adjustment of the mA/kV was utilized to reduce the radiation dose to as low   as reasonably achievable.       COMPARISON:   CT chest dated 05/16/2021, and 05/04/2021.  CT chest, abdomen and pelvis   dated 05/07/2021       HISTORY:   ORDERING SYSTEM PROVIDED HISTORY: Esophageal rupture, empyema, monitor of   abdomen   TECHNOLOGIST PROVIDED HISTORY:   Esophageal rupture, empyema, monitor of abdomen       Reason for Exam: Esophageal rupture, empyema, monitor of abdomen   Acuity: Unknown   Type of Exam: Unknown       FINDINGS:       Chest:       Mediastinum: Heart is normal in size.  There is no pericardial effusion.    Thoracic aorta and pulmonary arteries are normal in caliber.  There is a   right arm PICC with distal tip in the distal SVC.  There are a couple mildly   prominent mediastinal lymph nodes, measuring up to 0.9 cm, which are   unchanged.  There is a loculated fluid collection in the right paraesophageal   space and esophageal hiatus, which measures 9.3 x 4.5 x 8.3 cm on axial   images, which is slightly decreased in size from the previous CT chest.  No   pneumomediastinum or gas within the fluid collection.       Lungs/pleura: There is a small mildly loculated left pleural effusion, which   is unchanged.  Right chest tube has been removed. Estanislado Leung is trace mildly   loculated right pleural fluid.  There is increased airspace consolidation in   the posterior mid and lower right lung.  Strandy and patchy opacities at the   left lung base are not significantly changed, likely atelectasis or scar.  No   pneumothorax.       Soft Tissues/Bones: There is no acute or suspicious osseous abnormality. Visualized superficial soft tissues are within normal limits.           Abdomen/Pelvis:       Organs: Limited unenhanced liver and spleen are grossly unremarkable.  The   gallbladder is surgically absent.  Limited unenhanced pancreas and adrenal   glands are unremarkable.       Kidneys are symmetric in size and attenuation.  No renal or ureteral   calculus.  No hydronephrosis or perinephric inflammation.       GI/Bowel: There is a moderate amount of ascites in the abdomen and pelvis,   which is increased from the previous CT abdomen and pelvis. Norrine Sensor is no   free air.  Appendix is not seen. Estanislado Leung is no abnormal bowel distention.  The   majority of the stomach is intra-abdominal, and there are 2 PEG tubes in   place with retention bulbs in the mid stomach.  There is mild mesenteric   edema.  Surgical drain has been removed from the right lower quadrant.       Pelvis: Urinary bladder is unremarkable.  Uterus is surgically absent.  No   pelvic lymphadenopathy.       Peritoneum/Retroperitoneum: The abdominal aorta is normal in caliber.  There   is no retroperitoneal or mesenteric lymphadenopathy.       Bones/Soft Tissues: There is no acute or suspicious osseous abnormality. Visualized superficial soft tissues are within normal limits.           Impression   1.  Loculated fluid collection in the right paraesophageal space/esophageal   hiatus, stable to slightly decreased in size when compared to 05/16/2021. The collection measures approximately 9.3 x 4.5 cm, and there is no gas in   the collection.  This could be a postoperative fluid collection or loculated   ascites herniated into the space previously occupied by intrathoracic   stomach.  Possibility of abscess is not excluded.  No pneumomediastinum.       2.  Small mildly loculated left pleural effusion, which is unchanged. Interval removal of the right chest tube with trace right pleural fluid.       3.  Increased airspace consolidation in the posterior mid and lower right   lung, concerning for pneumonia.  Unchanged opacities in the posterior left   lung, possibly atelectasis or scarring.       4.  Moderate ascites in the abdomen and pelvis, which is increased.  No free   air.          EXAMINATION:   SINGLE CONTRAST ESOPHAGRAM       5/7/2021 11:03 am       TECHNIQUE:   Single contrast esophagram was performed with a total 100 mL of Omnipaque 240   oral contrast.       FLUOROSCOPY DOSE AND TYPE OR TIME AND EXPOSURES:   Fluoro time: 2.7 minutes; DAP: 70.44 mGy       COMPARISON:   Upper GI from 05/04/2021       HISTORY:   ORDERING SYSTEM PROVIDED HISTORY: s/p hiatal hernia reduction  with partial   gastrectomy and gastropexy   TECHNOLOGIST PROVIDED HISTORY:   s/p hiatal hernia reduction  with partial gastrectomy and gastropexy   Reason for Exam: H.H repair/gastrectomy/gastropexy/ 100 ml Omnipaque orally   Acuity: Unknown   Type of Exam: Unknown       72-year-old female status post hiatal hernia reduction with partial   gastrectomy and gastropexy       FINDINGS:   Fluoroscopic  imaging was obtained prior to the administration contrast.       2 gastrostomy tubes are seen projecting over the left upper quadrant.  There   also 2 surgical drains projecting over the left upper quadrant.  Prior   cholecystectomy.  Suture material projects over the left upper quadrant.       The patient drank contrast which migrates through the postoperative region   and into the stomach and small bowel.       There is contrast draining through what appears to be the gastrostomy tubes.       No extraneous collection of contrast is seen beyond the confines of the   stomach.       There is contrast marginating a presumed gastrostomy tube balloon.       Mild gastroesophageal reflux and delayed transit of contrast is seen within   the distal esophagus/GE junction.           Impression   1. Drainage of contrast material through what appears to be the gastrostomy   tubes following the ingestion of contrast.  Contrast does migrate beyond the   postoperative region into the proximal small bowel. 2. No extraneous/extraluminal collection of contrast is seen beyond the   confines of the stomach. 3. 2 surgical drains and 2 presumed gastrostomy tubes are seen overlying the   left upper quadrant.  There does appear to be contrast slightly marginating   the more lateral gastrostomy tube balloon. 4. Delayed migration of contrast through the distal esophagus and GE junction   with mild gastroesophageal reflux. The findings were sent to the Radiology Results [id number] at 12:43   pm on 5/7/2021to be communicated to a licensed caregiver.             EXAMINATION:   CT OF THE CHEST WITHOUT CONTRAST 5/4/2021 5:55 pm       TECHNIQUE:   CT of the chest was performed without the administration of intravenous   contrast. Multiplanar reformatted images are provided for review. Dose   modulation, iterative reconstruction, and/or weight based adjustment of the   mA/kV was utilized to reduce the radiation dose to as low as reasonably   achievable.       COMPARISON:   None.       HISTORY:   ORDERING SYSTEM PROVIDED HISTORY: large hiatal hernia, contrast progression? TECHNOLOGIST PROVIDED HISTORY:   large hiatal hernia, contrast progression?    Reason for Exam: large hiatal hernia, contrast progression?       FINDINGS:   Mediastinum: Heart size is normal without pericardial effusion.  There are   shotty mediastinal lymph nodes.  The thoracic aorta and main pulmonary artery   are normal in caliber.       Lungs/pleura: Moderate bilateral pleural effusions with adjacent   consolidation.  No pneumothorax.       Upper Abdomen: There is a large hiatal hernia containing the majority of the   stomach.  There is organoaxial volvulus.  Enteric tube is noted extending   below the diaphragm with tip outside the field of view.  The herniated   stomach is distended with contrast.  There is also contrast within the distal   esophagus. Vi Lolling is some contrast visualized within the portion of stomach   below the diaphragm.  Free air and fluid are noted within the visualized   upper abdomen with apparent free spill of contrast in the left upper quadrant.       Soft Tissues/Bones: No acute osseous abnormality.           Impression   1. Ascites and pneumoperitoneum with apparent free-flowing oral contrast in   the left upper quadrant is concerning for viscus perforation, possibly within   the subdiaphragmatic portion of the stomach. 2. Large hiatal hernia with organoaxial volvulus.  Majority of contrast is   retained within the esophagus and supradiaphragmatic stomach. 3. Enteric tube extends below the diaphragm with tip outside the field of   view. 4. Moderate bilateral pleural effusions with adjacent consolidation,   atelectasis versus pneumonia. Critical results were called by Dr. Sharee Christian MD to [facility] on   5/4/2021 at 18:30.            CXR:  ONE XRAY VIEW OF THE CHEST       5/11/2021 9:22 am       COMPARISON:   AP chest from 05/07/2021; CT scan of the chest, abdomen and pelvis from   05/07/2021       HISTORY:   ORDERING SYSTEM PROVIDED HISTORY: follow up on bibasilar consolidation   TECHNOLOGIST PROVIDED HISTORY:   follow up on bibasilar consolidation       History of diabetes and hypertension.     FINDINGS:   Overlying ECG monitor leads and gown snaps.  New right-sided PICC tip   position in the SVC.       Low lung volumes accentuating findings, including cardiomegaly with bibasilar   opacities suggesting atelectasis/consolidation and effusions.  Patchy   infiltrate right mid lung also again noted.       Bones stable.           Impression   New right-sided PICC tip position appears satisfactory.  Otherwise, similar   findings compatible with bibasilar atelectasis/consolidation, effusions and   minimal infiltrate or atelectasis right mid lung.             CT scan:   CT OF THE CHEST WITHOUT CONTRAST 5/11/2021 10:31 am       TECHNIQUE:   CT of the chest was performed without the administration of intravenous   contrast. Multiplanar reformatted images are provided for review. Dose   modulation, iterative reconstruction, and/or weight based adjustment of the   mA/kV was utilized to reduce the radiation dose to as low as reasonably   achievable.       COMPARISON:   CT scan of the chest from 05/07/2021.       HISTORY:   ORDERING SYSTEM PROVIDED HISTORY: ?left loculated pleural effusion   TECHNOLOGIST PROVIDED HISTORY:   ?left loculated pleural effusion   Reason for Exam: ?left loculated pleural effusion   Acuity: Unknown   Type of Exam: Unknown       FINDINGS:   Mediastinum: Interval placement right-sided PICC with tip position in the   mid-lower SVC.  Small unchanged mediastinal nodes; no bulky lymphadenopathy.    No acute thoracic aortic or cardiac abnormality on this unenhanced scan;   prominent LV again noted.  Tiny unchanged pericardial fluid or thickening.       Lungs/pleura: Similar bilateral pleural effusions with considerable mostly   lower lobe atelectasis or consolidation with air bronchograms; larger   loculated appearing component (measuring 10.0 x 4.9 cm) extending along the   azygoesophageal recess, and across the midline (best seen slices 41-64,   series 2).  Tracheobronchial tree patent centrally       Upper Abdomen: Similar small amount perihepatic and perisplenic ascitic fluid   and soft tissue infiltration visualized abdominal adipose tissue.  Clips   status post cholecystectomy.  Mild hepatic steatosis.       Soft Tissues/Bones: No acute abnormality.           Impression   New right-sided PICC again seen with unchanged and satisfactory tip position;   larger loculated right effusion in the azygoesophageal recess, as above. Otherwise similar findings to 05/07/2021 with suspected lower lobe   consolidation/pneumonitis, with volume loss and effusions both lower lobes.               Medical Decision Aykmsy-[id number]-[id number]:     Specimen Description 05/12/2021  5:09  [address]   . THORACENTESIS FLUID LEFT    Special Requests 05/12/2021  5:09  [address]   NOT REPORTED    Direct Exam Abnormal  05/12/2021  5:09 PM [facility]    Direct Exam 05/12/2021  5:09  [address]   NO BACTERIA SEEN    Direct Exam 05/12/2021  5:09  [address]   Gram stain made from cytocentrifuged specimen.  Organisms and cells will be concentrated. Culture Abnormal  05/12/2021  5:09 PM [address] FLUID CULTURE, RN NOTIFIED Results called to and read back by: ROSALINDA WALLER 05/14/21 0430    Culture 05/12/2021  5:09 PM [facility] FROM BOTTLE: Bria Fabianpavan POSITIVE RODS          Medical Decision Making-Other:     Note:  Labs, medications, radiologic studies were reviewed with personal review of films   Large amounts of data were reviewed  Discussed with nursing Staff, Discharge planner  Infection Control and Prevention measures reviewed  All prior entries were reviewed  Administer medications as ordered  Prognosis: Guarded  Discharge planning reviewed  Follow up as outpatient. Thank you for allowing us to participate in the care of this patient.  Please call with questions.       Glenn Harmon MD    5/21/2021, 3:45 PM

## 2021-05-21 NOTE — PROGRESS NOTES
Bariatric Surgery Progress Note      PATIENT NAME: Opal Heath   TODAY'S DATE: 5/21/2021, 7:20 AM  Chief Complaint   Patient presents with    Blood Sugar Problem     >450    Hernia     Hiatal      SUBJECTIVE:    Pt seen and examined. Minimal to no pain. PEG tubes clamped. Tolerating PO intake but still not meeting daily caloric needs. Family still apprehensive to TFs.      Tmax 37.4    OBJECTIVE:   Vitals:  /66   Pulse 77   Temp 98.8 °F (37.1 °C) (Temporal)   Resp 10   Ht 5' 2\" (1.575 m)   Wt 187 lb 13.3 oz (85.2 kg)   SpO2 97%   BMI 34.35 kg/m²      INTAKE/OUTPUT:      Intake/Output Summary (Last 24 hours) at 5/21/2021 0720  Last data filed at 5/21/2021 0516  Gross per 24 hour   Intake 1290 ml   Output 725 ml   Net 565 ml                 General: alert, oriented  Lungs: Symmetrical chest rise bilaterally  Heart: S1S2  Abdomen: Soft, ND, appropriately tender, non peritoneal, no rebound, incisions c/d/i, PEG x2 with no surrounding erythema   Extremity: moves all extremities x4, trace edema    Data Review:  CBC:   Recent Labs     05/19/21  0610 05/20/21  0548 05/21/21  0614   WBC 10.3 10.9 9.9   HGB 8.6* 8.1* 7.7*    385 346     BMP:    Recent Labs     05/19/21  0610 05/20/21  0548   * 138   K 3.6* 3.3*   CL 95* 98   CO2 29 29   BUN 32* 28*   CREATININE 1.60* 1.76*   GLUCOSE 83 91     Hepatic:   Recent Labs     05/19/21  0610 05/20/21  0548   AST 17 17   ALT 13 12   ALKPHOS 123* 113*   BILITOT 0.35 0.34   BILIDIR 0.16  --      Coagulation:   Recent Labs     05/19/21  0610 05/20/21  0548   APTT 25.4  --    PROT 6.8 6.5   INR 1.0  --        ASSESSMENT   Patient Active Problem List   Diagnosis    Hernia with obstruction    Essential hypertension    Thyroid disease    Syncope    Hiatal hernia    Necrosis of stomach    Mediastinitis    Peritonitis (HCC)    Severe malnutrition ([address]    Strangulated paraesophageal hernia    Paraesophageal fluid collection     79 yo F s/p 5/4 emergent robotic assisted

## 2021-05-21 NOTE — PROGRESS NOTES
Comprehensive Nutrition Assessment    Type and Reason for Visit:  Reassess    Nutrition Recommendations/Plan:   - Continue current Low Fiber diet. Will provide Ensure Clear Liquid oral supplements at all meals. Encourage/monitor PO intakes as tolerated. - Monitor need for supplemental Tube Feedings - if requested, suggest Vital AF 1.2 (semi-elemental) with slow advancement to goal 30 mL/hr (will provide 50% of estimated kcals and 72% estimated protein needs). - Monitor labs, weights, bowel function, and plan of care. Nutrition Assessment:  Pt reports having a little bit of nausea today. Reports having cherrios and milk for breakfast.  Discussed oral supplements and Ensure Clear Liquid supplements reordered with meals - pt reports she has been drinking. Noted free water flushes of 200 mL x 4 per day started. Encourage intakes of meals. G-tubes remain clamped. BM 5/20 noted. Labs reviewed: K 3.5 mmol/L, Ca 7.3 mg/dL, Glucose  mg/dL. Meds reviewed: Lantus, Humalog SS, Synthroid, Glycolax, Senokot, Zofran PRN. Malnutrition Assessment:  Malnutrition Status: Moderate malnutrition    Context:  Acute Illness     Findings of the 6 clinical characteristics of malnutrition:  Energy Intake:  1 - 75% or less of estimated energy requirements for 7 or more days - Previously meeting est needs with nutrition support. Weight Loss:  1 - 1% to 2% over 1 week - 7.4% x 2.5 weeks     Body Fat Loss:  No significant body fat loss   Muscle Mass Loss:  1 - Mild muscle mass loss Clavicles (pectoralis & deltoids)  Fluid Accumulation:  1 - Mild Extremities, Generalized   Strength:  Not Performed    Estimated Daily Nutrient Needs:  Energy (kcal):  MSJ x 1.2 = 1600 kcals/day; Weight Used for Energy Requirements:  Current     Protein (g):  75 g pro/day; Weight Used for Protein Requirements:  Ideal (1.5)        Fluid (ml/day):  3525-4683 mL/day (or per MD);  Method Used for Fluid Requirements:  ml/Kg (25-30) Nutrition Related Findings:  Labs/Meds reviewed. Distended abdomen. Active bowel sounds. Clamped G-tubes. 5/4: S/p hiatal hernia repair, gastrectomy, gastropexy, G-tubes x 2. Wounds:  Multiple, Surgical Incision       Current Nutrition Therapies:    DIET LOW FIBER; Dietary Nutrition Supplements: Clear Liquid Oral Supplement    Anthropometric Measures:  · Height: 5' 2\" (157.5 cm)  · Current Body Weight: 187 lb 13.3 oz (85.2 kg)   · Admission Body Weight: 202 lb (91.6 kg)    · Usual Body Weight:  (166 lb - stated)     · Ideal Body Weight: 110 lbs; % Ideal Body Weight 170.8 %   · BMI: 34.3  · BMI Categories: Obese Class 1 (BMI 30.0-34. 9)       Nutrition Diagnosis:   · Inadequate oral intake related to altered GI function as evidenced by intake 51-75% (variable PO intakes; need for ONS; previous need for supplemental nutrition support)    Nutrition Interventions:   Food and/or Nutrient Delivery:  Continue Current Diet, Start Oral Nutrition Supplement (Provide Ensure Clear Liquid ONS with all meals.)  Nutrition Education/Counseling:  No recommendation at this time   Coordination of Nutrition Care:  Continue to monitor while inpatient    Goals:  meet % of estimated nutrient needs       Nutrition Monitoring and Evaluation:   Food/Nutrient Intake Outcomes:  Food and Nutrient Intake, Supplement Intake  Physical Signs/Symptoms Outcomes:  Biochemical Data, GI Status, Nausea or Vomiting, Hemodynamic Status, Nutrition Focused Physical Findings, Weight, Skin     Electronically signed by Calin La RD, LD on 5/21/21 at 1:05 PM EDT    Contact: [phone]

## 2021-05-21 NOTE — PROGRESS NOTES
PULMONARY & CRITICAL CARE MEDICINE PROGRESS  NOTE     Patient:  Opal Heath  MRN: [id number]  Admit date: 5/3/2021    SUBJECTIVE     I personally interviewed/examined the patient, reviewed interval history and interpreted all available radiographic, laboratory data at the time of service. Chief Compliant/Reason for Initial Consult: Acute respiratory failure on vent support, gastric perforation    Brief Hospital Course and Interval History:  The patient is a 78 y.o. female with known medical history of diabetes mellitus, hypertension, hypothyroidism, abdominal hernia presented to the hospital with nausea, vomiting, diarrhea. Patient had similar symptoms on 4/28 but was apparently sent home. Patient had dizzy spells and lightheadedness, had a syncopal episode in the ER on this admission. Patient was transferred from the [facility] to [facility].  In [facility] patient lab revealed lactic acidosis of 6, glucose 585, patient was seen to be in DKA. Elevated lipase of 1451. Leukocytotic with WBC 23.5, hemoglobin 18.3. Patient also had elevated troponins, elevated creatinine of 3.8. In [facility] repeat labs showed creatinine of 2.49 with lactic acid of 2.4. Troponin was 85, recheck of 94. LFTs showed elevated bilirubin.     General surgery was consulted. CT chest showed ascites and pneumoperitoneum with apparent free-flowing oral contrast in the left upper quadrant concerning for viscus perforation possibly within the subdiaphragmatic portion of the stomach. Patient then went for robotic hernia repair 5/4 with wedge gastrectomy, gastropexy and placement of 2 TAISHA drains and 2 PEG tubes.   Patient is n.p.o.         Interval history  5/21/2021  No acute events   Started po diet , tpn dc           OBJECTIVE     VITAL SIGNS:   LAST-  /73   Pulse 85   Temp 99.8 °F (37.7 °C) (Temporal)   Resp 20   Ht 5' 2\" (1.575 05/19/21  0610 05/20/21  0548 05/21/21  0614   WBC 10.3 10.9 9.9   HGB 8.6* 8.1* 7.7*   HCT 27.9* 26.0* 25.1*   MCV 88.9 88.4 89.6    385 346   LYMPHOPCT 12* 5* 11*   RBC 3.14* 2.94* 2.80*   MCH 27.4 27.6 27.5   MCHC 30.8 31.2 30.7   RDW 14.4 14.6* 14.4     BMP:   Recent Labs     05/19/21  0610 05/20/21  0548 05/21/21  0614   * 138 135   K 3.6* 3.3* PENDING   CL 95* 98 98   CO2 29 29 27   BUN 32* 28* 24*   CREATININE 1.60* 1.76* 1.60*   GLUCOSE 83 91 95   PHOS 3.6  --  3.3     Liver Function Test:   Recent Labs     05/21/21  0614   PROT 6.4   LABALBU 1.8*   ALT 9   AST 20   ALKPHOS 106*   BILITOT 0.26*     Amylase/Lipase:  No results for input(s): AMYLASE, LIPASE in the last 72 hours. Coagulation Profile:   Recent Labs     05/19/21 0610   INR 1.0   PROTIME 10.3   APTT 25.4     Cardiac Enzymes:  No results for input(s): CKTOTAL, CKMB, CKMBINDEX, TROPONINI in the last 72 hours. Lactic Acid:  Lab Results   Component Value Date    LACTA 3.2 (H) 05/03/2021    LACTA 6.0 (HH) 05/03/2021     BNP:   No results found for: BNP  D-Dimer:  No results found for: DDIMER  Others:   Lab Results   Component Value Date    TSH 13.36 (H) 05/03/2021     No results found for: LILLY, RHEUMFACTOR, SEDRATE, CRP  No results found for: Chapis [place]  No results found for: IRON, TIBC, FERRITIN  No results found for: SPEP, UPEP  No results found for: PSA, CEA, , XY4502,     Input/Output:    Intake/Output Summary (Last 24 hours) at 5/21/2021 0957  Last data filed at 5/21/2021 0913  Gross per 24 hour   Intake 1290 ml   Output 725 ml   Net 565 ml       Microbiology:    Pathology:    Radiology Reports:  CT CHEST ABDOMEN PELVIS WO CONTRAST   Final Result   1. Loculated fluid collection in the right paraesophageal space/esophageal   hiatus, stable to slightly decreased in size when compared to 05/16/2021. The collection measures approximately 9.3 x 4.5 cm, and there is no gas in   the collection.   This could be a Result   Successful fluoroscopic guided placement of a right chest tube. IR GUIDED THORACENTESIS PLEURAL   Final Result   Successful ultrasound guided thoracentesis. FL ESOPHAGRAM   Final Result   1. No evidence for esophageal leak. 2. Postsurgical changes in the stomach. 3. Large amount of gastroesophageal reflux occurring repeatedly during the   study. .         CT CHEST WO CONTRAST   Final Result   New right-sided PICC again seen with unchanged and satisfactory tip position;   larger loculated right effusion in the azygoesophageal recess, as above. Otherwise similar findings to 05/07/2021 with suspected lower lobe   consolidation/pneumonitis, with volume loss and effusions both lower lobes. XR CHEST PORTABLE   Final Result   New right-sided PICC tip position appears satisfactory. Otherwise, similar   findings compatible with bibasilar atelectasis/consolidation, effusions and   minimal infiltrate or atelectasis right mid lung. CT CHEST ABDOMEN PELVIS WO CONTRAST   Final Result   1. Within the chest, the patient has consolidative processes in both lower   lobes likely pneumonitis. Bilateral pleural effusions and basilar   atelectasis are noted. 2. Postoperative changes within the abdomen. The patient had a hiatal hernia   repair. Although the stomach is decompressed, gastric walls appear thickened   likely secondary to inflammation which may be postoperative. 3. Fluid in the right pericolic gutter and pelvis. 4. Thickened small bowel loops in the right lower quadrant which may   represent secondary changes to surgery. 5. Two drain placements in the upper abdomen. Left inguinal terminates in   the left iliac vein. FL ESOPHAGRAM   Final Result   1. Drainage of contrast material through what appears to be the gastrostomy   tubes following the ingestion of contrast.  Contrast does migrate beyond the   postoperative region into the proximal small bowel.    2. No extraneous/extraluminal collection of contrast is seen beyond the   confines of the stomach. 3. 2 surgical drains and 2 presumed gastrostomy tubes are seen overlying the   left upper quadrant. There does appear to be contrast slightly marginating   the more lateral gastrostomy tube balloon. 4. Delayed migration of contrast through the distal esophagus and GE junction   with mild gastroesophageal reflux. The findings were sent to the Radiology Results [address] at 12:43   pm on 5/7/2021to be communicated to a licensed caregiver. XR CHEST PORTABLE   Final Result   Bibasilar consolidation new from prior study. Blunting of the costophrenic   angles bilaterally         XR CHEST PORTABLE   Final Result   ETT tip position stable. Decreased left subcutaneous emphysema. Slightly   improved suspected left basilar volume loss with persistent findings as   above. No overt vascular congestion. US RENAL COMPLETE   Final Result   Unremarkable ultrasound of the kidneys and urinary bladder. Trace right pleural effusion         XR CHEST PORTABLE   Final Result   Volume loss continues left hemithorax with haziness at the left base either   atelectasis and or fluid. Subcutaneous emphysema along the left lateral   chest wall has decreased. No appreciable extrapleural air. Endotracheal   tube in appropriate position. XR CHEST PORTABLE   Final Result   1. Recommend repositioning of left internal jugular approach central venous   catheter. 2. Low lung volumes with left basilar atelectasis plus or minus mild pleural   effusion. 3. Left chest wall scattered soft tissue emphysema. XR CHEST PORTABLE   Final Result   Intestinal tube deviates to the left side of a sizable hiatal hernia,   traversing below the hemidiaphragm and terminating just underneath the left   hemidiaphragm. Side port of the intestinal tube is below the diaphragmatic   hiatus.          CT CHEST WO CONTRAST Report (TTE)     Patient Name Sara Lin   Date of Study               05/05/2021      Date of      1950  Gender                      Female   Birth      Age          78 year(s)  Race                              Room Number  5615        Height:                     62 inch, 157.48 cm      Corporate ID [id number]    Weight:                     166 pounds, 75.3 kg   #      Patient Acct [de-identified]   BSA:          1.77 m^2      BMI:      30.36   #                                                              kg/m^2      MR #         [id number]     Sonographer                 Miller Curiel      Accession #  [id number]  Interpreting Physician      Shellie Vizcarra 63      Fellow                   Referring Nurse                            Practitioner      Interpreting             Referring Physician         Sourav Quijano   Fellow     Additional Comments  Technically difficult study, patient supine on ventilator. Type of Study      TTE procedure:2D Echocardiogram, M-Mode, Doppler, Color Doppler. Procedure Date  Date: 05/05/2021 Start: 07:32 AM    Study Location: [facility]  Technical Quality: Adequate visualization    Indications:Elevated troponin. History / Tech. Comments:  Procedure explained to patient. No known medical Hx. Patient Status: Inpatient    Height: 62 inches Weight: 166 pounds BSA: 1.77 m^2 BMI: 30.36 kg/m^2    CONCLUSIONS    Summary  Normal LV size and wall thickness. No obvious wall motion abnormality seen. Normal LV systolic function with LVEF 55%. RV appears dilated with reduced function. RV systolic pressure 37 mmHg  LA appears normal in size. No obvious significant structural valvular abnormality noted. No significant valvular stenosis or regurgitation noted. Normal aortic root dimension. No significant pericardial effusion noted.     Signature  ----------------------------------------------------------------------------   Electronically signed by Connor Montoya(Interpreting physician) on   05/05/2021 12:15 PM  ----------------------------------------------------------------------------    ----------------------------------------------------------------------------   Electronically signed by Olimpia Hall(Sonographer) on 05/05/2021 11:37 AM  ----------------------------------------------------------------------------  FINDINGS  Left Atrium  Left atrium is normal in size. Inter-atrial septum is intact with no evidence for an atrial septal defect,  by color doppler. Left Ventricle  Left ventricle is small in size, normal wall thickness, global left  ventricular systolic function is normal, calculated ejection fraction is  53%. All wall segments are not well visualized (poor acoustic windows.)  Right Atrium  Right atrial dilatation. Right Ventricle  Right ventricular dilatation with reduced systolic function. Abnormal RV strain. Mitral Valve  Normal mitral valve structure. Trivial mitral regurgitation. Aortic Valve  Aortic valve is trileaflet. No evidence of aortic insufficiency or stenosis. Tricuspid Valve  Normal tricuspid valve leaflets. Moderate tricuspid regurgitation. Estimated right ventricular systolic pressure is 37 mmHg, suggesting  pulmonary HTN. Pulmonic Valve  Pulmonic valve not well visualized but Doppler velocities are normal.  Pericardial Effusion  No significant pericardial effusion is seen. Miscellaneous  Normal aortic root dimension. E/E' average = 15.05. IVC dilated but unable to assess respiratory collapse due to patient on  ventilator.     M-mode / 2D Measurements & Calculations:      LVIDd:4 cm(3.7 - 5.6 cm)          Diastolic PVFUQS:25 ml   KZYJ:1.0 cm(0.6 - 1.1 cm)         Systolic DTQVOH:18.14 ml   LVPWd:0.9 cm(0.6 - 1.1 cm)        Aortic Root:2.9 cm(2.0 - 3.7 cm)                                     LA Dimension: 3.1 cm(1.9 - 4.0 cm)   Calculated LVEF (%): 52.95 %      LA volume/Index: 31.04 ml /18m^2 LVOT:1.9 cm                                     RVDd:3 cm      Mitral:                                 Aortic      Valve Area (P1/2-Time): 5.12 cm^2       Peak Velocity: 1.55 m/s   Peak E-Wave: 0.78 m/s                   Mean Velocity: 0.99 m/s   Peak A-Wave: 1.00 m/s                   Peak Gradient: 9.61 mmHg   E/A Ratio: 0.78                         Mean Gradient: 5 mmHg   Peak Gradient: 2.45 mmHg   Mean Gradient: 2 mmHg   Deceleration Time: 145 msec             Area (continuity): 2.16 cm^2   P1/2t: 43 msec                          AV VTI: 25.9 cm      Area (continuity): 2.1 cm^2   Mean Velocity: 0.60 m/s      Tricuspid:                              Pulmonic:      Estimated RVSP: 37 mmHg                 Peak Velocity: 0.80 m/s   Peak TR Velocity: 2.85 m/s              Peak Gradient: 2.57 mmHg   Peak TR Gradient: 32.49 mmHg     Diastology / Tissue Doppler  Septal Wall E' velocity:0.06 m/s  Septal Wall E/E':13.8  Lateral Wall E' velocity:0.05 m/s  Lateral Wall E/E':16.3       Cardiac Catheterization:   No results found for this or any previous visit. Date of Procedure: 5/4/2021     Pre-Op Diagnosis: PARAESOPHAGEAL HERNIA, PNEUMOPERITONEUM     Post-Op Diagnosis: Same, Gastric perforation due to ischemia from paraesophageal hernia. Feculent peritonitis       Procedure(s):  PARAESPHAGEAL HERNIA REPAIR LAPAROSCOPIC ROBOTIC   PARTIAL GASTRECTOMY  GASTROPEXY  IRRIGATION OF ABDOMEN  ASSESSMENT AND PLAN     Assessment:  B/l empyema post gastric perforation due to ischemia from para esohageal hernia .   Left fungal empyema   Left loculated pleural effusion -Left Thoracentesis 5/12/21 - exudate - candida   Right larger loculated pleural effusion -Right pig tail chest tube 5/13/21 - culture negative  - exudate - ph -6.9  Mediastinal fluid collection  Gastric volvulus with Viscus perforation  Sepsis   Bilateral lower lobe pneumonia  Peritonitis  Blood cultures positive for staph epidermidis, methicillin resistant  Diabetes mellitusDKA now resolved  Lactic acidosis resolving  BONNIE  Thrombocytopenia   Elevated troponins  Subclinical hypothyroidism  Elevated bilirubin  Hypernatremia    Plan:  GS note reviewed , Per GS and CTS no plan for aspiration of mediastinal fluid collection .   Cont antibiotics and antifungal   Will sign off - agree with ct chest in 2 weeks and then follow with GS and CTS   D/w Dr Chelle Lee MD, DO  Pulmonary and Critical Care Medicine           5/21/2021, 9:57 AM

## 2021-05-21 NOTE — PROGRESS NOTES
Occupational Therapy  Facility/Department: [facility] 4B STEPDOWN  Daily Treatment Note  NAME: Jossy Lacey  : 1950  MRN: [id number]    Date of Service: 2021    Discharge Recommendations:  Patient would benefit from continued therapy after discharge       Assessment   Performance deficits / Impairments: Decreased functional mobility ; Decreased ADL status; Decreased endurance;Decreased high-level IADLs;Decreased safe awareness;Decreased balance;Decreased posture;Decreased strength  Prognosis: Good  Patient Education: OT POC , transfer/walker safety, importance of participation in therapy, pursed lip breathing with fair return. REQUIRES OT FOLLOW UP: Yes  Activity Tolerance  Activity Tolerance: Patient Tolerated treatment well;Patient limited by fatigue  Activity Tolerance: Mild SOB. Safety Devices  Safety Devices in place: Yes  Type of devices: Call light within reach;Gait belt;Left in chair;Nurse notified         Patient Diagnosis(es): The primary encounter diagnosis was Acute pancreatitis, unspecified complication status, unspecified pancreatitis type. Diagnoses of BONINE (acute kidney injury) ([address] Hiatal hernia, Necrosis of stomach, Strangulated paraesophageal hernia, Mediastinitis, Paraesophageal fluid collection, Essential hypertension, Hernia with obstruction, and Diabetic ketoacidosis without coma associated with type 2 diabetes mellitus ([address] were also pertinent to this visit. has a past medical history of Diabetes mellitus ([address] Gout, Hiatal hernia, Hyperlipidemia, Hypertension, and Thyroid disease. has a past surgical history that includes Cholecystectomy (); Hysterectomy (); Breast surgery; other surgical history (); other surgical history (); Tubal ligation (); Gastric fundoplication (N/A, 1/3/1284);  picc line double lumen (5/10/2021); and IR CHEST TUBE INSERTION (2021).     Restrictions  Restrictions/Precautions  Restrictions/Precautions: General Precautions, Fall Risk  Required Braces or Orthoses?: No  Position Activity Restriction  Other position/activity restrictions: up with assist, s/p paraesphageal hernia repair 5/4, 2 PEG tubes. O2 NC 3 Lm. Subjective   General  Chart Reviewed: Yes  Patient assessed for rehabilitation services?: Yes  Family / Caregiver Present: No  General Comment  Pain Assessment  Pain Assessment: Faces  Leos-Baker Pain Rating: Hurts a little bit  Pain Type: Surgical pain  Pain Location: Abdomen  Pain Orientation: Lower  Pain Descriptors: Aching;Discomfort; Sore  Pain Frequency: Continuous  Non-Pharmaceutical Pain Intervention(s): Repositioned;Rest;Therapeutic presence  Vital Signs  Patient Currently in Pain: Yes   Orientation  Orientation  Overall Orientation Status: Within Functional Limits  Objective    ADL  Grooming: Setup; Increased time to complete;Supervision (Oral care seated in chair.)  UE Bathing: Setup; Increased time to complete;Stand by assistance (max A for back seated in chair d/t limited ROM.)  UE Dressing: Minimal assistance (To manage gown and wires.)  LE Dressing: Maximum assistance (Doff/don socks d/t abdominal discomfort.)  Toileting: Maximum assistance (Pericare/bottom care/brief mgnt standing EOB d/t abdominal discomfort.)  Additional Comments: Pt transferred to seated EOB in preparation for ambulating to bathroom. Pt required time seated EOB d/t lightheaded. Pt transferred to standing with urine running down leg, returned to seated. Pt stood EOB a second time for pericare/bottom care/brief mgnt max A. Seated rest break needed d/t fatigue. mild SOB. Pt educated on pursed lip breathing technique with poor/fair return. Pt transferred to seated in chair to complete ADL care. Increased time needed to complete all tasks. Balance  Sitting Balance: Stand by assistance (Seated EOB.)  Standing Balance: Contact guard assistance  Standing Balance  Time: 1 minute; 3 minutes x2.   Activity: Static standing EOB using RW, functional mobility through room. Functional Mobility  Functional - Mobility Device: Rolling Walker  Activity: To/from bathroom  Assist Level: Contact guard assistance  Functional Mobility Comments: Pt displayed slow/guarded steps. Bed mobility  Supine to Sit: Minimal assistance (Trunk support.)  Scooting: Contact guard assistance  Transfers  Sit to stand: Minimal assistance  Stand to sit: Minimal assistance  Transfer Comments: Transfers completed x3. Cognition  Overall Cognitive Status: WFL  Plan   Plan  Times per week: 4-5x/week  Current Treatment Recommendations: Safety Education & Training, Balance Training, Patient/Caregiver Education & Training, Self-Care / ADL, Functional Mobility Training, Equipment Evaluation, Education, & procurement, Home Management Training, Endurance Training, Strengthening  Goals  Short term goals  Time Frame for Short term goals: By discharge, pt will:  Short term goal 1: Demo bed mobility with Min A, using LRD  Short term goal 2: Demo functional transfers with Min A, using LRD  Short term goal 3: Demo functional mobility with Mod A, using LRD  Short term goal 4: Demo UB ADLs with CGA, using AE/DME PRN  Short term goal 5: Demo LB ADLs with Min A, setup, use of AE/DME PRN  Short term goal 6: Demo +5 minutes of static/dynamic standing with CGA to increase engagement in ADLs       Therapy Time   Individual Concurrent Group Co-treatment   Time In 0850         Time Out 1002         Minutes 72         Timed Code Treatment Minutes: 72 Minutes     Pt supine in bed upon therapist arrival. Pleasant and agreeable to therapy. See above for LOF for all tasks. Pt retired to seated in chair at end of session with call light within reach.     RAYMUNDO Pedro

## 2021-05-21 NOTE — PLAN OF CARE
Problem: Falls - Risk of:  Goal: Absence of physical injury  Description: Absence of physical injury  5/21/2021 0226 by Alice Stark RN  Outcome: Ongoing  5/20/2021 1725 by Sanya Granados RN  Outcome: Ongoing     Problem: Falls - Risk of:  Goal: Will remain free from falls  Description: Will remain free from falls  5/21/2021 0226 by Alice Stark RN  Outcome: Ongoing  5/20/2021 1725 by Sanya Granados RN  Outcome: Ongoing   Pt remains free from falls at this time. Floor free from obstacles, and bed is locked and in lowest position. Adequate lighting provided. Call light within reach; pt encouraged to call before getting OOB for any need. Will continue to monitor needs during hourly rounding.     Electronically signed by Alice Stark RN on 5/21/2021 at 2:26 AM

## 2021-05-22 LAB
ABSOLUTE EOS #: 0.12 K/UL (ref 0–0.4)
ABSOLUTE IMMATURE GRANULOCYTE: 0.12 K/UL (ref 0–0.3)
ABSOLUTE LYMPH #: 1.04 K/UL (ref 1–4.8)
ABSOLUTE MONO #: 0.12 K/UL (ref 0.1–0.8)
ANION GAP SERPL CALCULATED.3IONS-SCNC: 10 MMOL/L (ref 9–17)
BASOPHILS # BLD: 0 % (ref 0–2)
BASOPHILS ABSOLUTE: 0 K/UL (ref 0–0.2)
BUN BLDV-MCNC: 23 MG/DL (ref 8–23)
BUN/CREAT BLD: ABNORMAL (ref 9–20)
CALCIUM SERPL-MCNC: 7.3 MG/DL (ref 8.6–10.4)
CHLORIDE BLD-SCNC: 97 MMOL/L (ref 98–107)
CO2: 27 MMOL/L (ref 20–31)
CREAT SERPL-MCNC: 1.65 MG/DL (ref 0.5–0.9)
DIFFERENTIAL TYPE: ABNORMAL
EOSINOPHILS RELATIVE PERCENT: 1 % (ref 1–4)
GFR AFRICAN AMERICAN: 37 ML/MIN
GFR NON-AFRICAN AMERICAN: 31 ML/MIN
GFR SERPL CREATININE-BSD FRML MDRD: ABNORMAL ML/MIN/{1.73_M2}
GFR SERPL CREATININE-BSD FRML MDRD: ABNORMAL ML/MIN/{1.73_M2}
GLUCOSE BLD-MCNC: 101 MG/DL (ref 65–105)
GLUCOSE BLD-MCNC: 103 MG/DL (ref 65–105)
GLUCOSE BLD-MCNC: 108 MG/DL (ref 65–105)
GLUCOSE BLD-MCNC: 109 MG/DL (ref 65–105)
GLUCOSE BLD-MCNC: 68 MG/DL (ref 65–105)
GLUCOSE BLD-MCNC: 73 MG/DL (ref 65–105)
GLUCOSE BLD-MCNC: 85 MG/DL (ref 70–99)
GLUCOSE BLD-MCNC: 87 MG/DL (ref 65–105)
GLUCOSE BLD-MCNC: 93 MG/DL (ref 65–105)
HCT VFR BLD CALC: 27.5 % (ref 36.3–47.1)
HEMOGLOBIN: 8.4 G/DL (ref 11.9–15.1)
IMMATURE GRANULOCYTES: 1 %
LYMPHOCYTES # BLD: 9 % (ref 24–44)
MAGNESIUM: 2 MG/DL (ref 1.6–2.6)
MCH RBC QN AUTO: 27.5 PG (ref 25.2–33.5)
MCHC RBC AUTO-ENTMCNC: 30.5 G/DL (ref 28.4–34.8)
MCV RBC AUTO: 89.9 FL (ref 82.6–102.9)
MONOCYTES # BLD: 1 % (ref 1–7)
MORPHOLOGY: ABNORMAL
MORPHOLOGY: ABNORMAL
NRBC AUTOMATED: 0 PER 100 WBC
PDW BLD-RTO: 14.5 % (ref 11.8–14.4)
PLATELET # BLD: 392 K/UL (ref 138–453)
PLATELET ESTIMATE: ABNORMAL
PMV BLD AUTO: 9.1 FL (ref 8.1–13.5)
POTASSIUM SERPL-SCNC: 3.3 MMOL/L (ref 3.7–5.3)
RBC # BLD: 3.06 M/UL (ref 3.95–5.11)
RBC # BLD: ABNORMAL 10*6/UL
SEG NEUTROPHILS: 88 % (ref 36–66)
SEGMENTED NEUTROPHILS ABSOLUTE COUNT: 10.2 K/UL (ref 1.8–7.7)
SODIUM BLD-SCNC: 134 MMOL/L (ref 135–144)
WBC # BLD: 11.6 K/UL (ref 3.5–11.3)
WBC # BLD: ABNORMAL 10*3/UL

## 2021-05-22 PROCEDURE — 6370000000 HC RX 637 (ALT 250 FOR IP): Performed by: STUDENT IN AN ORGANIZED HEALTH CARE EDUCATION/TRAINING PROGRAM

## 2021-05-22 PROCEDURE — 6370000000 HC RX 637 (ALT 250 FOR IP): Performed by: FAMILY MEDICINE

## 2021-05-22 PROCEDURE — 36415 COLL VENOUS BLD VENIPUNCTURE: CPT

## 2021-05-22 PROCEDURE — 2580000003 HC RX 258: Performed by: STUDENT IN AN ORGANIZED HEALTH CARE EDUCATION/TRAINING PROGRAM

## 2021-05-22 PROCEDURE — 6360000002 HC RX W HCPCS: Performed by: STUDENT IN AN ORGANIZED HEALTH CARE EDUCATION/TRAINING PROGRAM

## 2021-05-22 PROCEDURE — 6370000000 HC RX 637 (ALT 250 FOR IP): Performed by: NURSE PRACTITIONER

## 2021-05-22 PROCEDURE — 85025 COMPLETE CBC W/AUTO DIFF WBC: CPT

## 2021-05-22 PROCEDURE — 83735 ASSAY OF MAGNESIUM: CPT

## 2021-05-22 PROCEDURE — 94640 AIRWAY INHALATION TREATMENT: CPT

## 2021-05-22 PROCEDURE — 99232 SBSQ HOSP IP/OBS MODERATE 35: CPT | Performed by: INTERNAL MEDICINE

## 2021-05-22 PROCEDURE — 80048 BASIC METABOLIC PNL TOTAL CA: CPT

## 2021-05-22 PROCEDURE — 2060000000 HC ICU INTERMEDIATE R&B

## 2021-05-22 PROCEDURE — 99232 SBSQ HOSP IP/OBS MODERATE 35: CPT | Performed by: STUDENT IN AN ORGANIZED HEALTH CARE EDUCATION/TRAINING PROGRAM

## 2021-05-22 PROCEDURE — 51798 US URINE CAPACITY MEASURE: CPT

## 2021-05-22 PROCEDURE — 82947 ASSAY GLUCOSE BLOOD QUANT: CPT

## 2021-05-22 RX ORDER — POTASSIUM CHLORIDE 20 MEQ/1
40 TABLET, EXTENDED RELEASE ORAL ONCE
Status: COMPLETED | OUTPATIENT
Start: 2021-05-22 | End: 2021-05-22

## 2021-05-22 RX ORDER — BISACODYL 10 MG
10 SUPPOSITORY, RECTAL RECTAL DAILY
Status: DISPENSED | OUTPATIENT
Start: 2021-05-22 | End: 2021-05-23

## 2021-05-22 RX ADMIN — HEPARIN SODIUM 5000 UNITS: 5000 INJECTION INTRAVENOUS; SUBCUTANEOUS at 05:55

## 2021-05-22 RX ADMIN — FLUCONAZOLE 400 MG: 40 POWDER, FOR SUSPENSION ORAL at 07:58

## 2021-05-22 RX ADMIN — POLYETHYLENE GLYCOL 3350 17 G: 17 POWDER, FOR SOLUTION ORAL at 07:57

## 2021-05-22 RX ADMIN — Medication 81 MG: at 07:57

## 2021-05-22 RX ADMIN — LEVOTHYROXINE SODIUM 112 MCG: 112 TABLET ORAL at 05:55

## 2021-05-22 RX ADMIN — POTASSIUM CHLORIDE 40 MEQ: 1500 TABLET, EXTENDED RELEASE ORAL at 08:56

## 2021-05-22 RX ADMIN — HEPARIN SODIUM 5000 UNITS: 5000 INJECTION INTRAVENOUS; SUBCUTANEOUS at 22:30

## 2021-05-22 RX ADMIN — GABAPENTIN 100 MG: 100 CAPSULE ORAL at 07:58

## 2021-05-22 RX ADMIN — DOCUSATE SODIUM 50MG AND SENNOSIDES 8.6MG 2 TABLET: 8.6; 5 TABLET, FILM COATED ORAL at 07:57

## 2021-05-22 RX ADMIN — GABAPENTIN 100 MG: 100 CAPSULE ORAL at 20:28

## 2021-05-22 RX ADMIN — HEPARIN SODIUM 5000 UNITS: 5000 INJECTION INTRAVENOUS; SUBCUTANEOUS at 14:07

## 2021-05-22 RX ADMIN — ONDANSETRON 4 MG: 2 INJECTION INTRAMUSCULAR; INTRAVENOUS at 11:01

## 2021-05-22 RX ADMIN — IPRATROPIUM BROMIDE AND ALBUTEROL SULFATE 1 AMPULE: .5; 2.5 SOLUTION RESPIRATORY (INHALATION) at 19:42

## 2021-05-22 RX ADMIN — GABAPENTIN 100 MG: 100 CAPSULE ORAL at 14:07

## 2021-05-22 RX ADMIN — ATORVASTATIN CALCIUM 10 MG: 10 TABLET, FILM COATED ORAL at 20:28

## 2021-05-22 RX ADMIN — PANTOPRAZOLE SODIUM 40 MG: 40 TABLET, DELAYED RELEASE ORAL at 05:55

## 2021-05-22 RX ADMIN — SODIUM CHLORIDE, PRESERVATIVE FREE 10 ML: 5 INJECTION INTRAVENOUS at 07:58

## 2021-05-22 RX ADMIN — SODIUM CHLORIDE, PRESERVATIVE FREE 10 ML: 5 INJECTION INTRAVENOUS at 20:28

## 2021-05-22 ASSESSMENT — ENCOUNTER SYMPTOMS
RESPIRATORY NEGATIVE: 1
ABDOMINAL PAIN: 1

## 2021-05-22 ASSESSMENT — PAIN SCALES - GENERAL
PAINLEVEL_OUTOF10: 0

## 2021-05-22 NOTE — PLAN OF CARE
Problem: OXYGENATION/RESPIRATORY FUNCTION  Goal: Patient will maintain patent airway  Outcome: Ongoing  Goal: Patient will achieve/maintain normal respiratory rate/effort  Description: Respiratory rate and effort will be within normal limits for the patient  Outcome: Ongoing     Problem: SKIN INTEGRITY  Goal: Skin integrity is maintained or improved  Outcome: Ongoing     Problem: Confusion - Acute:  Goal: Absence of continued neurological deterioration signs and symptoms  Description: Absence of continued neurological deterioration signs and symptoms  Outcome: Ongoing  Goal: Mental status will be restored to baseline  Description: Mental status will be restored to baseline  Outcome: Ongoing     Problem: Discharge Planning:  Goal: Ability to perform activities of daily living will improve  Description: Ability to perform activities of daily living will improve  Outcome: Ongoing  Goal: Participates in care planning  Description: Participates in care planning  Outcome: Ongoing     Problem: Injury - Risk of, Physical Injury:  Goal: Absence of physical injury  Description: Absence of physical injury  Outcome: Ongoing  Goal: Will remain free from falls  Description: Will remain free from falls  Outcome: Ongoing     Problem: Mood - Altered:  Goal: Mood stable  Description: Mood stable  Outcome: Ongoing  Goal: Absence of abusive behavior  Description: Absence of abusive behavior  Outcome: Ongoing  Goal: Verbalizations of feeling emotionally comfortable while being cared for will increase  Description: Verbalizations of feeling emotionally comfortable while being cared for will increase  Outcome: Ongoing     Problem: Psychomotor Activity - Altered:  Goal: Absence of psychomotor disturbance signs and symptoms  Description: Absence of psychomotor disturbance signs and symptoms  Outcome: Ongoing     Problem: Sensory Perception - Impaired:  Goal: Demonstrations of improved sensory functioning will increase  Description: Demonstrations of improved sensory functioning will increase  Outcome: Ongoing  Goal: Decrease in sensory misperception frequency  Description: Decrease in sensory misperception frequency  Outcome: Ongoing  Goal: Able to refrain from responding to false sensory perceptions  Description: Able to refrain from responding to false sensory perceptions  Outcome: Ongoing  Goal: Demonstrates accurate environmental perceptions  Description: Demonstrates accurate environmental perceptions  Outcome: Ongoing  Goal: Able to distinguish between reality-based and nonreality-based thinking  Description: Able to distinguish between reality-based and nonreality-based thinking  Outcome: Ongoing  Goal: Able to interrupt nonreality-based thinking  Description: Able to interrupt nonreality-based thinking  Outcome: Ongoing     Problem: Sleep Pattern Disturbance:  Goal: Appears well-rested  Description: Appears well-rested  Outcome: Ongoing     Problem: Skin Integrity:  Goal: Will show no infection signs and symptoms  Description: Will show no infection signs and symptoms  Outcome: Ongoing  Goal: Absence of new skin breakdown  Description: Absence of new skin breakdown  Outcome: Ongoing     Problem: Falls - Risk of:  Goal: Absence of physical injury  Description: Absence of physical injury  Outcome: Ongoing  Goal: Will remain free from falls  Description: Will remain free from falls  Outcome: Ongoing     Problem: Nutrition  Goal: Optimal nutrition therapy  Description: Nutrition Problem #1: Inadequate oral intake  Intervention: Food and/or Nutrient Delivery: Continue NPO, Start Parenteral Nutrition-suggest start with 150 g Dex, 50 g AA at 41.7 mL/hr with 100 mL 20% lipids.   Nutritional Goals: meet % of estimated nutrient needs     Outcome: Ongoing     Problem: Pain:  Goal: Pain level will decrease  Description: Pain level will decrease  Outcome: Ongoing  Goal: Control of acute pain  Description: Control of acute pain  Outcome: Ongoing  Goal: Control of chronic pain  Description: Control of chronic pain  Outcome: Ongoing     Problem: Infection - Central Venous Catheter-Associated Bloodstream Infection:  Goal: Will show no infection signs and symptoms  Description: Will show no infection signs and symptoms  Outcome: Ongoing

## 2021-05-22 NOTE — PROGRESS NOTES
[facility]  Office: José Miguel Townsend, DO, Adolfo Rosen, DO, Loida Love, DO, Rickey Jauregui, DO, Melvin Clark MD, Fabio Wills MD, Rani Corbin MD, Dg Ortiz MD, Nataliya Varela MD, Erwin Hayes MD, Naima Talbot MD, Rosemary Gudino MD, Elizabeth Matthew, DO, Cassie Ibarra MD, Santi Al DO, Thierry Szymanski MD,  Freddy Lew DO, Claire Del Toro MD, Tian Graham MD, Gretchen Puri MD, Letitia Holcomb MD, Nicole Maria, Sukhjinder Rivero, CNP, Truong Camp, CNP, Lashell Patton, CNS, Jose Wild, CNP, Thompson Lombard, CNP, Dudley Linedameon, CNP, Adenike Resendiz, CNP, Marianela Saldana, CNP, Jono Gao PA-C, Albino Camacho DNP, Willian Wilcox, CNP, Donald Wallace, CNP, Mounika Green, CNP, Carmen Pichardo, CNP, Phil Anaya, CNP, Maxim Velazco, [facility]    Progress Note    5/22/2021    8:17 AM    Name:   Jennifer Rojas  MRN:     [id number]     Acct:      [de-identified]   Room:   [facility] Day:  23  Admit Date:  5/3/2021 12:49 PM    PCP:   Jaret Syed DO  Code Status:  Full Code    Subjective:     C/C:   Chief Complaint   Patient presents with    Blood Sugar Problem     >450    Hernia     hyatial      Interval History Status: improved. Patient seen examined at bedside. Little bit of emesis this morning, no longer nauseous. Medicines intermittently helping. Brief History:     77-year-old female originally presenting to outlying facility due to nausea and vomiting and was transferred for further evaluation and patient underwent emergent robotic laparoscopic hiatal hernia reduction with gastropexy via G-tube on 5/4/2021 and required intubation and was ultimately extubated on 4/6/2021. Patient underwent thoracentesis and had chest tube insertion 5/12/2021 follow-up with cardiothoracic surgery as well as pulmonology.   Is a concern for possible esophageal leak and esophagram was performed on 5/7/2021 did not show any leak. Patient was started on Diflucan due to concern for Candida albicans    Review of Systems:     Constitutional:  negative for chills, fevers, sweats  Respiratory:  negative for cough, dyspnea on exertion, shortness of breath, wheezing  Cardiovascular:  negative for chest pain, chest pressure/discomfort, lower extremity edema, palpitations  Gastrointestinal:  negative for abdominal pain, constipation, diarrhea, nausea, vomiting  Neurological:  negative for dizziness, headache    Medications: Allergies: Allergies   Allergen Reactions    No Known Allergies        Current Meds:   Scheduled Meds:    bisacodyl  10 mg Rectal Daily    sennosides-docusate sodium  2 tablet Oral Daily    fluconazole  400 mg Oral Daily    gabapentin  100 mg Oral TID    aspirin  81 mg Oral Daily    atorvastatin  10 mg Oral Daily    levothyroxine  112 mcg Oral Daily    pantoprazole  40 mg Oral QAM AC    insulin glargine  10 Units Subcutaneous Nightly    polyethylene glycol  17 g Oral Daily    heparin (porcine)  5,000 Units Subcutaneous 3 times per day    ipratropium-albuterol  1 ampule Inhalation Q4H WA    insulin lispro  0-18 Units Subcutaneous Q4H    sodium chloride flush  5-40 mL Intravenous 2 times per day     Continuous Infusions:    dextrose      sodium chloride 25 mL (05/10/21 1840)     PRN Meds: oxyCODONE **OR** oxyCODONE, acetaminophen, glucose, dextrose, glucagon (rDNA), dextrose, sodium chloride, magnesium sulfate, acetaminophen, artificial tears, sodium chloride flush, [DISCONTINUED] promethazine **OR** ondansetron    Data:     Past Medical History:   has a past medical history of Diabetes mellitus ([address] Gout, Hiatal hernia, Hyperlipidemia, Hypertension, and Thyroid disease. Social History:   reports that she has never smoked. She has never used smokeless tobacco. She reports that she does not drink alcohol and does not use drugs.      Family History:   Family History   Problem Relation Age of Onset    Breast Cancer Mother     High Blood Pressure Mother     High Cholesterol Father     Breast Cancer Sister        Vitals:  BP (!) 140/92   Pulse 93   Temp 99.1 °F (37.3 °C) (Oral)   Resp 18   Ht 5' 2\" (1.575 m)   Wt 185 lb 3 oz (84 kg)   SpO2 94%   BMI 33.87 kg/m²   Temp (24hrs), Av.1 °F (37.3 °C), Min:98.7 °F (37.1 °C), Max:99.5 °F (37.5 °C)    Recent Labs     21  2340 21  0330 21  0343 21  0558   POCGLU 144* 68 73 87       I/O (24Hr):     Intake/Output Summary (Last 24 hours) at 2021 0817  Last data filed at 2021 0600  Gross per 24 hour   Intake 1455 ml   Output 300 ml   Net 1155 ml       Labs:  Hematology:  Recent Labs     21  0548 21  0614 21  0543   WBC 10.9 9.9 11.6*   RBC 2.94* 2.80* 3.06*   HGB 8.1* 7.7* 8.4*   HCT 26.0* 25.1* 27.5*   MCV 88.4 89.6 89.9   MCH 27.6 27.5 27.5   MCHC 31.2 30.7 30.5   RDW 14.6* 14.4 14.5*    346 392   MPV 9.3 9.2 9.1     Chemistry:  Recent Labs     21  0548 21  0614 21  0543    135 134*   K 3.3* 3.5* 3.3*   CL 98 98 97*   CO2 29 27 27   GLUCOSE 91 95 85   BUN 28* 24* 23   CREATININE 1.76* 1.60* 1.65*   MG  --  1.8 2.0   ANIONGAP 11 10 10   LABGLOM 29* 32* 31*   GFRAA 35* 39* 37*   CALCIUM 7.5* 7.3* 7.3*   PHOS  --  3.3  --      Recent Labs     21  0548 21  0614 21  1612 21  2340 21  0330 21  0343 21  0558   PROT 6.5 6.4  --   --   --   --   --   --    LABALBU 1.7* 1.8*  --   --   --   --   --   --    AST 17 20  --   --   --   --   --   --    ALT 12 9  --   --   --   --   --   --    ALKPHOS 113* 106*  --   --   --   --   --   --    BILITOT 0.34 0.26*  --   --   --   --   --   --    BILIDIR  --  0.12  --   --   --   --   --   --    POCGLU  --   --  102 118* 144* 68 73 87     ABG:  Lab Results   Component Value Date    POCPH 7.433 2021    PHART 7.193 2021    POCPCO2 36.8 2021    GVV1LAL 52.0 05/04/2021    POCPO2 150.9 05/06/2021    PO2ART 145.0 05/04/2021    POCHCO3 24.6 05/06/2021    QSW9FWE 19.2 05/04/2021    NBEA NOT REPORTED 05/06/2021    PBEA 0 05/06/2021    HFB5JAF NOT REPORTED 05/06/2021    KZRG5NNH 99 05/06/2021    E3KHGSAH 98.0 05/04/2021    FIO2 UNKNOWN 05/20/2021     Lab Results   Component Value Date/Time    SPECIAL NOT REPORTED 05/13/2021 10:23 AM     Lab Results   Component Value Date/Time    CULTURE NO GROWTH 4 DAYS 05/13/2021 10:23 AM       Radiology:  XR ABDOMEN (KUB) (SINGLE AP VIEW)    Result Date: 5/15/2021  2 new gastrostomy tubes noted. Ileus. CT CHEST WO CONTRAST    Result Date: 5/16/2021  1. Right chest tube in place with near complete resolution of right effusion. No pneumothorax or loculated fluid in the right hemithorax. 2.  Trace left pleural effusion. 3.  Dependent opacities in the lower lobes, left greater than right, again demonstrated, although improved since prior chest CT exam. 4.  Moderate amount of fluid within the esophageal hiatus. 5.  Partially visualized stomach appears distended with 2 gastrostomy tubes in place. CT CHEST WO CONTRAST    Result Date: 5/11/2021  New right-sided PICC again seen with unchanged and satisfactory tip position; larger loculated right effusion in the azygoesophageal recess, as above. Otherwise similar findings to 05/07/2021 with suspected lower lobe consolidation/pneumonitis, with volume loss and effusions both lower lobes. FL ESOPHAGRAM    Result Date: 5/11/2021  1. No evidence for esophageal leak. 2. Postsurgical changes in the stomach. 3. Large amount of gastroesophageal reflux occurring repeatedly during the study. .     XR CHEST PORTABLE    Result Date: 5/14/2021  Right chest tube in satisfactory position with significantly improved right pleural effusion. Bibasilar airspace disease and small left pleural effusion not significantly changed.      XR CHEST PORTABLE    Result Date: 5/11/2021  New right-sided PICC tip tolerating well. Monitor if patient is a candidate for VATS  Paraesophageal collection appears stable in size. discucsed with general surgery. Monitor as outpateint. With CT in 2 weeks. HTN, CAD, ASA, lipitor  Diabetes. lantus 10 units, ISS  Hypothyroidism. Synthroid 112 mcg  Neurontin 100 mg 3 times daily. Free water flushes encourage oral intake  Discharge planning. Most likely need skilled nursing facility.  May need to start tube feeds if PO intake remains poor    Shekhar Riojas MD  5/22/2021  8:17 AM

## 2021-05-22 NOTE — PROGRESS NOTES
lipase 1451 and elevated beta hydroxybutyrate.       MRCP on 05/03/2021 showed diffuse small bowel wall thickening likely due to third spacing as opposed to enteritis.     CT scan of the abdomen showed extremely large fluid-filled hiatal hernia and distended and fluid-filled stomach below the diaphragm. Patient was given Zosyn at the outlying facility and was transferred to [facility] ED for evaluation by the surgery team.     CT scan of the chest without contrast was performed on 05/04/2021 which showed ascites and pneumoperitoneum with apparent free-flowing oral contrast in the left upper quadrant concerning for viscus perforation possibly within the subdiaphragmatic portion of the stomach. Moderate bilateral pleural effusions and hiatal hernia with organoaxial volvulus. Bariatric surgery performed a laparoscopic robotic para esophageal hernia repair.     Cardiology is also following the patient because of elevated troponins with unremarkable echocardiography.     Antibiotics wise the patient was given Zosyn on 05/03/2021 at the outside facility and it has been continued through the present time.     Blood and urine cultures on 05/03/2021 show No growth . Repeat urine culture on 05/04/2021 shows No growth      Patient is currently having temperature elevations. Temp max of 101.1 on 05/07/2021 around 4 AM in the morning.     Patient WBC count is improving from 23.5 on presentation to 6.2 today.     Repeat chest x-ray 5-7-21 shows bibasilar consolidation new from prior study with blunting of the costophrenic angles bilaterally.   Patient is off of the pressors since 5-6-21    · Patient with large paraesophageal hiatal hernia with perforation of esophagus  · Hyperglycemia   · Acute pancreatitis  · Shock   · S/p laparoscopic, robotic para esophageal hernia repair 5-4-21  · Improvement in overall picture but is developing basilar infiltrates  · Not a MRSA carrier  · Unclear whether this is fluid retention vs residual leakage vs secondary pneumonia  · Esophagogram 21 does not show a leak  · Will plan to continue zosyn and add Diflucan. Monitor temps and CXR. · Zosyn and Vanco to continue while awaiting possible VATS/decortication procedure. · VATS not to be done. Will D/C antibiotics. Continue Diflucan liquid for 2  weeks. Current evaluation:2021    /77   Pulse 95   Temp 98.7 °F (37.1 °C) (Oral)   Resp 18   Ht 5' 2\" (1.575 m)   Wt 185 lb 3 oz (84 kg)   SpO2 95%   BMI 33.87 kg/m²     Temperature Range: Temp: 98.7 °F (37.1 °C) Temp  Av °F (37.2 °C)  Min: 98.7 °F (37.1 °C)  Max: 99.5 °F (37.5 °C)  The patient is seen and evaluated at bedside she is awake and alert in no acute distress. She reports vomiting twice this morning but was able to eat thereafter and is actually feeling good now. No abdominal pain or diarrhea. The chest tubes have since been removed. Review of Systems   Constitutional: Negative. Respiratory: Negative. Cardiovascular: Negative. Gastrointestinal: Positive for abdominal pain. Genitourinary: Negative. Musculoskeletal: Negative. Skin: Negative. Neurological: Negative. Psychiatric/Behavioral: Negative. Physical Examination :     Physical Exam  Constitutional:       Appearance: She is well-developed. She is obese. HENT:      Head: Normocephalic and atraumatic. Cardiovascular:      Rate and Rhythm: Regular rhythm. Heart sounds: Normal heart sounds. Pulmonary:      Effort: Pulmonary effort is normal.      Breath sounds: Normal breath sounds. Abdominal:      General: Bowel sounds are normal.      Palpations: Abdomen is soft. Comments: J NG tube in place   Musculoskeletal:      Cervical back: Normal range of motion and neck supple. Skin:     General: Skin is warm and dry. Neurological:      Mental Status: She is alert and oriented to person, place, and time.          Laboratory data:   I have independently reviewed the followinglabs:  CBC with Differential:   Recent Labs     05/21/21  0614 05/22/21  0543   WBC 9.9 11.6*   HGB 7.7* 8.4*   HCT 25.1* 27.5*    392   LYMPHOPCT 11* 9*   MONOPCT 3 1     BMP:   Recent Labs     05/21/21  0614 05/22/21  0543    134*   K 3.5* 3.3*   CL 98 97*   CO2 27 27   BUN 24* 23   CREATININE 1.60* 1.65*   MG 1.8 2.0     Hepatic Function Panel:   Recent Labs     05/20/21  0548 05/21/21  0614   PROT 6.5 6.4   LABALBU 1.7* 1.8*   BILIDIR  --  0.12   IBILI  --  0.14   BILITOT 0.34 0.26*   ALKPHOS 113* 106*   ALT 12 9   AST 17 20         Lab Results   Component Value Date    PROCAL 24.66 05/04/2021     No results found for: CRP  No results found for: SEDRATE      No results found for: DDIMER  No results found for: FERRITIN  Lab Results   Component Value Date     05/13/2021     Lab Results   Component Value Date    FIBRINOGEN 560 05/04/2021       Results in Past 30 Days  Result Component Current Result Ref Range Previous Result Ref Range   SARS-CoV-2 NOT DETECTED (5/3/2021) NOT DETECTED Not in Time Range      Lab Results   Component Value Date    COVID19 NOT DETECTED 05/03/2021       No results for input(s): VANCOTROUGH in the last 72 hours. Imaging Studies:   No new imaging    Cultures:     Fungus culture [[id number]] (Abnormal) Collected: 05/12/21 1714   Order Status: Completed Specimen: Thoracentesis Updated: 05/20/21 1223    Specimen Description . THORACENTESIS FLUID    Special Requests NOT REPORTED    Culture YEAST ISOLATED, ID TO FOLLOWAbnormal      Identified as : YANA ALBICANS   Culture, Anaerobic and Aerobic [[id number]] Collected: 05/13/21 1019   Order Status: Completed Specimen: Fluid Updated: 05/18/21 1723    Specimen Description . FLUID RT PIGTAIL DRAIN    Special Requests NOT REPORTED    Direct Exam NO NEUTROPHILS SEEN     NO BACTERIA SEEN    Culture NO GROWTH 5 DAYS   Fungus culture [[id number]] Collected: 05/13/21 1023   Order Status: Completed Specimen: Fluid Updated: 05/17/21 1428    Specimen Description . FLUID RT PIGTAIL DRAIN    Special Requests NOT REPORTED    Culture NO GROWTH 4 DAYS   Culture, Body Fluid [[id number]] (Abnormal) Collected: 05/12/21 1709   Order Status: Completed Specimen: Thoracentesis Updated: 05/16/21 0826    Specimen Description . THORACENTESIS FLUID LEFT    Special Requests NOT REPORTED    Direct Exam MANY NEUTROPHILSAbnormal      NO BACTERIA SEEN     Gram stain made from cytocentrifuged specimen.  Organisms and cells will be concentrated. Culture POSITIVE FLUID CULTURE, RN NOTIFIED Results called to and read back by: ROSALINDA WALLER 05/14/21 0430Abnormal      DIRECT GRAM STAIN FROM BOTTLE: BUDDING YEAST PLEASE NOTE, THIS IS A CORRECTED REPORT, PREVIOUSLY REPORTED AS: GRAM POSITIVE RODS NOTIFIED ROSALINDA MATT 5/15/21 1030     YANA ALBICANSAbnormal    Culture, Blood 1 [[id number]] Collected: 05/08/21 1410   Order Status: Completed Specimen: Blood Updated: 05/14/21 0003    Specimen Description . BLOOD    Special Requests L HAND 11 ML    Culture NO GROWTH 6 DAYS   Culture, Blood 1 [[id number]] Collected: 05/08/21 1400   Order Status: Completed Specimen: Blood Updated: 05/14/21 0003    Specimen Description . BLOOD    Special Requests R HAND 3 ML    Culture NO GROWTH 6 DAYS   Culture, Body Fluid [[id number]] Collected: 05/12/21 1300   Order Status: Completed Specimen: Fluid Updated: 05/13/21 1029    Specimen Description . FLUID RT PIGTAIL DRAIN    Special Requests NOT REPORTED    Direct Exam         Culture DUE TO THE SPECIMEN TYPE, THE ORDER WAS CANCELED AND REORDERED. PLEASE REFER TO: AEROBE ANAEROBE CULTURE   Culture, Blood 1 [[id number]] (Abnormal)  Collected: 05/07/21 1226   Order Status: Completed Specimen: Blood Updated: 05/09/21 1400    Specimen Description . BLOOD    Special Requests  1ML L FR    Culture POSITIVE Blood Culture Results called to and read back by: Stephanie Prather AT [id number] ON 5/8/21Abnormal      DIRECT Tony Faust contextual diversion.

## 2021-05-22 NOTE — PLAN OF CARE
Problem: SKIN INTEGRITY  Goal: Skin integrity is maintained or improved  5/22/2021 0226 by Ivis Rosenbaum RN  Outcome: Ongoing  5/21/2021 1621 by Samira Kearns RN  Outcome: Ongoing   Full skin assessment completed this shift. No new skin breakdown at this time. Pt repositioned q2h and prn. Pt kept clean and dry. Gel overlay hercules mattress in place on bed, heels floated. Will continue to monitor. Problem: Falls - Risk of:  Goal: Will remain free from falls  Description: Will remain free from falls  5/22/2021 0226 by Ivis Rosenbaum RN  Outcome: Ongoing  5/21/2021 1621 by Samira Kearns RN  Outcome: Ongoing   Pt remains free from falls at this time. Floor free from obstacles, and bed is locked and in lowest position. Adequate lighting provided. Call light within reach; pt encouraged to call before getting OOB for any need. Will continue to monitor needs during hourly rounding.     Electronically signed by Ivis Rosenbaum RN on 5/22/2021 at 2:27 AM

## 2021-05-23 LAB
ABSOLUTE EOS #: 0.1 K/UL (ref 0–0.4)
ABSOLUTE IMMATURE GRANULOCYTE: 0.38 K/UL (ref 0–0.3)
ABSOLUTE LYMPH #: 0.77 K/UL (ref 1–4.8)
ABSOLUTE MONO #: 1.06 K/UL (ref 0.1–0.8)
ANION GAP SERPL CALCULATED.3IONS-SCNC: 11 MMOL/L (ref 9–17)
BASOPHILS # BLD: 0 % (ref 0–2)
BASOPHILS ABSOLUTE: 0 K/UL (ref 0–0.2)
BUN BLDV-MCNC: 22 MG/DL (ref 8–23)
BUN/CREAT BLD: ABNORMAL (ref 9–20)
CALCIUM SERPL-MCNC: 7.1 MG/DL (ref 8.6–10.4)
CHLORIDE BLD-SCNC: 96 MMOL/L (ref 98–107)
CO2: 26 MMOL/L (ref 20–31)
CREAT SERPL-MCNC: 1.41 MG/DL (ref 0.5–0.9)
DIFFERENTIAL TYPE: ABNORMAL
EOSINOPHILS RELATIVE PERCENT: 1 % (ref 1–4)
GFR AFRICAN AMERICAN: 45 ML/MIN
GFR NON-AFRICAN AMERICAN: 37 ML/MIN
GFR SERPL CREATININE-BSD FRML MDRD: ABNORMAL ML/MIN/{1.73_M2}
GFR SERPL CREATININE-BSD FRML MDRD: ABNORMAL ML/MIN/{1.73_M2}
GLUCOSE BLD-MCNC: 110 MG/DL (ref 65–105)
GLUCOSE BLD-MCNC: 115 MG/DL (ref 70–99)
GLUCOSE BLD-MCNC: 118 MG/DL (ref 65–105)
GLUCOSE BLD-MCNC: 125 MG/DL (ref 65–105)
GLUCOSE BLD-MCNC: 137 MG/DL (ref 65–105)
GLUCOSE BLD-MCNC: 141 MG/DL (ref 65–105)
GLUCOSE BLD-MCNC: 184 MG/DL (ref 65–105)
GLUCOSE BLD-MCNC: 76 MG/DL (ref 65–105)
HCT VFR BLD CALC: 26.6 % (ref 36.3–47.1)
HEMOGLOBIN: 8.2 G/DL (ref 11.9–15.1)
IMMATURE GRANULOCYTES: 4 %
LYMPHOCYTES # BLD: 8 % (ref 24–44)
MAGNESIUM: 2 MG/DL (ref 1.6–2.6)
MCH RBC QN AUTO: 27.5 PG (ref 25.2–33.5)
MCHC RBC AUTO-ENTMCNC: 30.8 G/DL (ref 28.4–34.8)
MCV RBC AUTO: 89.3 FL (ref 82.6–102.9)
MONOCYTES # BLD: 11 % (ref 1–7)
MORPHOLOGY: ABNORMAL
NRBC AUTOMATED: 0 PER 100 WBC
PDW BLD-RTO: 14.6 % (ref 11.8–14.4)
PLATELET # BLD: 354 K/UL (ref 138–453)
PLATELET ESTIMATE: ABNORMAL
PMV BLD AUTO: 9.2 FL (ref 8.1–13.5)
POTASSIUM SERPL-SCNC: 3.4 MMOL/L (ref 3.7–5.3)
RBC # BLD: 2.98 M/UL (ref 3.95–5.11)
RBC # BLD: ABNORMAL 10*6/UL
SEG NEUTROPHILS: 76 % (ref 36–66)
SEGMENTED NEUTROPHILS ABSOLUTE COUNT: 7.29 K/UL (ref 1.8–7.7)
SODIUM BLD-SCNC: 133 MMOL/L (ref 135–144)
WBC # BLD: 9.6 K/UL (ref 3.5–11.3)
WBC # BLD: ABNORMAL 10*3/UL

## 2021-05-23 PROCEDURE — 6370000000 HC RX 637 (ALT 250 FOR IP): Performed by: FAMILY MEDICINE

## 2021-05-23 PROCEDURE — 94640 AIRWAY INHALATION TREATMENT: CPT

## 2021-05-23 PROCEDURE — 6370000000 HC RX 637 (ALT 250 FOR IP): Performed by: STUDENT IN AN ORGANIZED HEALTH CARE EDUCATION/TRAINING PROGRAM

## 2021-05-23 PROCEDURE — 6360000002 HC RX W HCPCS: Performed by: STUDENT IN AN ORGANIZED HEALTH CARE EDUCATION/TRAINING PROGRAM

## 2021-05-23 PROCEDURE — 51701 INSERT BLADDER CATHETER: CPT

## 2021-05-23 PROCEDURE — 82947 ASSAY GLUCOSE BLOOD QUANT: CPT

## 2021-05-23 PROCEDURE — 6370000000 HC RX 637 (ALT 250 FOR IP): Performed by: NURSE PRACTITIONER

## 2021-05-23 PROCEDURE — 2700000000 HC OXYGEN THERAPY PER DAY

## 2021-05-23 PROCEDURE — 94761 N-INVAS EAR/PLS OXIMETRY MLT: CPT

## 2021-05-23 PROCEDURE — 97535 SELF CARE MNGMENT TRAINING: CPT

## 2021-05-23 PROCEDURE — 85025 COMPLETE CBC W/AUTO DIFF WBC: CPT

## 2021-05-23 PROCEDURE — 99232 SBSQ HOSP IP/OBS MODERATE 35: CPT | Performed by: STUDENT IN AN ORGANIZED HEALTH CARE EDUCATION/TRAINING PROGRAM

## 2021-05-23 PROCEDURE — 2060000000 HC ICU INTERMEDIATE R&B

## 2021-05-23 PROCEDURE — 97116 GAIT TRAINING THERAPY: CPT

## 2021-05-23 PROCEDURE — 2580000003 HC RX 258: Performed by: STUDENT IN AN ORGANIZED HEALTH CARE EDUCATION/TRAINING PROGRAM

## 2021-05-23 PROCEDURE — 83735 ASSAY OF MAGNESIUM: CPT

## 2021-05-23 PROCEDURE — 36415 COLL VENOUS BLD VENIPUNCTURE: CPT

## 2021-05-23 PROCEDURE — 51798 US URINE CAPACITY MEASURE: CPT

## 2021-05-23 PROCEDURE — 97530 THERAPEUTIC ACTIVITIES: CPT

## 2021-05-23 PROCEDURE — 94660 CPAP INITIATION&MGMT: CPT

## 2021-05-23 PROCEDURE — 80048 BASIC METABOLIC PNL TOTAL CA: CPT

## 2021-05-23 RX ORDER — ALBUTEROL SULFATE 2.5 MG/3ML
2.5 SOLUTION RESPIRATORY (INHALATION) EVERY 6 HOURS PRN
Status: DISCONTINUED | OUTPATIENT
Start: 2021-05-23 | End: 2021-05-28 | Stop reason: HOSPADM

## 2021-05-23 RX ORDER — BISACODYL 10 MG
10 SUPPOSITORY, RECTAL RECTAL DAILY PRN
Status: DISCONTINUED | OUTPATIENT
Start: 2021-05-23 | End: 2021-05-28 | Stop reason: HOSPADM

## 2021-05-23 RX ADMIN — GABAPENTIN 100 MG: 100 CAPSULE ORAL at 14:04

## 2021-05-23 RX ADMIN — HEPARIN SODIUM 5000 UNITS: 5000 INJECTION INTRAVENOUS; SUBCUTANEOUS at 14:04

## 2021-05-23 RX ADMIN — INSULIN LISPRO 3 UNITS: 100 INJECTION, SOLUTION INTRAVENOUS; SUBCUTANEOUS at 20:36

## 2021-05-23 RX ADMIN — SODIUM CHLORIDE, PRESERVATIVE FREE 20 ML: 5 INJECTION INTRAVENOUS at 20:37

## 2021-05-23 RX ADMIN — LEVOTHYROXINE SODIUM 112 MCG: 112 TABLET ORAL at 06:40

## 2021-05-23 RX ADMIN — ATORVASTATIN CALCIUM 10 MG: 10 TABLET, FILM COATED ORAL at 20:36

## 2021-05-23 RX ADMIN — INSULIN GLARGINE 10 UNITS: 100 INJECTION, SOLUTION SUBCUTANEOUS at 20:36

## 2021-05-23 RX ADMIN — PANTOPRAZOLE SODIUM 40 MG: 40 TABLET, DELAYED RELEASE ORAL at 06:40

## 2021-05-23 RX ADMIN — HEPARIN SODIUM 5000 UNITS: 5000 INJECTION INTRAVENOUS; SUBCUTANEOUS at 20:37

## 2021-05-23 RX ADMIN — SODIUM CHLORIDE, PRESERVATIVE FREE 10 ML: 5 INJECTION INTRAVENOUS at 07:57

## 2021-05-23 RX ADMIN — HEPARIN SODIUM 5000 UNITS: 5000 INJECTION INTRAVENOUS; SUBCUTANEOUS at 06:40

## 2021-05-23 RX ADMIN — GABAPENTIN 100 MG: 100 CAPSULE ORAL at 20:37

## 2021-05-23 RX ADMIN — POLYETHYLENE GLYCOL 3350 17 G: 17 POWDER, FOR SOLUTION ORAL at 07:56

## 2021-05-23 RX ADMIN — INSULIN LISPRO 3 UNITS: 100 INJECTION, SOLUTION INTRAVENOUS; SUBCUTANEOUS at 13:04

## 2021-05-23 RX ADMIN — GABAPENTIN 100 MG: 100 CAPSULE ORAL at 07:56

## 2021-05-23 RX ADMIN — IPRATROPIUM BROMIDE AND ALBUTEROL SULFATE 1 AMPULE: .5; 2.5 SOLUTION RESPIRATORY (INHALATION) at 12:33

## 2021-05-23 RX ADMIN — POTASSIUM BICARBONATE 40 MEQ: 782 TABLET, EFFERVESCENT ORAL at 10:05

## 2021-05-23 RX ADMIN — Medication 81 MG: at 07:56

## 2021-05-23 RX ADMIN — DOCUSATE SODIUM 50MG AND SENNOSIDES 8.6MG 2 TABLET: 8.6; 5 TABLET, FILM COATED ORAL at 07:56

## 2021-05-23 RX ADMIN — FLUCONAZOLE 400 MG: 40 POWDER, FOR SUSPENSION ORAL at 07:56

## 2021-05-23 ASSESSMENT — PAIN SCALES - GENERAL
PAINLEVEL_OUTOF10: 0
PAINLEVEL_OUTOF10: 0

## 2021-05-23 NOTE — PLAN OF CARE
Problem: OXYGENATION/RESPIRATORY FUNCTION  Goal: Patient will maintain patent airway  5/23/2021 0330 by Joe Lutz RN  Outcome: Ongoing     Problem: OXYGENATION/RESPIRATORY FUNCTION  Goal: Patient will achieve/maintain normal respiratory rate/effort  Description: Respiratory rate and effort will be within normal limits for the patient  5/23/2021 0330 by Joe Lutz RN  Outcome: Ongoing     Problem: SKIN INTEGRITY  Goal: Skin integrity is maintained or improved  5/23/2021 0330 by Joe Lutz RN  Outcome: Ongoing     Problem: Injury - Risk of, Physical Injury:  Goal: Absence of physical injury  Description: Absence of physical injury  5/23/2021 0330 by Joe Lutz RN  Outcome: Ongoing     Problem: Injury - Risk of, Physical Injury:  Goal: Will remain free from falls  Description: Will remain free from falls  5/23/2021 0330 by Joe Lutz RN  Outcome: Ongoing     Problem: Psychomotor Activity - Altered:  Goal: Absence of psychomotor disturbance signs and symptoms  Description: Absence of psychomotor disturbance signs and symptoms  5/23/2021 0330 by Joe Lutz RN  Outcome: Ongoing     Problem: Sleep Pattern Disturbance:  Goal: Appears well-rested  Description: Appears well-rested  5/23/2021 0330 by Joe Lutz RN  Outcome: Ongoing     Problem: Skin Integrity:  Goal: Absence of new skin breakdown  Description: Absence of new skin breakdown  5/23/2021 0330 by Joe Lutz RN  Outcome: Ongoing     Problem: Falls - Risk of:  Goal: Will remain free from falls  Description: Will remain free from falls  5/23/2021 0330 by Joe Lutz RN  Outcome: Ongoing     Problem: Infection - Central Venous Catheter-Associated Bloodstream Infection:  Goal: Will show no infection signs and symptoms  Description: Will show no infection signs and symptoms  5/23/2021 0330 by Joe Lutz RN  Outcome: Ongoing

## 2021-05-23 NOTE — PROGRESS NOTES
[facility]  Office: [address], DO, Jose Alfredo Ng, DO, Ynae Gordon, DO, Olivier Umana Blood, DO, Kolby North MD, Cheo Guerrero MD, Be Benitez MD, Makayla Stock MD, Naida Subramanian MD, Zoran Khalil MD, Heather Pina MD, Erika Moya MD, Katelin Chua, DO, Keith Barron MD, Nir Gomez DO, Adia Whitehead MD,  Fior Cool DO, Helen Metzger MD, Jen Traore MD, Srinivasa Hamlin MD, Acosta Dillard MD, Nadine Olivarez, Brenda Novak, CNP, Jaleesa Gandara, CNP, Joseph Knott, CNS, Samara Solomon, [facility], Beryle Fix, CNP, Liv Gan, CNP, Laith Carr, CNP, Joseph Puri, CNP, Flaca Rose PA-C, Silvio Flores, [facility], Derik Chaidez, CNP, Mike Ko, CNP, Oralia Selby, CNP, Cony August, CNP, Avery Hernandez, [facility], Peter Dhaliwal, [address] [place] [address]    Progress Note    5/23/2021    9:36 AM    Name:   Dino Burkett  MRN:     [id number]     Acct:      [de-identified]   Room:   [facility]2944-26   Day:  21  Admit Date:  5/3/2021 12:49 PM    PCP:   Jesse Santos DO  Code Status:  Full Code    Subjective:     C/C:   Chief Complaint   Patient presents with    Blood Sugar Problem     >450    Hernia     hyatial      Interval History Status: improved. Patient seen examined at bedside. No nasuea or emesis, not having a bm in a few days    Brief History:     28-year-old female originally presenting to outlying facility due to nausea and vomiting and was transferred for further evaluation and patient underwent emergent robotic laparoscopic hiatal hernia reduction with gastropexy via G-tube on 5/4/2021 and required intubation and was ultimately extubated on 4/6/2021. Patient underwent thoracentesis and had chest tube insertion 5/12/2021 follow-up with cardiothoracic surgery as well as pulmonology. Is a concern for possible esophageal leak and esophagram was performed on 5/7/2021 did not show any leak.   Patient was started on Diflucan due to concern for Candida albicans    Review of Systems:     Constitutional:  negative for chills, fevers, sweats  Respiratory:  negative for cough, dyspnea on exertion, shortness of breath, wheezing  Cardiovascular:  negative for chest pain, chest pressure/discomfort, lower extremity edema, palpitations  Gastrointestinal:  negative for abdominal pain, constipation, diarrhea, nausea, vomiting  Neurological:  negative for dizziness, headache    Medications: Allergies: Allergies   Allergen Reactions    No Known Allergies        Current Meds:   Scheduled Meds:    potassium bicarb-citric acid  40 mEq Oral Once    sennosides-docusate sodium  2 tablet Oral Daily    fluconazole  400 mg Oral Daily    gabapentin  100 mg Oral TID    aspirin  81 mg Oral Daily    atorvastatin  10 mg Oral Daily    levothyroxine  112 mcg Oral Daily    pantoprazole  40 mg Oral QAM AC    insulin glargine  10 Units Subcutaneous Nightly    polyethylene glycol  17 g Oral Daily    heparin (porcine)  5,000 Units Subcutaneous 3 times per day    ipratropium-albuterol  1 ampule Inhalation Q4H WA    insulin lispro  0-18 Units Subcutaneous Q4H    sodium chloride flush  5-40 mL Intravenous 2 times per day     Continuous Infusions:    dextrose      sodium chloride 25 mL (05/10/21 1840)     PRN Meds: oxyCODONE **OR** oxyCODONE, acetaminophen, glucose, dextrose, glucagon (rDNA), dextrose, sodium chloride, magnesium sulfate, acetaminophen, artificial tears, sodium chloride flush, [DISCONTINUED] promethazine **OR** ondansetron    Data:     Past Medical History:   has a past medical history of Diabetes mellitus ([address] Gout, Hiatal hernia, Hyperlipidemia, Hypertension, and Thyroid disease. Social History:   reports that she has never smoked. She has never used smokeless tobacco. She reports that she does not drink alcohol and does not use drugs.      Family History:   Family History   Problem Relation Age of Onset  Breast Cancer Mother     High Blood Pressure Mother     High Cholesterol Father     Breast Cancer Sister        Vitals:  /85   Pulse 85   Temp 98.7 °F (37.1 °C) (Oral)   Resp 19   Ht 5' 2\" (1.575 m)   Wt 177 lb 11.1 oz (80.6 kg)   SpO2 93%   BMI 32.50 kg/m²   Temp (24hrs), Av.6 °F (37 °C), Min:98.1 °F (36.7 °C), Max:99.2 °F (37.3 °C)    Recent Labs     21  0014 21  0424 21  0731   POCGLU 109* 118* 110* 125*       I/O (24Hr):     Intake/Output Summary (Last 24 hours) at 2021 0936  Last data filed at 2021 0424  Gross per 24 hour   Intake 1000 ml   Output 1650 ml   Net -650 ml       Labs:  Hematology:  Recent Labs     21  0636   WBC 9.9 11.6* 9.6   RBC 2.80* 3.06* 2.98*   HGB 7.7* 8.4* 8.2*   HCT 25.1* 27.5* 26.6*   MCV 89.6 89.9 89.3   MCH 27.5 27.5 27.5   MCHC 30.7 30.5 30.8   RDW 14.4 14.5* 14.6*    392 354   MPV 9.2 9.1 9.2     Chemistry:  Recent Labs     21  0636    134* 133*   K 3.5* 3.3* 3.4*   CL 98 97* 96*   CO2 27 27 26   GLUCOSE 95 85 115*   BUN 24* 23 22   CREATININE 1.60* 1.65* 1.41*   MG 1.8 2.0 2.0   ANIONGAP 10 10 11   LABGLOM 32* 31* 37*   GFRAA 39* 37* 45*   CALCIUM 7.3* 7.3* 7.1*   PHOS 3.3  --   --      Recent Labs     21  1603 21  0014 21  0424 21  0731   PROT 6.4  --   --   --   --   --   --    LABALBU 1.8*  --   --   --   --   --   --    AST 20  --   --   --   --   --   --    ALT 9  --   --   --   --   --   --    ALKPHOS 106*  --   --   --   --   --   --    BILITOT 0.26*  --   --   --   --   --   --    BILIDIR 0.12  --   --   --   --   --   --    POCGLU  --  108* 101 109* 118* 110* 125*     ABG:  Lab Results   Component Value Date    POCPH 7.433 2021    PHART 7.193 2021    POCPCO2 36.8 2021    LIY8ZPP 52.0 2021    POCPO2 150.9 2021    PO2ART 145.0 05/04/2021    POCHCO3 24.6 05/06/2021    WAZ3ZUX 19.2 05/04/2021    NBEA NOT REPORTED 05/06/2021    PBEA 0 05/06/2021    RUS6ZCA NOT REPORTED 05/06/2021    ZMXC7XRS 99 05/06/2021    P5STUKTY 98.0 05/04/2021    FIO2 UNKNOWN 05/20/2021     Lab Results   Component Value Date/Time    SPECIAL NOT REPORTED 05/13/2021 10:23 AM     Lab Results   Component Value Date/Time    CULTURE NO GROWTH 4 DAYS 05/13/2021 10:23 AM       Radiology:  XR ABDOMEN (KUB) (SINGLE AP VIEW)    Result Date: 5/15/2021  2 new gastrostomy tubes noted. Ileus. CT CHEST WO CONTRAST    Result Date: 5/16/2021  1. Right chest tube in place with near complete resolution of right effusion. No pneumothorax or loculated fluid in the right hemithorax. 2.  Trace left pleural effusion. 3.  Dependent opacities in the lower lobes, left greater than right, again demonstrated, although improved since prior chest CT exam. 4.  Moderate amount of fluid within the esophageal hiatus. 5.  Partially visualized stomach appears distended with 2 gastrostomy tubes in place. CT CHEST WO CONTRAST    Result Date: 5/11/2021  New right-sided PICC again seen with unchanged and satisfactory tip position; larger loculated right effusion in the azygoesophageal recess, as above. Otherwise similar findings to 05/07/2021 with suspected lower lobe consolidation/pneumonitis, with volume loss and effusions both lower lobes. FL ESOPHAGRAM    Result Date: 5/11/2021  1. No evidence for esophageal leak. 2. Postsurgical changes in the stomach. 3. Large amount of gastroesophageal reflux occurring repeatedly during the study. .     XR CHEST PORTABLE    Result Date: 5/14/2021  Right chest tube in satisfactory position with significantly improved right pleural effusion. Bibasilar airspace disease and small left pleural effusion not significantly changed. XR CHEST PORTABLE    Result Date: 5/11/2021  New right-sided PICC tip position appears satisfactory.   Otherwise, similar findings compatible with bibasilar atelectasis/consolidation, effusions and minimal infiltrate or atelectasis right mid lung. IR CHEST TUBE INSERTION    Result Date: 5/13/2021  Successful fluoroscopic guided placement of a right chest tube. IR GUIDED THORACENTESIS PLEURAL    Result Date: 5/13/2021  Successful ultrasound guided thoracentesis. Physical Examination:        General appearance:  alert, cooperative and no distress  Mental Status:  oriented to person, place and time and normal affect  Lungs:  clear to auscultation bilaterally, normal effort, s/p right chest tube removal  Heart:  regular rate and rhythm, no murmur  Abdomen:  soft, slightly distended, bowel sounds present, similar to yesterday  Extremities:  no edema, redness, tenderness in the calves  Skin:  no gross lesions, rashes, induration    Assessment:        Hospital Problems         Last Modified POA    * (Principal) Necrosis of stomach 5/17/2021 Yes    Hernia with obstruction 5/4/2021 Yes    Essential hypertension 5/4/2021 Yes    Thyroid disease 5/4/2021 Yes    Syncope 5/4/2021 Yes    Hiatal hernia 5/5/2021 Yes    Mediastinitis 5/8/2021 Yes    Peritonitis ([address] 5/8/2021 Yes    Severe malnutrition ([address] 5/16/2021 Yes    Strangulated paraesophageal hernia 5/17/2021 Yes    Paraesophageal fluid collection 5/20/2021 Yes                      Plan:        Sepsis with septic shock secondary to strangulated paraesophageal hernia status post laparoscopic repair on 5/4/2021. Appreciate bariatric surgery recommendations. encourage PO intake, nutritional supplements, completed Reglan for total of 72 hours. Appreciate ID recommendations. Currently on Diflucan for Candida albicans. Acute respiratory failure requiring oxygen with empyema showing gram positive and yeast, still requiring 1L nasal cannula fluconazole for candida infection,Appreciate ID, pulm, and CTS recommendations, s/p chest tube removal and tolerating well.  Monitor if patient is a candidate for VATS as outpateint. Paraesophageal collection appears stable in size. discucsed with general surgery. Monitor as outpateint. With CT in 2 weeks. HTN, CAD, ASA, lipitor  Diabetes. lantus 10 units, ISS  Hypothyroidism. Synthroid 112 mcg  Neurontin 100 mg 3 times daily. Free water flushes encourage oral intake  Discharge planning. Most likely need skilled nursing facility.  May need to start tube feeds if PO intake remains poor     Monico Fields MD  5/23/2021  9:36 AM

## 2021-05-23 NOTE — PLAN OF CARE
5/23/2021 1546 by Dequan Couch RN  Outcome: Ongoing  5/23/2021 0330 by Jason Zamora RN  Outcome: Ongoing     Problem: Mood - Altered:  Goal: Mood stable  Description: Mood stable  5/23/2021 1546 by Dequan Couch RN  Outcome: Ongoing  5/23/2021 0330 by Jason Zamora RN  Outcome: Ongoing  Goal: Absence of abusive behavior  Description: Absence of abusive behavior  5/23/2021 1546 by Dequan Couch RN  Outcome: Ongoing  5/23/2021 0330 by Jason Zamora RN  Outcome: Ongoing  Goal: Verbalizations of feeling emotionally comfortable while being cared for will increase  Description: Verbalizations of feeling emotionally comfortable while being cared for will increase  5/23/2021 1546 by Dequan Couch RN  Outcome: Ongoing  5/23/2021 0330 by Jason Zamora RN  Outcome: Ongoing     Problem: Psychomotor Activity - Altered:  Goal: Absence of psychomotor disturbance signs and symptoms  Description: Absence of psychomotor disturbance signs and symptoms  5/23/2021 1546 by Dequan Couch RN  Outcome: Ongoing  5/23/2021 0330 by Jason Zamora RN  Outcome: Ongoing     Problem: Sensory Perception - Impaired:  Goal: Demonstrations of improved sensory functioning will increase  Description: Demonstrations of improved sensory functioning will increase  5/23/2021 1546 by Dequan Couch RN  Outcome: Ongoing  5/23/2021 0330 by Jason Zamora RN  Outcome: Ongoing  Goal: Decrease in sensory misperception frequency  Description: Decrease in sensory misperception frequency  5/23/2021 1546 by Dequan Couch RN  Outcome: Ongoing  5/23/2021 0330 by Jason Zamora RN  Outcome: Ongoing  Goal: Able to refrain from responding to false sensory perceptions  Description: Able to refrain from responding to false sensory perceptions  5/23/2021 1546 by Dequan Couch RN  Outcome: Ongoing  5/23/2021 0330 by Jason Zamora RN  Outcome: Ongoing  Goal: Demonstrates accurate environmental perceptions  Description: Demonstrates accurate environmental perceptions  5/23/2021 1546 by Toño Galan RN  Outcome: Ongoing  5/23/2021 0330 by Purnima Doll RN  Outcome: Ongoing  Goal: Able to distinguish between reality-based and nonreality-based thinking  Description: Able to distinguish between reality-based and nonreality-based thinking  5/23/2021 1546 by Toño Galan RN  Outcome: Ongoing  5/23/2021 0330 by Purnima Doll RN  Outcome: Ongoing  Goal: Able to interrupt nonreality-based thinking  Description: Able to interrupt nonreality-based thinking  5/23/2021 1546 by Toño Galan RN  Outcome: Ongoing  5/23/2021 0330 by Purnima Doll RN  Outcome: Ongoing     Problem: Sleep Pattern Disturbance:  Goal: Appears well-rested  Description: Appears well-rested  5/23/2021 1546 by Toño Galan RN  Outcome: Ongoing  5/23/2021 0330 by Purnima Doll RN  Outcome: Ongoing     Problem: Skin Integrity:  Goal: Will show no infection signs and symptoms  Description: Will show no infection signs and symptoms  5/23/2021 1546 by Toño Galan RN  Outcome: Ongoing  5/23/2021 0330 by Purnima Doll RN  Outcome: Ongoing  Goal: Absence of new skin breakdown  Description: Absence of new skin breakdown  5/23/2021 1546 by Toño Galan RN  Outcome: Ongoing  5/23/2021 0330 by Purnima Doll RN  Outcome: Ongoing     Problem: Falls - Risk of:  Goal: Absence of physical injury  Description: Absence of physical injury  5/23/2021 1546 by Toño Galan RN  Outcome: Ongoing  5/23/2021 0330 by Purnima Doll RN  Outcome: Ongoing  Goal: Will remain free from falls  Description: Will remain free from falls  5/23/2021 1546 by Toño Galan RN  Outcome: Ongoing  5/23/2021 0330 by Purnima Doll RN  Outcome: Ongoing     Problem: Nutrition  Goal: Optimal nutrition therapy  Description: Nutrition Problem #1: Inadequate oral intake  Intervention: Food and/or Nutrient Delivery: Continue NPO, Start Parenteral Nutrition-suggest start with 150 g Dex, 50 g AA at 41.7 mL/hr with 100 mL 20% lipids. Nutritional Goals: meet % of estimated nutrient needs     5/23/2021 1546 by Wesley Mohs, RN  Outcome: Ongoing  5/23/2021 0330 by Buzz Melara RN  Outcome: Ongoing     Problem: Pain:  Goal: Pain level will decrease  Description: Pain level will decrease  5/23/2021 1546 by Wesley Mohs, RN  Outcome: Ongoing  5/23/2021 0330 by Buzz Melara RN  Outcome: Ongoing  Goal: Control of acute pain  Description: Control of acute pain  5/23/2021 1546 by Wesley Mohs, RN  Outcome: Ongoing  5/23/2021 0330 by Buzz Melara RN  Outcome: Ongoing  Goal: Control of chronic pain  Description: Control of chronic pain  5/23/2021 1546 by Wesley Mohs, RN  Outcome: Ongoing  5/23/2021 0330 by Buzz Melara RN  Outcome: Ongoing     Problem: Infection - Central Venous Catheter-Associated Bloodstream Infection:  Goal: Will show no infection signs and symptoms  Description: Will show no infection signs and symptoms  5/23/2021 1546 by Wesley Mohs, RN  Outcome: Ongoing  5/23/2021 0330 by Buzz Melara RN  Outcome: Ongoing

## 2021-05-23 NOTE — PROGRESS NOTES
Occupational Therapy  Facility/Department: [facility] 4B STEPDOWN  Daily Treatment Note  NAME: Dunia Bishop  : 1950  MRN: [id number]    Date of Service: 2021    Discharge Recommendations:  Patient would benefit from continued therapy after discharge in order to increase pt activity tolerance, balance and independence. Assessment   Performance deficits / Impairments: Decreased functional mobility ; Decreased ADL status; Decreased endurance;Decreased high-level IADLs;Decreased safe awareness;Decreased balance;Decreased posture;Decreased strength  Prognosis: Good  OT Education: OT Role;Transfer Training;ADL Adaptive Strategies; Energy Conservation  Patient Education: purpose of OT; proper hand and foot placement; deep breathing; 4 figure dressing technique  Barriers to Learning: pt demo P carry over  REQUIRES OT FOLLOW UP: Yes  Activity Tolerance  Activity Tolerance: Patient Tolerated treatment well;Patient limited by fatigue;Treatment limited secondary to medical complications (free text)  Activity Tolerance: SOB  Safety Devices  Safety Devices in place: Yes  Type of devices: Gait belt;Patient at risk for falls; Left in chair;Chair alarm in place;Call light within reach;Nurse notified  Restraints  Initially in place: No         Patient Diagnosis(es): The primary encounter diagnosis was Acute pancreatitis, unspecified complication status, unspecified pancreatitis type. Diagnoses of BONNIE (acute kidney injury) ([address] Hiatal hernia, Necrosis of stomach, Strangulated paraesophageal hernia, Mediastinitis, Paraesophageal fluid collection, Essential hypertension, Hernia with obstruction, and Diabetic ketoacidosis without coma associated with type 2 diabetes mellitus ([address] were also pertinent to this visit. has a past medical history of Diabetes mellitus ([address] Gout, Hiatal hernia, Hyperlipidemia, Hypertension, and Thyroid disease. has a past surgical history that includes Cholecystectomy ();  Hysterectomy (); Breast surgery; other surgical history (1962); other surgical history (1978); Tubal ligation (1988); Gastric fundoplication (N/A, 3/2/0590); hc picc line double lumen (5/10/2021); and IR CHEST TUBE INSERTION (5/13/2021). Restrictions  Restrictions/Precautions  Restrictions/Precautions: General Precautions, Fall Risk  Required Braces or Orthoses?: No  Position Activity Restriction  Other position/activity restrictions: up with assist, s/p paraesphageal hernia repair 5/4, 2 PEG tubes. 2L O2 via NC  Subjective   General  Chart Reviewed: Yes  Patient assessed for rehabilitation services?: Yes  Family / Caregiver Present: No  General Comment  Comments: RN and pt agreeable to therapy  Vital Signs  Patient Currently in Pain: Denies   Orientation  Orientation  Overall Orientation Status: Within Functional Limits  Objective    ADL  Grooming: Stand by assistance;Setup (oral care and hair brushing completed seated supported)  LE Dressing: Maximum assistance;Setup;Verbal cueing; Increased time to complete (to doff/don socks seated in chair pt demo P hip flexion)  Toileting: Maximum assistance;Setup; Increased time to complete (personal hygiene and clothing management completed standing with RW)  Additional Comments: Pt seated in chair at start of session pleasant and cooperative. Pt SPO2 at 90% on room air sec to O2 connector being disconected. Pt SPO2 95% on 2 LO2. During session pt c/o SOB and dizziness. Stand attempted however unable to ambulate sec to dizziness. ADLs completed seated/standing at chair with RW. Pt limited throughout session sec to decreased activity tolerance, increased SOB and c/o dizziness.      Balance  Sitting Balance: Stand by assistance (Pt tolerated approx 15 min seated supported/unsupported in chair)  Standing Balance: Contact guard assistance  Standing Balance  Time: PT tolerated approx 3 min  Activity: static standing during ADLs  Comment: utilizing RW  Functional Mobility  Functional Mobility Comments: ALYSE sec to c/o dizziness  Bed mobility  Comment: pt seated in chair at start of session and retired to chair at session end  Transfers  Sit to stand: Minimal assistance  Stand to sit: Contact guard assistance  Transfer Comments: utilizing RW req cues on proper hand placement     Cognition  Overall Cognitive Status: WFL     At session end pt seated in chair with call light in reach, chair alarm on and all needs met.    Plan   Plan  Times per week: 4-5x/week  Current Treatment Recommendations: Safety Education & Training, Balance Training, Patient/Caregiver Education & Training, Self-Care / ADL, Functional Mobility Training, Equipment Evaluation, Education, & procurement, Home Management Training, Endurance Training, Strengthening  Cont POC  Goals  Short term goals  Time Frame for Short term goals: By discharge, pt will:  Short term goal 1: Demo bed mobility with Min A, using LRD  Short term goal 2: Demo functional transfers with Min A, using LRD  Short term goal 3: Demo functional mobility with Mod A, using LRD  Short term goal 4: Demo UB ADLs with CGA, using AE/DME PRN  Short term goal 5: Demo LB ADLs with Min A, setup, use of AE/DME PRN  Short term goal 6: Demo +5 minutes of static/dynamic standing with CGA to increase engagement in ADLs       Therapy Time   Individual Concurrent Group Co-treatment   Time In 1117         Time Out 1153         Minutes 36         Timed Code Treatment Minutes: [address], ENCINAS/L

## 2021-05-23 NOTE — PROGRESS NOTES
Physical Therapy  Facility/Department: [facility] 4B STEPDOWN  Daily Treatment Note  NAME: Jennifer Rojas  : 1950  MRN: [id number]    Date of Service: 2021    Discharge Recommendations:  Patient would benefit from continued therapy after discharge   PT Equipment Recommendations  Mobility Devices: Chante Small: Rolling    Assessment   Body structures, Functions, Activity limitations: Decreased functional mobility ; Decreased strength;Decreased endurance;Decreased balance  Assessment: Pt required Kirsten throughout all functional mobility this date. Pt stood and ambulated ~23ft with RW, Kirsten. Pt mildly unsteady with no LOB noted. Pt currenty unsafe to perform functional mobility unassisted d/t being a fall risk and would benefit from further PT to address deficits. Prognosis: Good  Decision Making: Medium Complexity  PT Education: Goals;PT Role;Plan of Care;General Safety; Functional Mobility Training  Patient Education: verbal cues for safe transfer technique  Activity Tolerance  Activity Tolerance: Patient limited by fatigue  Activity Tolerance: Some dizziness with sitting up     Patient Diagnosis(es): The primary encounter diagnosis was Acute pancreatitis, unspecified complication status, unspecified pancreatitis type. Diagnoses of BONNIE (acute kidney injury) ([address] Hiatal hernia, Necrosis of stomach, Strangulated paraesophageal hernia, Mediastinitis, Paraesophageal fluid collection, Essential hypertension, Hernia with obstruction, and Diabetic ketoacidosis without coma associated with type 2 diabetes mellitus ([address] were also pertinent to this visit. has a past medical history of Diabetes mellitus ([address] Gout, Hiatal hernia, Hyperlipidemia, Hypertension, and Thyroid disease. has a past surgical history that includes Cholecystectomy (); Hysterectomy (); Breast surgery; other surgical history (); other surgical history (); Tubal ligation ();  Gastric fundoplication (N/A, 0/3/9227); hc picc line double lumen (5/10/2021); and IR CHEST TUBE INSERTION (5/13/2021). Restrictions  Restrictions/Precautions  Restrictions/Precautions: General Precautions, Fall Risk  Required Braces or Orthoses?: No  Position Activity Restriction  Other position/activity restrictions: up with assist, s/p paraesphageal hernia repair 5/4, 2 PEG tubes. 2L O2 via NC  Subjective   General  Chart Reviewed: Yes  Response To Previous Treatment: Patient with no complaints from previous session. Subjective  Subjective: RN and pt agreeable to PT. Pt resting in bed upon arrival on 2L O2 via NC. Pt cooperative throughout. General Comment  Comments: Sat pt up in chair with chair alarm          Orientation  Orientation  Overall Orientation Status: Within Functional Limits  Cognition      Objective   Bed mobility  Rolling to Right: Minimal assistance  Supine to Sit: Minimal assistance  Scooting: Contact guard assistance  Transfers  Sit to Stand: Minimal Assistance  Stand to sit: Minimal Assistance  Bed to Chair: Minimal assistance  Ambulation  Ambulation?: Yes  Ambulation 1  Surface: level tile  Device: Rolling Walker  Other Apparatus: O2  Assistance: Minimal assistance  Quality of Gait: Pt with very slow alissa, decreased step length and height  Gait Deviations: Slow Alissa;Decreased step length;Decreased step height  Distance: 3 feet to chair. 5 feet forward and retro x2 from chair using RW and O2 by nasal canula  Comments: mildly unsteady with no LOB          Goals  Short term goals  Time Frame for Short term goals: 14 visits  Short term goal 1: Pt will be Susy with bed mobility  Short term goal 2: Pt will be Susy with transfers  Short term goal 3: Pt will amb 150' with RW and CGA  Patient Goals   Patient goals : Get tube out.     Plan    Plan  Times per week: 5-6x/week  Current Treatment Recommendations: Strengthening, Endurance Training, Functional Mobility Training, Balance Training, Transfer Training, Gait Training, Home Exercise Program, Safety Education & Training, Patient/Caregiver Education & Training, Equipment Evaluation, Education, & procurement  Safety Devices  Type of devices: Nurse notified, Call light within reach, Gait belt, All fall risk precautions in place, Patient at risk for falls, Chair alarm in place, Left in chair  Restraints  Initially in place: No     Therapy Time   Individual Concurrent Group Co-treatment   Time In 0859         Time Out 0925         Minutes [address] [place] [address]

## 2021-05-24 LAB
ABSOLUTE EOS #: 0.34 K/UL (ref 0–0.4)
ABSOLUTE IMMATURE GRANULOCYTE: 0.34 K/UL (ref 0–0.3)
ABSOLUTE LYMPH #: 1.36 K/UL (ref 1–4.8)
ABSOLUTE MONO #: 0.45 K/UL (ref 0.1–0.8)
ANION GAP SERPL CALCULATED.3IONS-SCNC: 13 MMOL/L (ref 9–17)
BASOPHILS # BLD: 1 % (ref 0–2)
BASOPHILS ABSOLUTE: 0.11 K/UL (ref 0–0.2)
BUN BLDV-MCNC: 22 MG/DL (ref 8–23)
BUN/CREAT BLD: ABNORMAL (ref 9–20)
CALCIUM SERPL-MCNC: 7.1 MG/DL (ref 8.6–10.4)
CHLORIDE BLD-SCNC: 99 MMOL/L (ref 98–107)
CO2: 25 MMOL/L (ref 20–31)
CREAT SERPL-MCNC: 1.43 MG/DL (ref 0.5–0.9)
DIFFERENTIAL TYPE: ABNORMAL
EOSINOPHILS RELATIVE PERCENT: 3 % (ref 1–4)
GFR AFRICAN AMERICAN: 44 ML/MIN
GFR NON-AFRICAN AMERICAN: 36 ML/MIN
GFR SERPL CREATININE-BSD FRML MDRD: ABNORMAL ML/MIN/{1.73_M2}
GFR SERPL CREATININE-BSD FRML MDRD: ABNORMAL ML/MIN/{1.73_M2}
GLUCOSE BLD-MCNC: 129 MG/DL (ref 65–105)
GLUCOSE BLD-MCNC: 143 MG/DL (ref 65–105)
GLUCOSE BLD-MCNC: 144 MG/DL (ref 65–105)
GLUCOSE BLD-MCNC: 144 MG/DL (ref 65–105)
GLUCOSE BLD-MCNC: 94 MG/DL (ref 65–105)
GLUCOSE BLD-MCNC: 98 MG/DL (ref 65–105)
GLUCOSE BLD-MCNC: 99 MG/DL (ref 70–99)
HCT VFR BLD CALC: 27.3 % (ref 36.3–47.1)
HEMOGLOBIN: 8.5 G/DL (ref 11.9–15.1)
IMMATURE GRANULOCYTES: 3 %
LYMPHOCYTES # BLD: 12 % (ref 24–44)
MAGNESIUM: 1.7 MG/DL (ref 1.6–2.6)
MCH RBC QN AUTO: 27.5 PG (ref 25.2–33.5)
MCHC RBC AUTO-ENTMCNC: 31.1 G/DL (ref 28.4–34.8)
MCV RBC AUTO: 88.3 FL (ref 82.6–102.9)
MONOCYTES # BLD: 4 % (ref 1–7)
MORPHOLOGY: ABNORMAL
MORPHOLOGY: ABNORMAL
NRBC AUTOMATED: 0 PER 100 WBC
PDW BLD-RTO: 14.6 % (ref 11.8–14.4)
PHOSPHORUS: 2.6 MG/DL (ref 2.6–4.5)
PLATELET # BLD: 317 K/UL (ref 138–453)
PLATELET ESTIMATE: ABNORMAL
PMV BLD AUTO: 9.1 FL (ref 8.1–13.5)
POTASSIUM SERPL-SCNC: 3.5 MMOL/L (ref 3.7–5.3)
RBC # BLD: 3.09 M/UL (ref 3.95–5.11)
RBC # BLD: ABNORMAL 10*6/UL
SEG NEUTROPHILS: 77 % (ref 36–66)
SEGMENTED NEUTROPHILS ABSOLUTE COUNT: 8.7 K/UL (ref 1.8–7.7)
SODIUM BLD-SCNC: 137 MMOL/L (ref 135–144)
WBC # BLD: 11.3 K/UL (ref 3.5–11.3)
WBC # BLD: ABNORMAL 10*3/UL

## 2021-05-24 PROCEDURE — 82947 ASSAY GLUCOSE BLOOD QUANT: CPT

## 2021-05-24 PROCEDURE — 6370000000 HC RX 637 (ALT 250 FOR IP): Performed by: NURSE PRACTITIONER

## 2021-05-24 PROCEDURE — 6370000000 HC RX 637 (ALT 250 FOR IP): Performed by: FAMILY MEDICINE

## 2021-05-24 PROCEDURE — 99232 SBSQ HOSP IP/OBS MODERATE 35: CPT | Performed by: INTERNAL MEDICINE

## 2021-05-24 PROCEDURE — 99232 SBSQ HOSP IP/OBS MODERATE 35: CPT | Performed by: STUDENT IN AN ORGANIZED HEALTH CARE EDUCATION/TRAINING PROGRAM

## 2021-05-24 PROCEDURE — APPSS30 APP SPLIT SHARED TIME 16-30 MINUTES: Performed by: NURSE PRACTITIONER

## 2021-05-24 PROCEDURE — 2580000003 HC RX 258: Performed by: STUDENT IN AN ORGANIZED HEALTH CARE EDUCATION/TRAINING PROGRAM

## 2021-05-24 PROCEDURE — 83735 ASSAY OF MAGNESIUM: CPT

## 2021-05-24 PROCEDURE — 6360000002 HC RX W HCPCS: Performed by: STUDENT IN AN ORGANIZED HEALTH CARE EDUCATION/TRAINING PROGRAM

## 2021-05-24 PROCEDURE — 51701 INSERT BLADDER CATHETER: CPT

## 2021-05-24 PROCEDURE — 6370000000 HC RX 637 (ALT 250 FOR IP): Performed by: STUDENT IN AN ORGANIZED HEALTH CARE EDUCATION/TRAINING PROGRAM

## 2021-05-24 PROCEDURE — 85025 COMPLETE CBC W/AUTO DIFF WBC: CPT

## 2021-05-24 PROCEDURE — 51798 US URINE CAPACITY MEASURE: CPT

## 2021-05-24 PROCEDURE — 2060000000 HC ICU INTERMEDIATE R&B

## 2021-05-24 PROCEDURE — 97110 THERAPEUTIC EXERCISES: CPT

## 2021-05-24 PROCEDURE — 36415 COLL VENOUS BLD VENIPUNCTURE: CPT

## 2021-05-24 PROCEDURE — 97116 GAIT TRAINING THERAPY: CPT

## 2021-05-24 PROCEDURE — 80048 BASIC METABOLIC PNL TOTAL CA: CPT

## 2021-05-24 PROCEDURE — 84100 ASSAY OF PHOSPHORUS: CPT

## 2021-05-24 RX ADMIN — HEPARIN SODIUM 5000 UNITS: 5000 INJECTION INTRAVENOUS; SUBCUTANEOUS at 06:30

## 2021-05-24 RX ADMIN — HEPARIN SODIUM 5000 UNITS: 5000 INJECTION INTRAVENOUS; SUBCUTANEOUS at 13:50

## 2021-05-24 RX ADMIN — INSULIN GLARGINE 10 UNITS: 100 INJECTION, SOLUTION SUBCUTANEOUS at 22:03

## 2021-05-24 RX ADMIN — GABAPENTIN 100 MG: 100 CAPSULE ORAL at 13:50

## 2021-05-24 RX ADMIN — INSULIN LISPRO 3 UNITS: 100 INJECTION, SOLUTION INTRAVENOUS; SUBCUTANEOUS at 20:41

## 2021-05-24 RX ADMIN — BISACODYL 10 MG: 10 SUPPOSITORY RECTAL at 01:01

## 2021-05-24 RX ADMIN — Medication 81 MG: at 08:38

## 2021-05-24 RX ADMIN — DOCUSATE SODIUM 50MG AND SENNOSIDES 8.6MG 2 TABLET: 8.6; 5 TABLET, FILM COATED ORAL at 08:38

## 2021-05-24 RX ADMIN — HEPARIN SODIUM 5000 UNITS: 5000 INJECTION INTRAVENOUS; SUBCUTANEOUS at 22:02

## 2021-05-24 RX ADMIN — SODIUM CHLORIDE, PRESERVATIVE FREE 10 ML: 5 INJECTION INTRAVENOUS at 08:38

## 2021-05-24 RX ADMIN — SODIUM CHLORIDE, PRESERVATIVE FREE 10 ML: 5 INJECTION INTRAVENOUS at 22:05

## 2021-05-24 RX ADMIN — ATORVASTATIN CALCIUM 10 MG: 10 TABLET, FILM COATED ORAL at 20:41

## 2021-05-24 RX ADMIN — PANTOPRAZOLE SODIUM 40 MG: 40 TABLET, DELAYED RELEASE ORAL at 06:30

## 2021-05-24 RX ADMIN — GABAPENTIN 100 MG: 100 CAPSULE ORAL at 20:41

## 2021-05-24 RX ADMIN — LEVOTHYROXINE SODIUM 112 MCG: 112 TABLET ORAL at 06:30

## 2021-05-24 RX ADMIN — POLYETHYLENE GLYCOL 3350 17 G: 17 POWDER, FOR SOLUTION ORAL at 08:38

## 2021-05-24 RX ADMIN — GABAPENTIN 100 MG: 100 CAPSULE ORAL at 08:38

## 2021-05-24 RX ADMIN — INSULIN LISPRO 3 UNITS: 100 INJECTION, SOLUTION INTRAVENOUS; SUBCUTANEOUS at 11:57

## 2021-05-24 RX ADMIN — FLUCONAZOLE 400 MG: 40 POWDER, FOR SUSPENSION ORAL at 08:38

## 2021-05-24 ASSESSMENT — PAIN SCALES - GENERAL
PAINLEVEL_OUTOF10: 0

## 2021-05-24 NOTE — PROGRESS NOTES
Physical Therapy  Facility/Department: [address] STEPDOWN  Daily Treatment Note  NAME: Mulugeta Dumont  : 1950  MRN: [id number]    Date of Service: 2021    Discharge Recommendations:  Patient would benefit from continued therapy after discharge   PT Equipment Recommendations  Equipment Needed: Yes  Mobility Devices: Mckay Garfield: Rolling  Other: Pt requires assistance for safe amb with RW. Assessment   Body structures, Functions, Activity limitations: Decreased functional mobility ; Decreased strength;Decreased endurance;Decreased balance  Assessment: Pt stood and ambulated ~35ft with RW and CGA. Pt steady throughout with no noted LOB. Pt would benefit from continued PT to address functional mobility deficits and return to prior level of independence. Prognosis: Good  PT Education: Goals;PT Role;Plan of Care;General Safety; Functional Mobility Training  REQUIRES PT FOLLOW UP: Yes  Activity Tolerance  Activity Tolerance: Patient limited by fatigue;Patient limited by endurance  Activity Tolerance: Some dizziness with sitting up     Patient Diagnosis(es): The primary encounter diagnosis was Acute pancreatitis, unspecified complication status, unspecified pancreatitis type. Diagnoses of BONNIE (acute kidney injury) ([address] Hiatal hernia, Necrosis of stomach, Strangulated paraesophageal hernia, Mediastinitis, Paraesophageal fluid collection, Essential hypertension, Hernia with obstruction, and Diabetic ketoacidosis without coma associated with type 2 diabetes mellitus ([address] were also pertinent to this visit. has a past medical history of Diabetes mellitus ([address] Gout, Hiatal hernia, Hyperlipidemia, Hypertension, and Thyroid disease. has a past surgical history that includes Cholecystectomy (); Hysterectomy (); Breast surgery; other surgical history (); other surgical history (); Tubal ligation ();  Gastric fundoplication (N/A, ); hc picc line double lumen (5/10/2021); and IR CHEST TUBE INSERTION (5/13/2021). Restrictions  Restrictions/Precautions  Restrictions/Precautions: General Precautions, Fall Risk  Required Braces or Orthoses?: No  Position Activity Restriction  Other position/activity restrictions: up with assist, s/p paraesphageal hernia repair 5/4, 2 PEG tubes. 2L O2 via NC  Subjective   General  Chart Reviewed: Yes  Response To Previous Treatment: Patient with no complaints from previous session. Family / Caregiver Present: No  Subjective  Subjective: RN and pt agreeable to PT. Pt resting in bed upon arrival on 2L O2 via NC. Pt cooperative throughout. General Comment  Comments: Pt retired to chair at end of session with call light in reach. Pain Screening  Patient Currently in Pain: Denies  Vital Signs  Patient Currently in Pain: Denies       Orientation  Orientation  Overall Orientation Status: Within Functional Limits  Cognition   Cognition  Overall Cognitive Status: WFL  Objective   Bed mobility  Supine to Sit: Minimal assistance (Required assistance with trunk progression.)  Sit to Supine:  (Pt retired to chair at end of session.)  Scooting: Contact guard assistance  Comment: HOB elevated, utilized bed rails, increased time/effort to complete. Transfers  Sit to Stand: Contact guard assistance  Stand to sit: Contact guard assistance  Comment: RW used  Ambulation  Ambulation?: Yes  Ambulation 1  Surface: level tile  Device: Rolling Walker  Other Apparatus: O2 (2L)  Assistance: Contact guard assistance;Minimal assistance  Gait Deviations: Slow Cami;Decreased step length;Decreased step height  Distance: 35ft  Comments: Steady with no LOB.   Stairs/Curb  Stairs?: No     Balance  Posture: Good  Sitting - Static: Good  Sitting - Dynamic: Good;-  Standing - Static: Fair  Standing - Dynamic: Fair;-  Comments: RW used to assess standing balance  Exercises  Hip Flexion: seated marches x15  Hip Abduction: Seated: x15  Knee Long Arc Quad: Seated: x15  Ankle Pumps: Seated: x20  Comments: LE exercises performed while seated in chair. Goals  Short term goals  Time Frame for Short term goals: 14 visits  Short term goal 1: Pt will be Susy with bed mobility  Short term goal 2: Pt will be Susy with transfers  Short term goal 3: Pt will amb 150' with RW and CGA  Patient Goals   Patient goals : Get tube out.     Plan    Plan  Times per week: 5-6x/week  Current Treatment Recommendations: Strengthening, Endurance Training, Functional Mobility Training, Balance Training, Transfer Training, Gait Training, Home Exercise Program, Safety Education & Training, Patient/Caregiver Education & Training, Equipment Evaluation, Education, & procurement  Safety Devices  Type of devices: Nurse notified, Call light within reach, Gait belt, All fall risk precautions in place, Patient at risk for falls, Chair alarm in place, Left in chair  Restraints  Initially in place: No     Therapy Time   Individual Concurrent Group Co-treatment   Time In 1430         Time Out 1453         Minutes 23         Timed Code Treatment Minutes: [address] [facility] \[facility]\""

## 2021-05-24 NOTE — PROGRESS NOTES
[facility]  Office: [address], DO, Frances Gay, DO, Danielle Jovel, DO, James Sidhu Juventino, DO, Elvin Sigala MD, Marietta Carmona MD, Doni Freeman MD, Gene Mayo MD, Mireya Martinez MD, Vannessa Jackson MD, Lyndsay Valadez MD, Seema Cano MD, Ashlyn Kerr DO, Paul Loya MD, Isael Atwood DO, Jaun Judge MD,  Naomi Mckeon DO, Jigna Cortes MD, Jeevan Pham MD, Dayana Matthew MD, Leticia Menendez MD, Leivabessy Tomas, Urban De La Rosa, CNP, Sydney Oscar CNP, Maryam Rodriguez, CNS, Ricci Lacy CNP, Michael Jackson CNP, Stephen Robertson, CNP, Keith Santiago, CNP, Kezia Freeman, CNP, Ramya Campos PA-C, Otto Astorga, [facility], Elma Young, CNP, Doreen Encinas, CNP, Bob Clancy, CNP, Sabino Gross, CNP, Elisa Morales, [facility], Ida Ace, [address] [place] [address]    Progress Note    5/24/2021    9:38 AM    Name:   Alvarez Jauregui  MRN:     [id number]     Acct:      [de-identified]   Room:   [address] [place], [place] [id number] Day:  21  Admit Date:  5/3/2021 12:49 PM    PCP:   Delbert Atkins DO  Code Status:  Full Code    Subjective:     C/C:   Chief Complaint   Patient presents with    Blood Sugar Problem     >450    Hernia     hyatial      Interval History Status: improved. Patient seen examined at bedside. [place], was drowsy this morning but follows commands    Brief History:     74-year-old female originally presenting to outlying facility due to nausea and vomiting and was transferred for further evaluation and patient underwent emergent robotic laparoscopic hiatal hernia reduction with gastropexy via G-tube on 5/4/2021 and required intubation and was ultimately extubated on 4/6/2021. Patient underwent thoracentesis and had chest tube insertion 5/12/2021 follow-up with cardiothoracic surgery as well as pulmonology. Is a concern for possible esophageal leak and esophagram was performed on 5/7/2021 did not show any leak. Patient was started on Diflucan due to concern for Candida albicans    Review of Systems:     Constitutional:  negative for chills, fevers, sweats  Respiratory:  negative for cough, dyspnea on exertion, shortness of breath, wheezing  Cardiovascular:  negative for chest pain, chest pressure/discomfort, lower extremity edema, palpitations  Gastrointestinal:  negative for abdominal pain, constipation, diarrhea, nausea, vomiting  Neurological:  negative for dizziness, headache    Medications: Allergies: Allergies   Allergen Reactions    No Known Allergies        Current Meds:   Scheduled Meds:    sennosides-docusate sodium  2 tablet Oral Daily    fluconazole  400 mg Oral Daily    gabapentin  100 mg Oral TID    aspirin  81 mg Oral Daily    atorvastatin  10 mg Oral Daily    levothyroxine  112 mcg Oral Daily    pantoprazole  40 mg Oral QAM AC    insulin glargine  10 Units Subcutaneous Nightly    polyethylene glycol  17 g Oral Daily    heparin (porcine)  5,000 Units Subcutaneous 3 times per day    insulin lispro  0-18 Units Subcutaneous Q4H    sodium chloride flush  5-40 mL Intravenous 2 times per day     Continuous Infusions:    dextrose      sodium chloride 25 mL (05/10/21 1840)     PRN Meds: bisacodyl, albuterol, oxyCODONE **OR** oxyCODONE, acetaminophen, glucose, dextrose, glucagon (rDNA), dextrose, sodium chloride, magnesium sulfate, acetaminophen, artificial tears, sodium chloride flush, [DISCONTINUED] promethazine **OR** ondansetron    Data:     Past Medical History:   has a past medical history of Diabetes mellitus ([address] Gout, Hiatal hernia, Hyperlipidemia, Hypertension, and Thyroid disease. Social History:   reports that she has never smoked. She has never used smokeless tobacco. She reports that she does not drink alcohol and does not use drugs.      Family History:   Family History   Problem Relation Age of Onset    Breast Cancer Mother     High Blood Pressure Mother     High Cholesterol Father     Breast Cancer Sister        Vitals:  /73   Pulse 68   Temp 97.3 °F (36.3 °C) (Temporal)   Resp 10   Ht 5' 2\" (1.575 m)   Wt 178 lb 12.7 oz (81.1 kg)   SpO2 98%   BMI 32.70 kg/m²   Temp (24hrs), Av °F (36.7 °C), Min:97.3 °F (36.3 °C), Max:98.4 °F (36.9 °C)    Recent Labs     21  0442 21  0625   POCGLU 141* 76 94 98       I/O (24Hr):     Intake/Output Summary (Last 24 hours) at 2021 0938  Last data filed at 2021 0625  Gross per 24 hour   Intake 1280 ml   Output 1048 ml   Net 232 ml       Labs:  Hematology:  Recent Labs     21  0521  0636 21  0624   WBC 11.6* 9.6 11.3   RBC 3.06* 2.98* 3.09*   HGB 8.4* 8.2* 8.5*   HCT 27.5* 26.6* 27.3*   MCV 89.9 89.3 88.3   MCH 27.5 27.5 27.5   MCHC 30.5 30.8 31.1   RDW 14.5* 14.6* 14.6*    354 317   MPV 9.1 9.2 9.1     Chemistry:  Recent Labs     21  0521  0636 21  0624   * 133* 137   K 3.3* 3.4* 3.5*   CL 97* 96* 99   CO2 27 26 25   GLUCOSE 85 115* 99   BUN 23 22 22   CREATININE 1.65* 1.41* 1.43*   MG 2.0 2.0 1.7   ANIONGAP 10 11 13   LABGLOM 31* 37* 36*   GFRAA 37* 45* 44*   CALCIUM 7.3* 7.1* 7.1*   PHOS  --   --  2.6     Recent Labs     21  1228 21  1607 21  0442 21  0625   POCGLU 184* 137* 141* 76 94 98     ABG:  Lab Results   Component Value Date    POCPH 7.433 2021    PHART 7.193 2021    POCPCO2 36.8 2021    STA4YZR 52.0 2021    POCPO2 150.9 2021    PO2ART 145.0 2021    POCHCO3 24.6 2021    GRR8TWZ 19.2 2021    NBEA NOT REPORTED 2021    PBEA 0 2021    YKH6OGP NOT REPORTED 2021    HLMR4VOH 99 2021    I8RUHYIE 98.0 2021    FIO2 UNKNOWN 2021     Lab Results   Component Value Date/Time    SPECIAL NOT REPORTED 2021 10:23 AM     Lab Results   Component Value Date/Time    CULTURE NO GROWTH 4 DAYS appearance:  alert, cooperative and no distress  Mental Status:  oriented to person, place and time and normal affect  Lungs:  clear to auscultation bilaterally, normal effort, s/p right chest tube removal  Heart:  regular rate and rhythm, no murmur  Abdomen:  soft, not distended, bowel sounds present, similar to yesterday. Previous wounds healing PEG tube  Extremities:  no edema, redness, tenderness in the calves  Skin:  no gross lesions, rashes, induration    Assessment:        Hospital Problems         Last Modified POA    * (Principal) Necrosis of stomach 5/17/2021 Yes    Hernia with obstruction 5/4/2021 Yes    Essential hypertension 5/4/2021 Yes    Thyroid disease 5/4/2021 Yes    Syncope 5/4/2021 Yes    Hiatal hernia 5/5/2021 Yes    Mediastinitis 5/8/2021 Yes    Peritonitis ([address] 5/8/2021 Yes    Severe malnutrition ([address] 5/16/2021 Yes    Strangulated paraesophageal hernia 5/17/2021 Yes    Paraesophageal fluid collection 5/20/2021 Yes                      Plan:        Sepsis with septic shock secondary to strangulated paraesophageal hernia status post laparoscopic repair on 5/4/2021. Appreciate bariatric surgery recommendations. encourage PO intake, nutritional supplements, completed Reglan for total of 72 hours. Appreciate ID recommendations. Currently on Diflucan for Candida albicans. Acute respiratory failure requiring oxygen with empyema showing gram positive and yeast, still requiring 1L nasal cannula fluconazole for candida infection,Appreciate ID, pulm, and CTS recommendations, s/p chest tube removal and tolerating well. Monitor if patient is a candidate for VATS as outpateint. Paraesophageal collection appears stable in size. discucsed with general surgery. Monitor as outpateint. With CT in 2 weeks. HTN, CAD, ASA, lipitor  Diabetes. lantus 10 units, ISS  Hypothyroidism. Synthroid 112 mcg  Neurontin 100 mg 3 times daily. Free water flushes encourage oral intake  Discharge planning.   Most likely need skilled nursing facility. May need to start tube feeds if PO intake is poor.  Patient is trying to improve her own intake    Shekhar Riojas MD  5/24/2021  9:38 AM

## 2021-05-24 NOTE — PROGRESS NOTES
Bariatric Surgery Progress Note      PATIENT NAME: Naida Parikh   TODAY'S DATE: 5/24/2021, 7:02 AM  Chief Complaint   Patient presents with    Blood Sugar Problem     >450    Hernia     Hiatal      SUBJECTIVE:    Pt seen and examined. no pain. PEG tubes clamped. Tolerating PO intake. +Flatus and BMs. Moving around more with PT/OT and nursing staff. Tmax 37.1    OBJECTIVE:   Vitals:  /72   Pulse 70   Temp 98.2 °F (36.8 °C) (Axillary)   Resp 14   Ht 5' 2\" (1.575 m)   Wt 178 lb 12.7 oz (81.1 kg)   SpO2 98%   BMI 32.70 kg/m²      INTAKE/OUTPUT:      Intake/Output Summary (Last 24 hours) at 5/24/2021 7401  Last data filed at 5/24/2021 5850  Gross per 24 hour   Intake 1400 ml   Output 1048 ml   Net 352 ml                 General: alert, oriented  Lungs: Symmetrical chest rise bilaterally  Heart: S1S2  Abdomen: Soft, ND, appropriately tender, non peritoneal, no rebound, incisions c/d/i, PEG x2 with no surrounding erythema   Extremity: moves all extremities x4, trace edema    Data Review:  CBC:   Recent Labs     05/22/21  0543 05/23/21  0636 05/24/21  0624   WBC 11.6* 9.6 11.3   HGB 8.4* 8.2* 8.5*    354 317     BMP:    Recent Labs     05/22/21  0543 05/23/21  0636   * 133*   K 3.3* 3.4*   CL 97* 96*   CO2 27 26   BUN 23 22   CREATININE 1.65* 1.41*   GLUCOSE 85 115*     Hepatic:   No results for input(s): AST, ALT, ALB, ALKPHOS, BILITOT, BILIDIR in the last 72 hours. Coagulation:   No results for input(s): APTT, PROT, INR in the last 72 hours.     ASSESSMENT   Patient Active Problem List   Diagnosis    Hernia with obstruction    Essential hypertension    Thyroid disease    Syncope    Hiatal hernia    Necrosis of stomach    Mediastinitis    Peritonitis (HCC)    Severe malnutrition ([address]    Strangulated paraesophageal hernia    Paraesophageal fluid collection     77 yo F s/p 5/4 emergent robotic assisted laparoscopic hiatal hernia reduction with gastropexy via g-tube x2     5/7 esophagram performed no leak noted    PLAN  1. [address] [address] for low fiber diet as tolerated, cont nutritional supplements, g-tubes ok to use for tube feeds, TF management and ordering per dietician/primary, continue PEGs to clamp, pt completed Reglan, cont miralax daily, PRN suppository      2. Cont recs and management per primary/ Pulmonary/ CT SX, no plans for drainage of right sided paraesophageal collection at this time, Discussed with cardiothoracic surgery   3. Cont abx per ID, goal potassium 4.0    4. Encourage IS use, deep breathing, ambulation and PT/OT work; [id number] [address] for DVT ppx from surg stance  5.  Pt to f/u with Dr. Ana Fragoso as out pt, ok for d/c from a surgical stance     Thank you,    Electronically signed by Sarah Flower DO  on 5/24/2021 at 7:02 AM

## 2021-05-24 NOTE — PLAN OF CARE
Problem: Confusion - Acute:  Goal: Absence of continued neurological deterioration signs and symptoms  Description: Absence of continued neurological deterioration signs and symptoms  5/24/2021 1634 by Dick Rick RN  Outcome: Ongoing  5/24/2021 0244 by Kristy Cruz RN  Outcome: Ongoing  Goal: Mental status will be restored to baseline  Description: Mental status will be restored to baseline  5/24/2021 1634 by Dick Rick RN  Outcome: Ongoing  5/24/2021 0244 by Kristy Cruz RN  Outcome: Ongoing     Problem: Discharge Planning:  Goal: Ability to perform activities of daily living will improve  Description: Ability to perform activities of daily living will improve  5/24/2021 1634 by Dick Rick RN  Outcome: Ongoing  5/24/2021 0244 by Kristy Cruz RN  Outcome: Ongoing  Goal: Participates in care planning  Description: Participates in care planning  5/24/[address]21 [address] by Dick Rick RN  Outcome: Ongoing  5/24/2021 0244 by Kristy Cruz RN  Outcome: Ongoing     Problem: Injury - Risk of, Physical Injury:  Goal: Absence of physical injury  Description: Absence of physical injury  5/24/2021 1634 by Dick Rick RN  Outcome: Ongoing  5/24/2021 0244 by Kristy Cruz RN  Outcome: Ongoing  Goal: Will remain free from falls  Description: Will remain free from falls  5/24/2021 1634 by Dick Rick RN  Outcome: Ongoing  5/24/2021 0244 by Kristy Cruz RN  Outcome: Ongoing     Problem: Mood - Altered:  Goal: Mood stable  Description: Mood stable  5/24/2021 1634 by Dick Rick RN  Outcome: Ongoing  5/24/2021 0244 by Kristy Cruz RN  Outcome: Ongoing  Goal: Absence of abusive behavior  Description: Absence of abusive behavior  5/24/2021 1634 by Dick Rick RN  Outcome: Ongoing  5/24/2021 0244 by Kristy Cruz RN  Outcome: Ongoing  Goal: Verbalizations of feeling emotionally comfortable while being cared for will increase  Description: Verbalizations of feeling emotionally comfortable while being cared for will increase  5/24/2021 1634 by Thalia Florence RN  Outcome: Ongoing  5/24/2021 0244 by Damián Shepherd RN  Outcome: Ongoing     Problem: Psychomotor Activity - Altered:  Goal: Absence of psychomotor disturbance signs and symptoms  Description: Absence of psychomotor disturbance signs and symptoms  5/24/2021 1634 by Thalia Florence RN  Outcome: Ongoing  5/24/2021 0244 by Damián Shepherd RN  Outcome: Ongoing     Problem: Sensory Perception - Impaired:  Goal: Demonstrations of improved sensory functioning will increase  Description: Demonstrations of improved sensory functioning will increase  5/24/2021 1634 by Thalia Florence RN  Outcome: Ongoing  5/24/2021 0244 by Damián Shepherd RN  Outcome: Ongoing  Goal: Decrease in sensory misperception frequency  Description: Decrease in sensory misperception frequency  5/24/2021 1634 by Thalia Florence RN  Outcome: Ongoing  5/24/2021 0244 by Damián Shepherd RN  Outcome: Ongoing  Goal: Able to refrain from responding to false sensory perceptions  Description: Able to refrain from responding to false sensory perceptions  5/24/2021 1634 by Thalia Florence RN  Outcome: Ongoing  5/24/2021 0244 by Damián Shepherd RN  Outcome: Ongoing  Goal: Demonstrates accurate environmental perceptions  Description: Demonstrates accurate environmental perceptions  5/24/2021 1634 by Thalia Florence RN  Outcome: Ongoing  5/24/2021 0244 by Damián Shepherd RN  Outcome: Ongoing  Goal: Able to distinguish between reality-based and nonreality-based thinking  Description: Able to distinguish between reality-based and nonreality-based thinking  5/24/2021 1634 by Thalia Florence RN  Outcome: Ongoing  5/24/2021 0244 by Damián Shepherd RN  Outcome: Ongoing  Goal: Able to interrupt nonreality-based thinking  Description: Able to interrupt nonreality-based thinking  5/24/2021 1634 by Thalia Florence RN  Outcome: Ongoing  5/24/2021 0244 by Damián Shepherd RN  Outcome: Ongoing     Problem: Sleep Pattern Disturbance: Goal: Appears well-rested  Description: Appears well-rested  5/24/2021 1634 by Van Goncalves RN  Outcome: Ongoing  5/24/2021 0244 by Gopi Monterroso RN  Outcome: Ongoing     Problem: Skin Integrity:  Goal: Will show no infection signs and symptoms  Description: Will show no infection signs and symptoms  5/24/2021 1634 by Van Goncalves RN  Outcome: Ongoing  5/24/2021 0244 by Gopi Monterroso RN  Outcome: Ongoing  Goal: Absence of new skin breakdown  Description: Absence of new skin breakdown  5/24/2021 1634 by Van Goncalves RN  Outcome: Ongoing  5/24/2021 0244 by Gopi Monterroso RN  Outcome: Ongoing     Problem: Falls - Risk of:  Goal: Absence of physical injury  Description: Absence of physical injury  5/24/2021 1634 by Van Goncalves RN  Outcome: Ongoing  5/24/2021 0244 by Gopi Monterroso RN  Outcome: Ongoing  Goal: Will remain free from falls  Description: Will remain free from falls  5/24/2021 1634 by Van Goncalves RN  Outcome: Ongoing  5/24/2021 0244 by Gopi Monterroso RN  Outcome: Ongoing     Problem: Nutrition  Goal: Optimal nutrition therapy  Description: Nutrition Problem #1: Inadequate oral intake  Intervention: Food and/or Nutrient Delivery: Continue NPO, Start Parenteral Nutrition-suggest start with 150 g Dex, 50 g AA at 41.7 mL/hr with 100 mL 20% lipids.   Nutritional Goals: meet % of estimated nutrient needs     5/24/2021 1634 by Van Goncalves RN  Outcome: Ongoing  5/24/2021 0244 by Gopi Monterroso RN  Outcome: Ongoing     Problem: Pain:  Goal: Pain level will decrease  Description: Pain level will decrease  5/24/2021 1634 by Van Goncalves RN  Outcome: Ongoing  5/24/2021 0244 by Gopi Monterroso RN  Outcome: Ongoing  Goal: Control of acute pain  Description: Control of acute pain  5/24/2021 1634 by Van Goncalves RN  Outcome: Ongoing  5/24/2021 0244 by Gopi Monterroso RN  Outcome: Ongoing  Goal: Control of chronic pain  Description: Control of chronic pain  5/24/2021 1634 by Van Goncalves RN  Outcome: Ongoing  5/24/2021 0244 by Nayan Wilson RN  Outcome: Ongoing     Problem: Infection - Central Venous Catheter-Associated Bloodstream Infection:  Goal: Will show no infection signs and symptoms  Description: Will show no infection signs and symptoms  5/24/2021 1634 by Twanda Osler, RN  Outcome: Ongoing  5/24/2021 0244 by Nayan Wilson RN  Outcome: Ongoing

## 2021-05-24 NOTE — CARE COORDINATION
Transitional Planning     Spoke with Brenda Ring at [facility]; will follow up with admissions and call back. precert still pending. Spoke with patient's daughter in law at bedside regarding discharge planning. [facility] is denied by insurance; family would like referrals sent to [facility] and [facility]. Referrals sent.

## 2021-05-24 NOTE — PROGRESS NOTES
Comprehensive Nutrition Assessment    Type and Reason for Visit:  Reassess    Nutrition Recommendations/Plan:   - Continue current Low Fiber diet with Ensure Clear Liquid oral supplements at all meals. Provide chocolate Ensure Enlive ONS for pt to try. Encourage/monitor PO intakes as tolerated. - Monitor need for supplemental Tube Feedings - if requested, suggest Vital AF 1.2 (semi-elemental) with slow advancement to goal 30 mL/hr (will provide 50% of estimated kcals and 72% estimated protein needs).    - Monitor labs, weights, bowel function, and plan of care. Nutrition Assessment:  Pt reports her appetite has always been small. Observed lunch tray which pt had 1/4 Ensure Clear and bites of soup. Pt reports for breakfast she had cherrios and milk. Pt states she is trying to drink Ensure Clear supplements but they are to sweet. Pt willing to try a chocolate Ensure with dinner. PEG clamped. Continues to recieve 200 mL free water x 4 per day. Last BM 5/24. Labs/Meds reviewed. Malnutrition Assessment:  Malnutrition Status: Moderate malnutrition    Context:  Acute Illness     Findings of the 6 clinical characteristics of malnutrition:  Energy Intake:  1 - 75% or less of estimated energy requirements for 7 or more days (Previously meeting est needs with nutrition support.)  Weight Loss:  1 - 1% to 2% over 1 week (7.4% x 2.5 weeks)     Body Fat Loss:  No significant body fat loss   Muscle Mass Loss:  1 - Mild muscle mass loss Clavicles (pectoralis & deltoids)  Fluid Accumulation:  1 - Mild Extremities, Generalized   Strength:  Not Performed    Estimated Daily Nutrient Needs:  Energy (kcal):  MSJ x 1.2 = 1600 kcals/day; Weight Used for Energy Requirements:  Current     Protein (g):  75 g pro/day; Weight Used for Protein Requirements:  Ideal (1.5)        Fluid (ml/day):  8333-1551 mL/day (or per MD);  Method Used for Fluid Requirements:  ml/Kg (25-30)      Nutrition Related Findings:  Labs/Meds reviewed. Distended abdomen. Active bowel sounds. Clamped G-tubes. 5/4: S/p hiatal hernia repair, gastrectomy, gastropexy, G-tubes x 2. Wounds:  Multiple, Surgical Incision       Current Nutrition Therapies:    DIET LOW FIBER; Dietary Nutrition Supplements: Clear Liquid Oral Supplement    Anthropometric Measures:  · Height: 5' 2\" (157.5 cm)  · Current Body Weight: 178 lb 12.7 oz (81.1 kg)   · Admission Body Weight: 202 lb (91.6 kg)    · Usual Body Weight:  (166 lb - stated)     · Ideal Body Weight: 110 lbs; % Ideal Body Weight 162.5 %   · BMI: 32.7  · BMI Categories: Obese Class 1 (BMI 30.0-34. 9)       Nutrition Diagnosis:   · Inadequate oral intake related to altered GI function (variable appetite) as evidenced by intake 0-25%, intake 26-50%, weight loss (variable PO intakes; need for ONS; previous need for supplemental nutrition support)    Nutrition Interventions:   Food and/or Nutrient Delivery:  Continue Current Diet, Modify Oral Nutrition Supplement (Continue Ensure Clear ONS.   Provide 1 chocolate Ensure Enlive with dinner.)  Nutrition Education/Counseling:  No recommendation at this time   Coordination of Nutrition Care:  Continue to monitor while inpatient    Goals:  meet % of estimated nutrient needs       Nutrition Monitoring and Evaluation:   Food/Nutrient Intake Outcomes:  Food and Nutrient Intake, Supplement Intake  Physical Signs/Symptoms Outcomes:  Biochemical Data, GI Status, Hemodynamic Status, Nutrition Focused Physical Findings, Skin, Weight     Electronically signed by Natividad Maradiaga RD, LD on 5/24/21 at 1:39 PM EDT    Contact: [phone]

## 2021-05-24 NOTE — PROGRESS NOTES
[facility] -Progress Note    Today's Date and Time: 5/24/2021, 2:26 PM    Impression :     Paraesophageal Hiatal hernia  Perforation of the esophagus  S/p laparoscopic, robotic para esophageal hernia repair 5-4-21  Acute pancreatitis  Shock   Hyperglycemia  NSTEMI  BONNIE  Not a MRSA carrier  Coagulase negative Staphylococci bacteremia x 2 on 5-7-21. Repeat blood cultures 5-8-21 x 2 : No growth . Bilateral Empyema 5/13/21  Left sided thoracentesis 5/12/21-grew Candida albicans  Right sided pigtail chest tube placed on 5/12/21 for right-sided effusion    Recommendations:   D/C Zosyn started 5/6/21. Stop date 4-19-21. D/C Vancomycin started 5/8/21 for Coagulase negative Staph. Stop date 5-19-21    Gram positive rods from thoracentesis identified as yeast. (Candida albicans). Fluconazole started 5-15-21. Will continue liquid Diflucan 400 mg q day. Stop date 5-30-21  Plan Follow up repeat CT chest to evaluate mediastinal collection and lung infiltrates in about 2 weeks. Medical Decision Making/Summary/Discussion:5/24/2021     Patient with large paraesophageal hiatal hernia with perforation of esophagus  Hyperglycemia   Acute pancreatitis  Shock   S/p laparoscopic, robotic para esophageal hernia repair 5-4-21  Improvement in overall picture but is developing basilar infiltrates  Not a MRSA carrier  Unclear whether this is fluid retention vs residual leakage vs secondary pneumonia  Esophagogram 5-7-21 does not show a leak  Will plan to continue zosyn and add Diflucan. Monitor temps and CXR. Zosyn and Vanco to continue while awaiting possible VATS/decortication procedure. VATS not to be done. Will D/C antibiotics. Continue Diflucan liquid for 2  weeks.     Infection Control Recommendations   [place] Precautions    Antimicrobial Stewardship Recommendations     Simplification of therapy  Targeted therapy    Coordination of Outpatient Care:   Estimated Length of IV antimicrobials: TBD  Patient will need Midline Catheter Insertion:No   Patient will need PICC line Insertion:No  Patient will need: Home IV , [place],  SNF,  LTAC:  Patient will need outpatient wound care:Yes    Chief complaint/reason for consultation:   Septic shock/sepsis    History of Present Illness:   Anamaria Bedoya is a 78y.o.-year-old   female who was initially admitted on 5/3/2021. Patient seen at the request of Macrina Orr. INITIAL HISTORY : 05/07/2021    Pt with a history of diabetes mellitus type 2, hypertension, hyperlipidemia, thyroid disease, gout and hiatal hernia. She initially presented on 05/03/2021 to an outlying facility with a chief complaint of nausea vomiting, diarrhea and syncopal attack . Patient was found to have blood glucose of 585,WBC of 23.5, elevated lipase 1451 and elevated beta hydroxybutyrate. MRCP on 05/03/2021 showed diffuse small bowel wall thickening likely due to third spacing as opposed to enteritis. CT scan of the abdomen showed extremely large fluid-filled hiatal hernia and distended and fluid-filled stomach below the diaphragm. Patient was given Zosyn at the outlying facility and was transferred to [address] [place] [address] for evaluation by the surgery team.    CT scan of the chest without contrast was performed on 05/04/2021 which showed ascites and pneumoperitoneum with apparent free-flowing oral contrast in the left upper quadrant concerning for viscus perforation possibly within the subdiaphragmatic portion of the stomach. Moderate bilateral pleural effusions and hiatal hernia with organoaxial volvulus. Bariatric surgery performed a laparoscopic robotic para esophageal hernia repair. Cardiology is also following the patient because of elevated troponins with unremarkable echocardiography. Antibiotics wise the patient was given Zosyn on 05/03/2021 at the outside facility and it has been continued through the present time.     Blood and growth  5/7/21: Coagulase negative Staphylococci bacteremia x 2  5/8/21: No growth  Sputum :    Thoracentesis fluid:  Lt side 5/12/21 gram positive rods/ candida albicans. Rt side 5-13-21: No growth     CT chest 5/11/21  New right-sided PICC again seen with unchanged and satisfactory tip position;   larger loculated right effusion in the azygoesophageal recess, as above. Otherwise similar findings to 05/07/2021 with suspected lower lobe   consolidation/pneumonitis, with volume loss and effusions both lower lobes. CT chest 5/16:  Impression:        1.  Right chest tube in place with near complete resolution of right   effusion.  No pneumothorax or loculated fluid in the right hemithorax. 2.  Trace left pleural effusion. 3.  Dependent opacities in the lower lobes, left greater than right, again   demonstrated, although improved since prior chest CT exam.     4.  Moderate amount of fluid within the esophageal hiatus. 5.  Partially visualized stomach appears distended with 2 gastrostomy tubes   in place. Discussed with patient, RN, IM, Pulmonary, daughter in law. I have personally reviewed the past medical history, past surgical history, medications, social history, and family history, and I have updated the database accordingly.   Past Medical History:     Past Medical History:   Diagnosis Date    Diabetes mellitus ([address]     Gout     Hiatal hernia     Hyperlipidemia     Hypertension     Thyroid disease        Past Surgical  History:     Past Surgical History:   Procedure Laterality Date    BREAST SURGERY      Bx 1993    CHOLECYSTECTOMY  1999    GASTRIC FUNDOPLICATION N/A 4/8/3519    PARAESPHAGEAL HERNIA REPAIR LAPAROSCOPIC ROBOTIC performed by Rosario Muir DO at [address] [place] [address]. PICC LINE DOUBLE LUMEN  5/10/2021         HYSTERECTOMY  1997    IR CHEST TUBE INSERTION  5/13/2021    IR CHEST TUBE INSERTION 5/13/2021 Heather West MD [facility]    OTHER SURGICAL HISTORY  1962    Remove spur L ankle    OTHER SURGICAL HISTORY  1978    Pin and wire repair R ankle    TUBAL LIGATION  1988       Medications:      sennosides-docusate sodium  2 tablet Oral Daily    fluconazole  400 mg Oral Daily    gabapentin  100 mg Oral TID    aspirin  81 mg Oral Daily    atorvastatin  10 mg Oral Daily    levothyroxine  112 mcg Oral Daily    pantoprazole  40 mg Oral QAM AC    insulin glargine  10 Units Subcutaneous Nightly    polyethylene glycol  17 g Oral Daily    heparin (porcine)  5,000 Units Subcutaneous 3 times per day    insulin lispro  0-18 Units Subcutaneous Q4H    sodium chloride flush  5-40 mL Intravenous 2 times per day       Social History:     Social History     Socioeconomic History    Marital status:      Spouse name: Not on file    Number of children: Not on file    Years of education: Not on file    Highest education level: Not on file   Occupational History    Not on file   Tobacco Use    Smoking status: Never Smoker    Smokeless tobacco: Never Used   Substance and Sexual Activity    Alcohol use: Never    Drug use: Never    Sexual activity: Not on file   Other Topics Concern    Not on file   Social History Narrative    Not on file     Social Determinants of Health     Financial Resource Strain:     Difficulty of Paying Living Expenses:    Food Insecurity:     Worried About Running Out of Food in the Last Year:     Ran Out of Food in the Last Year:    Transportation Needs:     Lack of Transportation (Medical):      Lack of Transportation (Non-Medical):    Physical Activity:     Days of Exercise per Week:     Minutes of Exercise per Session:    Stress:     Feeling of Stress :    Social Connections:     Frequency of Communication with Friends and Family:     Frequency of Social Gatherings with Friends and Family:     Attends Hindu Services:     Active Member of Clubs or Organizations:     Attends Club or Organization Meetings:     Marital Status:    Intimate Partner Violence:     Fear of Current or Ex-Partner:     Emotionally Abused:     Physically Abused:     Sexually Abused:        Family History:     Family History   Problem Relation Age of Onset    Breast Cancer Mother     High Blood Pressure Mother     High Cholesterol Father     Breast Cancer Sister         Allergies:   No known allergies     Review of Systems:   Review of Systems   Constitutional: Positive for fatigue and fever. Negative for activity change, appetite change, chills, diaphoresis and unexpected weight change. Respiratory: Positive for cough. Shortness of breath. Cardiovascular: Negative for chest pain, palpitations and leg swelling. Gastrointestinal: Positive for constipation. Negative for abdominal distention, nausea and vomiting. Endocrine: Negative for cold intolerance. Genitourinary: Negative for difficulty urinating and dysuria. Musculoskeletal: Negative for arthralgias and back pain. Neurological: Negative for dizziness, tremors, syncope and facial asymmetry. Hematological: Negative for adenopathy. Psychiatric/Behavioral: Negative for agitation and behavioral problems. The patient is not hyperactive. Physical Examination :     Patient Vitals for the past 8 hrs:   BP Temp Temp src Pulse Resp SpO2 Height   05/24/21 1332       5' 2\" (1.575 m)   05/24/21 1238 131/79 98 °F (36.7 °C) Temporal 80 19 99 %    05/24/21 0800 117/73 97.3 °F (36.3 °C) Temporal 68 10 98 %      General Appearance: Awake, alert  Head:  Normocephalic, no trauma  Eyes: Pupils equal, round, reactive to light, sclera anicteric; conjunctivae pink. No embolic phenomena. ENT: Oropharynx clear, without erythema, exudate, or thrush. No tenderness of sinuses. Mouth/throat: mucosa pink and moist. No lesions. Dentition in good repair. Neck:Supple, without lymphadenopathy. Thyroid normal, No bruits.   Pulmonary/Chest: decreased breath sounds in the bilateral chest tube removal. Eval for worsening effusion vs reoccurance   pneumothorax. Reason for Exam: s/p chest tube removal. Eval for worsening effusion vs   reoccurance pneumothorax. Acuity: Acute   Type of Exam: Initial       FINDINGS:   Right upper extremity PICC remains in satisfactory position. Cardiomediastinal silhouette appears unchanged.  Low inspiratory volume. Bibasilar predominant airspace disease appears slightly improved particularly   on the left.  No definite effusion.  No pneumothorax or subdiaphragmatic free   air.           Impression   Improving bibasilar airspace disease.  No significant recurrent effusion.           Order History      EXAMINATION:   CT OF THE CHEST, ABDOMEN, AND PELVIS WITHOUT CONTRAST 5/20/2021 9:50 am       TECHNIQUE:   CT of the chest, abdomen and pelvis was performed without the administration   of intravenous contrast. Multiplanar reformatted images are provided for   review. Dose modulation, iterative reconstruction, and/or weight based   adjustment of the mA/kV was utilized to reduce the radiation dose to as low   as reasonably achievable.       COMPARISON:   CT chest dated 05/16/2021, and 05/04/2021.  CT chest, abdomen and pelvis   dated 05/07/2021       HISTORY:   ORDERING SYSTEM PROVIDED HISTORY: Esophageal rupture, empyema, monitor of   abdomen   TECHNOLOGIST PROVIDED HISTORY:   Esophageal rupture, empyema, monitor of abdomen       Reason for Exam: Esophageal rupture, empyema, monitor of abdomen   Acuity: Unknown   Type of Exam: Unknown       FINDINGS:       Chest:       Mediastinum: Heart is normal in size.  There is no pericardial effusion.    Thoracic aorta and pulmonary arteries are normal in caliber.  There is a   right arm PICC with distal tip in the distal SVC.  There are a couple mildly   prominent mediastinal lymph nodes, measuring up to 0.9 cm, which are   unchanged.  There is a loculated fluid collection in the right paraesophageal   space and esophageal hiatus, which measures 9.3 x 4.5 x 8.3 cm on axial   images, which is slightly decreased in size from the previous CT chest.  No   pneumomediastinum or gas within the fluid collection.       Lungs/pleura: There is a small mildly loculated left pleural effusion, which   is unchanged.  Right chest tube has been removed. David Hoyles is trace mildly   loculated right pleural fluid.  There is increased airspace consolidation in   the posterior mid and lower right lung.  Strandy and patchy opacities at the   left lung base are not significantly changed, likely atelectasis or scar.  No   pneumothorax.       Soft Tissues/Bones: There is no acute or suspicious osseous abnormality. Visualized superficial soft tissues are within normal limits.           Abdomen/Pelvis:       Organs: Limited unenhanced liver and spleen are grossly unremarkable.  The   gallbladder is surgically absent.  Limited unenhanced pancreas and adrenal   glands are unremarkable.       Kidneys are symmetric in size and attenuation.  No renal or ureteral   calculus.  No hydronephrosis or perinephric inflammation.       GI/Bowel: There is a moderate amount of ascites in the abdomen and pelvis,   which is increased from the previous CT abdomen and pelvis. Orlene Louann is no   free air.  Appendix is not seen. David Hoyles is no abnormal bowel distention.  The   majority of the stomach is intra-abdominal, and there are 2 PEG tubes in   place with retention bulbs in the mid stomach.  There is mild mesenteric   edema.  Surgical drain has been removed from the right lower quadrant.       Pelvis: Urinary bladder is unremarkable.  Uterus is surgically absent.  No   pelvic lymphadenopathy.       Peritoneum/Retroperitoneum: The abdominal aorta is normal in caliber.  There   is no retroperitoneal or mesenteric lymphadenopathy.       Bones/Soft Tissues: There is no acute or suspicious osseous abnormality.    Visualized superficial soft tissues are within normal limits.         Impression   1. Loculated fluid collection in the right paraesophageal space/esophageal   hiatus, stable to slightly decreased in size when compared to 05/16/2021. The collection measures approximately 9.3 x 4.5 cm, and there is no gas in   the collection.  This could be a postoperative fluid collection or loculated   ascites herniated into the space previously occupied by intrathoracic   stomach.  Possibility of abscess is not excluded.  No pneumomediastinum.       2.  Small mildly loculated left pleural effusion, which is unchanged. Interval removal of the right chest tube with trace right pleural fluid.       3.  Increased airspace consolidation in the posterior mid and lower right   lung, concerning for pneumonia.  Unchanged opacities in the posterior left   lung, possibly atelectasis or scarring.       4.  Moderate ascites in the abdomen and pelvis, which is increased.  No free   air.          EXAMINATION:   SINGLE CONTRAST ESOPHAGRAM       5/7/2021 11:03 am       TECHNIQUE:   Single contrast esophagram was performed with a total 100 mL of Omnipaque 240   oral contrast.       FLUOROSCOPY DOSE AND TYPE OR TIME AND EXPOSURES:   Fluoro time: 2.7 minutes; DAP: 70.44 mGy       COMPARISON:   Upper GI from 05/04/2021       HISTORY:   ORDERING SYSTEM PROVIDED HISTORY: s/p hiatal hernia reduction  with partial   gastrectomy and gastropexy   TECHNOLOGIST PROVIDED HISTORY:   s/p hiatal hernia reduction  with partial gastrectomy and gastropexy   Reason for Exam: H.H repair/gastrectomy/gastropexy/ 100 ml Omnipaque orally   Acuity: Unknown   Type of Exam: Unknown       61-year-old female status post hiatal hernia reduction with partial   gastrectomy and gastropexy       FINDINGS:   Fluoroscopic  imaging was obtained prior to the administration contrast.       2 gastrostomy tubes are seen projecting over the left upper quadrant.  There   also 2 surgical drains projecting over the left upper quadrant.  Prior cholecystectomy.  Suture material projects over the left upper quadrant.       The patient drank contrast which migrates through the postoperative region   and into the stomach and small bowel.       There is contrast draining through what appears to be the gastrostomy tubes.       No extraneous collection of contrast is seen beyond the confines of the   stomach.       There is contrast marginating a presumed gastrostomy tube balloon.       Mild gastroesophageal reflux and delayed transit of contrast is seen within   the distal esophagus/GE junction.           Impression   1. Drainage of contrast material through what appears to be the gastrostomy   tubes following the ingestion of contrast.  Contrast does migrate beyond the   postoperative region into the proximal small bowel. 2. No extraneous/extraluminal collection of contrast is seen beyond the   confines of the stomach. 3. 2 surgical drains and 2 presumed gastrostomy tubes are seen overlying the   left upper quadrant.  There does appear to be contrast slightly marginating   the more lateral gastrostomy tube balloon. 4. Delayed migration of contrast through the distal esophagus and GE junction   with mild gastroesophageal reflux. The findings were sent to the Radiology Results [address] at 12:43   pm on 5/7/2021to be communicated to a licensed caregiver.             EXAMINATION:   CT OF THE CHEST WITHOUT CONTRAST 5/4/2021 5:55 pm       TECHNIQUE:   CT of the chest was performed without the administration of intravenous   contrast. Multiplanar reformatted images are provided for review. Dose   modulation, iterative reconstruction, and/or weight based adjustment of the   mA/kV was utilized to reduce the radiation dose to as low as reasonably   achievable.       COMPARISON:   None.       HISTORY:   ORDERING SYSTEM PROVIDED HISTORY: large hiatal hernia, contrast progression?    TECHNOLOGIST PROVIDED HISTORY:   large hiatal hernia, contrast progression? Reason for Exam: large hiatal hernia, contrast progression?       FINDINGS:   Mediastinum: Heart size is normal without pericardial effusion.  There are   shotty mediastinal lymph nodes.  The thoracic aorta and main pulmonary artery   are normal in caliber.       Lungs/pleura: Moderate bilateral pleural effusions with adjacent   consolidation.  No pneumothorax.       Upper Abdomen: There is a large hiatal hernia containing the majority of the   stomach.  There is organoaxial volvulus.  Enteric tube is noted extending   below the diaphragm with tip outside the field of view.  The herniated   stomach is distended with contrast.  There is also contrast within the distal   esophagus. Vi Lolling is some contrast visualized within the portion of stomach   below the diaphragm.  Free air and fluid are noted within the visualized   upper abdomen with apparent free spill of contrast in the left upper quadrant.       Soft Tissues/Bones: No acute osseous abnormality.           Impression   1. Ascites and pneumoperitoneum with apparent free-flowing oral contrast in   the left upper quadrant is concerning for viscus perforation, possibly within   the subdiaphragmatic portion of the stomach. 2. Large hiatal hernia with organoaxial volvulus.  Majority of contrast is   retained within the esophagus and supradiaphragmatic stomach. 3. Enteric tube extends below the diaphragm with tip outside the field of   view. 4. Moderate bilateral pleural effusions with adjacent consolidation,   atelectasis versus pneumonia.    Critical results were called by Dr. Sharee Christian MD to [facility] on   5/4/2021 at 18:30.            CXR:  ONE XRAY VIEW OF THE CHEST       5/11/2021 9:22 am       COMPARISON:   AP chest from 05/07/2021; CT scan of the chest, abdomen and pelvis from   05/07/2021       HISTORY:   ORDERING SYSTEM PROVIDED HISTORY: follow up on bibasilar consolidation   TECHNOLOGIST PROVIDED HISTORY:   follow up on bibasilar consolidation       History of diabetes and hypertension.       FINDINGS:   Overlying ECG monitor leads and gown snaps.  New right-sided PICC tip   position in the SVC.       Low lung volumes accentuating findings, including cardiomegaly with bibasilar   opacities suggesting atelectasis/consolidation and effusions.  Patchy   infiltrate right mid lung also again noted.       Bones stable.           Impression   New right-sided PICC tip position appears satisfactory.  Otherwise, similar   findings compatible with bibasilar atelectasis/consolidation, effusions and   minimal infiltrate or atelectasis right mid lung.             CT scan:   CT OF THE CHEST WITHOUT CONTRAST 5/11/2021 10:31 am       TECHNIQUE:   CT of the chest was performed without the administration of intravenous   contrast. Multiplanar reformatted images are provided for review. Dose   modulation, iterative reconstruction, and/or weight based adjustment of the   mA/kV was utilized to reduce the radiation dose to as low as reasonably   achievable.       COMPARISON:   CT scan of the chest from 05/07/2021.       HISTORY:   ORDERING SYSTEM PROVIDED HISTORY: ?left loculated pleural effusion   TECHNOLOGIST PROVIDED HISTORY:   ?left loculated pleural effusion   Reason for Exam: ?left loculated pleural effusion   Acuity: Unknown   Type of Exam: Unknown       FINDINGS:   Mediastinum: Interval placement right-sided PICC with tip position in the   mid-lower SVC.  Small unchanged mediastinal nodes; no bulky lymphadenopathy.    No acute thoracic aortic or cardiac abnormality on this unenhanced scan;   prominent LV again noted.  Tiny unchanged pericardial fluid or thickening.       Lungs/pleura: Similar bilateral pleural effusions with considerable mostly   lower lobe atelectasis or consolidation with air bronchograms; larger   loculated appearing component (measuring 10.0 x 4.9 cm) extending along the   azygoesophageal recess, and across the midline (best to participate in the care of this patient. Please call with questions. Aida MEJÍA, CNP    ATTESTATION:    I have discussed the case, including pertinent history and exam findings with the APRN. I have evaluated the  History, physical findings and pictures of the patient and the key elements of the encounter have been performed by me. I have reviewed the laboratory data, other diagnostic studies and discussed them with the APRN. I have updated the medical record where necessary. I agree with the assessment, plan and orders as documented by the APRN.     Fela Sampson MD.      5/24/2021, 2:26 PM

## 2021-05-24 NOTE — PLAN OF CARE
Problem: Injury - Risk of, Physical Injury:  Goal: Will remain free from falls  Description: Will remain free from falls  5/24/2021 0244 by Mare Saha RN  Outcome: Ongoing     Problem: Sleep Pattern Disturbance:  Goal: Appears well-rested  Description: Appears well-rested  5/24/2021 0244 by Mare Saha RN  Outcome: Ongoing     Problem: Skin Integrity:  Goal: Will show no infection signs and symptoms  Description: Will show no infection signs and symptoms  5/24/2021 0244 by Mare Saha RN  Outcome: Ongoing     Problem: Skin Integrity:  Goal: Absence of new skin breakdown  Description: Absence of new skin breakdown  5/24/2021 0244 by Mare Saha RN  Outcome: Ongoing     Problem: Falls - Risk of:  Goal: Absence of physical injury  Description: Absence of physical injury  5/24/2021 0244 by Mare Saha RN  Outcome: Ongoing     Problem: Nutrition  Goal: Optimal nutrition therapy  Description: Nutrition Problem #1: Inadequate oral intake  Intervention: Food and/or Nutrient Delivery: Continue NPO, Start Parenteral Nutrition-suggest start with 150 g Dex, 50 g AA at 41.7 mL/hr with 100 mL 20% lipids.   Nutritional Goals: meet % of estimated nutrient needs     5/24/2021 0244 by Mare Saha RN  Outcome: Ongoing

## 2021-05-25 LAB
ABSOLUTE EOS #: 0 K/UL (ref 0–0.4)
ABSOLUTE IMMATURE GRANULOCYTE: 0.44 K/UL (ref 0–0.3)
ABSOLUTE LYMPH #: 1 K/UL (ref 1–4.8)
ABSOLUTE MONO #: 0.78 K/UL (ref 0.1–0.8)
ANION GAP SERPL CALCULATED.3IONS-SCNC: 10 MMOL/L (ref 9–17)
BASOPHILS # BLD: 1 % (ref 0–2)
BASOPHILS ABSOLUTE: 0.11 K/UL (ref 0–0.2)
BUN BLDV-MCNC: 20 MG/DL (ref 8–23)
BUN/CREAT BLD: ABNORMAL (ref 9–20)
CALCIUM IONIZED: 0.94 MMOL/L (ref 1.13–1.33)
CALCIUM SERPL-MCNC: 6.8 MG/DL (ref 8.6–10.4)
CHLORIDE BLD-SCNC: 100 MMOL/L (ref 98–107)
CO2: 26 MMOL/L (ref 20–31)
CREAT SERPL-MCNC: 1.19 MG/DL (ref 0.5–0.9)
DIFFERENTIAL TYPE: ABNORMAL
EOSINOPHILS RELATIVE PERCENT: 0 % (ref 1–4)
GFR AFRICAN AMERICAN: 55 ML/MIN
GFR NON-AFRICAN AMERICAN: 45 ML/MIN
GFR SERPL CREATININE-BSD FRML MDRD: ABNORMAL ML/MIN/{1.73_M2}
GFR SERPL CREATININE-BSD FRML MDRD: ABNORMAL ML/MIN/{1.73_M2}
GLUCOSE BLD-MCNC: 112 MG/DL (ref 65–105)
GLUCOSE BLD-MCNC: 114 MG/DL (ref 65–105)
GLUCOSE BLD-MCNC: 131 MG/DL (ref 65–105)
GLUCOSE BLD-MCNC: 150 MG/DL (ref 65–105)
GLUCOSE BLD-MCNC: 84 MG/DL (ref 65–105)
GLUCOSE BLD-MCNC: 86 MG/DL (ref 65–105)
GLUCOSE BLD-MCNC: 95 MG/DL (ref 70–99)
GLUCOSE BLD-MCNC: 98 MG/DL (ref 65–105)
HCT VFR BLD CALC: 27.1 % (ref 36.3–47.1)
HEMOGLOBIN: 8.2 G/DL (ref 11.9–15.1)
IMMATURE GRANULOCYTES: 4 %
LYMPHOCYTES # BLD: 9 % (ref 24–44)
MAGNESIUM: 1.7 MG/DL (ref 1.6–2.6)
MCH RBC QN AUTO: 27.1 PG (ref 25.2–33.5)
MCHC RBC AUTO-ENTMCNC: 30.3 G/DL (ref 28.4–34.8)
MCV RBC AUTO: 89.4 FL (ref 82.6–102.9)
MONOCYTES # BLD: 7 % (ref 1–7)
MORPHOLOGY: ABNORMAL
NRBC AUTOMATED: 0 PER 100 WBC
PDW BLD-RTO: 14.5 % (ref 11.8–14.4)
PLATELET # BLD: 288 K/UL (ref 138–453)
PLATELET ESTIMATE: ABNORMAL
PMV BLD AUTO: 9.1 FL (ref 8.1–13.5)
POTASSIUM SERPL-SCNC: 3.3 MMOL/L (ref 3.7–5.3)
RBC # BLD: 3.03 M/UL (ref 3.95–5.11)
RBC # BLD: ABNORMAL 10*6/UL
SEG NEUTROPHILS: 79 % (ref 36–66)
SEGMENTED NEUTROPHILS ABSOLUTE COUNT: 8.77 K/UL (ref 1.8–7.7)
SODIUM BLD-SCNC: 136 MMOL/L (ref 135–144)
WBC # BLD: 11.1 K/UL (ref 3.5–11.3)
WBC # BLD: ABNORMAL 10*3/UL

## 2021-05-25 PROCEDURE — 82330 ASSAY OF CALCIUM: CPT

## 2021-05-25 PROCEDURE — 83735 ASSAY OF MAGNESIUM: CPT

## 2021-05-25 PROCEDURE — 36415 COLL VENOUS BLD VENIPUNCTURE: CPT

## 2021-05-25 PROCEDURE — 99232 SBSQ HOSP IP/OBS MODERATE 35: CPT | Performed by: FAMILY MEDICINE

## 2021-05-25 PROCEDURE — 6370000000 HC RX 637 (ALT 250 FOR IP): Performed by: NURSE PRACTITIONER

## 2021-05-25 PROCEDURE — 2060000000 HC ICU INTERMEDIATE R&B

## 2021-05-25 PROCEDURE — 97110 THERAPEUTIC EXERCISES: CPT

## 2021-05-25 PROCEDURE — 6370000000 HC RX 637 (ALT 250 FOR IP): Performed by: STUDENT IN AN ORGANIZED HEALTH CARE EDUCATION/TRAINING PROGRAM

## 2021-05-25 PROCEDURE — 97116 GAIT TRAINING THERAPY: CPT

## 2021-05-25 PROCEDURE — 2580000003 HC RX 258: Performed by: STUDENT IN AN ORGANIZED HEALTH CARE EDUCATION/TRAINING PROGRAM

## 2021-05-25 PROCEDURE — 6370000000 HC RX 637 (ALT 250 FOR IP): Performed by: FAMILY MEDICINE

## 2021-05-25 PROCEDURE — 80048 BASIC METABOLIC PNL TOTAL CA: CPT

## 2021-05-25 PROCEDURE — 6360000002 HC RX W HCPCS: Performed by: STUDENT IN AN ORGANIZED HEALTH CARE EDUCATION/TRAINING PROGRAM

## 2021-05-25 PROCEDURE — 51798 US URINE CAPACITY MEASURE: CPT

## 2021-05-25 PROCEDURE — 85025 COMPLETE CBC W/AUTO DIFF WBC: CPT

## 2021-05-25 PROCEDURE — 99232 SBSQ HOSP IP/OBS MODERATE 35: CPT | Performed by: INTERNAL MEDICINE

## 2021-05-25 RX ORDER — POTASSIUM CHLORIDE 20 MEQ/1
40 TABLET, EXTENDED RELEASE ORAL ONCE
Status: COMPLETED | OUTPATIENT
Start: 2021-05-25 | End: 2021-05-25

## 2021-05-25 RX ADMIN — FLUCONAZOLE 400 MG: 40 POWDER, FOR SUSPENSION ORAL at 08:28

## 2021-05-25 RX ADMIN — GABAPENTIN 100 MG: 100 CAPSULE ORAL at 13:32

## 2021-05-25 RX ADMIN — POLYETHYLENE GLYCOL 3350 17 G: 17 POWDER, FOR SOLUTION ORAL at 08:28

## 2021-05-25 RX ADMIN — INSULIN LISPRO 3 UNITS: 100 INJECTION, SOLUTION INTRAVENOUS; SUBCUTANEOUS at 12:06

## 2021-05-25 RX ADMIN — HEPARIN SODIUM 5000 UNITS: 5000 INJECTION INTRAVENOUS; SUBCUTANEOUS at 05:46

## 2021-05-25 RX ADMIN — Medication 81 MG: at 08:28

## 2021-05-25 RX ADMIN — SODIUM CHLORIDE, PRESERVATIVE FREE 10 ML: 5 INJECTION INTRAVENOUS at 08:27

## 2021-05-25 RX ADMIN — ATORVASTATIN CALCIUM 10 MG: 10 TABLET, FILM COATED ORAL at 21:49

## 2021-05-25 RX ADMIN — HEPARIN SODIUM 5000 UNITS: 5000 INJECTION INTRAVENOUS; SUBCUTANEOUS at 13:32

## 2021-05-25 RX ADMIN — GABAPENTIN 100 MG: 100 CAPSULE ORAL at 08:28

## 2021-05-25 RX ADMIN — GABAPENTIN 100 MG: 100 CAPSULE ORAL at 21:49

## 2021-05-25 RX ADMIN — HEPARIN SODIUM 5000 UNITS: 5000 INJECTION INTRAVENOUS; SUBCUTANEOUS at 21:49

## 2021-05-25 RX ADMIN — LEVOTHYROXINE SODIUM 112 MCG: 112 TABLET ORAL at 05:46

## 2021-05-25 RX ADMIN — INSULIN GLARGINE 10 UNITS: 100 INJECTION, SOLUTION SUBCUTANEOUS at 21:50

## 2021-05-25 RX ADMIN — DOCUSATE SODIUM 50MG AND SENNOSIDES 8.6MG 2 TABLET: 8.6; 5 TABLET, FILM COATED ORAL at 08:28

## 2021-05-25 RX ADMIN — PANTOPRAZOLE SODIUM 40 MG: 40 TABLET, DELAYED RELEASE ORAL at 05:46

## 2021-05-25 RX ADMIN — POTASSIUM CHLORIDE 40 MEQ: 1500 TABLET, EXTENDED RELEASE ORAL at 06:53

## 2021-05-25 ASSESSMENT — ENCOUNTER SYMPTOMS
DIARRHEA: 0
COUGH: 0
ABDOMINAL PAIN: 0
NAUSEA: 0
BLOOD IN STOOL: 0
VOMITING: 0
CONSTIPATION: 1
WHEEZING: 0
CHEST TIGHTNESS: 0
SHORTNESS OF BREATH: 0

## 2021-05-25 ASSESSMENT — PAIN SCALES - GENERAL
PAINLEVEL_OUTOF10: 0
PAINLEVEL_OUTOF10: 1

## 2021-05-25 ASSESSMENT — PAIN DESCRIPTION - ORIENTATION: ORIENTATION: LOWER

## 2021-05-25 ASSESSMENT — PAIN DESCRIPTION - PAIN TYPE: TYPE: SURGICAL PAIN

## 2021-05-25 NOTE — PLAN OF CARE
Problem: Falls - Risk of:  Goal: Absence of physical injury  Description: Absence of physical injury  5/25/2021 0343 by Taurus Jackson RN  Outcome: Ongoing  5/24/2021 1634 by Wilbur Little RN  Outcome: Ongoing   Pt remains free from falls at this time. Floor free from obstacles, and bed is locked and in lowest position. Adequate lighting provided. Call light within reach; pt encouraged to call before getting OOB for any need. Will continue to monitor needs during hourly rounding.     Electronically signed by Taurus Jackson RN on 5/25/2021 at 3:43 AM

## 2021-05-25 NOTE — PROGRESS NOTES
[facility] -Progress Note    Today's Date and Time: 5/25/2021, 12:52 PM    Impression :     Paraesophageal Hiatal hernia  Perforation of the esophagus  S/p laparoscopic, robotic para esophageal hernia repair 5-4-21  Acute pancreatitis  Shock   Hyperglycemia  NSTEMI  BONNIE  Not a MRSA carrier  Coagulase negative Staphylococci bacteremia x 2 on 5-7-21. Repeat blood cultures 5-8-21 x 2 : No growth . Bilateral Empyema 5/13/21  Left sided thoracentesis 5/12/21-grew Candida albicans  Right sided pigtail chest tube placed on 5/12/21 for right-sided effusion    Recommendations:   D/C Zosyn started 5/6/21. Stop date 4-19-21. D/C Vancomycin started 5/8/21 for Coagulase negative Staph. Stop date 5-19-21    Gram positive rods from thoracentesis identified as yeast. (Candida albicans). Fluconazole started 5-15-21. Will continue liquid Diflucan 400 mg q day. Stop date 5-30-21  Plan Follow up repeat CT chest to evaluate mediastinal collection and lung infiltrates in about 2 weeks. Medical Decision Making/Summary/Discussion:5/25/2021     Patient with large paraesophageal hiatal hernia with perforation of esophagus  Hyperglycemia   Acute pancreatitis  Shock   S/p laparoscopic, robotic para esophageal hernia repair 5-4-21  Improvement in overall picture but is developing basilar infiltrates  Not a MRSA carrier  Unclear whether this is fluid retention vs residual leakage vs secondary pneumonia  Esophagogram 5-7-21 does not show a leak  Will plan to continue zosyn and add Diflucan. Monitor temps and CXR. Zosyn and Vanco to continue while awaiting possible VATS/decortication procedure. VATS not to be done. Will D/C antibiotics. Continue Diflucan liquid for 2  weeks.     Infection Control Recommendations   [place] Precautions    Antimicrobial Stewardship Recommendations     Simplification of therapy  Targeted therapy    Coordination of Outpatient Care:   Estimated Length of IV antimicrobials: TBD  Patient will need Midline Catheter Insertion:No   Patient will need PICC line Insertion:No  Patient will need: Home IV , [place],  SNF,  LTAC:  Patient will need outpatient wound care:Yes    Chief complaint/reason for consultation:   Septic shock/sepsis    History of Present Illness:   Talon Morataya is a 78y.o.-year-old   female who was initially admitted on 5/3/2021. Patient seen at the request of Lesly Holliday. INITIAL HISTORY : 05/07/2021    Pt with a history of diabetes mellitus type 2, hypertension, hyperlipidemia, thyroid disease, gout and hiatal hernia. She initially presented on 05/03/2021 to an outlying facility with a chief complaint of nausea vomiting, diarrhea and syncopal attack . Patient was found to have blood glucose of 585,WBC of 23.5, elevated lipase 1451 and elevated beta hydroxybutyrate. MRCP on 05/03/2021 showed diffuse small bowel wall thickening likely due to third spacing as opposed to enteritis. CT scan of the abdomen showed extremely large fluid-filled hiatal hernia and distended and fluid-filled stomach below the diaphragm. Patient was given Zosyn at the outlying facility and was transferred to [facility] ED for evaluation by the surgery team.    CT scan of the chest without contrast was performed on 05/04/2021 which showed ascites and pneumoperitoneum with apparent free-flowing oral contrast in the left upper quadrant concerning for viscus perforation possibly within the subdiaphragmatic portion of the stomach. Moderate bilateral pleural effusions and hiatal hernia with organoaxial volvulus. Bariatric surgery performed a laparoscopic robotic para esophageal hernia repair. Cardiology is also following the patient because of elevated troponins with unremarkable echocardiography. Antibiotics wise the patient was given Zosyn on 05/03/2021 at the outside facility and it has been continued through the present time.     Blood and urine cultures on 05/03/2021 show No growth . Repeat urine culture on 05/04/2021 shows No growth     Patient is currently having temperature elevations. Temp max of 101.1 on 05/07/2021 around 4 AM in the morning. Patient WBC count is improving from 23.5 on presentation to 6.2 today. Repeat chest x-ray 5-7-21 shows bibasilar consolidation new from prior study with blunting of the costophrenic angles bilaterally. Patient is off of the pressors since 5-6-21    Left Thoracentesis on 5/12/21 for left loculated empyema-Pigtail catheter placed with pus noted. Fluid shows Candida albicans  Right sided 12 Portuguese chest tube placed on 5/13/21 for empyema-removed 5/18/21   Patient on liquid Diflucan    Follow-up esophagram on 5/11/21 negative for a leak. On low fiber diet plus supplements  Meeting % of nutritional needs    Coagulase negative Staphylococci bacteremia x 2 on 5-7-21. Repeat blood cultures 5-8-21 x 2 : No growth. Femoral line and moerl catheter were removed. Rt and Lt TAISHA drains removed, on 5-7- and 5-11-21, respectively  Chest tube removed on 5/18/21. No pneumothorax identified after removal chest tube. CURRENT EVALUATION : 5/25/2021    Afebrile  VS stable    No change at this time. Pending precert at this time. On 3-->2.5 L -->0.5 L NC  RR 26-->17-->19->20  02 sat 96-->99 %->96%    The patient is to follow-up as an outpatient with CT surgery in 2 weeks for paraesophageal fluid collection. She is tolerating an oral diet and is not needing additional nutrition via PEG at this time. PO diflucan until 5/30/21 for empyema fluid positive for Candida Albicans    Plan is to D/C to SNF-discharge planning started. Discussed with RN    Labs, X rays reviewed: 5/25/2021    BUN: 32->28-->24-->22->26  Cr: 1.60->1.7-->1.6-->1.43->1.19    WBC: 10.3->10.9-->9.9-->11.3->11.1  Hb: 8.1-->7.7-->8.5->8. 2  Plat: 385-->346-->315->288    LD: 345  Triglycerides: 261    Cultures:    Urine:  05/03/2021: No growth   05/04/2021: No growth     Blood:  05/03/2021: Blood cultures x 2:  No growth  5/7/21: Coagulase negative Staphylococci bacteremia x 2  5/8/21: No growth  Sputum :    Thoracentesis fluid:  Lt side 5/12/21 gram positive rods/ candida albicans. Rt side 5-13-21: No growth     CT chest 5/11/21  New right-sided PICC again seen with unchanged and satisfactory tip position;   larger loculated right effusion in the azygoesophageal recess, as above. Otherwise similar findings to 05/07/2021 with suspected lower lobe   consolidation/pneumonitis, with volume loss and effusions both lower lobes. CT chest 5/16:  Impression:        1.  Right chest tube in place with near complete resolution of right   effusion.  No pneumothorax or loculated fluid in the right hemithorax. 2.  Trace left pleural effusion. 3.  Dependent opacities in the lower lobes, left greater than right, again   demonstrated, although improved since prior chest CT exam.     4.  Moderate amount of fluid within the esophageal hiatus. 5.  Partially visualized stomach appears distended with 2 gastrostomy tubes   in place. Discussed with patient, RN, IM, Pulmonary, daughter in law. I have personally reviewed the past medical history, past surgical history, medications, social history, and family history, and I have updated the database accordingly.   Past Medical History:     Past Medical History:   Diagnosis Date    Diabetes mellitus ([address]     Gout     Hiatal hernia     Hyperlipidemia     Hypertension     Thyroid disease        Past Surgical  History:     Past Surgical History:   Procedure Laterality Date    BREAST SURGERY      Bx 1993    CHOLECYSTECTOMY  1999    GASTRIC FUNDOPLICATION N/A 0/2/3311    PARAESPHAGEAL HERNIA REPAIR LAPAROSCOPIC ROBOTIC performed by Zakia Coburn DO at [address] [place] [address]  5/10/2021         HYSTERECTOMY  1997    IR CHEST TUBE INSERTION  5/13/2021    IR CHEST TUBE INSERTION 5/13/2021 Jovon Harrell MD [facility]    OTHER SURGICAL HISTORY  1962    Remove spur L ankle    OTHER SURGICAL HISTORY  1978    Pin and wire repair R ankle    TUBAL LIGATION  1988       Medications:      sennosides-docusate sodium  2 tablet Oral Daily    fluconazole  400 mg Oral Daily    gabapentin  100 mg Oral TID    aspirin  81 mg Oral Daily    atorvastatin  10 mg Oral Daily    levothyroxine  112 mcg Oral Daily    pantoprazole  40 mg Oral QAM AC    insulin glargine  10 Units Subcutaneous Nightly    polyethylene glycol  17 g Oral Daily    heparin (porcine)  5,000 Units Subcutaneous 3 times per day    insulin lispro  0-18 Units Subcutaneous Q4H    sodium chloride flush  5-40 mL Intravenous 2 times per day       Social History:     Social History     Socioeconomic History    Marital status:      Spouse name: Not on file    Number of children: Not on file    Years of education: Not on file    Highest education level: Not on file   Occupational History    Not on file   Tobacco Use    Smoking status: Never Smoker    Smokeless tobacco: Never Used   Substance and Sexual Activity    Alcohol use: Never    Drug use: Never    Sexual activity: Not on file   Other Topics Concern    Not on file   Social History Narrative    Not on file     Social Determinants of Health     Financial Resource Strain:     Difficulty of Paying Living Expenses:    Food Insecurity:     Worried About Running Out of Food in the Last Year:     Ran Out of Food in the Last Year:    Transportation Needs:     Lack of Transportation (Medical):      Lack of Transportation (Non-Medical):    Physical Activity:     Days of Exercise per Week:     Minutes of Exercise per Session:    Stress:     Feeling of Stress :    Social Connections:     Frequency of Communication with Friends and Family:     Frequency of Social Gatherings with Friends and Family:     Attends Shinto Services:     Active Member of Clubs or Organizations:     Attends Club or Organization Meetings:     Marital Status:    Intimate Partner Violence:     Fear of Current or Ex-Partner:     Emotionally Abused:     Physically Abused:     Sexually Abused:        Family History:     Family History   Problem Relation Age of Onset    Breast Cancer Mother     High Blood Pressure Mother     High Cholesterol Father     Breast Cancer Sister         Allergies:   No known allergies     Review of Systems:   Review of Systems   Constitutional: Positive for fatigue and fever. Negative for activity change, appetite change, chills, diaphoresis and unexpected weight change. Respiratory: Positive for cough. Shortness of breath. Cardiovascular: Negative for chest pain, palpitations and leg swelling. Gastrointestinal: Positive for constipation. Negative for abdominal distention, nausea and vomiting. Endocrine: Negative for cold intolerance. Genitourinary: Negative for difficulty urinating and dysuria. Musculoskeletal: Negative for arthralgias and back pain. Neurological: Negative for dizziness, tremors, syncope and facial asymmetry. Hematological: Negative for adenopathy. Psychiatric/Behavioral: Negative for agitation and behavioral problems. The patient is not hyperactive. Physical Examination :     Patient Vitals for the past 8 hrs:   BP Temp Temp src Pulse Resp SpO2   05/25/21 1115 131/83 98.2 °F (36.8 °C) Oral 84 20 96 %   05/25/21 0716 132/83 98.2 °F (36.8 °C) Oral 78 20 98 %     General Appearance: Awake, alert  Head:  Normocephalic, no trauma  Eyes: Pupils equal, round, reactive to light, sclera anicteric; conjunctivae pink. No embolic phenomena. ENT: Oropharynx clear, without erythema, exudate, or thrush. No tenderness of sinuses. Mouth/throat: mucosa pink and moist. No lesions. Dentition in good repair. Neck:Supple, without lymphadenopathy.  Thyroid normal, No bruits. Pulmonary/Chest: decreased breath sounds in the bilateral lower bases. + tachypnea   Cardiovascular: Regular rate and rhythm without murmurs, rubs, or gallops. Abdomen: Soft, non tender. Bowel sounds normal. No organomegaly. The ports entry wounds are healing well and without any erythema. All four Extremities: No cyanosis, clubbing, edema, or effusions. Neurologic: No gross sensory or motor deficits. Skin: Warm and dry with good turgor. No signs of peripheral arterial or venous insufficiency. No ulcerations. No open wounds. Medical Decision Making -Laboratory:   I have independently reviewed/ordered the following labs:    CBC with Differential:   Recent Labs     05/24/21  0624 05/25/21  0551   WBC 11.3 11.1   HGB 8.5* 8.2*   HCT 27.3* 27.1*    288   LYMPHOPCT 12* 9*   MONOPCT 4 7     BMP:   Recent Labs     05/24/21  0624 05/25/21  0551    136   K 3.5* 3.3*   CL 99 100   CO2 25 26   BUN 22 20   CREATININE 1.43* 1.19*   MG 1.7 1.7     Hepatic Function Panel:   No results for input(s): PROT, LABALBU, BILIDIR, IBILI, BILITOT, ALKPHOS, ALT, AST in the last 72 hours. No results for input(s): RPR in the last 72 hours. No results for input(s): HIV in the last 72 hours. No results for input(s): BC in the last 72 hours.   Lab Results   Component Value Date    MUCUS NOT REPORTED 05/06/2021    RBC 3.03 05/25/2021    RBC 6.23 05/03/2021    TRICHOMONAS NOT REPORTED 05/06/2021    WBC 11.1 05/25/2021    YEAST NOT REPORTED 05/06/2021    TURBIDITY CLOUDY 05/06/2021     Lab Results   Component Value Date    CREATININE 1.19 05/25/2021    CREATININE 3.09 05/03/2021    GLUCOSE 95 05/25/2021    GLUCOSE 453 05/03/2021       Medical Decision Making-Imaging:     EXAMINATION:   ONE XRAY VIEW OF THE CHEST       5/20/2021 9:45 am       COMPARISON:   CT earlier today at 09:43 and radiograph May 18, 2021       HISTORY:   ORDERING SYSTEM PROVIDED HISTORY: s/p chest tube removal. Eval for worsening   effusion vs fluid collection in the right paraesophageal   space and esophageal hiatus, which measures 9.3 x 4.5 x 8.3 cm on axial   images, which is slightly decreased in size from the previous CT chest.  No   pneumomediastinum or gas within the fluid collection.       Lungs/pleura: There is a small mildly loculated left pleural effusion, which   is unchanged.  Right chest tube has been removed. Joseph Motto is trace mildly   loculated right pleural fluid.  There is increased airspace consolidation in   the posterior mid and lower right lung.  Strandy and patchy opacities at the   left lung base are not significantly changed, likely atelectasis or scar.  No   pneumothorax.       Soft Tissues/Bones: There is no acute or suspicious osseous abnormality. Visualized superficial soft tissues are within normal limits.           Abdomen/Pelvis:       Organs: Limited unenhanced liver and spleen are grossly unremarkable.  The   gallbladder is surgically absent.  Limited unenhanced pancreas and adrenal   glands are unremarkable.       Kidneys are symmetric in size and attenuation.  No renal or ureteral   calculus.  No hydronephrosis or perinephric inflammation.       GI/Bowel: There is a moderate amount of ascites in the abdomen and pelvis,   which is increased from the previous CT abdomen and pelvis. Marilyne [place] is no   free air.  Appendix is not seen. Joseph Motto is no abnormal bowel distention.  The   majority of the stomach is intra-abdominal, and there are 2 PEG tubes in   place with retention bulbs in the mid stomach.  There is mild mesenteric   edema.  Surgical drain has been removed from the right lower quadrant.       Pelvis: Urinary bladder is unremarkable.  Uterus is surgically absent.  No   pelvic lymphadenopathy.       Peritoneum/Retroperitoneum: The abdominal aorta is normal in caliber.  There   is no retroperitoneal or mesenteric lymphadenopathy.       Bones/Soft Tissues: There is no acute or suspicious osseous abnormality. Visualized superficial soft tissues are within normal limits.           Impression   1. Loculated fluid collection in the right paraesophageal space/esophageal   hiatus, stable to slightly decreased in size when compared to 05/16/2021. The collection measures approximately 9.3 x 4.5 cm, and there is no gas in   the collection.  This could be a postoperative fluid collection or loculated   ascites herniated into the space previously occupied by intrathoracic   stomach.  Possibility of abscess is not excluded.  No pneumomediastinum.       2.  Small mildly loculated left pleural effusion, which is unchanged. Interval removal of the right chest tube with trace right pleural fluid.       3.  Increased airspace consolidation in the posterior mid and lower right   lung, concerning for pneumonia.  Unchanged opacities in the posterior left   lung, possibly atelectasis or scarring.       4.  Moderate ascites in the abdomen and pelvis, which is increased.  No free   air.          EXAMINATION:   SINGLE CONTRAST ESOPHAGRAM       5/7/2021 11:03 am       TECHNIQUE:   Single contrast esophagram was performed with a total 100 mL of Omnipaque 240   oral contrast.       FLUOROSCOPY DOSE AND TYPE OR TIME AND EXPOSURES:   Fluoro time: 2.7 minutes; DAP: 70.44 mGy       COMPARISON:   Upper GI from 05/04/2021       HISTORY:   ORDERING SYSTEM PROVIDED HISTORY: s/p hiatal hernia reduction  with partial   gastrectomy and gastropexy   TECHNOLOGIST PROVIDED HISTORY:   s/p hiatal hernia reduction  with partial gastrectomy and gastropexy   Reason for Exam: H.H repair/gastrectomy/gastropexy/ 100 ml Omnipaque orally   Acuity: Unknown   Type of Exam: Unknown       81-year-old female status post hiatal hernia reduction with partial   gastrectomy and gastropexy       FINDINGS:   Fluoroscopic  imaging was obtained prior to the administration contrast.       2 gastrostomy tubes are seen projecting over the left upper quadrant.  There   also 2 surgical drains projecting over the left upper quadrant.  Prior   cholecystectomy.  Suture material projects over the left upper quadrant.       The patient drank contrast which migrates through the postoperative region   and into the stomach and small bowel.       There is contrast draining through what appears to be the gastrostomy tubes.       No extraneous collection of contrast is seen beyond the confines of the   stomach.       There is contrast marginating a presumed gastrostomy tube balloon.       Mild gastroesophageal reflux and delayed transit of contrast is seen within   the distal esophagus/GE junction.           Impression   1. Drainage of contrast material through what appears to be the gastrostomy   tubes following the ingestion of contrast.  Contrast does migrate beyond the   postoperative region into the proximal small bowel. 2. No extraneous/extraluminal collection of contrast is seen beyond the   confines of the stomach. 3. 2 surgical drains and 2 presumed gastrostomy tubes are seen overlying the   left upper quadrant.  There does appear to be contrast slightly marginating   the more lateral gastrostomy tube balloon. 4. Delayed migration of contrast through the distal esophagus and GE junction   with mild gastroesophageal reflux. The findings were sent to the Radiology Results [address] at 12:43   pm on 5/7/2021to be communicated to a licensed caregiver.             EXAMINATION:   CT OF THE CHEST WITHOUT CONTRAST 5/4/2021 5:55 pm       TECHNIQUE:   CT of the chest was performed without the administration of intravenous   contrast. Multiplanar reformatted images are provided for review. Dose   modulation, iterative reconstruction, and/or weight based adjustment of the   mA/kV was utilized to reduce the radiation dose to as low as reasonably   achievable.       COMPARISON:   None.       HISTORY:   ORDERING SYSTEM PROVIDED HISTORY: large hiatal hernia, contrast progression? TECHNOLOGIST PROVIDED HISTORY:   large hiatal hernia, contrast progression? Reason for Exam: large hiatal hernia, contrast progression?       FINDINGS:   Mediastinum: Heart size is normal without pericardial effusion.  There are   shotty mediastinal lymph nodes.  The thoracic aorta and main pulmonary artery   are normal in caliber.       Lungs/pleura: Moderate bilateral pleural effusions with adjacent   consolidation.  No pneumothorax.       Upper Abdomen: There is a large hiatal hernia containing the majority of the   stomach.  There is organoaxial volvulus.  Enteric tube is noted extending   below the diaphragm with tip outside the field of view.  The herniated   stomach is distended with contrast.  There is also contrast within the distal   esophagus. Suni Pilon is some contrast visualized within the portion of stomach   below the diaphragm.  Free air and fluid are noted within the visualized   upper abdomen with apparent free spill of contrast in the left upper quadrant.       Soft Tissues/Bones: No acute osseous abnormality.           Impression   1. Ascites and pneumoperitoneum with apparent free-flowing oral contrast in   the left upper quadrant is concerning for viscus perforation, possibly within   the subdiaphragmatic portion of the stomach. 2. Large hiatal hernia with organoaxial volvulus.  Majority of contrast is   retained within the esophagus and supradiaphragmatic stomach. 3. Enteric tube extends below the diaphragm with tip outside the field of   view. 4. Moderate bilateral pleural effusions with adjacent consolidation,   atelectasis versus pneumonia.    Critical results were called by Dr. Pelon Ruiz MD to [facility] on   5/4/2021 at 18:30.            CXR:  ONE XRAY VIEW OF THE CHEST       5/11/2021 9:22 am       COMPARISON:   AP chest from 05/07/2021; CT scan of the chest, abdomen and pelvis from   05/07/2021       HISTORY:   ORDERING SYSTEM PROVIDED HISTORY[facility]   TECHNOLOGIST PROVIDED HISTORY:   follow up on bibasilar consolidation       History of diabetes and hypertension.       FINDINGS:   Overlying ECG monitor leads and gown snaps.  New right-sided PICC tip   position in the SVC.       Low lung volumes accentuating findings, including cardiomegaly with bibasilar   opacities suggesting atelectasis/consolidation and effusions.  Patchy   infiltrate right mid lung also again noted.       Bones stable.           Impression   New right-sided PICC tip position appears satisfactory.  Otherwise, similar   findings compatible with bibasilar atelectasis/consolidation, effusions and   minimal infiltrate or atelectasis right mid lung.             CT scan:   CT OF THE CHEST WITHOUT CONTRAST 5/11/2021 10:31 am       TECHNIQUE:   CT of the chest was performed without the administration of intravenous   contrast. Multiplanar reformatted images are provided for review. Dose   modulation, iterative reconstruction, and/or weight based adjustment of the   mA/kV was utilized to reduce the radiation dose to as low as reasonably   achievable.       COMPARISON:   CT scan of the chest from 05/07/2021.       HISTORY:   ORDERING SYSTEM PROVIDED HISTORY: ?left loculated pleural effusion   TECHNOLOGIST PROVIDED HISTORY:   ?left loculated pleural effusion   Reason for Exam: ?left loculated pleural effusion   Acuity: Unknown   Type of Exam: Unknown       FINDINGS:   Mediastinum: Interval placement right-sided PICC with tip position in the   mid-lower SVC.  Small unchanged mediastinal nodes; no bulky lymphadenopathy.    No acute thoracic aortic or cardiac abnormality on this unenhanced scan;   prominent LV again noted.  Tiny unchanged pericardial fluid or thickening.       Lungs/pleura: Similar bilateral pleural effusions with considerable mostly   lower lobe atelectasis or consolidation with air bronchograms; larger   loculated appearing component (measuring 10.0 x 4.9 cm) extending along the   azygoesophageal recess, and across the midline (best seen slices 47-48,   series 2).  Tracheobronchial tree patent centrally       Upper Abdomen: Similar small amount perihepatic and perisplenic ascitic fluid   and soft tissue infiltration visualized abdominal adipose tissue.  Clips   status post cholecystectomy.  Mild hepatic steatosis.       Soft Tissues/Bones: No acute abnormality.           Impression   New right-sided PICC again seen with unchanged and satisfactory tip position;   larger loculated right effusion in the azygoesophageal recess, as above. Otherwise similar findings to 05/07/2021 with suspected lower lobe   consolidation/pneumonitis, with volume loss and effusions both lower lobes.               Medical Decision [id number]:     Specimen Description 05/12/2021  5:09  [address]   . THORACENTESIS FLUID LEFT    Special Requests 05/12/2021  5:09  [address]   NOT REPORTED    Direct Exam Abnormal  05/12/2021  5:09 PM [facility]    Direct Exam 05/12/2021  5:09  [address]   NO BACTERIA SEEN    Direct Exam 05/12/2021  5:09  [address]   Gram stain made from cytocentrifuged specimen.  Organisms and cells will be concentrated.     Culture Abnormal  05/12/2021  5:09 PM [address] [facility] FLUID CULTURE, RN NOTIFIED Results called to and read back by: ROSALINDA WALLER 05/14/21 0430    Culture 05/12/2021  5:09 PM [facility] FROM BOTTLE: Koreen Rosin POSITIVE RODS          Medical Decision Making-Other:     Note:  Labs, medications, radiologic studies were reviewed with personal review of films   Large amounts of data were reviewed  Discussed with nursing Staff, Discharge planner  Infection Control and Prevention measures reviewed  All prior entries were reviewed  Administer medications as ordered  Prognosis: Guarded  Discharge planning reviewed  Follow up as outpatient. Thank you for allowing us to participate in the care of this patient. Please call with questions. Rebecca Osman MD  Internal Medicine Resident, PGY-3  [facility]; [place], [place]  5/25/2021, 12:56 PM    ATTESTATION:    I have discussed the case, including pertinent history and exam findings with the residents and students. I have seen and examined the patient and the key elements of the encounter have been performed by me. I was present when the student obtained his information or examined the patient. I have reviewed the laboratory data, other diagnostic studies and discussed them with the residents. I have updated the medical record where necessary. I agree with the assessment, plan and orders as documented by the resident/ student.     Kaila Muñoz MD.

## 2021-05-25 NOTE — PROGRESS NOTES
[facility] KINCAID, [facility]. Acute pancreatitis, unspecified complication status, unspecified pancreatitis type [K85.90] . Vitals:    05/25/21 0716   BP: 132/83   Pulse: 78   Resp: 20   Temp: 98.2 °F (36.8 °C)   SpO2: 98%   . Patients home meds are   Prior to Admission medications    Medication Sig Start Date End Date Taking? Authorizing Provider   levothyroxine (SYNTHROID) 112 MCG tablet Take 1 tablet by mouth Daily 5/21/21  Yes Lisa Dennis MD   gabapentin (NEURONTIN) 100 MG capsule Take 1 capsule by mouth 3 times daily for 10 days.  5/20/21 5/30/21 Yes Lisa Dennis MD   fluconazole (DIFLUCAN) 40 MG/ML suspension Take 10 mLs by mouth daily for 9 days 5/21/21 5/30/21 Yes Lisa Dennis MD   SITagliptin (JANUVIA) 100 MG tablet Take 100 mg by mouth daily    Historical Provider, MD   omeprazole (PRILOSEC) 40 MG delayed release capsule Take 1 capsule by mouth every morning (before breakfast) 4/28/21   Audrey Rod DO   simvastatin (ZOCOR) 10 MG tablet Take 10 mg by mouth nightly    Historical Provider, MD   aspirin 81 MG EC tablet Take 81 mg by mouth daily    Historical Provider, MD   .  Recent Surgical History: None = 0     Assessment       RR 18  Breath Sounds: diminished      Bronchodilator assessment at level  1  Hyperinflation assessment at level   Secretion Management assessment at level      []    Bronchodilator Assessment  BRONCHODILATOR ASSESSMENT SCORE  Score 0 1 2 3 4 5   Breath Sounds   []  Patient Baseline []  No Wheeze good aeration []  Faint, scattered wheezing, good aeration [x]  Expiratory Wheezing and or moderately diminished []  Insp/Exp wheeze and/or very diminished []  Insp/Exp and/ or marked distress   Respiratory Rate   []  Patient Baseline []  Less than 20 [x]  Less than 20 []  20-25 []  Greater than 25 []  Greater than 25   Peak flow % of Pred or PB []  NA   []  Greater than 90%  []  81-90% []  71-80% []  Less than or equal to 70%  or unable to perform []  Unable due to Respiratory Distress   Dyspnea re []  Patient Baseline []  No SOB [x]  No SOB []  SOB on exertion []  SOB min activity []  At rest/acute   e FEV% Predicted       []  NA []  Above 69%  []  Unable []  Above 60-69%  []  Unable []  Above 50-59%  []  Unable []  Above 35-49%  []  Unable []  Less than 35%  []  Unable                 []  Hyperinflation Assessment  Score 1 2 3   CXR and Breath Sounds   []  Clear []  No atelectasis  Basilar aeration []  Atelectasis or absent basilar breath sounds   Incentive Spirometry Volume  (Per IBW)   []  Greater than or equal to 15ml/Kg []  less than 15ml/Kg []  less than 15ml/Kg   Surgery within last 2 weeks []  None or general   []  Abdominal or thoracic surgery  []  Abdominal or thoracic   Chronic Pulmonary Historyre []  No []  Yes []  Yes     []  Secretion Management Assessment  Score 1 2 3   Bilateral Breath Sounds   []  Occasional Rhonchi []  Scattered Rhonchi []  Course Rhonchi and/or poor aeration   Sputum    []  Small amount of thin secretions []  Moderate amount of viscous secretions []  Copius, Viscious Yellow/ Secretions   CXR as reported by physician []  clear  []  Unavailable []  Infiltrates and/or consolidation  []  Unavailable []  Mucus Plugging and or lobar consolidation  []  Unavailable   Cough []  Strong, productive cough []  Weak productive cough []  No cough or weak non-productive cough   DIANA KINCAID, [facility]  8:36 AM                            FEMALE                                  MALE                            FEV1 Predicted Normal Values                        FEV1 Predicted Normal Values          Age                                     Height in Feet and Inches       Age                                     Height in Feet and Inches       4' 11\" 5' 1\" 5' 3\" 5' 5\" 5' 7\" 5' 9\" 5' 11\" 6' 1\"  4' 11\" 5' 1\" 5' 3\" 5' 5\" 5' 7\" 5' 9\" 5' 11\" 6' 1\"   42 - 45 2.49 2.66 2.84 3.03 3.22 3.42 3.62 3.83 42 - 45 2.82 3.03 3.26 3.49 3.72 3.96 4.22 4.47   46 - 49 2. 40 2.57 2.76 2.94 3.14 3.33 3.54 3.75 46 - 49 2.70 2.92 3.14 3.37 3.61 3.85 4.10 4.36   50 - 53 2.31 2.48 2.66 2.85 3.04 3.24 3.45 3.66 50 - 53 2.58 2.80 3.02 3.25 3.49 3.73 3.98 4.24   54 - 57 2.21 2.38 2.57 2.75 2.95 3.14 3.35 3.56 54 - 57 2.46 2.67 2.89 3.12 3.36 3.60 3.85 4.11   58 - 61 2.10 2.28 2.46 2.65 2.84 3.04 3.24 3.45 58 - 61 2.32 2.54 2.76 2.99 3.23 3.47 3.72 3.98   62 - 65 1.99 2.17 2.35 2.54 2.73 2.93 3.13 3.34 62 - 65 2.19 2.40 2.62 2.85 3.09 3.33 3.58 3.84   66 - 69 1.88 2.05 2.23 2.42 2.61 2.81 3.02 3.23 66 - 69 2.04 2.26 2.48 2.71 2.95 3.19 3.44 3.70   70+ 1.82 1.99 2.17 2.36 2.55 2.75 2.95 3.16 70+ 1.97 2.19 2.41 2.64 2.87 3.12 3.37 3.62             Predicted Peak Expiratory Flow Rate                                       Height (in)  Female       Height (in) Male           Age 64 63 56 61 58 73 78 74 Age            21 344 357 372 387 402 417 432 446  60 62 64 66 68 70 72 74 76   25 337 352 366 381 396 411 426 441 25 447 476 505 533 562 591 [phone]   30 329 344 359 374 389 404 419 434 30 437 466 494 523 [phone] 667   35 322 337 351 366 381 396 411 426 35 426 455 484 512 541 [phone]   40 314 329 344 359 374 389 404 419 40 416 445 473 502 531 559 588 617 647   45 307 322 336 351 366 381 396 411 45 405 434 463 491 520 549 577 606 636   50 299 314 329 344 359 374 389 404 50 395 424 452 481 510 538 567 596 625   55 292 307 321 336 351 366 381 396 55 384 413 442 470 499 528 556 585 615   60 284 299 314 329 344 359 374 389 60 374 403 431 460 489 517 546 575 605   65 277 292 306 321 336 351 366 381 65 363 392 421 449 478 507 535 564 594   70 269 284 299 314 329 344 359 374 70 353 382 410 439 468 496 525 554 583   75 261 274 289 305 319 334 348 364 75 344 372 400 429 458 487 515 544 573   80 253 266 282 296 312 327 342 356 80 335 362 390 419 448 476 [phone] yes

## 2021-05-25 NOTE — PROGRESS NOTES
noted    PLAN  1. [facility] for low fiber diet as tolerated, cont nutritional supplements, g-tubes ok to use for tube feeds, TF management and ordering per dietician/primary, continue PEGs to clamp, pt completed Reglan, cont miralax daily, PRN suppository      2. Cont recs and management per primary/ Pulmonary/ CT SX, no plans for drainage of right sided paraesophageal collection at this time, Discussed with cardiothoracic surgery   3. Cont abx per ID, goal potassium 4.0    4. Encourage IS use, deep breathing, ambulation and PT/OT work; [facility] for DVT ppx from surg stance  5.  Pt to f/u with Dr. Carola Arteaga as out pt, ok for d/c from a surgical stance     Thank you,    Electronically signed by Laney Reyes DO  on 5/25/2021 at 6:12 AM

## 2021-05-25 NOTE — PLAN OF CARE
Problem: Confusion - Acute:  Goal: Absence of continued neurological deterioration signs and symptoms  Description: Absence of continued neurological deterioration signs and symptoms  5/25/2021 1712 by Jesus Cho RN  Outcome: Ongoing  5/25/2021 0343 by Megan Gant RN  Outcome: Ongoing  Goal: Mental status will be restored to baseline  Description: Mental status will be restored to baseline  5/25/2021 1712 by Jesus Cho RN  Outcome: Ongoing  5/25/2021 0343 by Megan Gant RN  Outcome: Ongoing     Problem: Discharge Planning:  Goal: Ability to perform activities of daily living will improve  Description: Ability to perform activities of daily living will improve  5/25/2021 1712 by Jesus Cho RN  Outcome: Ongoing  5/25/2021 0343 by Megan Gant RN  Outcome: Ongoing  Goal: Participates in care planning  Description: Participates in care planning  5/25/2021 1712 by Jesus Cho RN  Outcome: Ongoing  5/25/2021 0343 by Megan Gant RN  Outcome: Ongoing     Problem: Injury - Risk of, Physical Injury:  Goal: Absence of physical injury  Description: Absence of physical injury  5/25/2021 1712 by Jesus Cho RN  Outcome: Ongoing  5/25/2021 0343 by Megan Gant RN  Outcome: Ongoing  Goal: Will remain free from falls  Description: Will remain free from falls  5/25/2021 1712 by Jesus Cho RN  Outcome: Ongoing  5/25/2021 0343 by Megan Gant RN  Outcome: Ongoing     Problem: Mood - Altered:  Goal: Mood stable  Description: Mood stable  5/25/2021 1712 by Jesus Cho RN  Outcome: Ongoing  5/25/2021 0343 by Megan Gant RN  Outcome: Ongoing  Goal: Absence of abusive behavior  Description: Absence of abusive behavior  5/25/2021 1712 by Jesus Cho RN  Outcome: Ongoing  5/25/2021 0343 by Megan Gant RN  Outcome: Ongoing  Goal: Verbalizations of feeling emotionally comfortable while being cared for will increase  Description: Verbalizations of feeling emotionally comfortable while being cared for will

## 2021-05-25 NOTE — PROGRESS NOTES
[facility]  Office: [address], DO, William Ma, DO, Keyla Banks, DO, Hu Hernandez Blood, DO, Brandon Lyons MD, Ramonita Lara MD, Mahogany Rosas MD, Martin Darling MD, Melisa Goldmann, MD, Erin Gordon MD, Ej Monaco MD, Sina Liang MD, Walker Cerda, DO, Cammie Garcia MD, Aj Currie DO, Gabi Miller MD,  Homa Zapata DO, Yifan Rodriguez MD, Tabitha Carvajal MD, Aron Altamirano MD, Delta Coleman MD, Miguel Herr, Annalise Palomo, CNP, Prudencio Kaba, CNP, Alfa Travis, CNS, Taryn Adan, CNP, Jennifer Mercedes, CNP, Shima Branch, CNP, Elizabeth Mason, CNP, Lester Salazar, CNP, Sheba Luz PA-C, Silvestre Jay, [facility], Bela Gómez, CNP, Thor Fraga, CNP, Alonzo Gamboa, CNP, Eli Sarabia, CNP, Kyleigh Farias, CNP, Claudette David, [address] [place] [address]    Progress Note    5/25/2021    10:34 AM    Name:   Jolanta Chavez  MRN:     [id number]     Acct:      [de-identified]   Room:   [facility] Day:  25  Admit Date:  5/3/2021 12:49 PM    PCP:   Jimbo Cordero DO  Code Status:  Full Code    Subjective:     C/C:   Chief Complaint   Patient presents with    Blood Sugar Problem     >450    Hernia     hyatial      Interval History Status: improved. Patient seen and examined at bedside, no acute events overnight. Feeling okay and has a decent appetite, moves inside the room with PTPatient denies any chest pain, shortness of breath, chills, fevers, nausea or vomiting.   Patient vitals, labs and all providers notes were reviewed,from overnight shift and morning updates were noted and discussed with the nurse    Brief History:        60-year-old female originally presenting to outlying facility due to nausea and vomiting and was transferred for further evaluation and patient underwent emergent robotic laparoscopic hiatal hernia reduction with gastropexy via G-tube on 5/4/2021 and required intubation and was ultimately extubated on 4/6/2021. Patient underwent thoracentesis and had chest tube insertion 5/12/2021 follow-up with cardiothoracic surgery as well as pulmonology. Is a concern for possible esophageal leak and esophagram was performed on 5/7/2021 did not show any leak. Patient was started on Diflucan due to concern for Candida albicans    Review of Systems:     Review of Systems   Constitutional: Positive for activity change, appetite change and fatigue. Negative for chills, diaphoresis and fever. HENT: Negative for congestion. Eyes: Negative for visual disturbance. Respiratory: Negative for cough, chest tightness, shortness of breath and wheezing. Cardiovascular: Negative for chest pain, palpitations and leg swelling. Gastrointestinal: Positive for constipation. Negative for abdominal pain, blood in stool, diarrhea, nausea and vomiting. Genitourinary: Negative for difficulty urinating. Neurological: Positive for weakness. Negative for dizziness, light-headedness, numbness and headaches. Psychiatric/Behavioral: Nervous/anxious: All other systems reviewed and are negative. Medications: Allergies:     Allergies   Allergen Reactions    No Known Allergies        Current Meds:   Scheduled Meds:    sennosides-docusate sodium  2 tablet Oral Daily    fluconazole  400 mg Oral Daily    gabapentin  100 mg Oral TID    aspirin  81 mg Oral Daily    atorvastatin  10 mg Oral Daily    levothyroxine  112 mcg Oral Daily    pantoprazole  40 mg Oral QAM AC    insulin glargine  10 Units Subcutaneous Nightly    polyethylene glycol  17 g Oral Daily    heparin (porcine)  5,000 Units Subcutaneous 3 times per day    insulin lispro  0-18 Units Subcutaneous Q4H    sodium chloride flush  5-40 mL Intravenous 2 times per day     Continuous Infusions:    dextrose      sodium chloride 25 mL (05/10/21 7450)     PRN Meds: bisacodyl, albuterol, oxyCODONE **OR** oxyCODONE, acetaminophen, glucose, dextrose, glucagon (rDNA), dextrose, sodium chloride, magnesium sulfate, acetaminophen, artificial tears, sodium chloride flush, [DISCONTINUED] promethazine **OR** ondansetron    Data:     Past Medical History:   has a past medical history of Diabetes mellitus ([address] Gout, Hiatal hernia, Hyperlipidemia, Hypertension, and Thyroid disease. Social History:   reports that she has never smoked. She has never used smokeless tobacco. She reports that she does not drink alcohol and does not use drugs. Family History:   Family History   Problem Relation Age of Onset    Breast Cancer Mother     High Blood Pressure Mother     High Cholesterol Father     Breast Cancer Sister        Vitals:  /83   Pulse 78   Temp 98.2 °F (36.8 °C) (Oral)   Resp 20   Ht 5' 2\" (1.575 m)   Wt 176 lb 12.9 oz (80.2 kg)   SpO2 98%   BMI 32.34 kg/m²   Temp (24hrs), Av.1 °F (36.7 °C), Min:97.4 °F (36.3 °C), Max:98.4 °F (36.9 °C)    Recent Labs     21  2152 21  0321 21  0717   POCGLU 144* 143* 86 98       I/O (24Hr):     Intake/Output Summary (Last 24 hours) at 2021 1034  Last data filed at 2021 0654  Gross per 24 hour   Intake 1410 ml   Output 994 ml   Net 416 ml       Labs:  Hematology:  Recent Labs     21  0636 21  0624 21  0551   WBC 9.6 11.3 11.1   RBC 2.98* 3.09* 3.03*   HGB 8.2* 8.5* 8.2*   HCT 26.6* 27.3* 27.1*   MCV 89.3 88.3 89.4   MCH 27.5 27.5 27.1   MCHC 30.8 31.1 30.3   RDW 14.6* 14.6* 14.5*    317 288   MPV 9.2 9.1 9.1     Chemistry:  Recent Labs     21  0636 21  0624 21  0551 21  0717   * 137 136  --    K 3.4* 3.5* 3.3*  --    CL 96* 99 100  --    CO2 26  26  --    GLUCOSE 115* 99 95  --    BUN   --    CREATININE 1.41* 1.43* 1.19*  --    MG 2.0 1.7 1.7  --    ANIONGAP 11 13 10  --    LABGLOM 37* 36* 45*  --    GFRAA 45* 44* 55*  --    CALCIUM 7.1* 7.1* 6.8*  --    CAION  --   --   --  0.94*   PHOS  --  2.6 lung, concerning for pneumonia. Unchanged opacities in the posterior left lung, possibly atelectasis or scarring. 4.  Moderate ascites in the abdomen and pelvis, which is increased. No free air. Physical Examination:        Physical Exam  Vitals and nursing note reviewed. Constitutional:       General: She is not in acute distress. HENT:      Head: Normocephalic and atraumatic. Eyes:      Conjunctiva/sclera: Conjunctivae normal.      Pupils: Pupils are equal, round, and reactive to light. Cardiovascular:      Rate and Rhythm: Normal rate and regular rhythm. Heart sounds: No murmur heard. Pulmonary:      Effort: Pulmonary effort is normal. No accessory muscle usage or respiratory distress. Breath sounds: No stridor. No decreased breath sounds, wheezing, rhonchi or rales. Abdominal:      General: Bowel sounds are normal. There is no distension. Palpations: Abdomen is soft. Abdomen is not rigid. Tenderness: There is no abdominal tenderness. There is no guarding. Comments: PEG X 2 noted with no erythema around, other laparoscopic wounds healing appropriatly   Musculoskeletal:         General: No tenderness. Skin:     General: Skin is warm and dry. Findings: No erythema, lesion or rash. Neurological:      Mental Status: She is alert and oriented to person, place, and time. Cranial Nerves: No cranial nerve deficit. Motor: No seizure activity. Psychiatric:         Speech: Speech normal.         Behavior: Behavior normal. Behavior is cooperative.          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 On low fiber diet and nutritional supplements, completed Reglan for total of 72 hours.        Acute respiratory failure requiring oxygen with empyema showing gram positive and yeast, still requiring 1L nasal cannula, wean off NC today and monitor closely    Fluconazole for candida infection,Appreciate ID, pulm, and CTS recommendations, s/p chest tube removal and tolerating well. Monitor if patient is a candidate for VATS as outpateint. Paraesophageal collection appears stable in size.  discucsed with general surgery. Monitor as outpateint. With CT in 2 weeks. HTN, CAD, ASA, lipitor    Diabetes. lantus 10 units, ISS, hypoglycemia protocol     Hypothyroidism.  Synthroid 112 mcg    Neurontin 100 mg 3 times daily.     Continue PT/OT    Discharge planning.      Appreciate CM helping with placement     Discussed with the patient and the nurse     Melinda Coronel MD  5/25/2021  10:34 AM

## 2021-05-25 NOTE — CARE COORDINATION
Transitional Planning     Left VM for Kole Coulter regarding precert. 1000 spoke with Kole Coulter at [facility]; patient's insurance is in-network, precert is still pending. CM left VM for [facility] admissions. No beds available at [facility]. 1010 spoke with Josselyn Osei, patient's daughter in law; requested more SNF choices [address] does not have beds available. Referral sent to [facility]. [address] with Kole Coulter again to determine if precert was expedited; she stated they attempted to expedite but insurance stated they do not do that unless there is a national disaster. [address] with Megan Mattson at [facility], will transfer me to precert department. He stated that precert was initiated and can take up to 14 days to accept or deny. [address] with Hansel Gonzalez at [facility], patient case/precert in concurrent review. Asked if precert can be expedited. Karolina placed CM on hold and line disconnected. [address] with Robin Almonte at [facility]; do not have beds today, but will have a bed in next day or so. DON to review patient's case.

## 2021-05-25 NOTE — PROGRESS NOTES
Physical Therapy  Facility/Department: [facility] 4B STEPDOWN  Daily Treatment Note  NAME: Erick Oreilly  : 1950  MRN: [id number]    Date of Service: 2021    Discharge Recommendations:  Patient would benefit from continued therapy after discharge   PT Equipment Recommendations  Equipment Needed: Yes  Mobility Devices: Gwenevere Yazmin: Rolling    Assessment   Body structures, Functions, Activity limitations: Decreased functional mobility ; Decreased strength;Decreased endurance;Decreased balance  Assessment: Pt stood and ambulated ~60ft with RW and CGA. Pt steady throughout with no noted LOB. Pt fatigues quickly with ambulation and has decreased endurance. Would benefit from continued PT to address functional mobility deficits and return to prior level of independence. Prognosis: Good  PT Education: Goals;PT Role;Plan of Care;General Safety; Functional Mobility Training  REQUIRES PT FOLLOW UP: Yes  Activity Tolerance  Activity Tolerance: Patient limited by fatigue;Patient limited by endurance     Patient Diagnosis(es): The primary encounter diagnosis was Acute pancreatitis, unspecified complication status, unspecified pancreatitis type. Diagnoses of BONNIE (acute kidney injury) ([address] Hiatal hernia, Necrosis of stomach, Strangulated paraesophageal hernia, Mediastinitis, Paraesophageal fluid collection, Essential hypertension, Hernia with obstruction, and Diabetic ketoacidosis without coma associated with type 2 diabetes mellitus ([address] were also pertinent to this visit. has a past medical history of Diabetes mellitus ([address] Gout, Hiatal hernia, Hyperlipidemia, Hypertension, and Thyroid disease. has a past surgical history that includes Cholecystectomy (); Hysterectomy (); Breast surgery; other surgical history (); other surgical history (); Tubal ligation ();  Gastric fundoplication (N/A, 3/8/2654);  picc line double lumen (5/10/2021); and IR CHEST TUBE INSERTION (5/13/2021). Restrictions  Restrictions/Precautions  Restrictions/Precautions: General Precautions, Fall Risk  Required Braces or Orthoses?: No  Position Activity Restriction  Other position/activity restrictions: up with assist, s/p paraesphageal hernia repair 5/4, 2 PEG tubes. Subjective   General  Chart Reviewed: Yes  Response To Previous Treatment: Patient with no complaints from previous session. Family / Caregiver Present: No  Subjective  Subjective: RN and pt agreeable to PT. Pt resting in bed upon arrival with minimal c/o pain. Pt cooperative throughout. General Comment  Comments: Pt retired to chair at end of session with call light in reach. Pain Screening  Patient Currently in Pain: Yes  Pain Assessment  Pain Assessment: 0-10  Pain Level: 1  Patient's Stated Pain Goal: No pain  Pain Type: Surgical pain  Pain Location: Abdomen  Pain Orientation: Lower  Vital Signs  Patient Currently in Pain: Yes       Orientation  Orientation  Overall Orientation Status: Within Functional Limits  Cognition   Cognition  Overall Cognitive Status: Exceptions  Arousal/Alertness: Delayed responses to stimuli  Following Commands: Follows one step commands consistently; Follows one step commands with increased time  Memory: Appears intact  Initiation: Requires cues for some  Sequencing: Requires cues for some  Cognition Comment: Needing extended time for processing. Objective   Bed mobility  Supine to Sit: Contact guard assistance  Sit to Supine: Unable to assess (Pt retired to chair at end of session.)  Scooting: Contact guard assistance  Comment: HOB elevated, utilized bed rails, increased time/effort to complete.   Transfers  Sit to Stand: Contact guard assistance  Stand to sit: Contact guard assistance  Comment: RW used  Ambulation  Ambulation?: Yes  Ambulation 1  Surface: level tile  Device: Rolling Walker  Assistance: Contact guard assistance  Gait Deviations: Slow Cami;Decreased step length;Decreased step height  Distance: ~60ft  Comments: Steady with no LOB. Very slow alissa. Stairs/Curb  Stairs?: No     Balance  Posture: Good  Sitting - Static: Good  Sitting - Dynamic: Good;-  Standing - Static: Fair  Standing - Dynamic: Fair;-  Comments: RW used to assess standing balance  Exercises  Hip Flexion: seated marches x15  Hip Abduction: Seated: x15  Knee Long Arc Quad: Seated: x15  Ankle Pumps: Seated: x20  Comments: LE exercises performed while seated in chair. Goals  Short term goals  Time Frame for Short term goals: 14 visits  Short term goal 1: Pt will be Susy with bed mobility  Short term goal 2: Pt will be Susy with transfers  Short term goal 3: Pt will amb 150' with RW and CGA  Patient Goals   Patient goals : Get tube out.     Plan    Plan  Times per week: 5-6x/week  Current Treatment Recommendations: Strengthening, Endurance Training, Functional Mobility Training, Balance Training, Transfer Training, Gait Training, Home Exercise Program, Safety Education & Training, Patient/Caregiver Education & Training, Equipment Evaluation, Education, & procurement  Safety Devices  Type of devices: Nurse notified, Call light within reach, Gait belt, All fall risk precautions in place, Patient at risk for falls, Chair alarm in place, Left in chair  Restraints  Initially in place: No     Therapy Time   Individual Concurrent Group Co-treatment   Time In 1414         Time Out 1437         Minutes 23         Timed Code Treatment Minutes: 1101 Radha Flores PTA

## 2021-05-26 LAB
ABSOLUTE EOS #: 0.24 K/UL (ref 0–0.4)
ABSOLUTE IMMATURE GRANULOCYTE: 0.49 K/UL (ref 0–0.3)
ABSOLUTE LYMPH #: 1.83 K/UL (ref 1–4.8)
ABSOLUTE MONO #: 0.73 K/UL (ref 0.1–0.8)
ANION GAP SERPL CALCULATED.3IONS-SCNC: 10 MMOL/L (ref 9–17)
BASOPHILS # BLD: 0 % (ref 0–2)
BASOPHILS ABSOLUTE: 0 K/UL (ref 0–0.2)
BUN BLDV-MCNC: 18 MG/DL (ref 8–23)
BUN/CREAT BLD: ABNORMAL (ref 9–20)
CALCIUM SERPL-MCNC: 6.8 MG/DL (ref 8.6–10.4)
CHLORIDE BLD-SCNC: 99 MMOL/L (ref 98–107)
CO2: 25 MMOL/L (ref 20–31)
CREAT SERPL-MCNC: 1.18 MG/DL (ref 0.5–0.9)
DIFFERENTIAL TYPE: ABNORMAL
EOSINOPHILS RELATIVE PERCENT: 2 % (ref 1–4)
GFR AFRICAN AMERICAN: 55 ML/MIN
GFR NON-AFRICAN AMERICAN: 46 ML/MIN
GFR SERPL CREATININE-BSD FRML MDRD: ABNORMAL ML/MIN/{1.73_M2}
GFR SERPL CREATININE-BSD FRML MDRD: ABNORMAL ML/MIN/{1.73_M2}
GLUCOSE BLD-MCNC: 127 MG/DL (ref 65–105)
GLUCOSE BLD-MCNC: 127 MG/DL (ref 65–105)
GLUCOSE BLD-MCNC: 129 MG/DL (ref 65–105)
GLUCOSE BLD-MCNC: 196 MG/DL (ref 65–105)
GLUCOSE BLD-MCNC: 87 MG/DL (ref 65–105)
GLUCOSE BLD-MCNC: 89 MG/DL (ref 65–105)
GLUCOSE BLD-MCNC: 92 MG/DL (ref 70–99)
HCT VFR BLD CALC: 28.3 % (ref 36.3–47.1)
HEMOGLOBIN: 8.6 G/DL (ref 11.9–15.1)
IMMATURE GRANULOCYTES: 4 %
LYMPHOCYTES # BLD: 15 % (ref 24–44)
MAGNESIUM: 1.6 MG/DL (ref 1.6–2.6)
MCH RBC QN AUTO: 27 PG (ref 25.2–33.5)
MCHC RBC AUTO-ENTMCNC: 30.4 G/DL (ref 28.4–34.8)
MCV RBC AUTO: 89 FL (ref 82.6–102.9)
MONOCYTES # BLD: 6 % (ref 1–7)
MORPHOLOGY: ABNORMAL
NRBC AUTOMATED: 0 PER 100 WBC
PDW BLD-RTO: 14.6 % (ref 11.8–14.4)
PHOSPHORUS: 2 MG/DL (ref 2.6–4.5)
PLATELET # BLD: 312 K/UL (ref 138–453)
PLATELET ESTIMATE: ABNORMAL
PMV BLD AUTO: 9.4 FL (ref 8.1–13.5)
POTASSIUM SERPL-SCNC: 3.7 MMOL/L (ref 3.7–5.3)
RBC # BLD: 3.18 M/UL (ref 3.95–5.11)
RBC # BLD: ABNORMAL 10*6/UL
SEG NEUTROPHILS: 73 % (ref 36–66)
SEGMENTED NEUTROPHILS ABSOLUTE COUNT: 8.91 K/UL (ref 1.8–7.7)
SODIUM BLD-SCNC: 134 MMOL/L (ref 135–144)
WBC # BLD: 12.2 K/UL (ref 3.5–11.3)
WBC # BLD: ABNORMAL 10*3/UL

## 2021-05-26 PROCEDURE — 1200000000 HC SEMI PRIVATE

## 2021-05-26 PROCEDURE — 85025 COMPLETE CBC W/AUTO DIFF WBC: CPT

## 2021-05-26 PROCEDURE — 6370000000 HC RX 637 (ALT 250 FOR IP): Performed by: NURSE PRACTITIONER

## 2021-05-26 PROCEDURE — 6360000002 HC RX W HCPCS: Performed by: STUDENT IN AN ORGANIZED HEALTH CARE EDUCATION/TRAINING PROGRAM

## 2021-05-26 PROCEDURE — 99232 SBSQ HOSP IP/OBS MODERATE 35: CPT | Performed by: INTERNAL MEDICINE

## 2021-05-26 PROCEDURE — 2580000003 HC RX 258: Performed by: STUDENT IN AN ORGANIZED HEALTH CARE EDUCATION/TRAINING PROGRAM

## 2021-05-26 PROCEDURE — 6370000000 HC RX 637 (ALT 250 FOR IP): Performed by: STUDENT IN AN ORGANIZED HEALTH CARE EDUCATION/TRAINING PROGRAM

## 2021-05-26 PROCEDURE — 83735 ASSAY OF MAGNESIUM: CPT

## 2021-05-26 PROCEDURE — 97535 SELF CARE MNGMENT TRAINING: CPT

## 2021-05-26 PROCEDURE — 82947 ASSAY GLUCOSE BLOOD QUANT: CPT

## 2021-05-26 PROCEDURE — 84100 ASSAY OF PHOSPHORUS: CPT

## 2021-05-26 PROCEDURE — 6370000000 HC RX 637 (ALT 250 FOR IP): Performed by: FAMILY MEDICINE

## 2021-05-26 PROCEDURE — 80048 BASIC METABOLIC PNL TOTAL CA: CPT

## 2021-05-26 PROCEDURE — 99232 SBSQ HOSP IP/OBS MODERATE 35: CPT | Performed by: FAMILY MEDICINE

## 2021-05-26 PROCEDURE — 36415 COLL VENOUS BLD VENIPUNCTURE: CPT

## 2021-05-26 PROCEDURE — APPSS30 APP SPLIT SHARED TIME 16-30 MINUTES: Performed by: NURSE PRACTITIONER

## 2021-05-26 RX ADMIN — HEPARIN SODIUM 5000 UNITS: 5000 INJECTION INTRAVENOUS; SUBCUTANEOUS at 05:16

## 2021-05-26 RX ADMIN — DOCUSATE SODIUM 50MG AND SENNOSIDES 8.6MG 2 TABLET: 8.6; 5 TABLET, FILM COATED ORAL at 08:57

## 2021-05-26 RX ADMIN — SODIUM CHLORIDE, PRESERVATIVE FREE 10 ML: 5 INJECTION INTRAVENOUS at 22:20

## 2021-05-26 RX ADMIN — LEVOTHYROXINE SODIUM 112 MCG: 112 TABLET ORAL at 05:16

## 2021-05-26 RX ADMIN — POLYETHYLENE GLYCOL 3350 17 G: 17 POWDER, FOR SOLUTION ORAL at 08:57

## 2021-05-26 RX ADMIN — GABAPENTIN 100 MG: 100 CAPSULE ORAL at 14:38

## 2021-05-26 RX ADMIN — PANTOPRAZOLE SODIUM 40 MG: 40 TABLET, DELAYED RELEASE ORAL at 05:16

## 2021-05-26 RX ADMIN — HEPARIN SODIUM 5000 UNITS: 5000 INJECTION INTRAVENOUS; SUBCUTANEOUS at 22:19

## 2021-05-26 RX ADMIN — INSULIN GLARGINE 10 UNITS: 100 INJECTION, SOLUTION SUBCUTANEOUS at 22:20

## 2021-05-26 RX ADMIN — FLUCONAZOLE 400 MG: 40 POWDER, FOR SUSPENSION ORAL at 08:57

## 2021-05-26 RX ADMIN — INSULIN LISPRO 3 UNITS: 100 INJECTION, SOLUTION INTRAVENOUS; SUBCUTANEOUS at 12:59

## 2021-05-26 RX ADMIN — ATORVASTATIN CALCIUM 10 MG: 10 TABLET, FILM COATED ORAL at 22:19

## 2021-05-26 RX ADMIN — GABAPENTIN 100 MG: 100 CAPSULE ORAL at 08:57

## 2021-05-26 RX ADMIN — GABAPENTIN 100 MG: 100 CAPSULE ORAL at 22:19

## 2021-05-26 RX ADMIN — SODIUM CHLORIDE, PRESERVATIVE FREE 10 ML: 5 INJECTION INTRAVENOUS at 08:58

## 2021-05-26 RX ADMIN — Medication 81 MG: at 08:57

## 2021-05-26 RX ADMIN — HEPARIN SODIUM 5000 UNITS: 5000 INJECTION INTRAVENOUS; SUBCUTANEOUS at 14:38

## 2021-05-26 ASSESSMENT — PAIN SCALES - GENERAL: PAINLEVEL_OUTOF10: 0

## 2021-05-26 ASSESSMENT — ENCOUNTER SYMPTOMS
WHEEZING: 0
DIARRHEA: 0
COUGH: 0
SHORTNESS OF BREATH: 0
CONSTIPATION: 1
VOMITING: 0
NAUSEA: 0
BLOOD IN STOOL: 0
ABDOMINAL PAIN: 0
CHEST TIGHTNESS: 0

## 2021-05-26 NOTE — CARE COORDINATION
Transitional Planning:    Received a call from [facility] and they are unable to accept the pt due to not having beds available. 1019- called [facility] regarding precert, LM with Ginette Tovar req call back. 1021- called [facility], they received the referral, however, they are OON with insurance. 26- called the pt's daughter in law, Vicki Le req call back with more SNF choices. 1030- transport packet prepared and placed on CM desk on 4b.    1127- received call from [address] [place] [address] with [facility] stating that the precert was denied, but a peer to peer can be done by calling [phone].     1130- sent PS to Dr. Félix Aldana regarding precert being denied and peer to peer will need to be done, also updated her that I am waiting on SNF choices from the daughter in law.    5- spoke with the pt regarding SNF choices, she stated that she would like to stay in [place], provided her list for freedom of choice. She requested referral be sent to [facility] and [facility]. Referrals sent. 1605- received a call from Irineo Saavedra at [facility], they are able to accept and can start precert. Discussed this with the pt and she verbalized agreement. Precert started.

## 2021-05-26 NOTE — PLAN OF CARE
Problem: Confusion - Acute:  Goal: Absence of continued neurological deterioration signs and symptoms  Description: Absence of continued neurological deterioration signs and symptoms  5/26/2021 1607 by Lindsey Whitney RN  Outcome: Ongoing     Problem: Confusion - Acute:  Goal: Mental status will be restored to baseline  Description: Mental status will be restored to baseline  5/26/2021 1607 by Lindsey Whitney RN  Outcome: Ongoing     Problem: Discharge Planning:  Goal: Ability to perform activities of daily living will improve  Description: Ability to perform activities of daily living will improve  5/26/2021 1607 by Lindsey Whitney RN  Outcome: Ongoing     Problem: Discharge Planning:  Goal: Participates in care planning  Description: Participates in care planning  5/26/2021 1607 by Lindsey Whitney RN  Outcome: Ongoing     Problem: Injury - Risk of, Physical Injury:  Goal: Absence of physical injury  Description: Absence of physical injury  5/26/2021 1607 by Lindsey Whitney RN  Outcome: Ongoing     Problem: Injury - Risk of, Physical Injury:  Goal: Will remain free from falls  Description: Will remain free from falls  5/26/2021 1607 by Lindsey Whitney RN  Outcome: Ongoing     Problem: Mood - Altered:  Goal: Mood stable  Description: Mood stable  5/26/2021 1607 by Lindsey Whitney RN  Outcome: Ongoing     Problem: Mood - Altered:  Goal: Absence of abusive behavior  Description: Absence of abusive behavior  5/26/2021 1607 by Lindsey Whitney RN  Outcome: Ongoing     Problem: Mood - Altered:  Goal: Verbalizations of feeling emotionally comfortable while being cared for will increase  Description: Verbalizations of feeling emotionally comfortable while being cared for will increase  5/26/2021 1607 by Lindsey Whitney RN  Outcome: Ongoing     Problem: Psychomotor Activity - Altered:  Goal: Absence of psychomotor disturbance signs and symptoms  Description: Absence of psychomotor disturbance signs and symptoms  5/26/2021 1607 by Karolina Lazaro RN  Outcome: Ongoing     Problem: Skin Integrity:  Goal: Will show no infection signs and symptoms  Description: Will show no infection signs and symptoms  5/26/2021 1607 by Karolina Lazaro RN  Outcome: Ongoing     Problem: Skin Integrity:  Goal: Absence of new skin breakdown  Description: Absence of new skin breakdown  5/26/2021 1607 by Karolina Lazaro RN  Outcome: Ongoing     Problem: Falls - Risk of:  Goal: Absence of physical injury  Description: Absence of physical injury  5/26/2021 1607 by Karolina Lazaro RN  Outcome: Ongoing     Problem: Falls - Risk of:  Goal: Will remain free from falls  Description: Will remain free from falls  5/26/2021 1607 by Karolina Lazaro RN  Outcome: Ongoing     Problem: Pain:  Goal: Pain level will decrease  Description: Pain level will decrease  5/26/2021 1607 by Karolina Lazaro RN  Outcome: Ongoing     Problem: Pain:  Goal: Control of acute pain  Description: Control of acute pain  5/26/2021 1607 by Karolina Lazaro RN  Outcome: Ongoing     Problem: Pain:  Goal: Control of chronic pain  Description: Control of chronic pain  5/26/2021 1607 by Karolina Lazaro RN  Outcome: Ongoing     Problem: Infection - Central Venous Catheter-Associated Bloodstream Infection:  Goal: Will show no infection signs and symptoms  Description: Will show no infection signs and symptoms  5/26/2021 1607 by Karolina Lazaro RN  Outcome: Ongoing     Problem: ELIMINATION  Goal: Elimination patterns are normal or improving  Description: Elimination patterns return to pre-surgery normal patterns  Outcome: Ongoing

## 2021-05-26 NOTE — PROGRESS NOTES
Comprehensive Nutrition Assessment    Type and Reason for Visit:  Reassess    Nutrition Recommendations/Plan:   - Continue current Low Fiber diet with Ensure Enlive ONS in chocolate x 2 per day. Discontinue Ensure Clear Liquid supplements per pt request.  Encourage/monitor PO intakes as tolerated. - Monitor need for supplemental Tube Feedings - if requested, suggest Vital AF 1.2 (semi-elemental) with slow advancement to goal 30 mL/hr (will provide 50% of estimated kcals and 72% estimated protein needs).    - Monitor labs, weights, bowel function, and plan of care. Nutrition Assessment:  Pt continues to eat cheerios with milk for breakfast.  Pt reports for lunch she had some rice and jello - pt only had bites. Pt also was drinking a chocolate Ensure supplements - reports liking the chocolate flavor and drinking about 1/3 of ensure supplements. Pt states she has not been drinking Ensure Clear supplements and does not want to recieve them with meals. Pt reports she has been mainly eating potato and chicken noodle soup for dinner. Encouraged pt to drink oral supplements and eat what she can at meals. Continues to recieve 200 mL free water x 4 per day. Last BM 5/24. Labs reviewed: Na 134 mmol/L, Ca 6.8 mg/dL, Phos 2.0 mg/dL. Meds reviewed: Lantus, Humalog SS, Synthroid, Glycolax, Senokot. Malnutrition Assessment:  Malnutrition Status:   Moderate malnutrition    Context:  Acute Illness     Findings of the 6 clinical characteristics of malnutrition:  Energy Intake:  1 - 75% or less of estimated energy requirements for 7 or more days (Previously meeting est needs with nutrition support.)  Weight Loss:  7 - Greater than 5% over 1 month (9% since admission)     Body Fat Loss:  No significant body fat loss     Muscle Mass Loss:  1 - Mild muscle mass loss Clavicles (pectoralis & deltoids)  Fluid Accumulation:  1 - Mild Extremities, Generalized   Strength:  Not Performed    Estimated Daily Nutrient Needs:  Energy (kcal):  MSJ x 1.2 = 1600 kcals/day; Weight Used for Energy Requirements:  Current     Protein (g):  75 g pro/day; Weight Used for Protein Requirements:  Ideal (1.5)        Fluid (ml/day):  4981-2943 mL/day (or per MD); Method Used for Fluid Requirements:  ml/Kg (25-30)      Nutrition Related Findings:  Labs/Meds reviewed. Active bowel sounds. Clamped G-tubes. 5/4: S/p hiatal hernia repair, gastrectomy, gastropexy, G-tubes x 2      Wounds:  Multiple, Surgical Incision       Current Nutrition Therapies:    DIET LOW FIBER; Dietary Nutrition Supplements: Clear Liquid Oral Supplement  Dietary Nutrition Supplements: Standard High Calorie Oral Supplement    Anthropometric Measures:  · Height: 5' 2\" (157.5 cm)  · Current Body Weight: 183 lb 13.8 oz (83.4 kg)   · Admission Body Weight: 202 lb (91.6 kg)    · Usual Body Weight:  (166 lb - stated)     · Ideal Body Weight: 110 lbs; % Ideal Body Weight 167.1 %   · BMI: 33.6  · BMI Categories: Obese Class 1 (BMI 30.0-34. 9)       Nutrition Diagnosis:   · Inadequate oral intake related to altered GI function (variable appetite) as evidenced by intake 0-25%, intake 26-50%, weight loss (variable PO intakes; need for ONS; previous need for supplemental nutrition support)    Nutrition Interventions:   Food and/or Nutrient Delivery:  Continue Current Diet, Modify Oral Nutrition Supplement (Continue chocolate Ensure Enlive supplements.   Discontinue Ensure Clear Liquid supplements.)  Nutrition Education/Counseling:  No recommendation at this time, Education completed   Coordination of Nutrition Care:  Continue to monitor while inpatient    Goals:  meet % of estimated nutrient needs       Nutrition Monitoring and Evaluation:   Food/Nutrient Intake Outcomes:  Food and Nutrient Intake, Supplement Intake  Physical Signs/Symptoms Outcomes:  Biochemical Data, GI Status, Hemodynamic Status, Nutrition Focused Physical Findings, Skin, Weight     Electronically signed by Marcelo Fulton RD, LD on 5/26/21 at 2:00 PM EDT    Contact: [phone]

## 2021-05-26 NOTE — PLAN OF CARE
Problem: Confusion - Acute:  Goal: Absence of continued neurological deterioration signs and symptoms  Description: Absence of continued neurological deterioration signs and symptoms  5/26/2021 1854 by Poonam Allan RN  Outcome: Ongoing  5/26/2021 1607 by Karolina Lazaro RN  Outcome: Ongoing  Goal: Mental status will be restored to baseline  Description: Mental status will be restored to baseline  5/26/2021 1854 by Poonam Allan RN  Outcome: Ongoing  5/26/2021 1607 by Karolina Lazaro RN  Outcome: Ongoing     Problem: Discharge Planning:  Goal: Ability to perform activities of daily living will improve  Description: Ability to perform activities of daily living will improve  5/26/2021 1854 by Poonam Allan RN  Outcome: Ongoing  5/26/2021 1607 by Karolina Lazaro RN  Outcome: Ongoing  Goal: Participates in care planning  Description: Participates in care planning  5/26/2021 1854 by Poonam Allan RN  Outcome: Ongoing  5/26/2021 1607 by Karolina Lazaro RN  Outcome: Ongoing     Problem: Injury - Risk of, Physical Injury:  Goal: Absence of physical injury  Description: Absence of physical injury  5/26/2021 1854 by Poonam Allan RN  Outcome: Ongoing  5/26/2021 1607 by Karolina Lazaro RN  Outcome: Ongoing  Goal: Will remain free from falls  Description: Will remain free from falls  5/26/2021 1854 by Poonam Allan RN  Outcome: Ongoing  5/26/2021 1607 by Karolina Lazaro RN  Outcome: Ongoing

## 2021-05-26 NOTE — PROGRESS NOTES
[facility] -Progress Note    Today's Date and Time: 5/26/2021, 2:52 PM    Impression :     Paraesophageal Hiatal hernia  Perforation of the esophagus  S/p laparoscopic, robotic para esophageal hernia repair 5-4-21  Acute pancreatitis  Shock   Hyperglycemia  NSTEMI  BONNIE  Not a MRSA carrier  Coagulase negative Staphylococci bacteremia x 2 on 5-7-21. Repeat blood cultures 5-8-21 x 2 : No growth . Bilateral Empyema 5/13/21  Left sided thoracentesis 5/12/21-grew Candida albicans  Right sided pigtail chest tube placed on 5/12/21 for right-sided effusion    Recommendations:   D/C Zosyn started 5/6/21. Stop date 4-19-21. D/C Vancomycin started 5/8/21 for Coagulase negative Staph. Stop date 5-19-21    Gram positive rods from thoracentesis identified as yeast. (Candida albicans). Fluconazole started 5-15-21. Will continue liquid Diflucan 400 mg q day. Stop date 5-30-21  Plan Follow up repeat CT chest to evaluate mediastinal collection and lung infiltrates in about 2 weeks. Medical Decision Making/Summary/Discussion:5/26/2021     Patient with large paraesophageal hiatal hernia with perforation of esophagus  Hyperglycemia   Acute pancreatitis  Shock   S/p laparoscopic, robotic para esophageal hernia repair 5-4-21  Improvement in overall picture but is developing basilar infiltrates  Not a MRSA carrier  Unclear whether this is fluid retention vs residual leakage vs secondary pneumonia  Esophagogram 5-7-21 does not show a leak  Will plan to continue zosyn and add Diflucan. Monitor temps and CXR. Zosyn and Vanco to continue while awaiting possible VATS/decortication procedure. VATS not to be done. Will D/C antibiotics. Continue Diflucan liquid for 2  weeks.     Infection Control Recommendations   [place] Precautions    Antimicrobial Stewardship Recommendations     Simplification of therapy  Targeted therapy    Coordination of Outpatient Care:   Estimated Length of IV antimicrobials: TBD  Patient will need Midline Catheter Insertion:No   Patient will need PICC line Insertion:No  Patient will need: Home IV , [place],  SNF,  LTAC:  Patient will need outpatient wound care:Yes    Chief complaint/reason for consultation:   Septic shock/sepsis    History of Present Illness:   Anamaria Bedoya is a 78y.o.-year-old   female who was initially admitted on 5/3/2021. Patient seen at the request of Macrina Orr. INITIAL HISTORY : 05/07/2021    Pt with a history of diabetes mellitus type 2, hypertension, hyperlipidemia, thyroid disease, gout and hiatal hernia. She initially presented on 05/03/2021 to an outlying facility with a chief complaint of nausea vomiting, diarrhea and syncopal attack . Patient was found to have blood glucose of 585,WBC of 23.5, elevated lipase 1451 and elevated beta hydroxybutyrate. MRCP on 05/03/2021 showed diffuse small bowel wall thickening likely due to third spacing as opposed to enteritis. CT scan of the abdomen showed extremely large fluid-filled hiatal hernia and distended and fluid-filled stomach below the diaphragm. Patient was given Zosyn at the outlying facility and was transferred to [facility] ED for evaluation by the surgery team.    CT scan of the chest without contrast was performed on 05/04/2021 which showed ascites and pneumoperitoneum with apparent free-flowing oral contrast in the left upper quadrant concerning for viscus perforation possibly within the subdiaphragmatic portion of the stomach. Moderate bilateral pleural effusions and hiatal hernia with organoaxial volvulus. Bariatric surgery performed a laparoscopic robotic para esophageal hernia repair. Cardiology is also following the patient because of elevated troponins with unremarkable echocardiography. Antibiotics wise the patient was given Zosyn on 05/03/2021 at the outside facility and it has been continued through the present time.     Blood and urine cultures on 05/03/2021 show No growth. Repeat urine culture on 05/04/2021 shows No growth     Patient is currently having temperature elevations. Temp max of 101.1 on 05/07/2021 around 4 AM in the morning. Patient WBC count is improving from 23.5 on presentation to 6.2 today. Repeat chest x-ray 5-7-21 shows bibasilar consolidation new from prior study with blunting of the costophrenic angles bilaterally. Patient is off of the pressors since 5-6-21    Left Thoracentesis on 5/12/21 for left loculated empyema-Pigtail catheter placed with pus noted. Fluid shows Candida albicans  Right sided 12 [place] chest tube placed on 5/13/21 for empyema-removed 5/18/21   Patient on liquid Diflucan    Follow-up esophagram on 5/11/21 negative for a leak. On low fiber diet plus supplements  Meeting % of nutritional needs    Coagulase negative Staphylococci bacteremia x 2 on 5-7-21. Repeat blood cultures 5-8-21 x 2 : No growth. Femoral line and morel catheter were removed. Rt and Lt TAISHA drains removed, on 5-7- and 5-11-21, respectively  Chest tube removed on 5/18/21. No pneumothorax identified after removal chest tube. CURRENT EVALUATION : 5/26/2021    Afebrile  VS stable    Upon evaluation, the patient was alert and oriented on room air. She looks improved and states she is feeling better. She is still experiencing imtermittent nausea and vomiting, but is tolerating her oral diet better than she had been. She has not had a bowel movement according to the RN. On 3-->2.5 L -->0.5 L NC  RR 26-->17-->19->20  02 sat 96-->99 %->96%    The patient is to follow-up as an outpatient with CT surgery in 2 weeks for paraesophageal fluid collection. Plan is to D/C to SNF-discharge planning started.     Discussed with RN    Labs, X rays reviewed: 5/26/2021    BUN: 22->26-->18  Cr: 1.19-->1.18    WBC: 11.3->11.1-->12.2  Hb: 8.5->8.2-->8.6  Plat: 315->288-->312    LD: 345  Triglycerides: 261    Cultures:    Urine:  05/03/2021: No growth   05/04/2021: No growth     Blood:  05/03/2021: Blood cultures x 2:  No growth  5/7/21: Coagulase negative Staphylococci bacteremia x 2  5/8/21: No growth  Sputum :    Thoracentesis fluid:  Lt side 5/12/21 gram positive rods/ candida albicans. Rt side 5-13-21: No growth     CT chest 5/11/21  New right-sided PICC again seen with unchanged and satisfactory tip position;   larger loculated right effusion in the azygoesophageal recess, as above. Otherwise similar findings to 05/07/2021 with suspected lower lobe   consolidation/pneumonitis, with volume loss and effusions both lower lobes. CT chest 5/16:  Impression:        1.  Right chest tube in place with near complete resolution of right   effusion.  No pneumothorax or loculated fluid in the right hemithorax. 2.  Trace left pleural effusion. 3.  Dependent opacities in the lower lobes, left greater than right, again   demonstrated, although improved since prior chest CT exam.     4.  Moderate amount of fluid within the esophageal hiatus. 5.  Partially visualized stomach appears distended with 2 gastrostomy tubes   in place. Discussed with patient, RN, IM, Pulmonary, daughter in law. I have personally reviewed the past medical history, past surgical history, medications, social history, and family history, and I have updated the database accordingly.   Past Medical History:     Past Medical History:   Diagnosis Date    Diabetes mellitus ([address]     Gout     Hiatal hernia     Hyperlipidemia     Hypertension     Thyroid disease        Past Surgical  History:     Past Surgical History:   Procedure Laterality Date    BREAST SURGERY      Bx 1993    CHOLECYSTECTOMY  1999    GASTRIC FUNDOPLICATION N/A 2/7/0995    PARAESPHAGEAL HERNIA REPAIR LAPAROSCOPIC ROBOTIC performed by Lucio Lucio DO at [address] [place] [address]  5/10/2021         HYSTERECTOMY  1997    IR CHEST TUBE INSERTION  5/13/2021    IR CHEST TUBE INSERTION 5/13/2021 Philippe Bang MD [facility]    OTHER SURGICAL HISTORY  1962    Remove spur L ankle    OTHER SURGICAL HISTORY  1978    Pin and wire repair R ankle    TUBAL LIGATION  1988       Medications:      sennosides-docusate sodium  2 tablet Oral Daily    fluconazole  400 mg Oral Daily    gabapentin  100 mg Oral TID    aspirin  81 mg Oral Daily    atorvastatin  10 mg Oral Daily    levothyroxine  112 mcg Oral Daily    pantoprazole  40 mg Oral QAM AC    insulin glargine  10 Units Subcutaneous Nightly    polyethylene glycol  17 g Oral Daily    heparin (porcine)  5,000 Units Subcutaneous 3 times per day    insulin lispro  0-18 Units Subcutaneous Q4H    sodium chloride flush  5-40 mL Intravenous 2 times per day       Social History:     Social History     Socioeconomic History    Marital status:      Spouse name: Not on file    Number of children: Not on file    Years of education: Not on file    Highest education level: Not on file   Occupational History    Not on file   Tobacco Use    Smoking status: Never Smoker    Smokeless tobacco: Never Used   Substance and Sexual Activity    Alcohol use: Never    Drug use: Never    Sexual activity: Not on file   Other Topics Concern    Not on file   Social History Narrative    Not on file     Social Determinants of Health     Financial Resource Strain:     Difficulty of Paying Living Expenses:    Food Insecurity:     Worried About Running Out of Food in the Last Year:     Ran Out of Food in the Last Year:    Transportation Needs:     Lack of Transportation (Medical):      Lack of Transportation (Non-Medical):    Physical Activity:     Days of Exercise per Week:     Minutes of Exercise per Session:    Stress:     Feeling of Stress :    Social Connections:     Frequency of Communication with Friends and Family:     Frequency of Social Gatherings with Friends and Family:     Attends Protestant Services:     Active Member of Clubs or Organizations:     Attends Club or Organization Meetings:     Marital Status:    Intimate Partner Violence:     Fear of Current or Ex-Partner:     Emotionally Abused:     Physically Abused:     Sexually Abused:        Family History:     Family History   Problem Relation Age of Onset    Breast Cancer Mother     High Blood Pressure Mother     High Cholesterol Father     Breast Cancer Sister         Allergies:   No known allergies     Review of Systems:   Review of Systems   Constitutional: Positive for fatigue and fever. Negative for activity change, appetite change, chills, diaphoresis and unexpected weight change. Respiratory: Positive for cough. Shortness of breath. Cardiovascular: Negative for chest pain, palpitations and leg swelling. Gastrointestinal: Positive for constipation. Negative for abdominal distention, nausea and vomiting. Endocrine: Negative for cold intolerance. Genitourinary: Negative for difficulty urinating and dysuria. Musculoskeletal: Negative for arthralgias and back pain. Neurological: Negative for dizziness, tremors, syncope and facial asymmetry. Hematological: Negative for adenopathy. Psychiatric/Behavioral: Negative for agitation and behavioral problems. The patient is not hyperactive. Physical Examination :     Patient Vitals for the past 8 hrs:   BP Temp Temp src Pulse Resp SpO2 Height   05/26/21 1353       5' 2\" (1.575 m)   05/26/21 1240 124/76 98.9 °F (37.2 °C) Temporal 86 21 95 %    05/26/21 0730 117/76 98.3 °F (36.8 °C) Temporal 79 19 94 %      General Appearance: Awake, alert  Head:  Normocephalic, no trauma  Eyes: Pupils equal, round, reactive to light, sclera anicteric; conjunctivae pink. No embolic phenomena. ENT: Oropharynx clear, without erythema, exudate, or thrush. No tenderness of sinuses. Mouth/throat: mucosa pink and moist. No lesions.  Dentition in good repair. Neck:Supple, without lymphadenopathy. Thyroid normal, No bruits. Pulmonary/Chest: decreased breath sounds in the bilateral lower bases. + tachypnea   Cardiovascular: Regular rate and rhythm without murmurs, rubs, or gallops. Abdomen: Soft, non tender. Bowel sounds normal. No organomegaly. The ports entry wounds are healing well and without any erythema. All four Extremities: No cyanosis, clubbing, edema, or effusions. Neurologic: No gross sensory or motor deficits. Skin: Warm and dry with good turgor. No signs of peripheral arterial or venous insufficiency. No ulcerations. No open wounds. Medical Decision Making -Laboratory:   I have independently reviewed/ordered the following labs:    CBC with Differential:   Recent Labs     05/25/21  0551 05/26/21  0601   WBC 11.1 12.2*   HGB 8.2* 8.6*   HCT 27.1* 28.3*    312   LYMPHOPCT 9* 15*   MONOPCT 7 6     BMP:   Recent Labs     05/25/21  0551 05/26/21  0601    134*   K 3.3* 3.7    99   CO2 26 25   BUN 20 18   CREATININE 1.19* 1.18*   MG 1.7 1.6     Hepatic Function Panel:   No results for input(s): PROT, LABALBU, BILIDIR, IBILI, BILITOT, ALKPHOS, ALT, AST in the last 72 hours. No results for input(s): RPR in the last 72 hours. No results for input(s): HIV in the last 72 hours. No results for input(s): BC in the last 72 hours.   Lab Results   Component Value Date    MUCUS NOT REPORTED 05/06/2021    RBC 3.18 05/26/2021    RBC 6.23 05/03/2021    TRICHOMONAS NOT REPORTED 05/06/2021    WBC 12.2 05/26/2021    YEAST NOT REPORTED 05/06/2021    TURBIDITY CLOUDY 05/06/2021     Lab Results   Component Value Date    CREATININE 1.18 05/26/2021    CREATININE 3.09 05/03/2021    GLUCOSE 92 05/26/2021    GLUCOSE 453 05/03/2021       Medical Decision Making-Imaging:     EXAMINATION:   ONE XRAY VIEW OF THE CHEST       5/20/2021 9:45 am       COMPARISON:   CT earlier today at 09:43 and radiograph May 18, 2021       HISTORY:   [facility] HISTORY: s/p chest tube removal. Eval for worsening   effusion vs reoccurance pneumothorax. TECHNOLOGIST PROVIDED HISTORY:   s/p chest tube removal. Eval for worsening effusion vs reoccurance   pneumothorax. Reason for Exam: s/p chest tube removal. Eval for worsening effusion vs   reoccurance pneumothorax. Acuity: Acute   Type of Exam: Initial       FINDINGS:   Right upper extremity PICC remains in satisfactory position. Cardiomediastinal silhouette appears unchanged.  Low inspiratory volume. Bibasilar predominant airspace disease appears slightly improved particularly   on the left.  No definite effusion.  No pneumothorax or subdiaphragmatic free   air.           Impression   Improving bibasilar airspace disease.  No significant recurrent effusion.           Order History      EXAMINATION:   CT OF THE CHEST, ABDOMEN, AND PELVIS WITHOUT CONTRAST 5/20/2021 9:50 am       TECHNIQUE:   CT of the chest, abdomen and pelvis was performed without the administration   of intravenous contrast. Multiplanar reformatted images are provided for   review. Dose modulation, iterative reconstruction, and/or weight based   adjustment of the mA/kV was utilized to reduce the radiation dose to as low   as reasonably achievable.       COMPARISON:   CT chest dated 05/16/2021, and 05/04/2021.  CT chest, abdomen and pelvis   dated 05/07/2021       HISTORY:   ORDERING SYSTEM PROVIDED HISTORY: Esophageal rupture, empyema, monitor of   abdomen   TECHNOLOGIST PROVIDED HISTORY:   Esophageal rupture, empyema, monitor of abdomen       Reason for Exam: Esophageal rupture, empyema, monitor of abdomen   Acuity: Unknown   Type of Exam: Unknown       FINDINGS:       Chest:       Mediastinum: Heart is normal in size.  There is no pericardial effusion.    Thoracic aorta and pulmonary arteries are normal in caliber.  There is a   right arm PICC with distal tip in the distal SVC.  There are a couple mildly   prominent mediastinal lymph nodes, measuring up to 0.9 cm, which are   unchanged.  There is a loculated fluid collection in the right paraesophageal   space and esophageal hiatus, which measures 9.3 x 4.5 x 8.3 cm on axial   images, which is slightly decreased in size from the previous CT chest.  No   pneumomediastinum or gas within the fluid collection.       Lungs/pleura: There is a small mildly loculated left pleural effusion, which   is unchanged.  Right chest tube has been removed. Marinell Curd is trace mildly   loculated right pleural fluid.  There is increased airspace consolidation in   the posterior mid and lower right lung.  Strandy and patchy opacities at the   left lung base are not significantly changed, likely atelectasis or scar.  No   pneumothorax.       Soft Tissues/Bones: There is no acute or suspicious osseous abnormality. Visualized superficial soft tissues are within normal limits.           Abdomen/Pelvis:       Organs: Limited unenhanced liver and spleen are grossly unremarkable.  The   gallbladder is surgically absent.  Limited unenhanced pancreas and adrenal   glands are unremarkable.       Kidneys are symmetric in size and attenuation.  No renal or ureteral   calculus.  No hydronephrosis or perinephric inflammation.       GI/Bowel: There is a moderate amount of ascites in the abdomen and pelvis,   which is increased from the previous CT abdomen and pelvis. Gloria Crystal is no   free air.  Appendix is not seen. Marinell Curd is no abnormal bowel distention.  The   majority of the stomach is intra-abdominal, and there are 2 PEG tubes in   place with retention bulbs in the mid stomach.  There is mild mesenteric   edema.  Surgical drain has been removed from the right lower quadrant.       Pelvis: Urinary bladder is unremarkable.  Uterus is surgically absent.  No   pelvic lymphadenopathy.       Peritoneum/Retroperitoneum: The abdominal aorta is normal in caliber.  There   is no retroperitoneal or mesenteric lymphadenopathy.       Bones/Soft Tissues: There is no acute or suspicious osseous abnormality. Visualized superficial soft tissues are within normal limits.           Impression   1. Loculated fluid collection in the right paraesophageal space/esophageal   hiatus, stable to slightly decreased in size when compared to 05/16/2021. The collection measures approximately 9.3 x 4.5 cm, and there is no gas in   the collection.  This could be a postoperative fluid collection or loculated   ascites herniated into the space previously occupied by intrathoracic   stomach.  Possibility of abscess is not excluded.  No pneumomediastinum.       2.  Small mildly loculated left pleural effusion, which is unchanged. Interval removal of the right chest tube with trace right pleural fluid.       3.  Increased airspace consolidation in the posterior mid and lower right   lung, concerning for pneumonia.  Unchanged opacities in the posterior left   lung, possibly atelectasis or scarring.       4.  Moderate ascites in the abdomen and pelvis, which is increased.  No free   air.          EXAMINATION:   SINGLE CONTRAST ESOPHAGRAM       5/7/2021 11:03 am       TECHNIQUE:   Single contrast esophagram was performed with a total 100 mL of Omnipaque 240   oral contrast.       FLUOROSCOPY DOSE AND TYPE OR TIME AND EXPOSURES:   Fluoro time: 2.7 minutes; DAP: 70.44 mGy       COMPARISON:   Upper GI from 05/04/2021       HISTORY:   ORDERING SYSTEM PROVIDED HISTORY: s/p hiatal hernia reduction  with partial   gastrectomy and gastropexy   TECHNOLOGIST PROVIDED HISTORY:   s/p hiatal hernia reduction  with partial gastrectomy and gastropexy   Reason for Exam: H.H repair/gastrectomy/gastropexy/ 100 ml Omnipaque orally   Acuity: Unknown   Type of Exam: Unknown       77-year-old female status post hiatal hernia reduction with partial   gastrectomy and gastropexy       FINDINGS:   Fluoroscopic  imaging was obtained prior to the administration contrast.       2 gastrostomy tubes are seen projecting over the left upper quadrant.  There   also 2 surgical drains projecting over the left upper quadrant.  Prior   cholecystectomy.  Suture material projects over the left upper quadrant.       The patient drank contrast which migrates through the postoperative region   and into the stomach and small bowel.       There is contrast draining through what appears to be the gastrostomy tubes.       No extraneous collection of contrast is seen beyond the confines of the   stomach.       There is contrast marginating a presumed gastrostomy tube balloon.       Mild gastroesophageal reflux and delayed transit of contrast is seen within   the distal esophagus/GE junction.           Impression   1. Drainage of contrast material through what appears to be the gastrostomy   tubes following the ingestion of contrast.  Contrast does migrate beyond the   postoperative region into the proximal small bowel. 2. No extraneous/extraluminal collection of contrast is seen beyond the   confines of the stomach. 3. 2 surgical drains and 2 presumed gastrostomy tubes are seen overlying the   left upper quadrant.  There does appear to be contrast slightly marginating   the more lateral gastrostomy tube balloon. 4. Delayed migration of contrast through the distal esophagus and GE junction   with mild gastroesophageal reflux. The findings were sent to the Radiology Results [address] at 12:43   pm on 5/7/2021to be communicated to a licensed caregiver.             EXAMINATION:   CT OF THE CHEST WITHOUT CONTRAST 5/4/2021 5:55 pm       TECHNIQUE:   CT of the chest was performed without the administration of intravenous   contrast. Multiplanar reformatted images are provided for review.  Dose   modulation, iterative reconstruction, and/or weight based adjustment of the   mA/kV was utilized to reduce the radiation dose to as low as reasonably   achievable.       COMPARISON:   None.       HISTORY:   ORDERING SYSTEM PROVIDED HISTORY: large hiatal hernia, contrast progression? TECHNOLOGIST PROVIDED HISTORY:   large hiatal hernia, contrast progression? Reason for Exam: large hiatal hernia, contrast progression?       FINDINGS:   Mediastinum: Heart size is normal without pericardial effusion.  There are   shotty mediastinal lymph nodes.  The thoracic aorta and main pulmonary artery   are normal in caliber.       Lungs/pleura: Moderate bilateral pleural effusions with adjacent   consolidation.  No pneumothorax.       Upper Abdomen: There is a large hiatal hernia containing the majority of the   stomach.  There is organoaxial volvulus.  Enteric tube is noted extending   below the diaphragm with tip outside the field of view.  The herniated   stomach is distended with contrast.  There is also contrast within the distal   esophagus. Susana Minus is some contrast visualized within the portion of stomach   below the diaphragm.  Free air and fluid are noted within the visualized   upper abdomen with apparent free spill of contrast in the left upper quadrant.       Soft Tissues/Bones: No acute osseous abnormality.           Impression   1. Ascites and pneumoperitoneum with apparent free-flowing oral contrast in   the left upper quadrant is concerning for viscus perforation, possibly within   the subdiaphragmatic portion of the stomach. 2. Large hiatal hernia with organoaxial volvulus.  Majority of contrast is   retained within the esophagus and supradiaphragmatic stomach. 3. Enteric tube extends below the diaphragm with tip outside the field of   view. 4. Moderate bilateral pleural effusions with adjacent consolidation,   atelectasis versus pneumonia.    Critical results were called by Dr. Teo Bellamy MD to [facility] on   5/4/2021 at 18:30.            CXR:  ONE XRAY VIEW OF THE CHEST       5/11/2021 9:22 am       COMPARISON:   AP chest from 05/07/2021; CT scan of the chest, abdomen and pelvis from   05/07/2021       HISTORY: ORDERING SYSTEM PROVIDED HISTORY: follow up on bibasilar consolidation   TECHNOLOGIST PROVIDED HISTORY:   follow up on bibasilar consolidation       History of diabetes and hypertension.       FINDINGS:   Overlying ECG monitor leads and gown snaps.  New right-sided PICC tip   position in the SVC.       Low lung volumes accentuating findings, including cardiomegaly with bibasilar   opacities suggesting atelectasis/consolidation and effusions.  Patchy   infiltrate right mid lung also again noted.       Bones stable.           Impression   New right-sided PICC tip position appears satisfactory.  Otherwise, similar   findings compatible with bibasilar atelectasis/consolidation, effusions and   minimal infiltrate or atelectasis right mid lung.             CT scan:   CT OF THE CHEST WITHOUT CONTRAST 5/11/2021 10:31 am       TECHNIQUE:   CT of the chest was performed without the administration of intravenous   contrast. Multiplanar reformatted images are provided for review. Dose   modulation, iterative reconstruction, and/or weight based adjustment of the   mA/kV was utilized to reduce the radiation dose to as low as reasonably   achievable.       COMPARISON:   CT scan of the chest from 05/07/2021.       HISTORY:   ORDERING SYSTEM PROVIDED HISTORY: ?left loculated pleural effusion   TECHNOLOGIST PROVIDED HISTORY:   ?left loculated pleural effusion   Reason for Exam: ?left loculated pleural effusion   Acuity: Unknown   Type of Exam: Unknown       FINDINGS:   Mediastinum: Interval placement right-sided PICC with tip position in the   mid-lower SVC.  Small unchanged mediastinal nodes; no bulky lymphadenopathy.    No acute thoracic aortic or cardiac abnormality on this unenhanced scan;   prominent LV again noted.  Tiny unchanged pericardial fluid or thickening.       Lungs/pleura: Similar bilateral pleural effusions with considerable mostly   lower lobe atelectasis or consolidation with air bronchograms; larger   loculated appearing component (measuring 10.0 x 4.9 cm) extending along the   azygoesophageal recess, and across the midline (best seen slices 36-46,   series 2).  Tracheobronchial tree patent centrally       Upper Abdomen: Similar small amount perihepatic and perisplenic ascitic fluid   and soft tissue infiltration visualized abdominal adipose tissue.  Clips   status post cholecystectomy.  Mild hepatic steatosis.       Soft Tissues/Bones: No acute abnormality.           Impression   New right-sided PICC again seen with unchanged and satisfactory tip position;   larger loculated right effusion in the azygoesophageal recess, as above. Otherwise similar findings to 05/07/2021 with suspected lower lobe   consolidation/pneumonitis, with volume loss and effusions both lower lobes.               Medical Decision Kechnf-Hwzuvbpf-Kinda:     Specimen Description 05/12/2021  5:09  [address]   . THORACENTESIS FLUID LEFT    Special Requests 05/12/2021  5:09  [address]   NOT REPORTED    Direct Exam Abnormal  05/12/2021  5:09 PM [facility]    Direct Exam 05/12/2021  5:09  [address]   NO BACTERIA SEEN    Direct Exam 05/12/2021  5:09  [address]   Gram stain made from cytocentrifuged specimen.  Organisms and cells will be concentrated.     Culture Abnormal  05/12/2021  5:09 PM [address] FLUID CULTURE, RN NOTIFIED Results called to and read back by: ROSALINDA WALLER 05/14/21 0430    Culture 05/12/2021  5:09 PM [facility] FROM BOTTLE: Jacquie [place] POSITIVE RODS          Medical Decision Making-Other:     Note:  Labs, medications, radiologic studies were reviewed with personal review of films   Large amounts of data were reviewed  Discussed with nursing Staff, Discharge planner  Infection Control and Prevention measures reviewed  All prior entries were reviewed  Administer

## 2021-05-26 NOTE — PROGRESS NOTES
gastropexy via g-tube x2     5/7 esophagram performed no leak noted    PLAN  1. [address] [address] for low fiber diet as tolerated, cont nutritional supplements, g-tubes ok to use for tube feeds, TF management and ordering per dietician/primary, continue PEGs to clamp, pt completed Reglan, cont miralax daily, PRN suppository      2. Cont recs and management per primary/ Pulmonary/ CT SX, no plans for drainage of right sided paraesophageal collection at this time, Discussed with cardiothoracic surgery   3. Cont abx per ID, goal potassium 4.0    4. Encourage IS use, deep breathing, ambulation and PT/OT work; [address] [address]win for DVT ppx from surg stance  5.  Pt to f/u with Dr. Monique Posey as out pt, ok for d/c from a surgical stance, contact surgery service as needed      Thank you,    Electronically signed by Wander Camp DO  on 5/26/2021 at 7:52 AM

## 2021-05-26 NOTE — PLAN OF CARE
Nutrition Problem #1: Inadequate oral intake  Intervention: Food and/or Nutrient Delivery: Continue Current Diet, Modify Oral Nutrition Supplement (Continue chocolate Ensure Enlive supplements.   Discontinue Ensure Clear Liquid supplements.)  Nutritional Goals: meet % of estimated nutrient needs

## 2021-05-26 NOTE — PROGRESS NOTES
Occupational Therapy  Facility/Department: [facility] 4B STEPDOWN  Daily Treatment Note  NAME: Jolanta Chavez  : 1950  MRN: [id number]    Date of Service: 2021    Discharge Recommendations:  Patient would benefit from continued therapy after discharge in order to increase pt balance, activity tolerance and independence. Discussed with OTR to update goals. Assessment   Performance deficits / Impairments: Decreased functional mobility ; Decreased ADL status; Decreased endurance;Decreased high-level IADLs;Decreased safe awareness;Decreased balance;Decreased strength  Prognosis: Good  OT Education: OT Role;Transfer Training;Energy Conservation  Patient Education: purpose of OT; proper hand and foot placement; deep breathing; walker management  Barriers to Learning: pt demo F carry over  REQUIRES OT FOLLOW UP: Yes  Activity Tolerance  Activity Tolerance: Patient Tolerated treatment well  Activity Tolerance: SOB  Safety Devices  Safety Devices in place: Yes  Type of devices: Left in chair;Call light within reach; Chair alarm in place; Patient at risk for falls;Gait belt  Restraints  Initially in place: No         Patient Diagnosis(es): The primary encounter diagnosis was Acute pancreatitis, unspecified complication status, unspecified pancreatitis type. Diagnoses of BONNIE (acute kidney injury) ([address] Hiatal hernia, Necrosis of stomach, Strangulated paraesophageal hernia, Mediastinitis, Paraesophageal fluid collection, Essential hypertension, Hernia with obstruction, and Diabetic ketoacidosis without coma associated with type 2 diabetes mellitus ([address] were also pertinent to this visit. has a past medical history of Diabetes mellitus ([address] Gout, Hiatal hernia, Hyperlipidemia, Hypertension, and Thyroid disease. has a past surgical history that includes Cholecystectomy (); Hysterectomy (); Breast surgery; other surgical history (); other surgical history (); Tubal ligation ();  Gastric fundoplication (N/A, 5/4/2021); hc picc line double lumen (5/10/2021); and IR CHEST TUBE INSERTION (5/13/2021). Restrictions  Restrictions/Precautions  Restrictions/Precautions: General Precautions, Fall Risk  Required Braces or Orthoses?: No  Position Activity Restriction  Other position/activity restrictions: up with assist, s/p paraesphageal hernia repair 5/4, 2 PEG tubes. Subjective   General  Chart Reviewed: Yes  Patient assessed for rehabilitation services?: Yes  Family / Caregiver Present: No  General Comment  Comments: pt denying pain however c/o pressure in abdomen  Pain Assessment  Non-Pharmaceutical Pain Intervention(s): Ambulation/Increased Activity;Repositioned; Therapeutic presence  Vital Signs  Patient Currently in Pain: Denies   Orientation  Orientation  Overall Orientation Status: Within Functional Limits  Objective    ADL  Feeding: Independent (seated in chair pt able to open containers)  Grooming: Contact guard assistance;Setup;Verbal cueing; Increased time to complete;Modified independent  (CGA for hand hygiene completed standing at sink; Mod I for oral care completed seated supported in chair)  UE Dressing: Stand by assistance;Setup;Verbal cueing; Increased time to complete (to doff/don gown seated)  Toileting: Minimal assistance;Setup; Increased time to complete (personal hygiene and clothing management completed standing pt req Min A for post pull up over hips with brief)  Additional Comments: Pt supine in bed at start of session pleasant and cooperative. Toileting completed seated on standard toilet and with personal hygiene/clothing management completed standing with RW utilizing 0-2 hand support. Hand hygiene completed standing at sink utilizing 0-1 hand support with increased SOB noted. Oral care completed seated in chair sec to pt declining attempting standing at sink sec to increased SOB. UB dressing completed seated on chair.  Throughout activities pt req min-mod verbal cues for sequencing and initiation. Pt limited throughout session sec to increased SOB, decreased balance and decreased activity tolerance. Balance  Sitting Balance: Supervision (Pt tolerated approx 10 min seated on toilet and EOB)  Standing Balance: Contact guard assistance  Standing Balance  Time: Pt tolerated approx 3-4 min  Activity: dynamic standing during ADLs  Comment: utilizing RW  Functional Mobility  Functional - Mobility Device: Rolling Walker  Activity: To/from bathroom  Assist Level: Contact guard assistance  Functional Mobility Comments: req cues for proper walker placement  Toilet Transfers  Toilet - Technique: Ambulating  Equipment Used: Standard toilet (with hand rails)  Toilet Transfer: Minimal assistance  Toilet Transfers Comments: req assist with walker management  Bed mobility  Supine to Sit: Stand by assistance  Scooting: Stand by assistance  Comment: HOB elevated, utilizing bed rails  Transfers  Stand Step Transfers: Contact guard assistance  Sit to stand: Minimal assistance  Stand to sit: Minimal assistance  Transfer Comments: utilizing RW req cues on proper hand placement     Cognition  Overall Cognitive Status: Exceptions  Arousal/Alertness: Delayed responses to stimuli  Following Commands: Follows multistep commands with repitition; Follows multistep commands with increased time  Attention Span: Appears intact  Memory: Appears intact  Safety Judgement: Decreased awareness of need for assistance  Problem Solving: Decreased awareness of errors  Insights: Decreased awareness of deficits  Initiation: Requires cues for some  Sequencing: Requires cues for some  Cognition Comment: Needing extended time for processing. During session pt req increased time for processing. At session end pt seated in chair with call light in reach, chair alarm on and all needs met.    Plan   Plan  Times per week: 4-5x/week  Current Treatment Recommendations: Safety Education & Training, Balance Training, Patient/Caregiver Education & Training, Self-Care / ADL, Functional Mobility Training, Equipment Evaluation, Education, & procurement, Home Management Training, Endurance Training, Strengthening  Cont POC  Goals  Short term goals  Time Frame for Short term goals: By discharge, pt will:  Short term goal 1: Demo bed mobility with Min A, using LRD  Short term goal 2: Demo functional transfers with Min A, using LRD  Short term goal 3: Demo functional mobility with Mod A, using LRD  Short term goal 4: Demo UB ADLs with CGA, using AE/DME PRN  Short term goal 5: Demo LB ADLs with Min A, setup, use of AE/DME PRN  Short term goal 6: Demo +5 minutes of static/dynamic standing with CGA to increase engagement in ADLs       Therapy Time   Individual Concurrent Group Co-treatment   Time In 0816         Time Out 0855         Minutes 39         Timed Code Treatment Minutes: [address], [place]/L

## 2021-05-26 NOTE — PROGRESS NOTES
[facility]  Office: [address], DO, Adolfo Rosen, DO, Loida Love, DO, Rickey Jauregui, DO, Melvin Clark MD, Fabio Wills MD, Rani Corbin MD, Dg Ortiz MD, Nataliya Varela MD, Erwin Hayes MD, Naima Talbot MD, Rosemary Gudino MD, Elizabeth Matthew, DO, Cassie Ibarra MD, Santi Al DO, Thierry Szymanski MD,  Freddy Lew, DO, Claire Del Toro MD, Tian Graham MD, Gretchen Puri MD, Letitia Holcomb MD, Nicole Maria, Sukhjinder Rivero, CNP, Truong Camp, CNP, Lashell Patton, CNS, Jose Wild, CNP, Thompson Lombard, CNP, Dudley Linedameon, CNP, Adenike Resendiz, CNP, Marianela Saldana, CNP, Jono Gao PA-C, lAbino Camacho DNP, Willian Wilcox, CNP, Donald Wallace, CNP, Mounika Green, CNP, Carmen Pichardo, CNP, Phil Anaya, CNP, Maxim Velazco, [address] [place] [address]    Progress Note    5/26/2021    8:38 AM    Name:   Jennifer Rojas  MRN:     [id number]     Acct:      [de-identified]   Room:   [facility]1264-54   Day:  21  Admit Date:  5/3/2021 12:49 PM    PCP:   Jaret Syed DO  Code Status:  Full Code    Subjective:     C/C:   Chief Complaint   Patient presents with    Blood Sugar Problem     >450    Hernia     hyatial      Interval History Status: improved. Patient seen and examined at bedside, no acute events overnight. Feeling okay and has a decent appetite, moves better inside the room with PT. Afebrile overnight  Patient denies any chest pain, shortness of breath, chills, fevers, nausea or vomiting.   Patient vitals, labs and all providers notes were reviewed,from overnight shift and morning updates were noted and discussed with the nurse    Brief History:        57[facility] to [facility] and was transferred for further evaluation and patient underwent emergent robotic laparoscopic hiatal hernia reduction with gastropexy via G-tube on 5/4/2021 and required intubation and was ultimately extubated on 4/6/2021. Patient underwent thoracentesis and had chest tube insertion 5/12/2021 follow-up with cardiothoracic surgery as well as pulmonology. Is a concern for possible esophageal leak and esophagram was performed on 5/7/2021 did not show any leak. Patient was started on Diflucan due to concern for Candida albicans    Review of Systems:     Review of Systems   Constitutional: Positive for activity change, appetite change and fatigue. Negative for chills, diaphoresis and fever. HENT: Negative for congestion. Eyes: Negative for visual disturbance. Respiratory: Negative for cough, chest tightness, shortness of breath and wheezing. Cardiovascular: Negative for chest pain, palpitations and leg swelling. Gastrointestinal: Positive for constipation. Negative for abdominal pain, blood in stool, diarrhea, nausea and vomiting. Genitourinary: Negative for difficulty urinating. Neurological: Positive for weakness. Negative for dizziness, light-headedness, numbness and headaches. Psychiatric/Behavioral: Nervous/anxious: All other systems reviewed and are negative. Medications: Allergies:     Allergies   Allergen Reactions    No Known Allergies        Current Meds:   Scheduled Meds:    sennosides-docusate sodium  2 tablet Oral Daily    fluconazole  400 mg Oral Daily    gabapentin  100 mg Oral TID    aspirin  81 mg Oral Daily    atorvastatin  10 mg Oral Daily    levothyroxine  112 mcg Oral Daily    pantoprazole  40 mg Oral QAM AC    insulin glargine  10 Units Subcutaneous Nightly    polyethylene glycol  17 g Oral Daily    heparin (porcine)  5,000 Units Subcutaneous 3 times per day    insulin lispro  0-18 Units Subcutaneous Q4H    sodium chloride flush  5-40 mL Intravenous 2 times per day     Continuous Infusions:    dextrose      sodium chloride 25 mL (05/10/21 7710)     PRN Meds: bisacodyl, albuterol, oxyCODONE **OR** oxyCODONE, 0.94*  --    PHOS 2.6  --   --  2.0*     Recent Labs     05/25/21  1617 05/25/21  1655 05/25/21  2115 05/26/21  0016 05/26/21  0516 05/26/21  0644   POCGLU 114* 112* 131* 127* 89 87     ABG:  Lab Results   Component Value Date    POCPH 7.433 05/06/2021    PHART 7.193 05/04/2021    POCPCO2 36.8 05/06/2021    MWA3VAG 52.0 05/04/2021    POCPO2 150.9 05/06/2021    PO2ART 145.0 05/04/2021    POCHCO3 24.6 05/06/2021    VDJ1IXB 19.2 05/04/2021    NBEA NOT REPORTED 05/06/2021    PBEA 0 05/06/2021    RYD1NXB NOT REPORTED 05/06/2021    SUSE5OWW 99 05/06/2021    L6PUSGXY 98.0 05/04/2021    FIO2 UNKNOWN 05/20/2021     Lab Results   Component Value Date/Time    SPECIAL NOT REPORTED 05/13/2021 10:23 AM     Lab Results   Component Value Date/Time    CULTURE NO GROWTH 12 DAYS 05/13/2021 10:23 AM       Radiology:  XR CHEST (SINGLE VIEW FRONTAL)    Result Date: 5/18/2021  1. Right chest tube removal without pneumothorax. 2. Small left pleural effusion and bibasilar opacities are similar to prior radiograph. XR ABDOMEN (KUB) (SINGLE AP VIEW)    Result Date: 5/19/2021  Stable ileus versus partial small bowel obstruction     XR CHEST PORTABLE    Result Date: 5/20/2021  Improving bibasilar airspace disease. No significant recurrent effusion. CT CHEST ABDOMEN PELVIS WO CONTRAST    Result Date: 5/20/2021  1. Loculated fluid collection in the right paraesophageal space/esophageal hiatus, stable to slightly decreased in size when compared to 05/16/2021. The collection measures approximately 9.3 x 4.5 cm, and there is no gas in the collection. This could be a postoperative fluid collection or loculated ascites herniated into the space previously occupied by intrathoracic stomach. Possibility of abscess is not excluded. No pneumomediastinum. 2.  Small mildly loculated left pleural effusion, which is unchanged. Interval removal of the right chest tube with trace right pleural fluid.  3.  Increased airspace consolidation in the posterior mid and lower right lung, concerning for pneumonia. Unchanged opacities in the posterior left lung, possibly atelectasis or scarring. 4.  Moderate ascites in the abdomen and pelvis, which is increased. No free air. Physical Examination:        Physical Exam  Vitals and nursing note reviewed. Constitutional:       General: She is not in acute distress. HENT:      Head: Normocephalic and atraumatic. Eyes:      Conjunctiva/sclera: Conjunctivae normal.      Pupils: Pupils are equal, round, and reactive to light. Cardiovascular:      Rate and Rhythm: Normal rate and regular rhythm. Heart sounds: No murmur heard. Pulmonary:      Effort: Pulmonary effort is normal. No accessory muscle usage or respiratory distress. Breath sounds: No stridor. No decreased breath sounds, wheezing, rhonchi or rales. Abdominal:      General: Bowel sounds are normal. There is no distension. Palpations: Abdomen is soft. Abdomen is not rigid. Tenderness: There is no abdominal tenderness. There is no guarding. Comments: PEG X 2 noted with no erythema around, other laparoscopic wounds healing appropriatly   Musculoskeletal:         General: No tenderness. Skin:     General: Skin is warm and dry. Findings: No erythema, lesion or rash. Neurological:      Mental Status: She is alert and oriented to person, place, and time. Cranial Nerves: No cranial nerve deficit. Motor: No seizure activity. Psychiatric:         Speech: Speech normal.         Behavior: Behavior normal. Behavior is cooperative.          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 On low fiber diet and nutritional supplements, completed Reglan for total of 72 hours, on Miralax    Acute respiratory failure requiring oxygen with empyema showing gram positive and yeast, still requiring 1L nasal cannula, wean off NC today and monitor closely    Fluconazole for candida infection,Appreciate ID, pulm, and CTS recommendations, s/p chest tube removal and tolerating well. Monitor if patient is a candidate for VATS as outpateint. Paraesophageal collection appears stable in size.  discucsed with general surgery. Monitor as outpateint. With CT in 2 weeks. HTN, CAD, ASA, lipitor    Diabetes. lantus 10 units, ISS, hypoglycemia protocol     Hypothyroidism.  Synthroid 112 mcg    Neurontin 100 mg 3 times daily.     Moderate malnutrition: some improvement in appetite and intake, will evaluate when she is accepted if any form of complementary TF is needed    Continue PT/OT    Discharge planning.      Appreciate CM helping with placement     Discussed with the patient and the nurse     Await bed    Consuelo Franz MD  5/26/2021  8:38 AM

## 2021-05-26 NOTE — PLAN OF CARE
Problem: Confusion - Acute:  Goal: Absence of continued neurological deterioration signs and symptoms  Description: Absence of continued neurological deterioration signs and symptoms  5/26/2021 0426 by Garrett Williamson RN  Outcome: Ongoing     Problem: Confusion - Acute:  Goal: Mental status will be restored to baseline  Description: Mental status will be restored to baseline  5/26/2021 0426 by Garrett Williamson RN  Outcome: Ongoing     Problem: Discharge Planning:  Goal: Ability to perform activities of daily living will improve  Description: Ability to perform activities of daily living will improve  5/26/2021 0426 by Garrett Williamson RN  Outcome: Ongoing     Problem: Discharge Planning:  Goal: Participates in care planning  Description: Participates in care planning  5/26/2021 0426 by Garrett Williamson RN  Outcome: Ongoing     Problem: Injury - Risk of, Physical Injury:  Goal: Absence of physical injury  Description: Absence of physical injury  5/26/2021 0426 by Garrett Williamson RN  Outcome: Ongoing     Problem: Injury - Risk of, Physical Injury:  Goal: Will remain free from falls  Description: Will remain free from falls  5/26/2021 0426 by Garrett Williamson RN  Outcome: Ongoing     Problem: Mood - Altered:  Goal: Mood stable  Description: Mood stable  5/26/2021 0426 by Garrett Williamson RN  Outcome: Ongoing     Problem: Mood - Altered:  Goal: Absence of abusive behavior  Description: Absence of abusive behavior  5/26/2021 0426 by Garrett Williamson RN  Outcome: Ongoing     Problem: Mood - Altered:  Goal: Verbalizations of feeling emotionally comfortable while being cared for will increase  Description: Verbalizations of feeling emotionally comfortable while being cared for will increase  5/26/2021 0426 by Garrett Williamson RN  Outcome: Ongoing     Problem: Psychomotor Activity - Altered:  Goal: Absence of psychomotor disturbance signs and symptoms  Description: Absence of psychomotor disturbance signs and symptoms  5/26/2021 0426 by Alison Lester Absence of physical injury  Description: Absence of physical injury  5/26/2021 0426 by Jeninfer Weaver RN  Outcome: Ongoing     Problem: Falls - Risk of:  Goal: Will remain free from falls  Description: Will remain free from falls  5/26/2021 0426 by Jennifer Weaver RN  Outcome: Ongoing     Problem: Nutrition  Goal: Optimal nutrition therapy  Description: Nutrition Problem #1: Inadequate oral intake  Intervention: Food and/or Nutrient Delivery: Continue NPO, Start Parenteral Nutrition-suggest start with 150 g Dex, 50 g AA at 41.7 mL/hr with 100 mL 20% lipids.   Nutritional Goals: meet % of estimated nutrient needs     5/26/2021 0426 by Jennifer Weaver RN  Outcome: Ongoing     Problem: Pain:  Goal: Pain level will decrease  Description: Pain level will decrease  5/26/2021 0426 by Jennifer Weaver RN  Outcome: Ongoing     Problem: Pain:  Goal: Control of acute pain  Description: Control of acute pain  5/26/2021 0426 by Jennifer Weaver RN  Outcome: Ongoing     Problem: Pain:  Goal: Control of chronic pain  Description: Control of chronic pain  5/26/2021 0426 by Jennifer Weaver RN  Outcome: Ongoing

## 2021-05-27 ENCOUNTER — APPOINTMENT (OUTPATIENT)
Dept: GENERAL RADIOLOGY | Age: 71
DRG: 853 | End: 2021-05-27
Payer: MEDICARE

## 2021-05-27 LAB
ABSOLUTE EOS #: 0.24 K/UL (ref 0–0.4)
ABSOLUTE IMMATURE GRANULOCYTE: 0.24 K/UL (ref 0–0.3)
ABSOLUTE LYMPH #: 1.07 K/UL (ref 1–4.8)
ABSOLUTE MONO #: 0.71 K/UL (ref 0.1–0.8)
ALBUMIN SERPL-MCNC: 1.9 G/DL (ref 3.5–5.2)
ALBUMIN/GLOBULIN RATIO: 0.4 (ref 1–2.5)
ALP BLD-CCNC: 132 U/L (ref 35–104)
ALT SERPL-CCNC: 34 U/L (ref 5–33)
ANION GAP SERPL CALCULATED.3IONS-SCNC: 13 MMOL/L (ref 9–17)
ANION GAP SERPL CALCULATED.3IONS-SCNC: 9 MMOL/L (ref 9–17)
AST SERPL-CCNC: 58 U/L
BASOPHILS # BLD: 0 % (ref 0–2)
BASOPHILS ABSOLUTE: 0 K/UL (ref 0–0.2)
BILIRUB SERPL-MCNC: 0.32 MG/DL (ref 0.3–1.2)
BUN BLDV-MCNC: 17 MG/DL (ref 8–23)
BUN BLDV-MCNC: 18 MG/DL (ref 8–23)
BUN/CREAT BLD: ABNORMAL (ref 9–20)
BUN/CREAT BLD: ABNORMAL (ref 9–20)
CALCIUM SERPL-MCNC: 6.8 MG/DL (ref 8.6–10.4)
CALCIUM SERPL-MCNC: 7.2 MG/DL (ref 8.6–10.4)
CHLORIDE BLD-SCNC: 98 MMOL/L (ref 98–107)
CHLORIDE BLD-SCNC: 99 MMOL/L (ref 98–107)
CO2: 23 MMOL/L (ref 20–31)
CO2: 25 MMOL/L (ref 20–31)
CREAT SERPL-MCNC: 1.1 MG/DL (ref 0.5–0.9)
CREAT SERPL-MCNC: 1.22 MG/DL (ref 0.5–0.9)
DIFFERENTIAL TYPE: ABNORMAL
EOSINOPHILS RELATIVE PERCENT: 2 % (ref 1–4)
GFR AFRICAN AMERICAN: 53 ML/MIN
GFR AFRICAN AMERICAN: 60 ML/MIN
GFR NON-AFRICAN AMERICAN: 44 ML/MIN
GFR NON-AFRICAN AMERICAN: 49 ML/MIN
GFR SERPL CREATININE-BSD FRML MDRD: ABNORMAL ML/MIN/{1.73_M2}
GLUCOSE BLD-MCNC: 119 MG/DL (ref 65–105)
GLUCOSE BLD-MCNC: 149 MG/DL (ref 65–105)
GLUCOSE BLD-MCNC: 155 MG/DL (ref 70–99)
GLUCOSE BLD-MCNC: 164 MG/DL (ref 65–105)
GLUCOSE BLD-MCNC: 188 MG/DL (ref 65–105)
GLUCOSE BLD-MCNC: 192 MG/DL (ref 65–105)
GLUCOSE BLD-MCNC: 61 MG/DL (ref 65–105)
GLUCOSE BLD-MCNC: 83 MG/DL (ref 65–105)
GLUCOSE BLD-MCNC: 83 MG/DL (ref 70–99)
GLUCOSE BLD-MCNC: 85 MG/DL (ref 65–105)
HCT VFR BLD CALC: 26.3 % (ref 36.3–47.1)
HEMOGLOBIN: 8.2 G/DL (ref 11.9–15.1)
IMMATURE GRANULOCYTES: 2 %
LYMPHOCYTES # BLD: 9 % (ref 24–44)
MAGNESIUM: 1.6 MG/DL (ref 1.6–2.6)
MCH RBC QN AUTO: 27.5 PG (ref 25.2–33.5)
MCHC RBC AUTO-ENTMCNC: 31.2 G/DL (ref 28.4–34.8)
MCV RBC AUTO: 88.3 FL (ref 82.6–102.9)
MONOCYTES # BLD: 6 % (ref 1–7)
MORPHOLOGY: ABNORMAL
NRBC AUTOMATED: 0.2 PER 100 WBC
PDW BLD-RTO: 14.7 % (ref 11.8–14.4)
PLATELET # BLD: 304 K/UL (ref 138–453)
PLATELET ESTIMATE: ABNORMAL
PMV BLD AUTO: 8.9 FL (ref 8.1–13.5)
POTASSIUM SERPL-SCNC: 3.2 MMOL/L (ref 3.7–5.3)
POTASSIUM SERPL-SCNC: 3.3 MMOL/L (ref 3.7–5.3)
RBC # BLD: 2.98 M/UL (ref 3.95–5.11)
RBC # BLD: ABNORMAL 10*6/UL
SEG NEUTROPHILS: 81 % (ref 36–66)
SEGMENTED NEUTROPHILS ABSOLUTE COUNT: 9.64 K/UL (ref 1.8–7.7)
SODIUM BLD-SCNC: 132 MMOL/L (ref 135–144)
SODIUM BLD-SCNC: 135 MMOL/L (ref 135–144)
TOTAL PROTEIN: 6.2 G/DL (ref 6.4–8.3)
WBC # BLD: 11.9 K/UL (ref 3.5–11.3)
WBC # BLD: ABNORMAL 10*3/UL

## 2021-05-27 PROCEDURE — 6370000000 HC RX 637 (ALT 250 FOR IP): Performed by: NURSE PRACTITIONER

## 2021-05-27 PROCEDURE — 1200000000 HC SEMI PRIVATE

## 2021-05-27 PROCEDURE — 83735 ASSAY OF MAGNESIUM: CPT

## 2021-05-27 PROCEDURE — 6360000002 HC RX W HCPCS: Performed by: STUDENT IN AN ORGANIZED HEALTH CARE EDUCATION/TRAINING PROGRAM

## 2021-05-27 PROCEDURE — 6360000002 HC RX W HCPCS: Performed by: NURSE PRACTITIONER

## 2021-05-27 PROCEDURE — 6370000000 HC RX 637 (ALT 250 FOR IP): Performed by: STUDENT IN AN ORGANIZED HEALTH CARE EDUCATION/TRAINING PROGRAM

## 2021-05-27 PROCEDURE — 74018 RADEX ABDOMEN 1 VIEW: CPT

## 2021-05-27 PROCEDURE — 97110 THERAPEUTIC EXERCISES: CPT

## 2021-05-27 PROCEDURE — 97530 THERAPEUTIC ACTIVITIES: CPT

## 2021-05-27 PROCEDURE — 85025 COMPLETE CBC W/AUTO DIFF WBC: CPT

## 2021-05-27 PROCEDURE — 99232 SBSQ HOSP IP/OBS MODERATE 35: CPT | Performed by: FAMILY MEDICINE

## 2021-05-27 PROCEDURE — 51798 US URINE CAPACITY MEASURE: CPT

## 2021-05-27 PROCEDURE — 99232 SBSQ HOSP IP/OBS MODERATE 35: CPT | Performed by: INTERNAL MEDICINE

## 2021-05-27 PROCEDURE — 2580000003 HC RX 258: Performed by: STUDENT IN AN ORGANIZED HEALTH CARE EDUCATION/TRAINING PROGRAM

## 2021-05-27 PROCEDURE — 82947 ASSAY GLUCOSE BLOOD QUANT: CPT

## 2021-05-27 PROCEDURE — 80053 COMPREHEN METABOLIC PANEL: CPT

## 2021-05-27 PROCEDURE — 80048 BASIC METABOLIC PNL TOTAL CA: CPT

## 2021-05-27 PROCEDURE — 36415 COLL VENOUS BLD VENIPUNCTURE: CPT

## 2021-05-27 PROCEDURE — 6370000000 HC RX 637 (ALT 250 FOR IP): Performed by: FAMILY MEDICINE

## 2021-05-27 RX ORDER — LACTULOSE 10 G/15ML
20 SOLUTION ORAL 3 TIMES DAILY
Status: DISCONTINUED | OUTPATIENT
Start: 2021-05-27 | End: 2021-05-28 | Stop reason: HOSPADM

## 2021-05-27 RX ORDER — POTASSIUM CHLORIDE 20 MEQ/1
40 TABLET, EXTENDED RELEASE ORAL PRN
Status: DISCONTINUED | OUTPATIENT
Start: 2021-05-27 | End: 2021-05-28 | Stop reason: HOSPADM

## 2021-05-27 RX ORDER — POTASSIUM CHLORIDE 7.45 MG/ML
10 INJECTION INTRAVENOUS PRN
Status: DISCONTINUED | OUTPATIENT
Start: 2021-05-27 | End: 2021-05-28 | Stop reason: HOSPADM

## 2021-05-27 RX ADMIN — HEPARIN SODIUM 5000 UNITS: 5000 INJECTION INTRAVENOUS; SUBCUTANEOUS at 13:55

## 2021-05-27 RX ADMIN — MAGNESIUM SULFATE HEPTAHYDRATE 1000 MG: 1 INJECTION, SOLUTION INTRAVENOUS at 22:02

## 2021-05-27 RX ADMIN — LACTULOSE 20 G: 20 SOLUTION ORAL at 11:05

## 2021-05-27 RX ADMIN — GABAPENTIN 100 MG: 100 CAPSULE ORAL at 22:10

## 2021-05-27 RX ADMIN — SODIUM CHLORIDE, PRESERVATIVE FREE 10 ML: 5 INJECTION INTRAVENOUS at 08:36

## 2021-05-27 RX ADMIN — INSULIN LISPRO 2 UNITS: 100 INJECTION, SOLUTION INTRAVENOUS; SUBCUTANEOUS at 22:13

## 2021-05-27 RX ADMIN — POLYETHYLENE GLYCOL 3350 17 G: 17 POWDER, FOR SOLUTION ORAL at 08:36

## 2021-05-27 RX ADMIN — DOCUSATE SODIUM 50MG AND SENNOSIDES 8.6MG 2 TABLET: 8.6; 5 TABLET, FILM COATED ORAL at 08:36

## 2021-05-27 RX ADMIN — SODIUM CHLORIDE, PRESERVATIVE FREE 10 ML: 5 INJECTION INTRAVENOUS at 21:22

## 2021-05-27 RX ADMIN — INSULIN LISPRO 3 UNITS: 100 INJECTION, SOLUTION INTRAVENOUS; SUBCUTANEOUS at 13:54

## 2021-05-27 RX ADMIN — HEPARIN SODIUM 5000 UNITS: 5000 INJECTION INTRAVENOUS; SUBCUTANEOUS at 06:13

## 2021-05-27 RX ADMIN — PANTOPRAZOLE SODIUM 40 MG: 40 TABLET, DELAYED RELEASE ORAL at 06:13

## 2021-05-27 RX ADMIN — LEVOTHYROXINE SODIUM 112 MCG: 112 TABLET ORAL at 06:13

## 2021-05-27 RX ADMIN — LACTULOSE 20 G: 20 SOLUTION ORAL at 22:10

## 2021-05-27 RX ADMIN — HEPARIN SODIUM 5000 UNITS: 5000 INJECTION INTRAVENOUS; SUBCUTANEOUS at 22:10

## 2021-05-27 RX ADMIN — ATORVASTATIN CALCIUM 10 MG: 10 TABLET, FILM COATED ORAL at 22:10

## 2021-05-27 RX ADMIN — Medication 81 MG: at 08:36

## 2021-05-27 RX ADMIN — GABAPENTIN 100 MG: 100 CAPSULE ORAL at 13:55

## 2021-05-27 RX ADMIN — POTASSIUM CHLORIDE 10 MEQ: 7.46 INJECTION, SOLUTION INTRAVENOUS at 22:03

## 2021-05-27 RX ADMIN — POTASSIUM CHLORIDE 10 MEQ: 7.46 INJECTION, SOLUTION INTRAVENOUS at 23:50

## 2021-05-27 RX ADMIN — GABAPENTIN 100 MG: 100 CAPSULE ORAL at 08:36

## 2021-05-27 RX ADMIN — INSULIN GLARGINE 10 UNITS: 100 INJECTION, SOLUTION SUBCUTANEOUS at 22:15

## 2021-05-27 RX ADMIN — ONDANSETRON 4 MG: 2 INJECTION INTRAMUSCULAR; INTRAVENOUS at 21:22

## 2021-05-27 RX ADMIN — FLUCONAZOLE 400 MG: 40 POWDER, FOR SUSPENSION ORAL at 08:36

## 2021-05-27 RX ADMIN — INSULIN LISPRO 3 UNITS: 100 INJECTION, SOLUTION INTRAVENOUS; SUBCUTANEOUS at 00:35

## 2021-05-27 RX ADMIN — MAGNESIUM SULFATE HEPTAHYDRATE 1000 MG: 1 INJECTION, SOLUTION INTRAVENOUS at 23:29

## 2021-05-27 ASSESSMENT — ENCOUNTER SYMPTOMS
NAUSEA: 0
CHEST TIGHTNESS: 0
DIARRHEA: 0
VOMITING: 0
COUGH: 0
CONSTIPATION: 1
ABDOMINAL PAIN: 0
SHORTNESS OF BREATH: 0
WHEEZING: 0
ABDOMINAL DISTENTION: 1
BLOOD IN STOOL: 0

## 2021-05-27 NOTE — PROGRESS NOTES
[facility]  Office: [address], DO, Soledad Reyna, DO, Megan Motley, DO, Logan Foster Blood, DO, Anand Best MD, Annita Pack MD, Mercedes Winkler MD, Giovany Cervantes MD, Fatou Yi MD, Jacobo Mak MD, Sebastien Roblero MD, Yvette Boyer MD, Gay Wright DO, Татьяна Bass MD, Manuel Santos, DO, Anum Raymond MD,  Dayana Santoyo, DO, Isabelle Kelly MD, Ever Herbert MD, Handy Roth MD, Fuentes Samuels MD, Deon Kingston, Alma Aguilar, CNP, Feli Stacy, CNP, Aram Cifuentes, CNS, Christian Aguiar, CNP, Feliciano Munoz, CNP, Lee Tans, CNP, Tony Cesar, CNP, Brenden Hernandez, CNP, Eri Kilpatrick PA-C, Joseline Santiago, [facility], Argelia Gibbs, CNP, Palomo Alanis, CNP, Ignacia Jaime, CNP, Mackenzie Jay, CNP, Rachel Odonnell, [facility], Mica Islas, [address] [place] [address]    Progress Note    5/27/2021    9:22 AM    Name:   Sydnee Beauchamp  MRN:     [id number]     Acct:      [de-identified]   Room:   [address] [place], [place] [address] Day:  25  Admit Date:  5/3/2021 12:49 PM    PCP:   Ochoa Perez DO  Code Status:  Full Code    Subjective:     C/C:   Chief Complaint   Patient presents with    Blood Sugar Problem     >450    Hernia     hyatial      Interval History Status: improved. Patient seen and examined at bedside, no acute events overnight. Feeling okay and has a decent appetite, moves better inside the room with PT. Afebrile overnight  Still constipated and abdomen distended  Patient denies any chest pain, shortness of breath, chills, fevers, nausea or vomiting.   Patient vitals, labs and all providers notes were reviewed,from overnight shift and morning updates were noted and discussed with the nurse    Brief History:        77-year-old female originally presenting to outlying facility due to nausea and vomiting and was transferred for further evaluation and patient underwent emergent robotic laparoscopic hiatal hernia reduction with gastropexy via G-tube on 5/4/2021 and required intubation and was ultimately extubated on 4/6/2021. Patient underwent thoracentesis and had chest tube insertion 5/12/2021 follow-up with cardiothoracic surgery as well as pulmonology. Is a concern for possible esophageal leak and esophagram was performed on 5/7/2021 did not show any leak. Patient was started on Diflucan due to concern for Candida albicans    Review of Systems:     Review of Systems   Constitutional: Positive for activity change, appetite change and fatigue. Negative for chills, diaphoresis and fever. HENT: Negative for congestion. Eyes: Negative for visual disturbance. Respiratory: Negative for cough, chest tightness, shortness of breath and wheezing. Cardiovascular: Negative for chest pain, palpitations and leg swelling. Gastrointestinal: Positive for abdominal distention and constipation. Negative for abdominal pain, blood in stool, diarrhea, nausea and vomiting. Genitourinary: Negative for difficulty urinating. Neurological: Positive for weakness. Negative for dizziness, light-headedness, numbness and headaches. Psychiatric/Behavioral: Nervous/anxious: All other systems reviewed and are negative. Medications: Allergies:     Allergies   Allergen Reactions    No Known Allergies        Current Meds:   Scheduled Meds:    insulin lispro  0-18 Units Subcutaneous TID WC    insulin lispro  0-9 Units Subcutaneous Nightly    sennosides-docusate sodium  2 tablet Oral Daily    fluconazole  400 mg Oral Daily    gabapentin  100 mg Oral TID    aspirin  81 mg Oral Daily    atorvastatin  10 mg Oral Daily    levothyroxine  112 mcg Oral Daily    pantoprazole  40 mg Oral QAM AC    insulin glargine  10 Units Subcutaneous Nightly    polyethylene glycol  17 g Oral Daily    heparin (porcine)  5,000 Units Subcutaneous 3 times per day    sodium chloride flush  5-40 mL Intravenous 2 times per day     Continuous Infusions:    dextrose      sodium chloride 25 mL (05/10/21 1840)     PRN Meds: bisacodyl, albuterol, oxyCODONE **OR** oxyCODONE, acetaminophen, glucose, dextrose, glucagon (rDNA), dextrose, sodium chloride, magnesium sulfate, acetaminophen, artificial tears, sodium chloride flush, [DISCONTINUED] promethazine **OR** ondansetron    Data:     Past Medical History:   has a past medical history of Diabetes mellitus ([address] Gout, Hiatal hernia, Hyperlipidemia, Hypertension, and Thyroid disease. Social History:   reports that she has never smoked. She has never used smokeless tobacco. She reports that she does not drink alcohol and does not use drugs. Family History:   Family History   Problem Relation Age of Onset    Breast Cancer Mother     High Blood Pressure Mother     High Cholesterol Father     Breast Cancer Sister        Vitals:  /80   Pulse 82   Temp 98.2 °F (36.8 °C) (Oral)   Resp 14   Ht 5' 2\" (1.575 m)   Wt 179 lb 9.6 oz (81.5 kg)   SpO2 96%   BMI 32.85 kg/m²   Temp (24hrs), Av.2 °F (36.8 °C), Min:97.4 °F (36.3 °C), Max:98.9 °F (37.2 °C)    Recent Labs     21  0032 21  0418 21  0447 21  0812   POCGLU 149* 61* 83 85       I/O (24Hr):     Intake/Output Summary (Last 24 hours) at 2021 0672  Last data filed at 2021 2238  Gross per 24 hour   Intake 840 ml   Output    Net 840 ml       Labs:  Hematology:  Recent Labs     21  0551 21  0601 21  0548   WBC 11.1 12.2* 11.9*   RBC 3.03* 3.18* 2.98*   HGB 8.2* 8.6* 8.2*   HCT 27.1* 28.3* 26.3*   MCV 89.4 89.0 88.3   MCH 27.1 27.0 27.5   MCHC 30.3 30.4 31.2   RDW 14.5* 14.6* 14.7*    312 304   MPV 9.1 9.4 8.9     Chemistry:  Recent Labs     21  0551 21  0717 21  0601 21  0548     --  134* 132*   K 3.3*  --  3.7 3.3*     --  99 98   CO2 26  --  25 25   GLUCOSE 95  --  92 83   BUN 20  --  18 17   CREATININE 1.19*  --  1.18* 1.10*   MG 1.7  --  1.6  --    ANIONGAP 10  -- 10 9   LABGLOM 45*  --  46* 49*   GFRAA 55*  --  55* 60*   CALCIUM 6.8*  --  6.8* 6.8*   CAION  --  0.94*  --   --    PHOS  --   --  2.0*  --      Recent Labs     05/26/21  1710 05/26/21 2037 05/27/21  0032 05/27/21  0418 05/27/21  0447 05/27/21  0548 05/27/21  0812   PROT  --   --   --   --   --  6.2*  --    LABALBU  --   --   --   --   --  1.9*  --    AST  --   --   --   --   --  58*  --    ALT  --   --   --   --   --  34*  --    ALKPHOS  --   --   --   --   --  132*  --    BILITOT  --   --   --   --   --  0.32  --    POCGLU 127* 129* 149* 61* 83  --  85     ABG:  Lab Results   Component Value Date    POCPH 7.433 05/06/2021    PHART 7.193 05/04/2021    POCPCO2 36.8 05/06/2021    PGT4VVO 52.0 05/04/2021    POCPO2 150.9 05/06/2021    PO2ART 145.0 05/04/2021    POCHCO3 24.6 05/06/2021    IEO2XQK 19.2 05/04/2021    NBEA NOT REPORTED 05/06/2021    PBEA 0 05/06/2021    KHQ4HYR NOT REPORTED 05/06/2021    BCHR5YHY 99 05/06/2021    L2YFYDZX 98.0 05/04/2021    FIO2 UNKNOWN 05/20/2021     Lab Results   Component Value Date/Time    SPECIAL NOT REPORTED 05/13/2021 10:23 AM     Lab Results   Component Value Date/Time    CULTURE NO GROWTH 12 DAYS 05/13/2021 10:23 AM       Radiology:  XR CHEST (SINGLE VIEW FRONTAL)    Result Date: 5/18/2021  1. Right chest tube removal without pneumothorax. 2. Small left pleural effusion and bibasilar opacities are similar to prior radiograph. XR ABDOMEN (KUB) (SINGLE AP VIEW)    Result Date: 5/19/2021  Stable ileus versus partial small bowel obstruction     XR CHEST PORTABLE    Result Date: 5/20/2021  Improving bibasilar airspace disease. No significant recurrent effusion. CT CHEST ABDOMEN PELVIS WO CONTRAST    Result Date: 5/20/2021  1. Loculated fluid collection in the right paraesophageal space/esophageal hiatus, stable to slightly decreased in size when compared to 05/16/2021. The collection measures approximately 9.3 x 4.5 cm, and there is no gas in the collection.   This could be a postoperative fluid collection or loculated ascites herniated into the space previously occupied by intrathoracic stomach. Possibility of abscess is not excluded. No pneumomediastinum. 2.  Small mildly loculated left pleural effusion, which is unchanged. Interval removal of the right chest tube with trace right pleural fluid. 3.  Increased airspace consolidation in the posterior mid and lower right lung, concerning for pneumonia. Unchanged opacities in the posterior left lung, possibly atelectasis or scarring. 4.  Moderate ascites in the abdomen and pelvis, which is increased. No free air. Physical Examination:        Physical Exam  Vitals and nursing note reviewed. Constitutional:       General: She is not in acute distress. HENT:      Head: Normocephalic and atraumatic. Eyes:      Conjunctiva/sclera: Conjunctivae normal.      Pupils: Pupils are equal, round, and reactive to light. Cardiovascular:      Rate and Rhythm: Normal rate and regular rhythm. Heart sounds: No murmur heard. Pulmonary:      Effort: Pulmonary effort is normal. No accessory muscle usage or respiratory distress. Breath sounds: No stridor. No decreased breath sounds, wheezing, rhonchi or rales. Abdominal:      General: Bowel sounds are normal. There is no distension. Palpations: Abdomen is soft. Abdomen is not rigid. Tenderness: There is no abdominal tenderness. There is no guarding. Comments: PEG X 2 noted with no erythema around, other laparoscopic wounds healing appropriatly   Musculoskeletal:         General: No tenderness. Skin:     General: Skin is warm and dry. Findings: No erythema, lesion or rash. Neurological:      Mental Status: She is alert and oriented to person, place, and time. Cranial Nerves: No cranial nerve deficit. Motor: No seizure activity. Psychiatric:         Speech: Speech normal.         Behavior: Behavior normal. Behavior is cooperative. 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 On low fiber diet and nutritional supplements, completed Reglan for total of 72 hours, on Miralax    Acute respiratory failure requiring oxygen with empyema showing gram positive and yeast, still requiring 1L nasal cannula, wean off NC today and monitor closely    Fluconazole for candida infection,Appreciate ID, pulm, and CTS recommendations, s/p chest tube removal and tolerating well. Monitor if patient is a candidate for VATS as outpateint. Paraesophageal collection appears stable in size.  discucsed with general surgery. Monitor as outpateint. With CT in 2 weeks. HTN, CAD, ASA, lipitor    Diabetes.  lantus , ISS, hypoglycemia protocol     Hypothyroidism.  Synthroid 112 mcg    Constipation : continue bowel regimen, get KUB    Moderate malnutrition: some improvement in appetite and intake, will evaluate when she is accepted if any form of complementary TF is needed    Continue PT/OT    Discharge planning.      Appreciate CM helping with placement     Discussed with the patient and the nurse     Await bed    Ramya Cronin MD  5/27/2021  9:22 AM

## 2021-05-27 NOTE — PROGRESS NOTES
[facility] -Progress Note    Today's Date and Time: 5/27/2021, 9:34 AM    Impression :     Paraesophageal Hiatal hernia  Perforation of the esophagus  S/p laparoscopic, robotic para esophageal hernia repair 5-4-21  Acute pancreatitis  Shock   Hyperglycemia  NSTEMI  BONNIE  Not a MRSA carrier  Coagulase negative Staphylococci bacteremia x 2 on 5-7-21. Repeat blood cultures 5-8-21 x 2 : No growth . Bilateral Empyema 5/13/21  Left sided thoracentesis 5/12/21-grew Candida albicans  Right sided pigtail chest tube placed on 5/12/21 for right-sided effusion    Recommendations:   D/C Zosyn started 5/6/21. Stop date 4-19-21. D/C Vancomycin started 5/8/21 for Coagulase negative Staph. Stop date 5-19-21    Gram positive rods from thoracentesis identified as yeast. (Candida albicans). Fluconazole started 5-15-21. Will continue liquid Diflucan 400 mg q day. Stop date 5-30-21  Plan Follow up repeat CT chest to evaluate mediastinal collection and lung infiltrates in about 2 weeks. Medical Decision Making/Summary/Discussion:5/27/2021     Patient with large paraesophageal hiatal hernia with perforation of esophagus  Hyperglycemia   Acute pancreatitis  Shock   S/p laparoscopic, robotic para esophageal hernia repair 5-4-21  Improvement in overall picture but is developing basilar infiltrates  Not a MRSA carrier  Unclear whether this is fluid retention vs residual leakage vs secondary pneumonia  Esophagogram 5-7-21 does not show a leak  Will plan to continue zosyn and add Diflucan. Monitor temps and CXR. Zosyn and Vanco to continue while awaiting possible VATS/decortication procedure. VATS not to be done. Will D/C antibiotics. Continue Diflucan liquid for 2  weeks.     Infection Control Recommendations   [place] Precautions    Antimicrobial Stewardship Recommendations     Simplification of therapy  Targeted therapy    Coordination of Outpatient Care:   Estimated Length of IV antimicrobials: urine cultures on 05/03/2021 show No growth. Repeat urine culture on 05/04/2021 shows No growth     Patient is currently having temperature elevations. Temp max of 101.1 on 05/07/2021 around 4 AM in the morning. Patient WBC count is improving from 23.5 on presentation to 6.2 today. Repeat chest x-ray 5-7-21 shows bibasilar consolidation new from prior study with blunting of the costophrenic angles bilaterally. Patient is off of the pressors since 5-6-21    Left Thoracentesis on 5/12/21 for left loculated empyema-Pigtail catheter placed with pus noted. Fluid shows Candida albicans  Right sided 12 Turkmen chest tube placed on 5/13/21 for empyema-removed 5/18/21   Patient on liquid Diflucan    Follow-up esophagram on 5/11/21 negative for a leak. On low fiber diet plus supplements  Meeting % of nutritional needs    Coagulase negative Staphylococci bacteremia x 2 on 5-7-21. Repeat blood cultures 5-8-21 x 2 : No growth. Femoral line and morel catheter were removed. Rt and Lt TAISHA drains removed, on 5-7- and 5-11-21, respectively  Chest tube removed on 5/18/21. No pneumothorax identified after removal chest tube. CURRENT EVALUATION : 5/27/2021    Afebrile  VS stable    Upon evaluation, the patient was alert and oriented on room air. She looks improved and states she is feeling better. She is still experiencing imtermittent nausea and vomiting, but is tolerating her oral diet better than she had been. She has not had a bowel movement according to the RN. On RA saturating well. The patient is to follow-up as an outpatient with CT surgery in 2 weeks for paraesophageal fluid collection. Awaiting pre-cert at this time.      Discussed with RN    Labs, X rays reviewed: 5/27/2021    BUN: 22->26-->18->17  Cr: 1.19-->1.18->1.1    WBC: 11.3->11.1-->12.2->11.9  Hb: 8.5->8.2-->8.6->8.2  Plat: 315->288-->312->304    LD: 345  Triglycerides: 261    Cultures:    Urine:  05/03/2021: No growth 5/13/2021 Zonia Galicia MD [facility] PROCEDURES    OTHER SURGICAL HISTORY  1962    Remove spur L ankle    OTHER SURGICAL HISTORY  1978    Pin and wire repair R ankle    TUBAL LIGATION  1988       Medications:      insulin lispro  0-18 Units Subcutaneous TID WC    insulin lispro  0-9 Units Subcutaneous Nightly    sennosides-docusate sodium  2 tablet Oral Daily    fluconazole  400 mg Oral Daily    gabapentin  100 mg Oral TID    aspirin  81 mg Oral Daily    atorvastatin  10 mg Oral Daily    levothyroxine  112 mcg Oral Daily    pantoprazole  40 mg Oral QAM AC    insulin glargine  10 Units Subcutaneous Nightly    polyethylene glycol  17 g Oral Daily    heparin (porcine)  5,000 Units Subcutaneous 3 times per day    sodium chloride flush  5-40 mL Intravenous 2 times per day       Social History:     Social History     Socioeconomic History    Marital status:      Spouse name: Not on file    Number of children: Not on file    Years of education: Not on file    Highest education level: Not on file   Occupational History    Not on file   Tobacco Use    Smoking status: Never Smoker    Smokeless tobacco: Never Used   Substance and Sexual Activity    Alcohol use: Never    Drug use: Never    Sexual activity: Not on file   Other Topics Concern    Not on file   Social History Narrative    Not on file     Social Determinants of Health     Financial Resource Strain:     Difficulty of Paying Living Expenses:    Food Insecurity:     Worried About Running Out of Food in the Last Year:     Ran Out of Food in the Last Year:    Transportation Needs:     Lack of Transportation (Medical):      Lack of Transportation (Non-Medical):    Physical Activity:     Days of Exercise per Week:     Minutes of Exercise per Session:    Stress:     Feeling of Stress :    Social Connections:     Frequency of Communication with Friends and Family:     Frequency of Social Gatherings with Friends and Family:     Attends Yazidism Services:     Active Member of Clubs or Organizations:     Attends Club or Organization Meetings:     Marital Status:    Intimate Partner Violence:     Fear of Current or Ex-Partner:     Emotionally Abused:     Physically Abused:     Sexually Abused:        Family History:     Family History   Problem Relation Age of Onset    Breast Cancer Mother     High Blood Pressure Mother     High Cholesterol Father     Breast Cancer Sister         Allergies:   No known allergies     Review of Systems:   Review of Systems   Constitutional: Positive for fatigue and fever. Negative for activity change, appetite change, chills, diaphoresis and unexpected weight change. Respiratory: Positive for cough. Shortness of breath. Cardiovascular: Negative for chest pain, palpitations and leg swelling. Gastrointestinal: Positive for constipation. Negative for abdominal distention, nausea and vomiting. Endocrine: Negative for cold intolerance. Genitourinary: Negative for difficulty urinating and dysuria. Musculoskeletal: Negative for arthralgias and back pain. Neurological: Negative for dizziness, tremors, syncope and facial asymmetry. Hematological: Negative for adenopathy. Psychiatric/Behavioral: Negative for agitation and behavioral problems. The patient is not hyperactive. Physical Examination :     Patient Vitals for the past 8 hrs:   BP Temp Temp src Pulse Resp SpO2 Weight   05/27/21 0825 129/80 98.2 °F (36.8 °C) Oral 82 14 96 %    05/27/21 0600       179 lb 9.6 oz (81.5 kg)     General Appearance: Awake, alert  Head:  Normocephalic, no trauma  Eyes: Pupils equal, round, reactive to light, sclera anicteric; conjunctivae pink. No embolic phenomena. ENT: Oropharynx clear, without erythema, exudate, or thrush. No tenderness of sinuses. Mouth/throat: mucosa pink and moist. No lesions. Dentition in good repair. Neck:Supple, without lymphadenopathy.  Thyroid normal, No chest tube removal. Eval for worsening   effusion vs reoccurance pneumothorax. TECHNOLOGIST PROVIDED HISTORY:   s/p chest tube removal. Eval for worsening effusion vs reoccurance   pneumothorax. Reason for Exam: s/p chest tube removal. Eval for worsening effusion vs   reoccurance pneumothorax. Acuity: Acute   Type of Exam: Initial       FINDINGS:   Right upper extremity PICC remains in satisfactory position. Cardiomediastinal silhouette appears unchanged.  Low inspiratory volume. Bibasilar predominant airspace disease appears slightly improved particularly   on the left.  No definite effusion.  No pneumothorax or subdiaphragmatic free   air.           Impression   Improving bibasilar airspace disease.  No significant recurrent effusion.           Order History      EXAMINATION:   CT OF THE CHEST, ABDOMEN, AND PELVIS WITHOUT CONTRAST 5/20/2021 9:50 am       TECHNIQUE:   CT of the chest, abdomen and pelvis was performed without the administration   of intravenous contrast. Multiplanar reformatted images are provided for   review. Dose modulation, iterative reconstruction, and/or weight based   adjustment of the mA/kV was utilized to reduce the radiation dose to as low   as reasonably achievable.       COMPARISON:   CT chest dated 05/16/2021, and 05/04/2021.  CT chest, abdomen and pelvis   dated 05/07/2021       HISTORY:   ORDERING SYSTEM PROVIDED HISTORY: Esophageal rupture, empyema, monitor of   abdomen   TECHNOLOGIST PROVIDED HISTORY:   Esophageal rupture, empyema, monitor of abdomen       Reason for Exam: Esophageal rupture, empyema, monitor of abdomen   Acuity: Unknown   Type of Exam: Unknown       FINDINGS:       Chest:       Mediastinum: Heart is normal in size.  There is no pericardial effusion.    Thoracic aorta and pulmonary arteries are normal in caliber.  There is a   right arm PICC with distal tip in the distal SVC.  There are a couple mildly   prominent mediastinal lymph nodes, measuring up to 0.9 cm, which are   unchanged.  There is a loculated fluid collection in the right paraesophageal   space and esophageal hiatus, which measures 9.3 x 4.5 x 8.3 cm on axial   images, which is slightly decreased in size from the previous CT chest.  No   pneumomediastinum or gas within the fluid collection.       Lungs/pleura: There is a small mildly loculated left pleural effusion, which   is unchanged.  Right chest tube has been removed. Estanislado Leung is trace mildly   loculated right pleural fluid.  There is increased airspace consolidation in   the posterior mid and lower right lung.  Strandy and patchy opacities at the   left lung base are not significantly changed, likely atelectasis or scar.  No   pneumothorax.       Soft Tissues/Bones: There is no acute or suspicious osseous abnormality. Visualized superficial soft tissues are within normal limits.           Abdomen/Pelvis:       Organs: Limited unenhanced liver and spleen are grossly unremarkable.  The   gallbladder is surgically absent.  Limited unenhanced pancreas and adrenal   glands are unremarkable.       Kidneys are symmetric in size and attenuation.  No renal or ureteral   calculus.  No hydronephrosis or perinephric inflammation.       GI/Bowel: There is a moderate amount of ascites in the abdomen and pelvis,   which is increased from the previous CT abdomen and pelvis. Norrine Sensor is no   free air.  Appendix is not seen. Estanislado Leung is no abnormal bowel distention.  The   majority of the stomach is intra-abdominal, and there are 2 PEG tubes in   place with retention bulbs in the mid stomach.  There is mild mesenteric   edema.  Surgical drain has been removed from the right lower quadrant.       Pelvis: Urinary bladder is unremarkable.  Uterus is surgically absent.  No   pelvic lymphadenopathy.       Peritoneum/Retroperitoneum: The abdominal aorta is normal in caliber.  There   is no retroperitoneal or mesenteric lymphadenopathy.       Bones/Soft Tissues:  There is no acute or suspicious osseous abnormality. Visualized superficial soft tissues are within normal limits.           Impression   1. Loculated fluid collection in the right paraesophageal space/esophageal   hiatus, stable to slightly decreased in size when compared to 05/16/2021. The collection measures approximately 9.3 x 4.5 cm, and there is no gas in   the collection.  This could be a postoperative fluid collection or loculated   ascites herniated into the space previously occupied by intrathoracic   stomach.  Possibility of abscess is not excluded.  No pneumomediastinum.       2.  Small mildly loculated left pleural effusion, which is unchanged. Interval removal of the right chest tube with trace right pleural fluid.       3.  Increased airspace consolidation in the posterior mid and lower right   lung, concerning for pneumonia.  Unchanged opacities in the posterior left   lung, possibly atelectasis or scarring.       4.  Moderate ascites in the abdomen and pelvis, which is increased.  No free   air.          EXAMINATION:   SINGLE CONTRAST ESOPHAGRAM       5/7/2021 11:03 am       TECHNIQUE:   Single contrast esophagram was performed with a total 100 mL of Omnipaque 240   oral contrast.       FLUOROSCOPY DOSE AND TYPE OR TIME AND EXPOSURES:   Fluoro time: 2.7 minutes; DAP: 70.44 mGy       COMPARISON:   Upper GI from 05/04/2021       HISTORY:   ORDERING SYSTEM PROVIDED HISTORY: s/p hiatal hernia reduction  with partial   gastrectomy and gastropexy   TECHNOLOGIST PROVIDED HISTORY:   s/p hiatal hernia reduction  with partial gastrectomy and gastropexy   Reason for Exam: H.H repair/gastrectomy/gastropexy/ 100 ml Omnipaque orally   Acuity: Unknown   Type of Exam: Unknown       74-year-old female status post hiatal hernia reduction with partial   gastrectomy and gastropexy       FINDINGS:   Fluoroscopic  imaging was obtained prior to the administration contrast.       2 gastrostomy tubes are seen projecting over hiatal hernia, contrast progression? TECHNOLOGIST PROVIDED HISTORY:   large hiatal hernia, contrast progression? Reason for Exam: large hiatal hernia, contrast progression?       FINDINGS:   Mediastinum: Heart size is normal without pericardial effusion.  There are   shotty mediastinal lymph nodes.  The thoracic aorta and main pulmonary artery   are normal in caliber.       Lungs/pleura: Moderate bilateral pleural effusions with adjacent   consolidation.  No pneumothorax.       Upper Abdomen: There is a large hiatal hernia containing the majority of the   stomach.  There is organoaxial volvulus.  Enteric tube is noted extending   below the diaphragm with tip outside the field of view.  The herniated   stomach is distended with contrast.  There is also contrast within the distal   esophagus. Lunette Fortino is some contrast visualized within the portion of stomach   below the diaphragm.  Free air and fluid are noted within the visualized   upper abdomen with apparent free spill of contrast in the left upper quadrant.       Soft Tissues/Bones: No acute osseous abnormality.           Impression   1. Ascites and pneumoperitoneum with apparent free-flowing oral contrast in   the left upper quadrant is concerning for viscus perforation, possibly within   the subdiaphragmatic portion of the stomach. 2. Large hiatal hernia with organoaxial volvulus.  Majority of contrast is   retained within the esophagus and supradiaphragmatic stomach. 3. Enteric tube extends below the diaphragm with tip outside the field of   view. 4. Moderate bilateral pleural effusions with adjacent consolidation,   atelectasis versus pneumonia.    Critical results were called by Dr. Mary Lou Rizo MD to [facility] on   5/4/2021 at 18:30.            CXR:  ONE XRAY VIEW OF THE CHEST       5/11/2021 9:22 am       COMPARISON:   AP chest from 05/07/2021; CT scan of the chest, abdomen and pelvis from   05/07/2021       HISTORY:   [facility] HISTORY: follow up on bibasilar consolidation   TECHNOLOGIST PROVIDED HISTORY:   follow up on bibasilar consolidation       History of diabetes and hypertension.       FINDINGS:   Overlying ECG monitor leads and gown snaps.  New right-sided PICC tip   position in the SVC.       Low lung volumes accentuating findings, including cardiomegaly with bibasilar   opacities suggesting atelectasis/consolidation and effusions.  Patchy   infiltrate right mid lung also again noted.       Bones stable.           Impression   New right-sided PICC tip position appears satisfactory.  Otherwise, similar   findings compatible with bibasilar atelectasis/consolidation, effusions and   minimal infiltrate or atelectasis right mid lung.             CT scan:   CT OF THE CHEST WITHOUT CONTRAST 5/11/2021 10:31 am       TECHNIQUE:   CT of the chest was performed without the administration of intravenous   contrast. Multiplanar reformatted images are provided for review. Dose   modulation, iterative reconstruction, and/or weight based adjustment of the   mA/kV was utilized to reduce the radiation dose to as low as reasonably   achievable.       COMPARISON:   CT scan of the chest from 05/07/2021.       HISTORY:   ORDERING SYSTEM PROVIDED HISTORY: ?left loculated pleural effusion   TECHNOLOGIST PROVIDED HISTORY:   ?left loculated pleural effusion   Reason for Exam: ?left loculated pleural effusion   Acuity: Unknown   Type of Exam: Unknown       FINDINGS:   Mediastinum: Interval placement right-sided PICC with tip position in the   mid-lower SVC.  Small unchanged mediastinal nodes; no bulky lymphadenopathy.    No acute thoracic aortic or cardiac abnormality on this unenhanced scan;   prominent LV again noted.  Tiny unchanged pericardial fluid or thickening.       Lungs/pleura: Similar bilateral pleural effusions with considerable mostly   lower lobe atelectasis or consolidation with air bronchograms; larger   loculated appearing component (measuring 10.0 x 4.9 cm) extending along the   azygoesophageal recess, and across the midline (best seen slices 65-73,   series 2).  Tracheobronchial tree patent centrally       Upper Abdomen: Similar small amount perihepatic and perisplenic ascitic fluid   and soft tissue infiltration visualized abdominal adipose tissue.  Clips   status post cholecystectomy.  Mild hepatic steatosis.       Soft Tissues/Bones: No acute abnormality.           Impression   New right-sided PICC again seen with unchanged and satisfactory tip position;   larger loculated right effusion in the azygoesophageal recess, as above. Otherwise similar findings to 05/07/2021 with suspected lower lobe   consolidation/pneumonitis, with volume loss and effusions both lower lobes.               Medical Decision [id number]:     Specimen Description 05/12/2021  5:09  [address]   . THORACENTESIS FLUID LEFT    Special Requests 05/12/2021  5:09  [address]   NOT REPORTED    Direct Exam Abnormal  05/12/2021  5:09 PM [address] [facility]    Direct Exam 05/12/2021  5:09  [address]   NO BACTERIA SEEN    Direct Exam 05/12/2021  5:09  [address]   Gram stain made from cytocentrifuged specimen.  Organisms and cells will be concentrated.     Culture Abnormal  05/12/2021  5:09 PM [address] FLUID CULTURE, RN NOTIFIED Results called to and read back by: ROSALINDA WALLER 05/14/21 0430    Culture 05/12/2021  5:09 PM [facility] FROM BOTTLE: Queta Kinds POSITIVE RODS          Medical Decision Making-Other:     Note:  Labs, medications, radiologic studies were reviewed with personal review of films   Large amounts of data were reviewed  Discussed with nursing Staff, Discharge planner  Infection Control and Prevention measures reviewed  All prior entries were reviewed  Administer medications as ordered  Prognosis: Guarded  Discharge planning reviewed  Follow up as outpatient. Thank you for allowing us to participate in the care of this patient. Please call with questions. Kalani Medina MD  Internal Medicine Resident, PGY-3  [address], [place]  5/27/2021, 9:38 AM      ATTESTATION:    I have discussed the case, including pertinent history and exam findings with the residents and students. I have seen and examined the patient and the key elements of the encounter have been performed by me. I was present when the student obtained his information or examined the patient. I have reviewed the laboratory data, other diagnostic studies and discussed them with the residents. I have updated the medical record where necessary. I agree with the assessment, plan and orders as documented by the resident/ student.     Todd Morrison MD.

## 2021-05-27 NOTE — PLAN OF CARE
ROSALINDA Perez  Outcome: Met This Shift  5/26/2021 1854 by Minerva Rich RN  Outcome: Ongoing  5/26/2021 1607 by Ovi Nguyễn RN  Outcome: Ongoing  Goal: Control of acute pain  Description: Control of acute pain  5/27/2021 0529 by Sukhi Doll RN  Outcome: Met This Shift  5/26/2021 1854 by Minerva Rich RN  Outcome: Ongoing  5/26/2021 1607 by Ovi Nguyễn RN  Outcome: Ongoing  Goal: Control of chronic pain  Description: Control of chronic pain  5/27/2021 0529 by Sukhi Doll RN  Outcome: Met This Shift  5/26/2021 1854 by Minerva Rich RN  Outcome: Ongoing  5/26/2021 1607 by Ovi Nguyễn RN  Outcome: Ongoing     Problem: Confusion - Acute:  Goal: Absence of continued neurological deterioration signs and symptoms  Description: Absence of continued neurological deterioration signs and symptoms  5/26/2021 1854 by Minerva Rich RN  Outcome: Ongoing  5/26/2021 1607 by Ovi Nguyễn RN  Outcome: Ongoing  Goal: Mental status will be restored to baseline  Description: Mental status will be restored to baseline  5/26/2021 1854 by Minerva Rich RN  Outcome: Ongoing  5/26/2021 1607 by Ovi Nguyễn RN  Outcome: Ongoing     Problem: Discharge Planning:  Goal: Ability to perform activities of daily living will improve  Description: Ability to perform activities of daily living will improve  5/26/2021 1854 by Minerva Rich RN  Outcome: Ongoing  5/26/2021 1607 by Ovi Nguyễn RN  Outcome: Ongoing  Goal: Participates in care planning  Description: Participates in care planning  5/26/2021 1854 by Minerva Rich RN  Outcome: Ongoing  5/26/2021 1607 by Ovi Nguyễn RN  Outcome: Ongoing     Problem: Mood - Altered:  Goal: Verbalizations of feeling emotionally comfortable while being cared for will increase  Description: Verbalizations of feeling emotionally comfortable while being cared for will increase  5/26/2021 1854 by Minerva Rich RN  Outcome: Ongoing  5/26/2021 1607 by Lawson Sidhu ROSALINDA Dominguez  Outcome: Ongoing     Problem: Psychomotor Activity - Altered:  Goal: Absence of psychomotor disturbance signs and symptoms  Description: Absence of psychomotor disturbance signs and symptoms  5/26/2021 1854 by Johnie López RN  Outcome: Ongoing  5/26/2021 1607 by Rachel Perkins RN  Outcome: Ongoing     Problem: Sensory Perception - Impaired:  Goal: Demonstrations of improved sensory functioning will increase  Description: Demonstrations of improved sensory functioning will increase  5/26/2021 1854 by Johnie López RN  Outcome: Ongoing  Goal: Decrease in sensory misperception frequency  Description: Decrease in sensory misperception frequency  5/26/2021 1854 by Johnie López RN  Outcome: Ongoing  Goal: Able to refrain from responding to false sensory perceptions  Description: Able to refrain from responding to false sensory perceptions  5/26/2021 1854 by Johnie López RN  Outcome: Ongoing  Goal: Demonstrates accurate environmental perceptions  Description: Demonstrates accurate environmental perceptions  5/26/2021 1854 by Johnie López RN  Outcome: Ongoing  Goal: Able to distinguish between reality-based and nonreality-based thinking  Description: Able to distinguish between reality-based and nonreality-based thinking  5/26/2021 1854 by Johnie López RN  Outcome: Ongoing  Goal: Able to interrupt nonreality-based thinking  Description: Able to interrupt nonreality-based thinking  5/26/2021 1854 by Johnie López RN  Outcome: Ongoing     Problem: Skin Integrity:  Goal: Will show no infection signs and symptoms  Description: Will show no infection signs and symptoms  5/27/2021 0529 by Debra See RN  Outcome: Ongoing  5/26/2021 1854 by Johnie López RN  Outcome: Ongoing  5/26/2021 1607 by Rachel Perkins RN  Outcome: Ongoing  Goal: Absence of new skin breakdown  Description: Absence of new skin breakdown  5/27/2021 0529 by Debra See RN  Outcome: Ongoing  5/26/2021 1854 by Johnie López RN Outcome: Ongoing  5/26/2021 1607 by Edgar Pearce RN  Outcome: Ongoing     Problem: Nutrition  Goal: Optimal nutrition therapy  Description: Nutrition Problem #1: Inadequate oral intake  Intervention: Food and/or Nutrient Delivery: Continue NPO, Start Parenteral Nutrition-suggest start with 150 g Dex, 50 g AA at 41.7 mL/hr with 100 mL 20% lipids.   Nutritional Goals: meet % of estimated nutrient needs     5/27/2021 0529 by John Kaye RN  Outcome: Ongoing  5/26/2021 1854 by Moy Aponte RN  Outcome: Ongoing     Problem: Infection - Central Venous Catheter-Associated Bloodstream Infection:  Goal: Will show no infection signs and symptoms  Description: Will show no infection signs and symptoms  5/27/2021 0529 by John Kaye RN  Outcome: Ongoing  5/26/2021 1854 by Moy Aponte RN  Outcome: Ongoing  5/26/2021 1607 by Edgar Pearce RN  Outcome: Ongoing     Problem: ELIMINATION  Goal: Elimination patterns are normal or improving  Description: Elimination patterns return to pre-surgery normal patterns  5/26/2021 1854 by Moy Aponte RN  Outcome: Ongoing  5/26/2021 1607 by Edgar Pearce RN  Outcome: Ongoing

## 2021-05-27 NOTE — PROGRESS NOTES
(1986); Gastric fundoplication (N/A, 6/3/7004); hc picc line double lumen (5/10/2021); and IR CHEST TUBE INSERTION (5/13/2021). Restrictions  Restrictions/Precautions  Restrictions/Precautions: General Precautions, Fall Risk  Required Braces or Orthoses?: No  Position Activity Restriction  Other position/activity restrictions: up with assist, s/p paraesphageal hernia repair 5/4  Subjective   General  Response To Previous Treatment: Patient with no complaints from previous session. Family / Caregiver Present: No  Subjective  Subjective: RN and pt agreeable to PT. Pt agreeable and pleasant. Pt supine in bed at start of session. Pain Screening  Patient Currently in Pain: Denies  Pain Assessment  Pain Assessment: 0-10  Pain Level: 0  Vital Signs  Patient Currently in Pain: Denies       Orientation  Orientation  Overall Orientation Status: Within Functional Limits  Cognition   Cognition  Overall Cognitive Status: Exceptions  Arousal/Alertness: Delayed responses to stimuli  Following Commands: Follows multistep commands with repitition; Follows multistep commands with increased time  Attention Span: Appears intact  Memory: Appears intact  Safety Judgement: Decreased awareness of need for assistance  Problem Solving: Decreased awareness of errors  Insights: Decreased awareness of deficits  Initiation: Requires cues for some  Sequencing: Requires cues for some  Cognition Comment: Needing extended time for processing. Objective   Bed mobility  Supine to Sit: Stand by assistance  Sit to Supine: Stand by assistance  Scooting: Stand by assistance  Comment: Increased time and effort for task  Transfers  Sit to Stand: Contact guard assistance  Stand to sit: Contact guard assistance  Comment: RW used, cues for hand placement with fair return. Bed elevated d/t difficulty with first attempt of transfer.   Ambulation  Ambulation?: Yes  More Ambulation?: No  Ambulation 1  Surface: level tile  Device: Rolling Walker  Assistance: Contact guard assistance  Gait Deviations: Slow Cami;Decreased step length;Decreased step height  Distance: 30+30 ft  Comments: Pt requires one standing rest break for one minute between bouts of gait. Pt reports decreased endurance and increased fatigue with gait. Stairs/Curb  Stairs?: No     Balance  Posture: Fair  Sitting - Static: Good  Sitting - Dynamic: Good;-  Standing - Static: Fair  Standing - Dynamic: Fair  Comments: Standing balance assessed with RW                         Seated LE exercise program: Long Arc Quads, hip abduction/adduction, heel/toe raises, and marches. Reps: 15x AROM BLE            Goals  Short term goals  Time Frame for Short term goals: 14 visits  Short term goal 1: Pt will be Susy with bed mobility  Short term goal 2: Pt will be Susy with transfers  Short term goal 3: Pt will amb 150' with RW and CGA  Patient Goals   Patient goals : Get tube out.     Plan    Plan  Times per week: 5-6x/week  Current Treatment Recommendations: Strengthening, Endurance Training, Functional Mobility Training, Balance Training, Transfer Training, Gait Training, Home Exercise Program, Safety Education & Training, Patient/Caregiver Education & Training, Equipment Evaluation, Education, & procurement  Safety Devices  Type of devices: Nurse notified, Call light within reach, Gait belt, All fall risk precautions in place, Chair alarm in place, Bed alarm in place, Left in bed  Restraints  Initially in place: No     Therapy Time   Individual Concurrent Group Co-treatment   Time In 1435         Time Out 1459         Minutes 24         Timed Code Treatment Minutes: 2017 Roney Hong, PT

## 2021-05-27 NOTE — PROGRESS NOTES
JENNIFER Valadezatient Assessment complete. Acute pancreatitis, unspecified complication status, unspecified pancreatitis type [K85.90] . Vitals:    05/27/21 0825   BP: 129/80   Pulse: 82   Resp: 14   Temp: 98.2 °F (36.8 °C)   SpO2: 96%   . Patients home meds are   Prior to Admission medications    Medication Sig Start Date End Date Taking? Authorizing Provider   levothyroxine (SYNTHROID) 112 MCG tablet Take 1 tablet by mouth Daily 5/21/21  Yes Cordelia Barthel, MD   gabapentin (NEURONTIN) 100 MG capsule Take 1 capsule by mouth 3 times daily for 10 days.  5/20/21 5/30/21 Yes Cordelia Barthel, MD   fluconazole (DIFLUCAN) 40 MG/ML suspension Take 10 mLs by mouth daily for 9 days 5/21/21 5/30/21 Yes Cordelia Barthel, MD   SITagliptin (JANUVIA) 100 MG tablet Take 100 mg by mouth daily    Historical Provider, MD   omeprazole (PRILOSEC) 40 MG delayed release capsule Take 1 capsule by mouth every morning (before breakfast) 4/28/21   Elizabeth Sheehan DO   simvastatin (ZOCOR) 10 MG tablet Take 10 mg by mouth nightly    Historical Provider, MD   aspirin 81 MG EC tablet Take 81 mg by mouth daily    Historical Provider, MD   .  Recent Surgical History: None = 0     Assessment       RR 18  Breath Sounds: diminished      Bronchodilator assessment at level  1  [x]    Bronchodilator Assessment  BRONCHODILATOR ASSESSMENT SCORE  Score 0 1 2 3 4 5   Breath Sounds   []  Patient Baseline []  No Wheeze good aeration []  Faint, scattered wheezing, good aeration [x]  Expiratory Wheezing and or moderately diminished []  Insp/Exp wheeze and/or very diminished []  Insp/Exp and/ or marked distress   Respiratory Rate   []  Patient Baseline [x]  Less than 20 []  Less than 20 []  20-25 []  Greater than 25 []  Greater than 25   Peak flow % of Pred or PB [x]  NA   []  Greater than 90%  []  81-90% []  71-80% []  Less than or equal to 70%  or unable to perform []  Unable due to Respiratory Distress   Dyspnea re []  Patient Baseline [x]  No SOB []  No SOB []  SOB on exertion []  SOB min activity []  At rest/acute   e FEV% Predicted       [x]  NA []  Above 69%  []  Unable []  Above 60-69%  []  Unable []  Above 50-59%  []  Unable []  Above 35-49%  []  Unable []  Less than 35%  []  Unable              Gregorio Morel RCP  5:52 PM

## 2021-05-27 NOTE — PLAN OF CARE
Problem: Confusion - Acute:  Goal: Absence of continued neurological deterioration signs and symptoms  Description: Absence of continued neurological deterioration signs and symptoms  Outcome: Ongoing  Goal: Mental status will be restored to baseline  Description: Mental status will be restored to baseline  Outcome: Ongoing     Problem: Discharge Planning:  Goal: Ability to perform activities of daily living will improve  Description: Ability to perform activities of daily living will improve  Outcome: Ongoing  Goal: Participates in care planning  Description: Participates in care planning  Outcome: Ongoing     Problem: Injury - Risk of, Physical Injury:  Goal: Absence of physical injury  Description: Absence of physical injury  5/27/2021 1845 by Ronaldo Elliott RN  Outcome: Ongoing  5/27/2021 0529 by Kalyn Valerio RN  Outcome: Met This Shift  Goal: Will remain free from falls  Description: Will remain free from falls  5/27/2021 1845 by Ronaldo Elliott RN  Outcome: Ongoing  5/27/2021 0529 by Kalyn Valerio RN  Outcome: Met This Shift

## 2021-05-28 ENCOUNTER — HOSPITAL ENCOUNTER (EMERGENCY)
Age: 71
Discharge: HOME | End: 2021-05-28
Attending: EMERGENCY MEDICINE
Payer: MEDICARE

## 2021-05-28 ENCOUNTER — APPOINTMENT (OUTPATIENT)
Dept: CT IMAGING | Age: 71
End: 2021-05-28
Payer: MEDICARE

## 2021-05-28 VITALS
BODY MASS INDEX: 33.05 KG/M2 | TEMPERATURE: 98 F | RESPIRATION RATE: 18 BRPM | SYSTOLIC BLOOD PRESSURE: 132 MMHG | HEART RATE: 80 BPM | HEIGHT: 62 IN | OXYGEN SATURATION: 97 % | WEIGHT: 179.6 LBS | DIASTOLIC BLOOD PRESSURE: 77 MMHG

## 2021-05-28 VITALS
HEART RATE: 88 BPM | OXYGEN SATURATION: 97 % | TEMPERATURE: 97.8 F | DIASTOLIC BLOOD PRESSURE: 83 MMHG | RESPIRATION RATE: 18 BRPM | SYSTOLIC BLOOD PRESSURE: 145 MMHG

## 2021-05-28 DIAGNOSIS — Z43.1 PEG (PERCUTANEOUS ENDOSCOPIC GASTROSTOMY) ADJUSTMENT/REPLACEMENT/REMOVAL (HCC): Primary | ICD-10-CM

## 2021-05-28 LAB
ABSOLUTE EOS #: 0 K/UL (ref 0–0.4)
ABSOLUTE EOS #: 0.23 K/UL (ref 0–0.44)
ABSOLUTE IMMATURE GRANULOCYTE: 0.35 K/UL (ref 0–0.3)
ABSOLUTE IMMATURE GRANULOCYTE: 0.58 K/UL (ref 0–0.3)
ABSOLUTE LYMPH #: 1.28 K/UL (ref 1.1–3.7)
ABSOLUTE LYMPH #: 1.76 K/UL (ref 1–4.8)
ABSOLUTE MONO #: 0.47 K/UL (ref 0.1–0.8)
ABSOLUTE MONO #: 0.7 K/UL (ref 0.1–1.2)
ANION GAP SERPL CALCULATED.3IONS-SCNC: 10 MMOL/L (ref 9–17)
ANION GAP SERPL CALCULATED.3IONS-SCNC: 9 MMOL/L (ref 9–17)
BASOPHILS # BLD: 0 % (ref 0–2)
BASOPHILS # BLD: 1 % (ref 0–2)
BASOPHILS ABSOLUTE: 0 K/UL (ref 0–0.2)
BASOPHILS ABSOLUTE: 0.12 K/UL (ref 0–0.2)
BUN BLDV-MCNC: 16 MG/DL (ref 8–23)
BUN BLDV-MCNC: 19 MG/DL (ref 8–23)
BUN/CREAT BLD: ABNORMAL (ref 9–20)
BUN/CREAT BLD: ABNORMAL (ref 9–20)
CALCIUM SERPL-MCNC: 7.2 MG/DL (ref 8.6–10.4)
CALCIUM SERPL-MCNC: 7.9 MG/DL (ref 8.6–10.4)
CHLORIDE BLD-SCNC: 100 MMOL/L (ref 98–107)
CHLORIDE BLD-SCNC: 96 MMOL/L (ref 98–107)
CO2: 24 MMOL/L (ref 20–31)
CO2: 27 MMOL/L (ref 20–31)
CREAT SERPL-MCNC: 1.11 MG/DL (ref 0.5–0.9)
CREAT SERPL-MCNC: 1.17 MG/DL (ref 0.5–0.9)
DIFFERENTIAL TYPE: ABNORMAL
DIFFERENTIAL TYPE: ABNORMAL
EOSINOPHILS RELATIVE PERCENT: 0 % (ref 1–4)
EOSINOPHILS RELATIVE PERCENT: 2 % (ref 1–4)
GFR AFRICAN AMERICAN: 56 ML/MIN
GFR AFRICAN AMERICAN: 59 ML/MIN
GFR NON-AFRICAN AMERICAN: 46 ML/MIN
GFR NON-AFRICAN AMERICAN: 49 ML/MIN
GFR SERPL CREATININE-BSD FRML MDRD: ABNORMAL ML/MIN/{1.73_M2}
GLUCOSE BLD-MCNC: 117 MG/DL (ref 70–99)
GLUCOSE BLD-MCNC: 122 MG/DL (ref 65–105)
GLUCOSE BLD-MCNC: 165 MG/DL (ref 70–99)
GLUCOSE BLD-MCNC: 179 MG/DL (ref 65–105)
HCT VFR BLD CALC: 26.9 % (ref 36.3–47.1)
HCT VFR BLD CALC: 34.2 % (ref 36.3–47.1)
HEMOGLOBIN: 10.6 G/DL (ref 11.9–15.1)
HEMOGLOBIN: 8.3 G/DL (ref 11.9–15.1)
IMMATURE GRANULOCYTES: 3 %
IMMATURE GRANULOCYTES: 5 %
LYMPHOCYTES # BLD: 11 % (ref 24–43)
LYMPHOCYTES # BLD: 15 % (ref 24–44)
MAGNESIUM: 2.2 MG/DL (ref 1.6–2.6)
MCH RBC QN AUTO: 27.2 PG (ref 25.2–33.5)
MCH RBC QN AUTO: 27.4 PG (ref 25.2–33.5)
MCHC RBC AUTO-ENTMCNC: 30.9 G/DL (ref 28.4–34.8)
MCHC RBC AUTO-ENTMCNC: 31 G/DL (ref 28.4–34.8)
MCV RBC AUTO: 87.7 FL (ref 82.6–102.9)
MCV RBC AUTO: 88.8 FL (ref 82.6–102.9)
MONOCYTES # BLD: 4 % (ref 1–7)
MONOCYTES # BLD: 6 % (ref 3–12)
MORPHOLOGY: ABNORMAL
MORPHOLOGY: ABNORMAL
NRBC AUTOMATED: 0 PER 100 WBC
NRBC AUTOMATED: 0 PER 100 WBC
PDW BLD-RTO: 14.8 % (ref 11.8–14.4)
PDW BLD-RTO: 15 % (ref 11.8–14.4)
PHOSPHORUS: 2.7 MG/DL (ref 2.6–4.5)
PLATELET # BLD: 301 K/UL (ref 138–453)
PLATELET # BLD: 354 K/UL (ref 138–453)
PLATELET ESTIMATE: ABNORMAL
PLATELET ESTIMATE: ABNORMAL
PMV BLD AUTO: 8.8 FL (ref 8.1–13.5)
PMV BLD AUTO: 8.9 FL (ref 8.1–13.5)
POTASSIUM SERPL-SCNC: 3.6 MMOL/L (ref 3.7–5.3)
POTASSIUM SERPL-SCNC: 4 MMOL/L (ref 3.7–5.3)
RBC # BLD: 3.03 M/UL (ref 3.95–5.11)
RBC # BLD: 3.9 M/UL (ref 3.95–5.11)
RBC # BLD: ABNORMAL 10*6/UL
RBC # BLD: ABNORMAL 10*6/UL
SEG NEUTROPHILS: 75 % (ref 36–65)
SEG NEUTROPHILS: 78 % (ref 36–66)
SEGMENTED NEUTROPHILS ABSOLUTE COUNT: 8.69 K/UL (ref 1.5–8.1)
SEGMENTED NEUTROPHILS ABSOLUTE COUNT: 9.12 K/UL (ref 1.8–7.7)
SODIUM BLD-SCNC: 132 MMOL/L (ref 135–144)
SODIUM BLD-SCNC: 134 MMOL/L (ref 135–144)
WBC # BLD: 11.6 K/UL (ref 3.5–11.3)
WBC # BLD: 11.7 K/UL (ref 3.5–11.3)
WBC # BLD: ABNORMAL 10*3/UL
WBC # BLD: ABNORMAL 10*3/UL

## 2021-05-28 PROCEDURE — 6360000002 HC RX W HCPCS: Performed by: NURSE PRACTITIONER

## 2021-05-28 PROCEDURE — 6370000000 HC RX 637 (ALT 250 FOR IP): Performed by: NURSE PRACTITIONER

## 2021-05-28 PROCEDURE — 51798 US URINE CAPACITY MEASURE: CPT

## 2021-05-28 PROCEDURE — 6370000000 HC RX 637 (ALT 250 FOR IP): Performed by: FAMILY MEDICINE

## 2021-05-28 PROCEDURE — 99232 SBSQ HOSP IP/OBS MODERATE 35: CPT | Performed by: FAMILY MEDICINE

## 2021-05-28 PROCEDURE — 97110 THERAPEUTIC EXERCISES: CPT

## 2021-05-28 PROCEDURE — 85025 COMPLETE CBC W/AUTO DIFF WBC: CPT

## 2021-05-28 PROCEDURE — 82947 ASSAY GLUCOSE BLOOD QUANT: CPT

## 2021-05-28 PROCEDURE — 6370000000 HC RX 637 (ALT 250 FOR IP): Performed by: STUDENT IN AN ORGANIZED HEALTH CARE EDUCATION/TRAINING PROGRAM

## 2021-05-28 PROCEDURE — 2580000003 HC RX 258: Performed by: STUDENT IN AN ORGANIZED HEALTH CARE EDUCATION/TRAINING PROGRAM

## 2021-05-28 PROCEDURE — 6360000002 HC RX W HCPCS: Performed by: STUDENT IN AN ORGANIZED HEALTH CARE EDUCATION/TRAINING PROGRAM

## 2021-05-28 PROCEDURE — 83735 ASSAY OF MAGNESIUM: CPT

## 2021-05-28 PROCEDURE — 36415 COLL VENOUS BLD VENIPUNCTURE: CPT

## 2021-05-28 PROCEDURE — 97535 SELF CARE MNGMENT TRAINING: CPT

## 2021-05-28 PROCEDURE — 84100 ASSAY OF PHOSPHORUS: CPT

## 2021-05-28 PROCEDURE — 74177 CT ABD & PELVIS W/CONTRAST: CPT

## 2021-05-28 PROCEDURE — 99232 SBSQ HOSP IP/OBS MODERATE 35: CPT | Performed by: INTERNAL MEDICINE

## 2021-05-28 PROCEDURE — 80048 BASIC METABOLIC PNL TOTAL CA: CPT

## 2021-05-28 PROCEDURE — 97530 THERAPEUTIC ACTIVITIES: CPT

## 2021-05-28 PROCEDURE — 6360000004 HC RX CONTRAST MEDICATION: Performed by: STUDENT IN AN ORGANIZED HEALTH CARE EDUCATION/TRAINING PROGRAM

## 2021-05-28 PROCEDURE — 99284 EMERGENCY DEPT VISIT MOD MDM: CPT

## 2021-05-28 RX ADMIN — GABAPENTIN 100 MG: 100 CAPSULE ORAL at 13:49

## 2021-05-28 RX ADMIN — FLUCONAZOLE 400 MG: 40 POWDER, FOR SUSPENSION ORAL at 09:33

## 2021-05-28 RX ADMIN — POTASSIUM CHLORIDE 10 MEQ: 7.46 INJECTION, SOLUTION INTRAVENOUS at 02:34

## 2021-05-28 RX ADMIN — INSULIN LISPRO 3 UNITS: 100 INJECTION, SOLUTION INTRAVENOUS; SUBCUTANEOUS at 13:12

## 2021-05-28 RX ADMIN — LACTULOSE 20 G: 20 SOLUTION ORAL at 09:34

## 2021-05-28 RX ADMIN — SODIUM CHLORIDE, PRESERVATIVE FREE 10 ML: 5 INJECTION INTRAVENOUS at 09:36

## 2021-05-28 RX ADMIN — SODIUM CHLORIDE, PRESERVATIVE FREE 10 ML: 5 INJECTION INTRAVENOUS at 06:59

## 2021-05-28 RX ADMIN — LEVOTHYROXINE SODIUM 112 MCG: 112 TABLET ORAL at 07:00

## 2021-05-28 RX ADMIN — PANTOPRAZOLE SODIUM 40 MG: 40 TABLET, DELAYED RELEASE ORAL at 07:00

## 2021-05-28 RX ADMIN — Medication 81 MG: at 09:33

## 2021-05-28 RX ADMIN — GABAPENTIN 100 MG: 100 CAPSULE ORAL at 09:33

## 2021-05-28 RX ADMIN — HEPARIN SODIUM 5000 UNITS: 5000 INJECTION INTRAVENOUS; SUBCUTANEOUS at 06:51

## 2021-05-28 RX ADMIN — ONDANSETRON 4 MG: 2 INJECTION INTRAMUSCULAR; INTRAVENOUS at 06:59

## 2021-05-28 RX ADMIN — HEPARIN SODIUM 5000 UNITS: 5000 INJECTION INTRAVENOUS; SUBCUTANEOUS at 13:49

## 2021-05-28 RX ADMIN — IOPAMIDOL 75 ML: 755 INJECTION, SOLUTION INTRAVENOUS at 19:18

## 2021-05-28 RX ADMIN — POTASSIUM CHLORIDE 10 MEQ: 7.46 INJECTION, SOLUTION INTRAVENOUS at 01:13

## 2021-05-28 ASSESSMENT — ENCOUNTER SYMPTOMS
CHEST TIGHTNESS: 0
SHORTNESS OF BREATH: 0
ABDOMINAL DISTENTION: 1
COUGH: 0
CHEST TIGHTNESS: 0
NAUSEA: 0
NAUSEA: 0
CONSTIPATION: 0
COUGH: 0
BACK PAIN: 0
WHEEZING: 0
SHORTNESS OF BREATH: 0
BLOOD IN STOOL: 0
VOMITING: 0
COLOR CHANGE: 0
ABDOMINAL PAIN: 0
DIARRHEA: 0
WHEEZING: 0
CONSTIPATION: 1
DIARRHEA: 0
VOMITING: 0
ABDOMINAL PAIN: 1

## 2021-05-28 ASSESSMENT — PAIN SCALES - GENERAL: PAINLEVEL_OUTOF10: 0

## 2021-05-28 NOTE — PROGRESS NOTES
[facility]  Office: [address], DO, Luz Contreras, DO, Lele Harrison, DO, Alfonso Rodriguez Blood, DO, Petra Singh MD, Rebeka Proctor MD, Claire Vasquez MD, Bonnielee Nissen, MD, Leticia Thomas MD, Tion Hughes MD, Starla High MD, Danette Strong MD, Tirso Bruno, DO, Vianney Ventura MD, Leo Alston, DO, Niurka Anne MD,  Danni Dobbins, DO, Dixie Rodriguez MD, Jase Mora MD, Emma Naqvi MD, Jody Cool MD, Inna Cronin, Elsa Robin, CNP, Maribel Green, CNP, Kelly Choudhary, CNS, Chari Erazo, CNP, Terence Rea, CNP, Grace Hidalgo, CNP, Omayra Robert, CNP, Isidro Alejo, CNP, Jennifer Monterroso, PACedricC, Wander Pack, [facility], Mike West, CNP, Alessandro Alvarado, CNP, Agus Veliz, CNP, Gerardo Hodges, CNP, Mckenzie Lopez, CNP, Keyonna Pastor, [address] [place] [address]    Progress Note    5/28/2021    11:50 AM    Name:   Mary Benjamin  MRN:     [id number]     Acct:      [de-identified]   Room:   [facility]5379-67   Day:  22  Admit Date:  5/3/2021 12:49 PM    PCP:   Kellie Frias DO  Code Status:  Full Code    Subjective:     C/C:   Chief Complaint   Patient presents with    Blood Sugar Problem     >450    Hernia     hyatial      Interval History Status: improved. Patient seen and examined at bedside, no acute events overnight. Feeling okay and her appetite is improving , moves better inside the room with PT.   Afebrile overnight  Had multiple BM yesterday but vomited twice after dinner   Patient denies any chest pain, shortness of breath, chills, fevers  Patient vitals, labs and all providers notes were reviewed,from overnight shift and morning updates were noted and discussed with the nurse    Brief History:        53-year-old female originally presenting to [facility] facility due to nausea and vomiting and was transferred for further evaluation and patient underwent emergent robotic laparoscopic hiatal hernia reduction with gastropexy via G-tube on 5/4/2021 and required intubation and was ultimately extubated on 4/6/2021. Patient underwent thoracentesis and had chest tube insertion 5/12/2021 follow-up with cardiothoracic surgery as well as pulmonology. Is a concern for possible esophageal leak and esophagram was performed on 5/7/2021 did not show any leak. Patient was started on Diflucan due to concern for Candida albicans    Review of Systems:     Review of Systems   Constitutional: Positive for activity change, appetite change and fatigue. Negative for chills, diaphoresis and fever. HENT: Negative for congestion. Eyes: Negative for visual disturbance. Respiratory: Negative for cough, chest tightness, shortness of breath and wheezing. Cardiovascular: Negative for chest pain, palpitations and leg swelling. Gastrointestinal: Positive for abdominal distention and constipation (improved). Negative for abdominal pain, blood in stool, diarrhea, nausea and vomiting (last night). Genitourinary: Negative for difficulty urinating. Neurological: Positive for weakness. Negative for dizziness, light-headedness, numbness and headaches. Psychiatric/Behavioral: Nervous/anxious: All other systems reviewed and are negative. Medications: Allergies:     Allergies   Allergen Reactions    No Known Allergies        Current Meds:   Scheduled Meds:    insulin lispro  0-18 Units Subcutaneous TID WC    insulin lispro  0-9 Units Subcutaneous Nightly    lactulose  20 g Oral TID    sennosides-docusate sodium  2 tablet Oral Daily    fluconazole  400 mg Oral Daily    gabapentin  100 mg Oral TID    aspirin  81 mg Oral Daily    atorvastatin  10 mg Oral Daily    levothyroxine  112 mcg Oral Daily    pantoprazole  40 mg Oral QAM AC    insulin glargine  10 Units Subcutaneous Nightly    polyethylene glycol  17 g Oral Daily    heparin (porcine)  5,000 Units Subcutaneous 3 times per day    sodium chloride flush  5-40 mL 05/28/21  0636   * 132* 135 134*   K 3.7 3.3* 3.2* 3.6*   CL 99 98 99 100   CO2 25 25 23 24   GLUCOSE 92 83 155* 117*   BUN 18 17 18 16   CREATININE 1.18* 1.10* 1.22* 1.11*   MG 1.6  --  1.6 2.2   ANIONGAP 10 9 13 10   LABGLOM 46* 49* 44* 49*   GFRAA 55* 60* 53* 59*   CALCIUM 6.8* 6.8* 7.2* 7.2*   PHOS 2.0*  --   --  2.7     Recent Labs     05/27/21  0548 05/27/21  0812 05/27/21  1237 05/27/21  1354 05/27/21  1740 05/27/21 2127 05/28/21  0748   PROT 6.2*  --   --   --   --   --   --    LABALBU 1.9*  --   --   --   --   --   --    AST 58*  --   --   --   --   --   --    ALT 34*  --   --   --   --   --   --    ALKPHOS 132*  --   --   --   --   --   --    BILITOT 0.32  --   --   --   --   --   --    POCGLU  --  85 188* 164* 119* 192* 122*     ABG:  Lab Results   Component Value Date    POCPH 7.433 05/06/2021    PHART 7.193 05/04/2021    POCPCO2 36.8 05/06/2021    MSE1KGW 52.0 05/04/2021    POCPO2 150.9 05/06/2021    PO2ART 145.0 05/04/2021    POCHCO3 24.6 05/06/2021    PNN0KGQ 19.2 05/04/2021    NBEA NOT REPORTED 05/06/2021    PBEA 0 05/06/2021    PXU0XAW NOT REPORTED 05/06/2021    LMHA3LAI 99 05/06/2021    H8CLKCOI 98.0 05/04/2021    FIO2 UNKNOWN 05/20/2021     Lab Results   Component Value Date/Time    SPECIAL NOT REPORTED 05/13/2021 10:23 AM     Lab Results   Component Value Date/Time    CULTURE NO GROWTH 12 DAYS 05/13/2021 10:23 AM       Radiology:  XR CHEST (SINGLE VIEW FRONTAL)    Result Date: 5/18/2021  1. Right chest tube removal without pneumothorax. 2. Small left pleural effusion and bibasilar opacities are similar to prior radiograph. XR ABDOMEN (KUB) (SINGLE AP VIEW)    Result Date: 5/19/2021  Stable ileus versus partial small bowel obstruction     XR CHEST PORTABLE    Result Date: 5/20/2021  Improving bibasilar airspace disease. No significant recurrent effusion. CT CHEST ABDOMEN PELVIS WO CONTRAST    Result Date: 5/20/2021  1.  Loculated fluid collection in the right paraesophageal space/esophageal hiatus, stable to slightly decreased in size when compared to 05/16/2021. The collection measures approximately 9.3 x 4.5 cm, and there is no gas in the collection. This could be a postoperative fluid collection or loculated ascites herniated into the space previously occupied by intrathoracic stomach. Possibility of abscess is not excluded. No pneumomediastinum. 2.  Small mildly loculated left pleural effusion, which is unchanged. Interval removal of the right chest tube with trace right pleural fluid. 3.  Increased airspace consolidation in the posterior mid and lower right lung, concerning for pneumonia. Unchanged opacities in the posterior left lung, possibly atelectasis or scarring. 4.  Moderate ascites in the abdomen and pelvis, which is increased. No free air. Physical Examination:        Physical Exam  Vitals and nursing note reviewed. Constitutional:       General: She is not in acute distress. HENT:      Head: Normocephalic and atraumatic. Eyes:      Conjunctiva/sclera: Conjunctivae normal.      Pupils: Pupils are equal, round, and reactive to light. Cardiovascular:      Rate and Rhythm: Normal rate and regular rhythm. Heart sounds: No murmur heard. Pulmonary:      Effort: Pulmonary effort is normal. No accessory muscle usage or respiratory distress. Breath sounds: No stridor. No decreased breath sounds, wheezing, rhonchi or rales. Abdominal:      General: Bowel sounds are normal. There is no distension. Palpations: Abdomen is soft. Abdomen is not rigid. Tenderness: There is no abdominal tenderness. There is no guarding. Comments: PEG X 2 noted with no erythema around, other laparoscopic wounds healing appropriatly   Musculoskeletal:         General: No tenderness. Skin:     General: Skin is warm and dry. Findings: No erythema, lesion or rash. Neurological:      Mental Status: She is alert and oriented to person, place, and time.

## 2021-05-28 NOTE — PLAN OF CARE
Problem: Confusion - Acute:  Goal: Absence of continued neurological deterioration signs and symptoms  Description: Absence of continued neurological deterioration signs and symptoms  5/28/2021 1534 by Josué Matt RN  Outcome: Completed  5/28/2021 0518 by Inessa Ferraro RN  Outcome: Met This Shift  Goal: Mental status will be restored to baseline  Description: Mental status will be restored to baseline  5/28/2021 1534 by Josué Matt RN  Outcome: Completed  5/28/2021 0518 by Inessa Ferraro RN  Outcome: Met This Shift     Problem: Discharge Planning:  Goal: Ability to perform activities of daily living will improve  Description: Ability to perform activities of daily living will improve  Outcome: Completed  Goal: Participates in care planning  Description: Participates in care planning  Outcome: Completed     Problem: Injury - Risk of, Physical Injury:  Goal: Absence of physical injury  Description: Absence of physical injury  5/28/2021 1534 by Josué Matt RN  Outcome: Completed  5/28/2021 0518 by Inessa Ferraro RN  Outcome: Met This Shift  Goal: Will remain free from falls  Description: Will remain free from falls  5/28/2021 1534 by Josué Matt RN  Outcome: Completed  5/28/2021 0518 by Inessa Ferraro RN  Outcome: Met This Shift     Problem: Mood - Altered:  Goal: Mood stable  Description: Mood stable  5/28/2021 1534 by Josué Matt RN  Outcome: Completed  5/28/2021 0518 by Inessa Ferraro RN  Outcome: Ongoing  Goal: Absence of abusive behavior  Description: Absence of abusive behavior  5/28/2021 1534 by Josué Matt RN  Outcome: Completed  5/28/2021 0518 by Inessa Ferraro RN  Outcome: Met This Shift  Goal: Verbalizations of feeling emotionally comfortable while being cared for will increase  Description: Verbalizations of feeling emotionally comfortable while being cared for will increase  5/28/2021 1534 by Josué Matt RN  Outcome: Completed  5/28/2021 0518 by Inessa Ferraro RN  Outcome: Ongoing Problem: Psychomotor Activity - Altered:  Goal: Absence of psychomotor disturbance signs and symptoms  Description: Absence of psychomotor disturbance signs and symptoms  5/28/2021 1534 by Kvng Pride RN  Outcome: Completed  5/28/2021 0518 by Tasha Guillen RN  Outcome: Ongoing     Problem: Sensory Perception - Impaired:  Goal: Demonstrations of improved sensory functioning will increase  Description: Demonstrations of improved sensory functioning will increase  5/28/2021 1534 by Kvng Pride RN  Outcome: Completed  5/28/2021 0518 by Tasha Guillen RN  Outcome: Ongoing  Goal: Decrease in sensory misperception frequency  Description: Decrease in sensory misperception frequency  5/28/2021 1534 by Kvng Pride RN  Outcome: Completed  5/28/2021 0518 by Tasha Guillen RN  Outcome: Ongoing  Goal: Able to refrain from responding to false sensory perceptions  Description: Able to refrain from responding to false sensory perceptions  5/28/2021 1534 by Kvng Pride RN  Outcome: Completed  5/28/2021 0518 by Tasha Guillen RN  Outcome: Ongoing  Goal: Demonstrates accurate environmental perceptions  Description: Demonstrates accurate environmental perceptions  5/28/2021 1534 by Kvng Pride RN  Outcome: Completed  5/28/2021 0518 by Tasha Guillen RN  Outcome: Ongoing  Goal: Able to distinguish between reality-based and nonreality-based thinking  Description: Able to distinguish between reality-based and nonreality-based thinking  5/28/2021 1534 by Kvng Pride RN  Outcome: Completed  5/28/2021 0518 by Tasha Guillen RN  Outcome: Ongoing  Goal: Able to interrupt nonreality-based thinking  Description: Able to interrupt nonreality-based thinking  5/28/2021 1534 by Kvng Pride RN  Outcome: Completed  5/28/2021 0518 by Tasha Guillen RN  Outcome: Ongoing     Problem: Sleep Pattern Disturbance:  Goal: Appears well-rested  Description: Appears well-rested  5/28/2021 1534 by Kvng Pride RN  Outcome: Completed 5/28/2021 0518 by Jamarcus Berkowitz RN  Outcome: Ongoing     Problem: Skin Integrity:  Goal: Will show no infection signs and symptoms  Description: Will show no infection signs and symptoms  5/28/2021 1534 by Nida Persaud RN  Outcome: Completed  5/28/2021 0518 by Jamarcus Berkowitz RN  Outcome: Ongoing  Goal: Absence of new skin breakdown  Description: Absence of new skin breakdown  5/28/2021 1534 by Nida Persaud RN  Outcome: Completed  5/28/2021 0518 by Jamarcus Berkowitz RN  Outcome: Met This Shift     Problem: Falls - Risk of:  Goal: Absence of physical injury  Description: Absence of physical injury  5/28/2021 1534 by Nida Persaud RN  Outcome: Completed  5/28/2021 0518 by Jamarcus Berkowitz RN  Outcome: Met This Shift  Goal: Will remain free from falls  Description: Will remain free from falls  5/28/2021 1534 by Nida Persaud RN  Outcome: Completed  5/28/2021 0518 by Jamarcus Berkowitz RN  Outcome: Met This Shift     Problem: Nutrition  Goal: Optimal nutrition therapy  Description: Nutrition Problem #1: Inadequate oral intake  Intervention: Food and/or Nutrient Delivery: Continue NPO, Start Parenteral Nutrition-suggest start with 150 g Dex, 50 g AA at 41.7 mL/hr with 100 mL 20% lipids.   Nutritional Goals: meet % of estimated nutrient needs     5/28/2021 1534 by Nida Persaud RN  Outcome: Completed  5/28/2021 1133 by Sotero Nevarez RD, LD  Outcome: Ongoing  Note: Nutrition Problem #1: Inadequate oral intake  Intervention: Food and/or Nutrient Delivery: Continue Current Diet, Continue Oral Nutrition Supplement  Nutritional Goals: meet % of estimated nutrient needs     Problem: Pain:  Goal: Pain level will decrease  Description: Pain level will decrease  5/28/2021 1534 by Nida Persaud RN  Outcome: Completed  5/28/2021 0518 by Jamarcus Berkowitz RN  Outcome: Ongoing  Goal: Control of acute pain  Description: Control of acute pain  5/28/2021 1534 by Nida Persaud RN  Outcome: Completed  5/28/2021 0518 by Donta Munoz

## 2021-05-28 NOTE — ED PROVIDER NOTES
[facility] ED  Emergency Department Encounter  EmergencyMedicine Resident     Pt Lora Bowman  MRN: [id number]  [place] 1950  Date of evaluation: 5/28/21  PCP:  Shayan Villalobos       Chief Complaint   Patient presents with    G Tube Complications       HISTORY OF PRESENT ILLNESS  (Location/Symptom, Timing/Onset, Context/Setting, Quality, Duration, Modifying Factors, Severity.)      Taylor Hernandes is a 78 y.o. female who presents with G-tube complications. Patient was discharged from the hospital today, went to [facility], and is brought back with an hour because of drainage around her G-tube in the left upper quadrant. Patient also has an additional tube in her epigastric region, is unsure why she has 2 tubes. Patient is overall a poor historian. It appears patient was admitted for strangulated paraesophageal hernia, fluid collection, and peritonitis. Patient denies any fevers or chills, does have some mild abdominal tenderness, no chest pain, no shortness of breath. PAST MEDICAL / SURGICAL / SOCIAL / FAMILY HISTORY      has a past medical history of Diabetes mellitus ([address] Gout, Hiatal hernia, Hyperlipidemia, Hypertension, and Thyroid disease. has a past surgical history that includes Cholecystectomy (1999); Hysterectomy (1997); Breast surgery; other surgical history (1962); other surgical history (1978); Tubal ligation (1988); Gastric fundoplication (N/A, 4/1/4149); hc picc line double lumen (5/10/2021); and IR CHEST TUBE INSERTION (5/13/2021).       Social History     Socioeconomic History    Marital status:      Spouse name: Not on file    Number of children: Not on file    Years of education: Not on file    Highest education level: Not on file   Occupational History    Not on file   Tobacco Use    Smoking status: Never Smoker    Smokeless tobacco: Never Used   Substance and Sexual Activity    Alcohol use: Never    Drug use: Never  Sexual activity: Not on file   Other Topics Concern    Not on file   Social History Narrative    Not on file     Social Determinants of Health     Financial Resource Strain:     Difficulty of Paying Living Expenses:    Food Insecurity:     Worried About Running Out of Food in the Last Year:     [address] in the Last Year:    Transportation Needs:     Lack of Transportation (Medical):  Lack of Transportation (Non-Medical):    Physical Activity:     Days of Exercise per Week:     Minutes of Exercise per Session:    Stress:     Feeling of Stress :    Social Connections:     Frequency of Communication with Friends and Family:     Frequency of Social Gatherings with Friends and Family:     Attends Congregational Services:     Active Member of Clubs or Organizations:     Attends Club or Organization Meetings:     Marital Status:    Intimate Partner Violence:     Fear of Current or Ex-Partner:     Emotionally Abused:     Physically Abused:     Sexually Abused:        Family History   Problem Relation Age of Onset    Breast Cancer Mother     High Blood Pressure Mother     High Cholesterol Father     Breast Cancer Sister        Allergies:  No known allergies    Home Medications:  Prior to Admission medications    Medication Sig Start Date End Date Taking? Authorizing Provider   levothyroxine (SYNTHROID) 112 MCG tablet Take 1 tablet by mouth Daily 5/21/21   Yordy Reina MD   gabapentin (NEURONTIN) 100 MG capsule Take 1 capsule by mouth 3 times daily for 10 days.  5/20/21 5/30/21  Yordy Reina MD   fluconazole (DIFLUCAN) 40 MG/ML suspension Take 10 mLs by mouth daily for 9 days 5/21/21 5/30/21  Yordy Reina MD   SITagliptin (JANUVIA) 100 MG tablet Take 100 mg by mouth daily    Historical Provider, MD   omeprazole (PRILOSEC) 40 MG delayed release capsule Take 1 capsule by mouth every morning (before breakfast) 4/28/21   Monique Simeon DO   simvastatin (ZOCOR) 10 MG tablet Take 10 mg by mouth nightly    Historical Provider, MD   aspirin 81 MG EC tablet Take 81 mg by mouth daily    Historical Provider, MD       REVIEW OF SYSTEMS    (2-9 systems for level 4, 10 or more for level 5)      Review of Systems   Constitutional: Positive for fatigue. Negative for chills, diaphoresis and fever. Respiratory: Negative for cough, chest tightness, shortness of breath and wheezing. Cardiovascular: Negative for chest pain, palpitations and leg swelling. Gastrointestinal: Positive for abdominal pain (mild). Negative for constipation, diarrhea, nausea and vomiting. Endocrine: Negative for polydipsia, polyphagia and polyuria. Musculoskeletal: Negative for arthralgias, back pain, neck pain and neck stiffness. Skin: Negative for color change, pallor and rash. Neurological: Negative for dizziness, weakness, light-headedness and headaches. PHYSICAL EXAM   (up to 7 for level 4, 8 or more for level 5)      INITIAL VITALS:   BP (!) 145/83   Pulse 88   Temp 97.8 °F (36.6 °C) (Oral)   Resp 18   SpO2 97%     Physical Exam  Vitals and nursing note reviewed. Constitutional:       General: She is not in acute distress. Appearance: She is well-developed. She is obese. She is ill-appearing (chronically). She is not diaphoretic. HENT:      Head: Normocephalic and atraumatic. Eyes:      General: No scleral icterus. Conjunctiva/sclera: Conjunctivae normal.      Pupils: Pupils are equal, round, and reactive to light. Neck:      Vascular: No JVD. Trachea: No tracheal deviation. Cardiovascular:      Rate and Rhythm: Normal rate and regular rhythm. Pulses: Normal pulses. Heart sounds: Normal heart sounds. Pulmonary:      Effort: Pulmonary effort is normal. No respiratory distress. Breath sounds: Normal breath sounds. No wheezing. Chest:      Chest wall: No tenderness. Abdominal:      General: Bowel sounds are normal. There is distension (mild).       Palpations: Abdomen is soft. Tenderness: There is no abdominal tenderness. There is no guarding. Comments: Two PEG tubes exiting skin from the epigastric and left upper quadrant region, there is purulent drainage from the left upper quadrant PEG tube site. Musculoskeletal:      Cervical back: Normal range of motion and neck supple. Skin:     General: Skin is warm and dry. Capillary Refill: Capillary refill takes less than 2 seconds. Coloration: Skin is not pale. Findings: No erythema. Neurological:      Mental Status: She is alert and oriented to person, place, and time. Sensory: No sensory deficit. Motor: No weakness. Psychiatric:         Mood and Affect: Mood normal.         Behavior: Behavior normal.         DIFFERENTIAL  DIAGNOSIS     PLAN (LABS / IMAGING / EKG):  Orders Placed This Encounter   Procedures    CT CHEST ABDOMEN PELVIS W CONTRAST    CBC Auto Differential    Basic Metabolic Panel w/ Reflex to MG    Inpatient consult to General Surgery    Inpatient consult to Hospitalist       MEDICATIONS ORDERED:  Orders Placed This Encounter   Medications    iopamidol (ISOVUE-370) 76 % injection 75 mL       DDX: Surgical site infection, PEG tube drainage, ascites, poor care of surgical site    MDM/IMPRESSION: Is a 68-year-old female presenting with drainage from around her left upper quadrant PEG tube site. Patient had a paraesophageal hernia or strangulated and required surgeries. Patient has 2 PEG tubes in place. Patient has no abdominal pain, does have some yellow-tinged drainage from the left upper quadrant PEG tube. No abdominal pain, no fevers, vital stable. Will obtain CBC, BMP, plan for CT chest, abdomen, pelvis. Depending on imaging, may require surgical consult versus discharge home.     DIAGNOSTIC RESULTS / EMERGENCY DEPARTMENT COURSE / MDM   LAB RESULTS:  Results for orders placed or performed during the hospital encounter of 05/28/21   CBC Auto Differential EKG      All EKG's are interpreted by the Emergency Department Physician who either signs or Co-signs this chart in the absence of a cardiologist.    EMERGENCY DEPARTMENT COURSE:  ED Course as of May 28 2256   Fri May 28, 2021   2036 CT result noted, some purulent drainage from the PEG tube site in the left upper quadrant, consulted general surgery    [JG]   2116 Discussed patient with surgery, appears that this fluid was more consistent with fibrinous tissue from the tract trying to heal.  The G-tube was pulled tight with the bumper. Drainage stopped. Left open to air. [JG]   2132 Patient be discharged back to facility. Patient and patient's son are updated, agreeable with plan. [JG]   2143 Patient and family wants to go home instead of back to the facility. They state they are working with the patient's daughter-in-law, who is a nurse, who is already reached out to local facilities where they live, approximately [address] [facility] [address], and are trying to get her placed. Patient to go home with them in the meantime. [JG]   2253 The PEG tube site was having some drainage, the urostomy bag was applied which did help with the drainage. Patient discharged, family to try to find a facility closer to their house. At this time patient is medically stable and does not require admission. [JG]      ED Course User Index  [JG] Bre Alex DO        PROCEDURES:      CONSULTS:  IP CONSULT TO GENERAL SURGERY  IP CONSULT TO HOSPITALIST    CRITICAL CARE:      FINAL IMPRESSION      1. PEG (percutaneous endoscopic gastrostomy) adjustment/replacement/removal ([facility] [address]          DISPOSITION / PLAN     DISPOSITION Decision To Discharge 05/28/2021 10:53:28 PM      PATIENT REFERRED TO:  Verna Alex DO  [address]  [phone]    Go in 3 days      [facility]  [address] [facility] [place] [address] [phone]  Go to   If symptoms worsen      DISCHARGE MEDICATIONS:  New

## 2021-05-28 NOTE — PLAN OF CARE
activities of daily living will improve  5/27/2021 1845 by Nico Bishop RN  Outcome: Ongoing  Goal: Participates in care planning  Description: Participates in care planning  5/27/2021 1845 by Nico Bishop RN  Outcome: Ongoing     Problem: Mood - Altered:  Goal: Mood stable  Description: Mood stable  5/28/2021 0518 by Oral Villa RN  Outcome: Ongoing  5/27/2021 1845 by Nico Bishop RN  Outcome: Ongoing  Goal: Verbalizations of feeling emotionally comfortable while being cared for will increase  Description: Verbalizations of feeling emotionally comfortable while being cared for will increase  5/28/2021 0518 by Oral Villa RN  Outcome: Ongoing  5/27/2021 1845 by Nico Bishop RN  Outcome: Ongoing     Problem: Psychomotor Activity - Altered:  Goal: Absence of psychomotor disturbance signs and symptoms  Description: Absence of psychomotor disturbance signs and symptoms  5/28/2021 0518 by Oral Villa RN  Outcome: Ongoing  5/27/2021 1845 by Nico Bishop RN  Outcome: Ongoing     Problem: Sensory Perception - Impaired:  Goal: Demonstrations of improved sensory functioning will increase  Description: Demonstrations of improved sensory functioning will increase  5/28/2021 0518 by Oral Villa RN  Outcome: Ongoing  5/27/2021 1845 by Nico Bishop RN  Outcome: Ongoing  Goal: Decrease in sensory misperception frequency  Description: Decrease in sensory misperception frequency  5/28/2021 0518 by Oral Villa RN  Outcome: Ongoing  5/27/2021 1845 by Nico Bishop RN  Outcome: Ongoing  Goal: Able to refrain from responding to false sensory perceptions  Description: Able to refrain from responding to false sensory perceptions  5/28/2021 0518 by Oral Villa RN  Outcome: Ongoing  5/27/2021 1845 by Nico Bishop RN  Outcome: Ongoing  Goal: Demonstrates accurate environmental perceptions  Description: Demonstrates accurate environmental perceptions  5/28/2021 0518 by Oral Villa RN  Outcome: Ongoing  5/27/2021 1845 by Johnie López RN  Outcome: Ongoing  Goal: Able to distinguish between reality-based and nonreality-based thinking  Description: Able to distinguish between reality-based and nonreality-based thinking  5/28/2021 0518 by Jamarcus Berkowitz RN  Outcome: Ongoing  5/27/2021 1845 by Johnie López RN  Outcome: Ongoing  Goal: Able to interrupt nonreality-based thinking  Description: Able to interrupt nonreality-based thinking  5/28/2021 0518 by Jamarcus Berkowitz RN  Outcome: Ongoing  5/27/2021 1845 by Johnie López RN  Outcome: Ongoing     Problem: Sleep Pattern Disturbance:  Goal: Appears well-rested  Description: Appears well-rested  5/28/2021 0518 by Jamarcus Berkowitz RN  Outcome: Ongoing  5/27/2021 1845 by Johnie López RN  Outcome: Ongoing     Problem: Skin Integrity:  Goal: Will show no infection signs and symptoms  Description: Will show no infection signs and symptoms  5/28/2021 0518 by Jamarcus Berkowitz RN  Outcome: Ongoing  5/27/2021 1845 by Johnie López RN  Outcome: Ongoing     Problem: Nutrition  Goal: Optimal nutrition therapy  Description: Nutrition Problem #1: Inadequate oral intake  Intervention: Food and/or Nutrient Delivery: Continue NPO, Start Parenteral Nutrition-suggest start with 150 g Dex, 50 g AA at 41.7 mL/hr with 100 mL 20% lipids.   Nutritional Goals: meet % of estimated nutrient needs     5/27/2021 1845 by Johnie López RN  Outcome: Ongoing     Problem: Pain:  Goal: Pain level will decrease  Description: Pain level will decrease  5/28/2021 0518 by Jamarcus Berkowitz RN  Outcome: Ongoing  5/27/2021 1845 by Johnie López RN  Outcome: Ongoing  Goal: Control of acute pain  Description: Control of acute pain  5/28/2021 0518 by Jamarcus Berkowitz RN  Outcome: Ongoing  5/27/2021 1845 by Johnie López RN  Outcome: Ongoing  Goal: Control of chronic pain  Description: Control of chronic pain  5/28/2021 0518 by Jamarcus Berkowitz RN  Outcome: Ongoing  5/27/2021 1845 by Johnie López RN  Outcome: Ongoing     Problem: Infection - Central Venous Catheter-Associated Bloodstream Infection:  Goal: Will show no infection signs and symptoms  Description: Will show no infection signs and symptoms  5/28/2021 0518 by Kalin Estrada RN  Outcome: Ongoing  5/27/2021 1845 by Padmini Stout RN  Outcome: Ongoing     Problem: ELIMINATION  Goal: Elimination patterns are normal or improving  Description: Elimination patterns return to pre-surgery normal patterns  5/28/2021 0518 by Kalin Estrada RN  Outcome: Ongoing  5/27/2021 1845 by Padmini Stout RN  Outcome: Ongoing     Problem: Nausea/Vomiting:  Goal: Absence of nausea/vomiting  Description: Absence of nausea/vomiting  Outcome: Ongoing  Goal: Able to drink  Description: Able to drink  Outcome: Ongoing  Goal: Able to eat  Description: Able to eat  Outcome: Ongoing

## 2021-05-28 NOTE — PROGRESS NOTES
IV antimicrobials: TBD  Patient will need Midline Catheter Insertion:No   Patient will need PICC line Insertion:No  Patient will need: Home IV , [place],  SNF,  LTAC:  Patient will need outpatient wound care:Yes    Chief complaint/reason for consultation:   Septic shock/sepsis    History of Present Illness:   Fili Gonzalez is a 78y.o.-year-old   female who was initially admitted on 5/3/2021. Patient seen at the request of Zuleika Borjas. INITIAL HISTORY : 05/07/2021    Pt with a history of diabetes mellitus type 2, hypertension, hyperlipidemia, thyroid disease, gout and hiatal hernia. She initially presented on 05/03/2021 to an outlying facility with a chief complaint of nausea vomiting, diarrhea and syncopal attack . Patient was found to have blood glucose of 585,WBC of 23.5, elevated lipase 1451 and elevated beta hydroxybutyrate. MRCP on 05/03/2021 showed diffuse small bowel wall thickening likely due to third spacing as opposed to enteritis. CT scan of the abdomen showed extremely large fluid-filled hiatal hernia and distended and fluid-filled stomach below the diaphragm. Patient was given Zosyn at the outlying facility and was transferred to [address] [place] [id number] ED for evaluation by the surgery team.    CT scan of the chest without contrast was performed on 05/04/2021 which showed ascites and pneumoperitoneum with apparent free-flowing oral contrast in the left upper quadrant concerning for viscus perforation possibly within the subdiaphragmatic portion of the stomach. Moderate bilateral pleural effusions and hiatal hernia with organoaxial volvulus. Bariatric surgery performed a laparoscopic robotic para esophageal hernia repair. Cardiology is also following the patient because of elevated troponins with unremarkable echocardiography.     Antibiotics wise the patient was given Zosyn on 05/03/2021 at the outside facility and it has been continued through the present time.    Blood and urine cultures on 05/03/2021 show No growth. Repeat urine culture on 05/04/2021 shows No growth     Patient is currently having temperature elevations. Temp max of 101.1 on 05/07/2021 around 4 AM in the morning. Patient WBC count is improving from 23.5 on presentation to 6.2 today. Repeat chest x-ray 5-7-21 shows bibasilar consolidation new from prior study with blunting of the costophrenic angles bilaterally. Patient is off of the pressors since 5-6-21    Left Thoracentesis on 5/12/21 for left loculated empyema-Pigtail catheter placed with pus noted. Fluid shows Candida albicans  Right sided 12 Divehi chest tube placed on 5/13/21 for empyema-removed 5/18/21   Patient on liquid Diflucan    Follow-up esophagram on 5/11/21 negative for a leak. On low fiber diet plus supplements  Meeting % of nutritional needs    Coagulase negative Staphylococci bacteremia x 2 on 5-7-21. Repeat blood cultures 5-8-21 x 2 : No growth. Femoral line and morel catheter were removed. Rt and Lt TAISHA drains removed, on 5-7- and 5-11-21, respectively  Chest tube removed on 5/18/21. No pneumothorax identified after removal chest tube. CURRENT EVALUATION : 5/28/2021    Afebrile  VS stable  Saturating well on RA    Overall patient has improved. Awaiting pre-cert at this time. Patient sitting in chair and eating breakfast.   Appetitive slowly returning. The patient is to follow-up as an outpatient with CT surgery in 2 weeks for paraesophageal fluid collection.     Discussed with RN    Labs, X rays reviewed: 5/28/2021    BUN: 22->26-->18->17->16  Cr: 1.19-->1.18->1.1->1.11    WBC: 11.3->11.1-->12.2->11.9->11.6  Hb: 8.5->8.2-->8.6->8.2->8.3  Plat: 315->288-->312->304->301    LD: 345  Triglycerides: 261    Cultures:    Urine:  05/03/2021: No growth   05/04/2021: No growth     Blood:  05/03/2021: Blood cultures x 2:  No growth  5/7/21: Coagulase negative Staphylococci bacteremia x 2  5/8/21: No growth  Sputum :    Thoracentesis fluid:  Lt side 5/12/21 gram positive rods/ candida albicans. Rt side 5-13-21: No growth     CT chest 5/11/21  New right-sided PICC again seen with unchanged and satisfactory tip position;   larger loculated right effusion in the azygoesophageal recess, as above. Otherwise similar findings to 05/07/2021 with suspected lower lobe   consolidation/pneumonitis, with volume loss and effusions both lower lobes. CT chest 5/16:  Impression:        1.  Right chest tube in place with near complete resolution of right   effusion.  No pneumothorax or loculated fluid in the right hemithorax. 2.  Trace left pleural effusion. 3.  Dependent opacities in the lower lobes, left greater than right, again   demonstrated, although improved since prior chest CT exam.     4.  Moderate amount of fluid within the esophageal hiatus. 5.  Partially visualized stomach appears distended with 2 gastrostomy tubes   in place. Discussed with patient, RN, IM, Pulmonary, daughter in law. I have personally reviewed the past medical history, past surgical history, medications, social history, and family history, and I have updated the database accordingly.   Past Medical History:     Past Medical History:   Diagnosis Date    Diabetes mellitus ([address]     Gout     Hiatal hernia     Hyperlipidemia     Hypertension     Thyroid disease        Past Surgical  History:     Past Surgical History:   Procedure Laterality Date    BREAST SURGERY      Bx 1993    CHOLECYSTECTOMY  1999    GASTRIC FUNDOPLICATION N/A 1/6/0867    PARAESPHAGEAL HERNIA REPAIR LAPAROSCOPIC ROBOTIC performed by Livia Tinoco DO at [address] [place] [address]  5/10/2021         HYSTERECTOMY  1997    IR CHEST TUBE INSERTION  5/13/2021    IR CHEST TUBE INSERTION 5/13/2021 Karl Lackey [facility]    OTHER SURGICAL HISTORY  1962    Remove spur L ankle   [address] [place] [address] Pin and wire repair R ankle    TUBAL LIGATION  1988       Medications:      insulin lispro  0-18 Units Subcutaneous TID WC    insulin lispro  0-9 Units Subcutaneous Nightly    lactulose  20 g Oral TID    sennosides-docusate sodium  2 tablet Oral Daily    fluconazole  400 mg Oral Daily    gabapentin  100 mg Oral TID    aspirin  81 mg Oral Daily    atorvastatin  10 mg Oral Daily    levothyroxine  112 mcg Oral Daily    pantoprazole  40 mg Oral QAM AC    insulin glargine  10 Units Subcutaneous Nightly    polyethylene glycol  17 g Oral Daily    heparin (porcine)  5,000 Units Subcutaneous 3 times per day    sodium chloride flush  5-40 mL Intravenous 2 times per day       Social History:     Social History     Socioeconomic History    Marital status:      Spouse name: Not on file    Number of children: Not on file    Years of education: Not on file    Highest education level: Not on file   Occupational History    Not on file   Tobacco Use    Smoking status: Never Smoker    Smokeless tobacco: Never Used   Substance and Sexual Activity    Alcohol use: Never    Drug use: Never    Sexual activity: Not on file   Other Topics Concern    Not on file   Social History Narrative    Not on file     Social Determinants of Health     Financial Resource Strain:     Difficulty of Paying Living Expenses:    Food Insecurity:     Worried About Running Out of Food in the Last Year:     Ran Out of Food in the Last Year:    Transportation Needs:     Lack of Transportation (Medical):      Lack of Transportation (Non-Medical):    Physical Activity:     Days of Exercise per Week:     Minutes of Exercise per Session:    Stress:     Feeling of Stress :    Social Connections:     Frequency of Communication with Friends and Family:     Frequency of Social Gatherings with Friends and Family:     Attends [employer] Services:     Active Member of Clubs or Organizations:     Attends Club or Organization removal. Eval for worsening effusion vs   reoccurance pneumothorax. Acuity: Acute   Type of Exam: Initial       FINDINGS:   Right upper extremity PICC remains in satisfactory position. Cardiomediastinal silhouette appears unchanged.  Low inspiratory volume. Bibasilar predominant airspace disease appears slightly improved particularly   on the left.  No definite effusion.  No pneumothorax or subdiaphragmatic free   air.           Impression   Improving bibasilar airspace disease.  No significant recurrent effusion.           Order History      EXAMINATION:   CT OF THE CHEST, ABDOMEN, AND PELVIS WITHOUT CONTRAST 5/20/2021 9:50 am       TECHNIQUE:   CT of the chest, abdomen and pelvis was performed without the administration   of intravenous contrast. Multiplanar reformatted images are provided for   review. Dose modulation, iterative reconstruction, and/or weight based   adjustment of the mA/kV was utilized to reduce the radiation dose to as low   as reasonably achievable.       COMPARISON:   CT chest dated 05/16/2021, and 05/04/2021.  CT chest, abdomen and pelvis   dated 05/07/2021       HISTORY:   ORDERING SYSTEM PROVIDED HISTORY: Esophageal rupture, empyema, monitor of   abdomen   TECHNOLOGIST PROVIDED HISTORY:   Esophageal rupture, empyema, monitor of abdomen       Reason for Exam: Esophageal rupture, empyema, monitor of abdomen   Acuity: Unknown   Type of Exam: Unknown       FINDINGS:       Chest:       Mediastinum: Heart is normal in size.  There is no pericardial effusion.    Thoracic aorta and pulmonary arteries are normal in caliber.  There is a   right arm PICC with distal tip in the distal SVC.  There are a couple mildly   prominent mediastinal lymph nodes, measuring up to 0.9 cm, which are   unchanged.  There is a loculated fluid collection in the right paraesophageal   space and esophageal hiatus, which measures 9.3 x 4.5 x 8.3 cm on axial   images, which is slightly decreased in size from the previous CT chest.  No   pneumomediastinum or gas within the fluid collection.       Lungs/pleura: There is a small mildly loculated left pleural effusion, which   is unchanged.  Right chest tube has been removed. Rickford Math is trace mildly   loculated right pleural fluid.  There is increased airspace consolidation in   the posterior mid and lower right lung.  Strandy and patchy opacities at the   left lung base are not significantly changed, likely atelectasis or scar.  No   pneumothorax.       Soft Tissues/Bones: There is no acute or suspicious osseous abnormality. Visualized superficial soft tissues are within normal limits.           Abdomen/Pelvis:       Organs: Limited unenhanced liver and spleen are grossly unremarkable.  The   gallbladder is surgically absent.  Limited unenhanced pancreas and adrenal   glands are unremarkable.       Kidneys are symmetric in size and attenuation.  No renal or ureteral   calculus.  No hydronephrosis or perinephric inflammation.       GI/Bowel: There is a moderate amount of ascites in the abdomen and pelvis,   which is increased from the previous CT abdomen and pelvis. Saul Douglas is no   free air.  Appendix is not seen. Rickford Math is no abnormal bowel distention.  The   majority of the stomach is intra-abdominal, and there are 2 PEG tubes in   place with retention bulbs in the mid stomach.  There is mild mesenteric   edema.  Surgical drain has been removed from the right lower quadrant.       Pelvis: Urinary bladder is unremarkable.  Uterus is surgically absent.  No   pelvic lymphadenopathy.       Peritoneum/Retroperitoneum: The abdominal aorta is normal in caliber.  There   is no retroperitoneal or mesenteric lymphadenopathy.       Bones/Soft Tissues: There is no acute or suspicious osseous abnormality. Visualized superficial soft tissues are within normal limits.           Impression   1.  Loculated fluid collection in the right paraesophageal space/esophageal   hiatus, stable to slightly decreased in size when compared to 05/16/2021. The collection measures approximately 9.3 x 4.5 cm, and there is no gas in   the collection.  This could be a postoperative fluid collection or loculated   ascites herniated into the space previously occupied by intrathoracic   stomach.  Possibility of abscess is not excluded.  No pneumomediastinum.       2.  Small mildly loculated left pleural effusion, which is unchanged. Interval removal of the right chest tube with trace right pleural fluid.       3.  Increased airspace consolidation in the posterior mid and lower right   lung, concerning for pneumonia.  Unchanged opacities in the posterior left   lung, possibly atelectasis or scarring.       4.  Moderate ascites in the abdomen and pelvis, which is increased.  No free   air.          EXAMINATION:   SINGLE CONTRAST ESOPHAGRAM       5/7/2021 11:03 am       TECHNIQUE:   Single contrast esophagram was performed with a total 100 mL of Omnipaque 240   oral contrast.       FLUOROSCOPY DOSE AND TYPE OR TIME AND EXPOSURES:   Fluoro time: 2.7 minutes; DAP: 70.44 mGy       COMPARISON:   Upper GI from 05/04/2021       HISTORY:   ORDERING SYSTEM PROVIDED HISTORY: s/p hiatal hernia reduction  with partial   gastrectomy and gastropexy   TECHNOLOGIST PROVIDED HISTORY:   s/p hiatal hernia reduction  with partial gastrectomy and gastropexy   Reason for Exam: H.H repair/gastrectomy/gastropexy/ 100 ml Omnipaque orally   Acuity: Unknown   Type of Exam: Unknown       56-year-old female status post hiatal hernia reduction with partial   gastrectomy and gastropexy       FINDINGS:   Fluoroscopic  imaging was obtained prior to the administration contrast.       2 gastrostomy tubes are seen projecting over the left upper quadrant.  There   also 2 surgical drains projecting over the left upper quadrant.  Prior   cholecystectomy.  Suture material projects over the left upper quadrant.       The patient drank contrast which migrates through the postoperative region   and into the stomach and small bowel.       There is contrast draining through what appears to be the gastrostomy tubes.       No extraneous collection of contrast is seen beyond the confines of the   stomach.       There is contrast marginating a presumed gastrostomy tube balloon.       Mild gastroesophageal reflux and delayed transit of contrast is seen within   the distal esophagus/GE junction.           Impression   1. Drainage of contrast material through what appears to be the gastrostomy   tubes following the ingestion of contrast.  Contrast does migrate beyond the   postoperative region into the proximal small bowel. 2. No extraneous/extraluminal collection of contrast is seen beyond the   confines of the stomach. 3. 2 surgical drains and 2 presumed gastrostomy tubes are seen overlying the   left upper quadrant.  There does appear to be contrast slightly marginating   the more lateral gastrostomy tube balloon. 4. Delayed migration of contrast through the distal esophagus and GE junction   with mild gastroesophageal reflux. The findings were sent to the Radiology Results [address] at 12:43   pm on 5/7/2021to be communicated to a licensed caregiver.             EXAMINATION:   CT OF THE CHEST WITHOUT CONTRAST 5/4/2021 5:55 pm       TECHNIQUE:   CT of the chest was performed without the administration of intravenous   contrast. Multiplanar reformatted images are provided for review. Dose   modulation, iterative reconstruction, and/or weight based adjustment of the   mA/kV was utilized to reduce the radiation dose to as low as reasonably   achievable.       COMPARISON:   None.       HISTORY:   ORDERING SYSTEM PROVIDED HISTORY: large hiatal hernia, contrast progression? TECHNOLOGIST PROVIDED HISTORY:   large hiatal hernia, contrast progression?    Reason for Exam: large hiatal hernia, contrast progression?       FINDINGS:   Mediastinum: Heart size is snaps.  New right-sided PICC tip   position in the SVC.       Low lung volumes accentuating findings, including cardiomegaly with bibasilar   opacities suggesting atelectasis/consolidation and effusions.  Patchy   infiltrate right mid lung also again noted.       Bones stable.           Impression   New right-sided PICC tip position appears satisfactory.  Otherwise, similar   findings compatible with bibasilar atelectasis/consolidation, effusions and   minimal infiltrate or atelectasis right mid lung.             CT scan:   CT OF THE CHEST WITHOUT CONTRAST 5/11/2021 10:31 am       TECHNIQUE:   CT of the chest was performed without the administration of intravenous   contrast. Multiplanar reformatted images are provided for review. Dose   modulation, iterative reconstruction, and/or weight based adjustment of the   mA/kV was utilized to reduce the radiation dose to as low as reasonably   achievable.       COMPARISON:   CT scan of the chest from 05/07/2021.       HISTORY:   ORDERING SYSTEM PROVIDED HISTORY: ?left loculated pleural effusion   TECHNOLOGIST PROVIDED HISTORY:   ?left loculated pleural effusion   Reason for Exam: ?left loculated pleural effusion   Acuity: Unknown   Type of Exam: Unknown       FINDINGS:   Mediastinum: Interval placement right-sided PICC with tip position in the   mid-lower SVC.  Small unchanged mediastinal nodes; no bulky lymphadenopathy.    No acute thoracic aortic or cardiac abnormality on this unenhanced scan;   prominent LV again noted.  Tiny unchanged pericardial fluid or thickening.       Lungs/pleura: Similar bilateral pleural effusions with considerable mostly   lower lobe atelectasis or consolidation with air bronchograms; larger   loculated appearing component (measuring 10.0 x 4.9 cm) extending along the   azygoesophageal recess, and across the midline (best seen slices 75-62,   series 2).  Tracheobronchial tree patent centrally       Upper Abdomen: Similar small amount

## 2021-05-28 NOTE — PROGRESS NOTES
Occupational Therapy  Facility/Department: [facility] ONC/MED SURG  Daily Treatment Note  NAME: Mary Roblero  : 1950  MRN: [id number]    Date of Service: 2021    Discharge Recommendations:  Patient would benefit from continued therapy after discharge  OT Equipment Recommendations  Equipment Needed: Yes  Walker: Rolling  ADL Assistive Devices: Shower Chair with back  Other: Bed side commcoy, santos, RW. Copied from Internal Medicine: 47-year-old female originally presenting to outlying facility due to nausea and vomiting and was transferred for further evaluation and patient underwent emergent robotic laparoscopic hiatal hernia reduction with gastropexy via G-tube on 2021 and required intubation and was ultimately extubated on 2021. Patient underwent thoracentesis and had chest tube insertion 2021 follow-up with cardiothoracic surgery as well as pulmonology. Is a concern for possible esophageal leak and esophagram was performed on 2021 did not show any leak. Patient was started on Diflucan due to concern for Candida albicans  Assessment   Performance deficits / Impairments: Decreased functional mobility ; Decreased ADL status; Decreased endurance;Decreased high-level IADLs;Decreased safe awareness;Decreased balance;Decreased strength  Assessment: Pt demonstrated functional transfers with Min aand static/dynamic standing with CGA. Pt limited by decreased activity tolerance. Pt is expected to require skilled OT services during pts acute/post hospitilization to maximize pts safety and increase independence in ADLs, IADLs and functional mobility tasks.   Treatment Diagnosis: Acute Pancreatitis  Prognosis: Good  REQUIRES OT FOLLOW UP: Yes  Activity Tolerance  Activity Tolerance: Patient limited by fatigue (and decreased activity tolerance)  Activity Tolerance: Pt on room air throughout session with no complaints of SOB  Safety Devices  Safety Devices in place: Yes  Type of devices: Left in chair;Call light within reach;Gait belt  Restraints  Initially in place: No       Patient Diagnosis(es): The primary encounter diagnosis was Acute pancreatitis, unspecified complication status, unspecified pancreatitis type. Diagnoses of BONNIE (acute kidney injury) ([address] Hiatal hernia, Necrosis of stomach, Strangulated paraesophageal hernia, Mediastinitis, Paraesophageal fluid collection, Essential hypertension, Hernia with obstruction, and Diabetic ketoacidosis without coma associated with type 2 diabetes mellitus ([address] were also pertinent to this visit. has a past medical history of Diabetes mellitus ([address] Gout, Hiatal hernia, Hyperlipidemia, Hypertension, and Thyroid disease. has a past surgical history that includes Cholecystectomy (1999); Hysterectomy (1997); Breast surgery; other surgical history (1962); other surgical history (1978); Tubal ligation (1988); Gastric fundoplication (N/A, 6/4/0813); hc picc line double lumen (5/10/2021); and IR CHEST TUBE INSERTION (5/13/2021). Restrictions  Restrictions/Precautions  Restrictions/Precautions: Fall Risk  Required Braces or Orthoses?: No  Position Activity Restriction  Other position/activity restrictions: up with assist, s/p paraesophageal hernia repair 5/4  Subjective   General  Chart Reviewed: Yes  Patient assessed for rehabilitation services?: Yes  Family / Caregiver Present: No  General Comment  Comments: pt denying pain however c/o pressure in abdomen  Pain Assessment  Pain Assessment: 0-10  Pain Level: 0  Vital Signs  Patient Currently in Pain: No (pt just reporting \"twinges\" now and then)  Oxygen Therapy  O2 Device: None (Room air)   Orientation  Orientation  Overall Orientation Status: Within Functional Limits  Objective    ADL  Feeding: Independent (seated in chair pt able to open containers)  Grooming: Contact guard assistance;Setup;Verbal cueing; Increased time to complete;Modified independent  (CGA for hand hygiene completed standing at sink; Mod I for oral care completed seated supported in chair)  UE Bathing: Setup; Increased time to complete;Minimal assistance (min A for back seated in chair)  UE Dressing: Setup;Verbal cueing; Increased time to complete;Minimal assistance (to doff/don gown seated)  Toileting: Minimal assistance;Setup; Increased time to complete  Additional Comments: Toileting completed seated on standard toilet with use of B rails and with personal hygiene/clothing management completed standing with RW utilizing B hand support. Hand hygiene completed standing at sink. Pt reports increased fatigue this date due to not sleeping well. Pt with decreased activity tolerance and demonstrates a delay in processing information. Balance  Sitting Balance: Supervision  Standing Balance: Contact guard assistance  Standing Balance  Time: Pt tolerated approx 3-4 min  Activity: static and dynamic standing during ADLs  Comment: utilizing RW  Functional Mobility  Functional - Mobility Device: Rolling Walker  Activity: To/from bathroom  Assist Level: Contact guard assistance  Functional Mobility Comments: req cues for proper walker placement and proper hand use for safety  Toilet Transfers  Toilet - Technique: Ambulating  Equipment Used: Grab bars  Toilet Transfer: Minimal assistance  Toilet Transfers Comments: req assist with walker management  Bed mobility  Comment: Pt sitting up in chair upon entrance to room and retired to chair upon exit. Transfers  Stand Step Transfers: Contact guard assistance  Sit to stand: Minimal assistance  Stand to sit: Contact guard assistance  Transfer Comments: utilizing RW req cues on proper hand placement     Cognition  Overall Cognitive Status: Exceptions  Arousal/Alertness: Delayed responses to stimuli  Following Commands: Follows multistep commands with repitition; Follows multistep commands with increased time  Attention Span: Appears intact  Memory: Appears intact  Safety Judgement: Decreased awareness of need for assistance  Problem Solving: Decreased awareness of errors  Insights: Decreased awareness of deficits  Initiation: Requires cues for some  Sequencing: Requires cues for some  Cognition Comment: Needing extended time for processing.       Plan   Plan  Times per week: 4-5x/week  Current Treatment Recommendations: Safety Education & Training, Balance Training, Patient/Caregiver Education & Training, Self-Care / ADL, Functional Mobility Training, Equipment Evaluation, Education, & procurement, Home Management Training, Endurance Training, Strengthening    Goals  Short term goals  Time Frame for Short term goals: By discharge, pt will:  Short term goal 1: Demo bed mobility with CGA in order to increase independence(Goal updated on 5/26/2021 by Judith Guzman OTR/L)  Short term goal 2: Dem functional transfers/functional mobility with CGA and LRD, as needed(Goal updated on 5/26/2021 by Judith Guzman OTR/L)  Short term goal 3: Demo LB ADLs with Min A, setup, use of AE/DME PRN  Short term goal 4: Demo UB ADLs with supervision , using AE/DME PRN (Goal updated on 5/26/2021 by Judith Guzman OTR/L)  Short term goal 5: Demo +5 minutes of static/dynamic standing with CGA to increase engagement in ADLs  Short term goal 6: Demo +5 minutes of static/dynamic standing with CGA to increase engagement in ADLs       Therapy Time   Individual Concurrent Group Co-treatment   Time In 1038         Time Out 1120         Minutes 42         Timed Code Treatment Minutes: [facility], OTR/L

## 2021-05-28 NOTE — ED PROVIDER NOTES
[address]     Emergency Department     Faculty Note/ Attestation      Pt Name: Dunia Bishop                                       MRN: [id number]  Matildegfgina 1950  Date of evaluation: 5/28/2021    Patients PCP:    Etienne Currie,       Attestation  I performed a history and physical examination of the patient and discussed management with the resident. I reviewed the residents note and agree with the documented findings and plan of care. Any areas of disagreement are noted on the chart. I was personally present for the key portions of any procedures. I have documented in the chart those procedures where I was not present during the key portions. I have reviewed the emergency nurses triage note. I agree with the chief complaint, past medical history, past surgical history, allergies, medications, social and family history as documented unless otherwise noted below. For Physician Assistant/ Nurse Practitioner cases/documentation I have personally evaluated this patient and have completed at least one if not all key elements of the E/M (history, physical exam, and MDM). Additional findings are as noted.       Initial Screens:             Vitals:    Vitals:    05/28/21 1828   BP: (!) 145/83   Pulse: 88   Resp: 18   Temp: 97.8 °F (36.6 °C)   TempSrc: Oral   SpO2: 97%       CHIEF COMPLAINT       Chief Complaint   Patient presents with    G Tube Complications             DIAGNOSTIC RESULTS             RADIOLOGY:   CT CHEST ABDOMEN PELVIS W CONTRAST    (Results Pending)         LABS:  Labs Reviewed   CBC WITH AUTO DIFFERENTIAL   BASIC METABOLIC PANEL W/ REFLEX TO MG FOR LOW K         EMERGENCY DEPARTMENT COURSE:     -------------------------  BP: (!) 145/83, Temp: 97.8 °F (36.6 °C), Pulse: 88, Resp: 18      Comments    G-tube x2  Multiple abd surgeries  Just d/c today, at [facility] x1 hr then sent back to ED  Normal labs this am  Drainage around PEG site  Plan for CT abd    Will call Miles who just d/c the patient, appears to have been performed by [facility]. Despite concerning CT read, surgery and [facility] both feel the patient is stable and CT is unchanged from prior, thought is that she has ascites and the PEG tube was not tightened off, also had an occlusive dressing which was keeping the area moist.  We will put an absorbent dressing on, and discharge her back to her extended care facility    10:43 PM EDT  Patient had copious amounts of drainage from around the G-tube site, after discussing it with surgery this is not a G-tube for feeds however this is an attempt to tack the stomach down so it does not twist or reherniate. In discussion with surgery we discussed putting a urostomy appliance on it, we were able to attach a urostomy appliance around and then retightened the PEG button. Drainage is going to the bag and not around the skin.   Family is taking the patient home and will try to get her placed up near them, patient and son were both appreciative of the drainage appliance and thankful, left the department without any distress or concerns    (Please note that portions of this note were completed with a voice recognition program.  Efforts were made to edit the dictations but occasionally words are mis-transcribed.)      Rabia Malone MD,, MD  Attending Emergency Physician         Rabia Malone MD  05/28/21 4120       Rabia Malone MD  05/28/21 1616

## 2021-05-28 NOTE — PLAN OF CARE
JENNIFER Dayatient Assessment complete. Acute pancreatitis, unspecified complication status, unspecified pancreatitis type [K85.90] . Vitals:    05/28/21 0715   BP: 132/77   Pulse: 80   Resp: 18   Temp: 98 °F (36.7 °C)   SpO2: 97%   . Patients home meds are   Prior to Admission medications    Medication Sig Start Date End Date Taking? Authorizing Provider   levothyroxine (SYNTHROID) 112 MCG tablet Take 1 tablet by mouth Daily 5/21/21  Yes Scott Garland MD   gabapentin (NEURONTIN) 100 MG capsule Take 1 capsule by mouth 3 times daily for 10 days.  5/20/21 5/30/21 Yes Scott Garland MD   fluconazole (DIFLUCAN) 40 MG/ML suspension Take 10 mLs by mouth daily for 9 days 5/21/21 5/30/21 Yes Scott Garland MD   SITagliptin (JANUVIA) 100 MG tablet Take 100 mg by mouth daily    Historical Provider, MD   omeprazole (PRILOSEC) 40 MG delayed release capsule Take 1 capsule by mouth every morning (before breakfast) 4/28/21   Ese Castañeda DO   simvastatin (ZOCOR) 10 MG tablet Take 10 mg by mouth nightly    Historical Provider, MD   aspirin 81 MG EC tablet Take 81 mg by mouth daily    Historical Provider, MD   .    RR 18  Breath Sounds: clear      Hyperinflation assessment at level   Secretion Management assessment at level      [x]    Bronchodilator Assessment level one  BRONCHODILATOR ASSESSMENT SCORE  Score 0 1 2 3 4 5   Breath Sounds   []  Patient Baseline [x]  No Wheeze good aeration []  Faint, scattered wheezing, good aeration []  Expiratory Wheezing and or moderately diminished []  Insp/Exp wheeze and/or very diminished []  Insp/Exp and/ or marked distress   Respiratory Rate   []  Patient Baseline [x]  Less than 20 [x]  Less than 20 []  20-25 []  Greater than 25 []  Greater than 25   Peak flow % of Pred or PB [x]  NA   []  Greater than 90%  []  81-90% []  71-80% []  Less than or equal to 70%  or unable to perform []  Unable due to Respiratory Distress   Dyspnea re []  Patient Baseline []  No SOB []  No 50 - 53 2.31 2.48 2.66 2.85 3.04 3.24 3.45 3.66 50 - 53 2.58 2.80 3.02 3.25 3.49 3.73 3.98 4.24   54 - 57 2.21 2.38 2.57 2.75 2.95 3.14 3.35 3.56 54 - 57 2.46 2.67 2.89 3.12 3.36 3.60 3.85 4.11   58 - 61 2.10 2.28 2.46 2.65 2.84 3.04 3.24 3.45 58 - 61 2.32 2.54 2.76 2.99 3.23 3.47 3.72 3.98   62 - 65 1.99 2.17 2.35 2.54 2.73 2.93 3.13 3.34 62 - 65 2.19 2.40 2.62 2.85 3.09 3.33 3.58 3.84   66 - 69 1.88 2.05 2.23 2.42 2.61 2.81 3.02 3.23 66 - 69 2.04 2.26 2.48 2.71 2.95 3.19 3.44 3.70   70+ 1.82 1.99 2.17 2.36 2.55 2.75 2.95 3.16 70+ 1.97 2.19 2.41 2.64 2.87 3.12 3.37 3.62             Predicted Peak Expiratory Flow Rate                                       Height (in)  Female       Height (in) Male           Age 64 63 56 61 58 73 78 74 Age            21 344 357 372 387 402 417 432 446  60 62 64 66 68 70 72 74 76   25 337 352 366 381 396 411 426 441 25 447 476 505 533 562 591 [phone]   30 329 344 359 374 389 404 419 434 30 437 466 494 523 [phone] 667   35 322 337 351 366 381 396 411 426 35 426 455 484 512 541 [phone]   40 314 329 344 359 374 389 404 419 40 416 445 473 502 531 559 588 617 647   45 307 322 336 351 366 381 396 411 45 405 434 463 491 520 549 577 606 636   50 299 314 329 344 359 374 389 404 50 395 424 452 481 510 538 567 596 625   55 292 307 321 336 351 366 381 396 55 384 413 442 470 499 528 556 585 615   60 284 299 314 329 344 359 374 389 60 374 403 431 460 489 517 546 575 605   65 277 292 306 321 336 351 366 381 65 363 392 421 449 478 507 535 564 594   70 269 284 299 314 329 344 359 374 70 353 382 410 439 468 496 525 554 583   75 261 274 289 305 319 334 348 364 75 344 372 400 429 458 487 515 544 573   80 253 266 282 296 312 327 342 356 80 335 362 390 419 448 476 [phone]

## 2021-05-28 NOTE — CARE COORDINATION
Pablo Garrett from [employer] called and states that she reached out to [facility] regarding precert. She was told that they are behind on precerts and hope to have precert this afternoon. Pablo Garrett states she will call as soon as she receives it.

## 2021-05-28 NOTE — PROGRESS NOTES
Comprehensive Nutrition Assessment    Type and Reason for Visit:  Reassess    Nutrition Recommendations/Plan: Continue current Low Fiber diet with Ensure Enlive ONS in chocolate x 2 per day.  Encourage/monitor PO intakes as tolerated. Monitor need for supplemental TF - will provide recommendations as needed. Monitor labs, weights, bowel function, and plan of care. Nutrition Assessment:  Pt reports an episode of vomiting last night and this morning. Pt states she has been taking it slow with her intakes today. For breakfast she consumed 50% of cheerios and milk. Pt also drinking some of Ensure at visit - had consumed 25%. Pt reports last night she had most of her soup for dinner with some of an Ensure. Encouraged pt to continue to eat what she can at meals and to drink what she can of Ensures. Discussed with RN and MD about pt's PO intakes. Continues to recieve 200 mL free water flushes x 4 per day. Last BM today. Labs reviewed: Na 134 mmol/L, K 3.6 mmo/L. Meds reviewed: Lactulose. Malnutrition Assessment:  Malnutrition Status: Moderate malnutrition    Context:  Acute Illness     Findings of the 6 clinical characteristics of malnutrition:  Energy Intake:  1 - 75% or less of estimated energy requirements for 7 or more days (Previously meeting est needs with nutrition support.)  Weight Loss:  7 - Greater than 5% over 1 month (11% x 1 month)     Body Fat Loss:  No significant body fat loss   Muscle Mass Loss:  No significant muscle mass loss  Fluid Accumulation:  1 - Mild Extremities, Generalized   Strength:  Not Performed    Estimated Daily Nutrient Needs:  Energy (kcal):  MSJ x 1.2 = 1600 kcals/day; Weight Used for Energy Requirements:  Current     Protein (g):  75 g pro/day; Weight Used for Protein Requirements:  Ideal (1.5)        Fluid (ml/day):  2128-8688 mL/day (or per MD); Method Used for Fluid Requirements:  ml/Kg (25-30)      Nutrition Related Findings:  Labs/Meds reviewed.  Active bowel sounds. Clamped G-tubes. 5/4: S/p hiatal hernia repair, gastrectomy, gastropexy, G-tubes x 2      Wounds:  Multiple, Surgical Incision       Current Nutrition Therapies:    DIET LOW FIBER; Dietary Nutrition Supplements: Standard High Calorie Oral Supplement    Anthropometric Measures:  · Height: 5' 2\" (157.5 cm)  · Current Body Weight: 179 lb 9.6 oz (81.5 kg)   · Admission Body Weight: 202 lb (91.6 kg)    · Usual Body Weight:  (166 lb - stated)     · Ideal Body Weight: 110 lbs; % Ideal Body Weight 163.3 %   · BMI: 32.8  · BMI Categories: Obese Class 1 (BMI 30.0-34. 9)       Nutrition Diagnosis:   · Inadequate oral intake related to altered GI function (variable appetite) as evidenced by weight loss, intake 26-50% (variable PO intakes; need for ONS; previous need for supplemental nutrition support)    Nutrition Interventions:   Food and/or Nutrient Delivery:  Continue Current Diet, Continue Oral Nutrition Supplement  Nutrition Education/Counseling:  No recommendation at this time, Education completed   Coordination of Nutrition Care:  Continue to monitor while inpatient    Goals:  meet % of estimated nutrient needs       Nutrition Monitoring and Evaluation:   Food/Nutrient Intake Outcomes:  Food and Nutrient Intake, Supplement Intake  Physical Signs/Symptoms Outcomes:  Biochemical Data, GI Status, Nausea or Vomiting, Hemodynamic Status, Nutrition Focused Physical Findings, Skin, Weight     Electronically signed by Lisseth Ni RD, ORTEGA on 5/28/21 at 11:32 AM EDT    Contact: [phone]

## 2021-05-28 NOTE — ED NOTES
Bed: 29  Expected date:   Expected time:   Means of arrival:   Comments:  [address], RN  05/28/21 0887

## 2021-05-29 NOTE — CARE COORDINATION
[facility] Update     2021    Patient: Raj Lopez Patient : 1950   MRN: [id number]  Reason for Admission: 5/3-2021 Pancreatitis  Discharge Date: 21 RARS: Readmission Risk Score: 30  CT       Care Transitions Post Acute Facility Update    Care Transitions Interventions  Post Acute Facility: [address] [place] [address]

## 2021-05-29 NOTE — CONSULTS
General Surgery:  Consult Note        PATIENT NAME: Erick Oreilly   YOB: 1950    ADMISSION DATE: 5/28/2021  6:26 PM     Admitting Provider: Evangelina Ferreira    Consulted Physician: Samuel Rueda DATE: 5/28/2021    Chief Complaint:  Peg tube drainage  Consult Regarding:  Peg tube infection    HISTORY OF PRESENT ILLNESS:  The patient is a 78 y.o. female  who presented to ED c/o Peg tube drainage. Pt was discharged today from [place] [address] to rehab facility but was sent back due to concerns about the drainage. Pt was admitted for an extended course recovering from paraesophageal hernia repair with repair of gastric perforation done 5/4. Pt denies abdominal pain, N/V, F/C, CP, SOB. Pt reports fatigue. Pt continues to tolerate PO diet. Continues to have normal BM, last one hour ago. Pt currently has no complaints.         Date of Procedure: 5/4/2021   Post-Op Diagnosis: Gastric perforation due to ischemia from paraesophageal hernia. Feculent peritonitis       Procedure(s):  PARAESPHAGEAL HERNIA REPAIR LAPAROSCOPIC ROBOTIC   PARTIAL GASTRECTOMY  GASTROPEXY  IRRIGATION OF ABDOMEN      Past Medical History:        Diagnosis Date    Diabetes mellitus ([facility]     Gout     Hiatal hernia     Hyperlipidemia     Hypertension     Thyroid disease        Past Surgical History:        Procedure Laterality Date    BREAST SURGERY      Bx 1993    CHOLECYSTECTOMY  1999    GASTRIC FUNDOPLICATION N/A 1/3/4413    PARAESPHAGEAL HERNIA REPAIR LAPAROSCOPIC ROBOTIC performed by Justine Amaya DO at [address] [place] [address]  5/10/2021         HYSTERECTOMY  1997    IR CHEST TUBE INSERTION  5/13/2021    IR CHEST TUBE INSERTION 5/13/2021 Philippe Bang MD [facility]    OTHER SURGICAL HISTORY  1962    Remove spur L ankle    OTHER SURGICAL HISTORY  1978    Pin and wire repair R ankle    TUBAL LIGATION  1988       Medications Prior to Admission:   Not in a hospital admission.     Allergies: No known allergies    Social History:   Social History     Socioeconomic History    Marital status:      Spouse name: Not on file    Number of children: Not on file    Years of education: Not on file    Highest education level: Not on file   Occupational History    Not on file   Tobacco Use    Smoking status: Never Smoker    Smokeless tobacco: Never Used   Substance and Sexual Activity    Alcohol use: Never    Drug use: Never    Sexual activity: Not on file   Other Topics Concern    Not on file   Social History Narrative    Not on file     Social Determinants of Health     Financial Resource Strain:     Difficulty of Paying Living Expenses:    Food Insecurity:     Worried About Running Out of Food in the Last Year:     [address] in the Last Year:    Transportation Needs:     Lack of Transportation (Medical):      Lack of Transportation (Non-Medical):    Physical Activity:     Days of Exercise per Week:     Minutes of Exercise per Session:    Stress:     Feeling of Stress :    Social Connections:     Frequency of Communication with Friends and Family:     Frequency of Social Gatherings with Friends and Family:     Attends Oriental orthodox Services:     Active Member of Clubs or Organizations:     Attends Club or Organization Meetings:     Marital Status:    Intimate Partner Violence:     Fear of Current or Ex-Partner:     Emotionally Abused:     Physically Abused:     Sexually Abused:        Family History:       Problem Relation Age of Onset    Breast Cancer Mother     High Blood Pressure Mother     High Cholesterol Father     Breast Cancer Sister        REVIEW OF SYSTEMS:    As reviewed in HPI    PHYSICAL EXAM:    VITALS:  BP (!) 145/83   Pulse 88   Temp 97.8 °F (36.6 °C) (Oral)   Resp 18   SpO2 97%   INTAKE/OUTPUT:   No intake or output data in the 24 hours ending 05/28/21 2038    CONSTITUTIONAL:  awake, alert, not distressed and moderately obese  HEENT: Normocephalic/atraumatic, without obvious abnormality. NECK:  Supple, symmetrical, trachea midline   CARDIOVASCULAR: Regular rate and rhythm without murmurs. LUNGS: unlabored breathing on RA  ABDOMEN: obese, distended, without TTP, 2 G tube in place, which are loose with the bumpers not in contact with skin. Moisture appreciated without purulence, bleeding, erythema, or any s/s infection. MUSCULOSKELETAL: Muscle strength intact in all extremities bilaterally. NEUROLOGIC: CN II- XII intact. Gross motor intact without focal weakness. SKIN: No cyanosis, rashes, or edema noted.    Orientation:   oriented to person, place, and time    CBC with Differential:    Lab Results   Component Value Date    WBC 11.7 05/28/2021    RBC 3.90 05/28/2021    RBC 6.23 05/03/2021    HGB 10.6 05/28/2021    HCT 34.2 05/28/2021     05/28/2021    MCV 87.7 05/28/2021    MCH 27.2 05/28/2021    MCHC 31.0 05/28/2021    RDW 15.0 05/28/2021    NRBC 1 05/05/2021    LYMPHOPCT 15 05/28/2021    LYMPHOPCT 6 05/03/2021    MONOPCT 4 05/28/2021    BASOPCT 0 05/28/2021    MONOSABS 0.47 05/28/2021    MONOSABS 1.8 05/03/2021    LYMPHSABS 1.76 05/28/2021    LYMPHSABS 1.4 05/03/2021    EOSABS 0.00 05/28/2021    EOSABS 0.0 05/03/2021    BASOSABS 0.00 05/28/2021    DIFFTYPE NOT REPORTED 05/28/2021     CMP:    Lab Results   Component Value Date     05/28/2021    K 4.0 05/28/2021    CL 96 05/28/2021    CO2 27 05/28/2021    BUN 19 05/28/2021    CREATININE 1.17 05/28/2021    CREATININE 3.09 05/03/2021    GFRAA 56 05/28/2021    LABGLOM 46 05/28/2021    GLUCOSE 165 05/28/2021    GLUCOSE 453 05/03/2021    PROT 6.2 05/27/2021    PROT 7.3 05/03/2021    LABALBU 1.9 05/27/2021    CALCIUM 7.9 05/28/2021    BILITOT 0.32 05/27/2021    ALKPHOS 132 05/27/2021    AST 58 05/27/2021    ALT 34 05/27/2021     BMP:    Lab Results   Component Value Date     05/28/2021    K 4.0 05/28/2021    CL 96 05/28/2021    CO2 27 05/28/2021    BUN 19 05/28/2021    LABALBU The liver, spleen, pancreas, kidneys, and adrenal glands appear normal. The gallbladder is surgically absent. GI/Bowel: There are again seen post-surgical changes of the stomach. Two gastrostomy tubes are positioned within the stomach lumen. The stomach and the small and large bowel loops are otherwise normal in caliber, contour, and morphology, without acute abnormality. No dilated loops or areas of bowel wall thickening. Peritoneum/Retroperitoneum: There is a moderate amount of fluid within the lower abdomen which demonstrates faint peripheral enhancement as well as a moderate sized fluid collection within the lateral perihepatic right upper abdomen. A fluid collection adjacent to the distal esophagus at the esophageal hiatus level has decreased in size and now measures 2.3 x 4.9 cm (previously 4.5 x 9.3 cm). None of the areas of fluid collection contains internal gas, although peritonitis or early abscess formation is suspected. No free air. No enlarged or suspicious mesenteric or retroperitoneal lymphadenopathy. The abdominal aorta and iliac arteries are patent and of normal caliber. Pelvis: A small amount of pelvic free fluid is present. No pelvic fluid collections. No enlarged or suspicious pelvic or inguinal lymphadenopathy. The uterus is absent. No appreciable adnexal abnormality. The urinary bladder and the pelvic bowel loops and osseous structures are unremarkable. Bones/Soft Tissues: No significant osseous abnormality. No abdominal wall fluid collection. There is again seen a moderate amount of fluid throughout the abdomen which demonstrates faint peripheral enhancement and suspicious for peritonitis or early abscess formation. Gastrostomy tubes in place. No abdominal wall abscess. Minimal bibasilar atelectasis. Otherwise, clear lungs. Cardiomegaly. Trace left pleural effusion.          ASSESSMENT/Plan:  69F s/p paraesophageal hernia repair with gastropexy on 5/4 returns with rehab

## 2021-06-08 NOTE — DISCHARGE SUMMARY
[facility]  Office: [address], DO, Sebastian Shearer, DO, Chelsea Zamora, DO, Justin Ko Blood, DO, Sadaf Araujo MD, Maximilian Esquivel MD, Gisela Snyder MD, Puneet Kelsey MD, José Antonio Newton MD, Dm Leo MD, Renetta Strickland MD, Katy Pelaez MD, Dillon Moon, DO, Naima Mullen MD, Lauren Bashir, DO, Cordelia Barthel, MD,  Keyon Colindres, DO, Maribel Torres MD, Albert Pollock MD, Rock Fraire MD, Jojo Pearl MD, Zahra Beasley, [facility], [facility], CNP, Richard Tsang, CNP, Jossue Webster, CNS, Bárbara Barrera, CNP, Gregory Garibay, CNP, Nancy Murillo, CNP, Jesse Yao, CNP, Velma Sandoval, CNP, Cassandra Silva PA-C, Anneliese Baldwin, [facility], Noe De Souza, CNP, Eliu Estrada, CNP, Jared Byrd, CNP, Lynne Ibrahim, CNP, Leelee Giles, CNP, [facility], [address] [facility]. [address] [facility] [address]    Discharge Summary     Patient ID: Raj Lopez  :  1950   MRN: [id number]     ACCOUNT:  [de-identified]   Patient's PCP: Wily Castillo DO  Admit Date: 5/3/2021   Discharge Date: 2021   Length of Stay: 25  Code Status:  Prior  Discharge Physician: Puneet Kelsey MD     Active Discharge Diagnoses:     Hospital Problem Lists:  Principal Problem:    Necrosis of stomach  Active Problems:    Hernia with obstruction    Essential hypertension    Thyroid disease    Syncope    Hiatal hernia    Mediastinitis    Peritonitis ([address]    Severe malnutrition ([address]    Strangulated paraesophageal hernia    Paraesophageal fluid collection  Resolved Problems:    Septic shock ([address]    Diabetic ketoacidosis without coma associated with type 2 diabetes mellitus ([address]    BONNIE (acute kidney injury) ([address]    Acute tubular necrosis (HCC)    Lactic acid acidosis    Acute hypoxemic respiratory failure ([address]    Community acquired bilateral lower lobe pneumonia      Admission Condition:  critical     Discharged Condition: fair    Hospital Stay:     Hospital Course: 72-[facility] to [facility] and was transferred for further evaluation and patient underwent emergent robotic laparoscopic hiatal hernia reduction with gastropexy via G-tube on 5/4/2021 and required intubation and was ultimately extubated on 4/6/2021.  Patient underwent thoracentesis and had chest tube insertion 5/12/2021 follow-up with cardiothoracic surgery as well as pulmonology. Rocío Giordano a concern for possible esophageal leak and esophagram was performed on 5/7/2021 did not show any leak.  Patient was started on Diflucan due to concern for Candida albicans   During the admission patient was managed as follow    Sepsis with septic shock secondary to strangulated paraesophageal hernia status post laparoscopic repair on 5/4/2021:  Currently resolved   Appreciate bariatric surgery recommendations. On low fiber diet and nutritional supplements, completed Reglan for total of 72 hours, on Miralax     Acute respiratory failure requiring oxygen with empyema showing gram positive and yeast, still requiring 1L nasal cannula, wean off NC today and monitor closely    Fluconazole for candida infection,Appreciate ID, pulm, and CTS recommendations, s/p chest tube removal and tolerating well. Monitor if patient is a candidate for VATS as outpateint.     Paraesophageal collection appears stable in size.  discucsed with general surgery. Monitor as outpateint. With CT in 2 weeks.     HTN, CAD, ASA, lipitor     Diabetes.  lantus , ISS, hypoglycemia protocol      Hypothyroidism.  Synthroid 112 mcg     Constipation : continue bowel regimen, KUB unremarkable     Moderate malnutrition: some improvement in appetite and intake, no need for TF on discharge     Continue PT/OT      [facility]ed at [facility]  Med rec done  Scripts added   MAYLIN signed  30+ minutes spent      Significant therapeutic interventions: as above    Significant Diagnostic Studies:     Radiology:  No results found.    Consultations:    Consults:     Final Specialist Recommendations/Findings:   IP CONSULT TO GENERAL SURGERY  IP CONSULT TO HOSPITALIST  IP CONSULT TO CARDIOLOGY  IP CONSULT TO DIABETES EDUCATOR  IP CONSULT TO DIETITIAN  IP CONSULT TO BARIATRICS  IP CONSULT TO CRITICAL CARE  IP CONSULT TO GI  IP CONSULT TO NEPHROLOGY  IP CONSULT TO DIETITIAN  IP CONSULT TO DIETITIAN  IP CONSULT TO SURGICAL CRITICAL CARE TEAM  IP CONSULT TO INFECTIOUS DISEASES  IP CONSULT TO CARDIOTHORACIC SURGERY  IP CONSULT TO DIETITIAN  IP CONSULT TO DIETITIAN      The patient was seen and examined on day of discharge and this discharge summary is in conjunction with any daily progress note from day of discharge. Discharge plan:     Disposition: SNF    Physician Follow Up:     [address] [facility]  [address] [facility] [address] [phone]  Call  Follow up education on diabetes self management    Una Mercury,   [address] [place] [address]          [facility] [address] [place] [address] [facility]  [address] [facility] [id number] [phone]             Requiring Further Evaluation/Follow Up POST HOSPITALIZATION/Incidental Findings:     Diet: regular diet    Activity: As tolerated    Instructions to Patient:     Discharge Medications:      Medication List      START taking these medications    gabapentin 100 MG capsule  Commonly known as: NEURONTIN  Take 1 capsule by mouth 3 times daily for 10 days.         CHANGE how you take these medications    levothyroxine 112 MCG tablet  Commonly known as: SYNTHROID  Take 1 tablet by mouth Daily  What changed:   medication strength  additional instructions        CONTINUE taking these medications    aspirin 81 MG EC tablet     Januvia 100 MG tablet  Generic drug: SITagliptin     omeprazole 40 MG delayed release capsule  Commonly known as: PRILOSEC  Take 1 capsule by mouth every morning (before breakfast)     simvastatin 10 MG tablet  Commonly known as: ZOCOR        STOP taking these medications    glimepiride 2 MG tablet  Commonly known as: AMARYL     hydroCHLOROthiazide 25 MG tablet  Commonly known as: HYDRODIURIL     lisinopril 40 MG tablet  Commonly known as: PRINIVIL;ZESTRIL     metFORMIN 1000 MG tablet  Commonly known as: GLUCOPHAGE     metFORMIN 500 MG tablet  Commonly known as: GLUCOPHAGE        ASK your doctor about these medications    fluconazole 40 MG/ML suspension  Commonly known as: DIFLUCAN  Take 10 mLs by mouth daily for 9 days  Ask about: Should I take this medication? Where to Get Your Medications      These medications were sent to [id number], [address] [place] [address]. Delia Clear [phone] [place] [phone]  [facility] [place] [id number]    Phone: [phone]   fluconazole 40 MG/ML suspension  gabapentin 100 MG capsule  levothyroxine 112 MCG tablet         Discharge Procedure Orders   CT CHEST W WO CONTRAST   Standing Status: Future Standing Exp. Date: 05/20/22     Basic Metabolic Panel   Standing Status: Standing Number of Occurrences: 6 Standing Exp. Date: 05/20/22       Time Spent on discharge is  35 mins in patient examination, evaluation, counseling as well as medication reconciliation, prescriptions for required medications, discharge plan and follow up. Electronically signed by   Gwyn Gómez MD  6/8/2021  7:40 AM      Thank you Dr. Irwin Johnson DO for the opportunity to be involved in this patient's care.

## 2021-06-14 LAB
CULTURE: NORMAL
Lab: NORMAL
SPECIMEN DESCRIPTION: NORMAL
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
## 2022-07-18 NOTE — PROGRESS NOTES
Physical Therapy  Facility/Department: [facility]  Daily Treatment Note  NAME: Erick Oreilly  : 1950  MRN: [id number]    Date of Service: 2021    Discharge Recommendations:  Patient would benefit from continued therapy after discharge   PT Equipment Recommendations  Equipment Needed: Yes  Mobility Devices: Gwenevere Yazmin: Rolling    Assessment   Body structures, Functions, Activity limitations: Decreased functional mobility ; Decreased strength;Decreased endurance;Decreased balance;Decreased high-level IADLs;Decreased ADL status  Assessment: Pt stood and ambulated 60ft with RW and CGA. Pt steady throughout with no noted LOB. Pt fatigues quickly with ambulation and has decreased endurance. Would benefit from continued PT to address functional mobility deficits and return to prior level of independence. Prognosis: Good  PT Education: Goals;PT Role;Plan of Care;General Safety; Functional Mobility Training;Transfer Training;Energy Conservation  Barriers to Learning: none  REQUIRES PT FOLLOW UP: Yes  Activity Tolerance  Activity Tolerance: Patient limited by fatigue;Patient limited by endurance     Patient Diagnosis(es): The primary encounter diagnosis was Acute pancreatitis, unspecified complication status, unspecified pancreatitis type. Diagnoses of BONNIE (acute kidney injury) ([address] Hiatal hernia, Necrosis of stomach, Strangulated paraesophageal hernia, Mediastinitis, Paraesophageal fluid collection, Essential hypertension, Hernia with obstruction, and Diabetic ketoacidosis without coma associated with type 2 diabetes mellitus ([address] were also pertinent to this visit. has a past medical history of Diabetes mellitus ([address] Gout, Hiatal hernia, Hyperlipidemia, Hypertension, and Thyroid disease. has a past surgical history that includes Cholecystectomy (); Hysterectomy (); Breast surgery; other surgical history (); other surgical history (); Tubal ligation ();  Gastric fundoplication (N/A, 5/4/2021); hc picc line double lumen (5/10/2021); and IR CHEST TUBE INSERTION (5/13/2021). Restrictions  Restrictions/Precautions  Restrictions/Precautions: General Precautions, Fall Risk  Required Braces or Orthoses?: No  Position Activity Restriction  Other position/activity restrictions: up with assist, s/p paraesphageal hernia repair 5/4  Subjective   General  Response To Previous Treatment: Patient with no complaints from previous session. Family / Caregiver Present: No  Subjective  Subjective: RN and pt agreeable to PT. pt agreeable and pleasant. Supine in bed at start of session. Pain Screening  Patient Currently in Pain: No  Pain Assessment  Pain Assessment: 0-10  Pain Level: 0  Vital Signs  Patient Currently in Pain: No       Orientation  Orientation  Overall Orientation Status: Within Functional Limits  Cognition   Cognition  Overall Cognitive Status: Exceptions  Arousal/Alertness: Delayed responses to stimuli  Following Commands: Follows multistep commands with repitition; Follows multistep commands with increased time  Attention Span: Appears intact  Memory: Appears intact  Safety Judgement: Decreased awareness of need for assistance  Problem Solving: Decreased awareness of errors  Insights: Decreased awareness of deficits  Initiation: Requires cues for some  Sequencing: Requires cues for some  Cognition Comment: Needing extended time for processing. Objective   Bed mobility  Supine to Sit: Stand by assistance  Scooting: Stand by assistance  Comment: Pt retires to chair at end of session. Upon sitting up at EOB pt reports some lightheadedness which pt reports dissipates after 2 minute sitting break. Transfers  Sit to Stand: Minimal Assistance;Contact guard assistance  Stand to sit: Contact guard assistance  Comment: RW used, cues for hand placement with fair return. Bed elevated d/t difficulty with first attempt of transfer.  A second sit to stand was complete from chair with CGA.  Ambulation  Ambulation?: Yes  More Ambulation?: No  Ambulation 1  Surface: level tile  Device: Rolling Walker  Assistance: Contact guard assistance  Gait Deviations: Slow Cami;Decreased step length;Decreased step height  Distance: 60 ft  Comments: Pt with slow and steady gait. Stairs/Curb  Stairs?: No     Balance  Posture: Fair  Sitting - Static: Good  Sitting - Dynamic: Good;-  Standing - Static: Fair  Standing - Dynamic: Fair  Comments: Standing balance assessed with RW                         Seated LE exercise program: [employer], heel/toe raises, and marches. Reps: 10x AROM BLE            Goals  Short term goals  Time Frame for Short term goals: 14 visits  Short term goal 1: Pt will be Susy with bed mobility  Short term goal 2: Pt will be Susy with transfers  Short term goal 3: Pt will amb 150' with RW and CGA  Patient Goals   Patient goals : Get tube out.     Plan    Plan  Times per week: 5-6x/week  Current Treatment Recommendations: Strengthening, Endurance Training, Functional Mobility Training, Balance Training, Transfer Training, Gait Training, Home Exercise Program, Safety Education & Training, Patient/Caregiver Education & Training, Equipment Evaluation, Education, & procurement  Safety Devices  Type of devices: Nurse notified, Call light within reach, Gait belt, All fall risk precautions in place, Chair alarm in place, Left in chair  Restraints  Initially in place: No     Therapy Time   Individual Concurrent Group Co-treatment   Time In 1762         Time Out 0903         Minutes 25         Timed Code Treatment Minutes: 2017 Roney Hong, PT Normal vision: sees adequately in most situations; can see medication labels, newsprint

## 2024-01-01 NOTE — PLAN OF CARE
Problem: Respiratory  Intervention: Respiratory assessment  Note: [facility]EL FRANCO[facility]. Acute pancreatitis, unspecified complication status, unspecified pancreatitis type [K85.90] . Vitals:    05/23/21 1536   BP: 129/81   Pulse: 95   Resp: 22   Temp: 98.2 °F (36.8 °C)   SpO2: 94%   . Patients home meds are   Prior to Admission medications    Medication Sig Start Date End Date Taking? Authorizing Provider   levothyroxine (SYNTHROID) 112 MCG tablet Take 1 tablet by mouth Daily 5/21/21  Yes Lisa Dennis MD   gabapentin (NEURONTIN) 100 MG capsule Take 1 capsule by mouth 3 times daily for 10 days. 5/20/21 5/30/21 Yes Lisa Dennis MD   fluconazole (DIFLUCAN) 40 MG/ML suspension Take 10 mLs by mouth daily for 9 days 5/21/21 5/30/21 Yes Lisa Dennis MD   SITagliptin (JANUVIA) 100 MG tablet Take 100 mg by mouth daily    Historical Provider, MD   omeprazole (PRILOSEC) 40 MG delayed release capsule Take 1 capsule by mouth every morning (before breakfast) 4/28/21   Audrey Rod DO   simvastatin (ZOCOR) 10 MG tablet Take 10 mg by mouth nightly    Historical Provider, MD   aspirin 81 MG EC tablet Take 81 mg by mouth daily    Historical Provider, MD       Assessment     Copy Pasted HP from MD notes as written,     Brief History:      71-year-old female originally presenting to outlying facility due to nausea and vomiting and was transferred for further evaluation and patient underwent emergent robotic laparoscopic hiatal hernia reduction with gastropexy via G-tube on 5/4/2021 and required intubation and was ultimately extubated on 4/6/2021. Patient underwent thoracentesis and had chest tube insertion 5/12/2021 follow-up with cardiothoracic surgery as well as pulmonology. Is a concern for possible esophageal leak and esophagram was performed on 5/7/2021 did not show any leak.   Patient was started on Diflucan due to concern for Candida albicans    05/23, On low flow oxygen with Pt bib mom, pt woke up out of sleep and was choking on spit, been congested for 2 days and spitting up, not feeding well    Lungs clear and equal throughout and no increased work of breathing noted in triage, wet diaper in triage   adequate saturations. Chart reviewed,  No etiology to find that patient will benefit bronchodilators. No wheezing. Will initiate Aerosol Protocol. Change to Albuerol Rescue. Continue to monitor and treat by protocol.        RR 18  Breath Sounds: clear      Bronchodilator assessment at level  1    [x]    Bronchodilator Assessment  BRONCHODILATOR ASSESSMENT SCORE  Score 0 1 2 3 4 5   Breath Sounds   []  Patient Baseline [x]  No Wheeze good aeration []  Faint, scattered wheezing, good aeration []  Expiratory Wheezing and or moderately diminished []  Insp/Exp wheeze and/or very diminished []  Insp/Exp and/ or marked distress   Respiratory Rate   []  Patient Baseline [x]  Less than 20 [x]  Less than 20 []  20-25 []  Greater than 25 []  Greater than 25   Peak flow % of Pred or PB [x]  NA   []  Greater than 90%  []  81-90% []  71-80% []  Less than or equal to 70%  or unable to perform []  Unable due to Respiratory Distress   Dyspnea re []  Patient Baseline [x]  No SOB [x]  No SOB []  SOB on exertion []  SOB min activity []  At rest/acute   e FEV% Predicted       [x]  NA []  Above 69%  []  Unable []  Above 60-69%  []  Unable []  Above 50-59%  []  Unable []  Above 35-49%  []  Unable []  Less than 35%  []  Unable               Intervention: Administer treatments as ordered  Note: BRONCHOSPASM/BRONCHOCONSTRICTION     [x]         IMPROVE AERATION/BREATH SOUNDS  [x]   ADMINISTER BRONCHODILATOR THERAPY AS APPROPRIATE  [x]   ASSESS BREATH SOUNDS  [x]   IMPLEMENT AEROSOL/MDI PROTOCOL  [x]   PATIENT EDUCATION AS NEEDED

## 2025-02-27 NOTE — PROGRESS NOTES
Biochemical Data, Constipation, Nausea or Vomiting, Nutrition Focused Physical Findings, Skin, Weight, Hemodynamic Status     Electronically signed by Glendy Mehta RD, ORTEGA on 5/18/21 at 12:23 PM EDT    Contact: [phone] Render Post-Care Instructions In Note?: yes Post-Care Instructions: I reviewed with the patient in detail post-care instructions. Patient is to wear sunprotection, and avoid picking at any of the treated lesions. Pt may apply Vaseline to crusted or scabbing areas. Duration Of Freeze Thaw-Cycle (Seconds): 5 Consent: The patient's consent was obtained including but not limited to risks of crusting, scabbing, blistering, scarring, darker or lighter pigmentary change, recurrence, incomplete removal and infection. Detail Level: Detailed Number Of Freeze-Thaw Cycles: 1 freeze-thaw cycle Application Tool (Optional): Liquid Nitrogen Sprayer Render Note In Bullet Format When Appropriate: No

## (undated) DEVICE — SEAL

## (undated) DEVICE — STAPLER 60: Brand: SUREFORM

## (undated) DEVICE — GLOVE ORANGE PI 8   MSG9080

## (undated) DEVICE — SCISSOR SURG METZ CRV TIP

## (undated) DEVICE — TROCAR: Brand: KII FIOS FIRST ENTRY

## (undated) DEVICE — SUTURE PERMAHAND SZ 0 L30IN NONABSORBABLE BLK L26MM SH 1/2 K834H

## (undated) DEVICE — APPLICATOR MEDICATED 26 CC SOLUTION HI LT ORNG CHLORAPREP

## (undated) DEVICE — INSUFFLATION TUBING SET WITH FILTER, FUNNEL CONNECTOR AND LUER LOCK: Brand: JOSNOE MEDICAL INC

## (undated) DEVICE — VESSEL SEALER EXTEND: Brand: ENDOWRIST

## (undated) DEVICE — GLOVE ORANGE PI 7 1/2   MSG9075

## (undated) DEVICE — SUTURE ETHLN SZ 2-0 L18IN NONABSORBABLE BLK L26MM FS 3/8 664G

## (undated) DEVICE — GLOVE ORANGE PI 7   MSG9070

## (undated) DEVICE — BLADELESS OBTURATOR: Brand: WECK VISTA

## (undated) DEVICE — TROCARS: Brand: KII® OPTICAL ACCESS SYSTEM

## (undated) DEVICE — SUTURE ABSRB BRAID COAT VLT SH 3-0 27IN VCRL J311H

## (undated) DEVICE — PLUMEPORT SEO LAPAROSCOPIC SMOKE FILTRATION DEVICE: Brand: PLUMEPORT

## (undated) DEVICE — SUTURE SZ 0 27IN 5/8 CIR UR-6  TAPER PT VIOLET ABSRB VICRYL J603H

## (undated) DEVICE — RESERVOIR,SUCTION,100CC,SILICONE: Brand: MEDLINE

## (undated) DEVICE — METER,URINE,400ML,DRAIN BAG,L/F,LL: Brand: MEDLINE

## (undated) DEVICE — TUBE GASTROSTMY 22FR MED GRD SIL FEED GAM RECESS DST TIP

## (undated) DEVICE — GAUZE,SPONGE,FLUFF,6"X6.75",STRL,5/TRAY: Brand: MEDLINE

## (undated) DEVICE — Device

## (undated) DEVICE — GOWN,SIRUS,NONRNF,SETINSLV,XL,20/CS: Brand: MEDLINE

## (undated) DEVICE — TUBING, SUCTION, 9/32" X 20', STRAIGHT: Brand: MEDLINE INDUSTRIES, INC.

## (undated) DEVICE — TIP COVER ACCESSORY

## (undated) DEVICE — SUTURE MCRYL SZ 4-0 L18IN ABSRB UD L16MM PC-3 3/8 CIR PRIM Y845G

## (undated) DEVICE — CANNULA SEAL

## (undated) DEVICE — TUBE GASTROSTMY 20FR MED GRD SIL FEED GAM RECESS DST TIP 010020] AVANOS MEDICAL]

## (undated) DEVICE — ARM DRAPE

## (undated) DEVICE — COVER LT HNDL BLU PLAS

## (undated) DEVICE — DRESSING TRNSPAR W5XL4.5IN FLM SHT SEMIPERMEABLE WIND

## (undated) DEVICE — STAPLER 60 RELOAD GREEN: Brand: SUREFORM

## (undated) DEVICE — Z DISCONTINUED MER MEDLINE NO SUB IDED DRAIN SURG W0.63XL18IN LTX PENROSE UNIF WALL THICKNESS

## (undated) DEVICE — GLOVE SURG SZ 65 THK91MIL LTX FREE SYN POLYISOPRENE

## (undated) DEVICE — SUCTION IRRIGATOR: Brand: ENDOWRIST

## (undated) DEVICE — GOWN,AURORA,NONREINFORCED,LARGE: Brand: MEDLINE

## (undated) DEVICE — REDUCER: Brand: ENDOWRIST

## (undated) DEVICE — SOLUTION ANTIFOG VIS SYS CLEARIFY LAPSCP

## (undated) DEVICE — DRESSING TRNSPAR W2XL2.75IN FLM SHT SEMIPERMEABLE WIND

## (undated) DEVICE — DRAIN SURG 19FR 100% SIL RADPQ RND CHN FULL FLUT